# Patient Record
Sex: FEMALE | Race: WHITE | Employment: OTHER | URBAN - NONMETROPOLITAN AREA
[De-identification: names, ages, dates, MRNs, and addresses within clinical notes are randomized per-mention and may not be internally consistent; named-entity substitution may affect disease eponyms.]

---

## 2017-01-11 ENCOUNTER — OFFICE VISIT (OUTPATIENT)
Dept: PRIMARY CARE CLINIC | Age: 62
End: 2017-01-11
Payer: MEDICARE

## 2017-01-11 VITALS
WEIGHT: 165 LBS | TEMPERATURE: 98.8 F | BODY MASS INDEX: 25.9 KG/M2 | RESPIRATION RATE: 16 BRPM | DIASTOLIC BLOOD PRESSURE: 80 MMHG | SYSTOLIC BLOOD PRESSURE: 130 MMHG | OXYGEN SATURATION: 96 % | HEART RATE: 80 BPM | HEIGHT: 67 IN

## 2017-01-11 DIAGNOSIS — E55.9 VITAMIN D DEFICIENCY: ICD-10-CM

## 2017-01-11 DIAGNOSIS — N28.1 RENAL CYST: ICD-10-CM

## 2017-01-11 DIAGNOSIS — J44.1 COPD EXACERBATION (HCC): ICD-10-CM

## 2017-01-11 DIAGNOSIS — J44.9 CHRONIC OBSTRUCTIVE PULMONARY DISEASE, UNSPECIFIED COPD TYPE (HCC): ICD-10-CM

## 2017-01-11 DIAGNOSIS — Z12.31 SCREENING MAMMOGRAM, ENCOUNTER FOR: ICD-10-CM

## 2017-01-11 DIAGNOSIS — F32.A DEPRESSION, UNSPECIFIED DEPRESSION TYPE: ICD-10-CM

## 2017-01-11 DIAGNOSIS — E78.5 HYPERLIPIDEMIA, UNSPECIFIED HYPERLIPIDEMIA TYPE: ICD-10-CM

## 2017-01-11 DIAGNOSIS — K21.9 GASTROESOPHAGEAL REFLUX DISEASE, ESOPHAGITIS PRESENCE NOT SPECIFIED: ICD-10-CM

## 2017-01-11 DIAGNOSIS — G25.81 RESTLESS LEGS SYNDROME: Primary | ICD-10-CM

## 2017-01-11 DIAGNOSIS — E03.9 HYPOTHYROIDISM, UNSPECIFIED TYPE: ICD-10-CM

## 2017-01-11 PROCEDURE — 99214 OFFICE O/P EST MOD 30 MIN: CPT | Performed by: FAMILY MEDICINE

## 2017-01-11 PROCEDURE — G8427 DOCREV CUR MEDS BY ELIG CLIN: HCPCS | Performed by: FAMILY MEDICINE

## 2017-01-11 PROCEDURE — 3023F SPIROM DOC REV: CPT | Performed by: FAMILY MEDICINE

## 2017-01-11 PROCEDURE — G8484 FLU IMMUNIZE NO ADMIN: HCPCS | Performed by: FAMILY MEDICINE

## 2017-01-11 PROCEDURE — G8926 SPIRO NO PERF OR DOC: HCPCS | Performed by: FAMILY MEDICINE

## 2017-01-11 PROCEDURE — G8419 CALC BMI OUT NRM PARAM NOF/U: HCPCS | Performed by: FAMILY MEDICINE

## 2017-01-11 PROCEDURE — 3014F SCREEN MAMMO DOC REV: CPT | Performed by: FAMILY MEDICINE

## 2017-01-11 PROCEDURE — 96372 THER/PROPH/DIAG INJ SC/IM: CPT | Performed by: FAMILY MEDICINE

## 2017-01-11 PROCEDURE — 1036F TOBACCO NON-USER: CPT | Performed by: FAMILY MEDICINE

## 2017-01-11 PROCEDURE — 3017F COLORECTAL CA SCREEN DOC REV: CPT | Performed by: FAMILY MEDICINE

## 2017-01-11 RX ORDER — METHYLPREDNISOLONE SODIUM SUCCINATE 40 MG/ML
40 INJECTION, POWDER, LYOPHILIZED, FOR SOLUTION INTRAMUSCULAR; INTRAVENOUS ONCE
Status: COMPLETED | OUTPATIENT
Start: 2017-01-11 | End: 2017-01-11

## 2017-01-11 RX ORDER — CHOLECALCIFEROL (VITAMIN D3) 1250 MCG
1 CAPSULE ORAL WEEKLY
Qty: 12 CAPSULE | Refills: 3 | Status: SHIPPED | OUTPATIENT
Start: 2017-01-11 | End: 2022-05-03

## 2017-01-11 RX ORDER — TIZANIDINE 4 MG/1
4 TABLET ORAL EVERY 8 HOURS PRN
Status: CANCELLED | OUTPATIENT
Start: 2017-01-11

## 2017-01-11 RX ORDER — CITALOPRAM 40 MG/1
TABLET ORAL
Qty: 30 TABLET | Refills: 6 | Status: SHIPPED | OUTPATIENT
Start: 2017-01-11 | End: 2018-02-20 | Stop reason: SDUPTHER

## 2017-01-11 RX ORDER — PREDNISONE 20 MG/1
40 TABLET ORAL DAILY
Qty: 10 TABLET | Refills: 0 | Status: SHIPPED | OUTPATIENT
Start: 2017-01-11 | End: 2017-01-16

## 2017-01-11 RX ORDER — LEVOTHYROXINE SODIUM 112 UG/1
TABLET ORAL
Qty: 30 TABLET | Refills: 11 | Status: SHIPPED | OUTPATIENT
Start: 2017-01-11 | End: 2018-02-09 | Stop reason: SDUPTHER

## 2017-01-11 RX ORDER — ROPINIROLE 0.25 MG/1
0.25 TABLET, FILM COATED ORAL NIGHTLY
Qty: 30 TABLET | Refills: 5 | Status: SHIPPED | OUTPATIENT
Start: 2017-01-11 | End: 2018-02-09 | Stop reason: SDUPTHER

## 2017-01-11 RX ORDER — DOXYCYCLINE 100 MG/1
100 CAPSULE ORAL 2 TIMES DAILY
Qty: 20 CAPSULE | Refills: 0 | Status: SHIPPED | OUTPATIENT
Start: 2017-01-11 | End: 2017-08-07

## 2017-01-11 RX ORDER — ALENDRONATE SODIUM 35 MG/1
TABLET ORAL
Qty: 4 TABLET | Refills: 6 | Status: ON HOLD | OUTPATIENT
Start: 2017-01-11 | End: 2019-03-13

## 2017-01-11 RX ORDER — GABAPENTIN 600 MG/1
TABLET ORAL
Qty: 90 TABLET | Refills: 11 | Status: SHIPPED | OUTPATIENT
Start: 2017-01-11 | End: 2018-01-29 | Stop reason: SDUPTHER

## 2017-01-11 RX ORDER — MIRTAZAPINE 15 MG/1
TABLET, FILM COATED ORAL
Qty: 30 TABLET | Refills: 5 | Status: SHIPPED | OUTPATIENT
Start: 2017-01-11 | End: 2017-07-31 | Stop reason: SDUPTHER

## 2017-01-11 RX ORDER — ALBUTEROL SULFATE 90 UG/1
2 AEROSOL, METERED RESPIRATORY (INHALATION) PRN
Qty: 1 INHALER | Refills: 6 | Status: SHIPPED | OUTPATIENT
Start: 2017-01-11 | End: 2018-01-25 | Stop reason: SDUPTHER

## 2017-01-11 RX ADMIN — METHYLPREDNISOLONE SODIUM SUCCINATE 40 MG: 40 INJECTION, POWDER, LYOPHILIZED, FOR SOLUTION INTRAMUSCULAR; INTRAVENOUS at 14:57

## 2017-01-17 ENCOUNTER — HOSPITAL ENCOUNTER (OUTPATIENT)
Dept: WOMENS IMAGING | Age: 62
Discharge: HOME | End: 2017-01-17
Payer: MEDICARE

## 2017-01-17 ENCOUNTER — HOSPITAL ENCOUNTER (OUTPATIENT)
Dept: ULTRASOUND IMAGING | Age: 62
Discharge: HOME | End: 2017-01-17
Payer: MEDICARE

## 2017-01-17 DIAGNOSIS — N28.1 RENAL CYST: ICD-10-CM

## 2017-01-17 DIAGNOSIS — E78.5 HYPERLIPIDEMIA, UNSPECIFIED HYPERLIPIDEMIA TYPE: ICD-10-CM

## 2017-01-17 DIAGNOSIS — G25.81 RESTLESS LEGS SYNDROME: ICD-10-CM

## 2017-01-17 DIAGNOSIS — Z12.31 SCREENING MAMMOGRAM, ENCOUNTER FOR: ICD-10-CM

## 2017-01-17 DIAGNOSIS — E03.9 HYPOTHYROIDISM, UNSPECIFIED TYPE: ICD-10-CM

## 2017-01-17 LAB
ALBUMIN SERPL-MCNC: 3.9 G/DL (ref 3.5–5.2)
ALP BLD-CCNC: 137 U/L (ref 35–104)
ALT SERPL-CCNC: 15 U/L (ref 5–33)
ANION GAP SERPL CALCULATED.3IONS-SCNC: 15 MMOL/L (ref 7–19)
AST SERPL-CCNC: 13 U/L (ref 5–32)
BILIRUB SERPL-MCNC: 0.3 MG/DL (ref 0.2–1.2)
BUN BLDV-MCNC: 7 MG/DL (ref 6–20)
CALCIUM SERPL-MCNC: 8.7 MG/DL (ref 8.6–10)
CHLORIDE BLD-SCNC: 100 MMOL/L (ref 98–111)
CHOLESTEROL, TOTAL: 192 MG/DL (ref 160–199)
CO2: 29 MMOL/L (ref 22–29)
CREAT SERPL-MCNC: 0.6 MG/DL (ref 0.5–0.9)
GFR NON-AFRICAN AMERICAN: >60
GLOBULIN: 2.5 G/DL
GLUCOSE BLD-MCNC: 83 MG/DL (ref 74–109)
HDLC SERPL-MCNC: 68 MG/DL (ref 65–121)
LDL CHOLESTEROL CALCULATED: 82 MG/DL
POTASSIUM SERPL-SCNC: 3.9 MMOL/L (ref 3.5–5)
SODIUM BLD-SCNC: 144 MMOL/L (ref 136–145)
T4 FREE: 1.2 NG/ML (ref 0.9–1.7)
TOTAL PROTEIN: 6.4 G/DL (ref 6.6–8.7)
TRIGL SERPL-MCNC: 209 MG/DL (ref 150–199)
TSH SERPL DL<=0.05 MIU/L-ACNC: 2.63 UIU/ML (ref 0.27–4.2)

## 2017-01-17 PROCEDURE — 76770 US EXAM ABDO BACK WALL COMP: CPT

## 2017-01-17 PROCEDURE — 77063 BREAST TOMOSYNTHESIS BI: CPT

## 2017-01-20 ENCOUNTER — TELEPHONE (OUTPATIENT)
Dept: PRIMARY CARE CLINIC | Age: 62
End: 2017-01-20

## 2017-01-25 ASSESSMENT — ENCOUNTER SYMPTOMS
CHEST TIGHTNESS: 1
HEMOPTYSIS: 0
COUGH: 1
WHEEZING: 1
COLOR CHANGE: 0

## 2017-01-25 ASSESSMENT — COPD QUESTIONNAIRES: COPD: 1

## 2017-01-31 ENCOUNTER — TELEPHONE (OUTPATIENT)
Dept: PRIMARY CARE CLINIC | Age: 62
End: 2017-01-31

## 2017-01-31 DIAGNOSIS — N28.1 CYST OF LEFT KIDNEY: Primary | ICD-10-CM

## 2017-04-11 ENCOUNTER — OFFICE VISIT (OUTPATIENT)
Dept: PRIMARY CARE CLINIC | Age: 62
End: 2017-04-11
Payer: MEDICARE

## 2017-04-11 VITALS
DIASTOLIC BLOOD PRESSURE: 84 MMHG | HEIGHT: 67 IN | BODY MASS INDEX: 26.3 KG/M2 | SYSTOLIC BLOOD PRESSURE: 122 MMHG | OXYGEN SATURATION: 97 % | HEART RATE: 90 BPM | WEIGHT: 167.6 LBS

## 2017-04-11 DIAGNOSIS — E03.9 HYPOTHYROIDISM, UNSPECIFIED TYPE: ICD-10-CM

## 2017-04-11 DIAGNOSIS — G47.33 OBSTRUCTIVE SLEEP APNEA: ICD-10-CM

## 2017-04-11 DIAGNOSIS — N30.01 ACUTE CYSTITIS WITH HEMATURIA: ICD-10-CM

## 2017-04-11 DIAGNOSIS — R35.0 INCREASED FREQUENCY OF URINATION: Primary | ICD-10-CM

## 2017-04-11 LAB
BILIRUBIN, POC: NORMAL
BLOOD URINE, POC: NORMAL
CLARITY, POC: CLEAR
COLOR, POC: YELLOW
GLUCOSE URINE, POC: NORMAL
KETONES, POC: NORMAL
LEUKOCYTE EST, POC: NORMAL
NITRITE, POC: NORMAL
PH, POC: 6
PROTEIN, POC: NORMAL
SPECIFIC GRAVITY, POC: 1.02
UROBILINOGEN, POC: 0.2

## 2017-04-11 PROCEDURE — G8427 DOCREV CUR MEDS BY ELIG CLIN: HCPCS | Performed by: FAMILY MEDICINE

## 2017-04-11 PROCEDURE — 3014F SCREEN MAMMO DOC REV: CPT | Performed by: FAMILY MEDICINE

## 2017-04-11 PROCEDURE — 99214 OFFICE O/P EST MOD 30 MIN: CPT | Performed by: FAMILY MEDICINE

## 2017-04-11 PROCEDURE — G8419 CALC BMI OUT NRM PARAM NOF/U: HCPCS | Performed by: FAMILY MEDICINE

## 2017-04-11 PROCEDURE — 3017F COLORECTAL CA SCREEN DOC REV: CPT | Performed by: FAMILY MEDICINE

## 2017-04-11 PROCEDURE — 1036F TOBACCO NON-USER: CPT | Performed by: FAMILY MEDICINE

## 2017-04-11 PROCEDURE — 81002 URINALYSIS NONAUTO W/O SCOPE: CPT | Performed by: FAMILY MEDICINE

## 2017-04-11 RX ORDER — NITROFURANTOIN 25; 75 MG/1; MG/1
100 CAPSULE ORAL 2 TIMES DAILY
Qty: 20 CAPSULE | Refills: 0 | Status: SHIPPED | OUTPATIENT
Start: 2017-04-11 | End: 2017-04-21

## 2017-04-11 RX ORDER — MODAFINIL 200 MG/1
TABLET ORAL
Qty: 60 TABLET | Refills: 3 | Status: SHIPPED | OUTPATIENT
Start: 2017-04-11 | End: 2017-07-31 | Stop reason: SDUPTHER

## 2017-04-29 ASSESSMENT — ENCOUNTER SYMPTOMS
COUGH: 0
SHORTNESS OF BREATH: 0

## 2017-07-31 DIAGNOSIS — F32.A DEPRESSION, UNSPECIFIED DEPRESSION TYPE: ICD-10-CM

## 2017-08-01 RX ORDER — MIRTAZAPINE 15 MG/1
TABLET, FILM COATED ORAL
Qty: 30 TABLET | Refills: 0 | Status: SHIPPED | OUTPATIENT
Start: 2017-08-01 | End: 2017-08-31 | Stop reason: SDUPTHER

## 2017-08-01 RX ORDER — MODAFINIL 200 MG/1
TABLET ORAL
Qty: 60 TABLET | Refills: 3 | Status: SHIPPED | OUTPATIENT
Start: 2017-08-01 | End: 2017-12-08 | Stop reason: SDUPTHER

## 2017-08-07 ENCOUNTER — OFFICE VISIT (OUTPATIENT)
Dept: PRIMARY CARE CLINIC | Age: 62
End: 2017-08-07
Payer: MEDICARE

## 2017-08-07 VITALS
WEIGHT: 162.8 LBS | SYSTOLIC BLOOD PRESSURE: 136 MMHG | TEMPERATURE: 98.9 F | OXYGEN SATURATION: 95 % | HEART RATE: 82 BPM | BODY MASS INDEX: 25.55 KG/M2 | HEIGHT: 67 IN | DIASTOLIC BLOOD PRESSURE: 84 MMHG

## 2017-08-07 DIAGNOSIS — Z99.89 CPAP (CONTINUOUS POSITIVE AIRWAY PRESSURE) DEPENDENCE: ICD-10-CM

## 2017-08-07 DIAGNOSIS — Z23 NEED FOR ZOSTAVAX ADMINISTRATION: ICD-10-CM

## 2017-08-07 DIAGNOSIS — G25.81 RESTLESS LEGS SYNDROME: ICD-10-CM

## 2017-08-07 DIAGNOSIS — E03.9 HYPOTHYROIDISM, UNSPECIFIED TYPE: ICD-10-CM

## 2017-08-07 DIAGNOSIS — G47.33 OBSTRUCTIVE SLEEP APNEA: ICD-10-CM

## 2017-08-07 DIAGNOSIS — E78.5 HYPERLIPIDEMIA, UNSPECIFIED HYPERLIPIDEMIA TYPE: ICD-10-CM

## 2017-08-07 DIAGNOSIS — N28.1 RENAL CYST: Primary | ICD-10-CM

## 2017-08-07 PROCEDURE — G8427 DOCREV CUR MEDS BY ELIG CLIN: HCPCS | Performed by: FAMILY MEDICINE

## 2017-08-07 PROCEDURE — G8419 CALC BMI OUT NRM PARAM NOF/U: HCPCS | Performed by: FAMILY MEDICINE

## 2017-08-07 PROCEDURE — 3017F COLORECTAL CA SCREEN DOC REV: CPT | Performed by: FAMILY MEDICINE

## 2017-08-07 PROCEDURE — 3014F SCREEN MAMMO DOC REV: CPT | Performed by: FAMILY MEDICINE

## 2017-08-07 PROCEDURE — 1036F TOBACCO NON-USER: CPT | Performed by: FAMILY MEDICINE

## 2017-08-07 PROCEDURE — 99214 OFFICE O/P EST MOD 30 MIN: CPT | Performed by: FAMILY MEDICINE

## 2017-08-11 ASSESSMENT — ENCOUNTER SYMPTOMS
HEMOPTYSIS: 0
COUGH: 0
CHEST TIGHTNESS: 1
WHEEZING: 0
SHORTNESS OF BREATH: 0
COLOR CHANGE: 0

## 2017-08-11 ASSESSMENT — COPD QUESTIONNAIRES: COPD: 1

## 2017-08-25 ENCOUNTER — OFFICE VISIT (OUTPATIENT)
Dept: URGENT CARE | Age: 62
End: 2017-08-25
Payer: MEDICARE

## 2017-08-25 VITALS
WEIGHT: 161 LBS | TEMPERATURE: 98.4 F | HEART RATE: 84 BPM | BODY MASS INDEX: 25.27 KG/M2 | DIASTOLIC BLOOD PRESSURE: 74 MMHG | OXYGEN SATURATION: 96 % | SYSTOLIC BLOOD PRESSURE: 131 MMHG | HEIGHT: 67 IN | RESPIRATION RATE: 18 BRPM

## 2017-08-25 DIAGNOSIS — J02.0 STREP PHARYNGITIS: Primary | ICD-10-CM

## 2017-08-25 DIAGNOSIS — J02.9 SORE THROAT: ICD-10-CM

## 2017-08-25 DIAGNOSIS — J06.9 URI WITH COUGH AND CONGESTION: ICD-10-CM

## 2017-08-25 LAB — S PYO AG THROAT QL: POSITIVE

## 2017-08-25 PROCEDURE — 87880 STREP A ASSAY W/OPTIC: CPT | Performed by: NURSE PRACTITIONER

## 2017-08-25 PROCEDURE — 99213 OFFICE O/P EST LOW 20 MIN: CPT | Performed by: NURSE PRACTITIONER

## 2017-08-25 PROCEDURE — G8419 CALC BMI OUT NRM PARAM NOF/U: HCPCS | Performed by: NURSE PRACTITIONER

## 2017-08-25 PROCEDURE — 1036F TOBACCO NON-USER: CPT | Performed by: NURSE PRACTITIONER

## 2017-08-25 PROCEDURE — 3014F SCREEN MAMMO DOC REV: CPT | Performed by: NURSE PRACTITIONER

## 2017-08-25 PROCEDURE — G8427 DOCREV CUR MEDS BY ELIG CLIN: HCPCS | Performed by: NURSE PRACTITIONER

## 2017-08-25 PROCEDURE — 3017F COLORECTAL CA SCREEN DOC REV: CPT | Performed by: NURSE PRACTITIONER

## 2017-08-25 RX ORDER — AMOXICILLIN AND CLAVULANATE POTASSIUM 875; 125 MG/1; MG/1
1 TABLET, FILM COATED ORAL EVERY 12 HOURS
Qty: 20 TABLET | Refills: 0 | Status: SHIPPED | OUTPATIENT
Start: 2017-08-25 | End: 2017-09-04

## 2017-08-25 RX ORDER — BENZONATATE 100 MG/1
100 CAPSULE ORAL 3 TIMES DAILY PRN
Qty: 21 CAPSULE | Refills: 0 | Status: SHIPPED | OUTPATIENT
Start: 2017-08-25 | End: 2017-09-01

## 2017-08-25 RX ORDER — METHYLPREDNISOLONE 4 MG/1
TABLET ORAL
Qty: 1 KIT | Refills: 0 | Status: SHIPPED | OUTPATIENT
Start: 2017-08-25 | End: 2017-08-31

## 2017-08-25 RX ORDER — GUAIFENESIN AND DEXTROMETHORPHAN HYDROBROMIDE 1200; 60 MG/1; MG/1
1 TABLET, EXTENDED RELEASE ORAL EVERY 12 HOURS
Qty: 28 TABLET | Refills: 0 | Status: SHIPPED | OUTPATIENT
Start: 2017-08-25 | End: 2018-02-21

## 2017-08-25 ASSESSMENT — ENCOUNTER SYMPTOMS
SINUS PRESSURE: 1
SHORTNESS OF BREATH: 1
COUGH: 1

## 2017-08-31 DIAGNOSIS — F32.A DEPRESSION, UNSPECIFIED DEPRESSION TYPE: ICD-10-CM

## 2017-08-31 RX ORDER — MIRTAZAPINE 15 MG/1
TABLET, FILM COATED ORAL
Qty: 30 TABLET | Refills: 0 | Status: SHIPPED | OUTPATIENT
Start: 2017-08-31 | End: 2017-10-05 | Stop reason: SDUPTHER

## 2017-09-05 ENCOUNTER — HOSPITAL ENCOUNTER (OUTPATIENT)
Dept: GENERAL RADIOLOGY | Age: 62
Discharge: HOME | End: 2017-09-05
Payer: MEDICARE

## 2017-09-05 ENCOUNTER — OFFICE VISIT (OUTPATIENT)
Dept: URGENT CARE | Age: 62
End: 2017-09-05
Payer: MEDICARE

## 2017-09-05 VITALS
RESPIRATION RATE: 18 BRPM | OXYGEN SATURATION: 98 % | BODY MASS INDEX: 25.27 KG/M2 | WEIGHT: 161 LBS | HEART RATE: 98 BPM | TEMPERATURE: 97.3 F | HEIGHT: 67 IN | SYSTOLIC BLOOD PRESSURE: 139 MMHG | DIASTOLIC BLOOD PRESSURE: 80 MMHG

## 2017-09-05 DIAGNOSIS — R06.2 WHEEZING: ICD-10-CM

## 2017-09-05 DIAGNOSIS — R06.02 SHORTNESS OF BREATH: ICD-10-CM

## 2017-09-05 DIAGNOSIS — J06.9 URI WITH COUGH AND CONGESTION: Primary | ICD-10-CM

## 2017-09-05 PROCEDURE — 96372 THER/PROPH/DIAG INJ SC/IM: CPT | Performed by: NURSE PRACTITIONER

## 2017-09-05 PROCEDURE — G8419 CALC BMI OUT NRM PARAM NOF/U: HCPCS | Performed by: NURSE PRACTITIONER

## 2017-09-05 PROCEDURE — 99213 OFFICE O/P EST LOW 20 MIN: CPT | Performed by: NURSE PRACTITIONER

## 2017-09-05 PROCEDURE — 3014F SCREEN MAMMO DOC REV: CPT | Performed by: NURSE PRACTITIONER

## 2017-09-05 PROCEDURE — 1036F TOBACCO NON-USER: CPT | Performed by: NURSE PRACTITIONER

## 2017-09-05 PROCEDURE — G8427 DOCREV CUR MEDS BY ELIG CLIN: HCPCS | Performed by: NURSE PRACTITIONER

## 2017-09-05 PROCEDURE — 3017F COLORECTAL CA SCREEN DOC REV: CPT | Performed by: NURSE PRACTITIONER

## 2017-09-05 PROCEDURE — 71020 XR CHEST STANDARD TWO VW: CPT

## 2017-09-05 RX ORDER — FLUTICASONE PROPIONATE 50 MCG
1 SPRAY, SUSPENSION (ML) NASAL DAILY
Qty: 1 BOTTLE | Refills: 0 | Status: SHIPPED | OUTPATIENT
Start: 2017-09-05 | End: 2018-02-21

## 2017-09-05 RX ORDER — DEXTROMETHORPHAN HYDROBROMIDE AND PROMETHAZINE HYDROCHLORIDE 15; 6.25 MG/5ML; MG/5ML
5 SYRUP ORAL NIGHTLY PRN
Qty: 118 ML | Refills: 0 | Status: SHIPPED | OUTPATIENT
Start: 2017-09-05 | End: 2017-09-12

## 2017-09-05 RX ORDER — DEXAMETHASONE SODIUM PHOSPHATE 10 MG/ML
10 INJECTION INTRAMUSCULAR; INTRAVENOUS ONCE
Status: COMPLETED | OUTPATIENT
Start: 2017-09-05 | End: 2017-09-05

## 2017-09-05 RX ORDER — CETIRIZINE HYDROCHLORIDE 10 MG/1
10 TABLET ORAL DAILY
Qty: 30 TABLET | Refills: 0 | Status: SHIPPED | OUTPATIENT
Start: 2017-09-05 | End: 2018-02-21

## 2017-09-05 RX ADMIN — DEXAMETHASONE SODIUM PHOSPHATE 10 MG: 10 INJECTION INTRAMUSCULAR; INTRAVENOUS at 12:35

## 2017-09-05 ASSESSMENT — ENCOUNTER SYMPTOMS
COUGH: 1
WHEEZING: 1
SHORTNESS OF BREATH: 1
SORE THROAT: 0

## 2017-10-05 DIAGNOSIS — F32.A DEPRESSION, UNSPECIFIED DEPRESSION TYPE: ICD-10-CM

## 2017-10-06 RX ORDER — MIRTAZAPINE 15 MG/1
TABLET, FILM COATED ORAL
Qty: 30 TABLET | Refills: 0 | Status: SHIPPED | OUTPATIENT
Start: 2017-10-06 | End: 2017-11-10 | Stop reason: SDUPTHER

## 2017-11-10 DIAGNOSIS — F32.A DEPRESSION, UNSPECIFIED DEPRESSION TYPE: ICD-10-CM

## 2017-11-10 RX ORDER — MIRTAZAPINE 15 MG/1
TABLET, FILM COATED ORAL
Qty: 30 TABLET | Refills: 0 | Status: SHIPPED | OUTPATIENT
Start: 2017-11-10 | End: 2017-12-11 | Stop reason: SDUPTHER

## 2017-12-08 RX ORDER — MODAFINIL 200 MG/1
TABLET ORAL
Qty: 60 TABLET | Refills: 3 | Status: SHIPPED | OUTPATIENT
Start: 2017-12-08 | End: 2018-04-23 | Stop reason: SDUPTHER

## 2017-12-08 NOTE — TELEPHONE ENCOUNTER
patient called left message with request for refill on providgal. Last office visit 9-5 with next scheduled appointment 2-7  Dose verified.    Last UDS  unknown    Last fill per chart 8-1 with 3

## 2017-12-11 DIAGNOSIS — F32.A DEPRESSION, UNSPECIFIED DEPRESSION TYPE: ICD-10-CM

## 2017-12-12 RX ORDER — MIRTAZAPINE 15 MG/1
TABLET, FILM COATED ORAL
Qty: 30 TABLET | Refills: 0 | Status: SHIPPED | OUTPATIENT
Start: 2017-12-12 | End: 2017-12-22 | Stop reason: SDUPTHER

## 2017-12-22 DIAGNOSIS — F32.A DEPRESSION, UNSPECIFIED DEPRESSION TYPE: ICD-10-CM

## 2017-12-22 RX ORDER — MIRTAZAPINE 15 MG/1
TABLET, FILM COATED ORAL
Qty: 30 TABLET | Refills: 0 | Status: SHIPPED | OUTPATIENT
Start: 2017-12-22 | End: 2018-02-15 | Stop reason: SDUPTHER

## 2018-01-25 DIAGNOSIS — J44.9 CHRONIC OBSTRUCTIVE PULMONARY DISEASE, UNSPECIFIED COPD TYPE (HCC): ICD-10-CM

## 2018-01-30 RX ORDER — GABAPENTIN 600 MG/1
TABLET ORAL
Qty: 90 TABLET | Refills: 5 | Status: SHIPPED | OUTPATIENT
Start: 2018-01-30 | End: 2018-08-16 | Stop reason: SDUPTHER

## 2018-02-09 DIAGNOSIS — G25.81 RESTLESS LEGS SYNDROME: ICD-10-CM

## 2018-02-09 RX ORDER — ROPINIROLE 0.25 MG/1
TABLET, FILM COATED ORAL
Qty: 30 TABLET | Refills: 0 | Status: SHIPPED | OUTPATIENT
Start: 2018-02-09 | End: 2018-03-16 | Stop reason: SDUPTHER

## 2018-02-09 RX ORDER — LEVOTHYROXINE SODIUM 112 UG/1
TABLET ORAL
Qty: 30 TABLET | Refills: 0 | Status: SHIPPED | OUTPATIENT
Start: 2018-02-09 | End: 2018-03-12 | Stop reason: SDUPTHER

## 2018-02-15 DIAGNOSIS — F32.A DEPRESSION, UNSPECIFIED DEPRESSION TYPE: ICD-10-CM

## 2018-02-17 RX ORDER — MIRTAZAPINE 15 MG/1
TABLET, FILM COATED ORAL
Qty: 30 TABLET | Refills: 0 | Status: SHIPPED | OUTPATIENT
Start: 2018-02-17 | End: 2018-05-21 | Stop reason: SDUPTHER

## 2018-02-21 ENCOUNTER — OFFICE VISIT (OUTPATIENT)
Dept: PRIMARY CARE CLINIC | Age: 63
End: 2018-02-21
Payer: MEDICARE

## 2018-02-21 VITALS
WEIGHT: 158.6 LBS | HEIGHT: 67 IN | HEART RATE: 75 BPM | DIASTOLIC BLOOD PRESSURE: 76 MMHG | BODY MASS INDEX: 24.89 KG/M2 | TEMPERATURE: 98.6 F | OXYGEN SATURATION: 93 % | SYSTOLIC BLOOD PRESSURE: 110 MMHG

## 2018-02-21 DIAGNOSIS — Z99.89 CPAP (CONTINUOUS POSITIVE AIRWAY PRESSURE) DEPENDENCE: ICD-10-CM

## 2018-02-21 DIAGNOSIS — E78.5 HYPERLIPIDEMIA, UNSPECIFIED HYPERLIPIDEMIA TYPE: ICD-10-CM

## 2018-02-21 DIAGNOSIS — E03.9 HYPOTHYROIDISM, UNSPECIFIED TYPE: Primary | ICD-10-CM

## 2018-02-21 DIAGNOSIS — N28.1 RENAL CYST: ICD-10-CM

## 2018-02-21 DIAGNOSIS — F32.A DEPRESSION, UNSPECIFIED DEPRESSION TYPE: ICD-10-CM

## 2018-02-21 DIAGNOSIS — Z23 NEEDS FLU SHOT: ICD-10-CM

## 2018-02-21 PROCEDURE — 90686 IIV4 VACC NO PRSV 0.5 ML IM: CPT | Performed by: FAMILY MEDICINE

## 2018-02-21 PROCEDURE — G0008 ADMIN INFLUENZA VIRUS VAC: HCPCS | Performed by: FAMILY MEDICINE

## 2018-02-21 PROCEDURE — 99214 OFFICE O/P EST MOD 30 MIN: CPT | Performed by: FAMILY MEDICINE

## 2018-02-21 PROCEDURE — 3017F COLORECTAL CA SCREEN DOC REV: CPT | Performed by: FAMILY MEDICINE

## 2018-02-21 PROCEDURE — G8427 DOCREV CUR MEDS BY ELIG CLIN: HCPCS | Performed by: FAMILY MEDICINE

## 2018-02-21 PROCEDURE — G8484 FLU IMMUNIZE NO ADMIN: HCPCS | Performed by: FAMILY MEDICINE

## 2018-02-21 PROCEDURE — 3014F SCREEN MAMMO DOC REV: CPT | Performed by: FAMILY MEDICINE

## 2018-02-21 PROCEDURE — 1036F TOBACCO NON-USER: CPT | Performed by: FAMILY MEDICINE

## 2018-02-21 PROCEDURE — G8420 CALC BMI NORM PARAMETERS: HCPCS | Performed by: FAMILY MEDICINE

## 2018-02-21 ASSESSMENT — PATIENT HEALTH QUESTIONNAIRE - PHQ9
SUM OF ALL RESPONSES TO PHQ9 QUESTIONS 1 & 2: 0
2. FEELING DOWN, DEPRESSED OR HOPELESS: 0
SUM OF ALL RESPONSES TO PHQ QUESTIONS 1-9: 0
1. LITTLE INTEREST OR PLEASURE IN DOING THINGS: 0

## 2018-02-21 ASSESSMENT — ENCOUNTER SYMPTOMS
COLOR CHANGE: 0
COUGH: 0
SHORTNESS OF BREATH: 0

## 2018-03-05 DIAGNOSIS — E55.9 VITAMIN D DEFICIENCY: ICD-10-CM

## 2018-03-05 RX ORDER — ERGOCALCIFEROL 1.25 MG/1
CAPSULE ORAL WEEKLY
Qty: 4 CAPSULE | Refills: 2 | Status: SHIPPED | OUTPATIENT
Start: 2018-03-05 | End: 2018-11-30 | Stop reason: SDUPTHER

## 2018-03-09 ENCOUNTER — HOSPITAL ENCOUNTER (OUTPATIENT)
Dept: ULTRASOUND IMAGING | Age: 63
Discharge: HOME | End: 2018-03-09
Payer: MEDICARE

## 2018-03-09 DIAGNOSIS — E03.9 HYPOTHYROIDISM, UNSPECIFIED TYPE: ICD-10-CM

## 2018-03-09 DIAGNOSIS — E78.5 HYPERLIPIDEMIA, UNSPECIFIED HYPERLIPIDEMIA TYPE: ICD-10-CM

## 2018-03-09 DIAGNOSIS — N28.1 RENAL CYST: ICD-10-CM

## 2018-03-09 LAB
ALBUMIN SERPL-MCNC: 4.2 G/DL (ref 3.5–5.2)
ALP BLD-CCNC: 146 U/L (ref 35–104)
ALT SERPL-CCNC: 12 U/L (ref 5–33)
ANION GAP SERPL CALCULATED.3IONS-SCNC: 11 MMOL/L (ref 7–19)
AST SERPL-CCNC: 14 U/L (ref 5–32)
BILIRUB SERPL-MCNC: 0.3 MG/DL (ref 0.2–1.2)
BUN BLDV-MCNC: 11 MG/DL (ref 8–23)
CALCIUM SERPL-MCNC: 9.1 MG/DL (ref 8.8–10.2)
CHLORIDE BLD-SCNC: 100 MMOL/L (ref 98–111)
CHOLESTEROL, TOTAL: 190 MG/DL (ref 160–199)
CO2: 30 MMOL/L (ref 22–29)
CREAT SERPL-MCNC: 0.6 MG/DL (ref 0.5–0.9)
GFR NON-AFRICAN AMERICAN: >60
GLUCOSE BLD-MCNC: 97 MG/DL (ref 74–109)
HDLC SERPL-MCNC: 71 MG/DL (ref 65–121)
LDL CHOLESTEROL CALCULATED: 101 MG/DL
POTASSIUM SERPL-SCNC: 3.5 MMOL/L (ref 3.5–5)
SODIUM BLD-SCNC: 141 MMOL/L (ref 136–145)
T4 FREE: 1.5 NG/DL (ref 0.9–1.7)
TOTAL PROTEIN: 6.9 G/DL (ref 6.6–8.7)
TRIGL SERPL-MCNC: 91 MG/DL (ref 0–149)
TSH SERPL DL<=0.05 MIU/L-ACNC: 0.37 UIU/ML (ref 0.27–4.2)

## 2018-03-09 PROCEDURE — 76770 US EXAM ABDO BACK WALL COMP: CPT

## 2018-03-13 RX ORDER — LEVOTHYROXINE SODIUM 112 UG/1
TABLET ORAL
Qty: 30 TABLET | Refills: 11 | Status: SHIPPED | OUTPATIENT
Start: 2018-03-13 | End: 2019-04-15 | Stop reason: SDUPTHER

## 2018-03-14 ENCOUNTER — TELEPHONE (OUTPATIENT)
Dept: PRIMARY CARE CLINIC | Age: 63
End: 2018-03-14

## 2018-03-15 ENCOUNTER — TELEPHONE (OUTPATIENT)
Dept: PRIMARY CARE CLINIC | Age: 63
End: 2018-03-15

## 2018-04-23 RX ORDER — MODAFINIL 200 MG/1
TABLET ORAL
Qty: 60 TABLET | Refills: 0 | Status: SHIPPED | OUTPATIENT
Start: 2018-04-23 | End: 2018-05-10 | Stop reason: SDUPTHER

## 2018-05-01 ENCOUNTER — TELEPHONE (OUTPATIENT)
Dept: PRIMARY CARE CLINIC | Age: 63
End: 2018-05-01

## 2018-05-03 ENCOUNTER — TELEPHONE (OUTPATIENT)
Dept: PRIMARY CARE CLINIC | Age: 63
End: 2018-05-03

## 2018-05-09 ENCOUNTER — TELEPHONE (OUTPATIENT)
Dept: PRIMARY CARE CLINIC | Age: 63
End: 2018-05-09

## 2018-05-11 RX ORDER — MODAFINIL 200 MG/1
TABLET ORAL
Qty: 60 TABLET | Refills: 0 | Status: SHIPPED | OUTPATIENT
Start: 2018-05-11 | End: 2018-06-12 | Stop reason: SDUPTHER

## 2018-05-21 DIAGNOSIS — F32.A DEPRESSION, UNSPECIFIED DEPRESSION TYPE: ICD-10-CM

## 2018-05-23 RX ORDER — MIRTAZAPINE 15 MG/1
TABLET, FILM COATED ORAL
Qty: 30 TABLET | Refills: 3 | Status: SHIPPED | OUTPATIENT
Start: 2018-05-23 | End: 2018-11-02 | Stop reason: SDUPTHER

## 2018-06-12 DIAGNOSIS — G47.30 SLEEP APNEA, UNSPECIFIED TYPE: Primary | ICD-10-CM

## 2018-06-15 RX ORDER — MODAFINIL 200 MG/1
TABLET ORAL
Qty: 60 TABLET | Refills: 0 | Status: SHIPPED | OUTPATIENT
Start: 2018-06-15 | End: 2018-07-16 | Stop reason: SDUPTHER

## 2018-07-10 ENCOUNTER — APPOINTMENT (OUTPATIENT)
Dept: GENERAL RADIOLOGY | Age: 63
End: 2018-07-10
Payer: MEDICARE

## 2018-07-10 ENCOUNTER — APPOINTMENT (OUTPATIENT)
Dept: CT IMAGING | Age: 63
End: 2018-07-10
Payer: MEDICARE

## 2018-07-10 ENCOUNTER — HOSPITAL ENCOUNTER (EMERGENCY)
Age: 63
Discharge: HOME | End: 2018-07-10
Attending: EMERGENCY MEDICINE
Payer: MEDICARE

## 2018-07-10 VITALS
OXYGEN SATURATION: 93 % | DIASTOLIC BLOOD PRESSURE: 65 MMHG | HEART RATE: 89 BPM | WEIGHT: 160 LBS | HEIGHT: 67 IN | TEMPERATURE: 98 F | RESPIRATION RATE: 16 BRPM | BODY MASS INDEX: 25.11 KG/M2 | SYSTOLIC BLOOD PRESSURE: 110 MMHG

## 2018-07-10 DIAGNOSIS — M79.10 MYALGIA: ICD-10-CM

## 2018-07-10 DIAGNOSIS — E87.6 HYPOKALEMIA: ICD-10-CM

## 2018-07-10 DIAGNOSIS — R53.1 GENERALIZED WEAKNESS: Primary | ICD-10-CM

## 2018-07-10 DIAGNOSIS — R51.9 ACUTE NONINTRACTABLE HEADACHE, UNSPECIFIED HEADACHE TYPE: ICD-10-CM

## 2018-07-10 DIAGNOSIS — J44.1 COPD EXACERBATION (HCC): ICD-10-CM

## 2018-07-10 LAB
ALBUMIN SERPL-MCNC: 3.8 G/DL (ref 3.5–5.2)
ALP BLD-CCNC: 147 U/L (ref 35–104)
ALT SERPL-CCNC: 25 U/L (ref 5–33)
ANION GAP SERPL CALCULATED.3IONS-SCNC: 12 MMOL/L (ref 7–19)
AST SERPL-CCNC: 17 U/L (ref 5–32)
ATYPICAL LYMPHOCYTE RELATIVE PERCENT: 5 % (ref 0–8)
BASOPHILS ABSOLUTE: 0.1 K/UL (ref 0–0.2)
BASOPHILS MANUAL: 1 %
BASOPHILS RELATIVE PERCENT: 1 % (ref 0–1)
BILIRUB SERPL-MCNC: <0.2 MG/DL (ref 0.2–1.2)
BILIRUBIN URINE: NEGATIVE
BLOOD, URINE: NEGATIVE
BUN BLDV-MCNC: 5 MG/DL (ref 8–23)
CALCIUM SERPL-MCNC: 8.6 MG/DL (ref 8.8–10.2)
CHLORIDE BLD-SCNC: 101 MMOL/L (ref 98–111)
CLARITY: CLEAR
CO2: 31 MMOL/L (ref 22–29)
COLOR: YELLOW
CREAT SERPL-MCNC: 0.6 MG/DL (ref 0.5–0.9)
EOSINOPHILS ABSOLUTE: 0.12 K/UL (ref 0–0.6)
EOSINOPHILS RELATIVE PERCENT: 2 % (ref 0–5)
GFR NON-AFRICAN AMERICAN: >60
GLUCOSE BLD-MCNC: 99 MG/DL (ref 74–109)
GLUCOSE URINE: NEGATIVE MG/DL
HCT VFR BLD CALC: 34.7 % (ref 37–47)
HEMOGLOBIN: 11.4 G/DL (ref 12–16)
HYPOCHROMIA: ABNORMAL
KETONES, URINE: NEGATIVE MG/DL
LEUKOCYTE ESTERASE, URINE: NEGATIVE
LYMPHOCYTES ABSOLUTE: 2.6 K/UL (ref 1.1–4.5)
LYMPHOCYTES RELATIVE PERCENT: 37 % (ref 20–40)
MCH RBC QN AUTO: 29.5 PG (ref 27–31)
MCHC RBC AUTO-ENTMCNC: 32.9 G/DL (ref 33–37)
MCV RBC AUTO: 89.9 FL (ref 81–99)
MONOCYTES ABSOLUTE: 0.1 K/UL (ref 0–0.9)
MONOCYTES RELATIVE PERCENT: 1 % (ref 0–10)
NEUTROPHILS ABSOLUTE: 3.3 K/UL (ref 1.5–7.5)
NEUTROPHILS MANUAL: 54 %
NEUTROPHILS RELATIVE PERCENT: 54 % (ref 50–65)
NITRITE, URINE: NEGATIVE
OVALOCYTES: ABNORMAL
PDW BLD-RTO: 13.2 % (ref 11.5–14.5)
PERFORMED ON: NORMAL
PH UA: 6
PLATELET # BLD: 289 K/UL (ref 130–400)
PLATELET SLIDE REVIEW: ADEQUATE
PMV BLD AUTO: 9.6 FL (ref 9.4–12.3)
POC TROPONIN I: 0 NG/ML (ref 0–0.08)
POLYCHROMASIA: ABNORMAL
POTASSIUM SERPL-SCNC: 3 MMOL/L (ref 3.5–5)
PROTEIN UA: NEGATIVE MG/DL
RBC # BLD: 3.86 M/UL (ref 4.2–5.4)
SODIUM BLD-SCNC: 144 MMOL/L (ref 136–145)
SPECIFIC GRAVITY UA: 1.01
TOTAL CK: 47 U/L (ref 26–192)
TOTAL PROTEIN: 6.5 G/DL (ref 6.6–8.7)
TROPONIN: <0.01 NG/ML (ref 0–0.03)
URINE REFLEX TO CULTURE: NORMAL
UROBILINOGEN, URINE: 0.2 E.U./DL
WBC # BLD: 6.1 K/UL (ref 4.8–10.8)

## 2018-07-10 PROCEDURE — 85025 COMPLETE CBC W/AUTO DIFF WBC: CPT

## 2018-07-10 PROCEDURE — 6370000000 HC RX 637 (ALT 250 FOR IP): Performed by: EMERGENCY MEDICINE

## 2018-07-10 PROCEDURE — 84484 ASSAY OF TROPONIN QUANT: CPT

## 2018-07-10 PROCEDURE — 99285 EMERGENCY DEPT VISIT HI MDM: CPT

## 2018-07-10 PROCEDURE — 81003 URINALYSIS AUTO W/O SCOPE: CPT

## 2018-07-10 PROCEDURE — 96366 THER/PROPH/DIAG IV INF ADDON: CPT

## 2018-07-10 PROCEDURE — 80053 COMPREHEN METABOLIC PANEL: CPT

## 2018-07-10 PROCEDURE — 96375 TX/PRO/DX INJ NEW DRUG ADDON: CPT

## 2018-07-10 PROCEDURE — 6360000002 HC RX W HCPCS: Performed by: EMERGENCY MEDICINE

## 2018-07-10 PROCEDURE — 36415 COLL VENOUS BLD VENIPUNCTURE: CPT

## 2018-07-10 PROCEDURE — 70450 CT HEAD/BRAIN W/O DYE: CPT

## 2018-07-10 PROCEDURE — 94640 AIRWAY INHALATION TREATMENT: CPT

## 2018-07-10 PROCEDURE — 82550 ASSAY OF CK (CPK): CPT

## 2018-07-10 PROCEDURE — 96365 THER/PROPH/DIAG IV INF INIT: CPT

## 2018-07-10 PROCEDURE — 2580000003 HC RX 258: Performed by: EMERGENCY MEDICINE

## 2018-07-10 PROCEDURE — 99284 EMERGENCY DEPT VISIT MOD MDM: CPT | Performed by: EMERGENCY MEDICINE

## 2018-07-10 PROCEDURE — 71045 X-RAY EXAM CHEST 1 VIEW: CPT

## 2018-07-10 PROCEDURE — 93005 ELECTROCARDIOGRAM TRACING: CPT

## 2018-07-10 RX ORDER — ONDANSETRON 2 MG/ML
4 INJECTION INTRAMUSCULAR; INTRAVENOUS ONCE
Status: COMPLETED | OUTPATIENT
Start: 2018-07-10 | End: 2018-07-10

## 2018-07-10 RX ORDER — FENTANYL CITRATE 50 UG/ML
50 INJECTION, SOLUTION INTRAMUSCULAR; INTRAVENOUS ONCE
Status: COMPLETED | OUTPATIENT
Start: 2018-07-10 | End: 2018-07-10

## 2018-07-10 RX ORDER — POTASSIUM CHLORIDE 3 G/15ML
40 SOLUTION ORAL ONCE
Status: COMPLETED | OUTPATIENT
Start: 2018-07-10 | End: 2018-07-10

## 2018-07-10 RX ORDER — IPRATROPIUM BROMIDE AND ALBUTEROL SULFATE 2.5; .5 MG/3ML; MG/3ML
1 SOLUTION RESPIRATORY (INHALATION)
Status: DISCONTINUED | OUTPATIENT
Start: 2018-07-10 | End: 2018-07-10

## 2018-07-10 RX ORDER — MAGNESIUM SULFATE 1 G/100ML
1 INJECTION INTRAVENOUS ONCE
Status: COMPLETED | OUTPATIENT
Start: 2018-07-10 | End: 2018-07-10

## 2018-07-10 RX ORDER — IPRATROPIUM BROMIDE AND ALBUTEROL SULFATE 2.5; .5 MG/3ML; MG/3ML
1 SOLUTION RESPIRATORY (INHALATION) ONCE
Status: COMPLETED | OUTPATIENT
Start: 2018-07-10 | End: 2018-07-10

## 2018-07-10 RX ORDER — AZITHROMYCIN 250 MG/1
TABLET, FILM COATED ORAL
Qty: 1 PACKET | Refills: 0 | Status: SHIPPED | OUTPATIENT
Start: 2018-07-10 | End: 2018-07-14

## 2018-07-10 RX ORDER — 0.9 % SODIUM CHLORIDE 0.9 %
1000 INTRAVENOUS SOLUTION INTRAVENOUS ONCE
Status: COMPLETED | OUTPATIENT
Start: 2018-07-10 | End: 2018-07-10

## 2018-07-10 RX ORDER — METHYLPREDNISOLONE 4 MG/1
TABLET ORAL
Qty: 1 KIT | Refills: 0 | Status: ON HOLD | OUTPATIENT
Start: 2018-07-10 | End: 2019-03-13

## 2018-07-10 RX ORDER — ALBUTEROL SULFATE 90 UG/1
2 AEROSOL, METERED RESPIRATORY (INHALATION) EVERY 6 HOURS PRN
Qty: 1 INHALER | Refills: 3 | Status: SHIPPED | OUTPATIENT
Start: 2018-07-10 | End: 2019-04-15 | Stop reason: SDUPTHER

## 2018-07-10 RX ADMIN — Medication 40 MEQ: at 14:33

## 2018-07-10 RX ADMIN — ONDANSETRON 4 MG: 2 INJECTION INTRAMUSCULAR; INTRAVENOUS at 14:41

## 2018-07-10 RX ADMIN — SODIUM CHLORIDE 1000 ML: 9 INJECTION, SOLUTION INTRAVENOUS at 14:40

## 2018-07-10 RX ADMIN — FENTANYL CITRATE 50 MCG: 50 INJECTION, SOLUTION INTRAMUSCULAR; INTRAVENOUS at 14:43

## 2018-07-10 RX ADMIN — MAGNESIUM SULFATE HEPTAHYDRATE 1 G: 1 INJECTION, SOLUTION INTRAVENOUS at 14:45

## 2018-07-10 RX ADMIN — IPRATROPIUM BROMIDE AND ALBUTEROL SULFATE 1 AMPULE: .5; 3 SOLUTION RESPIRATORY (INHALATION) at 13:42

## 2018-07-10 ASSESSMENT — PAIN DESCRIPTION - PAIN TYPE
TYPE: ACUTE PAIN
TYPE: ACUTE PAIN

## 2018-07-10 ASSESSMENT — PAIN SCALES - GENERAL
PAINLEVEL_OUTOF10: 10
PAINLEVEL_OUTOF10: 10
PAINLEVEL_OUTOF10: 6

## 2018-07-10 ASSESSMENT — ENCOUNTER SYMPTOMS
COUGH: 0
NAUSEA: 0
DIARRHEA: 0
ABDOMINAL PAIN: 0
SORE THROAT: 0
SHORTNESS OF BREATH: 0
BACK PAIN: 0
RHINORRHEA: 0
VOMITING: 0

## 2018-07-10 NOTE — ED PROVIDER NOTES
INHALE ONCE DAILY. TIZANIDINE (ZANAFLEX) 4 MG TABLET    Take 4 mg by mouth every 8 hours as needed     VENTOLIN  (90 BASE) MCG/ACT INHALER    USE TWO PUFFS BY MOUTH AS NEEDED FOR WHEEZING    VITAMIN D (ERGOCALCIFEROL) 03755 UNITS CAPS CAPSULE    TAKE 1 CAPSULE BY MOUTH ONCE A WEEK       ALLERGIES     Codeine    FAMILY HISTORY       Family History   Problem Relation Age of Onset    Heart Disease Mother     Diabetes Father     Heart Disease Father     Diabetes Sister     Heart Disease Sister     Diabetes Brother     Heart Disease Brother     Cancer Maternal Aunt         breast    Cancer Paternal Aunt         lung cancer    Diabetes Sister     Heart Disease Sister     Diabetes Brother     Cancer Son         kidney          SOCIAL HISTORY       Social History     Social History    Marital status:      Spouse name: N/A    Number of children: N/A    Years of education: N/A     Social History Main Topics    Smoking status: Former Smoker     Packs/day: 0.00     Years: 37.00     Quit date: 2/3/2014    Smokeless tobacco: Never Used    Alcohol use No    Drug use: No    Sexual activity: Not Asked     Other Topics Concern    None     Social History Narrative    None       SCREENINGS             PHYSICAL EXAM    (up to 7 for level 4, 8 or more for level 5)     ED Triage Vitals [07/10/18 1308]   BP Temp Temp Source Pulse Resp SpO2 Height Weight   121/75 97.6 °F (36.4 °C) Temporal 95 20 95 % 5' 7\" (1.702 m) 160 lb (72.6 kg)       Physical Exam   Constitutional: She is oriented to person, place, and time. She appears well-developed and well-nourished. No distress. HENT:   Head: Normocephalic and atraumatic. Right Ear: External ear normal.   Left Ear: External ear normal.   Eyes: Conjunctivae and EOM are normal. Pupils are equal, round, and reactive to light. Neck: Normal range of motion. No tracheal deviation present.    Cardiovascular: Normal rate, regular rhythm, normal heart sounds and then take 1 tablet (250 mg) on days 2 through 5. METHYLPREDNISOLONE (MEDROL, AJ,) 4 MG TABLET    Take by mouth.           (Please note that portions of this note were completed with a voice recognition program.  Efforts were made to edit the dictations but occasionally words are mis-transcribed.)    Arti Flynn MD (electronically signed)  Attending Emergency Physician       Layne Rizvi MD  07/10/18 [id number]

## 2018-07-12 LAB
EKG P AXIS: 62 DEGREES
EKG P-R INTERVAL: 136 MS
EKG Q-T INTERVAL: 396 MS
EKG QRS DURATION: 94 MS
EKG QTC CALCULATION (BAZETT): 432 MS
EKG T AXIS: 37 DEGREES

## 2018-07-16 DIAGNOSIS — G47.30 SLEEP APNEA, UNSPECIFIED TYPE: ICD-10-CM

## 2018-07-16 RX ORDER — MODAFINIL 200 MG/1
TABLET ORAL
Qty: 60 TABLET | Refills: 0 | Status: SHIPPED | OUTPATIENT
Start: 2018-07-16 | End: 2018-08-15

## 2018-07-17 RX ORDER — MODAFINIL 200 MG/1
TABLET ORAL
Qty: 60 TABLET | Refills: 0 | Status: SHIPPED | OUTPATIENT
Start: 2018-07-17 | End: 2018-08-16 | Stop reason: SDUPTHER

## 2018-07-17 NOTE — TELEPHONE ENCOUNTER
[place] [facility] is scanned,06/2018    Requested Prescriptions     Pending Prescriptions Disp Refills    modafinil (PROVIGIL) 200 MG tablet [Pharmacy Med Name: MODAFINIL 200 MG TABLET] 60 tablet 0     Sig: TAKE 1 TABLET BY MOUTH AT 6AM AND 1 TABLET AT NOON     No UDS on file    Future Appointments  Date Time Provider Tanja Hernandez   8/23/2018 11:15 AM Sami Groves MD [address]

## 2018-08-16 DIAGNOSIS — G47.30 SLEEP APNEA, UNSPECIFIED TYPE: ICD-10-CM

## 2018-08-16 DIAGNOSIS — G62.9 NEUROPATHY: Primary | ICD-10-CM

## 2018-08-16 RX ORDER — MODAFINIL 200 MG/1
TABLET ORAL
Qty: 60 TABLET | Refills: 0 | Status: SHIPPED | OUTPATIENT
Start: 2018-08-17 | End: 2018-09-16

## 2018-08-16 RX ORDER — GABAPENTIN 600 MG/1
TABLET ORAL
Qty: 90 TABLET | Refills: 0 | Status: SHIPPED | OUTPATIENT
Start: 2018-08-16 | End: 2018-10-08 | Stop reason: SDUPTHER

## 2018-10-08 ENCOUNTER — OFFICE VISIT (OUTPATIENT)
Dept: PRIMARY CARE CLINIC | Age: 63
End: 2018-10-08
Payer: MEDICARE

## 2018-10-08 VITALS
WEIGHT: 156.8 LBS | BODY MASS INDEX: 24.61 KG/M2 | DIASTOLIC BLOOD PRESSURE: 72 MMHG | OXYGEN SATURATION: 97 % | SYSTOLIC BLOOD PRESSURE: 118 MMHG | TEMPERATURE: 97.7 F | HEART RATE: 76 BPM | HEIGHT: 67 IN

## 2018-10-08 DIAGNOSIS — G62.9 NEUROPATHY: Primary | ICD-10-CM

## 2018-10-08 DIAGNOSIS — Z13.0 SCREENING, ANEMIA, DEFICIENCY, IRON: ICD-10-CM

## 2018-10-08 DIAGNOSIS — Z13.220 SCREENING FOR HYPERLIPIDEMIA: ICD-10-CM

## 2018-10-08 DIAGNOSIS — G47.33 OBSTRUCTIVE SLEEP APNEA: ICD-10-CM

## 2018-10-08 DIAGNOSIS — F51.01 PRIMARY INSOMNIA: ICD-10-CM

## 2018-10-08 DIAGNOSIS — G47.19 EXCESSIVE DAYTIME SLEEPINESS: ICD-10-CM

## 2018-10-08 DIAGNOSIS — Z11.1 ENCOUNTER FOR TB TINE TEST: ICD-10-CM

## 2018-10-08 DIAGNOSIS — Z51.81 THERAPEUTIC DRUG MONITORING: ICD-10-CM

## 2018-10-08 DIAGNOSIS — E03.9 HYPOTHYROIDISM, UNSPECIFIED TYPE: ICD-10-CM

## 2018-10-08 DIAGNOSIS — Z13.1 SCREENING FOR DIABETES MELLITUS: ICD-10-CM

## 2018-10-08 LAB

## 2018-10-08 PROCEDURE — G8420 CALC BMI NORM PARAMETERS: HCPCS | Performed by: NURSE PRACTITIONER

## 2018-10-08 PROCEDURE — 80305 DRUG TEST PRSMV DIR OPT OBS: CPT | Performed by: NURSE PRACTITIONER

## 2018-10-08 PROCEDURE — 1036F TOBACCO NON-USER: CPT | Performed by: NURSE PRACTITIONER

## 2018-10-08 PROCEDURE — G8427 DOCREV CUR MEDS BY ELIG CLIN: HCPCS | Performed by: NURSE PRACTITIONER

## 2018-10-08 PROCEDURE — 3017F COLORECTAL CA SCREEN DOC REV: CPT | Performed by: NURSE PRACTITIONER

## 2018-10-08 PROCEDURE — 99214 OFFICE O/P EST MOD 30 MIN: CPT | Performed by: NURSE PRACTITIONER

## 2018-10-08 PROCEDURE — 86580 TB INTRADERMAL TEST: CPT | Performed by: NURSE PRACTITIONER

## 2018-10-08 PROCEDURE — G8484 FLU IMMUNIZE NO ADMIN: HCPCS | Performed by: NURSE PRACTITIONER

## 2018-10-08 RX ORDER — MODAFINIL 200 MG/1
200 TABLET ORAL 2 TIMES DAILY
Qty: 60 TABLET | Refills: 0 | Status: SHIPPED | OUTPATIENT
Start: 2018-10-08 | End: 2018-11-02 | Stop reason: SDUPTHER

## 2018-10-08 RX ORDER — MODAFINIL 200 MG/1
200 TABLET ORAL 2 TIMES DAILY
COMMUNITY
End: 2018-10-08 | Stop reason: SDUPTHER

## 2018-10-08 RX ORDER — GABAPENTIN 600 MG/1
600 TABLET ORAL 3 TIMES DAILY
Qty: 90 TABLET | Refills: 0 | Status: SHIPPED | OUTPATIENT
Start: 2018-10-08 | End: 2018-11-02 | Stop reason: SDUPTHER

## 2018-10-08 ASSESSMENT — ENCOUNTER SYMPTOMS
COUGH: 0
NAUSEA: 0
WHEEZING: 0
VOMITING: 0
ABDOMINAL PAIN: 0
SHORTNESS OF BREATH: 0
DIARRHEA: 0
BACK PAIN: 0

## 2018-10-08 NOTE — PROGRESS NOTES
[address] [place] [address], [facility]  Phone:  [phone]  Fax:  [phone]      Kellee Han is a 66 y.o. female who presents today for her medical conditions/complaints as noted below. Kellee Han is c/o of Medication Refill and Other (tb test)      Chief Complaint   Patient presents with    Medication Refill    Other     tb test       HPI:     HPI    Kellee Han presents today for a follow up, medication refills, and a TB skin test. She needs a refill on gabapentin and modafinil. She has obstructive sleep apnea. She wears a CPAP nightly. She also takes remeron for sleep. She states she sleeps well and has no issues today. She also has anxiety and depression that is stable. She takes her medications regularly. She is taking Celexa and this works well to help control her anxiety and depression. She denies SI/HI. She takes Provigil for daytime sleepiness. She has HARRIET and was not sleeping well. She sleeps better now that she takes Provigil. She also has neuropathy that is stable. She takes gabapentin and this control her pain from her neuropathy. Hypothyroidism: Recent symptoms: none. She denies fatigue, weight gain, weight loss, cold intolerance, heat intolerance, hair loss, dry skin, constipation, diarrhea, edema, anxiety, tremor, palpitations and dysphagia. Patient is  taking her medication consistently on an empty stomach.     No results found for: [facility]  Lab Results   Component Value Date    TSH 0.374 03/09/2018    TSH 2.63 01/17/2017    TSH 0.17 (L) 07/11/2016           Past Medical History:   Diagnosis Date    Anxiety     Asthma     COPD (chronic obstructive pulmonary disease) (HCC)     CPAP (continuous positive airway pressure) dependence     11cm    Depression     Hyperlipidemia     Hypothyroidism     Migraines     Obstructive sleep apnea     AHI:  13.1    Restless legs syndrome     Scoliosis     Unspecified sleep apnea         Past Surgical History: Procedure Laterality Date    APPENDECTOMY  age 27    Patient thinks this was removed with GB    BREAST SURGERY  2006    [facility]  [facility] 27       Social History   Substance Use Topics    Smoking status: Former Smoker     Packs/day: 0.00     Years: 37.00     Quit date: 2/3/2014    Smokeless tobacco: Never Used    Alcohol use No        Current Outpatient Prescriptions   Medication Sig Dispense Refill    modafinil (PROVIGIL) 200 MG tablet Take 1 tablet by mouth 2 times daily for 30 days. . 60 tablet 0    gabapentin (NEURONTIN) 600 MG tablet Take 1 tablet by mouth 3 times daily for 30 days. . 90 tablet 0    albuterol sulfate HFA (VENTOLIN HFA) 108 (90 Base) MCG/ACT inhaler Inhale 2 puffs into the lungs every 6 hours as needed for Wheezing 1 Inhaler 3    mirtazapine (REMERON) 15 MG tablet TAKE 1 TABLET BY MOUTH AT BEDTIME 30 tablet 3    rOPINIRole (REQUIP) 0.25 MG tablet TAKE 1 TABLET BY MOUTH AT BEDTIME 30 tablet 5    levothyroxine (SYNTHROID) 112 MCG tablet TAKE 1 TABLET BY MOUTH ONCE DAILY. 30 tablet 11    citalopram (CELEXA) 40 MG tablet TAKE 1 TABLET BY MOUTH ONCE DAILY 30 tablet 11    fluticasone-salmeterol (ADVAIR DISKUS) 250-50 MCG/DOSE AEPB USE 1 PUFF INTO LUNGS EVERY 12 HOURS TO PREVENT ASTHMA 60 each 3    esomeprazole (NEXIUM) 20 MG delayed release capsule TAKE 1 CAPSULE BEFORE BREAKFAST DAILY 30 capsule 6    Cholecalciferol (VITAMIN D3) 03756 UNITS CAPS Take 1 capsule by mouth once a week 12 capsule 3    alendronate (FOSAMAX) 35 MG tablet TAKE 1 TABLET ONCE A WEEK. TAKE WITH 8OZ WATER 30 MIN BEFORE FOOD OR OTHER MEDS. DON'T LIE DOWN FOR 30 MIN. 4 tablet 6    HYDROcodone-acetaminophen (NORCO)  MG per tablet Take 1 tablet by mouth every 6 hours as needed       tiZANidine (ZANAFLEX) 4 MG tablet Take 4 mg by mouth every 8 hours as needed       methylPREDNISolone (MEDROL, AJ,) 4 MG tablet Take by mouth.  1 kit 0    VENTOLIN neg     THC Screen, Urine neg     Tricyclic Antidepressants, Urine neg        Plan:     UDS and Raffy appropriate. Patient was unable to get to appointment last month so she has not had her medication in 3 weeks. Medications refilled. TB skin testing performed. Please come back in 48-72 hours to check this. Labs ordered. Please get these before next visit while fasting. Return in about 6 months (around 4/8/2019), or if symptoms worsen or fail to improve, for chronic conditions. Orders Placed This Encounter   Procedures    Mantoux testing    CBC     Standing Status:   Future     Standing Expiration Date:   10/8/2019    Lipid Panel     Standing Status:   Future     Standing Expiration Date:   10/8/2019     Order Specific Question:   Is Patient Fasting?/# of Hours     Answer:   8    Comprehensive Metabolic Panel     Standing Status:   Future     Standing Expiration Date:   10/8/2019    TSH without Reflex     Standing Status:   Future     Standing Expiration Date:   10/8/2019    POCT Rapid Drug Screen       Orders Placed This Encounter   Medications    modafinil (PROVIGIL) 200 MG tablet     Sig: Take 1 tablet by mouth 2 times daily for 30 days. .     Dispense:  60 tablet     Refill:  0    gabapentin (NEURONTIN) 600 MG tablet     Sig: Take 1 tablet by mouth 3 times daily for 30 days. .     Dispense:  90 tablet     Refill:  0        Patient offered educational materials - see patient instructions. Discussed use, benefit, and side effects of prescribed medications. All patient questions answered. Pt voiced understanding. Reviewed health maintenance. Instructed to continue current medications, diet and exercise. Patient agreed with treatment plan. Follow up as directed.            Electronically signed by ERIN Subramanian on 10/8/2018 at 12:22 PM

## 2018-11-02 DIAGNOSIS — G62.9 NEUROPATHY: ICD-10-CM

## 2018-11-02 DIAGNOSIS — G47.19 EXCESSIVE DAYTIME SLEEPINESS: ICD-10-CM

## 2018-11-02 DIAGNOSIS — F32.A DEPRESSION, UNSPECIFIED DEPRESSION TYPE: ICD-10-CM

## 2018-11-02 RX ORDER — MODAFINIL 200 MG/1
TABLET ORAL
Qty: 60 TABLET | Refills: 0 | Status: SHIPPED | OUTPATIENT
Start: 2018-11-08 | End: 2018-11-30 | Stop reason: SDUPTHER

## 2018-11-02 RX ORDER — GABAPENTIN 600 MG/1
TABLET ORAL
Qty: 90 TABLET | Refills: 0 | Status: SHIPPED | OUTPATIENT
Start: 2018-11-08 | End: 2018-11-30 | Stop reason: SDUPTHER

## 2018-11-02 RX ORDER — MIRTAZAPINE 15 MG/1
TABLET, FILM COATED ORAL
Qty: 30 TABLET | Refills: 0 | Status: SHIPPED | OUTPATIENT
Start: 2018-11-02 | End: 2018-11-30 | Stop reason: SDUPTHER

## 2018-11-30 DIAGNOSIS — F32.A DEPRESSION, UNSPECIFIED DEPRESSION TYPE: ICD-10-CM

## 2018-11-30 DIAGNOSIS — G62.9 NEUROPATHY: ICD-10-CM

## 2018-11-30 DIAGNOSIS — E55.9 VITAMIN D DEFICIENCY: ICD-10-CM

## 2018-11-30 DIAGNOSIS — G25.81 RESTLESS LEGS SYNDROME: ICD-10-CM

## 2018-11-30 DIAGNOSIS — G47.19 EXCESSIVE DAYTIME SLEEPINESS: ICD-10-CM

## 2018-11-30 RX ORDER — ERGOCALCIFEROL 1.25 MG/1
CAPSULE ORAL WEEKLY
Qty: 4 CAPSULE | Refills: 0 | Status: ON HOLD | OUTPATIENT
Start: 2018-11-30 | End: 2019-07-30 | Stop reason: HOSPADM

## 2018-11-30 RX ORDER — ROPINIROLE 0.25 MG/1
TABLET, FILM COATED ORAL
Qty: 30 TABLET | Refills: 0 | Status: SHIPPED | OUTPATIENT
Start: 2018-11-30 | End: 2019-01-07 | Stop reason: SDUPTHER

## 2018-11-30 RX ORDER — MODAFINIL 200 MG/1
TABLET ORAL
Qty: 60 TABLET | Refills: 0 | Status: SHIPPED | OUTPATIENT
Start: 2018-12-07 | End: 2019-01-07 | Stop reason: SDUPTHER

## 2018-11-30 RX ORDER — GABAPENTIN 600 MG/1
TABLET ORAL
Qty: 90 TABLET | Refills: 0 | Status: ON HOLD | OUTPATIENT
Start: 2018-12-07 | End: 2019-03-13

## 2018-11-30 RX ORDER — MIRTAZAPINE 15 MG/1
TABLET, FILM COATED ORAL
Qty: 30 TABLET | Refills: 0 | Status: SHIPPED | OUTPATIENT
Start: 2018-12-01 | End: 2019-01-07 | Stop reason: SDUPTHER

## 2018-11-30 NOTE — TELEPHONE ENCOUNTER
Keshavahid Amandashu called 11/30/18 with request for refill on Remeron, Gabapentin, Provigil, Requip, and Vitamin D. Last office visit 10/08/18 with next scheduled appointment 04/08/19  Dose verified.    Last UDS  10/08/18    Last fill per Remeron 11/2/18         Gabapentin 11/08/18         Provigil 11/08/18         Requip 08/16/18         Vitamin D 05/04/18  Raffy Report 10/08/18    10/8/2018 11:55 AM - Jeffrey Oven Winders     Component Results     Component Collected Lab   Amphetamine Screen, Urine 10/08/2018 11:49 AM Unknown   neg    Barbiturate Screen, Urine 10/08/2018 11:49 AM Unknown   POS    Benzodiazepine Screen, Urine 10/08/2018 11:49 AM Unknown   neg    Buprenorphine Urine 10/08/2018 11:49 AM Unknown   neg    Cocaine Metabolite Screen, Urine 10/08/2018 11:49 AM Unknown   neg    Gabapentin Screen, Urine 10/08/2018 11:49 AM Unknown   neg    Methamphetamine, Urine 10/08/2018 11:49 AM Unknown   neg    Methadone Screen, Urine 10/08/2018 11:49 AM Unknown   neg    Opiate Scrn, Ur 10/08/2018 11:49 AM Unknown   POS    Oxycodone Screen, Ur 10/08/2018 11:49 AM Unknown   POS    PCP Screen, Urine 10/08/2018 11:49 AM Unknown   neg    Propoxyphene Screen, Urine 10/08/2018 11:49 AM Unknown   neg    THC Screen, Urine 10/08/2018 11:49 AM Unknown   neg    Tricyclic Antidepressants, Urine 10/08/2018 11:49 AM Unknown   neg    Lab and Collection     POCT Rapid Drug Screen on 10/8/2018

## 2019-01-07 DIAGNOSIS — G25.81 RESTLESS LEGS SYNDROME: ICD-10-CM

## 2019-01-07 DIAGNOSIS — G47.19 EXCESSIVE DAYTIME SLEEPINESS: ICD-10-CM

## 2019-01-07 DIAGNOSIS — F32.A DEPRESSION, UNSPECIFIED DEPRESSION TYPE: ICD-10-CM

## 2019-01-07 RX ORDER — ROPINIROLE 0.25 MG/1
TABLET, FILM COATED ORAL
Qty: 30 TABLET | Refills: 0 | Status: SHIPPED | OUTPATIENT
Start: 2019-01-07 | End: 2019-02-11 | Stop reason: SDUPTHER

## 2019-01-07 RX ORDER — MODAFINIL 200 MG/1
TABLET ORAL
Qty: 60 TABLET | Refills: 0 | Status: SHIPPED | OUTPATIENT
Start: 2019-01-07 | End: 2019-02-06

## 2019-01-07 RX ORDER — MIRTAZAPINE 15 MG/1
TABLET, FILM COATED ORAL
Qty: 30 TABLET | Refills: 0 | Status: SHIPPED | OUTPATIENT
Start: 2019-01-07 | End: 2019-02-11 | Stop reason: SDUPTHER

## 2019-02-11 DIAGNOSIS — F32.A DEPRESSION, UNSPECIFIED DEPRESSION TYPE: ICD-10-CM

## 2019-02-11 DIAGNOSIS — G62.9 NEUROPATHY: ICD-10-CM

## 2019-02-11 DIAGNOSIS — G25.81 RESTLESS LEGS SYNDROME: ICD-10-CM

## 2019-02-11 RX ORDER — MIRTAZAPINE 15 MG/1
TABLET, FILM COATED ORAL
Qty: 30 TABLET | Refills: 0 | Status: SHIPPED | OUTPATIENT
Start: 2019-02-11 | End: 2019-03-12 | Stop reason: SDUPTHER

## 2019-02-11 RX ORDER — ROPINIROLE 0.25 MG/1
TABLET, FILM COATED ORAL
Qty: 30 TABLET | Refills: 0 | Status: SHIPPED | OUTPATIENT
Start: 2019-02-11 | End: 2019-03-12 | Stop reason: SDUPTHER

## 2019-02-13 RX ORDER — GABAPENTIN 600 MG/1
TABLET ORAL
Qty: 90 TABLET | Refills: 0 | Status: ON HOLD | OUTPATIENT
Start: 2019-02-13 | End: 2019-03-12 | Stop reason: SDUPTHER

## 2019-02-13 RX ORDER — MODAFINIL 200 MG/1
TABLET ORAL
Qty: 60 TABLET | Refills: 0 | Status: ON HOLD | OUTPATIENT
Start: 2019-02-13 | End: 2019-03-12 | Stop reason: SDUPTHER

## 2019-03-12 DIAGNOSIS — G62.9 NEUROPATHY: ICD-10-CM

## 2019-03-12 DIAGNOSIS — G25.81 RESTLESS LEGS SYNDROME: ICD-10-CM

## 2019-03-12 DIAGNOSIS — F32.A DEPRESSION, UNSPECIFIED DEPRESSION TYPE: ICD-10-CM

## 2019-03-13 ENCOUNTER — APPOINTMENT (OUTPATIENT)
Dept: GENERAL RADIOLOGY | Age: 64
DRG: 871 | End: 2019-03-13
Payer: MEDICARE

## 2019-03-13 ENCOUNTER — HOSPITAL ENCOUNTER (INPATIENT)
Age: 64
LOS: 2 days | Discharge: HOME | DRG: 871 | End: 2019-03-15
Attending: EMERGENCY MEDICINE | Admitting: HOSPITALIST
Payer: MEDICARE

## 2019-03-13 DIAGNOSIS — K21.9 GASTROESOPHAGEAL REFLUX DISEASE, ESOPHAGITIS PRESENCE NOT SPECIFIED: ICD-10-CM

## 2019-03-13 DIAGNOSIS — J96.11 CHRONIC RESPIRATORY FAILURE WITH HYPOXIA (HCC): ICD-10-CM

## 2019-03-13 DIAGNOSIS — J44.9 CHRONIC OBSTRUCTIVE PULMONARY DISEASE, UNSPECIFIED COPD TYPE (HCC): ICD-10-CM

## 2019-03-13 DIAGNOSIS — J18.9 PNEUMONIA DUE TO ORGANISM: Primary | ICD-10-CM

## 2019-03-13 DIAGNOSIS — F32.A DEPRESSION, UNSPECIFIED DEPRESSION TYPE: ICD-10-CM

## 2019-03-13 DIAGNOSIS — A41.9 SEPTICEMIA (HCC): ICD-10-CM

## 2019-03-13 DIAGNOSIS — J44.1 COPD EXACERBATION (HCC): ICD-10-CM

## 2019-03-13 LAB
ALBUMIN SERPL-MCNC: 4.1 G/DL (ref 3.5–5.2)
ALP BLD-CCNC: 147 U/L (ref 35–104)
ALT SERPL-CCNC: 19 U/L (ref 5–33)
ANION GAP SERPL CALCULATED.3IONS-SCNC: 10 MMOL/L (ref 7–19)
AST SERPL-CCNC: 26 U/L (ref 5–32)
BASE EXCESS ARTERIAL: 2.7 MMOL/L (ref -2–2)
BASOPHILS ABSOLUTE: 0 K/UL (ref 0–0.2)
BASOPHILS RELATIVE PERCENT: 0.3 % (ref 0–1)
BILIRUB SERPL-MCNC: 0.3 MG/DL (ref 0.2–1.2)
BILIRUBIN URINE: NEGATIVE
BLOOD, URINE: NEGATIVE
BUN BLDV-MCNC: 14 MG/DL (ref 8–23)
CALCIUM SERPL-MCNC: 8.9 MG/DL (ref 8.8–10.2)
CARBOXYHEMOGLOBIN ARTERIAL: 1.1 % (ref 0–5)
CHLORIDE BLD-SCNC: 102 MMOL/L (ref 98–111)
CLARITY: CLEAR
CO2: 28 MMOL/L (ref 22–29)
COLOR: YELLOW
CREAT SERPL-MCNC: 0.7 MG/DL (ref 0.5–0.9)
EOSINOPHILS ABSOLUTE: 0.1 K/UL (ref 0–0.6)
EOSINOPHILS RELATIVE PERCENT: 0.9 % (ref 0–5)
GFR NON-AFRICAN AMERICAN: >60
GLUCOSE BLD-MCNC: 145 MG/DL (ref 74–109)
GLUCOSE URINE: NEGATIVE MG/DL
HCO3 ARTERIAL: 28.9 MMOL/L (ref 22–26)
HCT VFR BLD CALC: 40.4 % (ref 37–47)
HEMOGLOBIN, ART, EXTENDED: 12.9 G/DL (ref 12–16)
HEMOGLOBIN: 13 G/DL (ref 12–16)
INR BLD: 0.9 (ref 0.88–1.18)
KETONES, URINE: NEGATIVE MG/DL
LACTIC ACID: 1.6 MMOL/L (ref 0.5–1.9)
LEUKOCYTE ESTERASE, URINE: NEGATIVE
LYMPHOCYTES ABSOLUTE: 0.9 K/UL (ref 1.1–4.5)
LYMPHOCYTES RELATIVE PERCENT: 13.3 % (ref 20–40)
MCH RBC QN AUTO: 30 PG (ref 27–31)
MCHC RBC AUTO-ENTMCNC: 32.2 G/DL (ref 33–37)
MCV RBC AUTO: 93.1 FL (ref 81–99)
METHEMOGLOBIN ARTERIAL: 1 %
MONOCYTES ABSOLUTE: 0.3 K/UL (ref 0–0.9)
MONOCYTES RELATIVE PERCENT: 3.9 % (ref 0–10)
NEUTROPHILS ABSOLUTE: 5.4 K/UL (ref 1.5–7.5)
NEUTROPHILS RELATIVE PERCENT: 81.3 % (ref 50–65)
NITRITE, URINE: NEGATIVE
O2 CONTENT ARTERIAL: 17.1 ML/DL
O2 SAT, ARTERIAL: 94 %
O2 THERAPY: ABNORMAL
PCO2 ARTERIAL: 50 MMHG (ref 35–45)
PDW BLD-RTO: 12.9 % (ref 11.5–14.5)
PH ARTERIAL: 7.37 (ref 7.35–7.45)
PH UA: 5.5 (ref 5–8)
PLATELET # BLD: 215 K/UL (ref 130–400)
PMV BLD AUTO: 10 FL (ref 9.4–12.3)
PO2 ARTERIAL: 69 MMHG (ref 80–100)
POTASSIUM SERPL-SCNC: 3.6 MMOL/L (ref 3.5–5)
POTASSIUM, WHOLE BLOOD: 3.5
PRO-BNP: 135 PG/ML (ref 0–900)
PROTEIN UA: NEGATIVE MG/DL
PROTHROMBIN TIME: 11.6 SEC (ref 12–14.6)
RAPID INFLUENZA  B AGN: NEGATIVE
RAPID INFLUENZA A AGN: NEGATIVE
RBC # BLD: 4.34 M/UL (ref 4.2–5.4)
SODIUM BLD-SCNC: 140 MMOL/L (ref 136–145)
SPECIFIC GRAVITY UA: 1.02 (ref 1–1.03)
TOTAL PROTEIN: 6.9 G/DL (ref 6.6–8.7)
TROPONIN: <0.01 NG/ML (ref 0–0.03)
URINE REFLEX TO CULTURE: NORMAL
UROBILINOGEN, URINE: 0.2 E.U./DL
WBC # BLD: 6.6 K/UL (ref 4.8–10.8)

## 2019-03-13 PROCEDURE — 6370000000 HC RX 637 (ALT 250 FOR IP): Performed by: EMERGENCY MEDICINE

## 2019-03-13 PROCEDURE — 2580000003 HC RX 258: Performed by: EMERGENCY MEDICINE

## 2019-03-13 PROCEDURE — 96367 TX/PROPH/DG ADDL SEQ IV INF: CPT

## 2019-03-13 PROCEDURE — 83605 ASSAY OF LACTIC ACID: CPT

## 2019-03-13 PROCEDURE — 87040 BLOOD CULTURE FOR BACTERIA: CPT

## 2019-03-13 PROCEDURE — 99285 EMERGENCY DEPT VISIT HI MDM: CPT | Performed by: EMERGENCY MEDICINE

## 2019-03-13 PROCEDURE — 6370000000 HC RX 637 (ALT 250 FOR IP): Performed by: INTERNAL MEDICINE

## 2019-03-13 PROCEDURE — 71046 X-RAY EXAM CHEST 2 VIEWS: CPT

## 2019-03-13 PROCEDURE — 96375 TX/PRO/DX INJ NEW DRUG ADDON: CPT

## 2019-03-13 PROCEDURE — 94664 DEMO&/EVAL PT USE INHALER: CPT

## 2019-03-13 PROCEDURE — 80074 ACUTE HEPATITIS PANEL: CPT

## 2019-03-13 PROCEDURE — 99223 1ST HOSP IP/OBS HIGH 75: CPT | Performed by: INTERNAL MEDICINE

## 2019-03-13 PROCEDURE — 36600 WITHDRAWAL OF ARTERIAL BLOOD: CPT

## 2019-03-13 PROCEDURE — 6360000002 HC RX W HCPCS: Performed by: EMERGENCY MEDICINE

## 2019-03-13 PROCEDURE — 83880 ASSAY OF NATRIURETIC PEPTIDE: CPT

## 2019-03-13 PROCEDURE — 81003 URINALYSIS AUTO W/O SCOPE: CPT

## 2019-03-13 PROCEDURE — 2700000000 HC OXYGEN THERAPY PER DAY

## 2019-03-13 PROCEDURE — 96365 THER/PROPH/DIAG IV INF INIT: CPT

## 2019-03-13 PROCEDURE — 82803 BLOOD GASES ANY COMBINATION: CPT

## 2019-03-13 PROCEDURE — 94640 AIRWAY INHALATION TREATMENT: CPT

## 2019-03-13 PROCEDURE — 84132 ASSAY OF SERUM POTASSIUM: CPT

## 2019-03-13 PROCEDURE — 94660 CPAP INITIATION&MGMT: CPT

## 2019-03-13 PROCEDURE — 2580000003 HC RX 258: Performed by: INTERNAL MEDICINE

## 2019-03-13 PROCEDURE — 1210000000 HC MED SURG R&B

## 2019-03-13 PROCEDURE — 99285 EMERGENCY DEPT VISIT HI MDM: CPT

## 2019-03-13 PROCEDURE — 87804 INFLUENZA ASSAY W/OPTIC: CPT

## 2019-03-13 PROCEDURE — 85025 COMPLETE CBC W/AUTO DIFF WBC: CPT

## 2019-03-13 PROCEDURE — 84484 ASSAY OF TROPONIN QUANT: CPT

## 2019-03-13 PROCEDURE — 6360000002 HC RX W HCPCS: Performed by: INTERNAL MEDICINE

## 2019-03-13 PROCEDURE — 93005 ELECTROCARDIOGRAM TRACING: CPT

## 2019-03-13 PROCEDURE — 85610 PROTHROMBIN TIME: CPT

## 2019-03-13 PROCEDURE — 80053 COMPREHEN METABOLIC PANEL: CPT

## 2019-03-13 PROCEDURE — 2500000003 HC RX 250 WO HCPCS: Performed by: INTERNAL MEDICINE

## 2019-03-13 PROCEDURE — 36415 COLL VENOUS BLD VENIPUNCTURE: CPT

## 2019-03-13 RX ORDER — ALBUTEROL SULFATE 90 UG/1
2 AEROSOL, METERED RESPIRATORY (INHALATION) EVERY 6 HOURS PRN
Status: DISCONTINUED | OUTPATIENT
Start: 2019-03-13 | End: 2019-03-15 | Stop reason: HOSPADM

## 2019-03-13 RX ORDER — ACETAMINOPHEN 325 MG/1
650 TABLET ORAL ONCE
Status: COMPLETED | OUTPATIENT
Start: 2019-03-13 | End: 2019-03-13

## 2019-03-13 RX ORDER — SODIUM CHLORIDE 0.9 % (FLUSH) 0.9 %
10 SYRINGE (ML) INJECTION PRN
Status: DISCONTINUED | OUTPATIENT
Start: 2019-03-13 | End: 2019-03-15 | Stop reason: HOSPADM

## 2019-03-13 RX ORDER — ROPINIROLE 0.25 MG/1
TABLET, FILM COATED ORAL
Qty: 30 TABLET | Refills: 0 | Status: SHIPPED | OUTPATIENT
Start: 2019-03-13 | End: 2019-04-15 | Stop reason: SDUPTHER

## 2019-03-13 RX ORDER — METHYLPREDNISOLONE SODIUM SUCCINATE 40 MG/ML
40 INJECTION, POWDER, LYOPHILIZED, FOR SOLUTION INTRAMUSCULAR; INTRAVENOUS EVERY 12 HOURS
Status: DISCONTINUED | OUTPATIENT
Start: 2019-03-13 | End: 2019-03-15

## 2019-03-13 RX ORDER — MIRTAZAPINE 15 MG/1
TABLET, FILM COATED ORAL
Qty: 30 TABLET | Refills: 0 | Status: SHIPPED | OUTPATIENT
Start: 2019-03-13 | End: 2019-04-15 | Stop reason: SDUPTHER

## 2019-03-13 RX ORDER — PANTOPRAZOLE SODIUM 40 MG/1
40 TABLET, DELAYED RELEASE ORAL
Status: DISCONTINUED | OUTPATIENT
Start: 2019-03-14 | End: 2019-03-15 | Stop reason: HOSPADM

## 2019-03-13 RX ORDER — ACETAMINOPHEN 325 MG/1
650 TABLET ORAL EVERY 4 HOURS PRN
Status: DISCONTINUED | OUTPATIENT
Start: 2019-03-13 | End: 2019-03-15 | Stop reason: HOSPADM

## 2019-03-13 RX ORDER — IPRATROPIUM BROMIDE AND ALBUTEROL SULFATE 2.5; .5 MG/3ML; MG/3ML
1 SOLUTION RESPIRATORY (INHALATION)
Status: DISCONTINUED | OUTPATIENT
Start: 2019-03-13 | End: 2019-03-15 | Stop reason: HOSPADM

## 2019-03-13 RX ORDER — IPRATROPIUM BROMIDE AND ALBUTEROL SULFATE 2.5; .5 MG/3ML; MG/3ML
1 SOLUTION RESPIRATORY (INHALATION) ONCE
Status: COMPLETED | OUTPATIENT
Start: 2019-03-13 | End: 2019-03-13

## 2019-03-13 RX ORDER — 0.9 % SODIUM CHLORIDE 0.9 %
1000 INTRAVENOUS SOLUTION INTRAVENOUS ONCE
Status: COMPLETED | OUTPATIENT
Start: 2019-03-13 | End: 2019-03-13

## 2019-03-13 RX ORDER — METHYLPREDNISOLONE SODIUM SUCCINATE 125 MG/2ML
60 INJECTION, POWDER, LYOPHILIZED, FOR SOLUTION INTRAMUSCULAR; INTRAVENOUS ONCE
Status: COMPLETED | OUTPATIENT
Start: 2019-03-13 | End: 2019-03-13

## 2019-03-13 RX ORDER — CITALOPRAM 40 MG/1
TABLET ORAL
Qty: 30 TABLET | Refills: 11 | OUTPATIENT
Start: 2019-03-13

## 2019-03-13 RX ORDER — CITALOPRAM 40 MG/1
TABLET ORAL
Qty: 30 TABLET | Refills: 0 | Status: SHIPPED | OUTPATIENT
Start: 2019-03-13 | End: 2019-06-19 | Stop reason: SDUPTHER

## 2019-03-13 RX ORDER — ROPINIROLE 0.25 MG/1
0.25 TABLET, FILM COATED ORAL NIGHTLY
Status: DISCONTINUED | OUTPATIENT
Start: 2019-03-13 | End: 2019-03-15 | Stop reason: HOSPADM

## 2019-03-13 RX ORDER — GABAPENTIN 600 MG/1
TABLET ORAL
Qty: 90 TABLET | Refills: 0 | OUTPATIENT
Start: 2019-03-13 | End: 2019-04-12

## 2019-03-13 RX ORDER — GABAPENTIN 600 MG/1
600 TABLET ORAL 3 TIMES DAILY
Status: DISCONTINUED | OUTPATIENT
Start: 2019-03-13 | End: 2019-03-15 | Stop reason: HOSPADM

## 2019-03-13 RX ORDER — CITALOPRAM 40 MG/1
40 TABLET ORAL DAILY
Status: DISCONTINUED | OUTPATIENT
Start: 2019-03-13 | End: 2019-03-15 | Stop reason: HOSPADM

## 2019-03-13 RX ORDER — MODAFINIL 200 MG/1
200 TABLET ORAL 2 TIMES DAILY
Status: DISCONTINUED | OUTPATIENT
Start: 2019-03-13 | End: 2019-03-15 | Stop reason: HOSPADM

## 2019-03-13 RX ORDER — ROPINIROLE 0.25 MG/1
TABLET, FILM COATED ORAL
Qty: 30 TABLET | Refills: 0 | OUTPATIENT
Start: 2019-03-13 | End: 2019-04-12

## 2019-03-13 RX ORDER — FENTANYL CITRATE 50 UG/ML
25 INJECTION, SOLUTION INTRAMUSCULAR; INTRAVENOUS ONCE
Status: COMPLETED | OUTPATIENT
Start: 2019-03-13 | End: 2019-03-13

## 2019-03-13 RX ORDER — MIRTAZAPINE 15 MG/1
TABLET, FILM COATED ORAL
Qty: 30 TABLET | Refills: 0 | OUTPATIENT
Start: 2019-03-13 | End: 2019-04-12

## 2019-03-13 RX ORDER — TIZANIDINE 4 MG/1
4 TABLET ORAL EVERY 8 HOURS PRN
Status: DISCONTINUED | OUTPATIENT
Start: 2019-03-13 | End: 2019-03-15 | Stop reason: HOSPADM

## 2019-03-13 RX ORDER — LEVOTHYROXINE SODIUM 112 UG/1
112 TABLET ORAL DAILY
Status: DISCONTINUED | OUTPATIENT
Start: 2019-03-13 | End: 2019-03-15 | Stop reason: HOSPADM

## 2019-03-13 RX ORDER — SODIUM CHLORIDE 0.9 % (FLUSH) 0.9 %
10 SYRINGE (ML) INJECTION EVERY 12 HOURS SCHEDULED
Status: DISCONTINUED | OUTPATIENT
Start: 2019-03-13 | End: 2019-03-15 | Stop reason: HOSPADM

## 2019-03-13 RX ORDER — HYDROCODONE BITARTRATE AND ACETAMINOPHEN 10; 325 MG/1; MG/1
1 TABLET ORAL EVERY 6 HOURS PRN
Status: DISCONTINUED | OUTPATIENT
Start: 2019-03-13 | End: 2019-03-15 | Stop reason: HOSPADM

## 2019-03-13 RX ADMIN — ACETAMINOPHEN 650 MG: 325 TABLET ORAL at 11:03

## 2019-03-13 RX ADMIN — METHYLPREDNISOLONE SODIUM SUCCINATE 40 MG: 40 INJECTION, POWDER, FOR SOLUTION INTRAMUSCULAR; INTRAVENOUS at 22:32

## 2019-03-13 RX ADMIN — IPRATROPIUM BROMIDE AND ALBUTEROL SULFATE 1 AMPULE: .5; 3 SOLUTION RESPIRATORY (INHALATION) at 09:35

## 2019-03-13 RX ADMIN — ROPINIROLE HYDROCHLORIDE 0.25 MG: 0.25 TABLET, FILM COATED ORAL at 22:31

## 2019-03-13 RX ADMIN — CEFTRIAXONE 1 G: 1 INJECTION, POWDER, FOR SOLUTION INTRAMUSCULAR; INTRAVENOUS at 11:02

## 2019-03-13 RX ADMIN — Medication 500 MG: at 11:03

## 2019-03-13 RX ADMIN — FAMOTIDINE 20 MG: 10 INJECTION, SOLUTION INTRAVENOUS at 16:44

## 2019-03-13 RX ADMIN — HYDROCODONE BITARTRATE AND ACETAMINOPHEN 1 TABLET: 10; 325 TABLET ORAL at 22:31

## 2019-03-13 RX ADMIN — IPRATROPIUM BROMIDE AND ALBUTEROL SULFATE 1 AMPULE: .5; 3 SOLUTION RESPIRATORY (INHALATION) at 15:32

## 2019-03-13 RX ADMIN — CITALOPRAM HYDROBROMIDE 40 MG: 40 TABLET ORAL at 16:33

## 2019-03-13 RX ADMIN — GABAPENTIN 600 MG: 600 TABLET, FILM COATED ORAL at 22:31

## 2019-03-13 RX ADMIN — FENTANYL CITRATE 25 MCG: 50 INJECTION INTRAMUSCULAR; INTRAVENOUS at 11:31

## 2019-03-13 RX ADMIN — FAMOTIDINE 20 MG: 10 INJECTION, SOLUTION INTRAVENOUS at 22:32

## 2019-03-13 RX ADMIN — ENOXAPARIN SODIUM 40 MG: 40 INJECTION SUBCUTANEOUS at 16:34

## 2019-03-13 RX ADMIN — HYDROCODONE BITARTRATE AND ACETAMINOPHEN 1 TABLET: 10; 325 TABLET ORAL at 16:34

## 2019-03-13 RX ADMIN — Medication 10 ML: at 22:33

## 2019-03-13 RX ADMIN — IPRATROPIUM BROMIDE AND ALBUTEROL SULFATE 1 AMPULE: .5; 3 SOLUTION RESPIRATORY (INHALATION) at 18:00

## 2019-03-13 RX ADMIN — LEVOTHYROXINE SODIUM 112 MCG: 112 TABLET ORAL at 16:34

## 2019-03-13 RX ADMIN — SODIUM CHLORIDE 1000 ML: 9 INJECTION, SOLUTION INTRAVENOUS at 12:15

## 2019-03-13 RX ADMIN — GABAPENTIN 600 MG: 600 TABLET, FILM COATED ORAL at 16:34

## 2019-03-13 RX ADMIN — METHYLPREDNISOLONE SODIUM SUCCINATE 60 MG: 125 INJECTION, POWDER, FOR SOLUTION INTRAMUSCULAR; INTRAVENOUS at 11:02

## 2019-03-13 ASSESSMENT — ENCOUNTER SYMPTOMS
ABDOMINAL PAIN: 0
SHORTNESS OF BREATH: 1

## 2019-03-13 ASSESSMENT — PAIN SCALES - GENERAL
PAINLEVEL_OUTOF10: 5
PAINLEVEL_OUTOF10: 5
PAINLEVEL_OUTOF10: 8
PAINLEVEL_OUTOF10: 6
PAINLEVEL_OUTOF10: 7

## 2019-03-14 PROBLEM — R74.01 TRANSAMINITIS: Status: ACTIVE | Noted: 2019-03-14

## 2019-03-14 PROBLEM — J18.9 COMMUNITY ACQUIRED PNEUMONIA OF LEFT LOWER LOBE OF LUNG: Status: ACTIVE | Noted: 2019-03-14

## 2019-03-14 LAB
ALBUMIN SERPL-MCNC: 3.6 G/DL (ref 3.5–5.2)
ALP BLD-CCNC: 126 U/L (ref 35–104)
ALT SERPL-CCNC: 125 U/L (ref 5–33)
ANION GAP SERPL CALCULATED.3IONS-SCNC: 9 MMOL/L (ref 7–19)
AST SERPL-CCNC: 122 U/L (ref 5–32)
BANDED NEUTROPHILS RELATIVE PERCENT: 6 % (ref 0–5)
BASOPHILS ABSOLUTE: 0 K/UL (ref 0–0.2)
BASOPHILS MANUAL: 0 %
BASOPHILS RELATIVE PERCENT: 0 % (ref 0–1)
BILIRUB SERPL-MCNC: <0.2 MG/DL (ref 0.2–1.2)
BUN BLDV-MCNC: 13 MG/DL (ref 8–23)
CALCIUM SERPL-MCNC: 8.5 MG/DL (ref 8.8–10.2)
CHLORIDE BLD-SCNC: 103 MMOL/L (ref 98–111)
CO2: 28 MMOL/L (ref 22–29)
CREAT SERPL-MCNC: 0.7 MG/DL (ref 0.5–0.9)
EOSINOPHILS ABSOLUTE: 0 K/UL (ref 0–0.6)
EOSINOPHILS RELATIVE PERCENT: 0 % (ref 0–5)
GFR NON-AFRICAN AMERICAN: >60
GLUCOSE BLD-MCNC: 155 MG/DL (ref 74–109)
HAV IGM SER IA-ACNC: NORMAL
HCT VFR BLD CALC: 35.2 % (ref 37–47)
HEMOGLOBIN: 11.1 G/DL (ref 12–16)
HEPATITIS B CORE IGM ANTIBODY: NORMAL
HEPATITIS B SURFACE ANTIGEN INTERPRETATION: NORMAL
HEPATITIS C ANTIBODY INTERPRETATION: NORMAL
HYPOCHROMIA: ABNORMAL
LYMPHOCYTES ABSOLUTE: 0.8 K/UL (ref 1.1–4.5)
LYMPHOCYTES RELATIVE PERCENT: 6 % (ref 20–40)
MAGNESIUM: 2 MG/DL (ref 1.6–2.4)
MCH RBC QN AUTO: 29.8 PG (ref 27–31)
MCHC RBC AUTO-ENTMCNC: 31.5 G/DL (ref 33–37)
MCV RBC AUTO: 94.4 FL (ref 81–99)
MONOCYTES ABSOLUTE: 0 K/UL (ref 0–0.9)
MONOCYTES RELATIVE PERCENT: 0 % (ref 0–10)
NEUTROPHILS ABSOLUTE: 13.1 K/UL (ref 1.5–7.5)
NEUTROPHILS MANUAL: 88 %
NEUTROPHILS RELATIVE PERCENT: 88 % (ref 50–65)
OVALOCYTES: ABNORMAL
PDW BLD-RTO: 13.2 % (ref 11.5–14.5)
PLATELET # BLD: 191 K/UL (ref 130–400)
PLATELET SLIDE REVIEW: ADEQUATE
PMV BLD AUTO: 10.9 FL (ref 9.4–12.3)
POLYCHROMASIA: ABNORMAL
POTASSIUM REFLEX MAGNESIUM: 3.7 MMOL/L (ref 3.5–5)
RBC # BLD: 3.73 M/UL (ref 4.2–5.4)
SODIUM BLD-SCNC: 140 MMOL/L (ref 136–145)
TOTAL PROTEIN: 6 G/DL (ref 6.6–8.7)
WBC # BLD: 13.9 K/UL (ref 4.8–10.8)

## 2019-03-14 PROCEDURE — 6360000002 HC RX W HCPCS: Performed by: INTERNAL MEDICINE

## 2019-03-14 PROCEDURE — 1210000000 HC MED SURG R&B

## 2019-03-14 PROCEDURE — 2700000000 HC OXYGEN THERAPY PER DAY

## 2019-03-14 PROCEDURE — 2500000003 HC RX 250 WO HCPCS: Performed by: INTERNAL MEDICINE

## 2019-03-14 PROCEDURE — 36415 COLL VENOUS BLD VENIPUNCTURE: CPT

## 2019-03-14 PROCEDURE — 6370000000 HC RX 637 (ALT 250 FOR IP): Performed by: INTERNAL MEDICINE

## 2019-03-14 PROCEDURE — 94640 AIRWAY INHALATION TREATMENT: CPT

## 2019-03-14 PROCEDURE — 94660 CPAP INITIATION&MGMT: CPT

## 2019-03-14 PROCEDURE — 2580000003 HC RX 258: Performed by: INTERNAL MEDICINE

## 2019-03-14 PROCEDURE — 99232 SBSQ HOSP IP/OBS MODERATE 35: CPT | Performed by: HOSPITALIST

## 2019-03-14 PROCEDURE — 83735 ASSAY OF MAGNESIUM: CPT

## 2019-03-14 PROCEDURE — 85025 COMPLETE CBC W/AUTO DIFF WBC: CPT

## 2019-03-14 PROCEDURE — 80053 COMPREHEN METABOLIC PANEL: CPT

## 2019-03-14 RX ADMIN — ENOXAPARIN SODIUM 40 MG: 40 INJECTION SUBCUTANEOUS at 12:57

## 2019-03-14 RX ADMIN — CITALOPRAM HYDROBROMIDE 40 MG: 40 TABLET ORAL at 07:52

## 2019-03-14 RX ADMIN — Medication 1 G: at 10:39

## 2019-03-14 RX ADMIN — Medication 10 ML: at 07:52

## 2019-03-14 RX ADMIN — IPRATROPIUM BROMIDE AND ALBUTEROL SULFATE 1 AMPULE: .5; 3 SOLUTION RESPIRATORY (INHALATION) at 18:30

## 2019-03-14 RX ADMIN — LEVOTHYROXINE SODIUM 112 MCG: 112 TABLET ORAL at 07:52

## 2019-03-14 RX ADMIN — GABAPENTIN 600 MG: 600 TABLET, FILM COATED ORAL at 07:52

## 2019-03-14 RX ADMIN — ROPINIROLE HYDROCHLORIDE 0.25 MG: 0.25 TABLET, FILM COATED ORAL at 22:11

## 2019-03-14 RX ADMIN — PANTOPRAZOLE SODIUM 40 MG: 40 TABLET, DELAYED RELEASE ORAL at 07:52

## 2019-03-14 RX ADMIN — METHYLPREDNISOLONE SODIUM SUCCINATE 40 MG: 40 INJECTION, POWDER, FOR SOLUTION INTRAMUSCULAR; INTRAVENOUS at 10:39

## 2019-03-14 RX ADMIN — FAMOTIDINE 20 MG: 10 INJECTION, SOLUTION INTRAVENOUS at 22:13

## 2019-03-14 RX ADMIN — MODAFINIL 200 MG: 200 TABLET ORAL at 04:41

## 2019-03-14 RX ADMIN — METHYLPREDNISOLONE SODIUM SUCCINATE 40 MG: 40 INJECTION, POWDER, FOR SOLUTION INTRAMUSCULAR; INTRAVENOUS at 22:12

## 2019-03-14 RX ADMIN — HYDROCODONE BITARTRATE AND ACETAMINOPHEN 1 TABLET: 10; 325 TABLET ORAL at 04:41

## 2019-03-14 RX ADMIN — IPRATROPIUM BROMIDE AND ALBUTEROL SULFATE 1 AMPULE: .5; 3 SOLUTION RESPIRATORY (INHALATION) at 07:16

## 2019-03-14 RX ADMIN — GABAPENTIN 600 MG: 600 TABLET, FILM COATED ORAL at 12:57

## 2019-03-14 RX ADMIN — HYDROCODONE BITARTRATE AND ACETAMINOPHEN 1 TABLET: 10; 325 TABLET ORAL at 23:34

## 2019-03-14 RX ADMIN — GABAPENTIN 600 MG: 600 TABLET, FILM COATED ORAL at 22:11

## 2019-03-14 RX ADMIN — AZITHROMYCIN MONOHYDRATE 500 MG: 500 INJECTION, POWDER, LYOPHILIZED, FOR SOLUTION INTRAVENOUS at 10:39

## 2019-03-14 RX ADMIN — Medication 10 ML: at 22:12

## 2019-03-14 RX ADMIN — FAMOTIDINE 20 MG: 10 INJECTION, SOLUTION INTRAVENOUS at 07:52

## 2019-03-14 RX ADMIN — HYDROCODONE BITARTRATE AND ACETAMINOPHEN 1 TABLET: 10; 325 TABLET ORAL at 10:38

## 2019-03-14 RX ADMIN — IPRATROPIUM BROMIDE AND ALBUTEROL SULFATE 1 AMPULE: .5; 3 SOLUTION RESPIRATORY (INHALATION) at 11:08

## 2019-03-14 RX ADMIN — HYDROCODONE BITARTRATE AND ACETAMINOPHEN 1 TABLET: 10; 325 TABLET ORAL at 17:06

## 2019-03-14 RX ADMIN — IPRATROPIUM BROMIDE AND ALBUTEROL SULFATE 1 AMPULE: .5; 3 SOLUTION RESPIRATORY (INHALATION) at 15:09

## 2019-03-14 RX ADMIN — MODAFINIL 200 MG: 200 TABLET ORAL at 12:12

## 2019-03-14 ASSESSMENT — PAIN SCALES - GENERAL
PAINLEVEL_OUTOF10: 5
PAINLEVEL_OUTOF10: 8
PAINLEVEL_OUTOF10: 5
PAINLEVEL_OUTOF10: 0

## 2019-03-15 VITALS
OXYGEN SATURATION: 100 % | BODY MASS INDEX: 24.01 KG/M2 | DIASTOLIC BLOOD PRESSURE: 74 MMHG | SYSTOLIC BLOOD PRESSURE: 140 MMHG | WEIGHT: 153 LBS | RESPIRATION RATE: 18 BRPM | HEIGHT: 67 IN | HEART RATE: 60 BPM | TEMPERATURE: 98.1 F

## 2019-03-15 LAB
ALBUMIN SERPL-MCNC: 3.5 G/DL (ref 3.5–5.2)
ALP BLD-CCNC: 122 U/L (ref 35–104)
ALT SERPL-CCNC: 143 U/L (ref 5–33)
ANION GAP SERPL CALCULATED.3IONS-SCNC: 11 MMOL/L (ref 7–19)
AST SERPL-CCNC: 85 U/L (ref 5–32)
BASOPHILS ABSOLUTE: 0 K/UL (ref 0–0.2)
BASOPHILS RELATIVE PERCENT: 0.1 % (ref 0–1)
BILIRUB SERPL-MCNC: <0.2 MG/DL (ref 0.2–1.2)
BUN BLDV-MCNC: 8 MG/DL (ref 8–23)
CALCIUM SERPL-MCNC: 8.5 MG/DL (ref 8.8–10.2)
CHLORIDE BLD-SCNC: 105 MMOL/L (ref 98–111)
CO2: 29 MMOL/L (ref 22–29)
CREAT SERPL-MCNC: 0.6 MG/DL (ref 0.5–0.9)
EOSINOPHILS ABSOLUTE: 0 K/UL (ref 0–0.6)
EOSINOPHILS RELATIVE PERCENT: 0 % (ref 0–5)
GFR NON-AFRICAN AMERICAN: >60
GLUCOSE BLD-MCNC: 131 MG/DL (ref 74–109)
HCT VFR BLD CALC: 35 % (ref 37–47)
HEMOGLOBIN: 10.9 G/DL (ref 12–16)
LYMPHOCYTES ABSOLUTE: 0.8 K/UL (ref 1.1–4.5)
LYMPHOCYTES RELATIVE PERCENT: 6.9 % (ref 20–40)
MAGNESIUM: 2.1 MG/DL (ref 1.6–2.4)
MCH RBC QN AUTO: 29.3 PG (ref 27–31)
MCHC RBC AUTO-ENTMCNC: 31.1 G/DL (ref 33–37)
MCV RBC AUTO: 94.1 FL (ref 81–99)
MONOCYTES ABSOLUTE: 0.4 K/UL (ref 0–0.9)
MONOCYTES RELATIVE PERCENT: 3.5 % (ref 0–10)
NEUTROPHILS ABSOLUTE: 10.6 K/UL (ref 1.5–7.5)
NEUTROPHILS RELATIVE PERCENT: 88.3 % (ref 50–65)
PDW BLD-RTO: 13.4 % (ref 11.5–14.5)
PLATELET # BLD: 198 K/UL (ref 130–400)
PMV BLD AUTO: 10.5 FL (ref 9.4–12.3)
POTASSIUM REFLEX MAGNESIUM: 4 MMOL/L (ref 3.5–5)
RBC # BLD: 3.72 M/UL (ref 4.2–5.4)
SODIUM BLD-SCNC: 145 MMOL/L (ref 136–145)
TOTAL PROTEIN: 6.2 G/DL (ref 6.6–8.7)
WBC # BLD: 12.1 K/UL (ref 4.8–10.8)

## 2019-03-15 PROCEDURE — 36415 COLL VENOUS BLD VENIPUNCTURE: CPT

## 2019-03-15 PROCEDURE — 83735 ASSAY OF MAGNESIUM: CPT

## 2019-03-15 PROCEDURE — 6370000000 HC RX 637 (ALT 250 FOR IP): Performed by: INTERNAL MEDICINE

## 2019-03-15 PROCEDURE — 80053 COMPREHEN METABOLIC PANEL: CPT

## 2019-03-15 PROCEDURE — 6370000000 HC RX 637 (ALT 250 FOR IP): Performed by: HOSPITALIST

## 2019-03-15 PROCEDURE — 85025 COMPLETE CBC W/AUTO DIFF WBC: CPT

## 2019-03-15 PROCEDURE — 99238 HOSP IP/OBS DSCHRG MGMT 30/<: CPT | Performed by: HOSPITALIST

## 2019-03-15 PROCEDURE — 94640 AIRWAY INHALATION TREATMENT: CPT

## 2019-03-15 PROCEDURE — 2580000003 HC RX 258: Performed by: INTERNAL MEDICINE

## 2019-03-15 RX ORDER — MODAFINIL 200 MG/1
TABLET ORAL
Qty: 60 TABLET | Refills: 0 | Status: SHIPPED | OUTPATIENT
Start: 2019-03-15 | End: 2019-04-15 | Stop reason: SDUPTHER

## 2019-03-15 RX ORDER — PREDNISONE 20 MG/1
40 TABLET ORAL DAILY
Qty: 10 TABLET | Refills: 0 | Status: SHIPPED | OUTPATIENT
Start: 2019-03-16 | End: 2019-03-21 | Stop reason: ALTCHOICE

## 2019-03-15 RX ORDER — PREDNISONE 10 MG/1
40 TABLET ORAL DAILY
Status: DISCONTINUED | OUTPATIENT
Start: 2019-03-15 | End: 2019-03-15 | Stop reason: HOSPADM

## 2019-03-15 RX ORDER — ALENDRONATE SODIUM 35 MG/1
TABLET ORAL
Qty: 4 TABLET | Refills: 6 | Status: SHIPPED | OUTPATIENT
Start: 2019-03-15 | End: 2020-02-24 | Stop reason: SDUPTHER

## 2019-03-15 RX ORDER — GABAPENTIN 600 MG/1
TABLET ORAL
Qty: 90 TABLET | Refills: 0 | Status: ON HOLD | OUTPATIENT
Start: 2019-03-15 | End: 2019-07-30 | Stop reason: HOSPADM

## 2019-03-15 RX ORDER — LEVOFLOXACIN 750 MG/1
750 TABLET ORAL DAILY
Qty: 5 TABLET | Refills: 0 | Status: SHIPPED | OUTPATIENT
Start: 2019-03-15 | End: 2019-03-20

## 2019-03-15 RX ADMIN — PREDNISONE 40 MG: 10 TABLET ORAL at 08:07

## 2019-03-15 RX ADMIN — IPRATROPIUM BROMIDE AND ALBUTEROL SULFATE 1 AMPULE: .5; 3 SOLUTION RESPIRATORY (INHALATION) at 07:13

## 2019-03-15 RX ADMIN — PANTOPRAZOLE SODIUM 40 MG: 40 TABLET, DELAYED RELEASE ORAL at 06:35

## 2019-03-15 RX ADMIN — CITALOPRAM HYDROBROMIDE 40 MG: 40 TABLET ORAL at 08:07

## 2019-03-15 RX ADMIN — Medication 10 ML: at 08:07

## 2019-03-15 RX ADMIN — MODAFINIL 200 MG: 200 TABLET ORAL at 06:35

## 2019-03-15 RX ADMIN — HYDROCODONE BITARTRATE AND ACETAMINOPHEN 1 TABLET: 10; 325 TABLET ORAL at 06:41

## 2019-03-15 RX ADMIN — GABAPENTIN 600 MG: 600 TABLET, FILM COATED ORAL at 08:07

## 2019-03-15 RX ADMIN — LEVOTHYROXINE SODIUM 112 MCG: 112 TABLET ORAL at 06:35

## 2019-03-15 ASSESSMENT — PAIN SCALES - GENERAL
PAINLEVEL_OUTOF10: 8
PAINLEVEL_OUTOF10: 8

## 2019-03-18 ENCOUNTER — CARE COORDINATION (OUTPATIENT)
Dept: CASE MANAGEMENT | Age: 64
End: 2019-03-18

## 2019-03-18 LAB
BLOOD CULTURE, ROUTINE: NORMAL
CULTURE, BLOOD 2: NORMAL

## 2019-03-19 ENCOUNTER — CARE COORDINATION (OUTPATIENT)
Dept: CASE MANAGEMENT | Age: 64
End: 2019-03-19

## 2019-03-19 LAB
EKG P AXIS: 67 DEGREES
EKG P-R INTERVAL: 122 MS
EKG Q-T INTERVAL: 330 MS
EKG QRS DURATION: 86 MS
EKG QTC CALCULATION (BAZETT): 414 MS
EKG T AXIS: 66 DEGREES

## 2019-03-20 ENCOUNTER — CARE COORDINATION (OUTPATIENT)
Dept: CASE MANAGEMENT | Age: 64
End: 2019-03-20

## 2019-03-20 DIAGNOSIS — J43.2 CENTRILOBULAR EMPHYSEMA (HCC): Primary | ICD-10-CM

## 2019-03-20 PROCEDURE — 1111F DSCHRG MED/CURRENT MED MERGE: CPT | Performed by: FAMILY MEDICINE

## 2019-03-21 ENCOUNTER — OFFICE VISIT (OUTPATIENT)
Dept: PRIMARY CARE CLINIC | Age: 64
End: 2019-03-21
Payer: MEDICARE

## 2019-03-21 VITALS
BODY MASS INDEX: 23.58 KG/M2 | DIASTOLIC BLOOD PRESSURE: 72 MMHG | HEART RATE: 76 BPM | SYSTOLIC BLOOD PRESSURE: 136 MMHG | OXYGEN SATURATION: 97 % | HEIGHT: 67 IN | WEIGHT: 150.25 LBS | TEMPERATURE: 97.7 F

## 2019-03-21 DIAGNOSIS — J43.8 OTHER EMPHYSEMA (HCC): ICD-10-CM

## 2019-03-21 DIAGNOSIS — J18.9 COMMUNITY ACQUIRED PNEUMONIA OF LEFT LOWER LOBE OF LUNG: ICD-10-CM

## 2019-03-21 DIAGNOSIS — Z09 HOSPITAL DISCHARGE FOLLOW-UP: Primary | ICD-10-CM

## 2019-03-21 DIAGNOSIS — J44.1 CHRONIC OBSTRUCTIVE PULMONARY DISEASE WITH ACUTE EXACERBATION (HCC): ICD-10-CM

## 2019-03-21 PROCEDURE — 1111F DSCHRG MED/CURRENT MED MERGE: CPT | Performed by: NURSE PRACTITIONER

## 2019-03-21 PROCEDURE — 99495 TRANSJ CARE MGMT MOD F2F 14D: CPT | Performed by: NURSE PRACTITIONER

## 2019-03-21 RX ORDER — METHYLPREDNISOLONE 4 MG/1
TABLET ORAL
Qty: 1 KIT | Refills: 0 | Status: SHIPPED | OUTPATIENT
Start: 2019-03-21 | End: 2019-03-27

## 2019-03-21 RX ORDER — AZITHROMYCIN 250 MG/1
250 TABLET, FILM COATED ORAL SEE ADMIN INSTRUCTIONS
Qty: 6 TABLET | Refills: 0 | Status: SHIPPED | OUTPATIENT
Start: 2019-03-21 | End: 2019-03-26

## 2019-03-25 ENCOUNTER — CARE COORDINATION (OUTPATIENT)
Dept: CASE MANAGEMENT | Age: 64
End: 2019-03-25

## 2019-03-25 ASSESSMENT — ENCOUNTER SYMPTOMS
WHEEZING: 1
SINUS PRESSURE: 0
NAUSEA: 0
VOMITING: 0
ABDOMINAL PAIN: 0
DIARRHEA: 0
CONSTIPATION: 0
COUGH: 0
SINUS PAIN: 0
SHORTNESS OF BREATH: 1

## 2019-03-26 ENCOUNTER — CARE COORDINATION (OUTPATIENT)
Dept: CASE MANAGEMENT | Age: 64
End: 2019-03-26

## 2019-03-29 ENCOUNTER — CARE COORDINATION (OUTPATIENT)
Dept: CASE MANAGEMENT | Age: 64
End: 2019-03-29

## 2019-04-01 ENCOUNTER — CARE COORDINATION (OUTPATIENT)
Dept: CASE MANAGEMENT | Age: 64
End: 2019-04-01

## 2019-04-01 NOTE — CARE COORDINATION
[facility] Transitions Follow Up Call    2019    Patient: Andrzej Carpenter  Patient : 1955   MRN: [id number]  Reason for Admission:   Discharge Date: 3/15/19 RARS: Readmission Risk Score: 15         Spoke with: [address] Transitions Subsequent and Final Call    Subsequent and Final Calls  Do you have any ongoing symptoms?:  No  Have your medications changed?:  No  Do you have any questions related to your medications?:  No  Do you currently have any active services?:  No  Do you have any needs or concerns that I can assist you with?:  No  Identified Barriers:  None  Care Transitions Interventions  Other Interventions: Follow Up : Spoke with patient for final follow up call. Patient very hurried to finish call. She did says she was eating well, feeling well. No problems, or SOA. She has had her follow up appointment with her provider. Will discharge from [place] [address] and advised patient if she has any problems or issues to contact [facility].    Future Appointments   Date Time Provider Tanja Hernandez   2019 11:30 AM ERIN Kelley [facility]   2019 11:30 [facility]       Ankita Conte, [place]

## 2019-04-15 DIAGNOSIS — F32.A DEPRESSION, UNSPECIFIED DEPRESSION TYPE: ICD-10-CM

## 2019-04-15 DIAGNOSIS — G25.81 RESTLESS LEGS SYNDROME: ICD-10-CM

## 2019-04-15 RX ORDER — MODAFINIL 200 MG/1
TABLET ORAL
Qty: 60 TABLET | Refills: 0 | Status: SHIPPED | OUTPATIENT
Start: 2019-04-15 | End: 2019-05-15

## 2019-04-15 RX ORDER — LEVOTHYROXINE SODIUM 112 UG/1
TABLET ORAL
Qty: 30 TABLET | Refills: 0 | Status: SHIPPED | OUTPATIENT
Start: 2019-04-15 | End: 2019-05-16 | Stop reason: SDUPTHER

## 2019-04-15 RX ORDER — MIRTAZAPINE 15 MG/1
TABLET, FILM COATED ORAL
Qty: 30 TABLET | Refills: 0 | Status: SHIPPED | OUTPATIENT
Start: 2019-04-15 | End: 2019-05-16 | Stop reason: SDUPTHER

## 2019-04-15 RX ORDER — ROPINIROLE 0.25 MG/1
TABLET, FILM COATED ORAL
Qty: 30 TABLET | Refills: 0 | Status: SHIPPED | OUTPATIENT
Start: 2019-04-15 | End: 2019-05-16 | Stop reason: SDUPTHER

## 2019-05-23 DIAGNOSIS — G25.81 RESTLESS LEGS SYNDROME: ICD-10-CM

## 2019-05-23 DIAGNOSIS — F32.A DEPRESSION, UNSPECIFIED DEPRESSION TYPE: ICD-10-CM

## 2019-05-23 RX ORDER — MODAFINIL 200 MG/1
TABLET ORAL
Qty: 60 TABLET | Refills: 0 | OUTPATIENT
Start: 2019-05-23 | End: 2019-06-19 | Stop reason: SDUPTHER

## 2019-06-03 DIAGNOSIS — G62.9 NEUROPATHY: ICD-10-CM

## 2019-06-03 RX ORDER — GABAPENTIN 600 MG/1
TABLET ORAL
Qty: 90 TABLET | Refills: 0 | OUTPATIENT
Start: 2019-06-03 | End: 2019-10-24 | Stop reason: SDUPTHER

## 2019-06-19 DIAGNOSIS — J44.1 CHRONIC OBSTRUCTIVE PULMONARY DISEASE WITH ACUTE EXACERBATION (HCC): ICD-10-CM

## 2019-06-19 DIAGNOSIS — F32.A DEPRESSION, UNSPECIFIED DEPRESSION TYPE: ICD-10-CM

## 2019-06-19 DIAGNOSIS — G25.81 RESTLESS LEGS SYNDROME: ICD-10-CM

## 2019-06-19 DIAGNOSIS — E03.9 HYPOTHYROIDISM, UNSPECIFIED TYPE: Primary | ICD-10-CM

## 2019-06-19 RX ORDER — LEVOTHYROXINE SODIUM 112 UG/1
TABLET ORAL
Qty: 30 TABLET | Refills: 0 | Status: SHIPPED | OUTPATIENT
Start: 2019-06-19 | End: 2019-07-18 | Stop reason: SDUPTHER

## 2019-06-19 RX ORDER — CITALOPRAM 40 MG/1
TABLET ORAL
Qty: 30 TABLET | Refills: 0 | Status: SHIPPED | OUTPATIENT
Start: 2019-06-19 | End: 2019-08-26 | Stop reason: SDUPTHER

## 2019-06-19 RX ORDER — MIRTAZAPINE 15 MG/1
TABLET, FILM COATED ORAL
Qty: 30 TABLET | Refills: 0 | Status: SHIPPED | OUTPATIENT
Start: 2019-06-19 | End: 2019-07-18 | Stop reason: SDUPTHER

## 2019-06-19 RX ORDER — ROPINIROLE 0.25 MG/1
TABLET, FILM COATED ORAL
Qty: 30 TABLET | Refills: 0 | Status: SHIPPED | OUTPATIENT
Start: 2019-06-19 | End: 2019-07-18 | Stop reason: SDUPTHER

## 2019-06-19 RX ORDER — MODAFINIL 200 MG/1
TABLET ORAL
Qty: 60 TABLET | Refills: 0 | OUTPATIENT
Start: 2019-06-19 | End: 2019-09-26 | Stop reason: SDUPTHER

## 2019-06-19 RX ORDER — GABAPENTIN 600 MG/1
TABLET ORAL
Qty: 90 TABLET | Refills: 0 | Status: ON HOLD | OUTPATIENT
Start: 2019-06-19 | End: 2019-07-30 | Stop reason: HOSPADM

## 2019-06-19 NOTE — TELEPHONE ENCOUNTER
Carlena Bon called to request a refill on her medication. Last office visit : 3/21/2019   Next office visit : 6/25/2019     Requested Prescriptions     Pending Prescriptions Disp Refills    VENTOLIN  (90 Base) MCG/ACT inhaler [Pharmacy Med Name: VENTOLIN HFA  INHALER] 18 g 0     Sig: USE TWO PUFFS BY MOUTH AS NEEDED FOR WHEEZING    levothyroxine (SYNTHROID) 112 MCG tablet [Pharmacy Med Name: LEVOTHYROXINE 112 MCG TABLET] 30 tablet 0     Sig: TAKE 1 TABLET BY MOUTH ONCE DAILY.     mirtazapine (REMERON) 15 MG tablet [Pharmacy Med Name: MIRTAZAPINE 15MG TABLET] 30 tablet 0     Sig: TAKE 1 TABLET BY MOUTH AT BEDTIME    rOPINIRole (REQUIP) 0.25 MG tablet [Pharmacy Med Name: rOPINIRole 0.25MG TAB] 30 tablet 0     Sig: TAKE 1 TABLET BY MOUTH AT BEDTIME    citalopram (CELEXA) 40 MG tablet [Pharmacy Med Name: CITALOPRAM 40 MG *DD] 30 tablet 0     Sig: TAKE 1 TABLET BY MOUTH ONCE DAILY            Daniel Arcos

## 2019-06-19 NOTE — TELEPHONE ENCOUNTER
Deangelo Izaguirre called to request a refill on her medication. Last office visit : 3/21/2019   Next office visit : 6/25/2019     Last UDS:   Amphetamine Screen, Urine   Date Value Ref Range Status   10/08/2018 neg  Final     Barbiturate Screen, Urine   Date Value Ref Range Status   10/08/2018 POS  Final     Benzodiazepine Screen, Urine   Date Value Ref Range Status   10/08/2018 neg  Final     Buprenorphine Urine   Date Value Ref Range Status   10/08/2018 neg  Final     Cocaine Metabolite Screen, Urine   Date Value Ref Range Status   10/08/2018 neg  Final     Gabapentin Screen, Urine   Date Value Ref Range Status   10/08/2018 neg  Final     Methamphetamine, Urine   Date Value Ref Range Status   10/08/2018 neg  Final     Opiate Scrn, Ur   Date Value Ref Range Status   10/08/2018 POS  Final     Oxycodone Screen, Ur   Date Value Ref Range Status   10/08/2018 POS  Final     PCP Screen, Urine   Date Value Ref Range Status   10/08/2018 neg  Final     Propoxyphene Screen, Urine   Date Value Ref Range Status   10/08/2018 neg  Final     THC Screen, Urine   Date Value Ref Range Status   10/08/2018 neg  Final     Tricyclic Antidepressants, Urine   Date Value Ref Range Status   10/08/2018 neg  Final       Last Versie Daub: 10/08/2018  Medication Contract: 1/11/2017   Last Fill: 4/15/2019    Requested Prescriptions     Pending Prescriptions Disp Refills    modafinil (PROVIGIL) 200 MG tablet [Pharmacy Med Name: MODAFINIL 200 MG TABLET] 60 tablet 0     Sig: TAKE 1 TABLET BY MOUTH TWO TIMES A DAY    gabapentin (NEURONTIN) 600 MG tablet [Pharmacy Med Name: GABAPENTIN 600MG TAB DDSD] 90 tablet 0     Sig: TAKE 1 TABLET BY MOUTH THREE TIMES DAILY.      Signed Prescriptions Disp Refills    VENTOLIN  (90 Base) MCG/ACT inhaler 18 g 0     Sig: USE TWO PUFFS BY MOUTH AS NEEDED FOR WHEEZING     Authorizing Provider: Veronica Carr     Ordering User: Carlos De La Fuente    levothyroxine (SYNTHROID) 112 MCG tablet 30 tablet 0 Sig: TAKE 1 TABLET BY MOUTH ONCE DAILY. Authorizing Provider: Steve Cam     Ordering User: Devi Iverson    mirtazapine (REMERON) 15 MG tablet 30 tablet 0     Sig: TAKE 1 TABLET BY MOUTH AT BEDTIME     Authorizing Provider: Steve Cam     Ordering User: TEJAL BENTON    rOPINIRole (REQUIP) 0.25 MG tablet 30 tablet 0     Sig: TAKE 1 TABLET BY MOUTH AT BEDTIME     Authorizing Provider: Steve Cam     Ordering User: TEJAL BENTON    citalopram (CELEXA) 40 MG tablet 30 tablet 0     Sig: TAKE 1 TABLET BY MOUTH ONCE DAILY     Authorizing Provider: Steve Cam     Ordering User: Devi Iverson         Please approve or refuse this medication.    Lazaro Huynh

## 2019-07-01 ENCOUNTER — OFFICE VISIT (OUTPATIENT)
Dept: PRIMARY CARE CLINIC | Age: 64
End: 2019-07-01
Payer: MEDICARE

## 2019-07-01 VITALS
OXYGEN SATURATION: 98 % | TEMPERATURE: 97.7 F | SYSTOLIC BLOOD PRESSURE: 132 MMHG | WEIGHT: 140 LBS | BODY MASS INDEX: 21.97 KG/M2 | HEIGHT: 67 IN | DIASTOLIC BLOOD PRESSURE: 84 MMHG | HEART RATE: 72 BPM

## 2019-07-01 DIAGNOSIS — N28.1 RENAL CYST: ICD-10-CM

## 2019-07-01 DIAGNOSIS — R93.89 ABNORMAL CHEST X-RAY: ICD-10-CM

## 2019-07-01 DIAGNOSIS — J44.9 CHRONIC OBSTRUCTIVE PULMONARY DISEASE, UNSPECIFIED COPD TYPE (HCC): Primary | ICD-10-CM

## 2019-07-01 DIAGNOSIS — Z80.51 FAMILY HISTORY OF RENAL CELL CARCINOMA: ICD-10-CM

## 2019-07-01 PROCEDURE — 1036F TOBACCO NON-USER: CPT | Performed by: NURSE PRACTITIONER

## 2019-07-01 PROCEDURE — 99214 OFFICE O/P EST MOD 30 MIN: CPT | Performed by: NURSE PRACTITIONER

## 2019-07-01 PROCEDURE — G8427 DOCREV CUR MEDS BY ELIG CLIN: HCPCS | Performed by: NURSE PRACTITIONER

## 2019-07-01 PROCEDURE — G8420 CALC BMI NORM PARAMETERS: HCPCS | Performed by: NURSE PRACTITIONER

## 2019-07-01 PROCEDURE — 3023F SPIROM DOC REV: CPT | Performed by: NURSE PRACTITIONER

## 2019-07-01 PROCEDURE — 3017F COLORECTAL CA SCREEN DOC REV: CPT | Performed by: NURSE PRACTITIONER

## 2019-07-01 PROCEDURE — G8926 SPIRO NO PERF OR DOC: HCPCS | Performed by: NURSE PRACTITIONER

## 2019-07-01 NOTE — PROGRESS NOTES
[address] [address]     Phone:  [phone]  Fax:  [phone]      Christy Marmolejo is a 60 y.o. female who presents today for her medical conditions/complaints as noted below. Christy Marmolejo is c/o of COPD (3 month follow up - no concerns)      Chief Complaint   Patient presents with    COPD     3 month follow up - no concerns       HPI:     HPI    Christy Marmolejo presents today for a 3 month COPD follow up. She has no new concerns today. She has to take rescue albuterol Nebs once or twice per week. She is still SOB upon exertion. She takes spiriva daily in the morning. She has a slight cough at times, but denies any other symptoms. She reports being able to do all ADLs just a little slower than usual. She no longer smokes. She also has a history of renal cell carcinoma and her son. She has a renal cyst.  She has followed up on this yearly for the last several years. It has not been noted to change. It is now time for her yearly ultrasound. She denies any abdominal pain or urination changes. Past Medical History:   Diagnosis Date    Anxiety     Asthma     COPD (chronic obstructive pulmonary disease) (HCC)     CPAP (continuous positive airway pressure) dependence     11cm    Depression     Hyperlipidemia     Hypothyroidism     Migraines     Obstructive sleep apnea     AHI:  13.1    Restless legs syndrome     Scoliosis     Unspecified sleep apnea         Past Surgical History:   Procedure Laterality Date    APPENDECTOMY  age 27    Patient thinks this was removed with GB    [facility]       Social History     Tobacco Use    Smoking status: Former Smoker     Packs/day: 0.00     Years: 37.00     Pack years: 0.00     Last attempt to quit: 2/3/2014     Years since quittin.4    Smokeless tobacco: Never Used   Substance Use Topics    Alcohol use:  No lb (63.5 kg)   SpO2 98%   BMI 21.93 kg/m²     Assessment:      Diagnosis Orders   1. Chronic obstructive pulmonary disease, unspecified COPD type (HCC)  XR CHEST STANDARD (2 VW)    Full PFT Study With Bronchodilator   2. Abnormal chest x-ray  XR CHEST STANDARD (2 VW)   3. Renal cyst  US RENAL LIMITED   4. Family history of renal cell carcinoma  US RENAL LIMITED       No results found for this visit on 07/01/19. Plan: We will get repeat chest x-ray in 2 months per radiologist recommendation on last chest x-ray on 3/13. Pulmonary function testing ordered. Renal ultrasound due to history of cyst and family history of renal cell carcinoma. Continue current medications. Return in about 3 months (around 10/1/2019), or if symptoms worsen or fail to improve, for COPD. Orders Placed This Encounter   Procedures    XR CHEST STANDARD (2 VW)     Standing Status:   Future     Standing Expiration Date:   7/1/2020     Order Specific Question:   Reason for exam:     Answer:   ab chest xray March, 2019, COPD    US RENAL LIMITED     Standing Status:   Future     Standing Expiration Date:   7/1/2020     Order Specific Question:   Reason for exam:     Answer:   left renal cyst    Full PFT Study With Bronchodilator     Standing Status:   Future     Standing Expiration Date:   7/1/2020       No orders of the defined types were placed in this encounter. Patient offered educational materials - see patient instructions for any instruction needed. Discussed use, benefit, and side effects of prescribed medications. All patient questions answered. Instructed to continue current medications, diet and exercise. Patient agreed with treatment plan. Follow up as directed. Patient was advised to go to the ED if condition ever becomes emergent. EMR Dragon/transcription disclaimer: Some of this encounter note is an electronic transcription/translation of spoken language to printed text.  The electronic

## 2019-07-01 NOTE — PATIENT INSTRUCTIONS
We are committed to providing you with the best care possible. In order to help us achieve these goals please remember to bring all medications, herbal products, and over the counter supplements with you to each visit. If your provider has ordered testing for you, please be sure to follow up with our office if you have not received results within 7 days after the testing took place. *If you receive a survey after visiting one of our offices, please take time to share your experience concerning your physician office visit. These surveys are confidential and no health information about you is shared. We are eager to improve for you and we are counting on your feedback to help make that happen. \

## 2019-07-07 ASSESSMENT — ENCOUNTER SYMPTOMS
WHEEZING: 0
SHORTNESS OF BREATH: 1
GASTROINTESTINAL NEGATIVE: 1
COUGH: 1
STRIDOR: 0

## 2019-07-24 ENCOUNTER — APPOINTMENT (OUTPATIENT)
Dept: GENERAL RADIOLOGY | Age: 64
DRG: 189 | End: 2019-07-24
Payer: MEDICARE

## 2019-07-24 ENCOUNTER — APPOINTMENT (OUTPATIENT)
Dept: CT IMAGING | Age: 64
DRG: 189 | End: 2019-07-24
Payer: MEDICARE

## 2019-07-24 ENCOUNTER — HOSPITAL ENCOUNTER (INPATIENT)
Age: 64
LOS: 5 days | Discharge: HOME | DRG: 189 | End: 2019-07-30
Attending: EMERGENCY MEDICINE | Admitting: HOSPITALIST
Payer: MEDICARE

## 2019-07-24 DIAGNOSIS — R93.89 ABNORMAL CT SCAN: Primary | ICD-10-CM

## 2019-07-24 DIAGNOSIS — R04.2 HEMOPTYSIS: ICD-10-CM

## 2019-07-24 DIAGNOSIS — J18.9 PNEUMONIA DUE TO ORGANISM: ICD-10-CM

## 2019-07-24 DIAGNOSIS — E87.6 HYPOKALEMIA: ICD-10-CM

## 2019-07-24 DIAGNOSIS — R51.9 NONINTRACTABLE HEADACHE, UNSPECIFIED CHRONICITY PATTERN, UNSPECIFIED HEADACHE TYPE: ICD-10-CM

## 2019-07-24 LAB
ALBUMIN SERPL-MCNC: 3.9 G/DL (ref 3.5–5.2)
ALP BLD-CCNC: 122 U/L (ref 35–104)
ALT SERPL-CCNC: 11 U/L (ref 5–33)
ANION GAP SERPL CALCULATED.3IONS-SCNC: 12 MMOL/L (ref 7–19)
APPEARANCE CSF: CLEAR
APTT: 31.8 SEC (ref 26–36.2)
AST SERPL-CCNC: 17 U/L (ref 5–32)
BACTERIA: NEGATIVE /HPF
BASOPHILS ABSOLUTE: 0 K/UL (ref 0–0.2)
BASOPHILS RELATIVE PERCENT: 0.3 % (ref 0–1)
BILIRUB SERPL-MCNC: 0.4 MG/DL (ref 0.2–1.2)
BILIRUBIN URINE: NEGATIVE
BLOOD, URINE: NEGATIVE
BUN BLDV-MCNC: 12 MG/DL (ref 8–23)
CALCIUM SERPL-MCNC: 9.2 MG/DL (ref 8.8–10.2)
CHLORIDE BLD-SCNC: 96 MMOL/L (ref 98–111)
CLARITY: ABNORMAL
CLOT EVALUATION CSF: NORMAL
CO2: 28 MMOL/L (ref 22–29)
COLOR CSF: COLORLESS
COLOR: YELLOW
CREAT SERPL-MCNC: 0.6 MG/DL (ref 0.5–0.9)
EOSINOPHILS ABSOLUTE: 0.1 K/UL (ref 0–0.6)
EOSINOPHILS RELATIVE PERCENT: 0.5 % (ref 0–5)
EPITHELIAL CELLS, UA: 2 /HPF (ref 0–5)
GFR NON-AFRICAN AMERICAN: >60
GLUCOSE BLD-MCNC: 101 MG/DL (ref 74–109)
GLUCOSE URINE: NEGATIVE MG/DL
GLUCOSE, CSF: 72 MG/DL (ref 40–70)
HCG QUALITATIVE: NEGATIVE
HCT VFR BLD CALC: 40.1 % (ref 37–47)
HEMOGLOBIN: 13 G/DL (ref 12–16)
HYALINE CASTS: 0 /HPF (ref 0–8)
INR BLD: 1.04 (ref 0.88–1.18)
KETONES, URINE: NEGATIVE MG/DL
LACTIC ACID, SEPSIS: 1.2 MG/DL (ref 0.5–1.9)
LEUKOCYTE ESTERASE, URINE: ABNORMAL
LIPASE: 33 U/L (ref 13–60)
LYMPHOCYTES ABSOLUTE: 1.6 K/UL (ref 1.1–4.5)
LYMPHOCYTES RELATIVE PERCENT: 9.8 % (ref 20–40)
MCH RBC QN AUTO: 29.9 PG (ref 27–31)
MCHC RBC AUTO-ENTMCNC: 32.4 G/DL (ref 33–37)
MCV RBC AUTO: 92.2 FL (ref 81–99)
MONOCYTES ABSOLUTE: 1 K/UL (ref 0–0.9)
MONOCYTES RELATIVE PERCENT: 6.2 % (ref 0–10)
NEUTROPHILS ABSOLUTE: 13.2 K/UL (ref 1.5–7.5)
NEUTROPHILS RELATIVE PERCENT: 82.8 % (ref 50–65)
NITRITE, URINE: NEGATIVE
PDW BLD-RTO: 12.9 % (ref 11.5–14.5)
PH UA: 7 (ref 5–8)
PLATELET # BLD: 231 K/UL (ref 130–400)
PMV BLD AUTO: 10.1 FL (ref 9.4–12.3)
POTASSIUM SERPL-SCNC: 2.9 MMOL/L (ref 3.5–5)
PROTEIN CSF: 27 MG/DL (ref 15–45)
PROTEIN UA: NEGATIVE MG/DL
PROTHROMBIN TIME: 13 SEC (ref 12–14.6)
RBC # BLD: 4.35 M/UL (ref 4.2–5.4)
RBC CSF: 0 /CUMM (ref 0–5)
RBC UA: 0 /HPF (ref 0–4)
SODIUM BLD-SCNC: 136 MMOL/L (ref 136–145)
SPECIFIC GRAVITY UA: 1.02 (ref 1–1.03)
TOTAL PROTEIN: 6.6 G/DL (ref 6.6–8.7)
TUBE NUMBER CSF: NORMAL
URINE REFLEX TO CULTURE: YES
UROBILINOGEN, URINE: 0.2 E.U./DL
WBC # BLD: 15.9 K/UL (ref 4.8–10.8)
WBC CSF: 0 /CUMM (ref 0–8)
WBC UA: 2 /HPF (ref 0–5)

## 2019-07-24 PROCEDURE — 71275 CT ANGIOGRAPHY CHEST: CPT

## 2019-07-24 PROCEDURE — 85610 PROTHROMBIN TIME: CPT

## 2019-07-24 PROCEDURE — 96374 THER/PROPH/DIAG INJ IV PUSH: CPT

## 2019-07-24 PROCEDURE — 82945 GLUCOSE OTHER FLUID: CPT

## 2019-07-24 PROCEDURE — 6360000004 HC RX CONTRAST MEDICATION: Performed by: EMERGENCY MEDICINE

## 2019-07-24 PROCEDURE — 87205 SMEAR GRAM STAIN: CPT

## 2019-07-24 PROCEDURE — 83690 ASSAY OF LIPASE: CPT

## 2019-07-24 PROCEDURE — 84157 ASSAY OF PROTEIN OTHER: CPT

## 2019-07-24 PROCEDURE — 009U3ZX DRAINAGE OF SPINAL CANAL, PERCUTANEOUS APPROACH, DIAGNOSTIC: ICD-10-PCS | Performed by: EMERGENCY MEDICINE

## 2019-07-24 PROCEDURE — 84703 CHORIONIC GONADOTROPIN ASSAY: CPT

## 2019-07-24 PROCEDURE — 83605 ASSAY OF LACTIC ACID: CPT

## 2019-07-24 PROCEDURE — 87086 URINE CULTURE/COLONY COUNT: CPT

## 2019-07-24 PROCEDURE — 87040 BLOOD CULTURE FOR BACTERIA: CPT

## 2019-07-24 PROCEDURE — 89050 BODY FLUID CELL COUNT: CPT

## 2019-07-24 PROCEDURE — 85730 THROMBOPLASTIN TIME PARTIAL: CPT

## 2019-07-24 PROCEDURE — 71045 X-RAY EXAM CHEST 1 VIEW: CPT

## 2019-07-24 PROCEDURE — 85025 COMPLETE CBC W/AUTO DIFF WBC: CPT

## 2019-07-24 PROCEDURE — 36415 COLL VENOUS BLD VENIPUNCTURE: CPT

## 2019-07-24 PROCEDURE — 93005 ELECTROCARDIOGRAM TRACING: CPT

## 2019-07-24 PROCEDURE — 80053 COMPREHEN METABOLIC PANEL: CPT

## 2019-07-24 PROCEDURE — 99285 EMERGENCY DEPT VISIT HI MDM: CPT

## 2019-07-24 PROCEDURE — 87070 CULTURE OTHR SPECIMN AEROBIC: CPT

## 2019-07-24 PROCEDURE — 70450 CT HEAD/BRAIN W/O DYE: CPT

## 2019-07-24 PROCEDURE — 6370000000 HC RX 637 (ALT 250 FOR IP): Performed by: EMERGENCY MEDICINE

## 2019-07-24 PROCEDURE — 81001 URINALYSIS AUTO W/SCOPE: CPT

## 2019-07-24 PROCEDURE — 87529 HSV DNA AMP PROBE: CPT

## 2019-07-24 PROCEDURE — 87075 CULTR BACTERIA EXCEPT BLOOD: CPT

## 2019-07-24 PROCEDURE — 6360000002 HC RX W HCPCS: Performed by: EMERGENCY MEDICINE

## 2019-07-24 PROCEDURE — 62270 DX LMBR SPI PNXR: CPT

## 2019-07-24 RX ORDER — LIDOCAINE HYDROCHLORIDE 10 MG/ML
INJECTION, SOLUTION EPIDURAL; INFILTRATION; INTRACAUDAL; PERINEURAL
Status: DISPENSED
Start: 2019-07-24 | End: 2019-07-25

## 2019-07-24 RX ORDER — ACETAMINOPHEN 500 MG
1000 TABLET ORAL ONCE
Status: COMPLETED | OUTPATIENT
Start: 2019-07-24 | End: 2019-07-24

## 2019-07-24 RX ORDER — POTASSIUM CHLORIDE 20 MEQ/1
40 TABLET, EXTENDED RELEASE ORAL ONCE
Status: COMPLETED | OUTPATIENT
Start: 2019-07-24 | End: 2019-07-24

## 2019-07-24 RX ORDER — MORPHINE SULFATE 4 MG/ML
4 INJECTION, SOLUTION INTRAMUSCULAR; INTRAVENOUS ONCE
Status: COMPLETED | OUTPATIENT
Start: 2019-07-24 | End: 2019-07-24

## 2019-07-24 RX ADMIN — MORPHINE SULFATE 4 MG: 4 INJECTION INTRAVENOUS at 22:10

## 2019-07-24 RX ADMIN — POTASSIUM CHLORIDE 40 MEQ: 20 TABLET, EXTENDED RELEASE ORAL at 21:17

## 2019-07-24 RX ADMIN — IOPAMIDOL 95 ML: 755 INJECTION, SOLUTION INTRAVENOUS at 20:42

## 2019-07-24 RX ADMIN — ACETAMINOPHEN 1000 MG: 500 TABLET, FILM COATED ORAL at 21:17

## 2019-07-24 ASSESSMENT — ENCOUNTER SYMPTOMS
SHORTNESS OF BREATH: 1
COUGH: 1
VOMITING: 0
ABDOMINAL PAIN: 0
DIARRHEA: 0
EYE PAIN: 0
NAUSEA: 1

## 2019-07-24 ASSESSMENT — PAIN DESCRIPTION - LOCATION: LOCATION: HEAD

## 2019-07-24 ASSESSMENT — PAIN SCALES - GENERAL
PAINLEVEL_OUTOF10: 10
PAINLEVEL_OUTOF10: 8
PAINLEVEL_OUTOF10: 10
PAINLEVEL_OUTOF10: 10

## 2019-07-25 ENCOUNTER — OUTSIDE FACILITY SERVICE (OUTPATIENT)
Dept: PULMONOLOGY | Facility: CLINIC | Age: 64
End: 2019-07-25

## 2019-07-25 PROBLEM — J96.01 ACUTE HYPOXEMIC RESPIRATORY FAILURE (HCC): Status: ACTIVE | Noted: 2019-07-25

## 2019-07-25 LAB
C-REACTIVE PROTEIN: 11.96 MG/DL (ref 0–0.5)
EKG P AXIS: NORMAL DEGREES
EKG P-R INTERVAL: NORMAL MS
EKG Q-T INTERVAL: 362 MS
EKG QRS DURATION: 146 MS
EKG QTC CALCULATION (BAZETT): 428 MS
EKG T AXIS: -105 DEGREES

## 2019-07-25 PROCEDURE — 86038 ANTINUCLEAR ANTIBODIES: CPT

## 2019-07-25 PROCEDURE — 99285 EMERGENCY DEPT VISIT HI MDM: CPT | Performed by: EMERGENCY MEDICINE

## 2019-07-25 PROCEDURE — 96376 TX/PRO/DX INJ SAME DRUG ADON: CPT

## 2019-07-25 PROCEDURE — 6370000000 HC RX 637 (ALT 250 FOR IP): Performed by: HOSPITALIST

## 2019-07-25 PROCEDURE — 6360000002 HC RX W HCPCS: Performed by: HOSPITALIST

## 2019-07-25 PROCEDURE — 96375 TX/PRO/DX INJ NEW DRUG ADDON: CPT

## 2019-07-25 PROCEDURE — 62270 DX LMBR SPI PNXR: CPT | Performed by: EMERGENCY MEDICINE

## 2019-07-25 PROCEDURE — 2580000003 HC RX 258: Performed by: EMERGENCY MEDICINE

## 2019-07-25 PROCEDURE — 2700000000 HC OXYGEN THERAPY PER DAY

## 2019-07-25 PROCEDURE — 99223 1ST HOSP IP/OBS HIGH 75: CPT | Performed by: INTERNAL MEDICINE

## 2019-07-25 PROCEDURE — 86255 FLUORESCENT ANTIBODY SCREEN: CPT

## 2019-07-25 PROCEDURE — 86140 C-REACTIVE PROTEIN: CPT

## 2019-07-25 PROCEDURE — 6360000002 HC RX W HCPCS: Performed by: EMERGENCY MEDICINE

## 2019-07-25 PROCEDURE — 94640 AIRWAY INHALATION TREATMENT: CPT

## 2019-07-25 PROCEDURE — 36415 COLL VENOUS BLD VENIPUNCTURE: CPT

## 2019-07-25 PROCEDURE — 99223 1ST HOSP IP/OBS HIGH 75: CPT | Performed by: HOSPITALIST

## 2019-07-25 PROCEDURE — 1210000000 HC MED SURG R&B

## 2019-07-25 PROCEDURE — 2580000003 HC RX 258: Performed by: HOSPITALIST

## 2019-07-25 RX ORDER — ACETAMINOPHEN 325 MG/1
650 TABLET ORAL EVERY 4 HOURS PRN
Status: DISCONTINUED | OUTPATIENT
Start: 2019-07-25 | End: 2019-07-30 | Stop reason: HOSPADM

## 2019-07-25 RX ORDER — ONDANSETRON 2 MG/ML
4 INJECTION INTRAMUSCULAR; INTRAVENOUS EVERY 6 HOURS PRN
Status: DISCONTINUED | OUTPATIENT
Start: 2019-07-25 | End: 2019-07-30 | Stop reason: HOSPADM

## 2019-07-25 RX ORDER — POTASSIUM CHLORIDE 7.45 MG/ML
10 INJECTION INTRAVENOUS PRN
Status: DISCONTINUED | OUTPATIENT
Start: 2019-07-25 | End: 2019-07-27

## 2019-07-25 RX ORDER — IPRATROPIUM BROMIDE AND ALBUTEROL SULFATE 2.5; .5 MG/3ML; MG/3ML
1 SOLUTION RESPIRATORY (INHALATION)
Status: DISCONTINUED | OUTPATIENT
Start: 2019-07-25 | End: 2019-07-28

## 2019-07-25 RX ORDER — LEVOTHYROXINE SODIUM 0.1 MG/1
100 TABLET ORAL DAILY
Status: DISCONTINUED | OUTPATIENT
Start: 2019-07-25 | End: 2019-07-30 | Stop reason: HOSPADM

## 2019-07-25 RX ORDER — POTASSIUM CHLORIDE 20 MEQ/1
40 TABLET, EXTENDED RELEASE ORAL PRN
Status: DISCONTINUED | OUTPATIENT
Start: 2019-07-25 | End: 2019-07-30 | Stop reason: HOSPADM

## 2019-07-25 RX ORDER — SODIUM CHLORIDE 0.9 % (FLUSH) 0.9 %
10 SYRINGE (ML) INJECTION EVERY 12 HOURS SCHEDULED
Status: DISCONTINUED | OUTPATIENT
Start: 2019-07-25 | End: 2019-07-30 | Stop reason: HOSPADM

## 2019-07-25 RX ORDER — ALBUTEROL SULFATE 90 UG/1
2 AEROSOL, METERED RESPIRATORY (INHALATION) EVERY 4 HOURS PRN
Status: DISCONTINUED | OUTPATIENT
Start: 2019-07-25 | End: 2019-07-30 | Stop reason: HOSPADM

## 2019-07-25 RX ORDER — SODIUM CHLORIDE 0.9 % (FLUSH) 0.9 %
10 SYRINGE (ML) INJECTION PRN
Status: DISCONTINUED | OUTPATIENT
Start: 2019-07-25 | End: 2019-07-30 | Stop reason: HOSPADM

## 2019-07-25 RX ORDER — IBUPROFEN 200 MG
400 TABLET ORAL EVERY 6 HOURS PRN
Status: DISCONTINUED | OUTPATIENT
Start: 2019-07-25 | End: 2019-07-30 | Stop reason: HOSPADM

## 2019-07-25 RX ORDER — ROPINIROLE 0.25 MG/1
0.25 TABLET, FILM COATED ORAL NIGHTLY
Status: DISCONTINUED | OUTPATIENT
Start: 2019-07-25 | End: 2019-07-30 | Stop reason: HOSPADM

## 2019-07-25 RX ORDER — HYDROCODONE BITARTRATE AND ACETAMINOPHEN 10; 325 MG/1; MG/1
1 TABLET ORAL EVERY 6 HOURS PRN
Status: DISCONTINUED | OUTPATIENT
Start: 2019-07-25 | End: 2019-07-30 | Stop reason: HOSPADM

## 2019-07-25 RX ORDER — MIRTAZAPINE 15 MG/1
15 TABLET, FILM COATED ORAL NIGHTLY
Status: DISCONTINUED | OUTPATIENT
Start: 2019-07-25 | End: 2019-07-30 | Stop reason: HOSPADM

## 2019-07-25 RX ORDER — SODIUM CHLORIDE 9 MG/ML
INJECTION, SOLUTION INTRAVENOUS CONTINUOUS
Status: DISCONTINUED | OUTPATIENT
Start: 2019-07-25 | End: 2019-07-26

## 2019-07-25 RX ORDER — PANTOPRAZOLE SODIUM 40 MG/1
40 TABLET, DELAYED RELEASE ORAL
Status: DISCONTINUED | OUTPATIENT
Start: 2019-07-25 | End: 2019-07-30 | Stop reason: HOSPADM

## 2019-07-25 RX ORDER — GABAPENTIN 600 MG/1
600 TABLET ORAL 2 TIMES DAILY
Status: DISCONTINUED | OUTPATIENT
Start: 2019-07-25 | End: 2019-07-30 | Stop reason: HOSPADM

## 2019-07-25 RX ORDER — CITALOPRAM 40 MG/1
40 TABLET ORAL DAILY
Status: DISCONTINUED | OUTPATIENT
Start: 2019-07-25 | End: 2019-07-30 | Stop reason: HOSPADM

## 2019-07-25 RX ORDER — MAGNESIUM SULFATE 1 G/100ML
1 INJECTION INTRAVENOUS PRN
Status: DISCONTINUED | OUTPATIENT
Start: 2019-07-25 | End: 2019-07-30 | Stop reason: HOSPADM

## 2019-07-25 RX ORDER — MORPHINE SULFATE 2 MG/ML
2 INJECTION, SOLUTION INTRAMUSCULAR; INTRAVENOUS ONCE
Status: COMPLETED | OUTPATIENT
Start: 2019-07-25 | End: 2019-07-25

## 2019-07-25 RX ORDER — METHYLPREDNISOLONE SODIUM SUCCINATE 125 MG/2ML
60 INJECTION, POWDER, LYOPHILIZED, FOR SOLUTION INTRAMUSCULAR; INTRAVENOUS EVERY 6 HOURS
Status: DISCONTINUED | OUTPATIENT
Start: 2019-07-25 | End: 2019-07-27

## 2019-07-25 RX ORDER — TIZANIDINE 4 MG/1
4 TABLET ORAL EVERY 8 HOURS PRN
Status: DISCONTINUED | OUTPATIENT
Start: 2019-07-25 | End: 2019-07-30 | Stop reason: HOSPADM

## 2019-07-25 RX ADMIN — HYDROCODONE BITARTRATE AND ACETAMINOPHEN 1 TABLET: 10; 325 TABLET ORAL at 02:28

## 2019-07-25 RX ADMIN — PANTOPRAZOLE SODIUM 40 MG: 40 TABLET, DELAYED RELEASE ORAL at 05:10

## 2019-07-25 RX ADMIN — METHYLPREDNISOLONE SODIUM SUCCINATE 60 MG: 125 INJECTION, POWDER, FOR SOLUTION INTRAMUSCULAR; INTRAVENOUS at 02:29

## 2019-07-25 RX ADMIN — HYDROCODONE BITARTRATE AND ACETAMINOPHEN 1 TABLET: 10; 325 TABLET ORAL at 09:28

## 2019-07-25 RX ADMIN — IPRATROPIUM BROMIDE AND ALBUTEROL SULFATE 1 AMPULE: .5; 3 SOLUTION RESPIRATORY (INHALATION) at 06:38

## 2019-07-25 RX ADMIN — CITALOPRAM HYDROBROMIDE 40 MG: 40 TABLET ORAL at 09:29

## 2019-07-25 RX ADMIN — IBUPROFEN 400 MG: 200 TABLET, FILM COATED ORAL at 13:44

## 2019-07-25 RX ADMIN — HYDROCODONE BITARTRATE AND ACETAMINOPHEN 1 TABLET: 10; 325 TABLET ORAL at 22:39

## 2019-07-25 RX ADMIN — Medication 500 MG: at 00:41

## 2019-07-25 RX ADMIN — METHYLPREDNISOLONE SODIUM SUCCINATE 60 MG: 125 INJECTION, POWDER, FOR SOLUTION INTRAMUSCULAR; INTRAVENOUS at 23:00

## 2019-07-25 RX ADMIN — TIOTROPIUM BROMIDE INHALATION SPRAY 2 PUFF: 3.12 SPRAY, METERED RESPIRATORY (INHALATION) at 09:29

## 2019-07-25 RX ADMIN — SODIUM CHLORIDE: 9 INJECTION, SOLUTION INTRAVENOUS at 16:14

## 2019-07-25 RX ADMIN — SODIUM CHLORIDE: 9 INJECTION, SOLUTION INTRAVENOUS at 02:29

## 2019-07-25 RX ADMIN — IPRATROPIUM BROMIDE AND ALBUTEROL SULFATE 1 AMPULE: .5; 3 SOLUTION RESPIRATORY (INHALATION) at 10:33

## 2019-07-25 RX ADMIN — IPRATROPIUM BROMIDE AND ALBUTEROL SULFATE 1 AMPULE: .5; 3 SOLUTION RESPIRATORY (INHALATION) at 14:12

## 2019-07-25 RX ADMIN — MORPHINE SULFATE 2 MG: 2 INJECTION, SOLUTION INTRAMUSCULAR; INTRAVENOUS at 00:41

## 2019-07-25 RX ADMIN — HYDROCODONE BITARTRATE AND ACETAMINOPHEN 1 TABLET: 10; 325 TABLET ORAL at 16:12

## 2019-07-25 RX ADMIN — ROPINIROLE HYDROCHLORIDE 0.25 MG: 0.25 TABLET, FILM COATED ORAL at 02:28

## 2019-07-25 RX ADMIN — IPRATROPIUM BROMIDE AND ALBUTEROL SULFATE 1 AMPULE: .5; 3 SOLUTION RESPIRATORY (INHALATION) at 20:14

## 2019-07-25 RX ADMIN — GABAPENTIN 600 MG: 600 TABLET, FILM COATED ORAL at 02:28

## 2019-07-25 RX ADMIN — Medication 10 ML: at 09:30

## 2019-07-25 RX ADMIN — METHYLPREDNISOLONE SODIUM SUCCINATE 60 MG: 125 INJECTION, POWDER, FOR SOLUTION INTRAMUSCULAR; INTRAVENOUS at 09:29

## 2019-07-25 RX ADMIN — Medication 10 ML: at 14:48

## 2019-07-25 RX ADMIN — MIRTAZAPINE 15 MG: 15 TABLET, FILM COATED ORAL at 21:01

## 2019-07-25 RX ADMIN — GABAPENTIN 600 MG: 600 TABLET, FILM COATED ORAL at 21:01

## 2019-07-25 RX ADMIN — CEFTRIAXONE 1 G: 1 INJECTION, POWDER, FOR SOLUTION INTRAMUSCULAR; INTRAVENOUS at 00:31

## 2019-07-25 RX ADMIN — MIRTAZAPINE 15 MG: 15 TABLET, FILM COATED ORAL at 02:28

## 2019-07-25 RX ADMIN — LEVOTHYROXINE SODIUM 100 MCG: 0.1 TABLET ORAL at 05:10

## 2019-07-25 RX ADMIN — METHYLPREDNISOLONE SODIUM SUCCINATE 60 MG: 125 INJECTION, POWDER, FOR SOLUTION INTRAMUSCULAR; INTRAVENOUS at 14:47

## 2019-07-25 RX ADMIN — ROPINIROLE HYDROCHLORIDE 0.25 MG: 0.25 TABLET, FILM COATED ORAL at 21:01

## 2019-07-25 RX ADMIN — GABAPENTIN 600 MG: 600 TABLET, FILM COATED ORAL at 09:29

## 2019-07-25 ASSESSMENT — PAIN SCALES - GENERAL
PAINLEVEL_OUTOF10: 7
PAINLEVEL_OUTOF10: 8
PAINLEVEL_OUTOF10: 9
PAINLEVEL_OUTOF10: 4
PAINLEVEL_OUTOF10: 3
PAINLEVEL_OUTOF10: 5
PAINLEVEL_OUTOF10: 8
PAINLEVEL_OUTOF10: 4
PAINLEVEL_OUTOF10: 9
PAINLEVEL_OUTOF10: 7
PAINLEVEL_OUTOF10: 5

## 2019-07-25 ASSESSMENT — ENCOUNTER SYMPTOMS
SHORTNESS OF BREATH: 1
COLOR CHANGE: 0
CHEST TIGHTNESS: 1
TROUBLE SWALLOWING: 0
WHEEZING: 1
GASTROINTESTINAL NEGATIVE: 1
COUGH: 1
SORE THROAT: 0

## 2019-07-25 ASSESSMENT — PAIN DESCRIPTION - LOCATION: LOCATION: HEAD

## 2019-07-25 ASSESSMENT — PAIN DESCRIPTION - FREQUENCY: FREQUENCY: CONTINUOUS

## 2019-07-25 NOTE — ED PROVIDER NOTES
vomiting. Genitourinary: Negative for difficulty urinating and dysuria. Musculoskeletal: Negative for neck pain and neck stiffness. Skin: Negative for rash. Neurological: Positive for headaches. Negative for weakness and numbness. All other systems reviewed and are negative. A complete review of systems was performed and is negative except as noted above in the HPI. PAST MEDICAL HISTORY     Past Medical History:   Diagnosis Date    Anxiety     Asthma     COPD [facility]chronic obstructive pulmonary disease) ([facility])     CPAP (continuous positive airway pressure) dependence     11cm    Depression     Hyperlipidemia     Hypothyroidism     Migraines     Obstructive sleep apnea     AHI:  13.1    Restless legs syndrome     Scoliosis     Unspecified sleep apnea          SURGICAL HISTORY       Past Surgical History:   Procedure Laterality Date    APPENDECTOMY  age 27    Patient thinks this was removed with GB    BREAST SURGERY  2006    [facility] 27         CURRENT MEDICATIONS       Previous Medications    ALBUTEROL (PROVENTIL) (5 MG/ML) 0.5% NEBULIZER SOLUTION    Take 1 mL by nebulization 4 times daily as needed for Wheezing    ALENDRONATE (FOSAMAX) 35 MG TABLET    TAKE 1 TABLET ONCE A WEEK. TAKE WITH 8OZ WATER 30 MIN BEFORE FOOD OR OTHER MEDS. DON'T LIE DOWN FOR 30 MIN. CHOLECALCIFEROL (VITAMIN D3) 50601 UNITS CAPS    Take 1 capsule by mouth once a week    CITALOPRAM (CELEXA) 40 MG TABLET    TAKE 1 TABLET BY MOUTH ONCE DAILY    ESOMEPRAZOLE (NEXIUM) 20 MG DELAYED RELEASE CAPSULE    TAKE 1 CAPSULE BEFORE BREAKFAST DAILY    GABAPENTIN (NEURONTIN) 600 MG TABLET    TAKE 1 TABLET BY MOUTH THREE TIMES DAILY. GABAPENTIN (NEURONTIN) 600 MG TABLET    TAKE 1 TABLET BY MOUTH THREE TIMES DAILY. GABAPENTIN (NEURONTIN) 600 MG TABLET    TAKE 1 TABLET BY MOUTH THREE TIMES DAILY.     HYDROCODONE-ACETAMINOPHEN (NORCO)  MG PER TABLET Take 1 tablet by mouth every 6 hours as needed     LEVOTHYROXINE (SYNTHROID) 112 MCG TABLET    TAKE 1 TABLET BY MOUTH ONCE DAILY. MIRTAZAPINE (REMERON) 15 MG TABLET    TAKE 1 TABLET BY MOUTH AT BEDTIME    MULTIPLE VITAMIN (MULTI-VITAMIN DAILY PO)    Take by mouth    OXYGEN    Inhale into the lungs as needed     ROPINIROLE (REQUIP) 0.25 MG TABLET    TAKE 1 TABLET BY MOUTH AT BEDTIME    TIOTROPIUM (SPIRIVA HANDIHALER) 18 MCG INHALATION CAPSULE    USE 1 CAPSULE IN HANDIHALER DEVICE AND INHALE ONCE DAILY.     TIZANIDINE (ZANAFLEX) 4 MG TABLET    Take 4 mg by mouth every 8 hours as needed     VENTOLIN  (90 BASE) MCG/ACT INHALER    USE TWO PUFFS BY MOUTH AS NEEDED FOR WHEEZING    VITAMIN D (ERGOCALCIFEROL) 26795 UNITS CAPS CAPSULE    TAKE 1 CAPSULE BY MOUTH ONCE A WEEK       ALLERGIES     Codeine    FAMILY HISTORY       Family History   Problem Relation Age of Onset    Heart Disease Mother     Diabetes Father     Heart Disease Father     Diabetes Sister     Heart Disease Sister     Diabetes Brother     Heart Disease Brother     Cancer Maternal Aunt         breast    Cancer Paternal Aunt         lung cancer    Diabetes Sister     Heart Disease Sister     Diabetes Brother     Cancer Son         kidney          SOCIAL HISTORY       Social History     Socioeconomic History    Marital status:      Spouse name: None    Number of children: None    Years of education: None    Highest education level: None   Occupational History    None   Social Needs    Financial resource strain: None    Food insecurity:     Worry: None     Inability: None    Transportation needs:     Medical: None     Non-medical: None   Tobacco Use    Smoking status: Former Smoker     Packs/day: 0.00     Years: 37.00     Pack years: 0.00     Last attempt to quit: 2/3/2014     Years since quittin.4    Smokeless tobacco: Never Used   Substance and Sexual Activity    Alcohol use: No    Drug use: No    Sexual

## 2019-07-25 NOTE — CONSULTS
Constitutional: Positive for fatigue and unexpected weight change (lost 26 lbs in 6 months ). Negative for appetite change, chills and fever. HENT: Positive for dental problem (wears dentures). Negative for nosebleeds, sore throat and trouble swallowing. Eyes: Visual disturbance: wears glasses but notes distortion of the left eye. Respiratory: Positive for cough (with hemoptysis ), chest tightness (when she is very SOB, feels like she has a knot in her chest at times), shortness of breath and wheezing. Cardiovascular: Negative. Negative for chest pain, palpitations and leg swelling. Gastrointestinal: Negative. Endocrine: Negative. Genitourinary: Negative. Musculoskeletal:        Scoliosis, DDD, arthritis and gets injections every 3 months with pain management    Skin: Negative. Negative for color change, rash and wound. Neurological: Positive for headaches (has migraines). Negative for dizziness, seizures, speech difficulty and weakness. Hematological: Negative. Does not bruise/bleed easily. Psychiatric/Behavioral: Negative for dysphoric mood. The patient is nervous/anxious. Physical Exam:   height is 5' 6\" (1.676 m) and weight is 141 lb 1.6 oz (64 kg). Her temporal temperature is 98.1 °F (36.7 °C). Her blood pressure is 116/68 and her pulse is 103. Her respiration is 20 and oxygen saturation is 93%. Intake/Output Summary (Last 24 hours) at 7/25/2019 1633  Last data filed at 7/25/2019 1613  Gross per 24 hour   Intake 1838 ml   Output 900 ml   Net 938 ml     Physical Exam   Constitutional: She is oriented to person, place, and time. She appears well-developed and well-nourished. No distress. HENT:   Head: Normocephalic and atraumatic. Eyes: Pupils are equal, round, and reactive to light. Neck: Normal range of motion. Neck supple. No JVD present. No thyromegaly present. Cardiovascular: Normal rate, regular rhythm and normal heart sounds.  Exam reveals no gallop and no excluded. . The trachea and bronchial tree are patent. Heart: The heart is normal in size. There is no pericardial effusion. Lymph nodes: No pathologically enlarged mediastinal, hilar, or axillary lymph nodes are present. Bones and soft tissues: The osseous structures of the thorax and surrounding soft tissues demonstrate no acute process. Upper abdomen: The imaged portion of the upper abdomen demonstrates no acute process. 1. There is no evidence of embolic disease. 2. On image 32 in the left lower lobe irregular 13 mm density is present. A neoplasm should be excluded. Follow-up with either bronchoscopy or possibly PET CT scan. . Signed by Dr Chhaya Adam on 2019 9:09 PM    Independent review of ekg: NSR 99 BPM  Problem list generated by Manual Bermudez:  Patient Active Problem List   Diagnosis    Restless legs syndrome    Hypothyroidism    Anxiety    Depression    COPD (chronic obstructive pulmonary disease) ([address]    Hyperlipidemia    Restless legs syndrome    Migraines    Smoking addiction    Vitamin D deficiency    Renal cyst    Obstructive sleep apnea    CPAP (continuous positive airway pressure) dependence    Primary insomnia    Sepsis ([address]    Community acquired pneumonia of left lower lobe of lung ([address]    Transaminitis    Acute hypoxemic respiratory failure (HCC)     Pulmonary Assessment:  New problem (to me), with additional workup planned:   1. Acute hypoxemic respiratory failure  2. LLL Density   3. Community acquired PNA  4. COPD exacerbation  5. Hemoptysis   New problem (to me), no additional workup planned:   1. Sarcoidosis  2. HARRIET-CPAP  Other problems either stable, failing to improve or worsenin. Hypokalemia     Recommend/plan:   Density noted on CT of the superior segment of the LLL is not amenable to bronchoscopy and there is no obvious bronchus sign. Nodes c/w Sarcoidosis. Differential diagnosis would include structural lung disease, tumor, vasculitis, infection. Recommendation at this time for antibiotics, Respiratory culture, CRP, JELLY, ANCA, sputum cytology. Thank you for this consult. We will follow  Electronically signed by Jordan Yepez on 07/25/19 at 4:33 PM    Physician Substantive portion:  Patient with history of COPD and sarcoidosis which was diagnosed in [place] about 20 years ago who presents now with hemoptysis. This is been of acute onset over the last day and did not occur in the past.  She has been diagnosed with COPD. She had an episode of pneumonia earlier in the year and had abnormalities on chest x-ray then. She is followed up with primary care. She has been treated since arrival here with antibiotics oxygen and respiratory therapy. She thinks she feels a little better. She is had no additional episodes of hemoptysis. She cannot really quantify reporting a got all over her clothes on the last episode on presentation. She denies increasing shortness of breath. She uses some inhalers at home. Plan to continue antibiotics and steroids and respiratory therapy. Her CT is reviewed. There is Nodular density in the left lower lobe and some other chronic changes. .  She has a history in her family of tuberculosis in her father although she reports having been PPD negative and did not require any treatment after that episode. We will send assays for vasculitis. She is not currently a candidate for bronchoscopy due to her marginal respiratory status. If she can improve from her COPD exacerbation, and if hemoptysis recurs, then at some point down the road we will plan to take her for bronchoscopy. Imaging will need to be followed up in the outpatient setting to ensure resolution. I have seen and examined patient personally, performing a face-to-face diagnostic evaluation with plan of care reviewed and developed with APRN and nursing staff.  I have addended and/or modified the above history of present illness, physical examination, and

## 2019-07-25 NOTE — ED NOTES
Assisted MD Moeller with Lumbar Puncture. Pt tolerated well. Band-aid applied to site. Pt instructed to lay flat. Pt verbalized understanding.       Zo Angeles RN  07/24/19 9750

## 2019-07-25 NOTE — CARE COORDINATION
[address] Transitions Interview     2019    Patient: Aby Aceves Patient : 1955   MRN: [id number]   RARS: Readmission Risk Score: 13         Spoke with:Fozia Briceno      Readmission Risk  Patient Active Problem List   Diagnosis    Restless legs syndrome    Hypothyroidism    Anxiety    Depression    COPD (chronic obstructive pulmonary disease) (HCC)    Hyperlipidemia    Restless legs syndrome    Migraines    Smoking addiction    Vitamin D deficiency    Renal cyst    Obstructive sleep apnea    CPAP (continuous positive airway pressure) dependence    Primary insomnia    [facility]    Community acquired pneumonia of left lower lobe of lung (HCC)    Transaminitis    Acute hypoxemic respiratory failure [facility])       Inpatient Assessment  Care Transitions Summary    Care Transitions Inpatient Review  Medication Review  Do you have all of your prescriptions and are they filled?:  Yes   Are you able to afford your medications?:  Yes  How often do you have difficulty taking your medications?:  I always take them as prescribed. Housing Review  Who do you live with?:  Grandchild  Are you an active caregiver in your home?:  Yes  For whom are you the caregiver?:  15year old grandchild  Social Support  Do you have a ?:  No  Do you have a [address] [place] [address]?:  No  Durable Medical Equipment  Patient Home Equipment:  Oxygen, CPAP, Nebulizer  Functional Review  Ability to seek help/take action for Emergent/Urgent situations i.e. fire, crime, inclement weather or health crisis. :  Independent  Ability handle personal hygiene needs (bathing/dressing/grooming): Independent  Ability to manage medications: Independent  Ability to prepare food:  Independent  Ability to maintain home (clean home, laundry): Independent  Ability to drive and/or has transportation:  Independent  Ability to do shopping:  Independent  Ability to manage finances:   Independent  Is patient able to

## 2019-07-26 ENCOUNTER — TELEPHONE (OUTPATIENT)
Dept: PULMONOLOGY | Facility: CLINIC | Age: 64
End: 2019-07-26

## 2019-07-26 ENCOUNTER — OUTSIDE FACILITY SERVICE (OUTPATIENT)
Dept: PULMONOLOGY | Facility: CLINIC | Age: 64
End: 2019-07-26

## 2019-07-26 PROBLEM — R04.2 HEMOPTYSIS: Status: ACTIVE | Noted: 2019-07-26

## 2019-07-26 LAB
ANION GAP SERPL CALCULATED.3IONS-SCNC: 14 MMOL/L (ref 7–19)
BUN BLDV-MCNC: 9 MG/DL (ref 8–23)
CALCIUM SERPL-MCNC: 8.9 MG/DL (ref 8.8–10.2)
CHLORIDE BLD-SCNC: 108 MMOL/L (ref 98–111)
CO2: 24 MMOL/L (ref 22–29)
CREAT SERPL-MCNC: 0.7 MG/DL (ref 0.5–0.9)
GFR NON-AFRICAN AMERICAN: >60
GLUCOSE BLD-MCNC: 175 MG/DL (ref 74–109)
HCT VFR BLD CALC: 41.5 % (ref 37–47)
HEMOGLOBIN: 11.9 G/DL (ref 12–16)
MAGNESIUM: 2 MG/DL (ref 1.6–2.4)
MCH RBC QN AUTO: 30.3 PG (ref 27–31)
MCHC RBC AUTO-ENTMCNC: 28.7 G/DL (ref 33–37)
MCV RBC AUTO: 105.6 FL (ref 81–99)
PDW BLD-RTO: 13.2 % (ref 11.5–14.5)
PLATELET # BLD: 200 K/UL (ref 130–400)
PMV BLD AUTO: 10.7 FL (ref 9.4–12.3)
POTASSIUM REFLEX MAGNESIUM: 3.1 MMOL/L (ref 3.5–5)
RBC # BLD: 3.93 M/UL (ref 4.2–5.4)
SODIUM BLD-SCNC: 146 MMOL/L (ref 136–145)
URINE CULTURE, ROUTINE: NORMAL
WBC # BLD: 16.4 K/UL (ref 4.8–10.8)

## 2019-07-26 PROCEDURE — 6370000000 HC RX 637 (ALT 250 FOR IP): Performed by: HOSPITALIST

## 2019-07-26 PROCEDURE — 94640 AIRWAY INHALATION TREATMENT: CPT

## 2019-07-26 PROCEDURE — 99232 SBSQ HOSP IP/OBS MODERATE 35: CPT | Performed by: HOSPITALIST

## 2019-07-26 PROCEDURE — 94660 CPAP INITIATION&MGMT: CPT

## 2019-07-26 PROCEDURE — 36415 COLL VENOUS BLD VENIPUNCTURE: CPT

## 2019-07-26 PROCEDURE — 85027 COMPLETE CBC AUTOMATED: CPT

## 2019-07-26 PROCEDURE — 80048 BASIC METABOLIC PNL TOTAL CA: CPT

## 2019-07-26 PROCEDURE — 99232 SBSQ HOSP IP/OBS MODERATE 35: CPT | Performed by: INTERNAL MEDICINE

## 2019-07-26 PROCEDURE — 83735 ASSAY OF MAGNESIUM: CPT

## 2019-07-26 PROCEDURE — 2580000003 HC RX 258: Performed by: HOSPITALIST

## 2019-07-26 PROCEDURE — 6360000002 HC RX W HCPCS: Performed by: HOSPITALIST

## 2019-07-26 PROCEDURE — 2700000000 HC OXYGEN THERAPY PER DAY

## 2019-07-26 PROCEDURE — 1210000000 HC MED SURG R&B

## 2019-07-26 RX ADMIN — TIOTROPIUM BROMIDE INHALATION SPRAY 2 PUFF: 3.12 SPRAY, METERED RESPIRATORY (INHALATION) at 08:46

## 2019-07-26 RX ADMIN — HYDROCODONE BITARTRATE AND ACETAMINOPHEN 1 TABLET: 10; 325 TABLET ORAL at 04:47

## 2019-07-26 RX ADMIN — MIRTAZAPINE 15 MG: 15 TABLET, FILM COATED ORAL at 19:57

## 2019-07-26 RX ADMIN — GABAPENTIN 600 MG: 600 TABLET, FILM COATED ORAL at 19:57

## 2019-07-26 RX ADMIN — PANTOPRAZOLE SODIUM 40 MG: 40 TABLET, DELAYED RELEASE ORAL at 05:50

## 2019-07-26 RX ADMIN — LEVOTHYROXINE SODIUM 100 MCG: 0.1 TABLET ORAL at 05:50

## 2019-07-26 RX ADMIN — Medication 10 ML: at 08:47

## 2019-07-26 RX ADMIN — IPRATROPIUM BROMIDE AND ALBUTEROL SULFATE 1 AMPULE: .5; 3 SOLUTION RESPIRATORY (INHALATION) at 06:34

## 2019-07-26 RX ADMIN — Medication 10 ML: at 14:27

## 2019-07-26 RX ADMIN — IBUPROFEN 400 MG: 200 TABLET, FILM COATED ORAL at 01:23

## 2019-07-26 RX ADMIN — CITALOPRAM HYDROBROMIDE 40 MG: 40 TABLET ORAL at 08:47

## 2019-07-26 RX ADMIN — POTASSIUM CHLORIDE 40 MEQ: 20 TABLET, EXTENDED RELEASE ORAL at 14:27

## 2019-07-26 RX ADMIN — HYDROCODONE BITARTRATE AND ACETAMINOPHEN 1 TABLET: 10; 325 TABLET ORAL at 23:31

## 2019-07-26 RX ADMIN — METHYLPREDNISOLONE SODIUM SUCCINATE 60 MG: 125 INJECTION, POWDER, FOR SOLUTION INTRAMUSCULAR; INTRAVENOUS at 19:56

## 2019-07-26 RX ADMIN — HYDROCODONE BITARTRATE AND ACETAMINOPHEN 1 TABLET: 10; 325 TABLET ORAL at 10:57

## 2019-07-26 RX ADMIN — METHYLPREDNISOLONE SODIUM SUCCINATE 60 MG: 125 INJECTION, POWDER, FOR SOLUTION INTRAMUSCULAR; INTRAVENOUS at 08:46

## 2019-07-26 RX ADMIN — IPRATROPIUM BROMIDE AND ALBUTEROL SULFATE 1 AMPULE: .5; 3 SOLUTION RESPIRATORY (INHALATION) at 10:16

## 2019-07-26 RX ADMIN — HYDROCODONE BITARTRATE AND ACETAMINOPHEN 1 TABLET: 10; 325 TABLET ORAL at 17:11

## 2019-07-26 RX ADMIN — GABAPENTIN 600 MG: 600 TABLET, FILM COATED ORAL at 08:47

## 2019-07-26 RX ADMIN — IPRATROPIUM BROMIDE AND ALBUTEROL SULFATE 1 AMPULE: .5; 3 SOLUTION RESPIRATORY (INHALATION) at 14:01

## 2019-07-26 RX ADMIN — METHYLPREDNISOLONE SODIUM SUCCINATE 60 MG: 125 INJECTION, POWDER, FOR SOLUTION INTRAMUSCULAR; INTRAVENOUS at 14:27

## 2019-07-26 RX ADMIN — METHYLPREDNISOLONE SODIUM SUCCINATE 60 MG: 125 INJECTION, POWDER, FOR SOLUTION INTRAMUSCULAR; INTRAVENOUS at 02:12

## 2019-07-26 RX ADMIN — AZITHROMYCIN DIHYDRATE 500 MG: 500 INJECTION, POWDER, LYOPHILIZED, FOR SOLUTION INTRAVENOUS at 01:23

## 2019-07-26 RX ADMIN — ROPINIROLE HYDROCHLORIDE 0.25 MG: 0.25 TABLET, FILM COATED ORAL at 19:57

## 2019-07-26 RX ADMIN — Medication 1 G: at 01:23

## 2019-07-26 RX ADMIN — SODIUM CHLORIDE: 9 INJECTION, SOLUTION INTRAVENOUS at 04:47

## 2019-07-26 RX ADMIN — IPRATROPIUM BROMIDE AND ALBUTEROL SULFATE 1 AMPULE: .5; 3 SOLUTION RESPIRATORY (INHALATION) at 18:44

## 2019-07-26 ASSESSMENT — PAIN DESCRIPTION - LOCATION: LOCATION: HEAD

## 2019-07-26 ASSESSMENT — ENCOUNTER SYMPTOMS
TROUBLE SWALLOWING: 0
SHORTNESS OF BREATH: 1
COUGH: 1
SORE THROAT: 0
COLOR CHANGE: 0
CHEST TIGHTNESS: 1
WHEEZING: 1
GASTROINTESTINAL NEGATIVE: 1

## 2019-07-26 ASSESSMENT — PAIN SCALES - GENERAL
PAINLEVEL_OUTOF10: 9
PAINLEVEL_OUTOF10: 8
PAINLEVEL_OUTOF10: 9
PAINLEVEL_OUTOF10: 7
PAINLEVEL_OUTOF10: 4
PAINLEVEL_OUTOF10: 9
PAINLEVEL_OUTOF10: 7

## 2019-07-26 ASSESSMENT — PAIN DESCRIPTION - FREQUENCY: FREQUENCY: CONTINUOUS

## 2019-07-26 NOTE — PROGRESS NOTES
Signed by Dr Luciana Romano on 7/24/2019 9:05 PM      XR CHEST PORTABLE   Final Result   1. No radiographic evidence of acute cardiopulmonary process. Signed by Dr Luciana Romano on 7/24/2019 8:39 PM        Micro:   Blood Culture, Routine   Date Value Ref Range Status   07/24/2019   Preliminary    No Growth to date. Any change in status will be called.   , No components found for: LABURINE  Patient Active Problem List    Diagnosis Date Noted    Acute hypoxemic respiratory failure ([facility] 07/25/2019    Community acquired pneumonia of left lower lobe of lung ([facility] 03/14/2019    Transaminitis 03/14/2019    Sepsis ([facility] 03/13/2019    Primary insomnia 11/07/2016    Obstructive sleep apnea     CPAP (continuous positive airway pressure) dependence     Renal cyst 01/11/2016    Smoking addiction 11/20/2012    Vitamin D deficiency 11/20/2012    Restless legs syndrome     Hypothyroidism     Anxiety     Depression     COPD (chronic obstructive pulmonary disease) (HCC)     Hyperlipidemia     Restless legs syndrome     Migraines        Assessment/plan: Active Problems:    Acute hypoxemic respiratory failure (HCC)   LLL Density    Community acquired PNA   COPD exacerbation   Hemoptysis   HARRIET    Resolved Problems:    * No resolved hospital problems. *      Plan:   Increase activity. O2 Walks. Per Pulm :  Will check PFTs on Monday am with tentative Bronchoscopy for Monday PM.   Will continue antibiotics, Solu Medrol, and duonebs     DVT Prophylaxis: jesica Juarez MD  Hospitalist Service  7/26/2019  3:17 PM

## 2019-07-26 NOTE — TELEPHONE ENCOUNTER
Pt informed regarding her Bronchoscopy with Biopsy on for 07/29/19 2nd case at [facility].  I am assuming the hospital will make sure she preps appropriately before procedure.

## 2019-07-26 NOTE — PROGRESS NOTES
Cancer in her maternal aunt, paternal aunt, son, and another family member; Diabetes in her brother, brother, father, sister, and sister; Heart Disease in her brother, father, mother, sister, and sister. Review of Systems:  Review of Systems   Constitutional: Positive for fatigue (no further ) and unexpected weight change (lost 26 lbs in 6 months ). Negative for appetite change, chills and fever. HENT: Positive for dental problem (wears dentures). Negative for nosebleeds, sore throat and trouble swallowing. Eyes: Visual disturbance: wears glasses but notes distortion of the left eye. Respiratory: Positive for cough (with hemoptysis ), chest tightness (when she is very SOB, feels like she has a knot in her chest at times), shortness of breath and wheezing. Cardiovascular: Negative. Negative for chest pain, palpitations and leg swelling. Gastrointestinal: Negative. Endocrine: Negative. Genitourinary: Negative. Musculoskeletal:        Scoliosis, DDD, arthritis and gets injections every 3 months with pain management    Skin: Negative. Negative for color change, rash and wound. Neurological: Positive for headaches (has migraines). Negative for dizziness, seizures, speech difficulty and weakness. Hematological: Negative. Does not bruise/bleed easily. Psychiatric/Behavioral: Negative for dysphoric mood. The patient is nervous/anxious. Physical Exam:   height is 5' 6\" (1.676 m) and weight is 141 lb 1.6 oz (64 kg). Her temporal temperature is 98.5 °F (36.9 °C). Her blood pressure is 119/69 and her pulse is 88. Her respiration is 20 and oxygen saturation is 94%. Intake/Output Summary (Last 24 hours) at 7/26/2019 1131  Last data filed at 7/26/2019 0853  Gross per 24 hour   Intake 1678 ml   Output --   Net 1678 ml     Physical Exam   Constitutional: She is oriented to person, place, and time. She appears well-developed and well-nourished. No distress.    HENT:   Head: Normocephalic and atraumatic. Eyes: Pupils are equal, round, and reactive to light. Neck: Normal range of motion. Neck supple. No JVD present. No thyromegaly present. Cardiovascular: Normal rate, regular rhythm and normal heart sounds. Exam reveals no gallop and no friction rub. No murmur heard. Pulmonary/Chest: Effort normal. No respiratory distress. She has wheezes (moderate throughout ). Abdominal: Soft. Bowel sounds are normal. She exhibits no distension. There is no tenderness. There is no guarding. Musculoskeletal: Normal range of motion. She exhibits no edema or deformity. Neurological: She is alert and oriented to person, place, and time. Skin: Skin is warm and dry. Psychiatric: She has a normal mood and affect. Her behavior is normal. Judgment and thought content normal.     Recent Labs     07/24/19 2007 07/26/19  0614   WBC 15.9* 16.4*   RBC 4.35 3.93*   HGB 13.0 11.9*   HCT 40.1 41.5    200   MCV 92.2 105.6*   MCH 29.9 30.3   MCHC 32.4* 28.7*   RDW 12.9 13.2      Recent Labs     07/24/19 2007      K 2.9*   CL 96*   CO2 28   BUN 12   CREATININE 0.6   CALCIUM 9.2   GLUCOSE 101      No results for input(s): PHART, NRL2COE, PO2ART, NKN1EFO, S3BNARKN, BEART in the last 72 hours. Recent Labs     07/24/19 2007   AST 17   ALT 11   ALKPHOS 122*   BILITOT 0.4   CALCIUM 9.2   INR 1.04     Recent Labs     07/24/19 2000 07/24/19  2230   BC No Growth to date. Any change in status will be called. --    LABGRAM  --  No WBCs or organisms seen     Radiograph: Ct Head Wo Contrast    Result Date: 7/24/2019  EXAMINATION: CT HEAD WO CONTRAST 7/24/2019 9:03 PM HISTORY: CT BRAIN without contrast dated 7/24/2019 7:30 PM HISTORY: Headache COMPARISON: July 10, 2018 DOSE LENGTH PRODUCT: 652 mGy cm TECHNIQUE: Serial axial tomographic images of the brain were obtained without the use of intravenous contrast. FINDINGS: The midline structures are nondisplaced.  The ventricles and basilar cisterns are normal in size demonstrates no aneurysm, stenosis or dissection. The great vessels are normal in appearance. No coronary artery calcifications are visualized. Neck base: The imaged portion of the base of the neck appears unremarkable. Lungs: Right lung is clear. In the left lower lobe a 13 mm irregular densities present. With a history of hemoptysis a neoplasm should be excluded. . The trachea and bronchial tree are patent. Heart: The heart is normal in size. There is no pericardial effusion. Lymph nodes: No pathologically enlarged mediastinal, hilar, or axillary lymph nodes are present. Bones and soft tissues: The osseous structures of the thorax and surrounding soft tissues demonstrate no acute process. Upper abdomen: The imaged portion of the upper abdomen demonstrates no acute process. 1. There is no evidence of embolic disease. 2. On image 32 in the left lower lobe irregular 13 mm density is present. A neoplasm should be excluded. Follow-up with either bronchoscopy or possibly PET CT scan. . Signed by Dr Francy Quispe on 7/24/2019 9:09 PM    Independent review of ekg: NSR 99 BPM 7/25/2019  Problem list generated by Ema Morataya:  Patient Active Problem List   Diagnosis    Restless legs syndrome    Hypothyroidism    Anxiety    Depression    COPD (chronic obstructive pulmonary disease) ([address]    Hyperlipidemia    Restless legs syndrome    Migraines    Smoking addiction    Vitamin D deficiency    Renal cyst    Obstructive sleep apnea    CPAP (continuous positive airway pressure) dependence    Primary insomnia    Sepsis ([address]    Community acquired pneumonia of left lower lobe of lung ([address]    Transaminitis    Acute hypoxemic respiratory failure (HCC)     Pulmonary Assessment:  New problem (to me), with additional workup planned:   1. Acute hypoxemic respiratory failure  2. LLL Density   3. Community acquired PNA  4. COPD exacerbation  5. Hemoptysis   New problem (to me), no additional workup planned:   1.

## 2019-07-27 ENCOUNTER — OUTSIDE FACILITY SERVICE (OUTPATIENT)
Dept: PULMONOLOGY | Facility: CLINIC | Age: 64
End: 2019-07-27

## 2019-07-27 LAB
ANION GAP SERPL CALCULATED.3IONS-SCNC: 15 MMOL/L (ref 7–19)
BUN BLDV-MCNC: 8 MG/DL (ref 8–23)
CALCIUM SERPL-MCNC: 8.4 MG/DL (ref 8.8–10.2)
CHLORIDE BLD-SCNC: 107 MMOL/L (ref 98–111)
CO2: 23 MMOL/L (ref 22–29)
CREAT SERPL-MCNC: 0.5 MG/DL (ref 0.5–0.9)
GFR NON-AFRICAN AMERICAN: >60
GLUCOSE BLD-MCNC: 255 MG/DL (ref 74–109)
HCT VFR BLD CALC: 33.7 % (ref 37–47)
HEMOGLOBIN: 10.8 G/DL (ref 12–16)
HERPES SIMPLEX VIRUS BY PCR: NOT DETECTED
HSV SOURCE: NORMAL
MCH RBC QN AUTO: 30.2 PG (ref 27–31)
MCHC RBC AUTO-ENTMCNC: 32 G/DL (ref 33–37)
MCV RBC AUTO: 94.1 FL (ref 81–99)
PDW BLD-RTO: 13.5 % (ref 11.5–14.5)
PLATELET # BLD: 227 K/UL (ref 130–400)
PMV BLD AUTO: 10.8 FL (ref 9.4–12.3)
POTASSIUM SERPL-SCNC: 3 MMOL/L (ref 3.5–5)
RBC # BLD: 3.58 M/UL (ref 4.2–5.4)
SODIUM BLD-SCNC: 145 MMOL/L (ref 136–145)
WBC # BLD: 18.8 K/UL (ref 4.8–10.8)

## 2019-07-27 PROCEDURE — 94660 CPAP INITIATION&MGMT: CPT

## 2019-07-27 PROCEDURE — 36415 COLL VENOUS BLD VENIPUNCTURE: CPT

## 2019-07-27 PROCEDURE — 99232 SBSQ HOSP IP/OBS MODERATE 35: CPT | Performed by: HOSPITALIST

## 2019-07-27 PROCEDURE — 2580000003 HC RX 258: Performed by: HOSPITALIST

## 2019-07-27 PROCEDURE — 6370000000 HC RX 637 (ALT 250 FOR IP): Performed by: HOSPITALIST

## 2019-07-27 PROCEDURE — 85027 COMPLETE CBC AUTOMATED: CPT

## 2019-07-27 PROCEDURE — 6360000002 HC RX W HCPCS: Performed by: HOSPITALIST

## 2019-07-27 PROCEDURE — 1210000000 HC MED SURG R&B

## 2019-07-27 PROCEDURE — 80048 BASIC METABOLIC PNL TOTAL CA: CPT

## 2019-07-27 PROCEDURE — 94640 AIRWAY INHALATION TREATMENT: CPT

## 2019-07-27 PROCEDURE — 99232 SBSQ HOSP IP/OBS MODERATE 35: CPT | Performed by: INTERNAL MEDICINE

## 2019-07-27 PROCEDURE — 2700000000 HC OXYGEN THERAPY PER DAY

## 2019-07-27 RX ORDER — METHYLPREDNISOLONE SODIUM SUCCINATE 40 MG/ML
40 INJECTION, POWDER, LYOPHILIZED, FOR SOLUTION INTRAMUSCULAR; INTRAVENOUS EVERY 12 HOURS
Status: DISCONTINUED | OUTPATIENT
Start: 2019-07-28 | End: 2019-07-28

## 2019-07-27 RX ORDER — ALPRAZOLAM 1 MG/1
1 TABLET ORAL 4 TIMES DAILY PRN
Status: DISCONTINUED | OUTPATIENT
Start: 2019-07-27 | End: 2019-07-30 | Stop reason: HOSPADM

## 2019-07-27 RX ORDER — POTASSIUM CHLORIDE 20 MEQ/1
40 TABLET, EXTENDED RELEASE ORAL 2 TIMES DAILY
Status: COMPLETED | OUTPATIENT
Start: 2019-07-27 | End: 2019-07-28

## 2019-07-27 RX ADMIN — POTASSIUM CHLORIDE 40 MEQ: 20 TABLET, EXTENDED RELEASE ORAL at 13:29

## 2019-07-27 RX ADMIN — AZITHROMYCIN DIHYDRATE 500 MG: 500 INJECTION, POWDER, LYOPHILIZED, FOR SOLUTION INTRAVENOUS at 00:10

## 2019-07-27 RX ADMIN — GABAPENTIN 600 MG: 600 TABLET, FILM COATED ORAL at 13:29

## 2019-07-27 RX ADMIN — MIRTAZAPINE 15 MG: 15 TABLET, FILM COATED ORAL at 19:42

## 2019-07-27 RX ADMIN — GABAPENTIN 600 MG: 600 TABLET, FILM COATED ORAL at 19:42

## 2019-07-27 RX ADMIN — TIZANIDINE 4 MG: 4 TABLET ORAL at 18:32

## 2019-07-27 RX ADMIN — HYDROCODONE BITARTRATE AND ACETAMINOPHEN 1 TABLET: 10; 325 TABLET ORAL at 07:31

## 2019-07-27 RX ADMIN — PANTOPRAZOLE SODIUM 40 MG: 40 TABLET, DELAYED RELEASE ORAL at 05:47

## 2019-07-27 RX ADMIN — METHYLPREDNISOLONE SODIUM SUCCINATE 60 MG: 125 INJECTION, POWDER, FOR SOLUTION INTRAMUSCULAR; INTRAVENOUS at 02:16

## 2019-07-27 RX ADMIN — POTASSIUM CHLORIDE 10 MEQ: 7.46 INJECTION, SOLUTION INTRAVENOUS at 07:31

## 2019-07-27 RX ADMIN — IPRATROPIUM BROMIDE AND ALBUTEROL SULFATE 1 AMPULE: .5; 3 SOLUTION RESPIRATORY (INHALATION) at 06:30

## 2019-07-27 RX ADMIN — Medication 10 ML: at 19:44

## 2019-07-27 RX ADMIN — METHYLPREDNISOLONE SODIUM SUCCINATE 60 MG: 125 INJECTION, POWDER, FOR SOLUTION INTRAMUSCULAR; INTRAVENOUS at 13:29

## 2019-07-27 RX ADMIN — IPRATROPIUM BROMIDE AND ALBUTEROL SULFATE 1 AMPULE: .5; 3 SOLUTION RESPIRATORY (INHALATION) at 10:32

## 2019-07-27 RX ADMIN — HYDROCODONE BITARTRATE AND ACETAMINOPHEN 1 TABLET: 10; 325 TABLET ORAL at 13:28

## 2019-07-27 RX ADMIN — LEVOTHYROXINE SODIUM 100 MCG: 0.1 TABLET ORAL at 05:47

## 2019-07-27 RX ADMIN — CITALOPRAM HYDROBROMIDE 40 MG: 40 TABLET ORAL at 13:29

## 2019-07-27 RX ADMIN — POTASSIUM CHLORIDE 40 MEQ: 20 TABLET, EXTENDED RELEASE ORAL at 19:42

## 2019-07-27 RX ADMIN — ALPRAZOLAM 1 MG: 1 TABLET ORAL at 19:42

## 2019-07-27 RX ADMIN — HYDROCODONE BITARTRATE AND ACETAMINOPHEN 1 TABLET: 10; 325 TABLET ORAL at 19:42

## 2019-07-27 RX ADMIN — Medication 1 G: at 00:11

## 2019-07-27 RX ADMIN — ROPINIROLE HYDROCHLORIDE 0.25 MG: 0.25 TABLET, FILM COATED ORAL at 19:42

## 2019-07-27 ASSESSMENT — ENCOUNTER SYMPTOMS
SORE THROAT: 0
CHEST TIGHTNESS: 1
GASTROINTESTINAL NEGATIVE: 1
COUGH: 1
WHEEZING: 1
TROUBLE SWALLOWING: 0
SHORTNESS OF BREATH: 1
COLOR CHANGE: 0

## 2019-07-27 ASSESSMENT — PAIN SCALES - GENERAL
PAINLEVEL_OUTOF10: 9
PAINLEVEL_OUTOF10: 3
PAINLEVEL_OUTOF10: 9

## 2019-07-27 ASSESSMENT — PAIN DESCRIPTION - PAIN TYPE: TYPE: ACUTE PAIN

## 2019-07-27 ASSESSMENT — PAIN DESCRIPTION - FREQUENCY: FREQUENCY: CONTINUOUS

## 2019-07-27 ASSESSMENT — PAIN DESCRIPTION - LOCATION: LOCATION: HEAD

## 2019-07-27 NOTE — PROGRESS NOTES
15 mg Oral Nightly    sodium chloride flush  10 mL Intravenous 2 times per day    cefTRIAXone (ROCEPHIN) IV  1 g Intravenous Q24H    azithromycin  500 mg Intravenous Q24H    methylPREDNISolone  60 mg Intravenous Q6H    ipratropium-albuterol  1 ampule Inhalation Q4H WA    pantoprazole  40 mg Oral QAM AC     albuterol, HYDROcodone-acetaminophen, albuterol sulfate HFA, tiZANidine, sodium chloride flush, magnesium hydroxide, ondansetron, potassium chloride **OR** potassium alternative oral replacement **OR** [DISCONTINUED] potassium chloride, magnesium sulfate, acetaminophen, ibuprofen  DIET GENERAL;         Lab and other Data:     Recent Labs     07/24/19 2007 07/26/19  0614 07/27/19  0015   WBC 15.9* 16.4* 18.8*   HGB 13.0 11.9* 10.8*    200 227     Recent Labs     07/24/19 2007 07/26/19  1206 07/27/19  0015    146* 145   K 2.9* 3.1* 3.0*   CL 96* 108 107   CO2 28 24 23   BUN 12 9 8   CREATININE 0.6 0.7 0.5   GLUCOSE 101 175* 255*     Recent Labs     07/24/19 2007   AST 17   ALT 11   BILITOT 0.4   ALKPHOS 122*     Troponin T: No results for input(s): TROPONINI in the last 72 hours. Pro-BNP: No results for input(s): BNP in the last 72 hours. INR:   Recent Labs     07/24/19 2007   INR 1.04     ABGs:   Lab Results   Component Value Date    PHART 7.370 03/13/2019    PO2ART 69.0 03/13/2019    VDZ4ESQ 50.0 03/13/2019     UA:  Recent Labs     07/24/19  2119   COLORU YELLOW   PHUR 7.0   WBCUA 2   RBCUA 0   BACTERIA NEGATIVE   CLARITYU CLOUDY*   SPECGRAV 1.020   LEUKOCYTESUR TRACE*   UROBILINOGEN 0.2   BILIRUBINUR Negative   BLOODU Negative   GLUCOSEU Negative       Imaging:   CTA PULMONARY W CONTRAST   Final Result   1. There is no evidence of embolic disease. 2. On image 32 in the left lower lobe irregular 13 mm density is   present. A neoplasm should be excluded. Follow-up with either   bronchoscopy or possibly PET CT scan. .   Signed by Dr Steffany Orozco on 7/24/2019 9:09 PM      CT Head WO

## 2019-07-27 NOTE — PROGRESS NOTES
Pulmonary and Critical Care Progress Note  [address]    MR# [id number]    Acct# [de-identified]  7/27/2019   4:33 PM    Referring Queenie Mercer MD  Chief Complaint: Shortness of breath and hemoptysis. Interval Hx: The patient is having no further hemoptysis at this time. Plans are tentatively to perform bronchoscopy sometime early next week. Past Medical History      Past Medical History:   Diagnosis Date    Anxiety     Asthma     [facility]     CPAP (continuous positive airway pressure) dependence     11cm    Depression     Hyperlipidemia     Hypothyroidism     Migraines     Obstructive sleep apnea     AHI:  13.1    Restless legs syndrome     Sarcoidosis     Scoliosis     Unspecified sleep apnea      SurgicalHistory  Past Surgical History:   Procedure Laterality Date    APPENDECTOMY  age 27    Patient thinks this was removed with GB    BREAST SURGERY  2006    bilateral breast tumor removal-[place] [id number]  age 27     Allergies  Allergies   Allergen Reactions    Codeine Shortness Of Breath     Medications    potassium chloride, 40 mEq, Oral, BID    citalopram, 40 mg, Oral, Daily    gabapentin, 600 mg, Oral, BID    levothyroxine, 100 mcg, Oral, Daily    rOPINIRole, 0.25 mg, Oral, Nightly    mirtazapine, 15 mg, Oral, Nightly    sodium chloride flush, 10 mL, Intravenous, 2 times per day    cefTRIAXone (ROCEPHIN) IV, 1 g, Intravenous, Q24H    azithromycin, 500 mg, Intravenous, Q24H    methylPREDNISolone, 60 mg, Intravenous, Q6H    ipratropium-albuterol, 1 ampule, Inhalation, Q4H WA    pantoprazole, 40 mg, Oral, QAM AC    tiotropium, 2 puff, Inhalation, Daily   Social History   reports that she quit smoking about 5 years ago. She smoked 0.00 packs per day for 37.00 years. She has never used smokeless tobacco. She reports that she does not drink alcohol or use drugs.   Family History  family history includes Cancer in her maternal aunt, paternal aunt, son, and another family member; Diabetes in her brother, brother, father, sister, and sister; Heart Disease in her brother, father, mother, sister, and sister. Review of Systems:  Review of Systems   Constitutional: Positive for fatigue (no further ) and unexpected weight change (lost 26 lbs in 6 months ). Negative for appetite change, chills and fever. HENT: Positive for dental problem (wears dentures). Negative for nosebleeds, sore throat and trouble swallowing. Eyes: Visual disturbance: wears glasses but notes distortion of the left eye. Respiratory: Positive for cough (with hemoptysis ), chest tightness (when she is very SOB, feels like she has a knot in her chest at times), shortness of breath and wheezing. Cardiovascular: Negative. Negative for chest pain, palpitations and leg swelling. Gastrointestinal: Negative. Endocrine: Negative. Genitourinary: Negative. Musculoskeletal:        Scoliosis, DDD, arthritis and gets injections every 3 months with pain management    Skin: Negative. Negative for color change, rash and wound. Neurological: Positive for headaches (has migraines). Negative for dizziness, seizures, speech difficulty and weakness. Hematological: Negative. Does not bruise/bleed easily. Psychiatric/Behavioral: Negative for dysphoric mood. The patient is nervous/anxious. Physical Exam:   height is 5' 6\" (1.676 m) and weight is 141 lb 1.6 oz (64 kg). Her temporal temperature is 97.7 °F (36.5 °C). Her blood pressure is 132/74 and her pulse is 90. Her respiration is 14 and oxygen saturation is 93%. Intake/Output Summary (Last 24 hours) at 7/27/2019 1126  Last data filed at 7/27/2019 0343  Gross per 24 hour   Intake 1204 ml   Output --   Net 1204 ml     Physical Exam   Constitutional: She is oriented to person, place, and time. She appears well-developed and well-nourished. No distress.

## 2019-07-28 ENCOUNTER — OUTSIDE FACILITY SERVICE (OUTPATIENT)
Dept: PULMONOLOGY | Facility: CLINIC | Age: 64
End: 2019-07-28

## 2019-07-28 LAB
ANA IGG, ELISA: NORMAL
ANCA IFA: NORMAL
ANION GAP SERPL CALCULATED.3IONS-SCNC: 12 MMOL/L (ref 7–19)
BUN BLDV-MCNC: 14 MG/DL (ref 8–23)
CALCIUM SERPL-MCNC: 8.5 MG/DL (ref 8.8–10.2)
CHLORIDE BLD-SCNC: 103 MMOL/L (ref 98–111)
CO2: 27 MMOL/L (ref 22–29)
CREAT SERPL-MCNC: 0.5 MG/DL (ref 0.5–0.9)
GFR NON-AFRICAN AMERICAN: >60
GLUCOSE BLD-MCNC: 159 MG/DL (ref 74–109)
HCT VFR BLD CALC: 35.9 % (ref 37–47)
HEMOGLOBIN: 11.2 G/DL (ref 12–16)
MCH RBC QN AUTO: 30.1 PG (ref 27–31)
MCHC RBC AUTO-ENTMCNC: 31.2 G/DL (ref 33–37)
MCV RBC AUTO: 96.5 FL (ref 81–99)
PDW BLD-RTO: 13.4 % (ref 11.5–14.5)
PLATELET # BLD: 233 K/UL (ref 130–400)
PMV BLD AUTO: 10.7 FL (ref 9.4–12.3)
POTASSIUM SERPL-SCNC: 3.7 MMOL/L (ref 3.5–5)
RBC # BLD: 3.72 M/UL (ref 4.2–5.4)
SODIUM BLD-SCNC: 142 MMOL/L (ref 136–145)
WBC # BLD: 11.8 K/UL (ref 4.8–10.8)

## 2019-07-28 PROCEDURE — 94640 AIRWAY INHALATION TREATMENT: CPT

## 2019-07-28 PROCEDURE — 6370000000 HC RX 637 (ALT 250 FOR IP): Performed by: HOSPITALIST

## 2019-07-28 PROCEDURE — 2580000003 HC RX 258: Performed by: HOSPITALIST

## 2019-07-28 PROCEDURE — 6360000002 HC RX W HCPCS: Performed by: HOSPITALIST

## 2019-07-28 PROCEDURE — 80048 BASIC METABOLIC PNL TOTAL CA: CPT

## 2019-07-28 PROCEDURE — 36415 COLL VENOUS BLD VENIPUNCTURE: CPT

## 2019-07-28 PROCEDURE — 99232 SBSQ HOSP IP/OBS MODERATE 35: CPT | Performed by: HOSPITALIST

## 2019-07-28 PROCEDURE — 85027 COMPLETE CBC AUTOMATED: CPT

## 2019-07-28 PROCEDURE — 1210000000 HC MED SURG R&B

## 2019-07-28 PROCEDURE — 99232 SBSQ HOSP IP/OBS MODERATE 35: CPT | Performed by: INTERNAL MEDICINE

## 2019-07-28 PROCEDURE — 94660 CPAP INITIATION&MGMT: CPT

## 2019-07-28 RX ORDER — METHYLPREDNISOLONE SODIUM SUCCINATE 40 MG/ML
40 INJECTION, POWDER, LYOPHILIZED, FOR SOLUTION INTRAMUSCULAR; INTRAVENOUS DAILY
Status: DISCONTINUED | OUTPATIENT
Start: 2019-07-29 | End: 2019-07-29

## 2019-07-28 RX ADMIN — MIRTAZAPINE 15 MG: 15 TABLET, FILM COATED ORAL at 21:19

## 2019-07-28 RX ADMIN — HYDROCODONE BITARTRATE AND ACETAMINOPHEN 1 TABLET: 10; 325 TABLET ORAL at 02:21

## 2019-07-28 RX ADMIN — PANTOPRAZOLE SODIUM 40 MG: 40 TABLET, DELAYED RELEASE ORAL at 06:21

## 2019-07-28 RX ADMIN — METHYLPREDNISOLONE SODIUM SUCCINATE 40 MG: 40 INJECTION, POWDER, FOR SOLUTION INTRAMUSCULAR; INTRAVENOUS at 00:20

## 2019-07-28 RX ADMIN — CITALOPRAM HYDROBROMIDE 40 MG: 40 TABLET ORAL at 07:46

## 2019-07-28 RX ADMIN — HYDROCODONE BITARTRATE AND ACETAMINOPHEN 1 TABLET: 10; 325 TABLET ORAL at 21:19

## 2019-07-28 RX ADMIN — ROPINIROLE HYDROCHLORIDE 0.25 MG: 0.25 TABLET, FILM COATED ORAL at 21:19

## 2019-07-28 RX ADMIN — GABAPENTIN 600 MG: 600 TABLET, FILM COATED ORAL at 07:46

## 2019-07-28 RX ADMIN — LEVOTHYROXINE SODIUM 100 MCG: 0.1 TABLET ORAL at 06:21

## 2019-07-28 RX ADMIN — HYDROCODONE BITARTRATE AND ACETAMINOPHEN 1 TABLET: 10; 325 TABLET ORAL at 08:41

## 2019-07-28 RX ADMIN — GABAPENTIN 600 MG: 600 TABLET, FILM COATED ORAL at 21:19

## 2019-07-28 RX ADMIN — Medication 10 ML: at 21:22

## 2019-07-28 RX ADMIN — POTASSIUM CHLORIDE 40 MEQ: 20 TABLET, EXTENDED RELEASE ORAL at 07:46

## 2019-07-28 RX ADMIN — ALPRAZOLAM 1 MG: 1 TABLET ORAL at 21:19

## 2019-07-28 RX ADMIN — ALPRAZOLAM 1 MG: 1 TABLET ORAL at 13:24

## 2019-07-28 RX ADMIN — IPRATROPIUM BROMIDE AND ALBUTEROL SULFATE 1 AMPULE: .5; 3 SOLUTION RESPIRATORY (INHALATION) at 06:39

## 2019-07-28 RX ADMIN — Medication 10 ML: at 07:47

## 2019-07-28 RX ADMIN — POTASSIUM CHLORIDE 40 MEQ: 20 TABLET, EXTENDED RELEASE ORAL at 21:19

## 2019-07-28 RX ADMIN — AZITHROMYCIN DIHYDRATE 500 MG: 500 INJECTION, POWDER, LYOPHILIZED, FOR SOLUTION INTRAVENOUS at 00:19

## 2019-07-28 RX ADMIN — Medication 1 G: at 00:19

## 2019-07-28 RX ADMIN — HYDROCODONE BITARTRATE AND ACETAMINOPHEN 1 TABLET: 10; 325 TABLET ORAL at 15:12

## 2019-07-28 ASSESSMENT — PAIN DESCRIPTION - DESCRIPTORS: DESCRIPTORS: THROBBING;BURNING

## 2019-07-28 ASSESSMENT — PAIN SCALES - GENERAL
PAINLEVEL_OUTOF10: 6
PAINLEVEL_OUTOF10: 8
PAINLEVEL_OUTOF10: 0
PAINLEVEL_OUTOF10: 4
PAINLEVEL_OUTOF10: 7
PAINLEVEL_OUTOF10: 0
PAINLEVEL_OUTOF10: 3
PAINLEVEL_OUTOF10: 3
PAINLEVEL_OUTOF10: 8

## 2019-07-28 ASSESSMENT — PAIN DESCRIPTION - PAIN TYPE: TYPE: CHRONIC PAIN

## 2019-07-28 ASSESSMENT — ENCOUNTER SYMPTOMS
TROUBLE SWALLOWING: 0
CHEST TIGHTNESS: 1
SHORTNESS OF BREATH: 1
COUGH: 1
SORE THROAT: 0
COLOR CHANGE: 0
GASTROINTESTINAL NEGATIVE: 1
WHEEZING: 1

## 2019-07-28 ASSESSMENT — PAIN DESCRIPTION - ONSET: ONSET: ON-GOING

## 2019-07-28 ASSESSMENT — PAIN DESCRIPTION - FREQUENCY: FREQUENCY: CONTINUOUS

## 2019-07-28 ASSESSMENT — PAIN DESCRIPTION - DIRECTION: RADIATING_TOWARDS: NO

## 2019-07-28 ASSESSMENT — PAIN DESCRIPTION - LOCATION: LOCATION: SHOULDER;BACK

## 2019-07-28 ASSESSMENT — PAIN DESCRIPTION - PROGRESSION: CLINICAL_PROGRESSION: NOT CHANGED

## 2019-07-28 ASSESSMENT — PAIN DESCRIPTION - ORIENTATION: ORIENTATION: LOWER

## 2019-07-28 ASSESSMENT — PAIN - FUNCTIONAL ASSESSMENT: PAIN_FUNCTIONAL_ASSESSMENT: PREVENTS OR INTERFERES SOME ACTIVE ACTIVITIES AND ADLS

## 2019-07-28 NOTE — PROGRESS NOTES
[id number] [facility]      Patient:  Raghavendra Trujillo  YOB: 1955  Date of Service: 7/28/2019  MRN: [id number]   Acct: [de-identified]   Primary Care Physician: ERIN Dalton  Advance Directive: Full Code  Admit Date: 7/24/2019       Hospital Day: 3    Chief Complaint:   Chief Complaint   Patient presents with    Shortness of Breath     out of home O2 and febrile, brought in by EMS, duoneb given in route        Subjective:  Pt seen and examined, still sob. Overall doing well. Pt feeling better, less agitated after receiving xanax prn. Objective:   VITALS:  /76   Pulse 79   Temp 97.2 °F (36.2 °C) (Temporal)   Resp 14   Ht 5' 6\" (1.676 m)   Wt 141 lb 1.6 oz (64 kg)   SpO2 97%   BMI 22.77 kg/m²   24HR INTAKE/OUTPUT:      Intake/Output Summary (Last 24 hours) at 7/28/2019 7942  Last data filed at 7/28/2019 0039  Gross per 24 hour   Intake 430 ml   Output --   Net 430 ml     General appearance: looks older than her age   [de-identified]: Normal cephalic, atraumatic without obvious deformity. Pupils equal, round, and reactive to light. Extra ocular muscles intact. Conjunctivae/corneas clear. Normal ears and nose. Neck: Supple, with full range of motion. No jugular venous distention. Trachea midline. Thyroid no masses noted. Respiratory: scattered wheezing and crackles   Cardiovascular: Regular rate and rhythm with normal S1/S2 without murmurs, rubs or gallops. Abdomen: Soft, non-tender, non-distended with normal bowel sounds. Musculoskeletal: No clubbing, cyanosis or edema bilaterally. Full range of motion without deformity in 4 extremities.     Neurologic:  Neurovascularly intact without any focal sensory/motor deficits. Cranial nerves: II-XII intact, grossly non-focal.  Psychiatric: Alert and oriented, thought content appropriate, normal insight.    Medications:      potassium chloride  40 mEq Oral BID    methylPREDNISolone  40 mg Intravenous Q12H    citalopram  40 mg Oral Daily    gabapentin 600 mg Oral BID    levothyroxine  100 mcg Oral Daily    rOPINIRole  0.25 mg Oral Nightly    mirtazapine  15 mg Oral Nightly    sodium chloride flush  10 mL Intravenous 2 times per day    cefTRIAXone (ROCEPHIN) IV  1 g Intravenous Q24H    azithromycin  500 mg Intravenous Q24H    ipratropium-albuterol  1 ampule Inhalation Q4H WA    pantoprazole  40 mg Oral QAM AC     ALPRAZolam, albuterol, HYDROcodone-acetaminophen, albuterol sulfate HFA, tiZANidine, sodium chloride flush, magnesium hydroxide, ondansetron, potassium chloride **OR** potassium alternative oral replacement **OR** [DISCONTINUED] potassium chloride, magnesium sulfate, acetaminophen, ibuprofen  DIET GENERAL;         Lab and other Data:     Recent Labs     07/26/19  0614 07/27/19  0015 07/28/19  0459   WBC 16.4* 18.8* 11.8*   HGB 11.9* 10.8* 11.2*    227 233     Recent Labs     07/26/19  1206 07/27/19  0015 07/28/19  0500   * 145 142   K 3.1* 3.0* 3.7    107 103   CO2 24 23 27   BUN 9 8 14   CREATININE 0.7 0.5 0.5   GLUCOSE 175* 255* 159*     No results for input(s): AST, ALT, ALB, BILITOT, ALKPHOS in the last 72 hours. Troponin T: No results for input(s): TROPONINI in the last 72 hours. Pro-BNP: No results for input(s): BNP in the last 72 hours. INR:   No results for input(s): INR in the last 72 hours. ABGs:   Lab Results   Component Value Date    PHART 7.370 03/13/2019    PO2ART 69.0 03/13/2019    UIC8VHS 50.0 03/13/2019     UA:  No results for input(s): NITRITE, COLORU, PHUR, LABCAST, WBCUA, RBCUA, MUCUS, TRICHOMONAS, YEAST, BACTERIA, CLARITYU, SPECGRAV, LEUKOCYTESUR, UROBILINOGEN, BILIRUBINUR, BLOODU, GLUCOSEU, AMORPHOUS in the last 72 hours. Invalid input(s): KETONESU    Imaging:   CTA PULMONARY W CONTRAST   Final Result   1. There is no evidence of embolic disease. 2. On image 32 in the left lower lobe irregular 13 mm density is   present. A neoplasm should be excluded.  Follow-up with either   bronchoscopy or possibly PET CT scan. .   Signed by Dr Yocasta Moy on 7/24/2019 9:09 PM      CT Head WO Contrast   Final Result   1. No acute intracranial process. Signed by Dr Yocasta Moy on 7/24/2019 9:05 PM      XR CHEST PORTABLE   Final Result   1. No radiographic evidence of acute cardiopulmonary process. Signed by Dr Yocasta Moy on 7/24/2019 8:39 PM        Micro:   Blood Culture, Routine   Date Value Ref Range Status   07/24/2019   Preliminary    No Growth to date. Any change in status will be called.   , No components found for: LABURINE  Patient Active Problem List    Diagnosis Date Noted    Hemoptysis 07/26/2019    Acute hypoxemic respiratory failure ([facility] [address] 07/25/2019    Community acquired pneumonia of left lower lobe of lung ([facility] [address] 03/14/2019    Transaminitis 03/14/2019    Sepsis ([facility] [address] 03/13/2019    Primary insomnia 11/07/2016    Obstructive sleep apnea     CPAP (continuous positive airway pressure) dependence     Renal cyst 01/11/2016    Smoking addiction 11/20/2012    Vitamin D deficiency 11/20/2012    Restless legs syndrome     Hypothyroidism     Anxiety     Depression     COPD with acute exacerbation (HCC)     Hyperlipidemia     Restless legs syndrome     Migraines        Assessment/plan: Active Problems:    Acute hypoxemic respiratory failure (HCC)   LLL Density    Community acquired PNA   COPD exacerbation   Hemoptysis   HARRIET    Resolved Problems:    * No resolved hospital problems. *      Plan:   Increase activity. O2 Walks. Per Pulm : Will check PFTs on Monday am with tentative Bronchoscopy for Monday PM.   Will continue antibiotics,    dec Solu Medrol as she is experiencing steroid induce psychosis and severe agitation.- + xanax prn    C/w  duonebs     Dispo: plans are for pulmonary function studies and possible bronchoscopy later tomrorow. Pulm on board.     DVT Prophylaxis: lovenox      Jimmie Barker MD  Hospitalist Service  7/28/2019  9:21 AM

## 2019-07-28 NOTE — PLAN OF CARE
Problem: Falls - Risk of:  Goal: Will remain free from falls  Description  Will remain free from falls  7/28/2019 1159 by Ashley Valencia RN  Outcome: Ongoing  7/28/2019 0434 by Marla Zabala LPN  Outcome: Ongoing  Goal: Absence of physical injury  Description  Absence of physical injury  7/28/2019 1159 by Ashley Valencia RN  Outcome: Ongoing  7/28/2019 0434 by Marla Zabala LPN  Outcome: Ongoing     Problem: Pain:  Goal: Pain level will decrease  Description  Pain level will decrease  7/28/2019 1159 by Ashley Valencia RN  Outcome: Ongoing  7/28/2019 0434 by Marla Zabala LPN  Outcome: Ongoing  Goal: Control of acute pain  Description  Control of acute pain  7/28/2019 1159 by Ashley Valencia RN  Outcome: Ongoing  7/28/2019 0434 by Marla Zabala LPN  Outcome: Ongoing  Goal: Control of chronic pain  Description  Control of chronic pain  7/28/2019 1159 by Ashley Valencia RN  Outcome: Ongoing  7/28/2019 0434 by Marla Zabala LPN  Outcome: Ongoing     Problem: Breathing Pattern - Ineffective:  Goal: Ability to achieve and maintain a regular respiratory rate will improve  Description  Ability to achieve and maintain a regular respiratory rate will improve  7/28/2019 1159 by Ashley Valencia RN  Outcome: Ongoing  7/28/2019 0434 by Marla Zabala LPN  Outcome: Ongoing

## 2019-07-29 ENCOUNTER — ANESTHESIA (OUTPATIENT)
Dept: ENDOSCOPY | Age: 64
DRG: 189 | End: 2019-07-29
Payer: MEDICARE

## 2019-07-29 ENCOUNTER — OUTSIDE FACILITY SERVICE (OUTPATIENT)
Dept: PULMONOLOGY | Facility: CLINIC | Age: 64
End: 2019-07-29

## 2019-07-29 ENCOUNTER — ANESTHESIA EVENT (OUTPATIENT)
Dept: ENDOSCOPY | Age: 64
DRG: 189 | End: 2019-07-29
Payer: MEDICARE

## 2019-07-29 VITALS
DIASTOLIC BLOOD PRESSURE: 84 MMHG | OXYGEN SATURATION: 95 % | SYSTOLIC BLOOD PRESSURE: 149 MMHG | RESPIRATION RATE: 35 BRPM

## 2019-07-29 PROBLEM — J18.9 COMMUNITY ACQUIRED PNEUMONIA OF LEFT LOWER LOBE OF LUNG: Status: RESOLVED | Noted: 2019-03-14 | Resolved: 2019-07-29

## 2019-07-29 LAB
ANION GAP SERPL CALCULATED.3IONS-SCNC: 10 MMOL/L (ref 7–19)
BLOOD CULTURE, ROUTINE: NORMAL
BUN BLDV-MCNC: 15 MG/DL (ref 8–23)
CALCIUM SERPL-MCNC: 8.2 MG/DL (ref 8.8–10.2)
CHLORIDE BLD-SCNC: 103 MMOL/L (ref 98–111)
CO2: 29 MMOL/L (ref 22–29)
CREAT SERPL-MCNC: 0.6 MG/DL (ref 0.5–0.9)
CULTURE, BLOOD 2: NORMAL
GFR NON-AFRICAN AMERICAN: >60
GLUCOSE BLD-MCNC: 88 MG/DL (ref 74–109)
HCT VFR BLD CALC: 37.6 % (ref 37–47)
HEMOGLOBIN: 12.2 G/DL (ref 12–16)
MCH RBC QN AUTO: 30 PG (ref 27–31)
MCHC RBC AUTO-ENTMCNC: 32.4 G/DL (ref 33–37)
MCV RBC AUTO: 92.6 FL (ref 81–99)
PDW BLD-RTO: 13.3 % (ref 11.5–14.5)
PLATELET # BLD: 215 K/UL (ref 130–400)
PMV BLD AUTO: 11 FL (ref 9.4–12.3)
POTASSIUM SERPL-SCNC: 4.7 MMOL/L (ref 3.5–5)
RBC # BLD: 4.06 M/UL (ref 4.2–5.4)
SODIUM BLD-SCNC: 142 MMOL/L (ref 136–145)
WBC # BLD: 9.9 K/UL (ref 4.8–10.8)

## 2019-07-29 PROCEDURE — 2500000003 HC RX 250 WO HCPCS

## 2019-07-29 PROCEDURE — 2580000003 HC RX 258: Performed by: INTERNAL MEDICINE

## 2019-07-29 PROCEDURE — 3700000001 HC ADD 15 MINUTES (ANESTHESIA): Performed by: INTERNAL MEDICINE

## 2019-07-29 PROCEDURE — 94660 CPAP INITIATION&MGMT: CPT

## 2019-07-29 PROCEDURE — 0BJ08ZZ INSPECTION OF TRACHEOBRONCHIAL TREE, VIA NATURAL OR ARTIFICIAL OPENING ENDOSCOPIC: ICD-10-PCS | Performed by: INTERNAL MEDICINE

## 2019-07-29 PROCEDURE — 2580000003 HC RX 258: Performed by: HOSPITALIST

## 2019-07-29 PROCEDURE — 87102 FUNGUS ISOLATION CULTURE: CPT

## 2019-07-29 PROCEDURE — 6360000002 HC RX W HCPCS: Performed by: HOSPITALIST

## 2019-07-29 PROCEDURE — 87206 SMEAR FLUORESCENT/ACID STAI: CPT

## 2019-07-29 PROCEDURE — 31622 DX BRONCHOSCOPE/WASH: CPT | Performed by: INTERNAL MEDICINE

## 2019-07-29 PROCEDURE — 6370000000 HC RX 637 (ALT 250 FOR IP): Performed by: INTERNAL MEDICINE

## 2019-07-29 PROCEDURE — 94762 N-INVAS EAR/PLS OXIMTRY CONT: CPT

## 2019-07-29 PROCEDURE — 6360000002 HC RX W HCPCS: Performed by: INTERNAL MEDICINE

## 2019-07-29 PROCEDURE — 6370000000 HC RX 637 (ALT 250 FOR IP): Performed by: HOSPITALIST

## 2019-07-29 PROCEDURE — 1210000000 HC MED SURG R&B

## 2019-07-29 PROCEDURE — 99232 SBSQ HOSP IP/OBS MODERATE 35: CPT | Performed by: HOSPITALIST

## 2019-07-29 PROCEDURE — 94727 GAS DIL/WSHOT DETER LNG VOL: CPT

## 2019-07-29 PROCEDURE — 99232 SBSQ HOSP IP/OBS MODERATE 35: CPT | Performed by: INTERNAL MEDICINE

## 2019-07-29 PROCEDURE — 6360000002 HC RX W HCPCS: Performed by: NURSE PRACTITIONER

## 2019-07-29 PROCEDURE — 7100000000 HC PACU RECOVERY - FIRST 15 MIN: Performed by: INTERNAL MEDICINE

## 2019-07-29 PROCEDURE — 94640 AIRWAY INHALATION TREATMENT: CPT

## 2019-07-29 PROCEDURE — 87205 SMEAR GRAM STAIN: CPT

## 2019-07-29 PROCEDURE — 87015 SPECIMEN INFECT AGNT CONCNTJ: CPT

## 2019-07-29 PROCEDURE — 2709999900 HC NON-CHARGEABLE SUPPLY: Performed by: INTERNAL MEDICINE

## 2019-07-29 PROCEDURE — 7100000001 HC PACU RECOVERY - ADDTL 15 MIN: Performed by: INTERNAL MEDICINE

## 2019-07-29 PROCEDURE — 6360000002 HC RX W HCPCS

## 2019-07-29 PROCEDURE — 2580000003 HC RX 258: Performed by: NURSE PRACTITIONER

## 2019-07-29 PROCEDURE — 3700000000 HC ANESTHESIA ATTENDED CARE: Performed by: INTERNAL MEDICINE

## 2019-07-29 PROCEDURE — 3609011300 HC BRONCHOSCOPY BRONCHIAL/ENDOBRNCL BX 1+ SITES: Performed by: INTERNAL MEDICINE

## 2019-07-29 PROCEDURE — 80048 BASIC METABOLIC PNL TOTAL CA: CPT

## 2019-07-29 PROCEDURE — 94060 EVALUATION OF WHEEZING: CPT

## 2019-07-29 PROCEDURE — 94729 DIFFUSING CAPACITY: CPT

## 2019-07-29 PROCEDURE — 36415 COLL VENOUS BLD VENIPUNCTURE: CPT

## 2019-07-29 PROCEDURE — 87116 MYCOBACTERIA CULTURE: CPT

## 2019-07-29 PROCEDURE — 85027 COMPLETE CBC AUTOMATED: CPT

## 2019-07-29 PROCEDURE — 87070 CULTURE OTHR SPECIMN AEROBIC: CPT

## 2019-07-29 RX ORDER — LIDOCAINE HYDROCHLORIDE 10 MG/ML
INJECTION, SOLUTION EPIDURAL; INFILTRATION; INTRACAUDAL; PERINEURAL PRN
Status: DISCONTINUED | OUTPATIENT
Start: 2019-07-29 | End: 2019-07-29 | Stop reason: SDUPTHER

## 2019-07-29 RX ORDER — SODIUM CHLORIDE 0.9 % (FLUSH) 0.9 %
10 SYRINGE (ML) INJECTION PRN
Status: DISCONTINUED | OUTPATIENT
Start: 2019-07-29 | End: 2019-07-29 | Stop reason: HOSPADM

## 2019-07-29 RX ORDER — SODIUM CHLORIDE 0.9 % (FLUSH) 0.9 %
10 SYRINGE (ML) INJECTION PRN
Status: DISCONTINUED | OUTPATIENT
Start: 2019-07-29 | End: 2019-07-30 | Stop reason: HOSPADM

## 2019-07-29 RX ORDER — SODIUM CHLORIDE 0.9 % (FLUSH) 0.9 %
10 SYRINGE (ML) INJECTION EVERY 12 HOURS SCHEDULED
Status: DISCONTINUED | OUTPATIENT
Start: 2019-07-29 | End: 2019-07-30 | Stop reason: HOSPADM

## 2019-07-29 RX ORDER — PROPOFOL 10 MG/ML
INJECTION, EMULSION INTRAVENOUS PRN
Status: DISCONTINUED | OUTPATIENT
Start: 2019-07-29 | End: 2019-07-29 | Stop reason: SDUPTHER

## 2019-07-29 RX ORDER — SODIUM CHLORIDE, SODIUM LACTATE, POTASSIUM CHLORIDE, CALCIUM CHLORIDE 600; 310; 30; 20 MG/100ML; MG/100ML; MG/100ML; MG/100ML
INJECTION, SOLUTION INTRAVENOUS CONTINUOUS
Status: DISCONTINUED | OUTPATIENT
Start: 2019-07-29 | End: 2019-07-29

## 2019-07-29 RX ORDER — PREDNISONE 20 MG/1
20 TABLET ORAL DAILY
Status: DISCONTINUED | OUTPATIENT
Start: 2019-07-30 | End: 2019-07-30 | Stop reason: HOSPADM

## 2019-07-29 RX ORDER — LIDOCAINE HYDROCHLORIDE 40 MG/ML
2.5 INJECTION, SOLUTION RETROBULBAR; TOPICAL ONCE
Status: DISCONTINUED | OUTPATIENT
Start: 2019-07-29 | End: 2019-07-29 | Stop reason: HOSPADM

## 2019-07-29 RX ORDER — SODIUM CHLORIDE 0.9 % (FLUSH) 0.9 %
10 SYRINGE (ML) INJECTION EVERY 12 HOURS SCHEDULED
Status: DISCONTINUED | OUTPATIENT
Start: 2019-07-29 | End: 2019-07-29 | Stop reason: HOSPADM

## 2019-07-29 RX ORDER — LIDOCAINE HYDROCHLORIDE 20 MG/ML
JELLY TOPICAL ONCE
Status: DISCONTINUED | OUTPATIENT
Start: 2019-07-29 | End: 2019-07-29 | Stop reason: HOSPADM

## 2019-07-29 RX ORDER — ALBUTEROL SULFATE 2.5 MG/3ML
2.5 SOLUTION RESPIRATORY (INHALATION) EVERY 6 HOURS PRN
Status: DISCONTINUED | OUTPATIENT
Start: 2019-07-29 | End: 2019-07-29

## 2019-07-29 RX ADMIN — Medication 10 ML: at 09:01

## 2019-07-29 RX ADMIN — GABAPENTIN 600 MG: 600 TABLET, FILM COATED ORAL at 20:35

## 2019-07-29 RX ADMIN — IPRATROPIUM BROMIDE 0.5 MG: 0.5 SOLUTION RESPIRATORY (INHALATION) at 19:00

## 2019-07-29 RX ADMIN — ALPRAZOLAM 1 MG: 1 TABLET ORAL at 20:38

## 2019-07-29 RX ADMIN — ROPINIROLE HYDROCHLORIDE 0.25 MG: 0.25 TABLET, FILM COATED ORAL at 20:34

## 2019-07-29 RX ADMIN — Medication 10 ML: at 20:35

## 2019-07-29 RX ADMIN — IPRATROPIUM BROMIDE 0.5 MG: 0.5 SOLUTION RESPIRATORY (INHALATION) at 10:17

## 2019-07-29 RX ADMIN — PROPOFOL 50 MG: 10 INJECTION, EMULSION INTRAVENOUS at 14:42

## 2019-07-29 RX ADMIN — HYDROCODONE BITARTRATE AND ACETAMINOPHEN 1 TABLET: 10; 325 TABLET ORAL at 18:05

## 2019-07-29 RX ADMIN — PROPOFOL 50 MG: 10 INJECTION, EMULSION INTRAVENOUS at 14:44

## 2019-07-29 RX ADMIN — SODIUM CHLORIDE, SODIUM LACTATE, POTASSIUM CHLORIDE, AND CALCIUM CHLORIDE: 600; 310; 30; 20 INJECTION, SOLUTION INTRAVENOUS at 13:21

## 2019-07-29 RX ADMIN — METHYLPREDNISOLONE SODIUM SUCCINATE 40 MG: 40 INJECTION, POWDER, FOR SOLUTION INTRAMUSCULAR; INTRAVENOUS at 09:00

## 2019-07-29 RX ADMIN — PROPOFOL 50 MG: 10 INJECTION, EMULSION INTRAVENOUS at 14:46

## 2019-07-29 RX ADMIN — ALBUTEROL SULFATE 2.5 MG: 2.5 SOLUTION RESPIRATORY (INHALATION) at 08:29

## 2019-07-29 RX ADMIN — Medication 1 G: at 01:13

## 2019-07-29 RX ADMIN — LEVOTHYROXINE SODIUM 100 MCG: 0.1 TABLET ORAL at 06:01

## 2019-07-29 RX ADMIN — AZITHROMYCIN DIHYDRATE 500 MG: 500 INJECTION, POWDER, LYOPHILIZED, FOR SOLUTION INTRAVENOUS at 01:14

## 2019-07-29 RX ADMIN — MIRTAZAPINE 15 MG: 15 TABLET, FILM COATED ORAL at 20:35

## 2019-07-29 RX ADMIN — LIDOCAINE HYDROCHLORIDE 30 MG: 10 INJECTION, SOLUTION EPIDURAL; INFILTRATION; INTRACAUDAL; PERINEURAL at 14:42

## 2019-07-29 RX ADMIN — PANTOPRAZOLE SODIUM 40 MG: 40 TABLET, DELAYED RELEASE ORAL at 06:01

## 2019-07-29 RX ADMIN — LIDOCAINE HYDROCHLORIDE 30 MG: 10 INJECTION, SOLUTION EPIDURAL; INFILTRATION; INTRACAUDAL; PERINEURAL at 14:40

## 2019-07-29 ASSESSMENT — ENCOUNTER SYMPTOMS
WHEEZING: 1
TROUBLE SWALLOWING: 0
COUGH: 1
GASTROINTESTINAL NEGATIVE: 1
SORE THROAT: 0
SHORTNESS OF BREATH: 1
COLOR CHANGE: 0
CHEST TIGHTNESS: 0

## 2019-07-29 ASSESSMENT — LIFESTYLE VARIABLES: SMOKING_STATUS: 0

## 2019-07-29 ASSESSMENT — PAIN SCALES - GENERAL
PAINLEVEL_OUTOF10: 0
PAINLEVEL_OUTOF10: 9
PAINLEVEL_OUTOF10: 0

## 2019-07-29 NOTE — ANESTHESIA PRE PROCEDURE
(NORCO)  MG per tablet 1 tablet  1 tablet Oral Q6H PRN Katlin Wallace MD   1 tablet at 07/28/19 2119    levothyroxine (SYNTHROID) tablet 100 mcg  100 mcg Oral Daily Katlin Wallace MD   100 mcg at 07/29/19 0601    albuterol sulfate  (90 Base) MCG/ACT inhaler 2 puff  2 puff Inhalation Q4H PRN Katlin Wallace MD        tiZANidine (ZANAFLEX) tablet 4 mg  4 mg Oral Q8H PRN Katlin Wallace MD   4 mg at 07/27/19 1832    rOPINIRole (REQUIP) tablet 0.25 mg  0.25 mg Oral Nightly Katlin Wallace MD   0.25 mg at 07/28/19 2119    mirtazapine (REMERON) tablet 15 mg  15 mg Oral Nightly Katlin Wallace MD   15 mg at 07/28/19 2119    sodium chloride flush 0.9 % injection 10 mL  10 mL Intravenous 2 times per day Katlin Wallace MD   10 mL at 07/28/19 2122    sodium chloride flush 0.9 % injection 10 mL  10 mL Intravenous PRN Katlin Wallace MD   10 mL at 07/26/19 1427    magnesium hydroxide (MILK OF MAGNESIA) 400 MG/5ML suspension 30 mL  30 mL Oral Daily PRN Katlin Wallace MD        [facility] injection 4 mg  4 mg Intravenous Q6H PRN Katlin Wallace MD        potassium chloride (KLOR-CON M) extended release tablet 40 mEq  40 mEq Oral PRN Katlin Wallace MD   40 mEq at 07/26/19 1427    Or    potassium bicarb-citric acid (EFFER-K) effervescent tablet 40 mEq  40 mEq Oral PRN Katlin Wallace MD        magnesium sulfate 1 g in dextrose 5% 100 mL IVPB  1 g Intravenous PRN Katlin Wallace MD        acetaminophen (TYLENOL) tablet 650 mg  650 mg Oral Q4H PRN Katlin Wallace MD        cefTRIAXone (ROCEPHIN) 1 g in sodium chloride (PF) 10 mL IV syringe  1 g Intravenous Q24H Katlin Wallace MD   1 g at 07/29/19 0113    azithromycin (ZITHROMAX) 500 mg in D5W 250ml Vial Mate  500 mg Intravenous Q24H Katlin Wallace MD   Stopped at 07/29/19 0331    pantoprazole (PROTONIX) tablet 40 mg  40 mg Oral QAM AC Katlin Wallace MD   40 mg at 07/29/19 0601    ibuprofen (ADVIL;MOTRIN) tablet 400 mg  400 mg Oral Q6H PRN Anselmo Rivera,

## 2019-07-29 NOTE — OP NOTE
Bronchoscopy Procedure Note    Date of Operation: 07/29/19    Pre-op Diagnosis: Hemoptysis    Post-op Diagnosis: no endobronchial lesions, chronic bronchitis    Surgeon: Saul Lazaro      Anesthesia: Aerosolized 4% lidocaine, lidocaine gel to nares. Topical 2% lidocaine to vocal cords and central airways via bronchoscope. Operation: Flexible fiberoptic bronchoscopy, diagnostic without c-arm    Bronchoscopy, Diagnostic was performed    Findings: diffuse and copious thick white mucus, friable airways. No focal lesions    Specimen: bronch wash    Estimated Blood Loss: None    Complications: none    Indications and History:  The patient is a 60 y.o.  female with Hemoptysis. The risks, benefits, complications, treatment options and expected outcomes were discussed with the patient. The possibilities of reaction to medication, pulmonary aspiration, perforation of a viscus, bleeding, failure to diagnose a condition and creating a complication requiring transfusion or operation were discussed with the patient who freely signed the consent. Time out was taken prior to the procedure. Description of Procedure:    After the induction of topical nasopharyngeal anesthesia, the patient was positioned supine and the bronchoscope was passed through the right naris . The vocal cords were visualized and 2% plain lidocaine was topically placed onto the cords. The cords were normal. The scope was then passed into the trachea. 1% plain lidocaine 5 ml was used topically on the christy.       The trachea, mainstem bronchi, RUL, RML, Bronchus Intermedius, RLL, SADE, SAED upper division and lingula, and LLL, and all primary segmental airways were examined and were normal except as described below:    Endobronchial findings: diffuse chronic bronchitis  Trachea: Edematous mucosa  Christy: Edematous mucosa  Right main bronchus: Edematous mucosa  Right upper lobe bronchus: Edematous mucosa  Right middle lobe bronchus: Edematous mucosa  Right lower lobe bronchus: Edematous mucosa  Left main bronchus: Edematous mucosa  Left upper lobe bronchus: Edematous mucosa  Left lower lobe bronchus: Edematous mucosa    Thick tenacious mucus was encountered through out trachea, and all lobar bronchi    Following examination and collection of specimens the bronchoscope was withdrawn    The Patient was taken to the Endoscopy Recovery area in satisfactory condition.     Brittany Garcia MD  7/29/19, 2:53 PM

## 2019-07-29 NOTE — ANESTHESIA POSTPROCEDURE EVALUATION
Department of Anesthesiology  Postprocedure Note    Patient: Christian Plaza  MRN: [id number]  YOB: 1955  Date of evaluation: 7/29/2019  Time:  2:52 PM     Procedure Summary     Date:  07/29/19 Room / Location:  [facility] ENDO 10 / [facility]    Anesthesia Start:  1430 Anesthesia Stop:  5803    Procedure:  BRONCHOSCOPY BIOPSY BRONCHUS (N/A ) Diagnosis:  (hemoptysis)    Surgeon:  Maddy Rahman MD Responsible Provider:  ERIN Montague CRNA    Anesthesia Type:  general ASA Status:  3          Anesthesia Type: general    Kaleigh Phase I: Kaleigh Score: 10    Kaleigh Phase II:      Last vitals: Reviewed and per EMR flowsheets.        Anesthesia Post Evaluation    Patient location during evaluation: bedside  Patient participation: complete - patient participated  Level of consciousness: awake and alert  Pain score: 0  Airway patency: patent  Nausea & Vomiting: no nausea and no vomiting  Complications: no  Cardiovascular status: hemodynamically stable  Respiratory status: acceptable, nonlabored ventilation, spontaneous ventilation and nasal cannula  Hydration status: euvolemic

## 2019-07-29 NOTE — PROGRESS NOTES
respiratory failure  2. LLL Density   3. Community acquired PNA  4. COPD exacerbation  5. Hemoptysis   New problem (to me), no additional workup planned:   1. Sarcoidosis  2. HARRIET-CPAP  Other problems either stable, failing to improve or worsenin. Hypokalemia     Recommend/plan:   NPO for Bronchoscopy today at 1:30. PFTs pending. Continue antibiotics. Electronically signed by Monica Alva on 19 at 7:56 AM    Physician Substantive portion:  Patient is without new complaints. We saw her this morning during rounds and she indicated no additional hemoptysis. No fevers or chills. She had not had her pulmonary function test done yet at the time of rounds. Exam showed diminished breath sounds, much improved compared to last week. Plan bronchoscopy in the p.m. to evaluate the airways and evaluate for any source of bleeding. Transition to p.o. steroids in the morning. See separate bronchoscopy documentation    I have seen and examined patient personally, performing a face-to-face diagnostic evaluation with plan of care reviewed and developed with APRN and nursing staff. I have addended and/or modified the above history of present illness, physical examination, and assessment and plan to reflect my findings and impressions. Essential elements of the care plan were discussed with APRN above. Agree with findings and assessment/plan as documented above.     Electronically signed by Maddy Rahman on 2019, 6:49 PM

## 2019-07-29 NOTE — PROGRESS NOTES
[address] [facility]      Patient:  Lyla Bettencourt  YOB: 1955  Date of Service: 7/29/2019  MRN: [id number]   Acct: [de-identified]   Primary Care Physician: ERIN Daigle  Advance Directive: Full Code  Admit Date: 7/24/2019       Hospital Day: 4    Chief Complaint:   Chief Complaint   Patient presents with    Shortness of Breath     out of home O2 and febrile, brought in by EMS, duoneb given in route        Subjective:  Pt seen and examined,  Doing ok- just came bck from PFT study- sched for bronch later this afternoon. Objective:   VITALS:  /75   Pulse 79   Temp 96.3 °F (35.7 °C) (Temporal)   Resp 20   Ht 5' 6\" (1.676 m)   Wt 141 lb 1.6 oz (64 kg)   SpO2 95%   BMI 22.77 kg/m²   24HR INTAKE/OUTPUT:      Intake/Output Summary (Last 24 hours) at 7/29/2019 0751  Last data filed at 7/28/2019 2355  Gross per 24 hour   Intake 500 ml   Output --   Net 500 ml     General appearance: looks older than her age   [de-identified]: Normal cephalic, atraumatic without obvious deformity. Pupils equal, round, and reactive to light. Extra ocular muscles intact. Conjunctivae/corneas clear. Normal ears and nose. Neck: Supple, with full range of motion. No jugular venous distention. Trachea midline. Thyroid no masses noted. Respiratory: scattered wheezing and crackles   Cardiovascular: Regular rate and rhythm with normal S1/S2 without murmurs, rubs or gallops. Abdomen: Soft, non-tender, non-distended with normal bowel sounds. Musculoskeletal: No clubbing, cyanosis or edema bilaterally. Full range of motion without deformity in 4 extremities.     Neurologic:  Neurovascularly intact without any focal sensory/motor deficits. Cranial nerves: II-XII intact, grossly non-focal.  Psychiatric: Alert and oriented, thought content appropriate, normal insight.    Medications:      sodium chloride flush  10 mL Intravenous 2 times per day    lidocaine PF  2.5 mL Other Once    lidocaine   Topical Once    ipratropium  0.5

## 2019-07-30 ENCOUNTER — OUTSIDE FACILITY SERVICE (OUTPATIENT)
Dept: PULMONOLOGY | Facility: CLINIC | Age: 64
End: 2019-07-30

## 2019-07-30 VITALS
HEART RATE: 70 BPM | DIASTOLIC BLOOD PRESSURE: 71 MMHG | HEIGHT: 66 IN | SYSTOLIC BLOOD PRESSURE: 112 MMHG | TEMPERATURE: 97.9 F | BODY MASS INDEX: 22.68 KG/M2 | RESPIRATION RATE: 14 BRPM | WEIGHT: 141.1 LBS | OXYGEN SATURATION: 97 %

## 2019-07-30 LAB
ANION GAP SERPL CALCULATED.3IONS-SCNC: 11 MMOL/L (ref 7–19)
BUN BLDV-MCNC: 14 MG/DL (ref 8–23)
CALCIUM SERPL-MCNC: 8 MG/DL (ref 8.8–10.2)
CHLORIDE BLD-SCNC: 99 MMOL/L (ref 98–111)
CO2: 29 MMOL/L (ref 22–29)
CREAT SERPL-MCNC: 0.7 MG/DL (ref 0.5–0.9)
GFR NON-AFRICAN AMERICAN: >60
GLUCOSE BLD-MCNC: 93 MG/DL (ref 74–109)
HCT VFR BLD CALC: 39.2 % (ref 37–47)
HEMOGLOBIN: 12.7 G/DL (ref 12–16)
MCH RBC QN AUTO: 30.2 PG (ref 27–31)
MCHC RBC AUTO-ENTMCNC: 32.4 G/DL (ref 33–37)
MCV RBC AUTO: 93.3 FL (ref 81–99)
PDW BLD-RTO: 12.9 % (ref 11.5–14.5)
PLATELET # BLD: 251 K/UL (ref 130–400)
PMV BLD AUTO: 10.6 FL (ref 9.4–12.3)
POTASSIUM SERPL-SCNC: 3.9 MMOL/L (ref 3.5–5)
RBC # BLD: 4.2 M/UL (ref 4.2–5.4)
SODIUM BLD-SCNC: 139 MMOL/L (ref 136–145)
WBC # BLD: 13.3 K/UL (ref 4.8–10.8)

## 2019-07-30 PROCEDURE — 85027 COMPLETE CBC AUTOMATED: CPT

## 2019-07-30 PROCEDURE — 2580000003 HC RX 258: Performed by: INTERNAL MEDICINE

## 2019-07-30 PROCEDURE — 36415 COLL VENOUS BLD VENIPUNCTURE: CPT

## 2019-07-30 PROCEDURE — 6360000002 HC RX W HCPCS: Performed by: INTERNAL MEDICINE

## 2019-07-30 PROCEDURE — 6370000000 HC RX 637 (ALT 250 FOR IP): Performed by: INTERNAL MEDICINE

## 2019-07-30 PROCEDURE — 94640 AIRWAY INHALATION TREATMENT: CPT

## 2019-07-30 PROCEDURE — 99232 SBSQ HOSP IP/OBS MODERATE 35: CPT | Performed by: INTERNAL MEDICINE

## 2019-07-30 PROCEDURE — 80048 BASIC METABOLIC PNL TOTAL CA: CPT

## 2019-07-30 RX ORDER — METHYLPREDNISOLONE 4 MG/1
TABLET ORAL
Qty: 1 KIT | Refills: 0 | Status: SHIPPED | OUTPATIENT
Start: 2019-07-30 | End: 2019-08-05

## 2019-07-30 RX ORDER — CEFDINIR 300 MG/1
300 CAPSULE ORAL 2 TIMES DAILY
Qty: 10 CAPSULE | Refills: 0 | Status: SHIPPED | OUTPATIENT
Start: 2019-07-30 | End: 2019-08-04

## 2019-07-30 RX ADMIN — ALPRAZOLAM 1 MG: 1 TABLET ORAL at 11:38

## 2019-07-30 RX ADMIN — AZITHROMYCIN DIHYDRATE 500 MG: 500 INJECTION, POWDER, LYOPHILIZED, FOR SOLUTION INTRAVENOUS at 00:29

## 2019-07-30 RX ADMIN — LEVOTHYROXINE SODIUM 100 MCG: 0.1 TABLET ORAL at 05:27

## 2019-07-30 RX ADMIN — CITALOPRAM HYDROBROMIDE 40 MG: 40 TABLET ORAL at 08:47

## 2019-07-30 RX ADMIN — PANTOPRAZOLE SODIUM 40 MG: 40 TABLET, DELAYED RELEASE ORAL at 05:27

## 2019-07-30 RX ADMIN — IPRATROPIUM BROMIDE 0.5 MG: 0.5 SOLUTION RESPIRATORY (INHALATION) at 06:47

## 2019-07-30 RX ADMIN — Medication 1 G: at 00:29

## 2019-07-30 RX ADMIN — PREDNISONE 20 MG: 20 TABLET ORAL at 08:47

## 2019-07-30 RX ADMIN — HYDROCODONE BITARTRATE AND ACETAMINOPHEN 1 TABLET: 10; 325 TABLET ORAL at 12:59

## 2019-07-30 RX ADMIN — GABAPENTIN 600 MG: 600 TABLET, FILM COATED ORAL at 08:47

## 2019-07-30 RX ADMIN — IPRATROPIUM BROMIDE 0.5 MG: 0.5 SOLUTION RESPIRATORY (INHALATION) at 10:29

## 2019-07-30 RX ADMIN — HYDROCODONE BITARTRATE AND ACETAMINOPHEN 1 TABLET: 10; 325 TABLET ORAL at 06:47

## 2019-07-30 RX ADMIN — ALPRAZOLAM 1 MG: 1 TABLET ORAL at 05:30

## 2019-07-30 RX ADMIN — Medication 10 ML: at 11:39

## 2019-07-30 RX ADMIN — HYDROCODONE BITARTRATE AND ACETAMINOPHEN 1 TABLET: 10; 325 TABLET ORAL at 00:40

## 2019-07-30 ASSESSMENT — PAIN SCALES - GENERAL
PAINLEVEL_OUTOF10: 6
PAINLEVEL_OUTOF10: 9
PAINLEVEL_OUTOF10: 4
PAINLEVEL_OUTOF10: 0

## 2019-07-30 ASSESSMENT — ENCOUNTER SYMPTOMS
WHEEZING: 1
COUGH: 0
GASTROINTESTINAL NEGATIVE: 1
CHEST TIGHTNESS: 0
COLOR CHANGE: 0
SHORTNESS OF BREATH: 1
SORE THROAT: 0
TROUBLE SWALLOWING: 0

## 2019-07-30 NOTE — PROGRESS NOTES
problems either stable, failing to improve or worsenin. Hypokalemia     Recommend/plan:   Ok to discharge from pulmonology standpoint. Follow up in one month. Taper prednisone. Electronically signed by Amber Springer on 19 at 11:18 AM    Physician Substantive portion:  She is better. Wants to go home. Says she hasn't felt better in years and she can breathe. Lung show diminished breath sounds, no accessory muscle use. Plan ok to home with outpatient follow up.  preliminary bronch results unrevealing. I have seen and examined patient personally, performing a face-to-face diagnostic evaluation with plan of care reviewed and developed with APRN and nursing staff. I have addended and/or modified the above history of present illness, physical examination, and assessment and plan to reflect my findings and impressions. Essential elements of the care plan were discussed with APRN above. Agree with findings and assessment/plan as documented above.     Electronically signed by Hilda Vasquez on 2019, 9:20 PM

## 2019-07-30 NOTE — DISCHARGE SUMMARY
reconstructions in the coronal and  sagittal planes were provided.     FINDINGS:    Pulmonary arteries: There is adequate enhancement of the pulmonary  arteries to evaluate for central and segmental pulmonary emboli. There  are no filling defects within the main, lobar, segmental or visualized  subsegmental pulmonary arteries. The pulmonary vessels are within  normal limits for size. Aorta and great vessels: The aorta is well opacified and demonstrates  no aneurysm, stenosis or dissection. The great vessels are normal in  appearance. No coronary artery calcifications are visualized. Neck base: The imaged portion of the base of the neck appears  unremarkable. Lungs: Right lung is clear. In the left lower lobe a 13 mm irregular  densities present. With a history of hemoptysis a neoplasm should be  excluded. . The trachea and bronchial tree are patent. Heart: The heart is normal in size. There is no pericardial effusion. Lymph nodes: No pathologically enlarged mediastinal, hilar, or  axillary lymph nodes are present. Bones and soft tissues: The osseous structures of the thorax and  surrounding soft tissues demonstrate no acute process. Upper abdomen: The imaged portion of the upper abdomen demonstrates no  acute process.      Impression:       1. There is no evidence of embolic disease. 2. On image 32 in the left lower lobe irregular 13 mm density is  present. A neoplasm should be excluded. Follow-up with either  bronchoscopy or possibly PET CT scan. .  Signed by Dr Francy Quispe on 7/24/2019 9:09 PM     CT Head WO Contrast [[id number]] Resulted: 07/24/19 2105     Order Status: Completed Updated: 07/24/19 2107     Narrative:       EXAMINATION: CT HEAD WO CONTRAST 7/24/2019 9:03 PM  HISTORY: CT BRAIN without contrast dated 7/24/2019 7:30 PM  HISTORY: Headache  COMPARISON: July 10, 2018   DOSE LENGTH PRODUCT: 652 mGy cm  TECHNIQUE: Serial axial tomographic images of the brain were obtained  without the use of

## 2019-07-31 LAB
ANAEROBIC CULTURE: NORMAL
CSF CULTURE: NORMAL
CULTURE, RESPIRATORY: ABNORMAL
CULTURE, RESPIRATORY: ABNORMAL
GRAM STAIN RESULT: ABNORMAL
GRAM STAIN RESULT: NORMAL
ORGANISM: ABNORMAL

## 2019-08-01 ENCOUNTER — TELEPHONE (OUTPATIENT)
Dept: INTERNAL MEDICINE | Age: 64
End: 2019-08-01

## 2019-08-06 ENCOUNTER — OFFICE VISIT (OUTPATIENT)
Dept: PRIMARY CARE CLINIC | Age: 64
End: 2019-08-06
Payer: MEDICARE

## 2019-08-06 VITALS
SYSTOLIC BLOOD PRESSURE: 116 MMHG | HEART RATE: 82 BPM | DIASTOLIC BLOOD PRESSURE: 70 MMHG | OXYGEN SATURATION: 98 % | TEMPERATURE: 96.1 F | HEIGHT: 67 IN | BODY MASS INDEX: 21.79 KG/M2 | WEIGHT: 138.8 LBS

## 2019-08-06 DIAGNOSIS — J18.9 PNEUMONIA DUE TO INFECTIOUS ORGANISM, UNSPECIFIED LATERALITY, UNSPECIFIED PART OF LUNG: ICD-10-CM

## 2019-08-06 DIAGNOSIS — Z09 HOSPITAL DISCHARGE FOLLOW-UP: Primary | ICD-10-CM

## 2019-08-06 DIAGNOSIS — R04.2 HEMOPTYSIS: ICD-10-CM

## 2019-08-06 PROCEDURE — 99496 TRANSJ CARE MGMT HIGH F2F 7D: CPT | Performed by: NURSE PRACTITIONER

## 2019-08-06 PROCEDURE — 1111F DSCHRG MED/CURRENT MED MERGE: CPT | Performed by: NURSE PRACTITIONER

## 2019-08-06 NOTE — PROGRESS NOTES
Chief Complaint   Patient presents with    Follow-Up from Hospital     no longer coughing up blood        Post-Discharge Transitional Care Management Services or Hospital Follow Up      Velasco Banner   YOB: 1955    Date of Office Visit:  8/6/2019  Date of Hospital Admission: 7/24/19  Date of Hospital Discharge: 7/30/19 at [facility]  Readmission Risk Score(high >=14%. Medium >=10%):Readmission Risk Score: 13      Care management risk score Rising risk (score 2-5) and Complex Care (Scores >=6): 6     Non face to face  following discharge, date last encounter closed (first attempt may have been earlier): 8/1/2019  3:05 PM 8/1/2019  3:05 PM    Call initiated 2 business days of discharge: Yes     Patient Active Problem List   Diagnosis    Restless legs syndrome    Hypothyroidism    Anxiety    Depression    COPD with acute exacerbation ([address]    Hyperlipidemia    Restless legs syndrome    Migraines    Smoking addiction    Vitamin D deficiency    Renal cyst    Obstructive sleep apnea    CPAP (continuous positive airway pressure) dependence    Primary insomnia    Sepsis ([address]    Transaminitis    Acute hypoxemic respiratory failure (HCC)    Hemoptysis       Allergies   Allergen Reactions    Codeine Shortness Of Breath       Medications listed as ordered at the time of discharge from hospital   [facility] Medication Instructions LINO:    Printed on:08/06/19 5407   Medication Information                      albuterol (PROVENTIL) (5 MG/ML) 0.5% nebulizer solution  Take 1 mL by nebulization 4 times daily as needed for Wheezing             alendronate (FOSAMAX) 35 MG tablet  TAKE 1 TABLET ONCE A WEEK. TAKE WITH 8OZ WATER 30 MIN BEFORE FOOD OR OTHER MEDS. DON'T LIE DOWN FOR 30 MIN.              Cholecalciferol (VITAMIN D3) 45021 UNITS CAPS  Take 1 capsule by mouth once a week             citalopram (CELEXA) 40 MG tablet  TAKE 1 TABLET BY MOUTH ONCE DAILY

## 2019-08-11 ASSESSMENT — ENCOUNTER SYMPTOMS
SHORTNESS OF BREATH: 1
WHEEZING: 0
NAUSEA: 0
SINUS PRESSURE: 0
SINUS PAIN: 0
VOMITING: 0
DIARRHEA: 0
COUGH: 0

## 2019-08-26 DIAGNOSIS — F32.A DEPRESSION, UNSPECIFIED DEPRESSION TYPE: ICD-10-CM

## 2019-08-26 RX ORDER — CITALOPRAM 40 MG/1
TABLET ORAL
Qty: 30 TABLET | Refills: 1 | Status: SHIPPED | OUTPATIENT
Start: 2019-08-26 | End: 2019-12-19

## 2019-09-02 LAB
FUNGUS (MYCOLOGY) CULTURE: ABNORMAL
KOH PREP: ABNORMAL
ORGANISM: ABNORMAL

## 2019-09-11 LAB
AFB CULTURE (MYCOBACTERIA): NORMAL
AFB SMEAR: NORMAL

## 2019-09-26 DIAGNOSIS — G25.81 RESTLESS LEGS SYNDROME: ICD-10-CM

## 2019-09-26 DIAGNOSIS — F32.A DEPRESSION, UNSPECIFIED DEPRESSION TYPE: ICD-10-CM

## 2019-09-26 RX ORDER — MODAFINIL 200 MG/1
TABLET ORAL
Qty: 60 TABLET | Refills: 1 | Status: SHIPPED | OUTPATIENT
Start: 2019-09-26 | End: 2019-11-26 | Stop reason: SDUPTHER

## 2019-10-15 ENCOUNTER — OFFICE VISIT (OUTPATIENT)
Dept: URGENT CARE | Age: 64
End: 2019-10-15
Payer: MEDICARE

## 2019-10-15 VITALS
OXYGEN SATURATION: 93 % | HEIGHT: 67 IN | SYSTOLIC BLOOD PRESSURE: 139 MMHG | TEMPERATURE: 98.5 F | RESPIRATION RATE: 16 BRPM | HEART RATE: 84 BPM | WEIGHT: 141 LBS | DIASTOLIC BLOOD PRESSURE: 78 MMHG | BODY MASS INDEX: 22.13 KG/M2

## 2019-10-15 DIAGNOSIS — J20.9 ACUTE BRONCHITIS, UNSPECIFIED ORGANISM: Primary | ICD-10-CM

## 2019-10-15 PROCEDURE — 1036F TOBACCO NON-USER: CPT | Performed by: NURSE PRACTITIONER

## 2019-10-15 PROCEDURE — G8427 DOCREV CUR MEDS BY ELIG CLIN: HCPCS | Performed by: NURSE PRACTITIONER

## 2019-10-15 PROCEDURE — 99213 OFFICE O/P EST LOW 20 MIN: CPT | Performed by: NURSE PRACTITIONER

## 2019-10-15 PROCEDURE — G8484 FLU IMMUNIZE NO ADMIN: HCPCS | Performed by: NURSE PRACTITIONER

## 2019-10-15 PROCEDURE — G8420 CALC BMI NORM PARAMETERS: HCPCS | Performed by: NURSE PRACTITIONER

## 2019-10-15 PROCEDURE — 96372 THER/PROPH/DIAG INJ SC/IM: CPT | Performed by: NURSE PRACTITIONER

## 2019-10-15 PROCEDURE — 3017F COLORECTAL CA SCREEN DOC REV: CPT | Performed by: NURSE PRACTITIONER

## 2019-10-15 RX ORDER — AZITHROMYCIN 250 MG/1
TABLET, FILM COATED ORAL
Qty: 1 PACKET | Refills: 0 | Status: SHIPPED | OUTPATIENT
Start: 2019-10-15 | End: 2019-10-25

## 2019-10-15 RX ORDER — BENZONATATE 100 MG/1
100 CAPSULE ORAL 3 TIMES DAILY PRN
Qty: 21 CAPSULE | Refills: 0 | Status: SHIPPED | OUTPATIENT
Start: 2019-10-15 | End: 2019-10-22

## 2019-10-15 RX ORDER — DEXAMETHASONE SODIUM PHOSPHATE 10 MG/ML
5 INJECTION INTRAMUSCULAR; INTRAVENOUS ONCE
Status: COMPLETED | OUTPATIENT
Start: 2019-10-15 | End: 2019-10-15

## 2019-10-15 RX ADMIN — DEXAMETHASONE SODIUM PHOSPHATE 5 MG: 10 INJECTION INTRAMUSCULAR; INTRAVENOUS at 13:23

## 2019-10-15 ASSESSMENT — ENCOUNTER SYMPTOMS
COUGH: 1
SHORTNESS OF BREATH: 0
SORE THROAT: 0
SINUS PRESSURE: 0

## 2019-10-23 DIAGNOSIS — G62.9 NEUROPATHY: ICD-10-CM

## 2019-10-24 DIAGNOSIS — G62.9 NEUROPATHY: ICD-10-CM

## 2019-10-24 RX ORDER — GABAPENTIN 600 MG/1
TABLET ORAL
Qty: 90 TABLET | Refills: 0 | Status: SHIPPED | OUTPATIENT
Start: 2019-10-24 | End: 2019-10-25

## 2019-10-25 RX ORDER — GABAPENTIN 600 MG/1
TABLET ORAL
Qty: 90 TABLET | Refills: 0 | Status: SHIPPED | OUTPATIENT
Start: 2019-10-25 | End: 2019-12-23

## 2019-11-26 DIAGNOSIS — F32.A DEPRESSION, UNSPECIFIED DEPRESSION TYPE: ICD-10-CM

## 2019-11-26 DIAGNOSIS — G25.81 RESTLESS LEGS SYNDROME: ICD-10-CM

## 2019-11-26 RX ORDER — MODAFINIL 200 MG/1
TABLET ORAL
Qty: 60 TABLET | Refills: 0 | Status: SHIPPED | OUTPATIENT
Start: 2019-11-26 | End: 2019-12-23

## 2019-12-18 DIAGNOSIS — F32.A DEPRESSION, UNSPECIFIED DEPRESSION TYPE: ICD-10-CM

## 2019-12-19 RX ORDER — CITALOPRAM 40 MG/1
TABLET ORAL
Qty: 30 TABLET | Refills: 0 | Status: SHIPPED | OUTPATIENT
Start: 2019-12-19 | End: 2020-01-21

## 2019-12-23 DIAGNOSIS — G62.9 NEUROPATHY: ICD-10-CM

## 2019-12-23 DIAGNOSIS — F32.A DEPRESSION, UNSPECIFIED DEPRESSION TYPE: ICD-10-CM

## 2019-12-23 DIAGNOSIS — G25.81 RESTLESS LEGS SYNDROME: ICD-10-CM

## 2019-12-23 RX ORDER — MODAFINIL 200 MG/1
TABLET ORAL
Qty: 60 TABLET | Refills: 0 | OUTPATIENT
Start: 2019-12-23 | End: 2020-01-21 | Stop reason: SDUPTHER

## 2019-12-23 RX ORDER — GABAPENTIN 600 MG/1
TABLET ORAL
Qty: 90 TABLET | Refills: 0 | OUTPATIENT
Start: 2019-12-23 | End: 2020-01-21 | Stop reason: SDUPTHER

## 2020-01-21 RX ORDER — CITALOPRAM 40 MG/1
TABLET ORAL
Qty: 30 TABLET | Refills: 0 | Status: SHIPPED | OUTPATIENT
Start: 2020-01-21 | End: 2020-02-24 | Stop reason: SDUPTHER

## 2020-01-21 RX ORDER — LEVOTHYROXINE SODIUM 112 UG/1
TABLET ORAL
Qty: 30 TABLET | Refills: 0 | Status: SHIPPED | OUTPATIENT
Start: 2020-01-21 | End: 2020-02-24 | Stop reason: SDUPTHER

## 2020-01-21 RX ORDER — MODAFINIL 200 MG/1
TABLET ORAL
Qty: 60 TABLET | Refills: 0 | OUTPATIENT
Start: 2020-01-21

## 2020-01-21 RX ORDER — GABAPENTIN 600 MG/1
TABLET ORAL
Qty: 90 TABLET | Refills: 0 | Status: SHIPPED | OUTPATIENT
Start: 2020-01-21 | End: 2020-02-24

## 2020-01-21 RX ORDER — MODAFINIL 200 MG/1
TABLET ORAL
Qty: 60 TABLET | Refills: 0 | Status: SHIPPED | OUTPATIENT
Start: 2020-01-21 | End: 2020-02-20

## 2020-01-21 RX ORDER — ROPINIROLE 0.25 MG/1
TABLET, FILM COATED ORAL
Qty: 30 TABLET | Refills: 0 | Status: SHIPPED | OUTPATIENT
Start: 2020-01-21 | End: 2020-02-24 | Stop reason: SDUPTHER

## 2020-01-21 RX ORDER — MIRTAZAPINE 15 MG/1
TABLET, FILM COATED ORAL
Qty: 30 TABLET | Refills: 0 | Status: SHIPPED | OUTPATIENT
Start: 2020-01-21 | End: 2020-02-24 | Stop reason: SDUPTHER

## 2020-01-21 NOTE — TELEPHONE ENCOUNTER
tablet 0     Sig: Take one tablet TID    modafinil (PROVIGIL) 200 MG tablet 60 tablet 0     Sig: TAKE 1 TABLET BY MOUTH TWICE DAILY. Please approve or refuse this medication.    Juju Escalante LPN

## 2020-01-28 ENCOUNTER — OFFICE VISIT (OUTPATIENT)
Dept: PRIMARY CARE CLINIC | Age: 65
End: 2020-01-28
Payer: MEDICARE

## 2020-01-28 VITALS
BODY MASS INDEX: 21.19 KG/M2 | SYSTOLIC BLOOD PRESSURE: 120 MMHG | HEART RATE: 84 BPM | WEIGHT: 135 LBS | HEIGHT: 67 IN | TEMPERATURE: 99 F | RESPIRATION RATE: 20 BRPM | OXYGEN SATURATION: 94 % | DIASTOLIC BLOOD PRESSURE: 72 MMHG

## 2020-01-28 PROCEDURE — 3023F SPIROM DOC REV: CPT | Performed by: NURSE PRACTITIONER

## 2020-01-28 PROCEDURE — 1036F TOBACCO NON-USER: CPT | Performed by: NURSE PRACTITIONER

## 2020-01-28 PROCEDURE — G8482 FLU IMMUNIZE ORDER/ADMIN: HCPCS | Performed by: NURSE PRACTITIONER

## 2020-01-28 PROCEDURE — G8427 DOCREV CUR MEDS BY ELIG CLIN: HCPCS | Performed by: NURSE PRACTITIONER

## 2020-01-28 PROCEDURE — G8420 CALC BMI NORM PARAMETERS: HCPCS | Performed by: NURSE PRACTITIONER

## 2020-01-28 PROCEDURE — G0008 ADMIN INFLUENZA VIRUS VAC: HCPCS | Performed by: NURSE PRACTITIONER

## 2020-01-28 PROCEDURE — 99214 OFFICE O/P EST MOD 30 MIN: CPT | Performed by: NURSE PRACTITIONER

## 2020-01-28 PROCEDURE — 90686 IIV4 VACC NO PRSV 0.5 ML IM: CPT | Performed by: NURSE PRACTITIONER

## 2020-01-28 PROCEDURE — G8926 SPIRO NO PERF OR DOC: HCPCS | Performed by: NURSE PRACTITIONER

## 2020-01-28 PROCEDURE — 3017F COLORECTAL CA SCREEN DOC REV: CPT | Performed by: NURSE PRACTITIONER

## 2020-01-28 NOTE — PROGRESS NOTES
BRONCHOSCOPY BIOPSY BRONCHUS performed by Adrianna Salcido MD at [facility]    CHOLECYSTECTOMY  age 27       Social History     Tobacco Use    Smoking status: Former Smoker     Packs/day: 0.00     Years: 37.00     Pack years: 0.00     Last attempt to quit: 2/3/2014     Years since quittin.9    Smokeless tobacco: Never Used   Substance Use Topics    Alcohol use: No        Current Outpatient Medications   Medication Sig Dispense Refill    mirtazapine (REMERON) 15 MG tablet TAKE 1 TABLET BY MOUTH AT BEDTIME 30 tablet 0    levothyroxine (SYNTHROID) 112 MCG tablet TAKE 1 TABLET BY MOUTH ONCE DAILY. 30 tablet 0    rOPINIRole (REQUIP) 0.25 MG tablet TAKE 1 TABLET BY MOUTH AT BEDTIME 30 tablet 0    gabapentin (NEURONTIN) 600 MG tablet Take one tablet TID 90 tablet 0    modafinil (PROVIGIL) 200 MG tablet TAKE 1 TABLET BY MOUTH TWICE DAILY. 60 tablet 0    VENTOLIN  (90 Base) MCG/ACT inhaler USE TWO PUFFS BY MOUTH AS NEEDED FOR WHEEZING 18 g 5    Multiple Vitamin (MULTI-VITAMIN DAILY PO) Take by mouth      albuterol (PROVENTIL) (5 MG/ML) 0.5% nebulizer solution Take 1 mL by nebulization 4 times daily as needed for Wheezing 120 each 3    alendronate (FOSAMAX) 35 MG tablet TAKE 1 TABLET ONCE A WEEK. TAKE WITH 8OZ WATER 30 MIN BEFORE FOOD OR OTHER MEDS. DON'T LIE DOWN FOR 30 MIN. 4 tablet 6    esomeprazole (NEXIUM) 20 MG delayed release capsule TAKE 1 CAPSULE BEFORE BREAKFAST DAILY 30 capsule 6    Cholecalciferol (VITAMIN D3) 75379 UNITS CAPS Take 1 capsule by mouth once a week 12 capsule 3    HYDROcodone-acetaminophen (NORCO)  MG per tablet Take 1 tablet by mouth every 6 hours as needed       OXYGEN Inhale into the lungs as needed       tiZANidine (ZANAFLEX) 4 MG tablet Take 4 mg by mouth every 8 hours as needed       citalopram (CELEXA) 40 MG tablet TAKE 1 TABLET BY MOUTH ONCE DAILY 30 tablet 0     No current facility-administered medications for this visit.         Allergies   Allergen neck supple. Cardiovascular:      Rate and Rhythm: Normal rate and regular rhythm. Pulses: Normal pulses. Heart sounds: Normal heart sounds. No murmur. Pulmonary:      Effort: Pulmonary effort is normal. No respiratory distress. Breath sounds: Normal breath sounds. No stridor. No wheezing, rhonchi or rales. Abdominal:      General: Bowel sounds are normal. There is no distension. Palpations: Abdomen is soft. Tenderness: There is no abdominal tenderness. Musculoskeletal:      Lumbar back: She exhibits decreased range of motion. Right lower leg: No edema. Left lower leg: No edema. Comments: Slow to rise from sitting due to pain. Lymphadenopathy:      Cervical: No cervical adenopathy. Skin:     General: Skin is warm and dry. Capillary Refill: Capillary refill takes less than 2 seconds. Findings: No rash. Neurological:      General: No focal deficit present. Mental Status: She is alert and oriented to person, place, and time. Mental status is at baseline. Psychiatric:         Mood and Affect: Mood normal.         Behavior: Behavior normal.         /72   Pulse 84   Temp 99 °F (37.2 °C) (Temporal)   Resp 20   Ht 5' 6.5\" (1.689 m)   Wt 135 lb (61.2 kg)   SpO2 94%   BMI 21.46 kg/m²     Assessment:      Diagnosis Orders   1. Hypothyroidism, unspecified type  TSH WITH REFLEX TO FT4    CBC Auto Differential    Comprehensive Metabolic Panel   2. Age-related osteoporosis without current pathological fracture  DEXA BONE DENSITY AXIAL SKELETON   3. Depression, unspecified depression type     4. Chronic obstructive pulmonary disease, unspecified COPD type ([facility] [address]     5. Renal mass  US RENAL COMPLETE   6. Restless legs syndrome     7. Encounter for screening mammogram for malignant neoplasm of breast   KRISTA DIGITAL SCREEN W CAD BILATERAL   8. Screening for breast cancer  KRISTA DIGITAL SCREEN W CAD BILATERAL   9. Neuropathy     10.  Excessive daytime sleepiness     11. Family history of renal cell carcinoma     12. Screening for hyperlipidemia  Lipid, Fasting   13. Chronic pain syndrome         No results found for this visit on 01/28/20. Plan:     Follow-up with specialist as scheduled. Mammogram ordered. Renal ultrasound ordered due to family history and history of renal cyst.    Bone density testing. Patient is getting these test done at [facility]. Check TSH, CBC, CMP, and lipid panel while fasting. Meds refilled. Flu vaccine given in office without reaction. Return in about 3 months (around 4/28/2020), or if symptoms worsen or fail to improve, for Physical- AWV, health  maintenance. Orders Placed This Encounter   Procedures    US RENAL COMPLETE     Standing Status:   Future     Standing Expiration Date:   1/28/2021     Scheduling Instructions:      [facility] done at [facility]     Order Specific Question:   Reason for exam:     Answer:   renal mass    KRISTA DIGITAL SCREEN W CAD BILATERAL     Standing Status:   Future     Standing Expiration Date:   3/28/2021     Order Specific Question:   Reason for exam:     Answer:   screen    DEXA BONE DENSITY AXIAL SKELETON     Standing Status:   Future     Standing Expiration Date:   1/28/2021     Order Specific Question:   Reason for exam:     Answer:   on fosamax    INFLUENZA, QUADV, 3 YRS AND OLDER, IM PF, PREFILL SYR OR SDV, 0.5ML (AFLURIA QUADV, PF)    TSH WITH REFLEX TO FT4     Standing Status:   Future     Standing Expiration Date:   1/28/2021    CBC Auto Differential     Standing Status:   Future     Standing Expiration Date:   1/28/2021    Comprehensive Metabolic Panel     Standing Status:   Future     Standing Expiration Date:   1/28/2021    Lipid, Fasting     Standing Status:   Future     Standing Expiration Date:   1/28/2021       No orders of the defined types were placed in this encounter.        Patient offered educational materials - see patient instructions for any instruction

## 2020-01-30 PROBLEM — Z80.51 FAMILY HISTORY OF RENAL CELL CARCINOMA: Status: ACTIVE | Noted: 2020-01-30

## 2020-01-30 PROBLEM — G62.9 NEUROPATHY: Status: ACTIVE | Noted: 2020-01-30

## 2020-01-30 PROBLEM — G89.4 CHRONIC PAIN SYNDROME: Status: ACTIVE | Noted: 2020-01-30

## 2020-01-30 PROBLEM — G47.19 EXCESSIVE DAYTIME SLEEPINESS: Status: ACTIVE | Noted: 2020-01-30

## 2020-01-30 PROBLEM — M81.0 AGE-RELATED OSTEOPOROSIS WITHOUT CURRENT PATHOLOGICAL FRACTURE: Status: ACTIVE | Noted: 2020-01-30

## 2020-01-30 ASSESSMENT — ENCOUNTER SYMPTOMS
NAUSEA: 0
WHEEZING: 0
SHORTNESS OF BREATH: 0
VOMITING: 0
ABDOMINAL PAIN: 0
COUGH: 0
SINUS PRESSURE: 0
DIARRHEA: 0
EYE PAIN: 0
BACK PAIN: 1
SINUS PAIN: 0

## 2020-02-24 RX ORDER — LEVOTHYROXINE SODIUM 112 UG/1
TABLET ORAL
Qty: 30 TABLET | Refills: 3 | Status: SHIPPED | OUTPATIENT
Start: 2020-02-24 | End: 2020-06-28

## 2020-02-24 RX ORDER — CITALOPRAM 40 MG/1
TABLET ORAL
Qty: 30 TABLET | Refills: 0 | Status: SHIPPED | OUTPATIENT
Start: 2020-02-24 | End: 2020-03-24

## 2020-02-24 RX ORDER — MIRTAZAPINE 15 MG/1
TABLET, FILM COATED ORAL
Qty: 30 TABLET | Refills: 3 | Status: SHIPPED | OUTPATIENT
Start: 2020-02-24 | End: 2020-06-28

## 2020-02-24 RX ORDER — ROPINIROLE 0.25 MG/1
TABLET, FILM COATED ORAL
Qty: 30 TABLET | Refills: 3 | Status: SHIPPED | OUTPATIENT
Start: 2020-02-24 | End: 2020-05-28

## 2020-02-24 RX ORDER — ALBUTEROL SULFATE 90 UG/1
AEROSOL, METERED RESPIRATORY (INHALATION)
Qty: 18 G | Refills: 5 | Status: SHIPPED | OUTPATIENT
Start: 2020-02-24 | End: 2020-12-01

## 2020-02-24 RX ORDER — ALENDRONATE SODIUM 35 MG/1
TABLET ORAL
Qty: 4 TABLET | Refills: 6 | Status: SHIPPED | OUTPATIENT
Start: 2020-02-24 | End: 2022-05-03

## 2020-02-24 NOTE — TELEPHONE ENCOUNTER
Alea Landa called to request a refill on her medication. Last office visit : 1/28/2020   Next office visit : 5/1/2020     Last UDS:   Amphetamine Screen, Urine   Date Value Ref Range Status   10/08/2018 neg  Final     Barbiturate Screen, Urine   Date Value Ref Range Status   10/08/2018 POS  Final     Benzodiazepine Screen, Urine   Date Value Ref Range Status   10/08/2018 neg  Final     Buprenorphine Urine   Date Value Ref Range Status   10/08/2018 neg  Final     Cocaine Metabolite Screen, Urine   Date Value Ref Range Status   10/08/2018 neg  Final     Gabapentin Screen, Urine   Date Value Ref Range Status   10/08/2018 neg  Final     Methamphetamine, Urine   Date Value Ref Range Status   10/08/2018 neg  Final     Opiate Scrn, Ur   Date Value Ref Range Status   10/08/2018 POS  Final     Oxycodone Screen, Ur   Date Value Ref Range Status   10/08/2018 POS  Final     PCP Screen, Urine   Date Value Ref Range Status   10/08/2018 neg  Final     Propoxyphene Screen, Urine   Date Value Ref Range Status   10/08/2018 neg  Final     THC Screen, Urine   Date Value Ref Range Status   10/08/2018 neg  Final     Tricyclic Antidepressants, Urine   Date Value Ref Range Status   10/08/2018 neg  Final       Last Gloria Juarez:   Medication Contract: needs med contract put in next appt note  Last Fill: 01-21-20    Requested Prescriptions     Pending Prescriptions Disp Refills    gabapentin (NEURONTIN) 600 MG tablet [Pharmacy Med Name: GABAPENTIN 600MG TAB] 90 tablet 0     Sig: TAKE 1 TABLET BY MOUTH THREE TIMES DAILY. Please approve or refuse this medication.    Gladys Beasley MA

## 2020-02-25 RX ORDER — GABAPENTIN 600 MG/1
TABLET ORAL
Qty: 90 TABLET | Refills: 0 | OUTPATIENT
Start: 2020-02-25 | End: 2020-03-19

## 2020-02-27 RX ORDER — MODAFINIL 200 MG/1
TABLET ORAL
Qty: 60 TABLET | Refills: 0 | Status: SHIPPED | OUTPATIENT
Start: 2020-02-27 | End: 2020-03-27

## 2020-03-19 RX ORDER — GABAPENTIN 600 MG/1
TABLET ORAL
Qty: 90 TABLET | Refills: 2 | Status: SHIPPED | OUTPATIENT
Start: 2020-03-25 | End: 2020-06-28

## 2020-03-19 NOTE — TELEPHONE ENCOUNTER
Ruddy Stallion called to request a refill on her medication. Last office visit : 1/28/2020   Next office visit : 5/1/2020     Last UDS:   Amphetamine Screen, Urine   Date Value Ref Range Status   10/08/2018 neg  Final     Barbiturate Screen, Urine   Date Value Ref Range Status   10/08/2018 POS  Final     Benzodiazepine Screen, Urine   Date Value Ref Range Status   10/08/2018 neg  Final     Buprenorphine Urine   Date Value Ref Range Status   10/08/2018 neg  Final     Cocaine Metabolite Screen, Urine   Date Value Ref Range Status   10/08/2018 neg  Final     Gabapentin Screen, Urine   Date Value Ref Range Status   10/08/2018 neg  Final     Methamphetamine, Urine   Date Value Ref Range Status   10/08/2018 neg  Final     Opiate Scrn, Ur   Date Value Ref Range Status   10/08/2018 POS  Final     Oxycodone Screen, Ur   Date Value Ref Range Status   10/08/2018 POS  Final     PCP Screen, Urine   Date Value Ref Range Status   10/08/2018 neg  Final     Propoxyphene Screen, Urine   Date Value Ref Range Status   10/08/2018 neg  Final     THC Screen, Urine   Date Value Ref Range Status   10/08/2018 neg  Final     Tricyclic Antidepressants, Urine   Date Value Ref Range Status   10/08/2018 neg  Final       Last [place]: 2-25  Medication Contract: 2017   Last Fill: 2-25    Requested Prescriptions     Pending Prescriptions Disp Refills    gabapentin (NEURONTIN) 600 MG tablet [Pharmacy Med Name: GABAPENTIN 600MG TAB] 90 tablet 0     Sig: TAKE 1 TABLET BY MOUTH THREE TIMES DAILY. Please approve or refuse this medication.    Tyrone Inman LPN

## 2020-03-25 PROBLEM — G25.81 RESTLESS LEGS SYNDROME: Status: RESOLVED | Noted: 2020-03-25 | Resolved: 2020-03-24

## 2020-03-27 RX ORDER — MODAFINIL 200 MG/1
TABLET ORAL
Qty: 60 TABLET | Refills: 0 | Status: SHIPPED | OUTPATIENT
Start: 2020-03-27 | End: 2020-03-27 | Stop reason: SDUPTHER

## 2020-03-29 RX ORDER — MODAFINIL 200 MG/1
TABLET ORAL
Qty: 60 TABLET | Refills: 0 | OUTPATIENT
Start: 2020-03-29 | End: 2020-04-26

## 2020-03-29 NOTE — TELEPHONE ENCOUNTER
Alea Landa called to request a refill on her medication. Last office visit : 1/28/2020   Next office visit : 5/1/2020     Last UDS:   Amphetamine Screen, Urine   Date Value Ref Range Status   10/08/2018 neg  Final     Barbiturate Screen, Urine   Date Value Ref Range Status   10/08/2018 POS  Final     Benzodiazepine Screen, Urine   Date Value Ref Range Status   10/08/2018 neg  Final     Buprenorphine Urine   Date Value Ref Range Status   10/08/2018 neg  Final     Cocaine Metabolite Screen, Urine   Date Value Ref Range Status   10/08/2018 neg  Final     Gabapentin Screen, Urine   Date Value Ref Range Status   10/08/2018 neg  Final     Methamphetamine, Urine   Date Value Ref Range Status   10/08/2018 neg  Final     Opiate Scrn, Ur   Date Value Ref Range Status   10/08/2018 POS  Final     Oxycodone Screen, Ur   Date Value Ref Range Status   10/08/2018 POS  Final     PCP Screen, Urine   Date Value Ref Range Status   10/08/2018 neg  Final     Propoxyphene Screen, Urine   Date Value Ref Range Status   10/08/2018 neg  Final     THC Screen, Urine   Date Value Ref Range Status   10/08/2018 neg  Final     Tricyclic Antidepressants, Urine   Date Value Ref Range Status   10/08/2018 neg  Final           Requested Prescriptions     Pending Prescriptions Disp Refills    modafinil (PROVIGIL) 200 MG tablet 60 tablet 0     Sig: Take 1 tablet by mouth twice a daily.          Rx called to pharmacy  Familia Alexander LPN

## 2020-05-06 RX ORDER — MODAFINIL 200 MG/1
TABLET ORAL
Qty: 60 TABLET | Refills: 0 | OUTPATIENT
Start: 2020-05-06

## 2020-05-06 NOTE — TELEPHONE ENCOUNTER
Rashaad Triplett called to request a refill on her medication. Last office visit : 1/28/2020   Next office visit : Visit date not found     Last UDS:   Amphetamine Screen, Urine   Date Value Ref Range Status   10/08/2018 neg  Final     Barbiturate Screen, Urine   Date Value Ref Range Status   10/08/2018 POS  Final     Benzodiazepine Screen, Urine   Date Value Ref Range Status   10/08/2018 neg  Final     Buprenorphine Urine   Date Value Ref Range Status   10/08/2018 neg  Final     Cocaine Metabolite Screen, Urine   Date Value Ref Range Status   10/08/2018 neg  Final     Gabapentin Screen, Urine   Date Value Ref Range Status   10/08/2018 neg  Final     Methamphetamine, Urine   Date Value Ref Range Status   10/08/2018 neg  Final     Opiate Scrn, Ur   Date Value Ref Range Status   10/08/2018 POS  Final     Oxycodone Screen, Ur   Date Value Ref Range Status   10/08/2018 POS  Final     PCP Screen, Urine   Date Value Ref Range Status   10/08/2018 neg  Final     Propoxyphene Screen, Urine   Date Value Ref Range Status   10/08/2018 neg  Final     THC Screen, Urine   Date Value Ref Range Status   10/08/2018 neg  Final     Tricyclic Antidepressants, Urine   Date Value Ref Range Status   10/08/2018 neg  Final           Requested Prescriptions     Pending Prescriptions Disp Refills    modafinil (PROVIGIL) 200 MG tablet [Pharmacy Med Name: MODAFINIL 200 MG TABLET] 60 tablet 0     Sig: TAKE 1 TABLET BY MOUTH TWICE DAILY.        Refused,no showed appt  Dilshad Lanier LPN

## 2020-05-07 ENCOUNTER — VIRTUAL VISIT (OUTPATIENT)
Dept: PRIMARY CARE CLINIC | Age: 65
End: 2020-05-07
Payer: MEDICARE

## 2020-05-07 PROCEDURE — G8427 DOCREV CUR MEDS BY ELIG CLIN: HCPCS | Performed by: NURSE PRACTITIONER

## 2020-05-07 PROCEDURE — 99214 OFFICE O/P EST MOD 30 MIN: CPT | Performed by: NURSE PRACTITIONER

## 2020-05-07 PROCEDURE — 3017F COLORECTAL CA SCREEN DOC REV: CPT | Performed by: NURSE PRACTITIONER

## 2020-05-07 RX ORDER — NAPROXEN 500 MG/1
500 TABLET ORAL 2 TIMES DAILY PRN
Qty: 20 TABLET | Refills: 0 | Status: SHIPPED | OUTPATIENT
Start: 2020-05-07 | End: 2022-05-03

## 2020-05-07 ASSESSMENT — ENCOUNTER SYMPTOMS
BACK PAIN: 1
EYES NEGATIVE: 1
GASTROINTESTINAL NEGATIVE: 1
RESPIRATORY NEGATIVE: 1

## 2020-05-07 NOTE — PROGRESS NOTES
2020    TELEHEALTH EVALUATION -- Audio/Visual (During XKYCW-14 public health emergency)    HPI:    Gallito Rubin (:  1955) has requested an audio/video evaluation for the following concern(s):    Fall  Patient reports she fell about 1 month ago and hit her right side chest/ ribs. Patient reports she fell down five steps and hit the stairs. Patient reports her dog pulled her down the stairs. Patient reports she is taking norco 10/325 as prescribed from Dr. Maria Guadalupe Perez at pain management. Patient reports she hasn't taken anything else for pain. Patient would like to have any imaging sent to [facility] because she has an appointment there on  for other imaging. No chest pain or shortness of breath. Review of Systems   Constitutional: Negative. HENT: Negative. Eyes: Negative. Respiratory: Negative. Cardiovascular: Negative. Gastrointestinal: Negative. Genitourinary: Negative. Musculoskeletal: Positive for arthralgias, back pain and myalgias. Skin: Negative. Allergic/Immunologic: Positive for environmental allergies. Neurological: Negative. Hematological: Negative. Psychiatric/Behavioral: Negative. Prior to Visit Medications    Medication Sig Taking? Authorizing Provider   naproxen (NAPROSYN) 500 MG tablet Take 1 tablet by mouth 2 times daily as needed for Pain Yes ERIN Rodas - NP   citalopram (CELEXA) 40 MG tablet TAKE 1 TABLET BY MOUTH ONCE DAILY Yes ERIN Escobar   gabapentin (NEURONTIN) 600 MG tablet TAKE 1 TABLET BY MOUTH THREE TIMES DAILY.  Yes ERIN Escobar   esomeprazole (NEXIUM) 20 MG delayed release capsule TAKE 1 CAPSULE BEFORE BREAKFAST DAILY Yes ERIN Escobar   albuterol sulfate HFA (VENTOLIN HFA) 108 (90 Base) MCG/ACT inhaler USE TWO PUFFS BY MOUTH AS NEEDED FOR WHEEZING Yes ERIN Escobar   rOPINIRole (REQUIP) 0.25 MG tablet TAKE 1 TABLET BY MOUTH AT BEDTIME Yes ERIN Escobar   levothyroxine (SYNTHROID) 112

## 2020-05-08 RX ORDER — MODAFINIL 200 MG/1
TABLET ORAL
Qty: 60 TABLET | Refills: 0 | Status: CANCELLED | OUTPATIENT
Start: 2020-05-08 | End: 2020-06-08

## 2020-05-08 RX ORDER — MODAFINIL 200 MG/1
TABLET ORAL
Qty: 60 TABLET | Refills: 0 | OUTPATIENT
Start: 2020-05-08 | End: 2020-05-08 | Stop reason: CLARIF

## 2020-05-12 RX ORDER — MODAFINIL 200 MG/1
200 TABLET ORAL 2 TIMES DAILY
Qty: 60 TABLET | Refills: 0 | OUTPATIENT
Start: 2020-05-12 | End: 2020-06-09

## 2020-05-28 ENCOUNTER — TELEPHONE (OUTPATIENT)
Dept: PRIMARY CARE CLINIC | Age: 65
End: 2020-05-28

## 2020-05-28 RX ORDER — ROPINIROLE 0.25 MG/1
TABLET, FILM COATED ORAL
Qty: 30 TABLET | Refills: 0 | Status: SHIPPED | OUTPATIENT
Start: 2020-05-28 | End: 2020-07-02

## 2020-05-28 NOTE — TELEPHONE ENCOUNTER
Patient called for xray,bone density results  Informed have bone density but will need to call for other results  Severo Abarca results

## 2020-05-29 RX ORDER — NAPROXEN 500 MG/1
500 TABLET ORAL 2 TIMES DAILY WITH MEALS
Qty: 60 TABLET | Refills: 0 | Status: SHIPPED | OUTPATIENT
Start: 2020-05-29 | End: 2020-07-02

## 2020-05-29 RX ORDER — ALBUTEROL SULFATE 2.5 MG/3ML
SOLUTION RESPIRATORY (INHALATION)
Qty: 75 ML | Refills: 0 | Status: SHIPPED | OUTPATIENT
Start: 2020-05-29 | End: 2021-03-30

## 2020-06-01 ENCOUNTER — TELEPHONE (OUTPATIENT)
Dept: PRIMARY CARE CLINIC | Age: 65
End: 2020-06-01

## 2020-06-01 NOTE — TELEPHONE ENCOUNTER
----- Message from Nacho Zaragoza, [address] - NP sent at 5/28/2020  2:14 PM CDT -----  Patient has three nondisplaced fractures of the right ribs. 5th, 6th, and 7th. Has the patient's pain improved?

## 2020-06-09 RX ORDER — MODAFINIL 200 MG/1
TABLET ORAL
Qty: 60 TABLET | Refills: 0 | OUTPATIENT
Start: 2020-06-11 | End: 2020-07-02

## 2020-06-10 ENCOUNTER — TRANSCRIBE ORDERS (OUTPATIENT)
Dept: ADMINISTRATIVE | Facility: HOSPITAL | Age: 65
End: 2020-06-10

## 2020-06-10 DIAGNOSIS — Z01.818 PREOP TESTING: Primary | ICD-10-CM

## 2020-06-28 RX ORDER — LEVOTHYROXINE SODIUM 112 UG/1
TABLET ORAL
Qty: 30 TABLET | Refills: 11 | Status: SHIPPED | OUTPATIENT
Start: 2020-06-28 | End: 2021-07-01

## 2020-06-28 RX ORDER — MIRTAZAPINE 15 MG/1
TABLET, FILM COATED ORAL
Qty: 30 TABLET | Refills: 11 | Status: SHIPPED | OUTPATIENT
Start: 2020-06-28 | End: 2021-07-01

## 2020-06-29 RX ORDER — GABAPENTIN 600 MG/1
TABLET ORAL
Qty: 90 TABLET | Refills: 5 | Status: SHIPPED | OUTPATIENT
Start: 2020-06-29 | End: 2021-01-03

## 2020-07-02 RX ORDER — NAPROXEN 500 MG/1
TABLET ORAL
Qty: 60 TABLET | Refills: 0 | Status: SHIPPED | OUTPATIENT
Start: 2020-07-02 | End: 2020-07-20 | Stop reason: SDUPTHER

## 2020-07-02 RX ORDER — ROPINIROLE 0.25 MG/1
TABLET, FILM COATED ORAL
Qty: 30 TABLET | Refills: 0 | Status: SHIPPED | OUTPATIENT
Start: 2020-07-02 | End: 2020-07-20 | Stop reason: SDUPTHER

## 2020-07-02 RX ORDER — MODAFINIL 200 MG/1
TABLET ORAL
Qty: 60 TABLET | Refills: 0 | Status: SHIPPED | OUTPATIENT
Start: 2020-07-10 | End: 2020-08-17

## 2020-07-02 NOTE — TELEPHONE ENCOUNTER
Cesar Valencia called to request a refill on her medication. Last office visit : 1/28/2020   Next office visit : 7/20/2020     Last UDS:   Amphetamine Screen, Urine   Date Value Ref Range Status   10/08/2018 neg  Final     Barbiturate Screen, Urine   Date Value Ref Range Status   10/08/2018 POS  Final     Benzodiazepine Screen, Urine   Date Value Ref Range Status   10/08/2018 neg  Final     Buprenorphine Urine   Date Value Ref Range Status   10/08/2018 neg  Final     Cocaine Metabolite Screen, Urine   Date Value Ref Range Status   10/08/2018 neg  Final     Gabapentin Screen, Urine   Date Value Ref Range Status   10/08/2018 neg  Final     Methamphetamine, Urine   Date Value Ref Range Status   10/08/2018 neg  Final     Opiate Scrn, Ur   Date Value Ref Range Status   10/08/2018 POS  Final     Oxycodone Screen, Ur   Date Value Ref Range Status   10/08/2018 POS  Final     PCP Screen, Urine   Date Value Ref Range Status   10/08/2018 neg  Final     Propoxyphene Screen, Urine   Date Value Ref Range Status   10/08/2018 neg  Final     THC Screen, Urine   Date Value Ref Range Status   10/08/2018 neg  Final     Tricyclic Antidepressants, Urine   Date Value Ref Range Status   10/08/2018 neg  Final       Last [place]: 2-25  Medication Contract: 2017   Last Fill: 6-11    Requested Prescriptions     Pending Prescriptions Disp Refills    rOPINIRole (REQUIP) 0.25 MG tablet [Pharmacy Med Name: rOPINIRole 0.25MG TAB] 30 tablet 0     Sig: TAKE 1 TABLET BY MOUTH AT BEDTIME    modafinil (PROVIGIL) 200 MG tablet [Pharmacy Med Name: MODAFINIL 200 MG TABLET] 60 tablet 0     Sig: TAKE 1 TABLET BY MOUTH TWICE DAILY.  naproxen (NAPROSYN) 500 MG tablet [Pharmacy Med Name: NAPROXEN 500MG TABLET] 60 tablet 0     Sig: TAKE 1 TABLET BY MOUTH TWICE DAILY WITH MEALS                   Please approve or refuse this medication.    Anjali Duarte LPN

## 2020-07-06 ENCOUNTER — LAB (OUTPATIENT)
Dept: LAB | Facility: HOSPITAL | Age: 65
End: 2020-07-06

## 2020-07-06 PROCEDURE — C9803 HOPD COVID-19 SPEC COLLECT: HCPCS | Performed by: PAIN MEDICINE

## 2020-07-06 PROCEDURE — U0003 INFECTIOUS AGENT DETECTION BY NUCLEIC ACID (DNA OR RNA); SEVERE ACUTE RESPIRATORY SYNDROME CORONAVIRUS 2 (SARS-COV-2) (CORONAVIRUS DISEASE [COVID-19]), AMPLIFIED PROBE TECHNIQUE, MAKING USE OF HIGH THROUGHPUT TECHNOLOGIES AS DESCRIBED BY CMS-2020-01-R: HCPCS | Performed by: PAIN MEDICINE

## 2020-07-07 LAB
COVID LABCORP PRIORITY: NORMAL
SARS-COV-2 RNA RESP QL NAA+PROBE: NOT DETECTED

## 2020-07-20 ENCOUNTER — VIRTUAL VISIT (OUTPATIENT)
Dept: PRIMARY CARE CLINIC | Age: 65
End: 2020-07-20
Payer: MEDICARE

## 2020-07-20 PROCEDURE — G8428 CUR MEDS NOT DOCUMENT: HCPCS | Performed by: NURSE PRACTITIONER

## 2020-07-20 PROCEDURE — 99214 OFFICE O/P EST MOD 30 MIN: CPT | Performed by: NURSE PRACTITIONER

## 2020-07-20 PROCEDURE — 3017F COLORECTAL CA SCREEN DOC REV: CPT | Performed by: NURSE PRACTITIONER

## 2020-07-20 RX ORDER — CITALOPRAM 40 MG/1
40 TABLET ORAL DAILY
Qty: 30 TABLET | Refills: 5 | Status: SHIPPED | OUTPATIENT
Start: 2020-07-20 | End: 2021-03-30

## 2020-07-20 RX ORDER — ROPINIROLE 0.25 MG/1
0.25 TABLET, FILM COATED ORAL NIGHTLY
Qty: 30 TABLET | Refills: 5 | Status: SHIPPED | OUTPATIENT
Start: 2020-07-20 | End: 2021-03-01

## 2020-07-20 RX ORDER — NAPROXEN 500 MG/1
500 TABLET ORAL 2 TIMES DAILY WITH MEALS
Qty: 60 TABLET | Refills: 5 | Status: SHIPPED | OUTPATIENT
Start: 2020-07-20 | End: 2021-05-02

## 2020-07-20 NOTE — PROGRESS NOTES
[address] [address]     Phone:  [phone]  Fax:  [phone]      2020    TELEHEALTH EVALUATION -- Audio/Visual (During DYHGB-86 public health emergency)    HPI:    Chief Complaint   Patient presents with    Hypothyroidism    Insomnia         George Wallis (:  1955) has requested an audio/video evaluation for the following concern(s): A follow-up on chronic conditions. She has hypothyroidism. She takes levothyroxine each morning. She has not yet been able to get labs that were previously ordered in January. She plans on getting these soon. She takes mirtazapine for sleep. She states this works well. She takes Requip for restless leg and leg cramps at night. She also takes gabapentin and naproxen for arthralgias. She states this works well. She is on Celexa for her moods. She states her moods have been stable. She is on Provigil due to daytime somnolence. She has no complaints with this. She states overall she feels well. Review of Systems   Constitutional: Negative for appetite change, fatigue and fever. HENT: Negative for congestion, sinus pressure and sinus pain. Eyes: Negative for pain and visual disturbance. Respiratory: Negative for cough, shortness of breath and wheezing. Cardiovascular: Negative for chest pain and leg swelling. Gastrointestinal: Negative for abdominal pain, diarrhea, nausea and vomiting. Endocrine: Negative for cold intolerance and heat intolerance. Genitourinary: Negative for dysuria, frequency and urgency. Musculoskeletal: Positive for arthralgias and back pain. Skin: Negative for rash and wound. Neurological: Negative for dizziness, weakness and headaches. Hematological: Negative for adenopathy. Psychiatric/Behavioral: Negative for dysphoric mood and sleep disturbance. The patient is not nervous/anxious. Prior to Visit Medications    Medication Sig Taking?  Authorizing Provider rOPINIRole (REQUIP) 0.25 MG tablet Take 1 tablet by mouth nightly Yes ERIN Escobar   naproxen (NAPROSYN) 500 MG tablet Take 1 tablet by mouth 2 times daily (with meals) Yes Abigail Bajwaanna marie ERIN   citalopram (CELEXA) 40 MG tablet Take 1 tablet by mouth daily Yes Abigail Bajwaanna marie ERIN   modafinil (PROVIGIL) 200 MG tablet TAKE 1 TABLET BY MOUTH TWICE DAILY. ERIN Escobar   gabapentin (NEURONTIN) 600 MG tablet TAKE 1 TABLET BY MOUTH THREE TIMES DAILY. ERIN Escobar   mirtazapine (REMERON) 15 MG tablet TAKE 1 TABLET BY MOUTH AT BEDTIME  ERIN Escobar   levothyroxine (SYNTHROID) 112 MCG tablet TAKE 1 TABLET BY MOUTH ONCE DAILY. Abigail Bajwaanna marieERIN   albuterol (PROVENTIL) (5 MG/ML) 0.5% nebulizer solution Take 1 mL by nebulization 4 times daily as needed for Wheezing  Abigailalbert AngeloERIN   albuterol (PROVENTIL) (2.5 MG/3ML) 0.083% nebulizer solution USE 1 VIAL IN THE NEBULIZATION 4 TIMES DAILY AS NEEDED FOR WHEEZING  Abigail Garettanna marieERIN   naproxen (NAPROSYN) 500 MG tablet Take 1 tablet by mouth 2 times daily as needed for Pain  ERIN Guerrero NP   esomeprazole (NEXIUM) 20 MG delayed release capsule TAKE 1 CAPSULE BEFORE BREAKFAST DAILY  ERIN Escobar   albuterol sulfate HFA (VENTOLIN HFA) 108 (90 Base) MCG/ACT inhaler USE TWO PUFFS BY MOUTH AS NEEDED FOR WHEEZING  REIN Escobar   alendronate (FOSAMAX) 35 MG tablet TAKE 1 TABLET ONCE A WEEK. TAKE WITH 8OZ WATER 30 MIN BEFORE FOOD OR OTHER MEDS. DON'T LIE DOWN FOR 30 MIN.   ERIN Cuevas   Cholecalciferol (VITAMIN D3) 09668 UNITS CAPS Take 1 capsule by mouth once a week  Radha Ramirez MD   HYDROcodone-acetaminophen (NORCO)  MG per tablet Take 1 tablet by mouth every 6 hours as needed   Historical Provider, MD   OXYGEN Inhale into the lungs as needed   Historical Provider, MD   tiZANidine (ZANAFLEX) 4 MG tablet Take 4 mg by mouth every 8 hours as needed   Historical Provider, MD       Social History     Tobacco Use    Smoking status: Former Smoker     Packs/day: 0.00     Years: 37.00     Pack years: 0.00     Last attempt to quit: 2/3/2014     Years since quittin.4    Smokeless tobacco: Never Used   Substance Use Topics    Alcohol use: No    Drug use: No        Allergies   Allergen Reactions    Codeine Shortness Of Breath   ,   Past Medical History:   Diagnosis Date    Anxiety     Asthma     COPD (chronic obstructive pulmonary disease) (HCC)     CPAP (continuous positive airway pressure) dependence     11cm    Depression     Hyperlipidemia     Hypothyroidism     Migraines     Obstructive sleep apnea     AHI:  13.1    Restless legs syndrome     Sarcoidosis     Scoliosis     Unspecified sleep apnea    ,   Past Surgical History:   Procedure Laterality Date    APPENDECTOMY  age 27    Patient thinks this was removed with GB    BREAST SURGERY      bilateral breast tumor removal-[facility] in [address] N/A 2019    BRONCHOSCOPY BIOPSY BRONCHUS performed by Holly Hernández MD at [facility]    CHOLECYSTECTOMY  age 27   ,   Family History   Problem Relation Age of Onset    Heart Disease Mother     Diabetes Father     Heart Disease Father     Diabetes Sister     Heart Disease Sister     Diabetes Brother     Heart Disease Brother     Cancer Maternal Aunt         breast    Cancer Paternal Aunt         lung cancer    Diabetes Sister     Heart Disease Sister     Diabetes Brother     Cancer Son         kidney    Cancer Other         Nephew with Renal cell carcinoma        PHYSICAL EXAMINATION:  [ INSTRUCTIONS:  \"[x]\" Indicates a positive item  \"[]\" Indicates a negative item  -- DELETE ALL ITEMS NOT EXAMINED]  Vital Signs: (As obtained by patient/caregiver at home)        Constitutional: [x] Appears well-developed and well-nourished [x] No apparent distress      [] Abnormal   Mental status  [x] Alert and awake  [x] Oriented to person/place/time under the [facility] waiver authority and the [employer] Act, this Virtual  Visit was conducted, with patient's consent, to reduce the patient's risk of exposure to COVID-19 and provide continuity of care for an established patient. Services were provided through a video synchronous discussion virtually to substitute for in-person clinic visit.

## 2020-07-21 ASSESSMENT — ENCOUNTER SYMPTOMS
SINUS PRESSURE: 0
DIARRHEA: 0
SHORTNESS OF BREATH: 0
VOMITING: 0
NAUSEA: 0
WHEEZING: 0
COUGH: 0
BACK PAIN: 1
EYE PAIN: 0
ABDOMINAL PAIN: 0
SINUS PAIN: 0

## 2020-08-16 NOTE — TELEPHONE ENCOUNTER
Shawn Barrios called to request a refill on her medication. Last office visit : 7/20/2020   Next office visit : 10/23/2020     Last UDS:   Amphetamine Screen, Urine   Date Value Ref Range Status   10/08/2018 neg  Final     Barbiturate Screen, Urine   Date Value Ref Range Status   10/08/2018 POS  Final     Benzodiazepine Screen, Urine   Date Value Ref Range Status   10/08/2018 neg  Final     Buprenorphine Urine   Date Value Ref Range Status   10/08/2018 neg  Final     Cocaine Metabolite Screen, Urine   Date Value Ref Range Status   10/08/2018 neg  Final     Gabapentin Screen, Urine   Date Value Ref Range Status   10/08/2018 neg  Final     Methamphetamine, Urine   Date Value Ref Range Status   10/08/2018 neg  Final     Opiate Scrn, Ur   Date Value Ref Range Status   10/08/2018 POS  Final     Oxycodone Screen, Ur   Date Value Ref Range Status   10/08/2018 POS  Final     PCP Screen, Urine   Date Value Ref Range Status   10/08/2018 neg  Final     Propoxyphene Screen, Urine   Date Value Ref Range Status   10/08/2018 neg  Final     THC Screen, Urine   Date Value Ref Range Status   10/08/2018 neg  Final     Tricyclic Antidepressants, Urine   Date Value Ref Range Status   10/08/2018 neg  Final       Last Pennie Rich: 2-25  Medication Contract: 2017   Last Fill: 7-10    Requested Prescriptions     Pending Prescriptions Disp Refills    modafinil (PROVIGIL) 200 MG tablet [Pharmacy Med Name: MODAFINIL 200 MG TABLET] 60 tablet 0     Sig: TAKE 1 TABLET BY MOUTH TWICE DAILY. Please approve or refuse this medication.    Arelis Son LPN

## 2020-08-17 RX ORDER — MODAFINIL 200 MG/1
TABLET ORAL
Qty: 60 TABLET | Refills: 0 | Status: SHIPPED | OUTPATIENT
Start: 2020-08-17 | End: 2020-09-23 | Stop reason: SDUPTHER

## 2020-08-19 ENCOUNTER — VIRTUAL VISIT (OUTPATIENT)
Dept: PRIMARY CARE CLINIC | Age: 65
End: 2020-08-19
Payer: MEDICARE

## 2020-08-19 PROCEDURE — 1036F TOBACCO NON-USER: CPT | Performed by: NURSE PRACTITIONER

## 2020-08-19 PROCEDURE — 3023F SPIROM DOC REV: CPT | Performed by: NURSE PRACTITIONER

## 2020-08-19 PROCEDURE — 3017F COLORECTAL CA SCREEN DOC REV: CPT | Performed by: NURSE PRACTITIONER

## 2020-08-19 PROCEDURE — G8420 CALC BMI NORM PARAMETERS: HCPCS | Performed by: NURSE PRACTITIONER

## 2020-08-19 PROCEDURE — G8926 SPIRO NO PERF OR DOC: HCPCS | Performed by: NURSE PRACTITIONER

## 2020-08-19 PROCEDURE — G8427 DOCREV CUR MEDS BY ELIG CLIN: HCPCS | Performed by: NURSE PRACTITIONER

## 2020-08-19 PROCEDURE — 99213 OFFICE O/P EST LOW 20 MIN: CPT | Performed by: NURSE PRACTITIONER

## 2020-08-19 RX ORDER — PREDNISONE 10 MG/1
TABLET ORAL
Qty: 39 TABLET | Refills: 0 | Status: SHIPPED | OUTPATIENT
Start: 2020-08-19 | End: 2021-09-22

## 2020-08-19 RX ORDER — CEFDINIR 300 MG/1
300 CAPSULE ORAL 2 TIMES DAILY
Qty: 20 CAPSULE | Refills: 0 | Status: SHIPPED | OUTPATIENT
Start: 2020-08-19 | End: 2020-08-29

## 2020-08-19 ASSESSMENT — ENCOUNTER SYMPTOMS
BACK PAIN: 0
SHORTNESS OF BREATH: 1
VOICE CHANGE: 0
NAUSEA: 0
COUGH: 1
VOMITING: 0
COLOR CHANGE: 0
RHINORRHEA: 0
PHOTOPHOBIA: 0

## 2020-08-19 NOTE — PROGRESS NOTES
[address] [address]             Phone:  [phone]  Fax:  [phone]       2020    TELEHEALTH EVALUATION -- Audio/Visual (During XQFIX-89 public health emergency)    HPI:  Chief Complaint   Patient presents with    Cough    [place] (:  1955) has requested an audio/video evaluation for the following concern(s):    Patient presents today for evaluation of congestion and cough. She states she is having thick green mucous production. She denies fever. She reports she has been tested for covid last week with these symptoms. She was negative. She has not taken any OTC medications for her symptoms. She wears O2 at night and is taking 4 breathing treatments per day. She has history of COPD. Review of Systems   Constitutional: Negative for chills and fever. HENT: Positive for congestion. Negative for ear pain, hearing loss, rhinorrhea and voice change. Eyes: Negative for photophobia and visual disturbance. Respiratory: Positive for cough and shortness of breath. Cardiovascular: Negative for chest pain and palpitations. Gastrointestinal: Negative for nausea and vomiting. Endocrine: Negative. Negative for cold intolerance and heat intolerance. Genitourinary: Negative for difficulty urinating and flank pain. Musculoskeletal: Negative for back pain and neck pain. Skin: Negative for color change and rash. Allergic/Immunologic: Negative for environmental allergies and food allergies. Neurological: Negative for dizziness, speech difficulty and headaches. Hematological: Does not bruise/bleed easily. Psychiatric/Behavioral: Negative for sleep disturbance and suicidal ideas. Prior to Visit Medications    Medication Sig Taking?  Authorizing Provider   cefdinir (OMNICEF) 300 MG capsule Take 1 capsule by mouth 2 times daily for 10 days Yes ERIN Brantley   predniSONE (DELTASONE) 10 MG tablet 60 mg x 3 days; 40 mg x 3 days; 20 mg x 3 days and then 10 mg x 3 days. Yes ERIN Min   modafinil (PROVIGIL) 200 MG tablet TAKE 1 TABLET BY MOUTH TWICE DAILY. ERIN Escobar   rOPINIRole (REQUIP) 0.25 MG tablet Take 1 tablet by mouth nightly  ERIN Escobar   naproxen (NAPROSYN) 500 MG tablet Take 1 tablet by mouth 2 times daily (with meals)  ERIN Escobar   citalopram (CELEXA) 40 MG tablet Take 1 tablet by mouth daily  ERIN Escobar   gabapentin (NEURONTIN) 600 MG tablet TAKE 1 TABLET BY MOUTH THREE TIMES DAILY. ERIN Escobar   mirtazapine (REMERON) 15 MG tablet TAKE 1 TABLET BY MOUTH AT BEDTIME  ERIN Escobar   levothyroxine (SYNTHROID) 112 MCG tablet TAKE 1 TABLET BY MOUTH ONCE DAILY. ERIN Escobar   albuterol (PROVENTIL) (5 MG/ML) 0.5% nebulizer solution Take 1 mL by nebulization 4 times daily as needed for Wheezing  ERIN Escobar   albuterol (PROVENTIL) (2.5 MG/3ML) 0.083% nebulizer solution USE 1 VIAL IN THE NEBULIZATION 4 TIMES DAILY AS NEEDED FOR WHEEZING  ERIN Escobar   naproxen (NAPROSYN) 500 MG tablet Take 1 tablet by mouth 2 times daily as needed for Pain  ERIN Wilson - NP   esomeprazole (NEXIUM) 20 MG delayed release capsule TAKE 1 CAPSULE BEFORE BREAKFAST DAILY  ERIN Escobar   albuterol sulfate HFA (VENTOLIN HFA) 108 (90 Base) MCG/ACT inhaler USE TWO PUFFS BY MOUTH AS NEEDED FOR WHEEZING  ERIN Escobar   alendronate (FOSAMAX) 35 MG tablet TAKE 1 TABLET ONCE A WEEK. TAKE WITH 8OZ WATER 30 MIN BEFORE FOOD OR OTHER MEDS. DON'T LIE DOWN FOR 30 MIN.   ERIN Luciano   Cholecalciferol (VITAMIN D3) 37540 UNITS CAPS Take 1 capsule by mouth once a week  Bernice Rios MD   HYDROcodone-acetaminophen (NORCO)  MG per tablet Take 1 tablet by mouth every 6 hours as needed   Historical Provider, MD   OXYGEN Inhale into the lungs as needed   Historical Provider, MD   tiZANidine (ZANAFLEX) 4 MG tablet Take 4 mg by mouth every 8 hours as needed   Historical Provider, MD       Social History     Tobacco Use    Smoking status: Former Smoker     Packs/day: 0.00     Years: 37.00     Pack years: 0.00     Last attempt to quit: 2/3/2014     Years since quittin.5    Smokeless tobacco: Never Used   Substance Use Topics    Alcohol use: No    Drug use: No        Allergies   Allergen Reactions    Codeine Shortness Of Breath   ,   Past Medical History:   Diagnosis Date    Anxiety     Asthma     COPD (chronic obstructive pulmonary disease) (HCC)     CPAP (continuous positive airway pressure) dependence     11cm    Depression     Hyperlipidemia     Hypothyroidism     Migraines     Obstructive sleep apnea     AHI:  13.1    Restless legs syndrome     Sarcoidosis     Scoliosis     Unspecified sleep apnea    ,   Past Surgical History:   Procedure Laterality Date    APPENDECTOMY  age 27    Patient thinks this was removed with GB    BREAST SURGERY      bilateral breast tumor removal-[facility] [address] N/A 2019    BRONCHOSCOPY BIOPSY BRONCHUS performed by Marva Mcgraw MD at [facility]    CHOLECYSTECTOMY  age 27   ,   Social History     Tobacco Use    Smoking status: Former Smoker     Packs/day: 0.00     Years: 37.00     Pack years: 0.00     Last attempt to quit: 2/3/2014     Years since quittin.5    Smokeless tobacco: Never Used   Substance Use Topics    Alcohol use: No    Drug use: No   ,   Family History   Problem Relation Age of Onset    Heart Disease Mother     Diabetes Father     Heart Disease Father     Diabetes Sister     Heart Disease Sister     Diabetes Brother     Heart Disease Brother     Cancer Maternal Aunt         breast    Cancer Paternal Aunt         lung cancer    Diabetes Sister     Heart Disease Sister     Diabetes Brother     Cancer Son         kidney    Cancer Other         Nephew with Renal cell carcinoma    ,   Immunization History Administered Date(s) Administered    Influenza Vaccine, unspecified formulation 10/22/2018    Influenza Virus Vaccine 12/01/2014, 01/11/2016    Influenza, Epimenio Oliverio, IM, PF (6 mo and older Fluzone, Flulaval, Fluarix, and 3 yrs and older Afluria) 02/21/2018, 01/28/2020    PPD Test 10/08/2018    Pneumococcal Polysaccharide (Ewhadktot11) 12/01/2014   ,   Health Maintenance   Topic Date Due    DTaP/Tdap/Td vaccine (1 - Tdap) 06/27/1976    Shingles Vaccine (1 of 2) 06/27/2007    Cervical cancer screen  09/25/2016    Colon cancer screen colonoscopy  10/17/2016    Breast cancer screen  01/17/2019    TSH testing  03/09/2019    Annual Wellness Visit (AWV)  05/29/2019    Flu vaccine (1) 09/01/2020    Lipid screen  03/09/2023    Pneumococcal 0-64 years Vaccine  Completed    Hepatitis C screen  Completed    HIV screen  Completed    Hepatitis A vaccine  Aged Out    Hepatitis B vaccine  Aged Out    Hib vaccine  Aged Out    Meningococcal (ACWY) vaccine  Aged Out       PHYSICAL EXAMINATION:  [ INSTRUCTIONS:  \"[x]\" Indicates a positive item  \"[]\" Indicates a negative item  -- DELETE ALL ITEMS NOT EXAMINED]  [x] Alert  [x] Oriented to person/place/time    [x] No apparent distress  [] Toxic appearing    [] Face flushed appearing [x] Sclera clear  [] Lips are cyanotic      [x] Breathing appears normal  [] Appears tachypneic      [] Rash on visible skin    [x] Cranial Nerves II-XII grossly intact    [x] Motor grossly intact in visible upper extremities    [x] Motor grossly intact in visible lower extremities    [x] Normal Mood  [] Anxious appearing    [] Depressed appearing  [] Confused appearing      [] Poor short term memory  [] Poor long term memory    [] OTHER:      Due to this being a TeleHealth encounter, evaluation of the following organ systems is limited: Vitals/Constitutional/EENT/Resp/CV/GI//MS/Neuro/Skin/Heme-Lymph-Imm. ASSESSMENT/PLAN:  1.  Upper respiratory tract infection, unspecified type  - Advised patient to continue breathing treatments every 4-6 hours at home; add mucinex OTC to help thin secretions. If her symptoms persist, worsen, or she develops fever she will need to follow-up for further evaluation and possible CXR. She has had 2 negative covid tests so low suspicion for covid19 at this time but she will still need to take quarantine precautions as discussed. - cefdinir (OMNICEF) 300 MG capsule; Take 1 capsule by mouth 2 times daily for 10 days  Dispense: 20 capsule; Refill: 0  - predniSONE (DELTASONE) 10 MG tablet; 60 mg x 3 days; 40 mg x 3 days; 20 mg x 3 days and then 10 mg x 3 days. Dispense: 39 tablet; Refill: 0    2. Chronic obstructive pulmonary disease with acute exacerbation (HCC)    - cefdinir (OMNICEF) 300 MG capsule; Take 1 capsule by mouth 2 times daily for 10 days  Dispense: 20 capsule; Refill: 0  - predniSONE (DELTASONE) 10 MG tablet; 60 mg x 3 days; 40 mg x 3 days; 20 mg x 3 days and then 10 mg x 3 days. Dispense: 39 tablet; Refill: 0      No follow-ups on file. An  electronic signature was used to authenticate this note. --ERIN Enciso on 8/19/2020 at 3:22 PM        Pursuant to the emergency declaration under the [facility], [facility] waiver authority and the [employer] Act, this Virtual  Visit was conducted, with patient's consent, to reduce the patient's risk of exposure to COVID-19 and provide continuity of care for an established patient. Services were provided through a video synchronous discussion virtually to substitute for in-person clinic visit.

## 2020-09-22 DIAGNOSIS — E03.9 HYPOTHYROIDISM, UNSPECIFIED TYPE: ICD-10-CM

## 2020-09-22 DIAGNOSIS — Z13.220 SCREENING FOR HYPERLIPIDEMIA: ICD-10-CM

## 2020-09-22 LAB
ALBUMIN SERPL-MCNC: 4.3 G/DL (ref 3.5–5.2)
ALP BLD-CCNC: 123 U/L (ref 35–104)
ALT SERPL-CCNC: 12 U/L (ref 5–33)
ANION GAP SERPL CALCULATED.3IONS-SCNC: 13 MMOL/L (ref 7–19)
AST SERPL-CCNC: 16 U/L (ref 5–32)
BASOPHILS ABSOLUTE: 0.1 K/UL (ref 0–0.2)
BASOPHILS RELATIVE PERCENT: 1 % (ref 0–1)
BILIRUB SERPL-MCNC: <0.2 MG/DL (ref 0.2–1.2)
BUN BLDV-MCNC: 13 MG/DL (ref 8–23)
CALCIUM SERPL-MCNC: 9.1 MG/DL (ref 8.8–10.2)
CHLORIDE BLD-SCNC: 100 MMOL/L (ref 98–111)
CHOLESTEROL, FASTING: 205 MG/DL (ref 160–199)
CO2: 30 MMOL/L (ref 22–29)
CREAT SERPL-MCNC: 0.6 MG/DL (ref 0.5–0.9)
EOSINOPHILS ABSOLUTE: 0.6 K/UL (ref 0–0.6)
EOSINOPHILS RELATIVE PERCENT: 7.4 % (ref 0–5)
GFR AFRICAN AMERICAN: >59
GFR NON-AFRICAN AMERICAN: >60
GLUCOSE BLD-MCNC: 53 MG/DL (ref 74–109)
HCT VFR BLD CALC: 42.4 % (ref 37–47)
HDLC SERPL-MCNC: 87 MG/DL (ref 65–121)
HEMOGLOBIN: 13.2 G/DL (ref 12–16)
IMMATURE GRANULOCYTES #: 0 K/UL
LDL CHOLESTEROL CALCULATED: 94 MG/DL
LYMPHOCYTES ABSOLUTE: 2.1 K/UL (ref 1.1–4.5)
LYMPHOCYTES RELATIVE PERCENT: 25.9 % (ref 20–40)
MCH RBC QN AUTO: 29.3 PG (ref 27–31)
MCHC RBC AUTO-ENTMCNC: 31.1 G/DL (ref 33–37)
MCV RBC AUTO: 94.2 FL (ref 81–99)
MONOCYTES ABSOLUTE: 0.6 K/UL (ref 0–0.9)
MONOCYTES RELATIVE PERCENT: 7.8 % (ref 0–10)
NEUTROPHILS ABSOLUTE: 4.6 K/UL (ref 1.5–7.5)
NEUTROPHILS RELATIVE PERCENT: 57.6 % (ref 50–65)
PDW BLD-RTO: 13.3 % (ref 11.5–14.5)
PLATELET # BLD: 232 K/UL (ref 130–400)
PMV BLD AUTO: 10.5 FL (ref 9.4–12.3)
POTASSIUM SERPL-SCNC: 3.8 MMOL/L (ref 3.5–5)
RBC # BLD: 4.5 M/UL (ref 4.2–5.4)
SODIUM BLD-SCNC: 143 MMOL/L (ref 136–145)
TOTAL PROTEIN: 6.8 G/DL (ref 6.6–8.7)
TRIGLYCERIDE, FASTING: 122 MG/DL (ref 0–149)
TSH REFLEX FT4: 0.91 UIU/ML (ref 0.35–5.5)
WBC # BLD: 7.9 K/UL (ref 4.8–10.8)

## 2020-09-23 RX ORDER — MODAFINIL 200 MG/1
200 TABLET ORAL DAILY
Qty: 60 TABLET | Refills: 0 | Status: SHIPPED | OUTPATIENT
Start: 2020-09-23 | End: 2020-10-23

## 2020-09-23 NOTE — TELEPHONE ENCOUNTER
Mary Nafisa called to request a refill on her medication. Last office visit : 8/19/2020   Next office visit : 10/23/2020     Last UDS:   Amphetamine Screen, Urine   Date Value Ref Range Status   10/08/2018 neg  Final     Barbiturate Screen, Urine   Date Value Ref Range Status   10/08/2018 POS  Final     Benzodiazepine Screen, Urine   Date Value Ref Range Status   10/08/2018 neg  Final     Buprenorphine Urine   Date Value Ref Range Status   10/08/2018 neg  Final     Cocaine Metabolite Screen, Urine   Date Value Ref Range Status   10/08/2018 neg  Final     Gabapentin Screen, Urine   Date Value Ref Range Status   10/08/2018 neg  Final     Methamphetamine, Urine   Date Value Ref Range Status   10/08/2018 neg  Final     Opiate Scrn, Ur   Date Value Ref Range Status   10/08/2018 POS  Final     Oxycodone Screen, Ur   Date Value Ref Range Status   10/08/2018 POS  Final     PCP Screen, Urine   Date Value Ref Range Status   10/08/2018 neg  Final     Propoxyphene Screen, Urine   Date Value Ref Range Status   10/08/2018 neg  Final     THC Screen, Urine   Date Value Ref Range Status   10/08/2018 neg  Final     Tricyclic Antidepressants, Urine   Date Value Ref Range Status   10/08/2018 neg  Final       Last Raffy:09/23/2020  Medication Contract:01/11/2017   Last Fill: 08/17/2020    Requested Prescriptions     Pending Prescriptions Disp Refills    modafinil (PROVIGIL) 200 MG tablet 60 tablet 0     Sig: Take 1 tablet by mouth daily for 30 days. Please approve or refuse this medication.    Cortes Adams

## 2020-10-01 ENCOUNTER — TRANSCRIBE ORDERS (OUTPATIENT)
Dept: ADMINISTRATIVE | Facility: HOSPITAL | Age: 65
End: 2020-10-01

## 2020-10-01 DIAGNOSIS — Z01.818 PREOP TESTING: Primary | ICD-10-CM

## 2020-10-08 ENCOUNTER — LAB (OUTPATIENT)
Dept: LAB | Facility: HOSPITAL | Age: 65
End: 2020-10-08

## 2020-10-08 PROCEDURE — U0003 INFECTIOUS AGENT DETECTION BY NUCLEIC ACID (DNA OR RNA); SEVERE ACUTE RESPIRATORY SYNDROME CORONAVIRUS 2 (SARS-COV-2) (CORONAVIRUS DISEASE [COVID-19]), AMPLIFIED PROBE TECHNIQUE, MAKING USE OF HIGH THROUGHPUT TECHNOLOGIES AS DESCRIBED BY CMS-2020-01-R: HCPCS | Performed by: PAIN MEDICINE

## 2020-10-08 PROCEDURE — C9803 HOPD COVID-19 SPEC COLLECT: HCPCS | Performed by: PAIN MEDICINE

## 2020-10-09 LAB
COVID LABCORP PRIORITY: NORMAL
SARS-COV-2 RNA RESP QL NAA+PROBE: NOT DETECTED

## 2020-10-13 ENCOUNTER — TRANSCRIBE ORDERS (OUTPATIENT)
Dept: ADMINISTRATIVE | Facility: HOSPITAL | Age: 65
End: 2020-10-13

## 2020-10-13 DIAGNOSIS — Z01.818 PREOP TESTING: Primary | ICD-10-CM

## 2020-10-15 ENCOUNTER — LAB (OUTPATIENT)
Dept: LAB | Facility: HOSPITAL | Age: 65
End: 2020-10-15

## 2020-10-15 PROCEDURE — C9803 HOPD COVID-19 SPEC COLLECT: HCPCS | Performed by: PAIN MEDICINE

## 2020-10-15 PROCEDURE — U0003 INFECTIOUS AGENT DETECTION BY NUCLEIC ACID (DNA OR RNA); SEVERE ACUTE RESPIRATORY SYNDROME CORONAVIRUS 2 (SARS-COV-2) (CORONAVIRUS DISEASE [COVID-19]), AMPLIFIED PROBE TECHNIQUE, MAKING USE OF HIGH THROUGHPUT TECHNOLOGIES AS DESCRIBED BY CMS-2020-01-R: HCPCS | Performed by: PAIN MEDICINE

## 2020-10-16 LAB
COVID LABCORP PRIORITY: NORMAL
SARS-COV-2 RNA RESP QL NAA+PROBE: NOT DETECTED

## 2020-10-22 ENCOUNTER — TELEPHONE (OUTPATIENT)
Dept: ADMINISTRATIVE | Age: 65
End: 2020-10-22

## 2020-10-30 RX ORDER — MODAFINIL 200 MG/1
TABLET ORAL
Qty: 60 TABLET | Refills: 0 | Status: SHIPPED | OUTPATIENT
Start: 2020-10-30 | End: 2020-12-02

## 2020-10-30 NOTE — TELEPHONE ENCOUNTER
Vandana Molly called to request a refill on her medication. Last office visit : 8/19/2020   Next office visit : Visit date not found     Last UDS:   Amphetamine Screen, Urine   Date Value Ref Range Status   10/08/2018 neg  Final     Barbiturate Screen, Urine   Date Value Ref Range Status   10/08/2018 POS  Final     Benzodiazepine Screen, Urine   Date Value Ref Range Status   10/08/2018 neg  Final     Buprenorphine Urine   Date Value Ref Range Status   10/08/2018 neg  Final     Cocaine Metabolite Screen, Urine   Date Value Ref Range Status   10/08/2018 neg  Final     Gabapentin Screen, Urine   Date Value Ref Range Status   10/08/2018 neg  Final     Methamphetamine, Urine   Date Value Ref Range Status   10/08/2018 neg  Final     Opiate Scrn, Ur   Date Value Ref Range Status   10/08/2018 POS  Final     Oxycodone Screen, Ur   Date Value Ref Range Status   10/08/2018 POS  Final     PCP Screen, Urine   Date Value Ref Range Status   10/08/2018 neg  Final     Propoxyphene Screen, Urine   Date Value Ref Range Status   10/08/2018 neg  Final     THC Screen, Urine   Date Value Ref Range Status   10/08/2018 neg  Final     Tricyclic Antidepressants, Urine   Date Value Ref Range Status   10/08/2018 neg  Final       Last Raffy:10/07/2020  Medication Contract:01/11/2017  Last Fill: **09/23/2020    Requested Prescriptions     Pending Prescriptions Disp Refills    modafinil (PROVIGIL) 200 MG tablet [Pharmacy Med Name: MODAFINIL 200 MG TABLET] 60 tablet 0     Sig: TAKE 1 TABLET BY MOUTH TWICE DAILY. Please approve or refuse this medication.    Edward Emanuel

## 2020-11-09 ENCOUNTER — TELEPHONE (OUTPATIENT)
Dept: ADMINISTRATIVE | Age: 65
End: 2020-11-09

## 2020-11-24 ENCOUNTER — OFFICE VISIT (OUTPATIENT)
Dept: PRIMARY CARE CLINIC | Age: 65
End: 2020-11-24
Payer: MEDICARE

## 2020-11-24 VITALS
DIASTOLIC BLOOD PRESSURE: 70 MMHG | WEIGHT: 150.2 LBS | OXYGEN SATURATION: 94 % | BODY MASS INDEX: 23.57 KG/M2 | HEIGHT: 67 IN | SYSTOLIC BLOOD PRESSURE: 110 MMHG | TEMPERATURE: 97.1 F | HEART RATE: 85 BPM

## 2020-11-24 PROCEDURE — G0008 ADMIN INFLUENZA VIRUS VAC: HCPCS | Performed by: NURSE PRACTITIONER

## 2020-11-24 PROCEDURE — 90686 IIV4 VACC NO PRSV 0.5 ML IM: CPT | Performed by: NURSE PRACTITIONER

## 2020-11-24 PROCEDURE — G0438 PPPS, INITIAL VISIT: HCPCS | Performed by: NURSE PRACTITIONER

## 2020-11-24 PROCEDURE — G8482 FLU IMMUNIZE ORDER/ADMIN: HCPCS | Performed by: NURSE PRACTITIONER

## 2020-11-24 PROCEDURE — 3017F COLORECTAL CA SCREEN DOC REV: CPT | Performed by: NURSE PRACTITIONER

## 2020-11-24 RX ORDER — ZOSTER VACCINE RECOMBINANT, ADJUVANTED 50 MCG/0.5
0.5 KIT INTRAMUSCULAR SEE ADMIN INSTRUCTIONS
Qty: 0.5 ML | Refills: 0 | Status: SHIPPED | OUTPATIENT
Start: 2020-11-24 | End: 2021-05-23

## 2020-11-24 ASSESSMENT — PATIENT HEALTH QUESTIONNAIRE - PHQ9
1. LITTLE INTEREST OR PLEASURE IN DOING THINGS: 0
SUM OF ALL RESPONSES TO PHQ9 QUESTIONS 1 & 2: 0
2. FEELING DOWN, DEPRESSED OR HOPELESS: 0
SUM OF ALL RESPONSES TO PHQ QUESTIONS 1-9: 0

## 2020-11-24 ASSESSMENT — LIFESTYLE VARIABLES: HOW OFTEN DO YOU HAVE A DRINK CONTAINING ALCOHOL: 0

## 2020-11-24 NOTE — PATIENT INSTRUCTIONS
Personalized Preventive Plan for Jack Romano - 11/24/2020  Medicare offers a range of preventive health benefits. Some of the tests and screenings are paid in full while other may be subject to a deductible, co-insurance, and/or copay. Some of these benefits include a comprehensive review of your medical history including lifestyle, illnesses that may run in your family, and various assessments and screenings as appropriate. After reviewing your medical record and screening and assessments performed today your provider may have ordered immunizations, labs, imaging, and/or referrals for you. A list of these orders (if applicable) as well as your Preventive Care list are included within your After Visit Summary for your review. Other Preventive Recommendations:    · A preventive eye exam performed by an eye specialist is recommended every 1-2 years to screen for glaucoma; cataracts, macular degeneration, and other eye disorders. · A preventive dental visit is recommended every 6 months. · Try to get at least 150 minutes of exercise per week or 10,000 steps per day on a pedometer . · Order or download the FREE \"Exercise & Physical Activity: Your Everyday Guide\" from [employer]. Call 0-225.519.4656 or search [employer] online. · You need 1401-3371 mg of calcium and 9867-7026 IU of vitamin D per day. It is possible to meet your calcium requirement with diet alone, but a vitamin D supplement is usually necessary to meet this goal.  · When exposed to the sun, use a sunscreen that protects against both UVA and UVB radiation with an SPF of 30 or greater. Reapply every 2 to 3 hours or after sweating, drying off with a towel, or swimming. · Always wear a seat belt when traveling in a car. Always wear a helmet when riding a bicycle or motorcycle.

## 2020-11-24 NOTE — PROGRESS NOTES
Chief Complaint   Patient presents with   [facility] AWV       Medicare Annual Wellness Visit  Name: Shakira Penn Date: 2020   MRN: [id number] Sex: Female   Age: 63 y.o. Ethnicity: Non-/Non    : 1955 Race: Steph Alaniz is here for Medicare AWV    Screenings for behavioral, psychosocial and functional/safety risks, and cognitive dysfunction are all negative except as indicated below. These results, as well as other patient data from the [address] form, are documented in Flowsheets linked to this Encounter. Allergies   Allergen Reactions    Codeine Shortness Of Breath         Prior to Visit Medications    Medication Sig Taking? Authorizing Provider   diclofenac sodium (VOLTAREN) 1 % GEL Apply 2 g topically 2 times daily Yes ERIN Ross   zoster recombinant adjuvanted vaccine (SHINGRIX) 50 MCG/0.5ML SUSR injection Inject 0.5 mLs into the muscle See Admin Instructions 1 dose now and repeat in 2-6 months Yes ERIN Escobar   modafinil (PROVIGIL) 200 MG tablet TAKE 1 TABLET BY MOUTH TWICE DAILY. Yes Nancy Machuca MD   predniSONE (DELTASONE) 10 MG tablet 60 mg x 3 days; 40 mg x 3 days; 20 mg x 3 days and then 10 mg x 3 days. Yes ERIN Arevalo   rOPINIRole (REQUIP) 0.25 MG tablet Take 1 tablet by mouth nightly Yes ERIN Escobar   naproxen (NAPROSYN) 500 MG tablet Take 1 tablet by mouth 2 times daily (with meals) Yes ERIN Escobar   citalopram (CELEXA) 40 MG tablet Take 1 tablet by mouth daily Yes ERIN Escobar   mirtazapine (REMERON) 15 MG tablet TAKE 1 TABLET BY MOUTH AT BEDTIME Yes ERIN Escobar   levothyroxine (SYNTHROID) 112 MCG tablet TAKE 1 TABLET BY MOUTH ONCE DAILY.  Yes ERIN Escobar   albuterol (PROVENTIL) (5 MG/ML) 0.5% nebulizer solution Take 1 mL by nebulization 4 times daily as needed for Wheezing Yes ERIN Escobar   albuterol (PROVENTIL) (2.5 MG/3ML) 0.083% nebulizer solution USE 1 VIAL IN THE NEBULIZATION 4 TIMES DAILY AS NEEDED FOR WHEEZING Yes ERIN Escobar   esomeprazole (NEXIUM) 20 MG delayed release capsule TAKE 1 CAPSULE BEFORE BREAKFAST DAILY Yes ERIN Escobar   albuterol sulfate HFA (VENTOLIN HFA) 108 (90 Base) MCG/ACT inhaler USE TWO PUFFS BY MOUTH AS NEEDED FOR WHEEZING Yes ERIN Escobar   alendronate (FOSAMAX) 35 MG tablet TAKE 1 TABLET ONCE A WEEK. TAKE WITH 8OZ WATER 30 MIN BEFORE FOOD OR OTHER MEDS. DON'T LIE DOWN FOR 30 MIN. Yes ERIN Cabrera   Cholecalciferol (VITAMIN D3) 34559 UNITS CAPS Take 1 capsule by mouth once a week Yes Curtis Jade MD   HYDROcodone-acetaminophen (NORCO)  MG per tablet Take 1 tablet by mouth every 6 hours as needed  Yes Historical Provider, MD   OXYGEN Inhale into the lungs as needed  Yes Historical Provider, MD   tiZANidine (ZANAFLEX) 4 MG tablet Take 4 mg by mouth every 8 hours as needed  Yes Historical Provider, MD   gabapentin (NEURONTIN) 600 MG tablet TAKE 1 TABLET BY MOUTH THREE TIMES DAILY.   ERIN Cabrera   naproxen (NAPROSYN) 500 MG tablet Take 1 tablet by mouth 2 times daily as needed for Pain  ERIN Pillai - NP         Past Medical History:   Diagnosis Date    Anxiety     Asthma     COPD [facility]ch[facility] obstructive pulmonary disease) ([facility] [address]     CPAP (continuous positive airway pressure) dependence     11cm    Depression     Hyperlipidemia     Hypothyroidism     Migraines     Obstructive sleep apnea     AHI:  13.1    Restless legs syndrome     Sarcoidosis     Scoliosis     Unspecified sleep apnea        Past Surgical History:   Procedure Laterality Date    APPENDECTOMY  age 27    Patient thinks this was removed with GB    BREAST SURGERY  2006    bilateral breast tumor removal-[facility] in [address] [place] N/A 7/29/2019    BRONCHOSCOPY BIOPSY BRONCHUS performed by Beba Griffin MD at [facility]    CHOLECYSTECTOMY  age 27         Family History   Problem Relation Age of Onset    Heart Disease Mother     Diabetes Father     Heart Disease Father     Diabetes Sister     Heart Disease Sister     Diabetes Brother     Heart Disease Brother     Cancer Maternal Aunt         breast    Cancer Paternal Aunt         lung cancer    Diabetes Sister     Heart Disease Sister     Diabetes Brother     Cancer Son         kidney    Cancer Other         Nephew with Renal cell carcinoma        CareTeam (Including outside providers/suppliers regularly involved in providing care):   Patient Care Team:  ERIN Foreman as PCP - General (Nurse Practitioner)  ERIN Fore[facility] [place] [place] [facility]    Wt Readings from Last 3 Encounters:   11/24/20 150 lb 3.2 oz (68.1 kg)   01/28/20 135 lb (61.2 kg)   10/15/19 141 lb (64 kg)     Vitals:    11/24/20 1506   BP: 110/70   Pulse: 85   Temp: 97.1 °F (36.2 °C)   TempSrc: Temporal   SpO2: 94%   Weight: 150 lb 3.2 oz (68.1 kg)   Height: 5' 6.5\" (1.689 m)     Body mass index is 23.88 kg/m². Based upon direct observation of the patient, evaluation of cognition reveals recent and remote memory intact.     General Appearance: alert and oriented to person, place and time, well developed and well- nourished, in no acute distress  Skin: warm and dry, no rash or erythema  Head: normocephalic and atraumatic  Eyes: pupils equal, round, and reactive to light, extraocular eye movements intact, conjunctivae normal  ENT: tympanic membrane, external ear and ear canal normal bilaterally, nose without deformity, nasal mucosa and turbinates normal without polyps  Neck: supple and non-tender without mass, no thyromegaly or thyroid nodules, no cervical lymphadenopathy  Pulmonary/Chest: clear to auscultation bilaterally- no wheezes, rales or rhonchi, normal air movement, no respiratory distress  Cardiovascular: normal rate, regular rhythm, normal S1 and S2, no murmurs, rubs, clicks, or gallops, distal pulses intact, no carotid bruits  Abdomen: soft, non-tender, non-distended, normal bowel sounds, no masses or organomegaly  Extremities: no cyanosis, clubbing or edema  Musculoskeletal: abnormal range of motion, no joint swelling, deformity or tenderness- low back pain-right lower rib pain  Neurologic: reflexes normal and symmetric, no cranial nerve deficit, gait, coordination and speech normal    Patient's complete Health Risk Assessment and screening values have been reviewed and are found in Flowsheets. The following problems were reviewed today and where indicated follow up appointments were made and/or referrals ordered. Positive Risk Factor Screenings with Interventions:       General Health and ACP:  General  In general, how would you say your health is?: Good  In the past 7 days, have you experienced any of the following?  New or Increased Pain, New or Increased Fatigue, Loneliness, Social Isolation, Stress or Anger?: None of These  Do you get the social and emotional support that you need?: Yes  Do you have a Living Will?: (!) No  Advance Directives     Power of [address] [place] [address] Will ACP-Advance Directive ACP-Power of     Not on File Not on File Filed [address] [place] [address] Risk Interventions:  · No Living Will: Packet given to patient    Health Habits/Nutrition:  Health Habits/Nutrition  Do you exercise for at least 20 minutes 2-3 times per week?: Yes  Have you lost any weight without trying in the past 3 months?: No  Do you eat fewer than 2 meals per day?: No  Have you seen a dentist within the past year?: (!) No  Body mass index: 23.88  Health Habits/Nutrition Interventions:  · Dental exam overdue:  patient declines dental evaluation, has dentures    Hearing/Vision:  No exam data present  Hearing/Vision  Do you or your family notice any trouble with your hearing?: No  Do you have difficulty driving, watching TV, or doing any of your daily activities because of your eyesight?: No  Have you had an eye exam within the past year?: (!) No  Hearing/Vision Interventions:  · Vision concerns:  patient encouraged to make appointment with his/her eye specialist, has plans to make an appt    Personalized Preventive Plan   Current Health Maintenance Status  Immunization History   Administered Date(s) Administered    Influenza Vaccine, unspecified formulation 10/22/2018    Influenza Virus Vaccine 12/01/2014, 01/11/2016    Influenza, Quadv, IM, PF (6 mo and older Fluzone, Flulaval, Fluarix, and 3 yrs and older Afluria) 02/21/2018, 01/28/2020, 11/24/2020    PPD Test 10/08/2018    Pneumococcal Polysaccharide ([id number]) 12/01/2014    Tdap (Boostrix, Adacel) 11/24/2017        Health Maintenance   Topic Date Due    Shingles Vaccine (1 of 2) 06/27/2007    Cervical cancer screen  09/25/2016    Colon cancer screen colonoscopy  10/17/2016    Breast cancer screen  01/17/2019    Annual Wellness Visit (AWV)  05/29/2019    TSH testing  09/22/2021    Lipid screen  09/22/2025    DTaP/Tdap/Td vaccine (2 - Td) 11/24/2027    Flu vaccine  Completed    Hepatitis C screen  Completed    HIV screen  Completed    Hepatitis A vaccine  Aged Out    Hepatitis B vaccine  Aged Out    Hib vaccine  Aged Out    Meningococcal (ACWY) vaccine  Aged Out    Pneumococcal 0-64 years Vaccine  Aged Out     Recommendations for [employer] Due: see orders and patient instructions/AVS.  . Recommended screening schedule for the next 5-10 years is provided to the patient in written form: see Patient Miguelina Granados was seen today for medicare awv. Diagnoses and all orders for this visit:    Routine general medical examination at a health care facility    Need for influenza vaccination  -     INFLUENZA, QUADV, 3 YRS AND OLDER, IM PF, PREFILL SYR OR SDV, 0.5ML (AFLURIA QUADV, PF)    Rib pain on right side  -     diclofenac sodium (VOLTAREN) 1 % GEL;  Apply 2 g topically 2 times daily    Need for shingles vaccine  -     zoster recombinant adjuvanted vaccine [facility]) 50 MCG/0.5ML SUSR injection; Inject 0.5 mLs into the muscle See Admin Instructions 1 dose now and repeat in 2-6 months          Due for colon screening- she is aware and will schedule with Dr. Shaan Chang    She [facility] that was normal, per patient. She is due next year for pap with HPV testing. Influenza vaccine given in office without reaction. Shingles vaccine rx given    Continues to have rib pain on right side from old fractures-can use diclofenac. Return in 6 months (on 5/24/2021), or if symptoms worsen or fail to improve, for Medicare Annual Wellness Visit in 1 year, chronic conditions.

## 2020-12-01 RX ORDER — ALBUTEROL SULFATE 90 UG/1
AEROSOL, METERED RESPIRATORY (INHALATION)
Qty: 18 G | Refills: 0 | Status: SHIPPED | OUTPATIENT
Start: 2020-12-01 | End: 2020-12-02

## 2020-12-02 RX ORDER — MODAFINIL 200 MG/1
TABLET ORAL
Qty: 60 TABLET | Refills: 0 | Status: SHIPPED | OUTPATIENT
Start: 2020-12-02 | End: 2020-12-04 | Stop reason: SDUPTHER

## 2020-12-02 RX ORDER — ALBUTEROL SULFATE 90 UG/1
AEROSOL, METERED RESPIRATORY (INHALATION)
Qty: 18 G | Refills: 0 | Status: SHIPPED | OUTPATIENT
Start: 2020-12-02 | End: 2021-01-29 | Stop reason: SDUPTHER

## 2020-12-04 RX ORDER — MODAFINIL 200 MG/1
200 TABLET ORAL DAILY
Qty: 60 TABLET | Refills: 0 | Status: SHIPPED | OUTPATIENT
Start: 2020-12-04 | End: 2020-12-06

## 2020-12-04 NOTE — TELEPHONE ENCOUNTER
Devyn Overall called to request a refill on her medication. Last office visit : 11/24/2020   Next office visit : Visit date not found     Requested Prescriptions     Pending Prescriptions Disp Refills    modafinil (PROVIGIL) 200 MG tablet 60 tablet 0     Sig: Take 1 tablet by mouth daily for 30 days.             John Da Silva

## 2020-12-31 DIAGNOSIS — G62.9 NEUROPATHY: ICD-10-CM

## 2021-01-03 NOTE — TELEPHONE ENCOUNTER
Enedelia Wood called to request a refill on her medication. Last office visit : 11/24/2020   Next office visit : Visit date not found     Last UDS:   Amphetamine Screen, Urine   Date Value Ref Range Status   10/08/2018 neg  Final     Barbiturate Screen, Urine   Date Value Ref Range Status   10/08/2018 POS  Final     Benzodiazepine Screen, Urine   Date Value Ref Range Status   10/08/2018 neg  Final     Buprenorphine Urine   Date Value Ref Range Status   10/08/2018 neg  Final     Cocaine Metabolite Screen, Urine   Date Value Ref Range Status   10/08/2018 neg  Final     Gabapentin Screen, Urine   Date Value Ref Range Status   10/08/2018 neg  Final     Methamphetamine, Urine   Date Value Ref Range Status   10/08/2018 neg  Final     Opiate Scrn, Ur   Date Value Ref Range Status   10/08/2018 POS  Final     Oxycodone Screen, Ur   Date Value Ref Range Status   10/08/2018 POS  Final     PCP Screen, Urine   Date Value Ref Range Status   10/08/2018 neg  Final     Propoxyphene Screen, Urine   Date Value Ref Range Status   10/08/2018 neg  Final     THC Screen, Urine   Date Value Ref Range Status   10/08/2018 neg  Final     Tricyclic Antidepressants, Urine   Date Value Ref Range Status   10/08/2018 neg  Final       Last Chetna Katz: 10-7  Medication Contract: 2017   Last Fill: 6-29 with 5      Requested Prescriptions     Pending Prescriptions Disp Refills    gabapentin (NEURONTIN) 600 MG tablet [Pharmacy Med Name: GABAPENTIN 600 MG TAB *DD] 90 tablet 0     Sig: TAKE 1 TABLET BY MOUTH THREE TIMES DAILY. Please approve or refuse this medication.    Jacque Inman LPN

## 2021-01-04 RX ORDER — GABAPENTIN 600 MG/1
TABLET ORAL
Qty: 90 TABLET | Refills: 5 | Status: SHIPPED
Start: 2021-01-04 | End: 2021-05-05 | Stop reason: SDDI

## 2021-01-04 NOTE — TELEPHONE ENCOUNTER
LAMONT was reviewed today per office protocol. Report shows No discrepancies. Fill pattern is consistent from single provider(s) at single pharmacy(s).     Presciption Escribed

## 2021-01-06 DIAGNOSIS — F51.01 PRIMARY INSOMNIA: ICD-10-CM

## 2021-01-08 RX ORDER — MODAFINIL 200 MG/1
TABLET ORAL
Qty: 60 TABLET | Refills: 0 | Status: SHIPPED | OUTPATIENT
Start: 2021-01-08 | End: 2021-02-19 | Stop reason: SDUPTHER

## 2021-01-08 NOTE — TELEPHONE ENCOUNTER
Chandni Guy called to request a refill on her medication. Last office visit : 11/24/2020   Next office visit : Visit date not found     Last UDS:   Amphetamine Screen, Urine   Date Value Ref Range Status   10/08/2018 neg  Final     Barbiturate Screen, Urine   Date Value Ref Range Status   10/08/2018 POS  Final     Benzodiazepine Screen, Urine   Date Value Ref Range Status   10/08/2018 neg  Final     Buprenorphine Urine   Date Value Ref Range Status   10/08/2018 neg  Final     Cocaine Metabolite Screen, Urine   Date Value Ref Range Status   10/08/2018 neg  Final     Gabapentin Screen, Urine   Date Value Ref Range Status   10/08/2018 neg  Final     Methamphetamine, Urine   Date Value Ref Range Status   10/08/2018 neg  Final     Opiate Scrn, Ur   Date Value Ref Range Status   10/08/2018 POS  Final     Oxycodone Screen, Ur   Date Value Ref Range Status   10/08/2018 POS  Final     PCP Screen, Urine   Date Value Ref Range Status   10/08/2018 neg  Final     Propoxyphene Screen, Urine   Date Value Ref Range Status   10/08/2018 neg  Final     THC Screen, Urine   Date Value Ref Range Status   10/08/2018 neg  Final     Tricyclic Antidepressants, Urine   Date Value Ref Range Status   10/08/2018 neg  Final       Last Ramiro Chew: today  Medication Contract: needs update at next visit   Last Fill: 12/10/20    Requested Prescriptions     Pending Prescriptions Disp Refills    modafinil (PROVIGIL) 200 MG tablet [Pharmacy Med Name: MODAFINIL 200 MG TABLET] 60 tablet 0     Sig: TAKE 1 TABLET BY MOUTH TWICE DAILY         Please approve or refuse this medication.    Deny Diggs

## 2021-01-14 ENCOUNTER — TRANSCRIBE ORDERS (OUTPATIENT)
Dept: ADMINISTRATIVE | Facility: HOSPITAL | Age: 66
End: 2021-01-14

## 2021-01-14 DIAGNOSIS — Z01.818 PREOP TESTING: Primary | ICD-10-CM

## 2021-01-16 ENCOUNTER — LAB (OUTPATIENT)
Dept: LAB | Facility: HOSPITAL | Age: 66
End: 2021-01-16

## 2021-01-16 LAB — SARS-COV-2 ORF1AB RESP QL NAA+PROBE: NOT DETECTED

## 2021-01-16 PROCEDURE — C9803 HOPD COVID-19 SPEC COLLECT: HCPCS | Performed by: PAIN MEDICINE

## 2021-01-16 PROCEDURE — U0004 COV-19 TEST NON-CDC HGH THRU: HCPCS | Performed by: PAIN MEDICINE

## 2021-01-22 ENCOUNTER — TELEPHONE (OUTPATIENT)
Dept: PRIMARY CARE CLINIC | Age: 66
End: 2021-01-22

## 2021-01-29 DIAGNOSIS — J44.1 CHRONIC OBSTRUCTIVE PULMONARY DISEASE WITH ACUTE EXACERBATION (HCC): ICD-10-CM

## 2021-01-29 RX ORDER — ALBUTEROL SULFATE 90 UG/1
AEROSOL, METERED RESPIRATORY (INHALATION)
Qty: 18 G | Refills: 0 | OUTPATIENT
Start: 2021-01-29

## 2021-01-29 RX ORDER — ALBUTEROL SULFATE 90 UG/1
AEROSOL, METERED RESPIRATORY (INHALATION)
Qty: 18 G | Refills: 0 | Status: SHIPPED | OUTPATIENT
Start: 2021-01-29 | End: 2021-03-01

## 2021-01-29 NOTE — TELEPHONE ENCOUNTER
Enedelia Helenashu called to request a refill on her medication.       Last office visit : 11/24/2020   Next office visit : 2/1/2021     Requested Prescriptions     Pending Prescriptions Disp Refills    albuterol sulfate HFA (VENTOLIN HFA) 108 (90 Base) MCG/ACT inhaler 18 g 0     Sig: USE TWO PUFFS BY MOUTH DAILY AS NEEDED FOR WHEEZING     Refused Prescriptions Disp Refills    albuterol sulfate HFA (VENTOLIN HFA) 108 (90 Base) MCG/ACT inhaler 18 g 0     Sig: USE TWO PUFFS BY MOUTH DAILY AS NEEDED FOR WHEEZING     Refused By: Carloz Oswald     Reason for Refusal: Patient needs an appointment            Lalita Hernandez

## 2021-02-01 ENCOUNTER — VIRTUAL VISIT (OUTPATIENT)
Dept: PRIMARY CARE CLINIC | Age: 66
End: 2021-02-01
Payer: MEDICARE

## 2021-02-01 DIAGNOSIS — F33.0 MILD EPISODE OF RECURRENT MAJOR DEPRESSIVE DISORDER (HCC): ICD-10-CM

## 2021-02-01 DIAGNOSIS — E03.9 ACQUIRED HYPOTHYROIDISM: ICD-10-CM

## 2021-02-01 DIAGNOSIS — J44.1 CHRONIC OBSTRUCTIVE PULMONARY DISEASE WITH ACUTE EXACERBATION (HCC): ICD-10-CM

## 2021-02-01 DIAGNOSIS — G89.4 CHRONIC PAIN SYNDROME: Primary | ICD-10-CM

## 2021-02-01 DIAGNOSIS — E78.5 HYPERLIPIDEMIA, UNSPECIFIED HYPERLIPIDEMIA TYPE: ICD-10-CM

## 2021-02-01 PROCEDURE — 99213 OFFICE O/P EST LOW 20 MIN: CPT | Performed by: NURSE PRACTITIONER

## 2021-02-01 PROCEDURE — 3023F SPIROM DOC REV: CPT | Performed by: NURSE PRACTITIONER

## 2021-02-01 PROCEDURE — G8482 FLU IMMUNIZE ORDER/ADMIN: HCPCS | Performed by: NURSE PRACTITIONER

## 2021-02-01 PROCEDURE — 3017F COLORECTAL CA SCREEN DOC REV: CPT | Performed by: NURSE PRACTITIONER

## 2021-02-01 PROCEDURE — G8427 DOCREV CUR MEDS BY ELIG CLIN: HCPCS | Performed by: NURSE PRACTITIONER

## 2021-02-01 PROCEDURE — G8420 CALC BMI NORM PARAMETERS: HCPCS | Performed by: NURSE PRACTITIONER

## 2021-02-01 PROCEDURE — 1036F TOBACCO NON-USER: CPT | Performed by: NURSE PRACTITIONER

## 2021-02-01 PROCEDURE — G8926 SPIRO NO PERF OR DOC: HCPCS | Performed by: NURSE PRACTITIONER

## 2021-02-01 ASSESSMENT — ENCOUNTER SYMPTOMS
PHOTOPHOBIA: 0
RHINORRHEA: 0
BACK PAIN: 0
VOICE CHANGE: 0
COLOR CHANGE: 0
SHORTNESS OF BREATH: 0
COUGH: 0
VOMITING: 0
NAUSEA: 0

## 2021-02-01 NOTE — PROGRESS NOTES
[address] [address]             Phone:  [phone]  Fax:  [phone]       2021    TELEHEALTH EVALUATION -- Audio/Visual (During WRBXV-03 public health emergency)    HPI:  Chief Complaint   Patient presents with    Depression    Hypertension    COPD         Theron Jefferson (:  1955) has requested an audio/video evaluation for the following concern(s):    Patient presents today for follow-up hyperlipidemia, hypothyroidism, depression, COPD. She reports medications accurate and she is compliant with taking appropriately. Depression well-controlled with Celexa. Denies SI. No recent COPD exacerbation. Blood pressure has been stable. She sees pain management for chronic back pain; receives injections. She would like to hold off on mammogram and colonoscopy until risk of covid decreases. Review of Systems   Constitutional: Negative for chills and fever. HENT: Negative for ear pain, hearing loss, rhinorrhea and voice change. Eyes: Negative for photophobia and visual disturbance. Respiratory: Negative for cough and shortness of breath. Cardiovascular: Negative for chest pain and palpitations. Gastrointestinal: Negative for nausea and vomiting. Endocrine: Negative. Negative for cold intolerance and heat intolerance. Genitourinary: Negative for difficulty urinating and flank pain. Musculoskeletal: Negative for back pain and neck pain. Skin: Negative for color change and rash. Allergic/Immunologic: Negative for environmental allergies and food allergies. Neurological: Negative for dizziness, speech difficulty and headaches. Hematological: Does not bruise/bleed easily. Psychiatric/Behavioral: Negative for sleep disturbance and suicidal ideas. Prior to Visit Medications    Medication Sig Taking?  Authorizing Provider albuterol sulfate HFA (VENTOLIN HFA) 108 (90 Base) MCG/ACT inhaler USE TWO PUFFS BY MOUTH DAILY AS NEEDED FOR WHEEZING  ERIN Escobar   modafinil (PROVIGIL) 200 MG tablet TAKE 1 TABLET BY MOUTH TWICE DAILY  Mitchell Leyva MD   gabapentin (NEURONTIN) 600 MG tablet TAKE 1 TABLET BY MOUTH THREE TIMES DAILY. Raffi Steele MD   diclofenac sodium (VOLTAREN) 1 % GEL Apply 2 g topically 2 times daily  ERIN Rader   zoster recombinant adjuvanted vaccine [facility]) 50 MCG/0.5ML SUSR injection Inject 0.5 mLs into the muscle See Admin Instructions 1 dose now and repeat in 2-6 months  ERIN Escobar   predniSONE (DELTASONE) 10 MG tablet 60 mg x 3 days; 40 mg x 3 days; 20 mg x 3 days and then 10 mg x 3 days. ERIN White   rOPINIRole (REQUIP) 0.25 MG tablet Take 1 tablet by mouth nightly  ERIN Escobar   naproxen (NAPROSYN) 500 MG tablet Take 1 tablet by mouth 2 times daily (with meals)  ERIN Escobar   citalopram (CELEXA) 40 MG tablet Take 1 tablet by mouth daily  ERIN Escobar   mirtazapine (REMERON) 15 MG tablet TAKE 1 TABLET BY MOUTH AT BEDTIME  ERIN Escobar   levothyroxine (SYNTHROID) 112 MCG tablet TAKE 1 TABLET BY MOUTH ONCE DAILY. ERIN Escobar   albuterol (PROVENTIL) (5 MG/ML) 0.5% nebulizer solution Take 1 mL by nebulization 4 times daily as needed for Wheezing  ERIN Escobar   albuterol (PROVENTIL) (2.5 MG/3ML) 0.083% nebulizer solution USE 1 VIAL IN THE NEBULIZATION 4 TIMES DAILY AS NEEDED FOR WHEEZING  ERIN Escobar   naproxen (NAPROSYN) 500 MG tablet Take 1 tablet by mouth 2 times daily as needed for Pain  ERIN Guzman - NP   esomeprazole (NEXIUM) 20 MG delayed release capsule TAKE 1 CAPSULE BEFORE BREAKFAST DAILY  ERIN Escobar   alendronate (FOSAMAX) 35 MG tablet TAKE 1 TABLET ONCE A WEEK. TAKE WITH 8OZ WATER 30 MIN BEFORE FOOD OR OTHER MEDS. DON'T LIE DOWN FOR 30 MIN.   ERIN Rader Cholecalciferol (VITAMIN D3) 20872 UNITS CAPS Take 1 capsule by mouth once a week  Diego Gibbs MD   HYDROcodone-acetaminophen (NORCO)  MG per tablet Take 1 tablet by mouth every 6 hours as needed   Historical Provider, MD   OXYGEN Inhale into the lungs as needed   Historical Provider, MD   tiZANidine (ZANAFLEX) 4 MG tablet Take 4 mg by mouth every 8 hours as needed   Historical Provider, MD       Social History     Tobacco Use    Smoking status: Former Smoker     Packs/day: 0.00     Years: 37.00     Pack years: 0.00     Quit date: 2/3/2014     Years since quittin.0    Smokeless tobacco: Never Used   Substance Use Topics    Alcohol use: No    Drug use: No        Allergies   Allergen Reactions    Codeine Shortness Of Breath   ,   Past Medical History:   Diagnosis Date    Anxiety     Asthma     [facility]     CPAP (continuous positive airway pressure) dependence     11cm    Depression     Hyperlipidemia     Hypothyroidism     Migraines     Obstructive sleep apnea     AHI:  13.1    Restless legs syndrome     Sarcoidosis     Scoliosis     Unspecified sleep apnea    ,   Past Surgical History:   Procedure Laterality Date    APPENDECTOMY  age 27    Patient thinks this was removed with GB    BREAST SURGERY      bilateral breast tumor removal-[facility] in [address] N/A 2019    BRONCHOSCOPY BIOPSY BRONCHUS performed by Avery Moody MD at [facility]    CHOLECYSTECTOMY  age 27   ,   Social History     Tobacco Use    Smoking status: Former Smoker     Packs/day: 0.00     Years: 37.00     Pack years: 0.00     Quit date: 2/3/2014     Years since quittin.0    Smokeless tobacco: Never Used   Substance Use Topics    Alcohol use: No    Drug use: No   ,   Family History   Problem Relation Age of Onset    Heart Disease Mother     Diabetes Father     Heart Disease Father     Diabetes Sister [x] Normal Mood  [] Anxious appearing    [] Depressed appearing  [] Confused appearing      [] Poor short term memory  [] Poor long term memory    [] OTHER:      Due to this being a TeleHealth encounter, evaluation of the following organ systems is limited: Vitals/Constitutional/EENT/Resp/CV/GI//MS/Neuro/Skin/Heme-Lymph-Imm. ASSESSMENT/PLAN:  1. Chronic obstructive pulmonary disease with acute exacerbation (HCC)    - Comprehensive Metabolic Panel; Future  - Hemoglobin A1C; Future  - Lipid Panel; Future  - CBC Auto Differential; Future  - TSH without Reflex; Future    2. Mild episode of recurrent major depressive disorder (HCC)  - Continue celexa. - Comprehensive Metabolic Panel; Future  - Hemoglobin A1C; Future  - Lipid Panel; Future  - CBC Auto Differential; Future  - TSH without Reflex; Future    3. Chronic pain syndrome  - Pain management. 4. Hyperlipidemia, unspecified hyperlipidemia type    - Comprehensive Metabolic Panel; Future  - Hemoglobin A1C; Future  - Lipid Panel; Future  - CBC Auto Differential; Future  - TSH without Reflex; Future    5. Acquired hypothyroidism    - Comprehensive Metabolic Panel; Future  - Hemoglobin A1C; Future  - Lipid Panel; Future  - CBC Auto Differential; Future  - TSH without Reflex; Future      Return in about 3 months (around 5/1/2021) for follow-up with labs, in office. An  electronic signature was used to authenticate this note. --ERIN Gutierrez on 2/1/2021 at 8:29 AM        Pursuant to the emergency declaration under the [address] [place] [address], [facility] waiver authority and the [employer] and [employer] Act, this Virtual  Visit was conducted, with patient's consent, to reduce the patient's risk of exposure to COVID-19 and provide continuity of care for an established patient. Services were provided through a video synchronous discussion virtually to substitute for in-person clinic visit.

## 2021-02-19 DIAGNOSIS — F51.01 PRIMARY INSOMNIA: ICD-10-CM

## 2021-02-19 NOTE — TELEPHONE ENCOUNTER
Kandy Jimenez called to request a refill on her medication. Last office visit : 2/1/2021   Next office visit : 5/5/2021     Last UDS:   Amphetamine Screen, Urine   Date Value Ref Range Status   10/08/2018 neg  Final     Barbiturate Screen, Urine   Date Value Ref Range Status   10/08/2018 POS  Final     Benzodiazepine Screen, Urine   Date Value Ref Range Status   10/08/2018 neg  Final     Buprenorphine Urine   Date Value Ref Range Status   10/08/2018 neg  Final     Cocaine Metabolite Screen, Urine   Date Value Ref Range Status   10/08/2018 neg  Final     Gabapentin Screen, Urine   Date Value Ref Range Status   10/08/2018 neg  Final     Methamphetamine, Urine   Date Value Ref Range Status   10/08/2018 neg  Final     Opiate Scrn, Ur   Date Value Ref Range Status   10/08/2018 POS  Final     Oxycodone Screen, Ur   Date Value Ref Range Status   10/08/2018 POS  Final     PCP Screen, Urine   Date Value Ref Range Status   10/08/2018 neg  Final     Propoxyphene Screen, Urine   Date Value Ref Range Status   10/08/2018 neg  Final     THC Screen, Urine   Date Value Ref Range Status   10/08/2018 neg  Final     Tricyclic Antidepressants, Urine   Date Value Ref Range Status   10/08/2018 neg  Final       Last Dontae Serene: 1/6/2021  Medication Contract:    Last Fill: 1/8/2021    Requested Prescriptions     Pending Prescriptions Disp Refills    modafinil (PROVIGIL) 200 MG tablet 60 tablet 0     Sig: TAKE 1 TABLET BY MOUTH TWICE DAILY                 Please approve or refuse this medication.    Gregory, MA

## 2021-02-24 RX ORDER — MODAFINIL 200 MG/1
TABLET ORAL
Qty: 60 TABLET | Refills: 0 | Status: SHIPPED | OUTPATIENT
Start: 2021-02-24 | End: 2021-03-30

## 2021-02-24 NOTE — TELEPHONE ENCOUNTER
LAMONT was reviewed today per office protocol. Report shows No discrepancies. Fill pattern is consistent from single provider(s) at single pharmacy(s).

## 2021-03-01 DIAGNOSIS — G25.81 RESTLESS LEGS SYNDROME: ICD-10-CM

## 2021-03-01 DIAGNOSIS — J44.1 CHRONIC OBSTRUCTIVE PULMONARY DISEASE WITH ACUTE EXACERBATION (HCC): ICD-10-CM

## 2021-03-01 RX ORDER — ROPINIROLE 0.25 MG/1
TABLET, FILM COATED ORAL
Qty: 30 TABLET | Refills: 0 | Status: SHIPPED | OUTPATIENT
Start: 2021-03-01 | End: 2021-03-30

## 2021-03-01 RX ORDER — ALBUTEROL SULFATE 90 UG/1
AEROSOL, METERED RESPIRATORY (INHALATION)
Qty: 18 G | Refills: 0 | Status: SHIPPED | OUTPATIENT
Start: 2021-03-01 | End: 2021-03-30

## 2021-03-30 DIAGNOSIS — F33.0 MILD EPISODE OF RECURRENT MAJOR DEPRESSIVE DISORDER (HCC): ICD-10-CM

## 2021-03-30 DIAGNOSIS — G25.81 RESTLESS LEGS SYNDROME: ICD-10-CM

## 2021-03-30 DIAGNOSIS — J44.1 CHRONIC OBSTRUCTIVE PULMONARY DISEASE WITH ACUTE EXACERBATION (HCC): ICD-10-CM

## 2021-03-30 RX ORDER — ALBUTEROL SULFATE 2.5 MG/3ML
SOLUTION RESPIRATORY (INHALATION)
Qty: 75 ML | Refills: 0 | Status: SHIPPED | OUTPATIENT
Start: 2021-03-30 | End: 2022-05-03

## 2021-03-30 RX ORDER — ALBUTEROL SULFATE 90 UG/1
AEROSOL, METERED RESPIRATORY (INHALATION)
Qty: 18 G | Refills: 0 | Status: SHIPPED | OUTPATIENT
Start: 2021-03-30 | End: 2021-05-02

## 2021-03-30 RX ORDER — ROPINIROLE 0.25 MG/1
TABLET, FILM COATED ORAL
Qty: 30 TABLET | Refills: 0 | Status: SHIPPED | OUTPATIENT
Start: 2021-03-30 | End: 2021-05-02

## 2021-03-30 RX ORDER — CITALOPRAM 40 MG/1
TABLET ORAL
Qty: 30 TABLET | Refills: 0 | Status: SHIPPED | OUTPATIENT
Start: 2021-03-30 | End: 2021-05-02

## 2021-04-15 ENCOUNTER — TRANSCRIBE ORDERS (OUTPATIENT)
Dept: LAB | Facility: HOSPITAL | Age: 66
End: 2021-04-15

## 2021-04-15 DIAGNOSIS — Z01.818 PREOP TESTING: Primary | ICD-10-CM

## 2021-04-19 ENCOUNTER — LAB (OUTPATIENT)
Dept: LAB | Facility: HOSPITAL | Age: 66
End: 2021-04-19

## 2021-04-19 LAB — SARS-COV-2 ORF1AB RESP QL NAA+PROBE: NOT DETECTED

## 2021-04-19 PROCEDURE — U0005 INFEC AGEN DETEC AMPLI PROBE: HCPCS | Performed by: PAIN MEDICINE

## 2021-04-19 PROCEDURE — U0004 COV-19 TEST NON-CDC HGH THRU: HCPCS | Performed by: PAIN MEDICINE

## 2021-04-19 PROCEDURE — C9803 HOPD COVID-19 SPEC COLLECT: HCPCS | Performed by: PAIN MEDICINE

## 2021-05-01 DIAGNOSIS — F33.0 MILD EPISODE OF RECURRENT MAJOR DEPRESSIVE DISORDER (HCC): ICD-10-CM

## 2021-05-01 DIAGNOSIS — G25.81 RESTLESS LEGS SYNDROME: ICD-10-CM

## 2021-05-01 DIAGNOSIS — J44.1 CHRONIC OBSTRUCTIVE PULMONARY DISEASE WITH ACUTE EXACERBATION (HCC): ICD-10-CM

## 2021-05-02 RX ORDER — ROPINIROLE 0.25 MG/1
TABLET, FILM COATED ORAL
Qty: 30 TABLET | Refills: 0 | Status: SHIPPED | OUTPATIENT
Start: 2021-05-02 | End: 2021-06-02

## 2021-05-02 RX ORDER — ALBUTEROL SULFATE 90 UG/1
AEROSOL, METERED RESPIRATORY (INHALATION)
Qty: 18 G | Refills: 0 | Status: SHIPPED | OUTPATIENT
Start: 2021-05-02 | End: 2021-06-02

## 2021-05-02 RX ORDER — CITALOPRAM 40 MG/1
TABLET ORAL
Qty: 30 TABLET | Refills: 0 | Status: SHIPPED | OUTPATIENT
Start: 2021-05-02 | End: 2021-06-02

## 2021-05-05 ENCOUNTER — OFFICE VISIT (OUTPATIENT)
Dept: PRIMARY CARE CLINIC | Age: 66
End: 2021-05-05
Payer: MEDICARE

## 2021-05-05 ENCOUNTER — HOSPITAL ENCOUNTER (OUTPATIENT)
Dept: GENERAL RADIOLOGY | Age: 66
Discharge: HOME | End: 2021-05-05
Payer: MEDICARE

## 2021-05-05 ENCOUNTER — TELEPHONE (OUTPATIENT)
Dept: PRIMARY CARE CLINIC | Age: 66
End: 2021-05-05

## 2021-05-05 VITALS
DIASTOLIC BLOOD PRESSURE: 84 MMHG | OXYGEN SATURATION: 96 % | HEART RATE: 83 BPM | HEIGHT: 66 IN | TEMPERATURE: 97.4 F | SYSTOLIC BLOOD PRESSURE: 120 MMHG | RESPIRATION RATE: 14 BRPM | BODY MASS INDEX: 25.07 KG/M2 | WEIGHT: 156 LBS

## 2021-05-05 DIAGNOSIS — E78.5 HYPERLIPIDEMIA, UNSPECIFIED HYPERLIPIDEMIA TYPE: ICD-10-CM

## 2021-05-05 DIAGNOSIS — R06.02 SHORTNESS OF BREATH: ICD-10-CM

## 2021-05-05 DIAGNOSIS — R07.81 RIB PAIN ON RIGHT SIDE: Primary | ICD-10-CM

## 2021-05-05 DIAGNOSIS — E03.9 ACQUIRED HYPOTHYROIDISM: ICD-10-CM

## 2021-05-05 DIAGNOSIS — R07.81 RIB PAIN ON RIGHT SIDE: ICD-10-CM

## 2021-05-05 DIAGNOSIS — Z51.81 ENCOUNTER FOR MEDICATION MONITORING: ICD-10-CM

## 2021-05-05 DIAGNOSIS — Z12.11 COLON CANCER SCREENING: ICD-10-CM

## 2021-05-05 DIAGNOSIS — F51.01 PRIMARY INSOMNIA: ICD-10-CM

## 2021-05-05 DIAGNOSIS — F33.0 MILD EPISODE OF RECURRENT MAJOR DEPRESSIVE DISORDER (HCC): ICD-10-CM

## 2021-05-05 PROCEDURE — G8419 CALC BMI OUT NRM PARAM NOF/U: HCPCS | Performed by: NURSE PRACTITIONER

## 2021-05-05 PROCEDURE — 71046 X-RAY EXAM CHEST 2 VIEWS: CPT

## 2021-05-05 PROCEDURE — 3017F COLORECTAL CA SCREEN DOC REV: CPT | Performed by: NURSE PRACTITIONER

## 2021-05-05 PROCEDURE — 80305 DRUG TEST PRSMV DIR OPT OBS: CPT | Performed by: NURSE PRACTITIONER

## 2021-05-05 PROCEDURE — 99214 OFFICE O/P EST MOD 30 MIN: CPT | Performed by: NURSE PRACTITIONER

## 2021-05-05 PROCEDURE — 1036F TOBACCO NON-USER: CPT | Performed by: NURSE PRACTITIONER

## 2021-05-05 PROCEDURE — G8427 DOCREV CUR MEDS BY ELIG CLIN: HCPCS | Performed by: NURSE PRACTITIONER

## 2021-05-05 RX ORDER — MODAFINIL 200 MG/1
TABLET ORAL
COMMUNITY
Start: 2021-02-23 | End: 2021-05-05 | Stop reason: SDDI

## 2021-05-05 RX ORDER — NAPROXEN 500 MG/1
TABLET ORAL
Qty: 60 TABLET | Refills: 0 | Status: SHIPPED | OUTPATIENT
Start: 2021-05-05 | End: 2021-09-22

## 2021-05-05 ASSESSMENT — ENCOUNTER SYMPTOMS
VOICE CHANGE: 0
NAUSEA: 0
SHORTNESS OF BREATH: 0
COLOR CHANGE: 0
BACK PAIN: 0
COUGH: 0
PHOTOPHOBIA: 0
RHINORRHEA: 0
VOMITING: 0

## 2021-05-05 ASSESSMENT — PATIENT HEALTH QUESTIONNAIRE - PHQ9
2. FEELING DOWN, DEPRESSED OR HOPELESS: 1
SUM OF ALL RESPONSES TO PHQ9 QUESTIONS 1 & 2: 1
SUM OF ALL RESPONSES TO PHQ QUESTIONS 1-9: 1

## 2021-05-05 NOTE — TELEPHONE ENCOUNTER
----- Message from ERIN Sutton sent at 5/5/2021 11:55 AM CDT -----  Discontinue provigil and gabapentin-- no further refills of this medication for this patient. She was positive for methamphatamine. Okay to send out to Sh[facility] for confirmation. Please also send UDS results to Dr Catina Dias with pain management.

## 2021-05-05 NOTE — PROGRESS NOTES
[address] [facility]  [address] [address]  [address] [address]  Dept: [phone]  Dept Fax: [phone]  Loc: [phone]    Kuldeep Giang is a 63 y.o. female who presents today for her medical conditions/complaints as noted below. Kuldeep Giang is c/o of Hyperlipidemia, Thyroid Problem, Depression (doing well; tired of being \"cooped up\" from [facility]), and Chest Pain (broke 3 ribs a while ago and she is still having pain when she wears a bra or moves a certain way)        HPI:     HPI   Chief Complaint   Patient presents with    Hyperlipidemia    Thyroid Problem    Depression     doing well; tired of being \"cooped up\" from [address] Chest Pain     broke 3 ribs a while ago and she is still having pain when she wears a bra or moves a certain way     Patient presents today for follow-up hyperlipidemia, hypothyroidism, depression. Depression well-controlled with celexa. She continues provigil for sleep disorder. Patient is due for a UDS today. She states she had broken 3 ribs several months ago and still complains of occasional pain. She  reports that she quit smoking about 7 years ago. She smoked 0.00 packs per day for 37.00 years. She has never used smokeless tobacco.    She would like to hold off on mammogram until after covid; she is agreeable to cologuard.      Past Medical History:   Diagnosis Date    Anxiety     Asthma     COPD [facility]ch[facility] obstructive [facility] disease[facility]     CPAP (continuous positive airway pressure) dependence     11cm    Depression     Hyperlipidemia     Hypothyroidism     Migraines     Obstructive sleep apnea     AHI:  13.1    Restless legs syndrome     Sarcoidosis     Scoliosis     Unspecified sleep apnea       Past Surgical History:   Procedure Laterality Date    APPENDECTOMY  age 27    Patient thinks this was removed with GB    BREAST SURGERY  2006    bilateral breast tumor removal-[facility] BRONCHOSCOPY N/A 2019    BRONCHOSCOPY BIOPSY BRONCHUS performed by Oswald Gatica MD at [facility]    CHOLECYSTECTOMY  age 27       Vitals 2021 2020 2020 10/15/2019 2019    SYSTOLIC 648 606 623 262 295 -   DIASTOLIC 84 70 72 78 70 -   Pulse 83 85 84 84 82 -   Temp 97.4 97.1 99 98.5 96.1 -   Resp 14 - 20 16 - -   SpO2 96 94 94 93 98 -   Weight 156 lb 150 lb 3.2 oz 135 lb 141 lb 138 lb 12.8 oz -   Height 5' 6\" 5' 6.5\" 5' 6.5\" 5' 7\" 5' 7\" -   Body mass index 25.18 kg/m2 23.88 kg/m2 21.46 kg/m2 22.08 kg/m2 21.74 kg/m2 -   Pain Level - - - - - 9   Some recent data might be hidden       Family History   Problem Relation Age of Onset    Heart Disease Mother     Diabetes Father     Heart Disease Father     Diabetes Sister     Heart Disease Sister     Diabetes Brother     Heart Disease Brother     Cancer Maternal Aunt         breast    Cancer Paternal Aunt         lung cancer    Diabetes Sister     Heart Disease Sister     Diabetes Brother     Cancer Son         kidney    Cancer Other         Nephew with Renal cell carcinoma        Social History     Tobacco Use    Smoking status: Former Smoker     Packs/day: 0.00     Years: 37.00     Pack years: 0.00     Quit date: 2/3/2014     Years since quittin.2    Smokeless tobacco: Never Used   Substance Use Topics    Alcohol use: No      Current Outpatient Medications on File Prior to Visit   Medication Sig Dispense Refill    citalopram (CELEXA) 40 MG tablet TAKE 1 TABLET BY MOUTH ONCE DAILY 30 tablet 0    rOPINIRole (REQUIP) 0.25 MG tablet TAKE 1 TABLET BY MOUTH AT BEDTIME 30 tablet 0    albuterol sulfate  (90 Base) MCG/ACT inhaler USE TWO PUFFS BY MOUTH DAILY AS NEEDED FOR WHEEZING 18 g 0    albuterol (PROVENTIL) (2.5 MG/3ML) 0.083% nebulizer solution USE 1 VIAL IN THE NEBULIZATION 4 TIMES DAILY AS NEEDED FOR WHEEZING 75 mL 0    diclofenac sodium (VOLTAREN) 1 % GEL Apply 2 g topically 2 times daily 150 g 1    mirtazapine (REMERON) 15 MG tablet TAKE 1 TABLET BY MOUTH AT BEDTIME 30 tablet 11    levothyroxine (SYNTHROID) 112 MCG tablet TAKE 1 TABLET BY MOUTH ONCE DAILY. 30 tablet 11    albuterol (PROVENTIL) (5 MG/ML) 0.5% nebulizer solution Take 1 mL by nebulization 4 times daily as needed for Wheezing 120 each 3    esomeprazole (NEXIUM) 20 MG delayed release capsule TAKE 1 CAPSULE BEFORE BREAKFAST DAILY 30 capsule 6    alendronate (FOSAMAX) 35 MG tablet TAKE 1 TABLET ONCE A WEEK. TAKE WITH 8OZ WATER 30 MIN BEFORE FOOD OR OTHER MEDS. DON'T LIE DOWN FOR 30 MIN. 4 tablet 6    Cholecalciferol (VITAMIN D3) 25796 UNITS CAPS Take 1 capsule by mouth once a week 12 capsule 3    HYDROcodone-acetaminophen (NORCO)  MG per tablet Take 1 tablet by mouth every 6 hours as needed       OXYGEN Inhale into the lungs as needed       tiZANidine (ZANAFLEX) 4 MG tablet Take 4 mg by mouth every 8 hours as needed       zoster recombinant adjuvanted vaccine [facility]) 50 MCG/0.5ML SUSR injection Inject 0.5 mLs into the muscle See Admin Instructions 1 dose now and repeat in 2-6 months (Patient not taking: Reported on 5/5/2021) 0.5 mL 0    predniSONE (DELTASONE) 10 MG tablet 60 mg x 3 days; 40 mg x 3 days; 20 mg x 3 days and then 10 mg x 3 days. (Patient not taking: Reported on 5/5/2021) 39 tablet 0    naproxen (NAPROSYN) 500 MG tablet Take 1 tablet by mouth 2 times daily as needed for Pain 20 tablet 0     No current facility-administered medications on file prior to visit.       Allergies   Allergen Reactions    Codeine Shortness Of Breath       Health Maintenance   Topic Date Due    COVID-19 Vaccine (1) Never done    Shingles Vaccine (1 of 2) Never done    Cervical cancer screen  09/25/2016    Colon cancer screen colonoscopy  10/17/2016    Breast cancer screen  01/17/2019    TSH testing  09/22/2021    Annual Wellness Visit (AWV)  11/25/2021    Lipid screen  09/22/2025    DTaP/Tdap/Td vaccine (2 - Td) 11/24/2027    Flu vaccine  Completed    Pneumococcal 0-64 years Vaccine  Completed    Hepatitis C screen  Completed    HIV screen  Completed    Hepatitis A vaccine  Aged Out    Hepatitis B vaccine  Aged Out    Hib vaccine  Aged Out    Meningococcal (ACWY) vaccine  Aged Out       Subjective:      Review of Systems   Constitutional: Negative for chills and fever. HENT: Negative for ear pain, hearing loss, rhinorrhea and voice change. Eyes: Negative for photophobia and visual disturbance. Respiratory: Negative for cough and shortness of breath. Cardiovascular: Negative for chest pain and palpitations. Gastrointestinal: Negative for nausea and vomiting. Endocrine: Negative. Negative for cold intolerance and heat intolerance. Genitourinary: Negative for difficulty urinating and flank pain. Musculoskeletal: Negative for back pain and neck pain. Skin: Negative for color change and rash. Allergic/Immunologic: Negative for environmental allergies and food allergies. Neurological: Negative for dizziness, speech difficulty and headaches. Hematological: Does not bruise/bleed easily. Psychiatric/Behavioral: Negative for sleep disturbance and suicidal ideas. Objective:     Physical Exam  Vitals signs and nursing note reviewed. Constitutional:       Appearance: She is well-developed. HENT:      Head: Atraumatic. Right Ear: External ear normal.      Left Ear: External ear normal.      Nose: Nose normal.   Eyes:      Conjunctiva/sclera: Conjunctivae normal.      Pupils: Pupils are equal, round, and reactive to light. Neck:      Musculoskeletal: Normal range of motion and neck supple. Cardiovascular:      Rate and Rhythm: Normal rate and regular rhythm. Heart sounds: Normal heart sounds, S1 normal and S2 normal.   Pulmonary:      Effort: Pulmonary effort is normal.      Breath sounds: Wheezing present. Abdominal:      General: Bowel sounds are normal.      Palpations: Abdomen is soft. Musculoskeletal: Normal range of motion. Skin:     General: Skin is warm and dry. Neurological:      Mental Status: She is alert and oriented to person, place, and time. Psychiatric:         Behavior: Behavior normal.       /84   Pulse 83   Temp 97.4 °F (36.3 °C) (Temporal)   Resp 14   Ht 5' 6\" (1.676 m)   Wt 156 lb (70.8 kg)   SpO2 96%   BMI 25.18 kg/m²     Assessment:       Diagnosis Orders   1. Rib pain on right side  naproxen (NAPROSYN) 500 MG tablet    XR CHEST STANDARD (2 VW)   2. Acquired hypothyroidism     3. Primary insomnia     4. Mild episode of recurrent major depressive disorder ([facility] [address]     5. Hyperlipidemia, unspecified hyperlipidemia type     6. Shortness of breath  XR CHEST STANDARD (2 VW)   7. Colon cancer screening  Cologuard (For External Results Only)   8. Encounter for medication monitoring  POCT Rapid Drug Screen         Plan:   More than 50% of the time was spent counseling and coordinating care for a total time of 30 min face to face. Advised naproxen as needed for rib pain. UDS today. Will obtain a chest x-ray. Follow-up in 3 months or sooner if needed. Patient given educational materials -see patient instructions. Discussed use, benefit, and side effects of prescribed medications. All patient questions answered. Pt voiced understanding. Reviewed health maintenance. Instructed to continue currentmedications, diet and exercise. Patient agreed with treatment plan. Follow up as directed. MEDICATIONS:  Orders Placed This Encounter   Medications    naproxen (NAPROSYN) 500 MG tablet     Sig: TAKE 1 TABLET TWICE DAILY WITH MEALS     Dispense:  60 tablet     Refill:  0         ORDERS:  Orders Placed This Encounter   Procedures    Cologuard (For External Results Only)    XR CHEST STANDARD (2 VW)    POCT Rapid Drug Screen       Follow-up:  Return in about 6 months (around 11/5/2021) for medicare AWV.     PATIENT INSTRUCTIONS:  Patient Instructions   We are committed to providing you with the best care possible. In order to help us achieve these goals please remember to bring all medications, herbal products, and over the counter supplements with you to each visit. If your provider has ordered testing for you, please be sure to follow up with our office if you have not received results within 7 days after the testing took place. *If you receive a survey after visiting one of our offices, please take time to share your experience concerning your physician office visit. These surveys are confidential and no health information about you is shared. We are eager to improve for you and we are counting on your feedback to help make that happen. Electronically signed by ERIN Sutton on 5/5/2021 at 4:56 PM    EMR Dragon/transcription disclaimer:  Much of thisencounter note is electronic transcription/translation of spoken language to printed texts. The electronic translation of spoken language may be erroneous, or at times, nonsensical words or phrases may be inadvertentlytranscribed.   Although I have reviewed the note for such errors, some may still exist.

## 2021-05-13 ENCOUNTER — TRANSCRIBE ORDERS (OUTPATIENT)
Dept: LAB | Facility: HOSPITAL | Age: 66
End: 2021-05-13

## 2021-05-13 DIAGNOSIS — Z01.818 PRE-OP TESTING: Primary | ICD-10-CM

## 2021-05-15 ENCOUNTER — LAB (OUTPATIENT)
Dept: LAB | Facility: HOSPITAL | Age: 66
End: 2021-05-15

## 2021-05-15 PROCEDURE — U0004 COV-19 TEST NON-CDC HGH THRU: HCPCS | Performed by: PAIN MEDICINE

## 2021-05-15 PROCEDURE — C9803 HOPD COVID-19 SPEC COLLECT: HCPCS | Performed by: PAIN MEDICINE

## 2021-05-15 PROCEDURE — U0005 INFEC AGEN DETEC AMPLI PROBE: HCPCS | Performed by: PAIN MEDICINE

## 2021-06-02 ENCOUNTER — VIRTUAL VISIT (OUTPATIENT)
Dept: PRIMARY CARE CLINIC | Age: 66
End: 2021-06-02
Payer: MEDICARE

## 2021-06-02 DIAGNOSIS — G43.809 OTHER MIGRAINE WITHOUT STATUS MIGRAINOSUS, NOT INTRACTABLE: Primary | ICD-10-CM

## 2021-06-02 DIAGNOSIS — G25.81 RESTLESS LEGS SYNDROME: ICD-10-CM

## 2021-06-02 DIAGNOSIS — F33.0 MILD EPISODE OF RECURRENT MAJOR DEPRESSIVE DISORDER (HCC): ICD-10-CM

## 2021-06-02 DIAGNOSIS — J44.1 CHRONIC OBSTRUCTIVE PULMONARY DISEASE WITH ACUTE EXACERBATION (HCC): ICD-10-CM

## 2021-06-02 PROCEDURE — 3017F COLORECTAL CA SCREEN DOC REV: CPT | Performed by: NURSE PRACTITIONER

## 2021-06-02 PROCEDURE — 1036F TOBACCO NON-USER: CPT | Performed by: NURSE PRACTITIONER

## 2021-06-02 PROCEDURE — G8427 DOCREV CUR MEDS BY ELIG CLIN: HCPCS | Performed by: NURSE PRACTITIONER

## 2021-06-02 PROCEDURE — 99213 OFFICE O/P EST LOW 20 MIN: CPT | Performed by: NURSE PRACTITIONER

## 2021-06-02 PROCEDURE — G8419 CALC BMI OUT NRM PARAM NOF/U: HCPCS | Performed by: NURSE PRACTITIONER

## 2021-06-02 RX ORDER — RIZATRIPTAN BENZOATE 10 MG/1
10 TABLET ORAL
Qty: 9 TABLET | Refills: 0 | Status: SHIPPED | OUTPATIENT
Start: 2021-06-02 | End: 2021-07-01

## 2021-06-02 RX ORDER — ROPINIROLE 0.25 MG/1
TABLET, FILM COATED ORAL
Qty: 30 TABLET | Refills: 0 | Status: SHIPPED | OUTPATIENT
Start: 2021-06-02 | End: 2021-07-01

## 2021-06-02 RX ORDER — ALBUTEROL SULFATE 90 UG/1
AEROSOL, METERED RESPIRATORY (INHALATION)
Qty: 18 G | Refills: 0 | Status: SHIPPED | OUTPATIENT
Start: 2021-06-02 | End: 2021-07-01

## 2021-06-02 RX ORDER — CITALOPRAM 40 MG/1
TABLET ORAL
Qty: 30 TABLET | Refills: 0 | Status: SHIPPED | OUTPATIENT
Start: 2021-06-02 | End: 2021-07-01

## 2021-06-02 ASSESSMENT — ENCOUNTER SYMPTOMS
BACK PAIN: 0
COUGH: 0
RHINORRHEA: 0
SHORTNESS OF BREATH: 0
NAUSEA: 0
VOICE CHANGE: 0
VOMITING: 0
PHOTOPHOBIA: 0
COLOR CHANGE: 0

## 2021-06-02 NOTE — PROGRESS NOTES
[address] [address]             Phone:  [phone]  Fax:  [phone]       2021    TELEHEALTH EVALUATION -- Audio/Visual (During [id number] public health emergency)    HPI:  Chief Complaint   Patient presents with    Migraine     has had a migraine for the last 3 days; has taken maxalt in the past and had success with that; would like to see about getting a new script for this         Yoly Valdez (:  1955) has requested an audio/video evaluation for the following concern(s):    Patient presents today for evaluation of migraine headache x 3 days. She states she has taken tylenol and ibuprofen without relief. Blood pressure in normal range. She denies visual changes, dizziness, chest pain. She states she has been on maxalt in the past but has not had a migraine in awhile so she has not needed it. She is requesting this today. Review of Systems   Constitutional: Negative for chills and fever. HENT: Negative for ear pain, hearing loss, rhinorrhea and voice change. Eyes: Negative for photophobia and visual disturbance. Respiratory: Negative for cough and shortness of breath. Cardiovascular: Negative for chest pain and palpitations. Gastrointestinal: Negative for nausea and vomiting. Endocrine: Negative. Negative for cold intolerance and heat intolerance. Genitourinary: Negative for difficulty urinating and flank pain. Musculoskeletal: Negative for back pain and neck pain. Skin: Negative for color change and rash. Allergic/Immunologic: Negative for environmental allergies and food allergies. Neurological: Positive for headaches. Negative for dizziness and speech difficulty. Hematological: Does not bruise/bleed easily. Psychiatric/Behavioral: Negative for sleep disturbance and suicidal ideas. Prior to Visit Medications    Medication Sig Taking?  Authorizing Provider   albuterol sulfate  (90 Base) MCG/ACT inhaler USE TWO PUFFS BY MOUTH DAILY AS NEEDED FOR WHEEZING  Yes ERIN Thornton   rOPINIRole (REQUIP) 0.25 MG tablet TAKE 1 TABLET BY MOUTH AT BEDTIME  Yes Steve Gao MD   citalopram (CELEXA) 40 MG tablet TAKE 1 TABLET BY MOUTH ONCE DAILY  Yes ERIN Thornton   rizatriptan (MAXALT) 10 MG tablet Take 1 tablet by mouth once as needed for Migraine May repeat in 2 hours if needed Yes ERIN Thornton   naproxen (NAPROSYN) 500 MG tablet TAKE 1 TABLET TWICE DAILY WITH MEALS Yes ERIN Thornton   albuterol (PROVENTIL) (2.5 MG/3ML) 0.083% nebulizer solution USE 1 VIAL IN THE NEBULIZATION 4 TIMES DAILY AS NEEDED FOR WHEEZING Yes Steve Gao MD   diclofenac sodium (VOLTAREN) 1 % GEL Apply 2 g topically 2 times daily Yes ERIN Escobar   predniSONE (DELTASONE) 10 MG tablet 60 mg x 3 days; 40 mg x 3 days; 20 mg x 3 days and then 10 mg x 3 days. Yes ERIN Thornton   mirtazapine (REMERON) 15 MG tablet TAKE 1 TABLET BY MOUTH AT BEDTIME Yes ERIN Escobar   levothyroxine (SYNTHROID) 112 MCG tablet TAKE 1 TABLET BY MOUTH ONCE DAILY. Yes ERIN Escobar   albuterol (PROVENTIL) (5 MG/ML) 0.5% nebulizer solution Take 1 mL by nebulization 4 times daily as needed for Wheezing Yes ERIN Escobar   esomeprazole (NEXIUM) 20 MG delayed release capsule TAKE 1 CAPSULE BEFORE BREAKFAST DAILY Yes ERIN Escobar   alendronate (FOSAMAX) 35 MG tablet TAKE 1 TABLET ONCE A WEEK. TAKE WITH 8OZ WATER 30 MIN BEFORE FOOD OR OTHER MEDS. DON'T LIE DOWN FOR 30 MIN.  Yes ERIN Gomez   Cholecalciferol (VITAMIN D3) 38582 UNITS CAPS Take 1 capsule by mouth once a week Yes Simon Huff MD   HYDROcodone-acetaminophen (NORCO)  MG per tablet Take 1 tablet by mouth every 6 hours as needed  Yes Historical Provider, MD   OXYGEN Inhale into the lungs as needed  Yes Historical Provider, MD   tiZANidine (ZANAFLEX) 4 MG tablet Take 4 mg by mouth every 8 hours as needed  Yes Historical Provider, MD   naproxen (NAPROSYN) 500 MG tablet Take 1 tablet by mouth 2 times daily as needed for Pain  ERIN Vee - NP       Social History     Tobacco Use    Smoking status: Former Smoker     Packs/day: 0.00     Years: 37.00     Pack years: 0.00     Quit date: 2/3/2014     Years since quittin.3    Smokeless tobacco: Never Used   Vaping Use    Vaping Use: Never used   Substance Use Topics    Alcohol use: No    Drug use: No        Allergies   Allergen Reactions    Codeine Shortness Of Breath   ,   Past Medical History:   Diagnosis Date    Anxiety     Asthma     COPD (chronic obstructive pulmonary disease) (HCC)     CPAP (continuous positive airway pressure) dependence     11cm    Depression     Hyperlipidemia     Hypothyroidism     Migraines     Obstructive sleep apnea     AHI:  13.1    Restless legs syndrome     Sarcoidosis     Scoliosis     Unspecified sleep apnea    ,   Past Surgical History:   Procedure Laterality Date    APPENDECTOMY  age 27    Patient thinks this was removed with GB    BREAST SURGERY      bilateral breast tumor removal-[facility] in [address] N/A 2019    BRONCHOSCOPY BIOPSY BRONCHUS performed by Yasemin Fuentes MD at [facility]    CHOLECYSTECTOMY  age 27   ,   Social History     Tobacco Use    Smoking status: Former Smoker     Packs/day: 0.00     Years: 37.00     Pack years: 0.00     Quit date: 2/3/2014     Years since quittin.3    Smokeless tobacco: Never Used   Vaping Use    Vaping Use: Never used   Substance Use Topics    Alcohol use: No    Drug use: No   ,   Family History   Problem Relation Age of Onset    Heart Disease Mother     Diabetes Father     Heart Disease Father     Diabetes Sister     Heart Disease Sister     Diabetes Brother     Heart Disease Brother     Cancer Maternal Aunt         breast    Cancer Paternal Aunt         lung cancer    Diabetes Sister     Heart Disease Sister     Diabetes

## 2021-07-01 DIAGNOSIS — G25.81 RESTLESS LEGS SYNDROME: ICD-10-CM

## 2021-07-01 DIAGNOSIS — J44.1 CHRONIC OBSTRUCTIVE PULMONARY DISEASE WITH ACUTE EXACERBATION (HCC): ICD-10-CM

## 2021-07-01 DIAGNOSIS — G43.809 OTHER MIGRAINE WITHOUT STATUS MIGRAINOSUS, NOT INTRACTABLE: ICD-10-CM

## 2021-07-01 DIAGNOSIS — F33.0 MILD EPISODE OF RECURRENT MAJOR DEPRESSIVE DISORDER (HCC): ICD-10-CM

## 2021-07-01 RX ORDER — CITALOPRAM 40 MG/1
TABLET ORAL
Qty: 30 TABLET | Refills: 0 | Status: SHIPPED | OUTPATIENT
Start: 2021-07-01 | End: 2021-08-03

## 2021-07-01 RX ORDER — RIZATRIPTAN BENZOATE 10 MG/1
10 TABLET ORAL
Qty: 9 TABLET | Refills: 0 | Status: SHIPPED | OUTPATIENT
Start: 2021-07-01 | End: 2021-09-22

## 2021-07-01 RX ORDER — ROPINIROLE 0.25 MG/1
TABLET, FILM COATED ORAL
Qty: 30 TABLET | Refills: 0 | Status: SHIPPED | OUTPATIENT
Start: 2021-07-01 | End: 2021-08-02

## 2021-07-01 RX ORDER — ALBUTEROL SULFATE 90 UG/1
AEROSOL, METERED RESPIRATORY (INHALATION)
Qty: 18 G | Refills: 0 | Status: SHIPPED | OUTPATIENT
Start: 2021-07-01 | End: 2021-07-20

## 2021-07-15 ENCOUNTER — OFFICE VISIT (OUTPATIENT)
Dept: FAMILY MEDICINE CLINIC | Facility: CLINIC | Age: 66
End: 2021-07-15

## 2021-07-15 VITALS
OXYGEN SATURATION: 96 % | HEIGHT: 67 IN | TEMPERATURE: 98.3 F | HEART RATE: 76 BPM | RESPIRATION RATE: 18 BRPM | BODY MASS INDEX: 26.53 KG/M2 | WEIGHT: 169 LBS

## 2021-07-15 DIAGNOSIS — R05.9 COUGH: Primary | ICD-10-CM

## 2021-07-15 DIAGNOSIS — Z20.822 EXPOSURE TO COVID-19 VIRUS: ICD-10-CM

## 2021-07-15 DIAGNOSIS — J44.1 COPD WITH EXACERBATION (HCC): ICD-10-CM

## 2021-07-15 LAB — SARS-COV-2 RNA PNL SPEC NAA+PROBE: NOT DETECTED

## 2021-07-15 PROCEDURE — 99203 OFFICE O/P NEW LOW 30 MIN: CPT | Performed by: NURSE PRACTITIONER

## 2021-07-15 PROCEDURE — 87635 SARS-COV-2 COVID-19 AMP PRB: CPT | Performed by: NURSE PRACTITIONER

## 2021-07-15 PROCEDURE — 96372 THER/PROPH/DIAG INJ SC/IM: CPT | Performed by: NURSE PRACTITIONER

## 2021-07-15 RX ORDER — RIZATRIPTAN BENZOATE 10 MG/1
TABLET ORAL
COMMUNITY
Start: 2021-07-01

## 2021-07-15 RX ORDER — BENZONATATE 100 MG/1
100 CAPSULE ORAL 3 TIMES DAILY PRN
Qty: 21 CAPSULE | Refills: 0 | Status: SHIPPED | OUTPATIENT
Start: 2021-07-15 | End: 2022-03-25

## 2021-07-15 RX ORDER — HYDROCODONE BITARTRATE AND ACETAMINOPHEN 10; 325 MG/1; MG/1
TABLET ORAL
COMMUNITY
Start: 2021-07-10

## 2021-07-15 RX ORDER — ALBUTEROL SULFATE 90 UG/1
AEROSOL, METERED RESPIRATORY (INHALATION)
COMMUNITY
Start: 2021-07-01 | End: 2022-03-25 | Stop reason: SDUPTHER

## 2021-07-15 RX ORDER — CITALOPRAM 40 MG/1
TABLET ORAL
COMMUNITY
Start: 2021-07-01

## 2021-07-15 RX ORDER — LEVOTHYROXINE SODIUM 112 UG/1
112 TABLET ORAL DAILY
COMMUNITY
Start: 2021-07-01

## 2021-07-15 RX ORDER — TIZANIDINE 4 MG/1
4 TABLET ORAL 2 TIMES DAILY
COMMUNITY
Start: 2021-07-10

## 2021-07-15 RX ORDER — AZITHROMYCIN 250 MG/1
TABLET, FILM COATED ORAL
Qty: 6 TABLET | Refills: 0 | Status: SHIPPED | OUTPATIENT
Start: 2021-07-15 | End: 2022-03-25

## 2021-07-15 RX ORDER — ROPINIROLE 0.25 MG/1
0.25 TABLET, FILM COATED ORAL
COMMUNITY
Start: 2021-07-01

## 2021-07-15 RX ORDER — MIRTAZAPINE 15 MG/1
15 TABLET, FILM COATED ORAL
COMMUNITY
Start: 2021-07-01

## 2021-07-15 RX ORDER — NAPROXEN 500 MG/1
500 TABLET ORAL 2 TIMES DAILY WITH MEALS
COMMUNITY
Start: 2021-06-02 | End: 2022-11-22

## 2021-07-15 RX ORDER — ALBUTEROL SULFATE 2.5 MG/3ML
SOLUTION RESPIRATORY (INHALATION)
COMMUNITY
Start: 2021-03-30 | End: 2022-10-03 | Stop reason: SDUPTHER

## 2021-07-15 RX ORDER — DEXAMETHASONE SODIUM PHOSPHATE 10 MG/ML
10 INJECTION INTRAMUSCULAR; INTRAVENOUS ONCE
Status: COMPLETED | OUTPATIENT
Start: 2021-07-15 | End: 2021-07-15

## 2021-07-15 RX ADMIN — DEXAMETHASONE SODIUM PHOSPHATE 10 MG: 10 INJECTION INTRAMUSCULAR; INTRAVENOUS at 10:18

## 2021-07-15 NOTE — PROGRESS NOTES
"Chief Complaint  Cough    Subjective          Ashley Gibbs presents to [facility] for cough.  History of Present Illness  New. Began 3-4 days ago.  Getting worse.  Patient reports headache, cough (productive thick yellow), sore throat, shortness of breath, wheezing, chills, and nauseated.  Denies fever, v/d, changes in taste or smell.     Has been using inhaler and nebulizer which helps temporarily. She is former smoker, has history of copd.   Reports covid exposure- would like to be tested today      Objective   Vital Signs:   Pulse 76   Temp 98.3 °F (36.8 °C) (Infrared)   Resp 18   Ht 170.2 cm (67\") Comment: per patient  Wt 76.7 kg (169 lb) Comment: per patient  SpO2 96%   BMI 26.47 kg/m²     Physical Exam  Vitals and nursing note reviewed.   Constitutional:       General: She is not in acute distress.     Appearance: She is well-developed.   HENT:      Head: Normocephalic and atraumatic.      Right Ear: Tympanic membrane, ear canal and external ear normal.      Left Ear: Tympanic membrane, ear canal and external ear normal.      Nose: Nose normal.      Right Sinus: No maxillary sinus tenderness or frontal sinus tenderness.      Left Sinus: No maxillary sinus tenderness or frontal sinus tenderness.      Mouth/Throat:      Pharynx: Uvula midline. Posterior oropharyngeal erythema (mild) present. No uvula swelling.   Eyes:      Conjunctiva/sclera: Conjunctivae normal.   Neck:      Thyroid: No thyromegaly.      Trachea: No tracheal deviation.   Cardiovascular:      Rate and Rhythm: Normal rate and regular rhythm.      Heart sounds: Normal heart sounds.   Pulmonary:      Effort: Pulmonary effort is normal.      Breath sounds: Wheezing (scattered upper lobes) present.   Abdominal:      General: Bowel sounds are normal. There is no abdominal bruit.      Palpations: Abdomen is soft. There is no mass.      Tenderness: There is no abdominal tenderness. There is no right CVA tenderness " or left CVA tenderness.   Musculoskeletal:      Cervical back: Neck supple.   Lymphadenopathy:      Cervical: Cervical adenopathy present.      Right cervical: Superficial cervical adenopathy present.      Left cervical: Superficial cervical adenopathy present.   Skin:     General: Skin is warm and dry.   Neurological:      Mental Status: She is alert.   Psychiatric:         Behavior: Behavior normal.        Result Review :                 Assessment and Plan    Diagnoses and all orders for this visit:    1. Cough (Primary)  -     COVID-19,[facility] [employer] IN-HOUSE,Nasal Swab No Transport Media 3-4 HR TAT - Swab, Nasal Cavity    2. COPD with exacerbation (CMS/HCC)  -     dexamethasone (DECADRON) injection 10 mg    3. Exposure to COVID-19 virus    Other orders  -     azithromycin (ZITHROMAX) 250 MG tablet; Take 2 tablets the first day, then 1 tablet daily for 4 days.  Dispense: 6 tablet; Refill: 0  -     benzonatate (Tessalon Perles) 100 MG capsule; Take 1 capsule by mouth 3 (Three) Times a Day As Needed for Cough.  Dispense: 21 capsule; Refill: 0      Plan:  covid test today  Recommend quarantine  Decadron im in office today   Start azithromycin    rtc or ed if worse shortness of breath occur for additional evaluation and management         Follow Up   Return in about 1 week (around 7/22/2021), or if symptoms worsen or fail to improve.  Patient was given instructions and counseling regarding her condition or for health maintenance advice. Please see specific information pulled into the AVS if appropriate.

## 2021-07-16 ENCOUNTER — TELEPHONE (OUTPATIENT)
Dept: FAMILY MEDICINE CLINIC | Facility: CLINIC | Age: 66
End: 2021-07-16

## 2021-07-16 NOTE — TELEPHONE ENCOUNTER
Caller: Ashley Gibbs    Relationship: Self    Best call back number: [phone]      What test was performed: COVID    When was the test performed: 7/15/21    Additional notes: Pt would like a call w/ results

## 2021-07-16 NOTE — TELEPHONE ENCOUNTER
Unable to leave message for patient.  Voicemail box is full.      Please see results note encounter.

## 2021-07-20 DIAGNOSIS — J44.1 CHRONIC OBSTRUCTIVE PULMONARY DISEASE WITH ACUTE EXACERBATION (HCC): ICD-10-CM

## 2021-07-20 RX ORDER — ALBUTEROL SULFATE 90 UG/1
AEROSOL, METERED RESPIRATORY (INHALATION)
Qty: 18 G | Refills: 0 | Status: SHIPPED | OUTPATIENT
Start: 2021-07-20 | End: 2021-09-01

## 2021-07-23 ENCOUNTER — OFFICE VISIT (OUTPATIENT)
Dept: FAMILY MEDICINE CLINIC | Facility: CLINIC | Age: 66
End: 2021-07-23

## 2021-07-23 VITALS
BODY MASS INDEX: 25.27 KG/M2 | SYSTOLIC BLOOD PRESSURE: 111 MMHG | TEMPERATURE: 98 F | HEIGHT: 67 IN | HEART RATE: 97 BPM | RESPIRATION RATE: 18 BRPM | WEIGHT: 161 LBS | DIASTOLIC BLOOD PRESSURE: 71 MMHG
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
## 2021-07-23 DIAGNOSIS — M25.559 HIP PAIN: Primary | ICD-10-CM

## 2021-07-23 PROBLEM — F32.A DEPRESSION: Status: ACTIVE | Noted: 2021-07-23

## 2021-07-23 PROBLEM — G89.4 CHRONIC PAIN DISORDER: Status: ACTIVE | Noted: 2020-01-30

## 2021-07-23 PROBLEM — E78.5 HYPERLIPIDEMIA: Status: ACTIVE | Noted: 2021-07-23

## 2021-07-23 PROBLEM — J96.01 ACUTE HYPOXEMIC RESPIRATORY FAILURE: Status: ACTIVE | Noted: 2019-07-25

## 2021-07-23 PROBLEM — F41.9 ANXIETY: Status: ACTIVE | Noted: 2021-07-23

## 2021-07-23 PROBLEM — Z99.89 CPAP (CONTINUOUS POSITIVE AIRWAY PRESSURE) DEPENDENCE: Status: ACTIVE | Noted: 2021-07-23

## 2021-07-23 PROBLEM — G47.19 EXCESSIVE DAYTIME SLEEPINESS: Status: ACTIVE | Noted: 2020-01-30

## 2021-07-23 PROBLEM — R04.2 HEMOPTYSIS: Status: ACTIVE | Noted: 2019-07-26

## 2021-07-23 PROBLEM — Z80.51 FAMILY HISTORY OF RENAL CELL CARCINOMA: Status: ACTIVE | Noted: 2020-01-30

## 2021-07-23 PROBLEM — M81.0 AGE-RELATED OSTEOPOROSIS WITHOUT CURRENT PATHOLOGICAL FRACTURE: Status: ACTIVE | Noted: 2020-01-30

## 2021-07-23 PROBLEM — J44.9 CHRONIC OBSTRUCTIVE PULMONARY DISEASE: Status: ACTIVE | Noted: 2021-07-23

## 2021-07-23 PROCEDURE — 99213 OFFICE O/P EST LOW 20 MIN: CPT | Performed by: NURSE PRACTITIONER
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
## 2021-08-02 DIAGNOSIS — G25.81 RESTLESS LEGS SYNDROME: ICD-10-CM

## 2021-08-02 DIAGNOSIS — F33.0 MILD EPISODE OF RECURRENT MAJOR DEPRESSIVE DISORDER (HCC): ICD-10-CM

## 2021-08-02 RX ORDER — ROPINIROLE 0.25 MG/1
TABLET, FILM COATED ORAL
Qty: 30 TABLET | Refills: 3 | Status: SHIPPED | OUTPATIENT
Start: 2021-08-02 | End: 2021-12-02

## 2021-08-02 NOTE — TELEPHONE ENCOUNTER
Rich Viveros called to request a refill on her medication.       Last office visit : 6/2/2021   Next office visit : 8/2/2021     Requested Prescriptions     Pending Prescriptions Disp Refills    rOPINIRole (REQUIP) 0.25 MG tablet [Pharmacy Med Name: ROPINIROLE HYDROCHLORIDE 0.25MG TABLET] 30 tablet 0     Sig: TAKE 1 TABLET BY MOUTH AT BEDTIME            Bridger Sun MA

## 2021-08-03 RX ORDER — CITALOPRAM 40 MG/1
TABLET ORAL
Qty: 30 TABLET | Refills: 0 | Status: SHIPPED | OUTPATIENT
Start: 2021-08-03 | End: 2021-09-01

## 2021-08-19 ENCOUNTER — TRANSCRIBE ORDERS (OUTPATIENT)
Dept: LAB | Facility: HOSPITAL | Age: 66
End: 2021-08-19

## 2021-08-19 DIAGNOSIS — Z01.818 PREOP TESTING: Primary | ICD-10-CM

## 2021-08-21 ENCOUNTER — LAB (OUTPATIENT)
Dept: LAB | Facility: HOSPITAL | Age: 66
End: 2021-08-21

## 2021-08-21 LAB — SARS-COV-2 ORF1AB RESP QL NAA+PROBE: NOT DETECTED

## 2021-08-21 PROCEDURE — U0004 COV-19 TEST NON-CDC HGH THRU: HCPCS | Performed by: PAIN MEDICINE

## 2021-08-21 PROCEDURE — C9803 HOPD COVID-19 SPEC COLLECT: HCPCS | Performed by: PAIN MEDICINE

## 2021-08-21 PROCEDURE — U0005 INFEC AGEN DETEC AMPLI PROBE: HCPCS | Performed by: PAIN MEDICINE

## 2021-09-01 DIAGNOSIS — J44.1 CHRONIC OBSTRUCTIVE PULMONARY DISEASE WITH ACUTE EXACERBATION (HCC): ICD-10-CM

## 2021-09-01 DIAGNOSIS — F33.0 MILD EPISODE OF RECURRENT MAJOR DEPRESSIVE DISORDER (HCC): ICD-10-CM

## 2021-09-01 RX ORDER — ALBUTEROL SULFATE 90 UG/1
AEROSOL, METERED RESPIRATORY (INHALATION)
Qty: 18 G | Refills: 0 | Status: SHIPPED | OUTPATIENT
Start: 2021-09-01 | End: 2021-10-01

## 2021-09-01 RX ORDER — CITALOPRAM 40 MG/1
TABLET ORAL
Qty: 30 TABLET | Refills: 0 | Status: SHIPPED | OUTPATIENT
Start: 2021-09-01 | End: 2021-10-01

## 2021-09-13 ENCOUNTER — TRANSCRIBE ORDERS (OUTPATIENT)
Dept: LAB | Facility: HOSPITAL | Age: 66
End: 2021-09-13

## 2021-09-13 DIAGNOSIS — Z01.818 PREOPERATIVE TESTING: Primary | ICD-10-CM

## 2021-09-17 ENCOUNTER — APPOINTMENT (OUTPATIENT)
Dept: LAB | Facility: HOSPITAL | Age: 66
End: 2021-09-17

## 2021-09-20 ENCOUNTER — TELEPHONE (OUTPATIENT)
Dept: PRIMARY CARE CLINIC | Age: 66
End: 2021-09-20

## 2021-09-20 NOTE — TELEPHONE ENCOUNTER
----- Message from Rosmery Quintana sent at 9/17/2021  6:53 PM CDT -----  Subject: Message to Provider    QUESTIONS  Information for Provider? Patient is attempting to collect unemployment   \"back pay\" from April of 2021 due to her high risk status because of COPD   and emphysema diagnoses. She said she needs a statement from her PCP   indicating why she should not be working during the pandemic. She's on   disability but had worked a little to help make ends meet, but hasn't been   working since April of 2020. Please call the patient on Monday when this   letter is ready for .  ---------------------------------------------------------------------------  --------------  [address]  What is the best way for the office to contact you? OK to leave message on   voicemail  Preferred Call Back Phone Number? [phone]  ---------------------------------------------------------------------------  --------------  SCRIPT ANSWERS  Relationship to Patient?  Self

## 2021-09-22 ENCOUNTER — OFFICE VISIT (OUTPATIENT)
Dept: PRIMARY CARE CLINIC | Age: 66
End: 2021-09-22
Payer: MEDICARE

## 2021-09-22 ENCOUNTER — TELEPHONE (OUTPATIENT)
Dept: PRIMARY CARE CLINIC | Age: 66
End: 2021-09-22

## 2021-09-22 VITALS
HEIGHT: 67 IN | BODY MASS INDEX: 26.06 KG/M2 | WEIGHT: 166 LBS | HEART RATE: 90 BPM | SYSTOLIC BLOOD PRESSURE: 120 MMHG | DIASTOLIC BLOOD PRESSURE: 82 MMHG | OXYGEN SATURATION: 95 % | TEMPERATURE: 98.4 F

## 2021-09-22 DIAGNOSIS — J44.1 CHRONIC OBSTRUCTIVE PULMONARY DISEASE WITH ACUTE EXACERBATION (HCC): Primary | ICD-10-CM

## 2021-09-22 DIAGNOSIS — E78.5 HYPERLIPIDEMIA, UNSPECIFIED HYPERLIPIDEMIA TYPE: ICD-10-CM

## 2021-09-22 PROCEDURE — 99214 OFFICE O/P EST MOD 30 MIN: CPT | Performed by: NURSE PRACTITIONER

## 2021-09-22 PROCEDURE — G8427 DOCREV CUR MEDS BY ELIG CLIN: HCPCS | Performed by: NURSE PRACTITIONER

## 2021-09-22 PROCEDURE — 3017F COLORECTAL CA SCREEN DOC REV: CPT | Performed by: NURSE PRACTITIONER

## 2021-09-22 PROCEDURE — 0001A COVID-19, PFIZER VACCINE 30MCG/0.3ML DOSE: CPT | Performed by: NURSE PRACTITIONER

## 2021-09-22 PROCEDURE — 91300 COVID-19, PFIZER VACCINE 30MCG/0.3ML DOSE: CPT | Performed by: NURSE PRACTITIONER

## 2021-09-22 PROCEDURE — G8419 CALC BMI OUT NRM PARAM NOF/U: HCPCS | Performed by: NURSE PRACTITIONER

## 2021-09-22 PROCEDURE — 3023F SPIROM DOC REV: CPT | Performed by: NURSE PRACTITIONER

## 2021-09-22 PROCEDURE — G8926 SPIRO NO PERF OR DOC: HCPCS | Performed by: NURSE PRACTITIONER

## 2021-09-22 PROCEDURE — 1036F TOBACCO NON-USER: CPT | Performed by: NURSE PRACTITIONER

## 2021-09-22 RX ORDER — METHYLPREDNISOLONE 4 MG/1
TABLET ORAL
Qty: 1 KIT | Refills: 0 | Status: SHIPPED | OUTPATIENT
Start: 2021-09-22 | End: 2022-05-03

## 2021-09-22 ASSESSMENT — ENCOUNTER SYMPTOMS
BACK PAIN: 0
VOICE CHANGE: 0
PHOTOPHOBIA: 0
RHINORRHEA: 0
NAUSEA: 0
SHORTNESS OF BREATH: 0
VOMITING: 0
COLOR CHANGE: 0
COUGH: 0

## 2021-09-22 NOTE — PROGRESS NOTES
[address] [facility]  [address] [address]  [address] [address]  Dept: [phone]  Dept Fax: [phone]  Loc: [phone]    Rose Rondon is a 61 y.o. female who presents today for her medical conditions/complaints as noted below. Rose Rondon is c/o of Follow-up (Pt here with no complaints. She states she has a little bit of wheezing)        HPI:     HPI   Chief Complaint   Patient presents with    Follow-up     Pt here with no complaints. She states she has a little bit of wheezing     Patient presents today for follow-up COPD, hyperlipidemia. She complains of wheezing that began this morning. She has history of COPD. She reports her  passed away in August due to covid. She states several family members have had it unfortunately. She is currently on a voluntary leave of absence due to being high risk for covid19. She works for [employer]; she is needing a statement to remain on leave. Patient is agreeable to covid19 vaccine in clinic.      Past Medical History:   Diagnosis Date    Anxiety     Asthma     COPD (chronic obstructive pulmonary disease) (HCC)     CPAP (continuous positive airway pressure) dependence     11cm    Depression     Hyperlipidemia     Hypothyroidism     Migraines     Obstructive sleep apnea     AHI:  13.1    Restless legs syndrome     Sarcoidosis     Scoliosis     Unspecified sleep apnea       Past Surgical History:   Procedure Laterality Date    APPENDECTOMY  age 27    Patient thinks this was removed with GB    BREAST SURGERY  2006    bilateral breast tumor removal-[facility] in [address] N/A 7/29/2019    BRONCHOSCOPY BIOPSY BRONCHUS performed by Yesi Razo MD at [facility]    CHOLECYSTECTOMY  age 22       Vitals 9/22/2021 5/5/2021 11/24/2020 1/28/2020 10/15/2019 7/4/6124   SYSTOLIC 875 [phone]   DIASTOLIC 82 84 70 72 78 70   Pulse 90 83 85 84 84 82   Temp 98.4 97.4 97.1 99 98.5 96.1   Resp - 14  16 -   SpO2 95 96 94 94 93 98   Weight 166 lb 156 lb 150 lb 3.2 oz 135 lb 141 lb 138 lb 12.8 oz   Height 5' 7\" 5' 6\" 5' 6.5\" 5' 6.5\" 5' 7\" 5' 7\"   Body mass index 26 kg/m2 25.18 kg/m2 23.88 kg/m2 21.46 kg/m2 22.08 kg/m2 21.74 kg/m2   Pain Level - - - - - -   Some recent data might be hidden       Family History   Problem Relation Age of Onset    Heart Disease Mother     Diabetes Father     Heart Disease Father     Diabetes Sister     Heart Disease Sister     Diabetes Brother     Heart Disease Brother     Cancer Maternal Aunt         breast    Cancer Paternal Aunt         lung cancer    Diabetes Sister     Heart Disease Sister     Diabetes Brother     Cancer Son         kidney    Cancer Other         Nephew with Renal cell carcinoma        Social History     Tobacco Use    Smoking status: Former Smoker     Packs/day: 0.00     Years: 37.00     Pack years: 0.00     Quit date: 2/3/2014     Years since quittin.6    Smokeless tobacco: Never Used   Substance Use Topics    Alcohol use: No      Current Outpatient Medications on File Prior to Visit   Medication Sig Dispense Refill    citalopram (CELEXA) 40 MG tablet TAKE 1 TABLET BY MOUTH ONCE DAILY  30 tablet 0    albuterol sulfate  (90 Base) MCG/ACT inhaler USE TWO PUFFS BY MOUTH DAILY AS NEEDED FOR WHEEZING  18 g 0    rOPINIRole (REQUIP) 0.25 MG tablet TAKE 1 TABLET BY MOUTH AT BEDTIME  30 tablet 3    levothyroxine (SYNTHROID) 112 MCG tablet TAKE 1 TABLET BY MOUTH ONCE DAILY.   30 tablet 11    mirtazapine (REMERON) 15 MG tablet TAKE 1 TABLET BY MOUTH AT BEDTIME  30 tablet 10    rizatriptan (MAXALT) 10 MG tablet TAKE 1 TABLET BY MOUTH ONCE AS NEEDED FOR MIGRAINE MAY REPEAT IN 2 HOURS IF NEEDED  9 tablet 0    albuterol (PROVENTIL) (2.5 MG/3ML) 0.083% nebulizer solution USE 1 VIAL IN THE NEBULIZATION 4 TIMES DAILY AS NEEDED FOR WHEEZING 75 mL 0    diclofenac sodium (VOLTAREN) 1 % GEL Apply 2 g topically 2 times daily 150 g 1    albuterol (PROVENTIL) (5 MG/ML) 0.5% nebulizer solution Take 1 mL by nebulization 4 times daily as needed for Wheezing 120 each 3    naproxen (NAPROSYN) 500 MG tablet Take 1 tablet by mouth 2 times daily as needed for Pain 20 tablet 0    alendronate (FOSAMAX) 35 MG tablet TAKE 1 TABLET ONCE A WEEK. TAKE WITH 8OZ WATER 30 MIN BEFORE FOOD OR OTHER MEDS. DON'T LIE DOWN FOR 30 MIN. 4 tablet 6    Cholecalciferol (VITAMIN D3) 27234 UNITS CAPS Take 1 capsule by mouth once a week 12 capsule 3    HYDROcodone-acetaminophen (NORCO)  MG per tablet Take 1 tablet by mouth every 6 hours as needed       OXYGEN Inhale into the lungs as needed       tiZANidine (ZANAFLEX) 4 MG tablet Take 4 mg by mouth every 8 hours as needed        No current facility-administered medications on file prior to visit. Allergies   Allergen Reactions    Codeine Shortness Of Breath       Health Maintenance   Topic Date Due    COVID-19 Vaccine (1) Never done    Shingles Vaccine (1 of 2) Never done    Cervical cancer screen  09/26/2013    Colon cancer screen colonoscopy  10/17/2016    Breast cancer screen  01/17/2019    Flu vaccine (1) 09/01/2021    TSH testing  09/22/2021    Annual Wellness Visit (AWV)  11/25/2021    Pneumococcal 0-64 years Vaccine (2 of 2 - PPSV23) 06/27/2022    Lipid screen  09/22/2025    DTaP/Tdap/Td vaccine (2 - Td or Tdap) 11/24/2027    Hepatitis C screen  Completed    HIV screen  Completed    Hepatitis A vaccine  Aged Out    Hepatitis B vaccine  Aged Out    Hib vaccine  Aged Out    Meningococcal (ACWY) vaccine  Aged Out       Subjective:      Review of Systems   Constitutional: Negative for chills and fever. HENT: Negative for ear pain, hearing loss, rhinorrhea and voice change. Eyes: Negative for photophobia and visual disturbance. Respiratory: Negative for cough and shortness of breath. Cardiovascular: Negative for chest pain and palpitations.    Gastrointestinal: Negative for nausea and vomiting. Endocrine: Negative. Negative for cold intolerance and heat intolerance. Genitourinary: Negative for difficulty urinating and flank pain. Musculoskeletal: Negative for back pain and neck pain. Skin: Negative for color change and rash. Allergic/Immunologic: Negative for environmental allergies and food allergies. Neurological: Negative for dizziness, speech difficulty and headaches. Hematological: Does not bruise/bleed easily. Psychiatric/Behavioral: Negative for sleep disturbance and suicidal ideas. Objective:     Physical Exam  Vitals and nursing note reviewed. Constitutional:       Appearance: She is well-developed. HENT:      Head: Atraumatic. Right Ear: External ear normal.      Left Ear: External ear normal.      Nose: Nose normal.   Eyes:      Conjunctiva/sclera: Conjunctivae normal.      Pupils: Pupils are equal, round, and reactive to light. Cardiovascular:      Rate and Rhythm: Normal rate and regular rhythm. Heart sounds: Normal heart sounds, S1 normal and S2 normal.   Pulmonary:      Effort: Pulmonary effort is normal.      Breath sounds: Wheezing present. Abdominal:      General: Bowel sounds are normal.      Palpations: Abdomen is soft. Musculoskeletal:         General: Normal range of motion. Cervical back: Normal range of motion and neck supple. Skin:     General: Skin is warm and dry. Neurological:      Mental Status: She is alert and oriented to person, place, and time. Psychiatric:         Behavior: Behavior normal.       /82   Pulse 90   Temp 98.4 °F (36.9 °C)   Ht 5' 7\" (1.702 m)   Wt 166 lb (75.3 kg)   SpO2 95%   BMI 26.00 kg/m²     Assessment:       Diagnosis Orders   1. Chronic obstructive pulmonary disease [facility]     2.  Hyperlipidemia, unspecified hyperlipidemia type           Plan:     More than 50% of the time was spent counseling and coordinating care for a total time of 30 min face to face. First dose of covid vaccine given in clinic today. Return in 3 weeks for second dose. She will go to lab today for fasting labs. Medrol dose pack given for COPD exacerbation if symptoms fail to improve. Patient received approximately 5 minutes of counseling on COVID-19 pandemic in regards to hygiene, symptoms, following public guidelines, and precautions to take. Patient received additional 5 minutes of counseling on covid vaccination data and need to vaccinate when available. Patient given educational materials -see patient instructions. Discussed use, benefit, and side effects of prescribed medications. All patient questions answered. Pt voiced understanding. Reviewed health maintenance. Instructed to continue currentmedications, diet and exercise. Patient agreed with treatment plan. Follow up as directed. MEDICATIONS:  Orders Placed This Encounter   Medications    methylPREDNISolone (MEDROL DOSEPACK) 4 MG tablet     Sig: Take by mouth. Dispense:  1 kit     Refill:  0         ORDERS:  Orders Placed This Encounter   Procedures    COVID-19, Pfizer Vaccine 30MCG/0.3mL Dose       Follow-up:  Return in about 3 weeks (around 10/13/2021) for covid vaccine. PATIENT INSTRUCTIONS:  There are no Patient Instructions on file for this visit. Electronically signed by ERIN Horton on 9/22/2021 at 11:52 AM    EMR Dragon/transcription disclaimer:  Much of thisencounter note is electronic transcription/translation of spoken language to printed texts. The electronic translation of spoken language may be erroneous, or at times, nonsensical words or phrases may be inadvertentlytranscribed.   Although I have reviewed the note for such errors, some may still exist.

## 2021-09-22 NOTE — TELEPHONE ENCOUNTER
----- Message from ERIN Adan sent at 9/22/2021  3:34 PM CDT -----  Please inform patient of stable labs. Lipids are slightly elevated. Advise weight loss and exercise. Avoid fatty foods. Recheck 6 months.

## 2021-09-23 ENCOUNTER — TELEPHONE (OUTPATIENT)
Dept: PRIMARY CARE CLINIC | Age: 66
End: 2021-09-23

## 2021-09-23 NOTE — TELEPHONE ENCOUNTER
----- Message from ERIN Ruiz sent at 9/22/2021  3:34 PM CDT -----  Please inform patient of stable labs. Lipids are slightly elevated. Advise weight loss and exercise. Avoid fatty foods. Recheck 6 months.

## 2021-09-27 ENCOUNTER — LAB (OUTPATIENT)
Dept: LAB | Facility: HOSPITAL | Age: 66
End: 2021-09-27

## 2021-09-27 LAB — SARS-COV-2 ORF1AB RESP QL NAA+PROBE: NOT DETECTED

## 2021-09-27 PROCEDURE — U0004 COV-19 TEST NON-CDC HGH THRU: HCPCS | Performed by: PAIN MEDICINE

## 2021-09-27 PROCEDURE — U0005 INFEC AGEN DETEC AMPLI PROBE: HCPCS | Performed by: PAIN MEDICINE

## 2021-09-27 PROCEDURE — C9803 HOPD COVID-19 SPEC COLLECT: HCPCS | Performed by: PAIN MEDICINE

## 2021-10-01 DIAGNOSIS — J44.1 CHRONIC OBSTRUCTIVE PULMONARY DISEASE WITH ACUTE EXACERBATION (HCC): ICD-10-CM

## 2021-10-01 DIAGNOSIS — F33.0 MILD EPISODE OF RECURRENT MAJOR DEPRESSIVE DISORDER (HCC): ICD-10-CM

## 2021-10-01 RX ORDER — ALBUTEROL SULFATE 90 UG/1
AEROSOL, METERED RESPIRATORY (INHALATION)
Qty: 18 G | Refills: 0 | Status: SHIPPED | OUTPATIENT
Start: 2021-10-01 | End: 2021-11-01

## 2021-10-01 RX ORDER — CITALOPRAM 40 MG/1
TABLET ORAL
Qty: 30 TABLET | Refills: 0 | Status: SHIPPED | OUTPATIENT
Start: 2021-10-01 | End: 2021-11-01

## 2021-10-13 ENCOUNTER — OFFICE VISIT (OUTPATIENT)
Dept: PRIMARY CARE CLINIC | Age: 66
End: 2021-10-13
Payer: MEDICARE

## 2021-10-13 DIAGNOSIS — Z23 NEED FOR COVID-19 VACCINE: Primary | ICD-10-CM

## 2021-10-13 PROCEDURE — 99999 PR OFFICE/OUTPT VISIT,PROCEDURE ONLY: CPT | Performed by: NURSE PRACTITIONER

## 2021-10-13 PROCEDURE — 0002A COVID-19, PFIZER VACCINE 30MCG/0.3ML DOSE: CPT | Performed by: NURSE PRACTITIONER

## 2021-10-13 PROCEDURE — 91300 COVID-19, PFIZER VACCINE 30MCG/0.3ML DOSE: CPT | Performed by: NURSE PRACTITIONER

## 2021-10-13 NOTE — PROGRESS NOTES
After obtaining consent, and per orders of Dr. Adline Felty cooper, injection of covid 19 given in Right deltoid by Deysi Hunt. Patient instructed to remain in clinic for 20 minutes afterwards, and to report any adverse reaction to me immediately.

## 2021-11-01 DIAGNOSIS — F33.0 MILD EPISODE OF RECURRENT MAJOR DEPRESSIVE DISORDER (HCC): ICD-10-CM

## 2021-11-01 DIAGNOSIS — J44.1 CHRONIC OBSTRUCTIVE PULMONARY DISEASE WITH ACUTE EXACERBATION (HCC): ICD-10-CM

## 2021-11-01 RX ORDER — ALBUTEROL SULFATE 90 UG/1
AEROSOL, METERED RESPIRATORY (INHALATION)
Qty: 18 G | Refills: 0 | Status: SHIPPED | OUTPATIENT
Start: 2021-11-01 | End: 2021-12-02

## 2021-11-01 RX ORDER — CITALOPRAM 40 MG/1
TABLET ORAL
Qty: 30 TABLET | Refills: 0 | Status: SHIPPED | OUTPATIENT
Start: 2021-11-01 | End: 2021-12-02

## 2021-11-01 NOTE — TELEPHONE ENCOUNTER
Niya Wesley called to request a refill on her medication.       Last office visit : 10/13/2021   Next office visit : 3/22/2022     Requested Prescriptions     Pending Prescriptions Disp Refills    albuterol sulfate  (90 Base) MCG/ACT inhaler [Pharmacy Med Name: ALBUTEROL SULFATE HFA 108MCG/ACT AEROSOL SOLUTION] 18 g 0     Sig: INHALE 2 PUFFS DAILY AS NEEDED FOR WHEEZING    citalopram (CELEXA) 40 MG tablet [Pharmacy Med Name: CITALOPRAM HYDROBROMIDE 40MG TABLET] 30 tablet 0     Sig: TAKE 1 TABLET BY MOUTH ONCE DAILY            Jodee Blunt

## 2021-12-02 DIAGNOSIS — F33.0 MILD EPISODE OF RECURRENT MAJOR DEPRESSIVE DISORDER (HCC): ICD-10-CM

## 2021-12-02 DIAGNOSIS — J44.1 CHRONIC OBSTRUCTIVE PULMONARY DISEASE WITH ACUTE EXACERBATION (HCC): ICD-10-CM

## 2021-12-02 DIAGNOSIS — G25.81 RESTLESS LEGS SYNDROME: ICD-10-CM

## 2021-12-02 RX ORDER — CITALOPRAM 40 MG/1
TABLET ORAL
Qty: 30 TABLET | Refills: 0 | Status: SHIPPED | OUTPATIENT
Start: 2021-12-02 | End: 2022-01-02

## 2021-12-02 RX ORDER — ALBUTEROL SULFATE 90 UG/1
AEROSOL, METERED RESPIRATORY (INHALATION)
Qty: 18 G | Refills: 0 | Status: SHIPPED | OUTPATIENT
Start: 2021-12-02 | End: 2022-01-07

## 2021-12-02 RX ORDER — ROPINIROLE 0.25 MG/1
TABLET, FILM COATED ORAL
Qty: 30 TABLET | Refills: 3 | Status: SHIPPED | OUTPATIENT
Start: 2021-12-02 | End: 2022-04-04

## 2021-12-06 ENCOUNTER — OFFICE VISIT (OUTPATIENT)
Dept: FAMILY MEDICINE CLINIC | Facility: CLINIC | Age: 66
End: 2021-12-06

## 2021-12-06 VITALS
OXYGEN SATURATION: 95 % | HEART RATE: 91 BPM | TEMPERATURE: 96.9 F | DIASTOLIC BLOOD PRESSURE: 80 MMHG | HEIGHT: 67 IN | SYSTOLIC BLOOD PRESSURE: 128 MMHG | WEIGHT: 165.12 LBS | BODY MASS INDEX: 25.92 KG/M2

## 2021-12-06 DIAGNOSIS — R05.9 COUGH: ICD-10-CM

## 2021-12-06 DIAGNOSIS — J06.9 UPPER RESPIRATORY TRACT INFECTION, UNSPECIFIED TYPE: ICD-10-CM

## 2021-12-06 DIAGNOSIS — J44.1 COPD WITH EXACERBATION (HCC): Primary | ICD-10-CM

## 2021-12-06 LAB — SARS-COV-2 ORF1AB RESP QL NAA+PROBE: NOT DETECTED

## 2021-12-06 PROCEDURE — 99213 OFFICE O/P EST LOW 20 MIN: CPT | Performed by: NURSE PRACTITIONER

## 2021-12-06 PROCEDURE — U0004 COV-19 TEST NON-CDC HGH THRU: HCPCS | Performed by: NURSE PRACTITIONER

## 2021-12-06 PROCEDURE — U0005 INFEC AGEN DETEC AMPLI PROBE: HCPCS | Performed by: NURSE PRACTITIONER

## 2021-12-06 RX ORDER — AMOXICILLIN AND CLAVULANATE POTASSIUM 875; 125 MG/1; MG/1
1 TABLET, FILM COATED ORAL 2 TIMES DAILY
Qty: 20 TABLET | Refills: 0 | Status: SHIPPED | OUTPATIENT
Start: 2021-12-06 | End: 2021-12-16

## 2021-12-06 RX ORDER — METHYLPREDNISOLONE 4 MG/1
TABLET ORAL
Qty: 21 TABLET | Refills: 0 | Status: SHIPPED | OUTPATIENT
Start: 2021-12-06 | End: 2022-01-31

## 2021-12-06 NOTE — PROGRESS NOTES
"Chief Complaint  URI (chest congestion started 3 days ago )    Subjective          Ashley Gibbs presents to [facility]  History of Present Illness  Uri  New. Started started 3 days ago. Reports cough (yellow producitve), chest congestion, nasal congestion, pnd, headache, sweating. No fever or shrotness obfreaht, wheezing, n/v/d. mendez has been exposed to similar illness.  Has tried ibuprofen, has not helped.       Objective   Vital Signs:   /80 (BP Location: Left arm, Patient Position: Sitting, Cuff Size: Adult)   Pulse 91   Temp 96.9 °F (36.1 °C) (Temporal)   Ht 170.2 cm (67\") Comment: CT  Wt 74.9 kg (165 lb 2 oz)   SpO2 95%   BMI 25.86 kg/m²     Physical Exam  Vitals and nursing note reviewed.   Constitutional:       General: She is not in acute distress.     Appearance: She is well-developed.   HENT:      Right Ear: Tympanic membrane and ear canal normal.      Left Ear: Tympanic membrane and ear canal normal.      Nose: Congestion present.      Right Sinus: No maxillary sinus tenderness or frontal sinus tenderness.      Left Sinus: No maxillary sinus tenderness or frontal sinus tenderness.      Mouth/Throat:      Mouth: Mucous membranes are moist.      Pharynx: Oropharynx is clear. Uvula midline. No uvula swelling.   Eyes:      Conjunctiva/sclera: Conjunctivae normal.   Neck:      Thyroid: No thyromegaly.      Trachea: No tracheal deviation.   Cardiovascular:      Rate and Rhythm: Normal rate and regular rhythm.      Heart sounds: Normal heart sounds.   Pulmonary:      Effort: Pulmonary effort is normal.      Breath sounds: Wheezing and rhonchi present.   Musculoskeletal:      Cervical back: Neck supple.   Lymphadenopathy:      Cervical: No cervical adenopathy.   Skin:     General: Skin is warm and dry.   Neurological:      Mental Status: She is alert.   Psychiatric:         Behavior: Behavior normal.        Result Review :                 Assessment and Plan  "   Diagnoses and all orders for this visit:    1. COPD with exacerbation (HCC) (Primary)    2. Cough  -     COVID-19,APTIMA PANTHER,PAD IN-HOUSE,NP/OP/NASAL SWAB IN UTM/VTM/SALINE/LIQUID AMIES TRANSPORT MEDIA/NP WASH OR ASPIRATE, 24 HR TAT - Swab, Nasal Cavity    3. Upper respiratory tract infection, unspecified type    Other orders  -     amoxicillin-clavulanate (AUGMENTIN) 875-125 MG per tablet; Take 1 tablet by mouth 2 (Two) Times a Day for 10 days.  Dispense: 20 tablet; Refill: 0  -     methylPREDNISolone (MEDROL) 4 MG dose pack; Take as directed on package instructions.  Dispense: 21 tablet; Refill: 0      Use albuterol  F/u for cxr if worse      Follow Up {Instructions Charge Capture  Follow-up Communications :23}  Return in about 1 week (around 12/13/2021), or if symptoms worsen or fail to improve.  Patient was given instructions and counseling regarding her condition or for health maintenance advice. Please see specific information pulled into the AVS if appropriate.

## 2022-01-07 ENCOUNTER — TELEPHONE (OUTPATIENT)
Dept: PRIMARY CARE CLINIC | Age: 67
End: 2022-01-07

## 2022-01-07 DIAGNOSIS — J44.1 CHRONIC OBSTRUCTIVE PULMONARY DISEASE WITH ACUTE EXACERBATION (HCC): ICD-10-CM

## 2022-01-07 RX ORDER — ALBUTEROL SULFATE 90 UG/1
AEROSOL, METERED RESPIRATORY (INHALATION)
Qty: 18 G | Refills: 0 | Status: SHIPPED | OUTPATIENT
Start: 2022-01-07 | End: 2022-02-02

## 2022-01-07 NOTE — TELEPHONE ENCOUNTER
----- Message from Peachwang Bermeo sent at 1/7/2022  8:46 AM CST -----  Subject: Refill Request    QUESTIONS  Name of Medication? albuterol sulfate  (90 Base) MCG/ACT inhaler  Patient-reported dosage and instructions? as needed   How many days do you have left? 0  Preferred Pharmacy? [address] phone number (if available)? [phone]  ---------------------------------------------------------------------------  --------------  CALL BACK INFO  What is the best way for the office to contact you? OK to leave message on   voicemail  Preferred Call Back Phone Number?  [phone]

## 2022-01-07 NOTE — TELEPHONE ENCOUNTER
Lopez Alfaro called to request a refill on her medication.       Last office visit : 10/13/2021   Next office visit : 3/22/2022     Requested Prescriptions     Pending Prescriptions Disp Refills    albuterol sulfate  (90 Base) MCG/ACT inhaler [Pharmacy Med Name: ALBUTEROL SULFATE HFA 108MCG/ACT AEROSOL SOLUTION] 18 g 0     Sig: INHALE 2 PUFFS DAILY AS NEEDED FOR WHEEZING            Linda Hathaway

## 2022-01-31 ENCOUNTER — OFFICE VISIT (OUTPATIENT)
Dept: FAMILY MEDICINE CLINIC | Facility: CLINIC | Age: 67
End: 2022-01-31

## 2022-01-31 VITALS
HEART RATE: 78 BPM | TEMPERATURE: 99.2 F | WEIGHT: 160 LBS | BODY MASS INDEX: 25.11 KG/M2 | DIASTOLIC BLOOD PRESSURE: 82 MMHG | OXYGEN SATURATION: 97 % | HEIGHT: 67 IN | SYSTOLIC BLOOD PRESSURE: 142 MMHG | RESPIRATION RATE: 16 BRPM

## 2022-01-31 DIAGNOSIS — R06.02 SHORTNESS OF BREATH: ICD-10-CM

## 2022-01-31 DIAGNOSIS — R05.9 COUGH: ICD-10-CM

## 2022-01-31 DIAGNOSIS — J44.1 COPD WITH EXACERBATION: ICD-10-CM

## 2022-01-31 DIAGNOSIS — R50.9 FEVER, UNSPECIFIED FEVER CAUSE: Primary | ICD-10-CM

## 2022-01-31 PROBLEM — G25.81 RESTLESS LEGS SYNDROME: Status: ACTIVE | Noted: 2022-01-31

## 2022-01-31 PROBLEM — G43.909 MIGRAINES: Status: ACTIVE | Noted: 2022-01-31

## 2022-01-31 PROBLEM — E03.9 HYPOTHYROIDISM: Status: ACTIVE | Noted: 2022-01-31

## 2022-01-31 PROBLEM — F33.0 MILD EPISODE OF RECURRENT MAJOR DEPRESSIVE DISORDER: Status: ACTIVE | Noted: 2021-02-01

## 2022-01-31 PROBLEM — G62.9 NEUROPATHY: Status: ACTIVE | Noted: 2020-01-30

## 2022-01-31 PROBLEM — G47.33 OBSTRUCTIVE SLEEP APNEA: Status: ACTIVE | Noted: 2022-01-31

## 2022-01-31 LAB — SARS-COV-2 ORF1AB RESP QL NAA+PROBE: NOT DETECTED

## 2022-01-31 PROCEDURE — 96372 THER/PROPH/DIAG INJ SC/IM: CPT | Performed by: NURSE PRACTITIONER

## 2022-01-31 PROCEDURE — U0005 INFEC AGEN DETEC AMPLI PROBE: HCPCS | Performed by: NURSE PRACTITIONER

## 2022-01-31 PROCEDURE — U0004 COV-19 TEST NON-CDC HGH THRU: HCPCS | Performed by: NURSE PRACTITIONER

## 2022-01-31 PROCEDURE — 99214 OFFICE O/P EST MOD 30 MIN: CPT | Performed by: NURSE PRACTITIONER

## 2022-01-31 RX ORDER — DEXAMETHASONE SODIUM PHOSPHATE 10 MG/ML
10 INJECTION INTRAMUSCULAR; INTRAVENOUS ONCE
Status: DISCONTINUED | OUTPATIENT
Start: 2022-01-31 | End: 2022-01-31

## 2022-01-31 RX ORDER — IPRATROPIUM BROMIDE AND ALBUTEROL SULFATE 2.5; .5 MG/3ML; MG/3ML
3 SOLUTION RESPIRATORY (INHALATION) EVERY 4 HOURS PRN
Qty: 90 ML | Refills: 0 | Status: SHIPPED | OUTPATIENT
Start: 2022-01-31 | End: 2023-01-25 | Stop reason: SDUPTHER

## 2022-01-31 RX ORDER — METHYLPREDNISOLONE ACETATE 40 MG/ML
40 INJECTION, SUSPENSION INTRA-ARTICULAR; INTRALESIONAL; INTRAMUSCULAR; SOFT TISSUE ONCE
Status: COMPLETED | OUTPATIENT
Start: 2022-01-31 | End: 2022-01-31

## 2022-01-31 RX ORDER — AMOXICILLIN AND CLAVULANATE POTASSIUM 875; 125 MG/1; MG/1
1 TABLET, FILM COATED ORAL 2 TIMES DAILY
Qty: 20 TABLET | Refills: 0 | Status: SHIPPED | OUTPATIENT
Start: 2022-01-31 | End: 2022-02-02

## 2022-01-31 RX ORDER — METHYLPREDNISOLONE 4 MG/1
TABLET ORAL
Qty: 21 TABLET | Refills: 0 | Status: SHIPPED | OUTPATIENT
Start: 2022-01-31 | End: 2022-03-25

## 2022-01-31 RX ADMIN — METHYLPREDNISOLONE ACETATE 40 MG: 40 INJECTION, SUSPENSION INTRA-ARTICULAR; INTRALESIONAL; INTRAMUSCULAR; SOFT TISSUE at 12:55

## 2022-02-01 ENCOUNTER — TELEPHONE (OUTPATIENT)
Dept: FAMILY MEDICINE CLINIC | Facility: CLINIC | Age: 67
End: 2022-02-01

## 2022-02-01 DIAGNOSIS — J44.1 CHRONIC OBSTRUCTIVE PULMONARY DISEASE WITH ACUTE EXACERBATION (HCC): ICD-10-CM

## 2022-02-01 NOTE — TELEPHONE ENCOUNTER
PATIENT CALLED BACK STATING SHE MISSED A CALL FROM SOMEONE IN OFFICE. SHE IS UNSURE WHAT THE CALLBACK WAS FOR. [phone]

## 2022-02-02 RX ORDER — CEFDINIR 300 MG/1
300 CAPSULE ORAL 2 TIMES DAILY
Qty: 14 CAPSULE | Refills: 0 | Status: SHIPPED | OUTPATIENT
Start: 2022-02-02 | End: 2022-03-25

## 2022-02-02 RX ORDER — ALBUTEROL SULFATE 90 UG/1
AEROSOL, METERED RESPIRATORY (INHALATION)
Qty: 18 G | Refills: 0 | Status: SHIPPED | OUTPATIENT
Start: 2022-02-02 | End: 2022-03-03

## 2022-02-02 NOTE — TELEPHONE ENCOUNTER
Called pt to notify- pt verbalized understanding. Will  new abx and stop current, requesting extended work excuse, granddaughter will  this afternoon.

## 2022-02-02 NOTE — TELEPHONE ENCOUNTER
Stop augmentin, start cefdinir (sent to pharmacy). If not improving still after trying this recommend ER.

## 2022-03-03 DIAGNOSIS — J44.1 CHRONIC OBSTRUCTIVE PULMONARY DISEASE WITH ACUTE EXACERBATION (HCC): ICD-10-CM

## 2022-03-03 RX ORDER — ALBUTEROL SULFATE 90 UG/1
AEROSOL, METERED RESPIRATORY (INHALATION)
Qty: 18 G | Refills: 10 | Status: SHIPPED | OUTPATIENT
Start: 2022-03-03 | End: 2022-05-03 | Stop reason: SDUPTHER

## 2022-03-25 ENCOUNTER — OFFICE VISIT (OUTPATIENT)
Dept: FAMILY MEDICINE CLINIC | Facility: CLINIC | Age: 67
End: 2022-03-25

## 2022-03-25 VITALS
TEMPERATURE: 98 F | SYSTOLIC BLOOD PRESSURE: 152 MMHG | HEIGHT: 67 IN | BODY MASS INDEX: 26.06 KG/M2 | RESPIRATION RATE: 24 BRPM | HEART RATE: 82 BPM | WEIGHT: 166 LBS | DIASTOLIC BLOOD PRESSURE: 76 MMHG | OXYGEN SATURATION: 95 %

## 2022-03-25 DIAGNOSIS — J44.1 COPD WITH EXACERBATION: Primary | ICD-10-CM

## 2022-03-25 PROCEDURE — 96372 THER/PROPH/DIAG INJ SC/IM: CPT | Performed by: FAMILY MEDICINE

## 2022-03-25 PROCEDURE — 99214 OFFICE O/P EST MOD 30 MIN: CPT | Performed by: FAMILY MEDICINE

## 2022-03-25 RX ORDER — ALBUTEROL SULFATE 90 UG/1
2 AEROSOL, METERED RESPIRATORY (INHALATION) EVERY 4 HOURS PRN
Qty: 6.7 G | Refills: 0 | Status: SHIPPED | OUTPATIENT
Start: 2022-03-25 | End: 2022-11-22 | Stop reason: SDUPTHER

## 2022-03-25 RX ORDER — DEXAMETHASONE SODIUM PHOSPHATE 10 MG/ML
10 INJECTION INTRAMUSCULAR; INTRAVENOUS ONCE
Status: COMPLETED | OUTPATIENT
Start: 2022-03-25 | End: 2022-03-25

## 2022-03-25 RX ORDER — AZITHROMYCIN 250 MG/1
TABLET, FILM COATED ORAL
Qty: 6 TABLET | Refills: 0 | Status: SHIPPED | OUTPATIENT
Start: 2022-03-25 | End: 2022-06-02

## 2022-03-25 RX ADMIN — DEXAMETHASONE SODIUM PHOSPHATE 10 MG: 10 INJECTION INTRAMUSCULAR; INTRAVENOUS at 12:15

## 2022-04-04 DIAGNOSIS — G25.81 RESTLESS LEGS SYNDROME: ICD-10-CM

## 2022-04-04 RX ORDER — ROPINIROLE 0.25 MG/1
TABLET, FILM COATED ORAL
Qty: 30 TABLET | Refills: 0 | Status: SHIPPED | OUTPATIENT
Start: 2022-04-04 | End: 2022-05-03 | Stop reason: SDUPTHER

## 2022-04-04 NOTE — TELEPHONE ENCOUNTER
Emma Postal called to request a refill on her medication.       Last office visit : 10/13/2021   Next office visit : Visit date not found     Requested Prescriptions     Signed Prescriptions Disp Refills    rOPINIRole (REQUIP) 0.25 MG tablet 30 tablet 0     Sig: TAKE 1 TABLET BY MOUTH AT BEDTIME     Authorizing Provider: Dalton Beverly     Ordering User: Emili Gutierrez MA

## 2022-05-03 ENCOUNTER — OFFICE VISIT (OUTPATIENT)
Dept: PRIMARY CARE CLINIC | Age: 67
End: 2022-05-03
Payer: MEDICARE

## 2022-05-03 VITALS
TEMPERATURE: 99.6 F | HEART RATE: 77 BPM | HEIGHT: 67 IN | BODY MASS INDEX: 25.27 KG/M2 | DIASTOLIC BLOOD PRESSURE: 68 MMHG | SYSTOLIC BLOOD PRESSURE: 110 MMHG | WEIGHT: 161 LBS | OXYGEN SATURATION: 97 %

## 2022-05-03 DIAGNOSIS — G25.81 RESTLESS LEGS SYNDROME: ICD-10-CM

## 2022-05-03 DIAGNOSIS — R07.81 RIB PAIN ON RIGHT SIDE: ICD-10-CM

## 2022-05-03 DIAGNOSIS — J44.1 CHRONIC OBSTRUCTIVE PULMONARY DISEASE WITH ACUTE EXACERBATION (HCC): ICD-10-CM

## 2022-05-03 DIAGNOSIS — F33.0 MILD EPISODE OF RECURRENT MAJOR DEPRESSIVE DISORDER (HCC): ICD-10-CM

## 2022-05-03 DIAGNOSIS — F32.A DEPRESSION, UNSPECIFIED DEPRESSION TYPE: ICD-10-CM

## 2022-05-03 DIAGNOSIS — E03.9 HYPOTHYROIDISM, UNSPECIFIED TYPE: ICD-10-CM

## 2022-05-03 PROCEDURE — 1036F TOBACCO NON-USER: CPT | Performed by: NURSE PRACTITIONER

## 2022-05-03 PROCEDURE — 3017F COLORECTAL CA SCREEN DOC REV: CPT | Performed by: NURSE PRACTITIONER

## 2022-05-03 PROCEDURE — 99214 OFFICE O/P EST MOD 30 MIN: CPT | Performed by: NURSE PRACTITIONER

## 2022-05-03 PROCEDURE — 3023F SPIROM DOC REV: CPT | Performed by: NURSE PRACTITIONER

## 2022-05-03 PROCEDURE — G8427 DOCREV CUR MEDS BY ELIG CLIN: HCPCS | Performed by: NURSE PRACTITIONER

## 2022-05-03 PROCEDURE — G8419 CALC BMI OUT NRM PARAM NOF/U: HCPCS | Performed by: NURSE PRACTITIONER

## 2022-05-03 RX ORDER — ALBUTEROL SULFATE 90 UG/1
AEROSOL, METERED RESPIRATORY (INHALATION)
Qty: 18 G | Refills: 5 | Status: SHIPPED | OUTPATIENT
Start: 2022-05-03

## 2022-05-03 RX ORDER — MIRTAZAPINE 15 MG/1
TABLET, FILM COATED ORAL
Qty: 30 TABLET | Refills: 5 | Status: SHIPPED | OUTPATIENT
Start: 2022-05-03 | End: 2022-11-02

## 2022-05-03 RX ORDER — TIZANIDINE 4 MG/1
4 TABLET ORAL 2 TIMES DAILY
COMMUNITY
Start: 2021-07-10

## 2022-05-03 RX ORDER — ROPINIROLE 0.25 MG/1
0.25 TABLET, FILM COATED ORAL
COMMUNITY
Start: 2021-07-01

## 2022-05-03 RX ORDER — LEVOTHYROXINE SODIUM 112 UG/1
TABLET ORAL
Qty: 30 TABLET | Refills: 5 | Status: SHIPPED | OUTPATIENT
Start: 2022-05-03 | End: 2022-11-02

## 2022-05-03 RX ORDER — ROPINIROLE 0.25 MG/1
TABLET, FILM COATED ORAL
Qty: 30 TABLET | Refills: 5 | Status: SHIPPED | OUTPATIENT
Start: 2022-05-03 | End: 2022-11-02

## 2022-05-03 RX ORDER — CITALOPRAM 40 MG/1
TABLET ORAL
Qty: 30 TABLET | Refills: 5 | Status: SHIPPED | OUTPATIENT
Start: 2022-05-03 | End: 2022-11-02

## 2022-05-03 RX ORDER — IPRATROPIUM BROMIDE AND ALBUTEROL SULFATE 2.5; .5 MG/3ML; MG/3ML
3 SOLUTION RESPIRATORY (INHALATION) EVERY 4 HOURS PRN
COMMUNITY
Start: 2022-01-31

## 2022-05-03 RX ORDER — AZITHROMYCIN 250 MG/1
TABLET, FILM COATED ORAL
COMMUNITY
Start: 2022-03-25 | End: 2022-05-03

## 2022-05-03 RX ORDER — CEFDINIR 300 MG/1
CAPSULE ORAL
COMMUNITY
Start: 2022-02-02 | End: 2022-05-03

## 2022-05-03 SDOH — ECONOMIC STABILITY: FOOD INSECURITY: WITHIN THE PAST 12 MONTHS, YOU WORRIED THAT YOUR FOOD WOULD RUN OUT BEFORE YOU GOT MONEY TO BUY MORE.: OFTEN TRUE

## 2022-05-03 SDOH — ECONOMIC STABILITY: FOOD INSECURITY: WITHIN THE PAST 12 MONTHS, THE FOOD YOU BOUGHT JUST DIDN'T LAST AND YOU DIDN'T HAVE MONEY TO GET MORE.: OFTEN TRUE

## 2022-05-03 ASSESSMENT — ENCOUNTER SYMPTOMS
VOMITING: 0
COLOR CHANGE: 0
COUGH: 0
VOICE CHANGE: 0
BACK PAIN: 0
PHOTOPHOBIA: 0
SHORTNESS OF BREATH: 0
NAUSEA: 0
RHINORRHEA: 0

## 2022-05-03 ASSESSMENT — SOCIAL DETERMINANTS OF HEALTH (SDOH): HOW HARD IS IT FOR YOU TO PAY FOR THE VERY BASICS LIKE FOOD, HOUSING, MEDICAL CARE, AND HEATING?: NOT HARD AT ALL

## 2022-05-03 NOTE — PROGRESS NOTES
[address] [facility]  [address] [address]  [address] [address]  Dept: [phone]  Dept Fax: [phone]  Loc: [phone]    Rose Rondon is a 61 y.o. female who presents today for her medical conditions/complaints as noted below. Rose Rondon is c/o of Medication Refill (PROVIGIL GABAPENTIN) and Follow-up        HPI:     HPI   Chief Complaint   Patient presents with    Medication Refill     PROVIGIL GABAPENTIN    Follow-up     Patient presents today for follow-up COPD, hyperlipidemia. Patient is due for fasting labs. She is requesting to restart gabapentin for lower back pain with sciatica. She has known arthritis. She has radiating pain to both legs; right > left. She states she has been on gabapentin in the past for this; she has been off it for 6 months. She failed a UDS in May of last year; she had a confirmation via [employer] that showed positive for methamphetamine. We had discussed no longer prescribing controlled medications from this practice. Patient is very argumentative today that there is no way she could have been positive for meth at that time. Discussed with patient that she can return to pain management to discuss receiving gabapentin. Patient also requesting provigil from our office today.      Past Medical History:   Diagnosis Date    Anxiety     Asthma     COPD (chronic obstructive pulmonary disease) (HCC)     CPAP (continuous positive airway pressure) dependence     11cm    Depression     Hyperlipidemia     Hypothyroidism     Migraines     Obstructive sleep apnea     AHI:  13.1    Restless legs syndrome     Sarcoidosis     Scoliosis     Unspecified sleep apnea       Past Surgical History:   Procedure Laterality Date    APPENDECTOMY  age 27    Patient thinks this was removed with GB    BREAST SURGERY  2006    bilateral breast tumor removal-[facility] in [address] N/A 7/29/2019    BRONCHOSCOPY BIOPSY BRONCHUS performed by Beba Griffin MD at [facility]    CHOLECYSTECTOMY  age 27       Vitals 5/3/2022 2021 2021 2020 2020    SYSTOLIC 070 [phone] [id number] 573   DIASTOLIC 68 82 84 70 72 78   Site Left Upper Arm - - - - -   Position Sitting - - - - -   Pulse 77 90 83 85 84 84   Temp 99.6 98.4 97.4 97.1 99 98.5   Resp - - 14 - 20 16   SpO2 97 95 96 94 94 93   Weight 161 lb 166 lb 156 lb 150 lb 3.2 oz 135 lb 141 lb   Height 5' 7\" 5' 7\" 5' 6\" 5' 6.5\" 5' 6.5\" 5' 7\"   Body mass index 25.21 kg/m2 26 kg/m2 25.18 kg/m2 23.88 kg/m2 21.46 kg/m2 22.08 kg/m2   Pain Level - - - - - -   Some recent data might be hidden       Family History   Problem Relation Age of Onset    Heart Disease Mother     Diabetes Father     Heart Disease Father     Diabetes Sister     Heart Disease Sister     Diabetes Brother     Heart Disease Brother     Cancer Maternal Aunt         breast    Cancer Paternal Aunt         lung cancer    Diabetes Sister     Heart Disease Sister     Diabetes Brother     Cancer Son         kidney    Cancer Other         Nephew with Renal cell carcinoma        Social History     Tobacco Use    Smoking status: Former Smoker     Packs/day: 0.00     Years: 37.00     Pack years: 0.00     Quit date: 2/3/2014     Years since quittin.2    Smokeless tobacco: Never Used   Substance Use Topics    Alcohol use: No      Current Outpatient Medications on File Prior to Visit   Medication Sig Dispense Refill    OXYGEN Inhale into the lungs daily as needed      ipratropium-albuterol (DUONEB) 0.5-2.5 (3) MG/3ML SOLN nebulizer solution Inhale 3 mLs into the lungs every 4 hours as needed      rOPINIRole (REQUIP) 0.25 MG tablet Take 0.25 mg by mouth      tiZANidine (ZANAFLEX) 4 MG tablet Take 4 mg by mouth 2 times daily      rOPINIRole (REQUIP) 0.25 MG tablet TAKE 1 TABLET BY MOUTH AT BEDTIME 30 tablet 0    albuterol sulfate  (90 Base) MCG/ACT inhaler INHALE 2 PUFFS DAILY AS NEEDED FOR WHEEZING 18 g 10    citalopram (CELEXA) 40 MG tablet TAKE 1 TABLET BY MOUTH ONCE DAILY 30 tablet 3    levothyroxine (SYNTHROID) 112 MCG tablet TAKE 1 TABLET BY MOUTH ONCE DAILY. 30 tablet 11    mirtazapine (REMERON) 15 MG tablet TAKE 1 TABLET BY MOUTH AT BEDTIME  30 tablet 10    diclofenac sodium (VOLTAREN) 1 % GEL Apply 2 g topically 2 times daily 150 g 1    albuterol (PROVENTIL) (5 MG/ML) 0.5% nebulizer solution Take 1 mL by nebulization 4 times daily as needed for Wheezing 120 each 3    naproxen (NAPROSYN) 500 MG tablet Take 1 tablet by mouth 2 times daily as needed for Pain 20 tablet 0    HYDROcodone-acetaminophen (NORCO)  MG per tablet Take 1 tablet by mouth every 6 hours as needed       azithromycin (ZITHROMAX) 250 MG tablet  (Patient not taking: Reported on 5/3/2022)      cefdinir (OMNICEF) 300 MG capsule  (Patient not taking: Reported on 5/3/2022)      methylPREDNISolone (MEDROL DOSEPACK) 4 MG tablet Take by mouth. (Patient not taking: Reported on 5/3/2022) 1 kit 0    rizatriptan (MAXALT) 10 MG tablet TAKE 1 TABLET BY MOUTH ONCE AS NEEDED FOR MIGRAINE MAY REPEAT IN 2 HOURS IF NEEDED  9 tablet 0    albuterol (PROVENTIL) (2.5 MG/3ML) 0.083% nebulizer solution USE 1 VIAL IN THE NEBULIZATION 4 TIMES DAILY AS NEEDED FOR WHEEZING (Patient not taking: Reported on 5/3/2022) 75 mL 0    alendronate (FOSAMAX) 35 MG tablet TAKE 1 TABLET ONCE A WEEK. TAKE WITH 8OZ WATER 30 MIN BEFORE FOOD OR OTHER MEDS. DON'T LIE DOWN FOR 30 MIN. (Patient not taking: Reported on 5/3/2022) 4 tablet 6    Cholecalciferol (VITAMIN D3) 94227 UNITS CAPS Take 1 capsule by mouth once a week (Patient not taking: Reported on 5/3/2022) 12 capsule 3    OXYGEN Inhale into the lungs as needed       tiZANidine (ZANAFLEX) 4 MG tablet Take 4 mg by mouth every 8 hours as needed        No current facility-administered medications on file prior to visit.      Allergies   Allergen Reactions    Codeine Shortness Of Breath Health Maintenance   Topic Date Due    Shingles vaccine (1 of 2) Never done    Pneumococcal 0-64 years Vaccine (2 - PCV) 12/01/2015    Cervical cancer screen  09/25/2016    Colorectal Cancer Screen  10/17/2016    Breast cancer screen  01/17/2019    Annual Wellness Visit (AWV)  11/25/2021    COVID-19 Vaccine (3 - Booster for Pfizer series) 03/13/2022    Depression Monitoring  05/05/2022    Flu vaccine (Season Ended) 09/01/2022    TSH  09/22/2022    Lipids  09/22/2026    DTaP/Tdap/Td vaccine (2 - Td or Tdap) 11/24/2027    Hepatitis C screen  Completed    HIV screen  Completed    Hepatitis A vaccine  Aged Out    Hepatitis B vaccine  Aged Out    Hib vaccine  Aged Out    Meningococcal (ACWY) vaccine  Aged Out       Subjective:      Review of Systems   Constitutional: Negative for chills and fever. HENT: Negative for ear pain, hearing loss, rhinorrhea and voice change. Eyes: Negative for photophobia and visual disturbance. Respiratory: Negative for cough and shortness of breath. Cardiovascular: Negative for chest pain and palpitations. Gastrointestinal: Negative for nausea and vomiting. Endocrine: Negative. Negative for cold intolerance and heat intolerance. Genitourinary: Negative for difficulty urinating and flank pain. Musculoskeletal: Negative for back pain and neck pain. Skin: Negative for color change and rash. Allergic/Immunologic: Negative for environmental allergies and food allergies. Neurological: Negative for dizziness, speech difficulty and headaches. Hematological: Does not bruise/bleed easily. Psychiatric/Behavioral: Negative for sleep disturbance and suicidal ideas. Objective:     Physical Exam  Vitals and nursing note reviewed. Constitutional:       Appearance: She is well-developed. HENT:      Head: Atraumatic.       Right Ear: External ear normal.      Left Ear: External ear normal.      Nose: Nose normal.   Eyes:      Conjunctiva/sclera: Conjunctivae normal.      Pupils: Pupils are equal, round, and reactive to light. Cardiovascular:      Rate and Rhythm: Normal rate and regular rhythm. Heart sounds: Normal heart sounds, S1 normal and S2 normal.   Pulmonary:      Effort: Pulmonary effort is normal.      Breath sounds: Normal breath sounds. Abdominal:      General: Bowel sounds are normal.      Palpations: Abdomen is soft. Musculoskeletal:         General: Normal range of motion. Cervical back: Normal range of motion and neck supple. Skin:     General: Skin is warm and dry. Neurological:      Mental Status: She is alert and oriented to person, place, and time. Psychiatric:         Behavior: Behavior normal.       /68 (Site: Left Upper Arm, Position: Sitting)   Pulse 77   Temp 99.6 °F (37.6 °C) (Temporal)   Ht 5' 7\" (1.702 m)   Wt 161 lb (73 kg)   SpO2 97%   BMI 25.22 kg/m²     Assessment:       Diagnosis Orders   1. Chronic obstructive pulmonary disease with acute exacerbation ([facility] [address]     2. Restless legs syndrome     3. Hypothyroidism, unspecified type     4. Mild episode of recurrent major depressive disorder ([facility] [address]     5. Depression, unspecified depression type     6. Rib pain on right side           Plan:   More than 50% of the time was spent counseling and coordinating care for a total time of 30 min face to face. No controlled medications from this office; patient was not satisfied with this decision, however, I discussed with her that her UDS was inappropriate on 2 accounts therefore our policy does not allow for controlled meds. She may discuss with pain management. PDMP Monitoring:    Last PDMP Oscar Ashby as Reviewed [facility]):  Review User Review Instant Review Result          Last Controlled Substance Monitoring Documentation      Refill from 1/6/2021 in [address] [place] [address] The Prescription Monitoring Report for this patient was reviewed today.  filed at 01/08/2021 6266   Periodic Controlled Substance Monitoring No signs of potential drug abuse or diversion identified. filed at 01/08/2021 1708        Urine Drug Screenings (1 yr)     POCT Rapid Drug Screen  Collected: 5/5/2021 (Edited Result - FINAL)    Complete Results          POCT Rapid Drug Screen  Collected: 10/8/2018 11:49 AM (Final result)    Complete Results          Urine Drug Screen  Resulted: 5/9/2021 (Final result)    Complete Results              Medication Contract and Consent for Opioid Use Documents Filed     Patient Documents     Type of Document Status Date Received Received By Description    Medication Contract Signed 10/23/2013 10:01 AM KATIE Leung scanned into media    Medication Contract Signed 12/1/2014  9:38 AM Rodrigo Pressley     Medication Contract Signed 1/11/2017  1:46 PM Sondra Luo     Medication Contract Received 5/5/2021 10:23 AM Carly Owusu Medication Contract                 Patient given educational materials -see patient instructions. Discussed use, benefit, and side effects of prescribed medications. All patient questions answered. Pt voiced understanding. Reviewed health maintenance. Instructed to continue currentmedications, diet and exercise. Patient agreed with treatment plan. Follow up as directed. MEDICATIONS:  No orders of the defined types were placed in this encounter. ORDERS:  No orders of the defined types were placed in this encounter. Follow-up:  No follow-ups on file. PATIENT INSTRUCTIONS:  There are no Patient Instructions on file for this visit. Electronically signed by ERIN Haider on 5/3/2022 at 11:15 AM    EMR Dragon/transcription disclaimer:  Much of thisencounter note is electronic transcription/translation of spoken language to printed texts. The electronic translation of spoken language may be erroneous, or at times, nonsensical words or phrases may be inadvertentlytranscribed.   Although I have reviewed the note for such errors, some may still exist.

## 2022-06-02 ENCOUNTER — OFFICE VISIT (OUTPATIENT)
Dept: FAMILY MEDICINE CLINIC | Facility: CLINIC | Age: 67
End: 2022-06-02

## 2022-06-02 VITALS
HEIGHT: 67 IN | HEART RATE: 68 BPM | WEIGHT: 160 LBS | TEMPERATURE: 96.4 F | OXYGEN SATURATION: 98 % | SYSTOLIC BLOOD PRESSURE: 112 MMHG | BODY MASS INDEX: 25.11 KG/M2 | RESPIRATION RATE: 18 BRPM | DIASTOLIC BLOOD PRESSURE: 68 MMHG

## 2022-06-02 DIAGNOSIS — R11.2 NAUSEA AND VOMITING, UNSPECIFIED VOMITING TYPE: ICD-10-CM

## 2022-06-02 DIAGNOSIS — R52 BODY ACHES: Primary | ICD-10-CM

## 2022-06-02 DIAGNOSIS — R19.8 GUM SYMPTOMS: ICD-10-CM

## 2022-06-02 DIAGNOSIS — J01.00 ACUTE NON-RECURRENT MAXILLARY SINUSITIS: ICD-10-CM

## 2022-06-02 LAB
EXPIRATION DATE: NORMAL
FLUAV AG NPH QL: NEGATIVE
FLUBV AG NPH QL: NEGATIVE
INTERNAL CONTROL: NORMAL
Lab: NORMAL

## 2022-06-02 PROCEDURE — U0004 COV-19 TEST NON-CDC HGH THRU: HCPCS | Performed by: NURSE PRACTITIONER

## 2022-06-02 PROCEDURE — 87804 INFLUENZA ASSAY W/OPTIC: CPT | Performed by: NURSE PRACTITIONER

## 2022-06-02 PROCEDURE — 99214 OFFICE O/P EST MOD 30 MIN: CPT | Performed by: NURSE PRACTITIONER

## 2022-06-02 PROCEDURE — U0005 INFEC AGEN DETEC AMPLI PROBE: HCPCS | Performed by: NURSE PRACTITIONER

## 2022-06-02 RX ORDER — AZITHROMYCIN 250 MG/1
TABLET, FILM COATED ORAL
Qty: 6 TABLET | Refills: 0 | Status: SHIPPED | OUTPATIENT
Start: 2022-06-02 | End: 2022-11-22

## 2022-06-02 RX ORDER — PROMETHAZINE HYDROCHLORIDE 25 MG/1
25 TABLET ORAL EVERY 6 HOURS PRN
Qty: 20 TABLET | Refills: 0 | Status: SHIPPED | OUTPATIENT
Start: 2022-06-02 | End: 2022-11-22

## 2022-06-02 NOTE — PROGRESS NOTES
Chief Complaint  Vomiting (Vomiting for 2 days.)    Subjective        Ashley Gibbs presents to [facility]  Lip swelling, body aches, sinus tenderness and nausea vomiting that started 2 days ago.  Has vomited 4 x/24 hrs.  Denies fever, chills, diarrhea or abdominal pain.  Has associated nausea, vomiting, headache, feeling terrible, and decreased appetite.  Has been able to keep liquids down.  Denies any new foods, perfumes , lotions.  Denies loss of taste or smell. Does not have anything at home to stop the vomiting. She has tried ice, heat, salt water gargles and nothing is working.  She has been in bed for the last 2 days     Vomiting   This is a new problem. The current episode started in the past 7 days. The problem occurs 2 to 4 times per day. The problem has been gradually worsening. The emesis has an appearance of stomach contents. There has been no fever. Associated symptoms include headaches. Pertinent negatives include no arthralgias, chest pain, chills, diarrhea, dizziness, fever, myalgias, sweats or URI. She has tried nothing for the symptoms. The treatment provided no relief.   Facial Pain  This is a new problem. The current episode started in the past 7 days. The problem occurs constantly. The problem has been gradually worsening. Associated symptoms include headaches and vomiting. Pertinent negatives include no arthralgias, chest pain, chills, fever, myalgias, sore throat or swollen glands. The symptoms are aggravated by drinking, eating and swallowing. She has tried acetaminophen, rest, sleep, lying down, heat and ice for the symptoms. The treatment provided mild relief.     Lip Swelling  Upper gums/lip swollen and tender.  Normally, wears dentures but the pain is so bad she couldn't wear them.  She says it hurts to touch.  Rates the pain 10/10.        Objective   Vital Signs:  /68 (BP Location: Right arm, Patient Position: Sitting, Cuff Size: Adult)   Pulse 68   " Temp 96.4 °F (35.8 °C) (Infrared)   Resp 18   Ht 170.2 cm (67\") Comment: per patient  Wt 72.6 kg (160 lb) Comment: per patient  SpO2 98%   BMI 25.06 kg/m²     BMI is >= 25 and < 30. (Overweight) The following options were offered after discussion: exercise counseling/recommendations and nutrition counseling/recommendations      Physical Exam  Vitals and nursing note reviewed.   Constitutional:       General: She is awake.      Appearance: Normal appearance. She is well-developed and well-groomed.   HENT:      Head: Normocephalic and atraumatic.        Right Ear: Hearing, tympanic membrane, ear canal and external ear normal.      Left Ear: Hearing, tympanic membrane, ear canal and external ear normal.      Nose: Congestion present.      Right Sinus: Maxillary sinus tenderness present.      Left Sinus: Maxillary sinus tenderness present.      Mouth/Throat:      Lips: Pink.      Dentition: Gingival swelling present.      Pharynx: Oropharynx is clear.     Eyes:      General: Lids are normal.      Conjunctiva/sclera: Conjunctivae normal.   Cardiovascular:      Rate and Rhythm: Normal rate and regular rhythm.      Heart sounds: Normal heart sounds.   Pulmonary:      Effort: Pulmonary effort is normal.      Breath sounds: Normal breath sounds and air entry.   Musculoskeletal:      Cervical back: Full passive range of motion without pain.      Right lower leg: No edema.      Left lower leg: No edema.   Lymphadenopathy:      Head:      Right side of head: No submental, submandibular or tonsillar adenopathy.      Left side of head: No submental, submandibular or tonsillar adenopathy.   Skin:     General: Skin is warm and dry.   Neurological:      Mental Status: She is alert and oriented to person, place, and time.      Sensory: Sensation is intact.      Motor: Motor function is intact.      Coordination: Coordination is intact.      Gait: Gait is intact.   Psychiatric:         Attention and Perception: Attention and " perception normal.         Mood and Affect: Mood and affect normal.         Speech: Speech normal.         Behavior: Behavior normal. Behavior is cooperative.         Thought Content: Thought content normal.         Cognition and Memory: Cognition and memory normal.         Judgment: Judgment normal.        Result Review :                Assessment and Plan   Diagnoses and all orders for this visit:    1. Body aches (Primary); new worsening with work up   -     COVID-19,APTIMA PANTHER,PAD IN-HOUSE,NP/OP/NASAL SWAB IN UTM/VTM/SALINE/LIQUID AMIES TRANSPORT MEDIA/NP WASH OR ASPIRATE, 24 HR TAT - Swab, Nasal Cavity  -     POCT Influenza A/B    2. Nausea and vomiting, unspecified vomiting type; new, worsening with med management   -     promethazine (PHENERGAN) 25 MG tablet; Take 1 tablet by mouth Every 6 (Six) Hours As Needed for Nausea or Vomiting.  Dispense: 20 tablet; Refill: 0    3. Acute non-recurrent maxillary sinusitis; new, worsening, with med management   -     azithromycin (Zithromax) 250 MG tablet; Take 2 tablets the first day, then 1 tablet daily for 4 days.  Dispense: 6 tablet; Refill: 0    4. Gum symptoms; new, worsening with med management   -     nystatin (MYCOSTATIN) 100,000 unit/mL suspension; Swish and swallow 5 mL 4 (Four) Times a Day.  Dispense: 200 mL; Refill: 0    may continue salt water gargles       I spent 14 minutes caring for Ashley on this date of service. This time includes time spent by me in the following activities:preparing for the visit, performing a medically appropriate examination and/or evaluation , counseling and educating the patient/family/caregiver, ordering medications, tests, or procedures, documenting information in the medical record and care coordination  Follow Up   No follow-ups on file.  Patient was given instructions and counseling regarding her condition or for health maintenance advice. Please see specific information pulled into the AVS if appropriate.      Electronically signed by Kristine Dacosta, CHERY, APRN, 06/02/22, 2:39 PM CDT.

## 2022-06-03 LAB — SARS-COV-2 ORF1AB RESP QL NAA+PROBE: NOT DETECTED

## 2022-06-05 ENCOUNTER — APPOINTMENT (OUTPATIENT)
Dept: CT IMAGING | Age: 67
End: 2022-06-05
Payer: MEDICARE

## 2022-06-05 ENCOUNTER — HOSPITAL ENCOUNTER (EMERGENCY)
Age: 67
Discharge: HOME | End: 2022-06-05
Payer: MEDICARE

## 2022-06-05 VITALS
TEMPERATURE: 97.9 F | DIASTOLIC BLOOD PRESSURE: 87 MMHG | WEIGHT: 160 LBS | SYSTOLIC BLOOD PRESSURE: 142 MMHG | HEART RATE: 85 BPM | OXYGEN SATURATION: 91 % | BODY MASS INDEX: 25.06 KG/M2 | RESPIRATION RATE: 16 BRPM

## 2022-06-05 DIAGNOSIS — K04.7 DENTAL INFECTION: Primary | ICD-10-CM

## 2022-06-05 LAB
ALBUMIN SERPL-MCNC: 4.3 G/DL (ref 3.5–5.2)
ALP BLD-CCNC: 155 U/L (ref 35–104)
ALT SERPL-CCNC: 10 U/L (ref 5–33)
ANION GAP SERPL CALCULATED.3IONS-SCNC: 9 MMOL/L (ref 7–19)
AST SERPL-CCNC: 14 U/L (ref 5–32)
BASOPHILS ABSOLUTE: 0.1 K/UL (ref 0–0.2)
BASOPHILS RELATIVE PERCENT: 0.6 % (ref 0–1)
BILIRUB SERPL-MCNC: 0.4 MG/DL (ref 0.2–1.2)
BUN BLDV-MCNC: 9 MG/DL (ref 8–23)
CALCIUM SERPL-MCNC: 9.4 MG/DL (ref 8.8–10.2)
CHLORIDE BLD-SCNC: 98 MMOL/L (ref 98–111)
CO2: 31 MMOL/L (ref 22–29)
CREAT SERPL-MCNC: 0.7 MG/DL (ref 0.5–0.9)
EOSINOPHILS ABSOLUTE: 0.6 K/UL (ref 0–0.6)
EOSINOPHILS RELATIVE PERCENT: 6.7 % (ref 0–5)
GFR AFRICAN AMERICAN: >59
GFR NON-AFRICAN AMERICAN: >60
GLUCOSE BLD-MCNC: 100 MG/DL (ref 74–109)
HCT VFR BLD CALC: 46 % (ref 37–47)
HEMOGLOBIN: 14.5 G/DL (ref 12–16)
IMMATURE GRANULOCYTES #: 0 K/UL
LYMPHOCYTES ABSOLUTE: 2.5 K/UL (ref 1.1–4.5)
LYMPHOCYTES RELATIVE PERCENT: 28.3 % (ref 20–40)
MCH RBC QN AUTO: 28.9 PG (ref 27–31)
MCHC RBC AUTO-ENTMCNC: 31.5 G/DL (ref 33–37)
MCV RBC AUTO: 91.8 FL (ref 81–99)
MONOCYTES ABSOLUTE: 0.8 K/UL (ref 0–0.9)
MONOCYTES RELATIVE PERCENT: 9.4 % (ref 0–10)
NEUTROPHILS ABSOLUTE: 4.9 K/UL (ref 1.5–7.5)
NEUTROPHILS RELATIVE PERCENT: 54.8 % (ref 50–65)
PDW BLD-RTO: 12.6 % (ref 11.5–14.5)
PLATELET # BLD: 275 K/UL (ref 130–400)
PMV BLD AUTO: 10.3 FL (ref 9.4–12.3)
POTASSIUM REFLEX MAGNESIUM: 3.7 MMOL/L (ref 3.5–5)
RBC # BLD: 5.01 M/UL (ref 4.2–5.4)
SODIUM BLD-SCNC: 138 MMOL/L (ref 136–145)
TOTAL PROTEIN: 6.5 G/DL (ref 6.6–8.7)
WBC # BLD: 8.9 K/UL (ref 4.8–10.8)

## 2022-06-05 PROCEDURE — 6360000002 HC RX W HCPCS: Performed by: PHYSICIAN ASSISTANT

## 2022-06-05 PROCEDURE — 70486 CT MAXILLOFACIAL W/O DYE: CPT

## 2022-06-05 PROCEDURE — 2500000003 HC RX 250 WO HCPCS: Performed by: PHYSICIAN ASSISTANT

## 2022-06-05 PROCEDURE — 2580000003 HC RX 258: Performed by: PHYSICIAN ASSISTANT

## 2022-06-05 PROCEDURE — 36415 COLL VENOUS BLD VENIPUNCTURE: CPT

## 2022-06-05 PROCEDURE — 85025 COMPLETE CBC W/AUTO DIFF WBC: CPT

## 2022-06-05 PROCEDURE — 96375 TX/PRO/DX INJ NEW DRUG ADDON: CPT

## 2022-06-05 PROCEDURE — 96365 THER/PROPH/DIAG IV INF INIT: CPT

## 2022-06-05 PROCEDURE — 6370000000 HC RX 637 (ALT 250 FOR IP): Performed by: PHYSICIAN ASSISTANT

## 2022-06-05 PROCEDURE — 99284 EMERGENCY DEPT VISIT MOD MDM: CPT

## 2022-06-05 PROCEDURE — 80053 COMPREHEN METABOLIC PANEL: CPT

## 2022-06-05 PROCEDURE — 70486 CT MAXILLOFACIAL W/O DYE: CPT | Performed by: RADIOLOGY

## 2022-06-05 PROCEDURE — 96366 THER/PROPH/DIAG IV INF ADDON: CPT

## 2022-06-05 RX ORDER — CLINDAMYCIN PHOSPHATE 900 MG/50ML
900 INJECTION INTRAVENOUS ONCE
Status: COMPLETED | OUTPATIENT
Start: 2022-06-05 | End: 2022-06-05

## 2022-06-05 RX ORDER — LIDOCAINE HYDROCHLORIDE 20 MG/ML
15 SOLUTION OROPHARYNGEAL ONCE
Status: COMPLETED | OUTPATIENT
Start: 2022-06-05 | End: 2022-06-05

## 2022-06-05 RX ORDER — 0.9 % SODIUM CHLORIDE 0.9 %
500 INTRAVENOUS SOLUTION INTRAVENOUS ONCE
Status: COMPLETED | OUTPATIENT
Start: 2022-06-05 | End: 2022-06-05

## 2022-06-05 RX ORDER — CLINDAMYCIN HYDROCHLORIDE 300 MG/1
300 CAPSULE ORAL 3 TIMES DAILY
Qty: 21 CAPSULE | Refills: 0 | Status: SHIPPED | OUTPATIENT
Start: 2022-06-05 | End: 2022-06-12

## 2022-06-05 RX ORDER — MORPHINE SULFATE 4 MG/ML
4 INJECTION, SOLUTION INTRAMUSCULAR; INTRAVENOUS ONCE
Status: COMPLETED | OUTPATIENT
Start: 2022-06-05 | End: 2022-06-05

## 2022-06-05 RX ORDER — ONDANSETRON 2 MG/ML
4 INJECTION INTRAMUSCULAR; INTRAVENOUS ONCE
Status: COMPLETED | OUTPATIENT
Start: 2022-06-05 | End: 2022-06-05

## 2022-06-05 RX ADMIN — CLINDAMYCIN IN 5 PERCENT DEXTROSE 900 MG: 18 INJECTION, SOLUTION INTRAVENOUS at 15:57

## 2022-06-05 RX ADMIN — MORPHINE SULFATE 4 MG: 4 INJECTION, SOLUTION INTRAMUSCULAR; INTRAVENOUS at 15:52

## 2022-06-05 RX ADMIN — SODIUM CHLORIDE 500 ML: 9 INJECTION, SOLUTION INTRAVENOUS at 15:52

## 2022-06-05 RX ADMIN — LIDOCAINE HYDROCHLORIDE 15 ML: 20 SOLUTION ORAL; TOPICAL at 17:06

## 2022-06-05 RX ADMIN — ONDANSETRON 4 MG: 2 INJECTION INTRAMUSCULAR; INTRAVENOUS at 15:52

## 2022-06-05 RX ADMIN — TOPICAL ANESTHETIC: 200 SPRAY DENTAL; PERIODONTAL at 17:06

## 2022-06-05 ASSESSMENT — PAIN DESCRIPTION - LOCATION: LOCATION: MOUTH

## 2022-06-05 ASSESSMENT — PAIN - FUNCTIONAL ASSESSMENT: PAIN_FUNCTIONAL_ASSESSMENT: 0-10

## 2022-06-05 ASSESSMENT — ENCOUNTER SYMPTOMS
NAUSEA: 1
DIARRHEA: 0
SHORTNESS OF BREATH: 0
SINUS PAIN: 1
VOMITING: 1
SORE THROAT: 0
ABDOMINAL PAIN: 0

## 2022-06-05 ASSESSMENT — PAIN SCALES - GENERAL: PAINLEVEL_OUTOF10: 10

## 2022-06-05 NOTE — Clinical Note
Rudy Gonsalo was seen and treated in our emergency department on 6/5/2022. She may return to work on 06/10/2022. If you have any questions or concerns, please don't hesitate to call.       Jarrett Puckett [place]

## 2022-06-05 NOTE — ED PROVIDER NOTES
[facility] [place] [facility] EMERGENCY DEPT  eMERGENCY dEPARTMENT eNCOUnter      Pt Name: Az Sahu  MRN: [id number]  [place] 1955  Date of evaluation: 6/5/2022  Provider: Seth Kaba Dr       Chief Complaint   Patient presents with    Dental Pain     pt seen at walk in clinic recently and started on abx          HISTORY OF PRESENT ILLNESS   (Location/Symptom, Timing/Onset,Context/Setting, Quality, Duration, Modifying Factors, Severity)  Note limiting factors. Az Sahu is a 61 y.o. female with history including hypothyroidism, anxiety, depression, COPD, restless leg syndrome, migraines, obstructive sleep apnea, and chronic pain syndrome who presents to the emergency department complaint of oral pain. The patient was seen at a walk-in clinic and started on azithromycin for abscessed sinuses. The patient denies any fever or chills. She states she has pain in the gums of the upper jawline. She states the pain radiates into her nose and sinuses and she is concerned about an abscess. HPI    NursingNotes were reviewed. REVIEW OF SYSTEMS    (2-9 systems for level 4, 10 or more for level 5)     Review of Systems   Constitutional: Negative for chills and fever. HENT: Positive for dental problem and sinus pain. Negative for congestion and sore throat. Respiratory: Negative for shortness of breath. Cardiovascular: Negative for chest pain. Gastrointestinal: Positive for nausea and vomiting. Negative for abdominal pain and diarrhea. Musculoskeletal: Negative for neck pain and neck stiffness. Neurological: Positive for headaches. Negative for dizziness. All other systems reviewed and are negative.            PAST MEDICALHISTORY     Past Medical History:   Diagnosis Date    Anxiety     Asthma     COPD (chronic obstructive pulmonary disease) (HCC)     CPAP (continuous positive airway pressure) dependence     11cm    Depression     Hyperlipidemia     Hypothyroidism     Migraines     Obstructive sleep apnea     AHI:  13.1    Restless legs syndrome     Sarcoidosis     Scoliosis     Unspecified sleep apnea          SURGICAL HISTORY       Past Surgical History:   Procedure Laterality Date    APPENDECTOMY  age 27    Patient thinks this was removed with GB    BREAST SURGERY  2006    bilateral breast tumor removal-[facility] in [address] N/A 7/29/2019    BRONCHOSCOPY BIOPSY BRONCHUS performed by Isaiah Garcia MD at [facility]    CHOLECYSTECTOMY  age 27         CURRENT MEDICATIONS     Previous Medications    ALBUTEROL (PROVENTIL) (5 MG/ML) 0.5% NEBULIZER SOLUTION    Take 1 mL by nebulization 4 times daily as needed for Wheezing    ALBUTEROL SULFATE  (90 BASE) MCG/ACT INHALER    INHALE 2 PUFFS DAILY AS NEEDED FOR WHEEZING    CITALOPRAM (CELEXA) 40 MG TABLET    TAKE 1 TABLET BY MOUTH ONCE DAILY    DICLOFENAC SODIUM (VOLTAREN) 1 % GEL    Apply 2 g topically 2 times daily    HYDROCODONE-ACETAMINOPHEN (NORCO)  MG PER TABLET    Take 1 tablet by mouth every 6 hours as needed     IPRATROPIUM-ALBUTEROL (DUONEB) 0.5-2.5 (3) MG/3ML SOLN NEBULIZER SOLUTION    Inhale 3 mLs into the lungs every 4 hours as needed    LEVOTHYROXINE (SYNTHROID) 112 MCG TABLET    TAKE 1 TABLET BY MOUTH ONCE DAILY.     MIRTAZAPINE (REMERON) 15 MG TABLET    TAKE 1 TABLET BY MOUTH AT BEDTIME    NAPROXEN (NAPROSYN) 500 MG TABLET    Take 1 tablet by mouth 2 times daily as needed for Pain    OXYGEN    Inhale into the lungs as needed     OXYGEN    Inhale into the lungs daily as needed    RIZATRIPTAN (MAXALT) 10 MG TABLET    TAKE 1 TABLET BY MOUTH ONCE AS NEEDED FOR MIGRAINE MAY REPEAT IN 2 HOURS IF NEEDED     ROPINIROLE (REQUIP) 0.25 MG TABLET    Take 0.25 mg by mouth    ROPINIROLE (REQUIP) 0.25 MG TABLET    TAKE 1 TABLET BY MOUTH AT BEDTIME    TIZANIDINE (ZANAFLEX) 4 MG TABLET    Take 4 mg by mouth every 8 hours as needed     TIZANIDINE (ZANAFLEX) 4 MG TABLET    Take 4 mg by mouth 2 times daily ALLERGIES     Codeine    FAMILY HISTORY       Family History   Problem Relation Age of Onset    Heart Disease Mother     Diabetes Father     Heart Disease Father     Diabetes Sister     Heart Disease Sister     Diabetes Brother     Heart Disease Brother     Cancer Maternal Aunt         breast    Cancer Paternal Aunt         lung cancer    Diabetes Sister     Heart Disease Sister     Diabetes Brother     Cancer Son         kidney    Cancer Other         Nephew with Renal cell carcinoma           SOCIAL HISTORY       Social History     Socioeconomic History    Marital status:      Spouse name: None    Number of children: None    Years of education: None    Highest education level: None   Occupational History    None   Tobacco Use    Smoking status: Former Smoker     Packs/day: 0.00     Years: 37.00     Pack years: 0.00     Quit date: 2/3/2014     Years since quittin.3    Smokeless tobacco: Never Used   Vaping Use    Vaping Use: Never used   Substance and Sexual Activity    Alcohol use: No    Drug use: No    Sexual activity: Yes     Partners: Male   Other Topics Concern    None   Social History Narrative    None     Social Determinants of Health     Financial Resource Strain: Low Risk     Difficulty of Paying Living Expenses: Not hard at all   Food Insecurity: Food Insecurity Present    Worried About [facility] in the Last Year: Often true    Francicso Javier of Food in the Last Year: Often true   Transportation Needs:     Lack of Transportation (Medical): Not on file    Lack of Transportation (Non-Medical):  Not on file   Physical Activity:     Days of Exercise per Week: Not on file    Minutes of Exercise per Session: Not on file   Stress:     Feeling of Stress : Not on file   Social Connections:     Frequency of Communication with Friends and Family: Not on file    Frequency of Social Gatherings with Friends and Family: Not on file    Attends Jain Services: Not on file    Active Member of Clubs or Organizations: Not on file    Attends Club or Organization Meetings: Not on file    Marital Status: Not on file   Intimate Partner Violence:     Fear of Current or Ex-Partner: Not on file    Emotionally Abused: Not on file    Physically Abused: Not on file    Sexually Abused: Not on file   Housing Stability:     Unable to Pay for Housing in the Last Year: Not on file    Number of [place] in the Last Year: Not on file    Unstable Housing in the Last Year: Not on file       SCREENINGS    Arnie Coma Scale  Eye Opening: Spontaneous  Best Verbal Response: Oriented  Best Motor Response: Obeys commands  Eitzen Coma Scale Score: 15        PHYSICAL EXAM    (up to 7 for level 4, 8 or more for level 5)     ED Triage Vitals [06/05/22 1400]   BP Temp Temp src Heart Rate Resp SpO2 Height Weight   (!) 142/87 97.9 °F (36.6 °C) -- 85 16 91 % -- 160 lb (72.6 kg)       Physical Exam  Vitals and nursing note reviewed. Constitutional:       General: She is not in acute distress. Appearance: Normal appearance. She is normal weight. She is not ill-appearing, toxic-appearing or diaphoretic. HENT:      Head: Normocephalic and atraumatic. Comments: Patient does not have any teeth, all removed. She does have an area of swelling and tenderness in the anterior gums of the upper jawline. There is no associated fluctuance or obvious drainable abscess     Mouth/Throat:      Mouth: Mucous membranes are moist.      Pharynx: Oropharynx is clear. No oropharyngeal exudate or posterior oropharyngeal erythema. Eyes:      Extraocular Movements: Extraocular movements intact. Conjunctiva/sclera: Conjunctivae normal.      Pupils: Pupils are equal, round, and reactive to light. Cardiovascular:      Rate and Rhythm: Normal rate and regular rhythm. Pulses: Normal pulses. Pulmonary:      Effort: Pulmonary effort is normal. No respiratory distress.    Chest:      Chest wall: No tenderness. Abdominal:      General: There is no distension. Palpations: Abdomen is soft. Tenderness: There is no abdominal tenderness. Musculoskeletal:      Cervical back: Normal range of motion and neck supple. No rigidity or tenderness. Lymphadenopathy:      Cervical: No cervical adenopathy. Skin:     General: Skin is warm and dry. Neurological:      General: No focal deficit present. Mental Status: She is alert and oriented to person, place, and time. DIAGNOSTIC RESULTS     RADIOLOGY:  Non-plain film images such as CT, Ultrasound and MRI are read by the radiologist. Plain radiographic images are visualized and preliminarily interpreted bythe emergency physician with the below findings:    [address]   Final Result       1. No facial abscess/fluid collection is seen. Recommendation:   Follow up as clinically indicated. All CT scans at this facility utilize dose modulation, iterative reconstruction, and/or weight based dosing when appropriate to reduce radiation dose to as low as reasonably achievable. Electronically Signed by Dwayne Mendiola MD at 05-Jun-2022 04:58:57 PM                       LABS:  Labs Reviewed   CBC WITH AUTO DIFFERENTIAL - Abnormal; Notable for the following components:       Result Value    MCHC 31.5 (*)     Eosinophils % 6.7 (*)     All other components within normal limits   COMPREHENSIVE METABOLIC PANEL W/ REFLEX TO MG FOR LOW K - Abnormal; Notable for the following components:    CO2 31 (*)     Total Protein 6.5 (*)     Alkaline Phosphatase 155 (*)     All other components within normal limits       All other labs were within normal range or not returned as of this dictation.     EMERGENCY DEPARTMENT COURSE and DIFFERENTIAL DIAGNOSIS/MDM:   Vitals:    Vitals:    06/05/22 1400   BP: (!) 142/87   Pulse: 85   Resp: 16   Temp: 97.9 °F (36.6 °C)   SpO2: 91%   Weight: 160 lb (72.6 kg)       MDM  Patient is a 71-year-old female presents the ER with complaint of oral pain. On physical exam she does have an area of swelling without any fluctuance or obvious abscess. Due to her tenderness of her maxillary sinuses and nose associated with this lesion, CT of the face was obtained. This is negative for any abscess or fluid collection. The patient has continued to worsen with azithromycin so I will change to clindamycin. She also did receive a dose of clindamycin in the ER. She has no other associated leukocytosis on her CBC that is concerning. She has no electrolyte disturbance, TORSTEN, or elevation of her LFTs it is noted on her CMP. She has no abdominal tenderness warranting imaging related to her nausea and vomiting. She was given morphine as well in the ER for pain control. I will send her home with dental balls for pain control. She is agreeable to the treatment plan and I have gone over return precautions with the patient and her family member who verbalized understanding. All questions were answered. FINAL IMPRESSION      1.  Dental infection          DISPOSITION/PLAN   DISPOSITION Decision To Discharge 06/05/2022 04:53:03 PM      PATIENT REFERRED TO:  ERIN Gómez  [facility] [address]  [phone]            DISCHARGE MEDICATIONS:  New Prescriptions    CLINDAMYCIN (CLEOCIN) 300 MG CAPSULE    Take 1 capsule by mouth 3 times daily for 7 days          (Please note that portions of this note were completed with a voice recognition program.  Efforts were made to edit thedictations but occasionally words are mis-transcribed.)    ATI Bonilla (electronically signed)     Carolina Salas, [address]  06/05/22 [id number]

## 2022-08-24 ENCOUNTER — TELEPHONE (OUTPATIENT)
Dept: PRIMARY CARE CLINIC | Age: 67
End: 2022-08-24

## 2022-08-24 NOTE — TELEPHONE ENCOUNTER
Wants to know if you can send her some medication in to her pharmacy because she has recently alyson tested positive for covid

## 2022-09-13 ENCOUNTER — TELEPHONE (OUTPATIENT)
Dept: FAMILY MEDICINE CLINIC | Facility: CLINIC | Age: 67
End: 2022-09-13

## 2022-09-13 ENCOUNTER — OFFICE VISIT (OUTPATIENT)
Dept: FAMILY MEDICINE CLINIC | Facility: CLINIC | Age: 67
End: 2022-09-13

## 2022-09-13 VITALS
TEMPERATURE: 98.6 F | RESPIRATION RATE: 18 BRPM | WEIGHT: 149 LBS | BODY MASS INDEX: 23.39 KG/M2 | SYSTOLIC BLOOD PRESSURE: 137 MMHG | HEIGHT: 67 IN | DIASTOLIC BLOOD PRESSURE: 76 MMHG | HEART RATE: 105 BPM | OXYGEN SATURATION: 99 %

## 2022-09-13 DIAGNOSIS — K08.89 PAIN, DENTAL: ICD-10-CM

## 2022-09-13 DIAGNOSIS — K12.2 MOUTH ABSCESS: Primary | ICD-10-CM

## 2022-09-13 PROCEDURE — 99213 OFFICE O/P EST LOW 20 MIN: CPT | Performed by: NURSE PRACTITIONER

## 2022-09-13 RX ORDER — CLINDAMYCIN HYDROCHLORIDE 300 MG/1
300 CAPSULE ORAL 3 TIMES DAILY
Qty: 21 CAPSULE | Refills: 0 | Status: SHIPPED | OUTPATIENT
Start: 2022-09-13 | End: 2022-09-20

## 2022-09-13 NOTE — PROGRESS NOTES
"Chief Complaint  Mouth Lesions    Subjective        Ashley Gibbs presents to [facility]  Presents with complaints of mouth pain and abscess that started yesterday.  She was here for same thing  In June and was seen by me and  treated with azithromycin. Family member in room states, \"She was treated at this urgent care with this last time and the girl who seen her gave her the wrong antibiotic and she had to go to er and get iv antibiotics.  I explained to the family member that I was the one who seen her and I prescribed an appropriate antibiotic.  Also educated the family member that we are not an urgent care we are a primary care office just like pt's PCP.  Pt states the abscess has come back and it started a couple days ago and her gums are very tender and throbbing. Pt states, \"I have not been eating but have been drinking.\"  Denies fever, chills, nausea, vomiting or diarrhea            Dental Pain   This is a recurrent problem. The current episode started yesterday. The problem occurs constantly. The problem has been gradually worsening. The pain is at a severity of 10/10. The pain is severe. Associated symptoms include facial pain. Pertinent negatives include no fever, oral bleeding, sinus pressure or thermal sensitivity. She has tried rest and acetaminophen for the symptoms. The treatment provided mild relief.       Objective   Vital Signs:  /76 (BP Location: Left arm, Patient Position: Sitting, Cuff Size: Adult)   Pulse 105   Temp 98.6 °F (37 °C) (Infrared)   Resp 18   Ht 170.2 cm (67\") Comment: per patient  Wt 67.6 kg (149 lb)   SpO2 99%   BMI 23.34 kg/m²   Estimated body mass index is 23.34 kg/m² as calculated from the following:    Height as of this encounter: 170.2 cm (67\").    Weight as of this encounter: 67.6 kg (149 lb).    BMI is within normal parameters. No other follow-up for BMI required.      Physical Exam  Vitals and nursing note reviewed. "   Constitutional:       General: She is awake.      Appearance: She is well-developed and well-groomed. She is ill-appearing.   HENT:      Head: Normocephalic and atraumatic.      Right Ear: Hearing, tympanic membrane, ear canal and external ear normal.      Left Ear: Hearing, tympanic membrane, ear canal and external ear normal.      Nose: Nose normal.      Mouth/Throat:      Lips: Pink.      Pharynx: Oropharynx is clear.     Eyes:      General: Lids are normal.      Conjunctiva/sclera: Conjunctivae normal.   Cardiovascular:      Rate and Rhythm: Normal rate and regular rhythm.      Heart sounds: Normal heart sounds.   Pulmonary:      Effort: Pulmonary effort is normal.      Breath sounds: Normal breath sounds and air entry.   Musculoskeletal:      Cervical back: Full passive range of motion without pain.      Right lower leg: No edema.      Left lower leg: No edema.   Lymphadenopathy:      Head:      Right side of head: No submental, submandibular or tonsillar adenopathy.      Left side of head: No submental, submandibular or tonsillar adenopathy.   Skin:     General: Skin is warm and dry.   Neurological:      Mental Status: She is alert and oriented to person, place, and time.      Sensory: Sensation is intact.      Motor: Motor function is intact.      Coordination: Coordination is intact.      Gait: Gait is intact.   Psychiatric:         Attention and Perception: Attention and perception normal.         Mood and Affect: Mood and affect normal.         Speech: Speech normal.         Behavior: Behavior normal. Behavior is cooperative.         Thought Content: Thought content normal.         Cognition and Memory: Cognition and memory normal.         Judgment: Judgment normal.        Result Review :                Assessment and Plan   Diagnoses and all orders for this visit:    1. Mouth abscess (Primary)  2. Pain, dental      -     clindamycin (CLEOCIN) 300 MG capsule; Take 1 capsule by mouth 3 (Three) Times a Day  for 7 days.  Dispense: 21 capsule; Refill: 0       -    Pt is prescribed norco by another provider she can take that for pain or use tylenol as directed         Follow Up   Return in about 1 week (around 9/20/2022), or if symptoms worsen or fail to improve.  Patient was given instructions and counseling regarding her condition or for health maintenance advice. Please see specific information pulled into the AVS if appropriate.     Electronically signed by Kristine Dacosta DNP, APRN, 09/13/22, 12:46 PM CDT.

## 2022-10-03 ENCOUNTER — TELEMEDICINE (OUTPATIENT)
Dept: FAMILY MEDICINE CLINIC | Facility: CLINIC | Age: 67
End: 2022-10-03

## 2022-10-03 DIAGNOSIS — J06.9 UPPER RESPIRATORY TRACT INFECTION, UNSPECIFIED TYPE: Primary | ICD-10-CM

## 2022-10-03 PROCEDURE — 99214 OFFICE O/P EST MOD 30 MIN: CPT | Performed by: NURSE PRACTITIONER

## 2022-10-03 RX ORDER — ALBUTEROL SULFATE 2.5 MG/3ML
2.5 SOLUTION RESPIRATORY (INHALATION) EVERY 4 HOURS PRN
Qty: 90 ML | Refills: 0 | Status: SHIPPED | OUTPATIENT
Start: 2022-10-03

## 2022-10-03 RX ORDER — METHYLPREDNISOLONE 4 MG/1
TABLET ORAL
Qty: 21 TABLET | Refills: 0 | Status: SHIPPED | OUTPATIENT
Start: 2022-10-03 | End: 2022-11-22

## 2022-10-03 RX ORDER — AMOXICILLIN AND CLAVULANATE POTASSIUM 875; 125 MG/1; MG/1
1 TABLET, FILM COATED ORAL 2 TIMES DAILY
Qty: 20 TABLET | Refills: 0 | Status: SHIPPED | OUTPATIENT
Start: 2022-10-03 | End: 2022-10-13

## 2022-10-03 NOTE — PROGRESS NOTES
"Chief Complaint  Cough    Subjective        Ashley Gibbs presents via telehealth zoom video visit [facility]  History of Present Illness   Patient location: [place], [place]  Provider location: office in [place]     Consent verbalized by patient for audio/video visit today.     Here for sick visit.  Reports  Symptoms started 4 days ago. Worsening.   Reports nasal congestion, chest congestion, pnd, headache, sinus pressure, cough.  Thick purulent mucous   Feels ill, fatigued  Gets out of breath with coughing  No fevers, sore throat, ear pain  Getting out of breath  Has copd   Needs refill of albuterol nebulizer.     Objective   Vital Signs:  There were no vitals taken for this visit.  Estimated body mass index is 23.34 kg/m² as calculated from the following:    Height as of 9/13/22: 170.2 cm (67\").    Weight as of 9/13/22: 67.6 kg (149 lb).    BMI is within normal parameters. No other follow-up for BMI required.      Physical Exam   No vital signs or bmi obtained for this encounter.      Physical exam-  · General appearance- Alert, appears ill but comfortable. Dressed appropriately. No distress.   · HEENT- external examination of eyes, ears and nose all normal. Head is atraumatic. Hearing normal.   · Neck is symmetric, trachea midline.   · Normal respiratory effort. Coughing occasionally during visit.  · Digits and nails appear healthy. No clubbing, rashes or discolorations.   · Normal range of motion of all extremities.  · Mood appropriate. Communicates easily. Normal judgement and insight. Oriented to time, place and person. Memory appears intact.       Result Review :                Assessment and Plan   Diagnoses and all orders for this visit:    1. Upper respiratory tract infection, unspecified type (Primary)    Other orders  -     albuterol (PROVENTIL) (2.5 MG/3ML) 0.083% nebulizer solution; Take 2.5 mg by nebulization Every 4 (Four) Hours As Needed for Wheezing.  " Dispense: 90 mL; Refill: 0  -     amoxicillin-clavulanate (AUGMENTIN) 875-125 MG per tablet; Take 1 tablet by mouth 2 (Two) Times a Day for 10 days.  Dispense: 20 tablet; Refill: 0  -     methylPREDNISolone (MEDROL) 4 MG dose pack; Take as directed on package instructions.  Dispense: 21 tablet; Refill: 0      Encouraged to follow up in a clinic if symptoms do not improve or worsen to be evaluated and get an xray if needed.          Follow Up   Return in about 1 week (around 10/10/2022), or if symptoms worsen or fail to improve.  Patient was given instructions and counseling regarding her condition or for health maintenance advice. Please see specific information pulled into the AVS if appropriate.

## 2022-10-27 ENCOUNTER — TELEPHONE (OUTPATIENT)
Dept: PRIMARY CARE CLINIC | Age: 67
End: 2022-10-27

## 2022-10-31 DIAGNOSIS — F32.A DEPRESSION, UNSPECIFIED DEPRESSION TYPE: ICD-10-CM

## 2022-10-31 DIAGNOSIS — G25.81 RESTLESS LEGS SYNDROME: ICD-10-CM

## 2022-10-31 DIAGNOSIS — E03.9 HYPOTHYROIDISM, UNSPECIFIED TYPE: ICD-10-CM

## 2022-10-31 DIAGNOSIS — F33.0 MILD EPISODE OF RECURRENT MAJOR DEPRESSIVE DISORDER (HCC): ICD-10-CM

## 2022-11-02 RX ORDER — ROPINIROLE 0.25 MG/1
TABLET, FILM COATED ORAL
Qty: 30 TABLET | Refills: 5 | Status: SHIPPED | OUTPATIENT
Start: 2022-11-02

## 2022-11-02 RX ORDER — MIRTAZAPINE 15 MG/1
TABLET, FILM COATED ORAL
Qty: 30 TABLET | Refills: 5 | Status: SHIPPED | OUTPATIENT
Start: 2022-11-02

## 2022-11-02 RX ORDER — LEVOTHYROXINE SODIUM 112 UG/1
TABLET ORAL
Qty: 30 TABLET | Refills: 5 | Status: SHIPPED | OUTPATIENT
Start: 2022-11-02

## 2022-11-02 RX ORDER — CITALOPRAM 40 MG/1
TABLET ORAL
Qty: 30 TABLET | Refills: 5 | Status: SHIPPED | OUTPATIENT
Start: 2022-11-02

## 2022-11-05 DIAGNOSIS — J44.1 CHRONIC OBSTRUCTIVE PULMONARY DISEASE WITH ACUTE EXACERBATION (HCC): ICD-10-CM

## 2022-11-05 RX ORDER — ALBUTEROL SULFATE 90 UG/1
AEROSOL, METERED RESPIRATORY (INHALATION)
Qty: 18 G | Refills: 0 | Status: SHIPPED | OUTPATIENT
Start: 2022-11-05

## 2022-11-22 ENCOUNTER — TELEMEDICINE (OUTPATIENT)
Dept: FAMILY MEDICINE CLINIC | Facility: CLINIC | Age: 67
End: 2022-11-22

## 2022-11-22 DIAGNOSIS — J44.1 COPD WITH EXACERBATION: ICD-10-CM

## 2022-11-22 PROCEDURE — 99213 OFFICE O/P EST LOW 20 MIN: CPT | Performed by: NURSE PRACTITIONER

## 2022-11-22 RX ORDER — ALBUTEROL SULFATE 90 UG/1
2 AEROSOL, METERED RESPIRATORY (INHALATION) EVERY 4 HOURS PRN
Qty: 6.7 G | Refills: 0 | Status: SHIPPED | OUTPATIENT
Start: 2022-11-22 | End: 2023-01-25

## 2022-11-22 RX ORDER — PREDNISONE 20 MG/1
20 TABLET ORAL DAILY
Qty: 5 TABLET | Refills: 0 | Status: SHIPPED | OUTPATIENT
Start: 2022-11-22 | End: 2022-11-27

## 2022-11-22 RX ORDER — AZITHROMYCIN 250 MG/1
TABLET, FILM COATED ORAL
Qty: 6 TABLET | Refills: 0 | Status: SHIPPED | OUTPATIENT
Start: 2022-11-22 | End: 2023-02-13

## 2022-12-06 NOTE — PROGRESS NOTES
"Chief Complaint  Cough    Subjective        Ashley Gibbs presents via telehealth video visit with [facility]  History of Present Illness  Unable to complete visit using a video connection to the patient. A phone visit was used to complete this visits. Total time of discussion was 10 minutes.    Here for acute visit   Reports symptoms started 2 days ago  Reports sinus congestion, drainage into chest, wheezing and thick mucous.   No fevers  Having shortness of breath      Objective   Vital Signs:  There were no vitals taken for this visit.  Estimated body mass index is 23.34 kg/m² as calculated from the following:    Height as of 9/13/22: 170.2 cm (67\").    Weight as of 9/13/22: 67.6 kg (149 lb).    BMI is within normal parameters. No other follow-up for BMI required.      Physical Exam   No vital signs, physical examination or bmi performed for this encounter.     Result Review :                Assessment and Plan   Diagnoses and all orders for this visit:    1. COPD with exacerbation (HCC)  -     albuterol sulfate  (90 Base) MCG/ACT inhaler; Inhale 2 puffs Every 4 (Four) Hours As Needed for Wheezing or Shortness of Air.  Dispense: 6.7 g; Refill: 0    Other orders  -     predniSONE (DELTASONE) 20 MG tablet; Take 1 tablet by mouth Daily for 5 days.  Dispense: 5 tablet; Refill: 0  -     azithromycin (Zithromax Z-Jonas) 250 MG tablet; Take 2 tablets by mouth on day 1, then 1 tablet daily on days 2-5  Dispense: 6 tablet; Refill: 0      Follow up in clinic if respiratory symptoms worsen.            Follow Up   Return in about 1 week (around 11/29/2022), or if symptoms worsen or fail to improve.  Patient was given instructions and counseling regarding her condition or for health maintenance advice. Please see specific information pulled into the AVS if appropriate.       "

## 2023-01-25 ENCOUNTER — OFFICE VISIT (OUTPATIENT)
Dept: FAMILY MEDICINE CLINIC | Facility: CLINIC | Age: 68
End: 2023-01-25
Payer: MEDICARE

## 2023-01-25 VITALS
OXYGEN SATURATION: 94 % | HEART RATE: 76 BPM | HEIGHT: 67 IN | DIASTOLIC BLOOD PRESSURE: 73 MMHG | RESPIRATION RATE: 16 BRPM | WEIGHT: 149 LBS | SYSTOLIC BLOOD PRESSURE: 116 MMHG | BODY MASS INDEX: 23.39 KG/M2 | TEMPERATURE: 97.5 F

## 2023-01-25 DIAGNOSIS — J44.1 COPD WITH EXACERBATION: ICD-10-CM

## 2023-01-25 PROCEDURE — 99214 OFFICE O/P EST MOD 30 MIN: CPT | Performed by: NURSE PRACTITIONER

## 2023-01-25 PROCEDURE — 96372 THER/PROPH/DIAG INJ SC/IM: CPT | Performed by: NURSE PRACTITIONER

## 2023-01-25 RX ORDER — CEFTRIAXONE 1 G/1
1 INJECTION, POWDER, FOR SOLUTION INTRAMUSCULAR; INTRAVENOUS ONCE
Status: COMPLETED | OUTPATIENT
Start: 2023-01-25 | End: 2023-01-25

## 2023-01-25 RX ORDER — METHYLPREDNISOLONE 4 MG/1
TABLET ORAL
Qty: 21 TABLET | Refills: 0 | Status: SHIPPED | OUTPATIENT
Start: 2023-01-25 | End: 2023-02-13

## 2023-01-25 RX ORDER — ALBUTEROL SULFATE 90 UG/1
2 AEROSOL, METERED RESPIRATORY (INHALATION) EVERY 4 HOURS PRN
Qty: 18 G | Refills: 2 | Status: SHIPPED | OUTPATIENT
Start: 2023-01-25

## 2023-01-25 RX ORDER — AZITHROMYCIN 250 MG/1
TABLET, FILM COATED ORAL
Qty: 6 TABLET | Refills: 0 | Status: SHIPPED | OUTPATIENT
Start: 2023-01-25 | End: 2023-02-13

## 2023-01-25 RX ORDER — DEXAMETHASONE SODIUM PHOSPHATE 10 MG/ML
10 INJECTION INTRAMUSCULAR; INTRAVENOUS ONCE
Status: COMPLETED | OUTPATIENT
Start: 2023-01-25 | End: 2023-01-25

## 2023-01-25 RX ORDER — IPRATROPIUM BROMIDE AND ALBUTEROL SULFATE 2.5; .5 MG/3ML; MG/3ML
3 SOLUTION RESPIRATORY (INHALATION) EVERY 4 HOURS PRN
Qty: 90 ML | Refills: 0 | Status: SHIPPED | OUTPATIENT
Start: 2023-01-25

## 2023-01-25 RX ORDER — FLUTICASONE PROPIONATE AND SALMETEROL 250; 50 UG/1; UG/1
1 POWDER RESPIRATORY (INHALATION)
Qty: 60 EACH | Refills: 1 | Status: SHIPPED | OUTPATIENT
Start: 2023-01-25 | End: 2023-03-14

## 2023-01-25 RX ADMIN — DEXAMETHASONE SODIUM PHOSPHATE 10 MG: 10 INJECTION INTRAMUSCULAR; INTRAVENOUS at 11:47

## 2023-01-25 RX ADMIN — CEFTRIAXONE 1 G: 1 INJECTION, POWDER, FOR SOLUTION INTRAMUSCULAR; INTRAVENOUS at 11:47

## 2023-01-25 NOTE — PROGRESS NOTES
"Chief Complaint  Shortness of Breath    Subjective        Ashley Gibbs presents to [facility]  History of Present Illness    The patient is a 67-year-old female who presents to the clinic today for cough and shortness of breath. She is accompanied by an adult female.    The patient has been coughing and experiencing shortness of breath for approximately 3 days. Her oxygen has been saturating at 79 to 83 percent. She has oxygen at home and has been using it when her oxygen saturation is low. She uses her albuterol nebulizer but has ran out. She has rescue inhalers as well, however she is out and, she needs refills. She denies having a long-term inhaler. She reports she has used Advair in the past and she tolerated it well. She has been producing significant amounts of thick mucus. She is using Mucus Relief 400 mg 3 times daily. She denies any ear congestion, otalgia, or otorrhea. She denies any allergies. The patient has not been prescribed antibiotics or steroids for at least 3 months.    Objective   Vital Signs:  /73 (BP Location: Left arm, Patient Position: Sitting, Cuff Size: Adult)   Pulse 76   Temp 97.5 °F (36.4 °C) (Infrared)   Resp 16   Ht 170.2 cm (67\")   Wt 67.6 kg (149 lb)   SpO2 94%   BMI 23.34 kg/m²   Estimated body mass index is 23.34 kg/m² as calculated from the following:    Height as of this encounter: 170.2 cm (67\").    Weight as of this encounter: 67.6 kg (149 lb).       BMI is within normal parameters. No other follow-up for BMI required.      Physical Exam  Vitals and nursing note reviewed.   Constitutional:       General: She is not in acute distress.     Appearance: She is well-developed.   HENT:      Head: Normocephalic and atraumatic.      Right Ear: Tympanic membrane and ear canal normal.      Left Ear: Tympanic membrane and ear canal normal.      Nose: Nose normal.      Right Sinus: No maxillary sinus tenderness or frontal sinus tenderness.      " Left Sinus: No maxillary sinus tenderness or frontal sinus tenderness.      Mouth/Throat:      Mouth: Mucous membranes are moist.      Pharynx: Oropharynx is clear. Uvula midline. No uvula swelling.   Eyes:      Conjunctiva/sclera: Conjunctivae normal.   Neck:      Thyroid: No thyromegaly.      Trachea: No tracheal deviation.   Cardiovascular:      Rate and Rhythm: Normal rate and regular rhythm.      Heart sounds: Normal heart sounds.   Pulmonary:      Effort: Pulmonary effort is normal.      Breath sounds: Wheezing and rhonchi present.   Musculoskeletal:      Cervical back: Neck supple.   Lymphadenopathy:      Cervical: No cervical adenopathy.   Skin:     General: Skin is warm and dry.   Neurological:      Mental Status: She is alert.   Psychiatric:         Behavior: Behavior normal.        Result Review :                   Assessment and Plan   Diagnoses and all orders for this visit:    1. COPD with exacerbation (HCC)  -     dexamethasone (DECADRON) injection 10 mg  -     cefTRIAXone (ROCEPHIN) injection 1 g  -     albuterol sulfate  (90 Base) MCG/ACT inhaler; Inhale 2 puffs Every 4 (Four) Hours As Needed for Wheezing or Shortness of Air.  Dispense: 18 g; Refill: 2    Other orders  -     ipratropium-albuterol (DUO-NEB) 0.5-2.5 mg/3 ml nebulizer; Take 3 mL by nebulization Every 4 (Four) Hours As Needed for Wheezing or Shortness of Air.  Dispense: 90 mL; Refill: 0  -     Fluticasone-Salmeterol (Advair Diskus) 250-50 MCG/ACT DISKUS; Inhale 1 puff 2 (Two) Times a Day.  Dispense: 60 each; Refill: 1  -     methylPREDNISolone (MEDROL) 4 MG dose pack; Take as directed on package instructions.  Dispense: 21 tablet; Refill: 0  -     azithromycin (Zithromax Z-Jonas) 250 MG tablet; Take 2 tablets by mouth on day 1, then 1 tablet daily on days 2-5  Dispense: 6 tablet; Refill: 0      1. Chronic obstructive pulmonary disease exacerbation.  - The patient will receive rocephin 1 gm im  And decadron 10 mg im injections in  office.  - We will send a prescription for azithromycin and a Medrol Dosepak.  - We will send a refill of ipratropium albuterol as well as albuterol sulfate.  - We will send a new prescription of Advair for maintenance treatment, instructed on use.  - The patient was advised to return for a chest x-ray if her breathing symptoms worsen.       Follow Up   Return in about 1 week (around 2/1/2023), or if symptoms worsen or fail to improve.  Patient was given instructions and counseling regarding her condition or for health maintenance advice. Please see specific information pulled into the AVS if appropriate.     Transcribed from ambient dictation for CARRIE Medina by Mar Aguiar, Quality .      Patient verbalized consent to the visit recording.  I have personally performed the services described in this document as transcribed by the above individual, and it is both accurate and complete.  CARRIE Medina  1/31/2023  11:17 CDT

## 2023-02-13 ENCOUNTER — OFFICE VISIT (OUTPATIENT)
Dept: FAMILY MEDICINE CLINIC | Facility: CLINIC | Age: 68
End: 2023-02-13
Payer: MEDICARE

## 2023-02-13 VITALS
SYSTOLIC BLOOD PRESSURE: 103 MMHG | RESPIRATION RATE: 20 BRPM | TEMPERATURE: 96.9 F | WEIGHT: 149 LBS | OXYGEN SATURATION: 95 % | HEART RATE: 91 BPM | DIASTOLIC BLOOD PRESSURE: 70 MMHG | BODY MASS INDEX: 23.39 KG/M2 | HEIGHT: 67 IN

## 2023-02-13 DIAGNOSIS — R05.9 COUGH, UNSPECIFIED TYPE: ICD-10-CM

## 2023-02-13 DIAGNOSIS — J02.9 ACUTE PHARYNGITIS, UNSPECIFIED ETIOLOGY: ICD-10-CM

## 2023-02-13 DIAGNOSIS — R50.9 FEVER, UNSPECIFIED FEVER CAUSE: Primary | ICD-10-CM

## 2023-02-13 DIAGNOSIS — J44.1 COPD EXACERBATION: ICD-10-CM

## 2023-02-13 DIAGNOSIS — J06.9 UPPER RESPIRATORY TRACT INFECTION, UNSPECIFIED TYPE: ICD-10-CM

## 2023-02-13 DIAGNOSIS — H66.003 ACUTE SUPPURATIVE OTITIS MEDIA OF BOTH EARS WITHOUT SPONTANEOUS RUPTURE OF TYMPANIC MEMBRANES, RECURRENCE NOT SPECIFIED: ICD-10-CM

## 2023-02-13 LAB
EXPIRATION DATE: NORMAL
FLUAV AG UPPER RESP QL IA.RAPID: NOT DETECTED
FLUBV AG UPPER RESP QL IA.RAPID: NOT DETECTED
INTERNAL CONTROL: NORMAL
Lab: NORMAL
SARS-COV-2 AG UPPER RESP QL IA.RAPID: NOT DETECTED

## 2023-02-13 PROCEDURE — 87428 SARSCOV & INF VIR A&B AG IA: CPT | Performed by: NURSE PRACTITIONER

## 2023-02-13 PROCEDURE — 99214 OFFICE O/P EST MOD 30 MIN: CPT | Performed by: NURSE PRACTITIONER

## 2023-02-13 PROCEDURE — 96372 THER/PROPH/DIAG INJ SC/IM: CPT | Performed by: NURSE PRACTITIONER

## 2023-02-13 RX ORDER — CEFTRIAXONE 1 G/1
1 INJECTION, POWDER, FOR SOLUTION INTRAMUSCULAR; INTRAVENOUS ONCE
Status: COMPLETED | OUTPATIENT
Start: 2023-02-13 | End: 2023-02-13

## 2023-02-13 RX ORDER — AMOXICILLIN AND CLAVULANATE POTASSIUM 875; 125 MG/1; MG/1
1 TABLET, FILM COATED ORAL 2 TIMES DAILY
Qty: 20 TABLET | Refills: 0 | Status: SHIPPED | OUTPATIENT
Start: 2023-02-13 | End: 2023-02-23

## 2023-02-13 RX ADMIN — CEFTRIAXONE 1 G: 1 INJECTION, POWDER, FOR SOLUTION INTRAMUSCULAR; INTRAVENOUS at 14:57

## 2023-02-13 NOTE — PROGRESS NOTES
"Chief Complaint  Fever (Pt states she has been sick for a few day s) and Headache    Subjective          Ashley Gibbs presents to [facility]  History of Present Illness  The patient is a 67-year-old female who presents today for a sick visit. She is accompanied by an adult female.    The patient was last seen a couple of weeks ago for a COPD exacerbation. She was treated with an antibiotic injection and cortisone injection. She was prescribed an antibiotic and a steroid. She feels that she has never recovered from the COPD exacerbation. Symptoms that still persist include wheezing, coughing, and mucus that is thick and yellow in color.     The adult female states that the patient was freezing last night, 02/12/2023. This morning, 02/13/2023, the adult female checked in on her and the patient had a temperature of 103 degrees Fahrenheit. The adult female took her temperature 3 times for confirmation of a fever. The patient has been using her nebulizer. She is also complaining of a mild earache. The adult female adds that the patient's throat and ear were red.     The patient's COVID-19 and influenza tests were negative in the office today, 02/13/2023.     Objective   Vital Signs:   /70 (BP Location: Left arm, Patient Position: Sitting, Cuff Size: Adult)   Pulse 91   Temp 96.9 °F (36.1 °C) (Infrared)   Resp 20   Ht 170.2 cm (67\")   Wt 67.6 kg (149 lb)   SpO2 95%   BMI 23.34 kg/m²     BMI is within normal parameters. No other follow-up for BMI required.      Physical Exam  Vitals and nursing note reviewed.   Constitutional:       General: She is not in acute distress.     Appearance: She is well-developed. She is ill-appearing.   HENT:      Right Ear: Ear canal normal. Tympanic membrane is injected.      Left Ear: Ear canal normal. Tympanic membrane is injected.      Nose: Nose normal.      Right Sinus: No maxillary sinus tenderness or frontal sinus tenderness.      Left " Sinus: No maxillary sinus tenderness or frontal sinus tenderness.      Mouth/Throat:      Mouth: Mucous membranes are moist.      Pharynx: Uvula midline. Pharyngeal swelling and posterior oropharyngeal erythema present. No uvula swelling.   Eyes:      Conjunctiva/sclera: Conjunctivae normal.   Neck:      Thyroid: No thyromegaly.      Trachea: No tracheal deviation.   Cardiovascular:      Rate and Rhythm: Normal rate and regular rhythm.      Heart sounds: Normal heart sounds.   Pulmonary:      Effort: Pulmonary effort is normal.      Breath sounds: Wheezing and rhonchi present.   Musculoskeletal:      Cervical back: Neck supple.   Lymphadenopathy:      Cervical: Cervical adenopathy present.      Right cervical: Superficial cervical adenopathy present.      Left cervical: Superficial cervical adenopathy present.   Skin:     General: Skin is warm and dry.   Neurological:      Mental Status: She is alert.   Psychiatric:         Behavior: Behavior normal.          Result Review :          Lab Results (last 24 hours)     Procedure Component Value Units Date/Time    POCT SARS-CoV-2 Antigen NOLVIA + Flu [[id number]]  (Normal) Collected: 02/13/23 1430    Specimen: Swab Updated: 02/13/23 1431     SARS Antigen Not Detected     Influenza A Antigen NOLVIA Not Detected     Influenza B Antigen NOLVIA Not Detected     Internal Control Passed     Lot Number [id number]     Expiration Date 03/07/2024                   Assessment and Plan    Diagnoses and all orders for this visit:    1. Fever, unspecified fever cause (Primary)  -     POCT SARS-CoV-2 Antigen NOLVIA + Flu  -     XR Chest 2 View    2. COPD exacerbation (HCC)  -     cefTRIAXone (ROCEPHIN) injection 1 g    3. Upper respiratory tract infection, unspecified type    4. Cough, unspecified type  - We will obtain a chest radiograph to rule out pneumonia.  - The patient will have an antibiotic injection performed today.  -     XR Chest 2 View    Other orders  -     amoxicillin-clavulanate  (AUGMENTIN) 875-125 MG per tablet; Take 1 tablet by mouth 2 (Two) Times a Day for 10 days.  Dispense: 20 tablet; Refill: 0  Take antibiotic with food      Follow Up   Return if symptoms worsen or fail to improve.  Patient was given instructions and counseling regarding her condition or for health maintenance advice. Please see specific information pulled into the AVS if appropriate.     CARRIE Medina      Transcribed from ambient dictation for CARRIE Medina by Zeenat Whaley.  02/13/23   16:13 CST    Patient or patient representative verbalized consent to the visit recording.  I have personally performed the services described in this document as transcribed by the above individual, and it is both accurate and complete.

## 2023-02-22 RX ORDER — ALBUTEROL SULFATE 90 UG/1
2 AEROSOL, METERED RESPIRATORY (INHALATION) EVERY 4 HOURS PRN
Qty: 18 G | Refills: 2 | Status: SHIPPED | OUTPATIENT
Start: 2023-02-22

## 2023-03-13 ENCOUNTER — TELEPHONE (OUTPATIENT)
Dept: FAMILY MEDICINE CLINIC | Facility: CLINIC | Age: 68
End: 2023-03-13
Payer: MEDICARE

## 2023-03-13 NOTE — TELEPHONE ENCOUNTER
Patient's granddaughter Hope Davidson called and said that Dirkleslee told her to call if patient needs an emergency inhaler.  She said her grandmother does need this.  She wants this sent to [facility].

## 2023-03-13 NOTE — TELEPHONE ENCOUNTER
Granddaughter called again to check on the status of inhaler. Antonia is listed on [facility] but no boxes were selected. Ashley gave verbal permission to speak to Antonia. Advised her that pt has 2 inhalers on her medication list and both have refills. Antonia states that when she spoek with the pharmacy that they didn't have any inhalers on file for the pt. Explained that I would contact the pharmacy to follow up. Verbalized understanding.     Called pharmacy, states that it is too soon to refill albuterol/ventolin inhaler. There is a refill ready for the advair diskus and they will get filled and ready for pt .     Called pt back to notify- verbalized understanding.

## 2023-03-14 RX ORDER — FLUTICASONE PROPIONATE AND SALMETEROL 50; 250 UG/1; UG/1
POWDER RESPIRATORY (INHALATION)
Qty: 60 EACH | Refills: 1 | Status: SHIPPED | OUTPATIENT
Start: 2023-03-14

## 2023-03-14 NOTE — TELEPHONE ENCOUNTER
Called pt back to inquire. Pt reports that she picked up her advair diskus inhaler and is doing fire. No further questions or concerns at this time. Refill also sent to day for 2 more refills.

## 2023-03-14 NOTE — TELEPHONE ENCOUNTER
Rx Refill Note  Requested Prescriptions     Pending Prescriptions Disp Refills   • Advair Diskus 250-50 MCG/ACT DISKUS [Pharmacy Med Name: ADVAIR 250-50 DISKUS 250-50 Aerosol] 60 each 1     Sig: INHALE ONE PUFF INTO THE LUNGS TWICE A DAY      Last office visit with prescribing clinician: 2/13/2023   Next office visit with prescribing clinician: Visit date not found                         Would you like a call back once the refill request has been completed: [] Yes [] No    If the office needs to give you a call back, can they leave a voicemail: [] Yes [] No    Olivia Newsome MA  03/14/23, 08:39 CDT

## 2023-03-30 ENCOUNTER — TELEPHONE (OUTPATIENT)
Dept: FAMILY MEDICINE CLINIC | Facility: CLINIC | Age: 68
End: 2023-03-30
Payer: MEDICARE

## 2023-03-30 NOTE — TELEPHONE ENCOUNTER
Pt granddaughter, carrington melton (on verbal release) called and stated patient seen recently and just over time having more trouble breathing. She is using albuterol inhaler more than she should. I will send in atrovent to add to inhalers. Recommend she schedule a visit to establish care and discuss her breathing.

## 2023-03-31 NOTE — TELEPHONE ENCOUNTER
Called pt's grand daughter back to inquire- reports that pt is out of town and will return next week. Reports that pt goes through her rescue inhaler about every 2 weeks. Stated she has previously discussed with Padmaja. Reports that pt is some better but still gets short of breath and relies on inhaler more than she thinks she should.

## 2023-04-04 DIAGNOSIS — J44.1 CHRONIC OBSTRUCTIVE PULMONARY DISEASE WITH ACUTE EXACERBATION (HCC): ICD-10-CM

## 2023-04-04 RX ORDER — ALBUTEROL SULFATE 90 UG/1
AEROSOL, METERED RESPIRATORY (INHALATION)
Qty: 18 G | Refills: 0 | OUTPATIENT
Start: 2023-04-04

## 2023-04-12 ENCOUNTER — OFFICE VISIT (OUTPATIENT)
Dept: FAMILY MEDICINE CLINIC | Facility: CLINIC | Age: 68
End: 2023-04-12
Payer: MEDICARE

## 2023-04-12 VITALS
HEART RATE: 78 BPM | WEIGHT: 148 LBS | HEIGHT: 67 IN | OXYGEN SATURATION: 93 % | SYSTOLIC BLOOD PRESSURE: 118 MMHG | BODY MASS INDEX: 23.23 KG/M2 | RESPIRATION RATE: 20 BRPM | TEMPERATURE: 98.4 F | DIASTOLIC BLOOD PRESSURE: 77 MMHG

## 2023-04-12 DIAGNOSIS — R06.00 DYSPNEA, UNSPECIFIED TYPE: ICD-10-CM

## 2023-04-12 DIAGNOSIS — R09.81 NASAL CONGESTION: Primary | ICD-10-CM

## 2023-04-12 DIAGNOSIS — J06.9 UPPER RESPIRATORY TRACT INFECTION, UNSPECIFIED TYPE: ICD-10-CM

## 2023-04-12 DIAGNOSIS — J44.9 CHRONIC OBSTRUCTIVE PULMONARY DISEASE, UNSPECIFIED COPD TYPE: ICD-10-CM

## 2023-04-12 LAB
B PARAPERT DNA SPEC QL NAA+PROBE: NOT DETECTED
B PERT DNA SPEC QL NAA+PROBE: NOT DETECTED
C PNEUM DNA NPH QL NAA+NON-PROBE: NOT DETECTED
FLUAV SUBTYP SPEC NAA+PROBE: NOT DETECTED
FLUBV RNA ISLT QL NAA+PROBE: NOT DETECTED
HADV DNA SPEC NAA+PROBE: NOT DETECTED
HCOV 229E RNA SPEC QL NAA+PROBE: NOT DETECTED
HCOV HKU1 RNA SPEC QL NAA+PROBE: NOT DETECTED
HCOV NL63 RNA SPEC QL NAA+PROBE: NOT DETECTED
HCOV OC43 RNA SPEC QL NAA+PROBE: NOT DETECTED
HMPV RNA NPH QL NAA+NON-PROBE: NOT DETECTED
HPIV1 RNA ISLT QL NAA+PROBE: NOT DETECTED
HPIV2 RNA SPEC QL NAA+PROBE: NOT DETECTED
HPIV3 RNA NPH QL NAA+PROBE: DETECTED
HPIV4 P GENE NPH QL NAA+PROBE: NOT DETECTED
M PNEUMO IGG SER IA-ACNC: NOT DETECTED
RHINOVIRUS RNA SPEC NAA+PROBE: NOT DETECTED
RSV RNA NPH QL NAA+NON-PROBE: NOT DETECTED
SARS-COV-2 RNA NPH QL NAA+NON-PROBE: NOT DETECTED

## 2023-04-12 PROCEDURE — 0202U NFCT DS 22 TRGT SARS-COV-2: CPT | Performed by: NURSE PRACTITIONER

## 2023-04-12 RX ORDER — AZITHROMYCIN 250 MG/1
TABLET, FILM COATED ORAL
Qty: 6 TABLET | Refills: 0 | Status: SHIPPED | OUTPATIENT
Start: 2023-04-12 | End: 2023-04-19

## 2023-04-12 RX ORDER — DEXAMETHASONE SODIUM PHOSPHATE 10 MG/ML
10 INJECTION INTRAMUSCULAR; INTRAVENOUS ONCE
Status: COMPLETED | OUTPATIENT
Start: 2023-04-12 | End: 2023-04-12

## 2023-04-12 RX ORDER — GABAPENTIN 600 MG/1
1 TABLET ORAL EVERY 12 HOURS SCHEDULED
COMMUNITY
Start: 2023-04-04

## 2023-04-12 RX ORDER — GUAIFENESIN/DEXTROMETHORPHAN 100-10MG/5
5 SYRUP ORAL 3 TIMES DAILY PRN
Qty: 237 ML | Refills: 0 | Status: SHIPPED | OUTPATIENT
Start: 2023-04-12 | End: 2023-04-19

## 2023-04-12 RX ADMIN — DEXAMETHASONE SODIUM PHOSPHATE 10 MG: 10 INJECTION INTRAMUSCULAR; INTRAVENOUS at 09:57

## 2023-04-12 NOTE — PROGRESS NOTES
"Chief Complaint  Cough (Pt states she has been sick since Sunday ), Hoarse, and Headache    Subjective        Ashley Gibbs presents to [facility]  History of Present Illness  Here for acute visit  Reports cough, congestion, shortness of breath for about 4 days now  Exposed to parainfluenza virus  Has copd        Objective   Vital Signs:  /77 (BP Location: Right arm, Patient Position: Sitting, Cuff Size: Adult)   Pulse 78   Temp 98.4 °F (36.9 °C) (Infrared)   Resp 20   Ht 170.2 cm (67\")   Wt 67.1 kg (148 lb)   SpO2 93%   BMI 23.18 kg/m²   Estimated body mass index is 23.18 kg/m² as calculated from the following:    Height as of this encounter: 170.2 cm (67\").    Weight as of this encounter: 67.1 kg (148 lb).       BMI is within normal parameters. No other follow-up for BMI required.      Physical Exam  Vitals and nursing note reviewed.   Constitutional:       General: She is not in acute distress.     Appearance: She is well-developed. She is ill-appearing.   HENT:      Head: Normocephalic and atraumatic.      Right Ear: Tympanic membrane and ear canal normal.      Left Ear: Tympanic membrane and ear canal normal.      Nose: Nose normal.      Right Sinus: No maxillary sinus tenderness or frontal sinus tenderness.      Left Sinus: No maxillary sinus tenderness or frontal sinus tenderness.      Mouth/Throat:      Mouth: Mucous membranes are moist.      Pharynx: Oropharynx is clear. Uvula midline. No uvula swelling.   Eyes:      Conjunctiva/sclera: Conjunctivae normal.   Neck:      Thyroid: No thyromegaly.      Trachea: No tracheal deviation.   Cardiovascular:      Rate and Rhythm: Normal rate and regular rhythm.      Heart sounds: Normal heart sounds.   Pulmonary:      Effort: Pulmonary effort is normal.      Breath sounds: Wheezing and rhonchi present.   Musculoskeletal:      Cervical back: Neck supple.   Lymphadenopathy:      Cervical: No cervical adenopathy.   Skin:     " General: Skin is warm and dry.   Neurological:      Mental Status: She is alert.   Psychiatric:         Behavior: Behavior normal.        Result Review :                   Assessment and Plan   Diagnoses and all orders for this visit:    1. Nasal congestion (Primary)    2. Chronic obstructive pulmonary disease, unspecified COPD type  -     Respiratory Panel PCR w/COVID-19(SARS-CoV-2) NABIL/COREY/LULY/PAD/COR/MAD/TITO In-House, NP Swab in UTM/VTM, 3-4 HR TAT - Swab, Nasopharynx  -     dexamethasone (DECADRON) injection 10 mg    3. Upper respiratory tract infection, unspecified type  -     Respiratory Panel PCR w/COVID-19(SARS-CoV-2) NABIL/COREY/LULY/PAD/COR/MAD/TITO In-House, NP Swab in UTM/VTM, 3-4 HR TAT - Swab, Nasopharynx  -     dexamethasone (DECADRON) injection 10 mg    4. Dyspnea, unspecified type  -     Respiratory Panel PCR w/COVID-19(SARS-CoV-2) NABIL/COREY/LULY/PAD/COR/MAD/TITO In-House, NP Swab in UTM/VTM, 3-4 HR TAT - Swab, Nasopharynx  -     dexamethasone (DECADRON) injection 10 mg  -     XR Chest 2 View    Other orders  -    azithromycin (ZITHROMAX) 250 MG tablet; Take 2 tablets the first day, then 1 tablet daily for 4 days. (Patient not taking: Reported on 4/19/2023)  Dispense: 6 tablet; Refill: 0  -    guaiFENesin-dextromethorphan (ROBITUSSIN DM) 100-10 MG/5ML syrup; Take 5 mL by mouth 3 (Three) Times a Day As Needed for Cough.  Dispense: 237 mL; Refill: 0      Plan:  Respiratory panel today  Likely parainfluenza virus  cxr today   Decadron 10 mg im in office today   Azithromycin to cover for copd exacerbation vs secondary infection  May use robitussin dm if needed for cough           Follow Up   Return in about 1 week (around 4/19/2023), or if symptoms worsen or fail to improve.  Patient was given instructions and counseling regarding her condition or for health maintenance advice. Please see specific information pulled into the AVS if appropriate.

## 2023-04-19 ENCOUNTER — OFFICE VISIT (OUTPATIENT)
Dept: FAMILY MEDICINE CLINIC | Facility: CLINIC | Age: 68
End: 2023-04-19
Payer: MEDICARE

## 2023-04-19 ENCOUNTER — TELEPHONE (OUTPATIENT)
Dept: FAMILY MEDICINE CLINIC | Facility: CLINIC | Age: 68
End: 2023-04-19

## 2023-04-19 VITALS
HEIGHT: 67 IN | HEART RATE: 79 BPM | SYSTOLIC BLOOD PRESSURE: 104 MMHG | DIASTOLIC BLOOD PRESSURE: 66 MMHG | OXYGEN SATURATION: 92 % | WEIGHT: 145.8 LBS | TEMPERATURE: 97.3 F | RESPIRATION RATE: 20 BRPM | BODY MASS INDEX: 22.88 KG/M2

## 2023-04-19 VITALS
SYSTOLIC BLOOD PRESSURE: 104 MMHG | TEMPERATURE: 97.3 F | OXYGEN SATURATION: 92 % | HEART RATE: 79 BPM | HEIGHT: 67 IN | DIASTOLIC BLOOD PRESSURE: 66 MMHG | RESPIRATION RATE: 20 BRPM | WEIGHT: 145.8 LBS | BODY MASS INDEX: 22.88 KG/M2

## 2023-04-19 DIAGNOSIS — G47.33 OSA (OBSTRUCTIVE SLEEP APNEA): Primary | ICD-10-CM

## 2023-04-19 DIAGNOSIS — E03.9 HYPOTHYROIDISM, UNSPECIFIED TYPE: ICD-10-CM

## 2023-04-19 DIAGNOSIS — J44.9 CHRONIC OBSTRUCTIVE PULMONARY DISEASE, UNSPECIFIED COPD TYPE: ICD-10-CM

## 2023-04-19 DIAGNOSIS — G47.19 EXCESSIVE DAYTIME SLEEPINESS: ICD-10-CM

## 2023-04-19 DIAGNOSIS — J44.9 CHRONIC OBSTRUCTIVE PULMONARY DISEASE, UNSPECIFIED COPD TYPE: Primary | ICD-10-CM

## 2023-04-19 DIAGNOSIS — M81.0 AGE-RELATED OSTEOPOROSIS WITHOUT CURRENT PATHOLOGICAL FRACTURE: ICD-10-CM

## 2023-04-19 DIAGNOSIS — Z13.220 SCREENING FOR HYPERLIPIDEMIA: ICD-10-CM

## 2023-04-19 DIAGNOSIS — R91.1 LUNG NODULE: ICD-10-CM

## 2023-04-19 DIAGNOSIS — R06.00 DYSPNEA, UNSPECIFIED TYPE: ICD-10-CM

## 2023-04-19 DIAGNOSIS — Z00.00 WELCOME TO MEDICARE PREVENTIVE VISIT: Primary | ICD-10-CM

## 2023-04-19 DIAGNOSIS — N28.89 RENAL MASS: ICD-10-CM

## 2023-04-19 DIAGNOSIS — Z82.49 FAMILY HISTORY OF CHF (CONGESTIVE HEART FAILURE): ICD-10-CM

## 2023-04-19 DIAGNOSIS — Z12.31 BREAST CANCER SCREENING BY MAMMOGRAM: ICD-10-CM

## 2023-04-19 DIAGNOSIS — Z51.81 THERAPEUTIC DRUG MONITORING: ICD-10-CM

## 2023-04-19 PROBLEM — Z83.3 FAMILY HISTORY OF DIABETES MELLITUS: Status: ACTIVE | Noted: 2023-04-19

## 2023-04-19 PROBLEM — Z99.89 CPAP (CONTINUOUS POSITIVE AIRWAY PRESSURE) DEPENDENCE: Status: ACTIVE | Noted: 2019-03-14

## 2023-04-19 PROBLEM — Z99.81 ON SUPPLEMENTAL OXYGEN THERAPY: Status: ACTIVE | Noted: 2023-04-19

## 2023-04-19 PROCEDURE — G0402 INITIAL PREVENTIVE EXAM: HCPCS | Performed by: NURSE PRACTITIONER

## 2023-04-19 RX ORDER — MODAFINIL 100 MG/1
100 TABLET ORAL DAILY
Qty: 90 TABLET | Refills: 0 | Status: SHIPPED | OUTPATIENT
Start: 2023-04-19

## 2023-04-19 RX ORDER — BUDESONIDE, GLYCOPYRROLATE, AND FORMOTEROL FUMARATE 160; 9; 4.8 UG/1; UG/1; UG/1
2 AEROSOL, METERED RESPIRATORY (INHALATION) 2 TIMES DAILY
Qty: 10.7 G | Refills: 11 | Status: SHIPPED | OUTPATIENT
Start: 2023-04-19

## 2023-04-19 RX ORDER — CITALOPRAM 40 MG/1
40 TABLET ORAL DAILY
Qty: 90 TABLET | Refills: 1 | Status: SHIPPED | OUTPATIENT
Start: 2023-04-19

## 2023-04-19 RX ORDER — MODAFINIL 100 MG/1
100 TABLET ORAL DAILY
COMMUNITY
End: 2023-04-19 | Stop reason: SDUPTHER

## 2023-04-19 RX ORDER — ROPINIROLE 0.25 MG/1
0.25 TABLET, FILM COATED ORAL
Qty: 90 TABLET | Refills: 1 | Status: SHIPPED | OUTPATIENT
Start: 2023-04-19

## 2023-04-19 RX ORDER — LEVOTHYROXINE SODIUM 112 UG/1
112 TABLET ORAL DAILY
Qty: 90 TABLET | Refills: 1 | Status: SHIPPED | OUTPATIENT
Start: 2023-04-19

## 2023-04-19 RX ORDER — MIRTAZAPINE 15 MG/1
15 TABLET, FILM COATED ORAL
Qty: 90 TABLET | Refills: 1 | Status: SHIPPED | OUTPATIENT
Start: 2023-04-19

## 2023-04-19 NOTE — TELEPHONE ENCOUNTER
Caller: Emelia Gibbs    Relationship: Self    Best call back number:     [phone]      Who are you requesting to speak with     CLINICAL STAFF    Do you know the name of the person who called:     EMELIA    What was the call regarding:     NEEDING A PRIOR AUTHORIZATION FOR PROVIGIL    Do you require a callback:     PLEASE ADVISE    [facility] - [place], [place] - [address] - [phone] [address] [phone] FX

## 2023-04-19 NOTE — PROGRESS NOTES
Chief Complaint  Establish Care    Subjective        Ashley Gibbs presents to [facility]  History of Present Illness  The patient is here to establish care and needs refills on chronic medications. We are going to discuss her past medical history. She has a past medical history of cholesterol, hypothyroidism, family history of renal cell carcinoma, renal mass, anxiety, depression, osteoporosis, chronic pain, migraines, neuropathy, restless leg syndrome, chronic respiratory failure, chronic obstructive pulmonary disease, coughing up blood, excessive daytime sleepiness, sleep apnea, insomnia.     COPD  The patient has been using her CPAP machine, and it works well for her. She has an albuterol inhaler and Advair for her chronic obstructive pulmonary disease as well as supplemental oxygen as needed. She confirms that she continues to experience constant shortness of breath and wheezing.     Anxiety and depression  The patient is taking Celexa for her anxiety and depression.    Chronic pain  She is taking gabapentin for her chronic pain and hydrocodone, which is managed with pain management. The patient is also taking Zanaflex that is managed by Dr. Zambrano.     Hypothyroidism  The patient is currently taking levothyroxine for her thyroid.    Restless leg syndrome  She takes Remeron 1 tablet at night for her insomnia.    Migraines  The patient has Maxalt for her migraines and notes that she has not had a migraine in a while. She does not need a refill on Maxalt at this time.     Sleep apnea  The patient has been using her CPAP machine, and it works well for her. She was taking propranolol 200 mg for her sleep apnea, but has not had the medication refilled in over a year.     Family history  She confirms her father had heart failure and mother has tachycardia. She has not seen a cardiologist recently. The patient has a family history of diabetes.   Objective   Vital Signs:  /66 (BP  "Location: Right arm, Patient Position: Sitting, Cuff Size: Adult)   Pulse 79   Temp 97.3 °F (36.3 °C) (Infrared)   Resp 20   Ht 170.2 cm (67\")   Wt 66.1 kg (145 lb 12.8 oz)   SpO2 92%   BMI 22.84 kg/m²   Estimated body mass index is 22.84 kg/m² as calculated from the following:    Height as of this encounter: 170.2 cm (67\").    Weight as of this encounter: 66.1 kg (145 lb 12.8 oz).       BMI is within normal parameters. No other follow-up for BMI required.      Physical Exam  Vitals and nursing note reviewed.   Constitutional:       General: She is not in acute distress.     Appearance: She is well-developed.   HENT:      Right Ear: Tympanic membrane and ear canal normal.      Left Ear: Tympanic membrane and ear canal normal.      Nose: Nose normal.      Right Sinus: No maxillary sinus tenderness or frontal sinus tenderness.      Left Sinus: No maxillary sinus tenderness or frontal sinus tenderness.      Mouth/Throat:      Mouth: Mucous membranes are moist.      Pharynx: Oropharynx is clear. Uvula midline. No uvula swelling.   Eyes:      Conjunctiva/sclera: Conjunctivae normal.   Neck:      Thyroid: No thyromegaly.      Trachea: No tracheal deviation.   Cardiovascular:      Rate and Rhythm: Normal rate and regular rhythm.      Heart sounds: Normal heart sounds.   Pulmonary:      Effort: Pulmonary effort is normal.      Breath sounds: Examination of the right-upper field reveals wheezing. Examination of the left-upper field reveals wheezing. Decreased breath sounds and wheezing present.   Abdominal:      General: Bowel sounds are normal.      Palpations: Abdomen is soft. There is no mass.      Tenderness: There is no abdominal tenderness.   Musculoskeletal:      Cervical back: Neck supple.   Lymphadenopathy:      Cervical: No cervical adenopathy.   Skin:     General: Skin is warm and dry.   Neurological:      Mental Status: She is alert.   Psychiatric:         Mood and Affect: Mood normal.         Behavior: " Behavior normal.        Result Review :                   Assessment and Plan   Diagnoses and all orders for this visit:    1. Chronic obstructive pulmonary disease, unspecified COPD type (Primary)    2. Excessive daytime sleepiness  -     modafinil (Provigil) 100 MG tablet; Take 1 tablet by mouth Daily.  Dispense: 90 tablet; Refill: 0    3. Family history of CHF (congestive heart failure)  -     Adult Transthoracic Echo Complete W/ Cont if Necessary Per Protocol; Future    4. Dyspnea, unspecified type  -     Adult Transthoracic Echo Complete W/ Cont if Necessary Per Protocol; Future    5. Therapeutic drug monitoring  -     ToxASSURE Select 13 (MW) - Urine, Clean Catch    Other orders  -     Budeson-Glycopyrrol-Formoterol (Breztri Aerosphere) 160-9-4.8 MCG/ACT aerosol inhaler; Inhale 2 puffs 2 (Two) Times a Day.  Dispense: 10.7 g; Refill: 11  -     citalopram (CeleXA) 40 MG tablet; Take 1 tablet by mouth Daily.  Dispense: 90 tablet; Refill: 1  -     levothyroxine (SYNTHROID, LEVOTHROID) 112 MCG tablet; Take 1 tablet by mouth Daily.  Dispense: 90 tablet; Refill: 1  -     mirtazapine (REMERON) 15 MG tablet; Take 1 tablet by mouth every night at bedtime.  Dispense: 90 tablet; Refill: 1  -     rOPINIRole (REQUIP) 0.25 MG tablet; Take 1 tablet by mouth every night at bedtime.  Dispense: 90 tablet; Refill: 1      Plan:    1. COPD/worsening dyspnea  - Discontinue advair  - Start breztri  - Ordered a CT scan of the chest.  - Ordered echocardiogram.     2. Anxiety and depression  - Continue Celexa as prescribed.  - resume previous remeron as prescribed    3. Sleep apnea  - Continue using CPAP machine.     4. Restless leg syndrome  - Continue requip as prescribed.    5. Migraines  - Continue Maxalt as prescribed.  - Restart propranolol 100 mg, prescription sent.     6. Excessive daytime sleepiness  - resume previous provigil   - ekasper reviewed. uds and contract today. Advised patient of risks associated with controlled  substances including physical and mental dependence, abuse, and mental impairment. Advised patient to use least amount of possible and never overuse or share medications with other people. Patient states understanding of risks and instructions on proper use.     7. Health maintenance  - Ordered laboratory studies for today  - Ordered a mammogram.         Follow Up   Return in about 3 months (around 7/19/2023) for Recheck.  Patient was given instructions and counseling regarding her condition or for health maintenance advice. Please see specific information pulled into the AVS if appropriate.   Transcribed from ambient dictation for CARRIE Medina by Evangelina Gloria.  04/19/23   14:14 CDT    Patient or patient representative verbalized consent to the visit recording.  I have personally performed the services described in this document as transcribed by the above individual, and it is both accurate and complete.

## 2023-04-19 NOTE — PROGRESS NOTES
The ABCs of the Annual Wellness Visit  [place] to Medicare Visit    Subjective     Ashley Gibbs is a 67 y.o. female who presents for a  Welcome to Medicare Visit.      The following portions of the patient's history were reviewed and   updated as appropriate: allergies, current medications, past family history, past medical history, past social history, past surgical history and problem list.     Compared to one year ago, the patient feels her physical   health is the same.    Compared to one year ago, the patient feels her mental   health is the same.    Recent Hospitalizations:  She was not admitted to the hospital during the last year.       Current Medical Providers:  Patient Care Team:  Padmaja Bermudez APRN as PCP - General (Family Medicine)    Outpatient Medications Prior to Visit   Medication Sig Dispense Refill   • albuterol (PROVENTIL) (2.5 MG/3ML) 0.083% nebulizer solution Take 2.5 mg by nebulization Every 4 (Four) Hours As Needed for Wheezing. 90 mL 0   • gabapentin (NEURONTIN) 600 MG tablet Take 1 tablet by mouth Every 12 (Twelve) Hours.     • HYDROcodone-acetaminophen (NORCO)  MG per tablet TAKE (1) TABLET THREE TIMES DAILY AS NEEDED FOR PAIN     • ipratropium-albuterol (DUO-NEB) 0.5-2.5 mg/3 ml nebulizer Take 3 mL by nebulization Every 4 (Four) Hours As Needed for Wheezing or Shortness of Air. 90 mL 0   • O2 (OXYGEN) Inhale 3 L/min Daily As Needed.     • rizatriptan (MAXALT) 10 MG tablet TAKE 1 TABLET BY MOUTH ONCE AS NEEDED FOR MIGRAINE MAY REPEAT IN 2 HOURS IF NEEDED     • tiZANidine (ZANAFLEX) 4 MG tablet Take 1 tablet by mouth 2 (Two) Times a Day.     • Advair Diskus 250-50 MCG/ACT DISKUS INHALE ONE PUFF INTO THE LUNGS TWICE A DAY 60 each 1   • albuterol sulfate  (90 Base) MCG/ACT inhaler Inhale 2 puffs Every 4 (Four) Hours As Needed for Wheezing or Shortness of Air. 18 g 2   • albuterol sulfate  (90 Base) MCG/ACT inhaler Inhale 2 puffs Every 4 (Four) Hours As Needed for  Wheezing. 18 g 2   • citalopram (CeleXA) 40 MG tablet TAKE 1 TABLET BY MOUTH ONCE DAILY     • guaiFENesin-dextromethorphan (ROBITUSSIN DM) 100-10 MG/5ML syrup Take 5 mL by mouth 3 (Three) Times a Day As Needed for Cough. 237 mL 0   • ipratropium (ATROVENT HFA) 17 MCG/ACT inhaler Inhale 2 puffs 4 (Four) Times a Day. 12.9 g 2   • levothyroxine (SYNTHROID, LEVOTHROID) 112 MCG tablet Take 1 tablet by mouth Daily.     • mirtazapine (REMERON) 15 MG tablet Take 1 tablet by mouth every night at bedtime.     • rOPINIRole (REQUIP) 0.25 MG tablet Take 1 tablet by mouth every night at bedtime.     • azithromycin (ZITHROMAX) 250 MG tablet Take 2 tablets the first day, then 1 tablet daily for 4 days. (Patient not taking: Reported on 4/19/2023) 6 tablet 0     No facility-administered medications prior to visit.       Opioid medication/s are on active medication list.  and I have evaluated her active treatment plan and pain score trends (see table).  Vitals:    04/19/23 1105   PainSc: 0-No pain     I have reviewed the chart for potential of high risk medication and harmful drug interactions in the elderly.            Aspirin is not on active medication list.  Aspirin use is not indicated based on review of current medical condition/s. Risk of harm outweighs potential benefits.  .    Patient Active Problem List   Diagnosis   • Acute hypoxemic respiratory failure   • Age-related osteoporosis without current pathological fracture   • Anxiety   • Chronic obstructive pulmonary disease   • Chronic pain disorder   • CPAP (continuous positive airway pressure) dependence   • Depression   • Excessive daytime sleepiness   • Hemoptysis   • Family history of renal cell carcinoma   • Hyperlipidemia   • Hypothyroidism   • Migraines   • Mild episode of recurrent major depressive disorder   • Neuropathy   • Obstructive sleep apnea   • Primary insomnia   • Renal mass   • Restless legs syndrome   • On supplemental oxygen therapy   • Family history of  "CHF (congestive heart failure)   • Family history of diabetes mellitus     Advance Care Planning   Advance Care Planning     Advance Directive is not on file.  ACP discussion was declined by the patient. Patient does not have an advance directive, declines further assistance.       Objective   Vitals:    23 1105   BP: 104/66   BP Location: Right arm   Patient Position: Sitting   Cuff Size: Adult   Pulse: 79   Resp: 20   Temp: 97.3 °F (36.3 °C)   TempSrc: Infrared   SpO2: 92%   Weight: 66.1 kg (145 lb 12.8 oz)   Height: 170.2 cm (67\")   PainSc: 0-No pain     Estimated body mass index is 22.84 kg/m² as calculated from the following:    Height as of this encounter: 170.2 cm (67\").    Weight as of this encounter: 66.1 kg (145 lb 12.8 oz).    BMI is within normal parameters. No other follow-up for BMI required.      Does the patient have evidence of cognitive impairment?   No    Lab Results   Component Value Date    CHLPL 228 (H) 2023    TRIG 98 2023    HDL 83 (H) 2023     (H) 2023    VLDL 17 2023       Procedures       HEALTH RISK ASSESSMENT    Smoking Status:  Social History     Tobacco Use   Smoking Status Former   • Packs/day: 1.00   • Years: 40.00   • Pack years: 40.00   • Types: Cigarettes   • Quit date: 2008   • Years since quitting: 15.0   Smokeless Tobacco Never     Alcohol Consumption:  Social History     Substance and Sexual Activity   Alcohol Use Defer       Fall Risk Screen:    STEADI Fall Risk Assessment was completed, and patient is at LOW risk for falls.Assessment completed on:2023    Depression Screen:       2023    10:59 AM   PHQ-2/PHQ-9 Depression Screening   Little Interest or Pleasure in Doing Things 0-->not at all   Feeling Down, Depressed or Hopeless 0-->not at all   PHQ-9: Brief Depression Severity Measure Score 0       Health Habits and Functional and Cognitive Screenin/19/2023    11:00 AM   Functional & Cognitive Status   Do you " have difficulty preparing food and eating? No   Do you have difficulty bathing yourself, getting dressed or grooming yourself? No   Do you have difficulty using the toilet? No   Do you have difficulty moving around from place to place? No   Do you have trouble with steps or getting out of a bed or a chair? No   Current Diet Well Balanced Diet   Dental Exam Not up to date   Eye Exam Not up to date   Exercise (times per week) 0 times per week   Current Exercises Include No Regular Exercise   Do you need help using the phone?  No   Are you deaf or do you have serious difficulty hearing?  No   Do you need help with transportation? No   Do you need help shopping? No   Do you need help preparing meals?  No   Do you need help with housework?  No   Do you need help with laundry? No   Do you need help taking your medications? No   Do you need help managing money? No   Do you ever drive or ride in a car without wearing a seat belt? No   Have you felt unusual stress, anger or loneliness in the last month? No   Who do you live with? Alone   If you need help, do you have trouble finding someone available to you? No   Have you been bothered in the last four weeks by sexual problems? No   Do you have difficulty concentrating, remembering or making decisions? No       Visual Acuity:    Vision Screening    Right eye Left eye Both eyes   Without correction 20/30 20/30 20/30   With correction          Age-appropriate Screening Schedule:  Refer to the list below for future screening recommendations based on patient's age, sex and/or medical conditions. Orders for these recommended tests are listed in the plan section. The patient has been provided with a written plan.    Health Maintenance   Topic Date Due   • COLORECTAL CANCER SCREENING  Never done   • ZOSTER VACCINE (1 of 2) Never done   • MAMMOGRAM  01/17/2019   • PAP SMEAR  Never done   • COVID-19 Vaccine (3 - Booster for Pfizer series) 12/08/2021   • DXA SCAN  05/13/2022   •  Pneumococcal Vaccine 65+ (2 - PCV) 10/02/2023 (Originally 12/1/2015)   • INFLUENZA VACCINE  08/01/2023   • ANNUAL WELLNESS VISIT  04/19/2024   • LIPID PANEL  04/19/2024   • TDAP/TD VACCINES (2 - Td or Tdap) 11/24/2027   • HEPATITIS C SCREENING  Completed        CMS Preventative Services Quick Reference  Risk Factors Identified During Encounter    Chronic Pain: Natural history and expected course discussed. Questions answered.  sees pain mgmt  Fall Risk-High or Moderate: Discussed Fall Prevention in the home  Immunizations Discussed/Encouraged: Prevnar 20 (Pneumococcal 20-valent conjugate) and COVID19  Polypharmacy: Medication List reviewed  The above risks/problems have been discussed with the patient.  Pertinent information has been shared with the patient in the After Visit Summary.  Follow up plans and orders are seen below in the Assessment/Plan Section.    Diagnoses and all orders for this visit:    1. Welcome to Medicare preventive visit (Primary)    2. Breast cancer screening by mammogram  -     Mammo Screening Bilateral With CAD; Future    3. Age-related osteoporosis without current pathological fracture  -     DEXA Bone Density Axial; Future    4. Lung nodule  -     CT Chest Without Contrast; Future    5. Renal mass  -     US Renal Bilateral; Future    6. Chronic obstructive pulmonary disease, unspecified COPD type  -     CT Chest Without Contrast; Future  -     Comprehensive metabolic panel  -     CBC & Differential    7. Screening for hyperlipidemia  -     Lipid panel    8. Hypothyroidism, unspecified type  -     TSH      Follow Up:   Initial Medicare Visit in one year    An After Visit Summary and PPPS were made available to the patient.

## 2023-04-20 LAB
ALBUMIN SERPL-MCNC: 4.3 G/DL (ref 3.5–5.2)
ALBUMIN/GLOB SERPL: 2.2 G/DL
ALP SERPL-CCNC: 110 U/L (ref 39–117)
ALT SERPL-CCNC: 18 U/L (ref 1–33)
AST SERPL-CCNC: 17 U/L (ref 1–32)
BASOPHILS # BLD AUTO: 0.07 10*3/MM3 (ref 0–0.2)
BASOPHILS NFR BLD AUTO: 0.9 % (ref 0–1.5)
BILIRUB SERPL-MCNC: 0.3 MG/DL (ref 0–1.2)
BUN SERPL-MCNC: 7 MG/DL (ref 8–23)
BUN/CREAT SERPL: 9.7 (ref 7–25)
CALCIUM SERPL-MCNC: 9.6 MG/DL (ref 8.6–10.5)
CHLORIDE SERPL-SCNC: 100 MMOL/L (ref 98–107)
CHOLEST SERPL-MCNC: 228 MG/DL (ref 0–200)
CO2 SERPL-SCNC: 32.8 MMOL/L (ref 22–29)
CREAT SERPL-MCNC: 0.72 MG/DL (ref 0.57–1)
EGFRCR SERPLBLD CKD-EPI 2021: 92.9 ML/MIN/1.73
EOSINOPHIL # BLD AUTO: 0.47 10*3/MM3 (ref 0–0.4)
EOSINOPHIL NFR BLD AUTO: 6.3 % (ref 0.3–6.2)
ERYTHROCYTE [DISTWIDTH] IN BLOOD BY AUTOMATED COUNT: 13.1 % (ref 12.3–15.4)
GLOBULIN SER CALC-MCNC: 2 GM/DL
GLUCOSE SERPL-MCNC: 78 MG/DL (ref 65–99)
HCT VFR BLD AUTO: 43.6 % (ref 34–46.6)
HDLC SERPL-MCNC: 83 MG/DL (ref 40–60)
HGB BLD-MCNC: 13.7 G/DL (ref 12–15.9)
IMM GRANULOCYTES # BLD AUTO: 0.02 10*3/MM3 (ref 0–0.05)
IMM GRANULOCYTES NFR BLD AUTO: 0.3 % (ref 0–0.5)
LDLC SERPL CALC-MCNC: 128 MG/DL (ref 0–100)
LYMPHOCYTES # BLD AUTO: 1.98 10*3/MM3 (ref 0.7–3.1)
LYMPHOCYTES NFR BLD AUTO: 26.4 % (ref 19.6–45.3)
MCH RBC QN AUTO: 28.1 PG (ref 26.6–33)
MCHC RBC AUTO-ENTMCNC: 31.4 G/DL (ref 31.5–35.7)
MCV RBC AUTO: 89.5 FL (ref 79–97)
MONOCYTES # BLD AUTO: 0.81 10*3/MM3 (ref 0.1–0.9)
MONOCYTES NFR BLD AUTO: 10.8 % (ref 5–12)
NEUTROPHILS # BLD AUTO: 4.14 10*3/MM3 (ref 1.7–7)
NEUTROPHILS NFR BLD AUTO: 55.3 % (ref 42.7–76)
NRBC BLD AUTO-RTO: 0 /100 WBC (ref 0–0.2)
PLATELET # BLD AUTO: 388 10*3/MM3 (ref 140–450)
POTASSIUM SERPL-SCNC: 4.1 MMOL/L (ref 3.5–5.2)
PROT SERPL-MCNC: 6.3 G/DL (ref 6–8.5)
RBC # BLD AUTO: 4.87 10*6/MM3 (ref 3.77–5.28)
SODIUM SERPL-SCNC: 144 MMOL/L (ref 136–145)
TRIGL SERPL-MCNC: 98 MG/DL (ref 0–150)
TSH SERPL DL<=0.005 MIU/L-ACNC: 1.22 UIU/ML (ref 0.27–4.2)
VLDLC SERPL CALC-MCNC: 17 MG/DL (ref 5–40)
WBC # BLD AUTO: 7.49 10*3/MM3 (ref 3.4–10.8)

## 2023-04-24 DIAGNOSIS — J44.1 COPD WITH EXACERBATION: ICD-10-CM

## 2023-04-25 RX ORDER — ALBUTEROL SULFATE 90 UG/1
AEROSOL, METERED RESPIRATORY (INHALATION)
Qty: 18 G | Refills: 2 | Status: SHIPPED | OUTPATIENT
Start: 2023-04-25

## 2023-04-25 NOTE — TELEPHONE ENCOUNTER
Rx Refill Note  Requested Prescriptions     Pending Prescriptions Disp Refills   • Ventolin  (90 Base) MCG/ACT inhaler [Pharmacy Med Name: VENTOLIN HFA 90 MCG INHALER 108 (90 BAS Aerosol] 18 g 2     Sig: Inhale 2 puffs Every 4 (Four) Hours As Needed for Wheezing.      Last office visit with prescribing clinician: 4/19/2023   Next office visit with prescribing clinician: 7/19/2023     Nori Ramsey MA  04/25/23, 10:16 CDT     no

## 2023-04-27 LAB — DRUGS UR: NORMAL

## 2023-04-28 ENCOUNTER — APPOINTMENT (OUTPATIENT)
Dept: CT IMAGING | Facility: HOSPITAL | Age: 68
End: 2023-04-28
Payer: MEDICARE

## 2023-04-28 ENCOUNTER — APPOINTMENT (OUTPATIENT)
Dept: GENERAL RADIOLOGY | Facility: HOSPITAL | Age: 68
End: 2023-04-28
Payer: MEDICARE

## 2023-04-28 ENCOUNTER — HOSPITAL ENCOUNTER (EMERGENCY)
Facility: HOSPITAL | Age: 68
Discharge: HOME | End: 2023-04-28
Attending: FAMILY MEDICINE
Payer: MEDICARE

## 2023-04-28 VITALS
DIASTOLIC BLOOD PRESSURE: 88 MMHG | SYSTOLIC BLOOD PRESSURE: 132 MMHG | OXYGEN SATURATION: 97 % | BODY MASS INDEX: 21.98 KG/M2 | TEMPERATURE: 99 F | HEART RATE: 98 BPM | WEIGHT: 145 LBS | HEIGHT: 68 IN | RESPIRATION RATE: 20 BRPM

## 2023-04-28 DIAGNOSIS — N39.0 URINARY TRACT INFECTION WITHOUT HEMATURIA, SITE UNSPECIFIED: Primary | ICD-10-CM

## 2023-04-28 LAB
ALBUMIN SERPL-MCNC: 3.8 G/DL (ref 3.5–5.2)
ALBUMIN/GLOB SERPL: 1.3 G/DL
ALP SERPL-CCNC: 159 U/L (ref 39–117)
ALT SERPL W P-5'-P-CCNC: 35 U/L (ref 1–33)
ANION GAP SERPL CALCULATED.3IONS-SCNC: 12 MMOL/L (ref 5–15)
AST SERPL-CCNC: 32 U/L (ref 1–32)
BACTERIA UR QL AUTO: ABNORMAL /HPF
BASOPHILS # BLD AUTO: 0.06 10*3/MM3 (ref 0–0.2)
BASOPHILS NFR BLD AUTO: 0.4 % (ref 0–1.5)
BILIRUB SERPL-MCNC: 0.6 MG/DL (ref 0–1.2)
BILIRUB UR QL STRIP: NEGATIVE
BUN SERPL-MCNC: 10 MG/DL (ref 8–23)
BUN/CREAT SERPL: 11.5 (ref 7–25)
CALCIUM SPEC-SCNC: 8.3 MG/DL (ref 8.6–10.5)
CHLORIDE SERPL-SCNC: 99 MMOL/L (ref 98–107)
CK SERPL-CCNC: 69 U/L (ref 20–180)
CLARITY UR: ABNORMAL
CO2 SERPL-SCNC: 27 MMOL/L (ref 22–29)
COLOR UR: ABNORMAL
CREAT SERPL-MCNC: 0.87 MG/DL (ref 0.57–1)
D-LACTATE SERPL-SCNC: 0.7 MMOL/L (ref 0.5–2)
DEPRECATED RDW RBC AUTO: 46 FL (ref 37–54)
EGFRCR SERPLBLD CKD-EPI 2021: 74 ML/MIN/1.73
EOSINOPHIL # BLD AUTO: 0.01 10*3/MM3 (ref 0–0.4)
EOSINOPHIL NFR BLD AUTO: 0.1 % (ref 0.3–6.2)
ERYTHROCYTE [DISTWIDTH] IN BLOOD BY AUTOMATED COUNT: 13.6 % (ref 12.3–15.4)
GLOBULIN UR ELPH-MCNC: 3 GM/DL
GLUCOSE SERPL-MCNC: 105 MG/DL (ref 65–99)
GLUCOSE UR STRIP-MCNC: NEGATIVE MG/DL
HCT VFR BLD AUTO: 39.8 % (ref 34–46.6)
HGB BLD-MCNC: 12.3 G/DL (ref 12–15.9)
HGB UR QL STRIP.AUTO: ABNORMAL
HOLD SPECIMEN: NORMAL
HOLD SPECIMEN: NORMAL
IMM GRANULOCYTES # BLD AUTO: 0.05 10*3/MM3 (ref 0–0.05)
IMM GRANULOCYTES NFR BLD AUTO: 0.3 % (ref 0–0.5)
KETONES UR QL STRIP: NEGATIVE
LEUKOCYTE ESTERASE UR QL STRIP.AUTO: ABNORMAL
LIPASE SERPL-CCNC: 14 U/L (ref 13–60)
LYMPHOCYTES # BLD AUTO: 1.1 10*3/MM3 (ref 0.7–3.1)
LYMPHOCYTES NFR BLD AUTO: 7.1 % (ref 19.6–45.3)
MAGNESIUM SERPL-MCNC: 1.9 MG/DL (ref 1.6–2.4)
MCH RBC QN AUTO: 28 PG (ref 26.6–33)
MCHC RBC AUTO-ENTMCNC: 30.9 G/DL (ref 31.5–35.7)
MCV RBC AUTO: 90.7 FL (ref 79–97)
MONOCYTES # BLD AUTO: 1.92 10*3/MM3 (ref 0.1–0.9)
MONOCYTES NFR BLD AUTO: 12.3 % (ref 5–12)
NEUTROPHILS NFR BLD AUTO: 12.42 10*3/MM3 (ref 1.7–7)
NEUTROPHILS NFR BLD AUTO: 79.8 % (ref 42.7–76)
NITRITE UR QL STRIP: POSITIVE
NRBC BLD AUTO-RTO: 0 /100 WBC (ref 0–0.2)
PH UR STRIP.AUTO: 6 [PH] (ref 5–8)
PLATELET # BLD AUTO: 222 10*3/MM3 (ref 140–450)
PMV BLD AUTO: 10.2 FL (ref 6–12)
POTASSIUM SERPL-SCNC: 3.7 MMOL/L (ref 3.5–5.2)
PROT SERPL-MCNC: 6.8 G/DL (ref 6–8.5)
PROT UR QL STRIP: ABNORMAL
RBC # BLD AUTO: 4.39 10*6/MM3 (ref 3.77–5.28)
RBC # UR STRIP: ABNORMAL /HPF
REF LAB TEST METHOD: ABNORMAL
SODIUM SERPL-SCNC: 138 MMOL/L (ref 136–145)
SP GR UR STRIP: 1.02 (ref 1–1.03)
SQUAMOUS #/AREA URNS HPF: ABNORMAL /HPF
UROBILINOGEN UR QL STRIP: ABNORMAL
WBC # UR STRIP: ABNORMAL /HPF
WBC NRBC COR # BLD: 15.56 10*3/MM3 (ref 3.4–10.8)
WHOLE BLOOD HOLD COAG: NORMAL
WHOLE BLOOD HOLD SPECIMEN: NORMAL

## 2023-04-28 PROCEDURE — 96365 THER/PROPH/DIAG IV INF INIT: CPT

## 2023-04-28 PROCEDURE — 83605 ASSAY OF LACTIC ACID: CPT | Performed by: FAMILY MEDICINE

## 2023-04-28 PROCEDURE — 83735 ASSAY OF MAGNESIUM: CPT | Performed by: FAMILY MEDICINE

## 2023-04-28 PROCEDURE — 25510000001 IOPAMIDOL 61 % SOLUTION: Performed by: FAMILY MEDICINE

## 2023-04-28 PROCEDURE — 74177 CT ABD & PELVIS W/CONTRAST: CPT

## 2023-04-28 PROCEDURE — 96375 TX/PRO/DX INJ NEW DRUG ADDON: CPT

## 2023-04-28 PROCEDURE — 99284 EMERGENCY DEPT VISIT MOD MDM: CPT

## 2023-04-28 PROCEDURE — 87186 SC STD MICRODIL/AGAR DIL: CPT | Performed by: FAMILY MEDICINE

## 2023-04-28 PROCEDURE — 81001 URINALYSIS AUTO W/SCOPE: CPT | Performed by: FAMILY MEDICINE

## 2023-04-28 PROCEDURE — 25010000002 ONDANSETRON PER 1 MG: Performed by: FAMILY MEDICINE

## 2023-04-28 PROCEDURE — P9612 CATHETERIZE FOR URINE SPEC: HCPCS

## 2023-04-28 PROCEDURE — 83690 ASSAY OF LIPASE: CPT | Performed by: FAMILY MEDICINE

## 2023-04-28 PROCEDURE — 80053 COMPREHEN METABOLIC PANEL: CPT | Performed by: FAMILY MEDICINE

## 2023-04-28 PROCEDURE — 87086 URINE CULTURE/COLONY COUNT: CPT | Performed by: FAMILY MEDICINE

## 2023-04-28 PROCEDURE — 71045 X-RAY EXAM CHEST 1 VIEW: CPT

## 2023-04-28 PROCEDURE — 85025 COMPLETE CBC W/AUTO DIFF WBC: CPT | Performed by: FAMILY MEDICINE

## 2023-04-28 PROCEDURE — 96376 TX/PRO/DX INJ SAME DRUG ADON: CPT

## 2023-04-28 PROCEDURE — 87040 BLOOD CULTURE FOR BACTERIA: CPT | Performed by: FAMILY MEDICINE

## 2023-04-28 PROCEDURE — 82550 ASSAY OF CK (CPK): CPT | Performed by: FAMILY MEDICINE

## 2023-04-28 PROCEDURE — 87088 URINE BACTERIA CULTURE: CPT | Performed by: FAMILY MEDICINE

## 2023-04-28 PROCEDURE — 25010000002 CEFTRIAXONE PER 250 MG: Performed by: PHYSICIAN ASSISTANT

## 2023-04-28 RX ORDER — ONDANSETRON 2 MG/ML
4 INJECTION INTRAMUSCULAR; INTRAVENOUS ONCE
Status: COMPLETED | OUTPATIENT
Start: 2023-04-28 | End: 2023-04-28

## 2023-04-28 RX ORDER — HYDROCODONE BITARTRATE AND ACETAMINOPHEN 10; 325 MG/1; MG/1
1 TABLET ORAL ONCE
Status: COMPLETED | OUTPATIENT
Start: 2023-04-28 | End: 2023-04-28

## 2023-04-28 RX ORDER — SODIUM CHLORIDE 0.9 % (FLUSH) 0.9 %
10 SYRINGE (ML) INJECTION AS NEEDED
Status: DISCONTINUED | OUTPATIENT
Start: 2023-04-28 | End: 2023-04-29 | Stop reason: HOSPADM

## 2023-04-28 RX ADMIN — CEFTRIAXONE 2 G: 2 INJECTION, POWDER, FOR SOLUTION INTRAMUSCULAR; INTRAVENOUS at 22:40

## 2023-04-28 RX ADMIN — ONDANSETRON 4 MG: 2 INJECTION INTRAMUSCULAR; INTRAVENOUS at 23:05

## 2023-04-28 RX ADMIN — ONDANSETRON 4 MG: 2 INJECTION INTRAMUSCULAR; INTRAVENOUS at 20:59

## 2023-04-28 RX ADMIN — Medication 10 ML: at 23:06

## 2023-04-28 RX ADMIN — IOPAMIDOL 100 ML: 612 INJECTION, SOLUTION INTRAVENOUS at 21:26

## 2023-04-28 RX ADMIN — SODIUM CHLORIDE 1000 ML: 9 INJECTION, SOLUTION INTRAVENOUS at 21:01

## 2023-04-28 RX ADMIN — HYDROCODONE BITARTRATE AND ACETAMINOPHEN 1 TABLET: 10; 325 TABLET ORAL at 23:06

## 2023-04-29 LAB — HOLD SPECIMEN: NORMAL

## 2023-04-29 RX ORDER — ONDANSETRON 4 MG/1
4 TABLET, ORALLY DISINTEGRATING ORAL EVERY 6 HOURS PRN
Qty: 12 TABLET | Refills: 0 | Status: SHIPPED | OUTPATIENT
Start: 2023-04-29

## 2023-04-29 RX ORDER — CIPROFLOXACIN 500 MG/1
500 TABLET, FILM COATED ORAL 2 TIMES DAILY
Qty: 14 TABLET | Refills: 0 | Status: SHIPPED | OUTPATIENT
Start: 2023-04-29 | End: 2023-05-07 | Stop reason: HOSPADM

## 2023-04-29 NOTE — ED PROVIDER NOTES
Subjective   History of Present Illness    Review of Systems    Past Medical History:   Diagnosis Date   • Back pain    • COPD (chronic obstructive pulmonary disease)    • Dependence on supplemental oxygen    • Disease of thyroid gland    • History of degenerative disc disease    • Restless leg    • Scoliosis        Allergies   Allergen Reactions   • Codeine Shortness Of Breath       Past Surgical History:   Procedure Laterality Date   • CHOLECYSTECTOMY     • TUBAL ABDOMINAL LIGATION         History reviewed. No pertinent family history.    Social History     Socioeconomic History   • Marital status:    Tobacco Use   • Smoking status: Former     Packs/day: 1.00     Years: 40.00     Pack years: 40.00     Types: Cigarettes     Quit date: 5/1/2008     Years since quitting: 15.0   • Smokeless tobacco: Never   Vaping Use   • Vaping Use: Every day   • Substances: Nicotine   • Devices: Pre-filled pod   Substance and Sexual Activity   • Alcohol use: Defer   • Drug use: Defer   • Sexual activity: Defer           Objective   Physical Exam    Procedures           ED Course  ED Course as of 04/29/23 0214 Fri Apr 28, 2023 2121 Assumed care of patient at this time from Dr. Marquise Cortez.  Pending results of urinalysis as well as CT imaging will reevaluate and disposition accordingly.  Please see Dr. Cortez's note above for HPI, ROS, and physical examination findings.  [JS]   2144 CXR showed: Stable mild chronic lung changes, no acute disease. [JS]   2209 Urinalysis revealed moderate leukocytes, positive nitrites, 13-20 WBC as well as 2+ bacteria for which Rocephin will be ordered and a culture will be sent. [JS]   2210 CT imaging of the abdomen and pelvis performed with IV contrast showed: Mild enhancement of mucosal lining of ureters and urinary bladder to coordinate with urinalysis, no mass or abscess. [JS]   2225 Upon reevaluation at this time patient resting comfortably in bed reporting that she is feeling better.   Reviewed with patient as well as family members lab and imaging findings.  Discussed need for continued outpatient compliance with antibiotics, emphasis on hydration avoiding sugary beverages.  Discussed need for PCP follow-up within the next 48 hours for close reevaluation.  Advised strict return precautions and need for immediate return to ED should she develop any new or worsening symptoms.  Patient is tolerating p.o. fluids, has had improvement in her tachycardia, is using her baseline oxygen.  Patient and family members with no further questions, concerns, needs at this time and patient is stable for discharge. [JS]      ED Course User Index  [JS] Tree Moreno PA-C                                           Medical Decision Making  Urinary tract infection without hematuria, site unspecified: complicated acute illness or injury  Amount and/or Complexity of Data Reviewed  External Data Reviewed: labs, radiology and notes.  Labs: ordered. Decision-making details documented in ED Course.  Radiology: ordered. Decision-making details documented in ED Course.  ECG/medicine tests: ordered. Decision-making details documented in ED Course.  Discussion of management or test interpretation with external provider(s): Dr. Marquise Cortez (attending)    Risk  Prescription drug management.          Final diagnoses:   Urinary tract infection without hematuria, site unspecified       ED Disposition  ED Disposition     ED Disposition   Discharge    Condition   Stable    Comment   --             Padmaja Bermudez, CARRIE  [address] [address] [phone]    Schedule an appointment as soon as possible for a visit in 2 days      [facility] Emergency Department  [address] [place] [id number] [phone]    As needed         Medication List      New Prescriptions    ciprofloxacin 500 MG tablet  Commonly known as: CIPRO  Take 1 tablet by mouth 2 (Two) Times a Day for 7 days.      ondansetron ODT 4 MG disintegrating tablet  Commonly known as: ZOFRAN-ODT  Place 1 tablet on the tongue Every 6 (Six) Hours As Needed for Nausea.           Where to Get Your Medications      These medications were sent to [facility] - [place], [place] - [address] - [phone]  - [phone]   [address] [place] [address], [facility] [address]    Phone: [phone]   · ciprofloxacin 500 MG tablet  · ondansetron ODT 4 MG disintegrating tablet          Tree Moreno PA-C  04/29/23 0214

## 2023-04-29 NOTE — DISCHARGE INSTRUCTIONS
Today you have evidence of a urinary tract infection for which will need to complete your antibiotics in their entirety.  Please stay well-hydrated by increasing water.  Please follow-up with your primary care provider within the next 48 hours for close reevaluation.  Should you develop any new or worsening symptoms please return to the ER for further evaluation.

## 2023-04-29 NOTE — ED PROVIDER NOTES
Subjective   History of Present Illness  67-year-old female comes emergency room with complaints of left lower quadrant abdominal pain.  Patient states that she has also been having some episodes of vomiting.  Patient states that she is unable to keep anything down anytime she eats or drinks she had to have having episodes of vomiting.  Patient states that she has been feeling diaphoretic as well.  Patient states that her face has been flushed red.  Patient states that she feels like it is a fever but she has not checked her temperature at home.  Patient denies any diarrhea.  Patient denies any bloody stools.  Patient denies any black tarry stools.  Patient denies any dysuria hematuria.  Patient denies any other symptoms at this time.        Review of Systems   Constitutional: Positive for diaphoresis and fever.   Gastrointestinal: Positive for abdominal pain, nausea and vomiting.   All other systems reviewed and are negative.      Past Medical History:   Diagnosis Date   • Back pain    • COPD (chronic obstructive pulmonary disease)    • Dependence on supplemental oxygen    • Disease of thyroid gland    • History of degenerative disc disease    • Restless leg    • Scoliosis        Allergies   Allergen Reactions   • Codeine Shortness Of Breath       Past Surgical History:   Procedure Laterality Date   • CHOLECYSTECTOMY     • TUBAL ABDOMINAL LIGATION         History reviewed. No pertinent family history.    Social History     Socioeconomic History   • Marital status:    Tobacco Use   • Smoking status: Former     Packs/day: 1.00     Years: 40.00     Pack years: 40.00     Types: Cigarettes     Quit date: 5/1/2008     Years since quitting: 15.0   • Smokeless tobacco: Never   Vaping Use   • Vaping Use: Every day   • Substances: Nicotine   • Devices: Pre-filled pod   Substance and Sexual Activity   • Alcohol use: Defer   • Drug use: Defer   • Sexual activity: Defer           Objective   Physical Exam  Vitals and  nursing note reviewed.   Constitutional:       Appearance: Normal appearance.   HENT:      Head: Normocephalic and atraumatic.      Mouth/Throat:      Mouth: Mucous membranes are moist.   Eyes:      Extraocular Movements: Extraocular movements intact.      Pupils: Pupils are equal, round, and reactive to light.   Cardiovascular:      Rate and Rhythm: Normal rate and regular rhythm.      Heart sounds: Normal heart sounds.   Pulmonary:      Effort: Pulmonary effort is normal.      Breath sounds: Normal breath sounds.   Abdominal:      General: Bowel sounds are normal.      Palpations: Abdomen is soft.      Tenderness: There is abdominal tenderness in the left upper quadrant and left lower quadrant. There is no guarding or rebound.   Musculoskeletal:      Cervical back: Normal range of motion and neck supple.   Skin:     General: Skin is warm and dry.   Neurological:      General: No focal deficit present.      Mental Status: She is alert and oriented to person, place, and time.   Psychiatric:         Mood and Affect: Mood normal.         Behavior: Behavior normal.         Procedures           ED Course  ED Course as of 04/29/23 1513   Fri Apr 28, 2023 2121 Assumed care of patient at this time from Dr. Marquise Cortez.  Pending results of urinalysis as well as CT imaging will reevaluate and disposition accordingly.  Please see Dr. Cortez's note above for HPI, ROS, and physical examination findings.  [JS]   2144 CXR showed: Stable mild chronic lung changes, no acute disease. [JS]   2209 Urinalysis revealed moderate leukocytes, positive nitrites, 13-20 WBC as well as 2+ bacteria for which Rocephin will be ordered and a culture will be sent. [JS]   2210 CT imaging of the abdomen and pelvis performed with IV contrast showed: Mild enhancement of mucosal lining of ureters and urinary bladder to coordinate with urinalysis, no mass or abscess. [JS]   2225 Upon reevaluation at this time patient resting comfortably in bed reporting  that she is feeling better.  Reviewed with patient as well as family members lab and imaging findings.  Discussed need for continued outpatient compliance with antibiotics, emphasis on hydration avoiding sugary beverages.  Discussed need for PCP follow-up within the next 48 hours for close reevaluation.  Advised strict return precautions and need for immediate return to ED should she develop any new or worsening symptoms.  Patient is tolerating p.o. fluids, has had improvement in her tachycardia, is using her baseline oxygen.  Patient and family members with no further questions, concerns, needs at this time and patient is stable for discharge. [JS]      ED Course User Index  [JS] Tree Moreno PA-C                                         Lab Results (last 24 hours)     Procedure Component Value Units Date/Time    [place] Blood Culture Bottle Set [[id number]] Collected: 04/28/23 2035    Specimen: Blood from Arm, Right Updated: 04/28/23 2146     Extra Tube Hold for add-ons.     Comment: Auto resulted.       CBC & Differential [[id number]]  (Abnormal) Collected: 04/28/23 2038    Specimen: Blood Updated: 04/28/23 2050    Narrative:      The following orders were created for panel order CBC & Differential.  Procedure                               Abnormality         Status                     ---------                               -----------         ------                     CBC Auto Differential[[id number]]        Abnormal            Final result                 Please view results for these tests on the individual orders.    Comprehensive Metabolic Panel [[id number]]  (Abnormal) Collected: 04/28/23 2038    Specimen: Blood Updated: 04/28/23 2109     Glucose 105 mg/dL      BUN 10 mg/dL      Creatinine 0.87 mg/dL      Sodium 138 mmol/L      Potassium 3.7 mmol/L      Chloride 99 mmol/L      CO2 27.0 mmol/L      Calcium 8.3 mg/dL      Total Protein 6.8 g/dL      Albumin 3.8 g/dL      ALT (SGPT) 35 U/L      AST (SGOT) 32  U/L      Alkaline Phosphatase 159 U/L      Total Bilirubin 0.6 mg/dL      Globulin 3.0 gm/dL      A/G Ratio 1.3 g/dL      BUN/Creatinine Ratio 11.5     Anion Gap 12.0 mmol/L      eGFR 74.0 mL/min/1.73     Narrative:      GFR Normal >60  Chronic Kidney Disease <60  Kidney Failure <15      Lipase [[id number]]  (Normal) Collected: 04/28/23 2038    Specimen: Blood Updated: 04/28/23 2104     Lipase 14 U/L     Lactic Acid, Plasma [[id number]]  (Normal) Collected: 04/28/23 2038    Specimen: Blood Updated: 04/28/23 2104     Lactate 0.7 mmol/L     CK [[id number]]  (Normal) Collected: 04/28/23 2038    Specimen: Blood Updated: 04/28/23 2109     Creatine Kinase 69 U/L     Magnesium [[id number]]  (Normal) Collected: 04/28/23 2038    Specimen: Blood Updated: 04/28/23 2104     Magnesium 1.9 mg/dL     Blood Culture - Blood, Hand, Right [[id number]] Collected: 04/28/23 2038    Specimen: Blood from Hand, Right Updated: 04/28/23 2055    CBC Auto Differential [[id number]]  (Abnormal) Collected: 04/28/23 2038    Specimen: Blood Updated: 04/28/23 2050     WBC 15.56 10*3/mm3      RBC 4.39 10*6/mm3      Hemoglobin 12.3 g/dL      Hematocrit 39.8 %      MCV 90.7 fL      MCH 28.0 pg      MCHC 30.9 g/dL      RDW 13.6 %      RDW-SD 46.0 fl      MPV 10.2 fL      Platelets 222 10*3/mm3      Neutrophil % 79.8 %      Lymphocyte % 7.1 %      Monocyte % 12.3 %      Eosinophil % 0.1 %      Basophil % 0.4 %      Immature Grans % 0.3 %      Neutrophils, Absolute 12.42 10*3/mm3      Lymphocytes, Absolute 1.10 10*3/mm3      Monocytes, Absolute 1.92 10*3/mm3      Eosinophils, Absolute 0.01 10*3/mm3      Basophils, Absolute 0.06 10*3/mm3      Immature Grans, Absolute 0.05 10*3/mm3      nRBC 0.0 /100 WBC     Urinalysis With Culture If Indicated - Straight Cath [[id number]]  (Abnormal) Collected: 04/28/23 2104    Specimen: Urine from Straight Cath Updated: 04/28/23 2150     Color, UA Dark Yellow     Appearance, UA Turbid     pH, UA 6.0     Specific Gravity, UA 1.016      Glucose, UA Negative     Ketones, UA Negative     Bilirubin, UA Negative     Blood, UA Large (3+)     Protein, UA >=300 mg/dL (3+)     Leuk Esterase, UA Moderate (2+)     Nitrite, UA Positive     Urobilinogen, UA 0.2 E.U./dL    Narrative:      In absence of clinical symptoms, the presence of pyuria, bacteria, and/or nitrites on the urinalysis result does not correlate with infection.    Urinalysis, Microscopic Only - Straight Cath [[id number]]  (Abnormal) Collected: 04/28/23 2104    Specimen: Urine from Straight Cath Updated: 04/28/23 2150     RBC, UA 3-5 /HPF      WBC, UA 13-20 /HPF      Bacteria, UA 2+ /HPF      Squamous Epithelial Cells, UA 0-2 /HPF      Methodology Manual Light Microscopy    Urine Culture - Urine, Straight Cath [[id number]] Collected: 04/28/23 2104    Specimen: Urine from Straight Cath Updated: 04/28/23 2150          CT Abdomen Pelvis With Contrast   Final Result      XR Chest 1 View   Final Result   1. Stable, mild chronic lung changes.   2. No acute disease.           This report was finalized on 04/28/2023 21:24 by Dr. Huber Story MD.           Medications   sodium chloride 0.9 % bolus 1,000 mL (0 mL Intravenous Stopped 4/28/23 2201)   ondansetron (ZOFRAN) injection 4 mg (4 mg Intravenous Given 4/28/23 2059)   iopamidol (ISOVUE-300) 61 % injection 100 mL (100 mL Intravenous Given 4/28/23 2126)   cefTRIAXone (ROCEPHIN) 2 g in sodium chloride 0.9 % 100 mL IVPB (0 g Intravenous Stopped 4/28/23 2306)   ondansetron (ZOFRAN) injection 4 mg (4 mg Intravenous Given 4/28/23 2305)   HYDROcodone-acetaminophen (NORCO)  MG per tablet 1 tablet (1 tablet Oral Given 4/28/23 2306)         MDM    Final diagnoses:   Urinary tract infection without hematuria, site unspecified       ED Disposition  ED Disposition     ED Disposition   Discharge    Condition   Stable    Comment   --             Padmaja Bermudez, CARRIE  [address]  [facility] [address] [phone]    Schedule an appointment as soon  as possible for a visit in 2 days      [facility] Emergency Department  [facility] [facility] [address] [phone]    As needed         Medication List      New Prescriptions    ciprofloxacin 500 MG tablet  Commonly known as: CIPRO  Take 1 tablet by mouth 2 (Two) Times a Day for 7 days.     ondansetron ODT 4 MG disintegrating tablet  Commonly known as: ZOFRAN-ODT  Place 1 tablet on the tongue Every 6 (Six) Hours As Needed for Nausea.           Where to Get Your Medications      These medications were sent to [facility] - [place] [address] [address] - [phone] [facility] [phone] [facility] [address], [facility] [address]    Phone: [phone]   · ciprofloxacin 500 MG tablet  · ondansetron ODT 4 MG disintegrating tablet          Marquise Cortez MD  04/29/23 8139

## 2023-04-30 LAB — BACTERIA SPEC AEROBE CULT: ABNORMAL

## 2023-05-03 ENCOUNTER — TELEPHONE (OUTPATIENT)
Dept: FAMILY MEDICINE CLINIC | Facility: CLINIC | Age: 68
End: 2023-05-03
Payer: MEDICARE

## 2023-05-03 ENCOUNTER — HOSPITAL ENCOUNTER (INPATIENT)
Facility: HOSPITAL | Age: 68
LOS: 4 days | Discharge: HOME | DRG: 872 | End: 2023-05-07
Attending: FAMILY MEDICINE | Admitting: FAMILY MEDICINE
Payer: MEDICARE

## 2023-05-03 ENCOUNTER — APPOINTMENT (OUTPATIENT)
Dept: CT IMAGING | Facility: HOSPITAL | Age: 68
DRG: 872 | End: 2023-05-03
Payer: MEDICARE

## 2023-05-03 DIAGNOSIS — N12 PYELONEPHRITIS: ICD-10-CM

## 2023-05-03 DIAGNOSIS — A41.9 SEPSIS, DUE TO UNSPECIFIED ORGANISM, UNSPECIFIED WHETHER ACUTE ORGAN DYSFUNCTION PRESENT: Primary | ICD-10-CM

## 2023-05-03 LAB
ALBUMIN SERPL-MCNC: 3.6 G/DL (ref 3.5–5.2)
ALBUMIN/GLOB SERPL: 1.2 G/DL
ALP SERPL-CCNC: 138 U/L (ref 39–117)
ALT SERPL W P-5'-P-CCNC: 20 U/L (ref 1–33)
ANION GAP SERPL CALCULATED.3IONS-SCNC: 8 MMOL/L (ref 5–15)
AST SERPL-CCNC: 23 U/L (ref 1–32)
BACTERIA SPEC AEROBE CULT: NORMAL
BACTERIA UR QL AUTO: ABNORMAL /HPF
BASOPHILS # BLD AUTO: 0.09 10*3/MM3 (ref 0–0.2)
BASOPHILS NFR BLD AUTO: 0.4 % (ref 0–1.5)
BILIRUB SERPL-MCNC: 0.3 MG/DL (ref 0–1.2)
BILIRUB UR QL STRIP: NEGATIVE
BUN SERPL-MCNC: 6 MG/DL (ref 8–23)
BUN/CREAT SERPL: 7.4 (ref 7–25)
CALCIUM SPEC-SCNC: 8.7 MG/DL (ref 8.6–10.5)
CHLORIDE SERPL-SCNC: 97 MMOL/L (ref 98–107)
CLARITY UR: CLEAR
CO2 SERPL-SCNC: 33 MMOL/L (ref 22–29)
COLOR UR: YELLOW
CREAT SERPL-MCNC: 0.81 MG/DL (ref 0.57–1)
D-LACTATE SERPL-SCNC: 1 MMOL/L (ref 0.5–2)
DEPRECATED RDW RBC AUTO: 45.2 FL (ref 37–54)
EGFRCR SERPLBLD CKD-EPI 2021: 80.7 ML/MIN/1.73
EOSINOPHIL # BLD AUTO: 0.1 10*3/MM3 (ref 0–0.4)
EOSINOPHIL NFR BLD AUTO: 0.5 % (ref 0.3–6.2)
ERYTHROCYTE [DISTWIDTH] IN BLOOD BY AUTOMATED COUNT: 14 % (ref 12.3–15.4)
GLOBULIN UR ELPH-MCNC: 2.9 GM/DL
GLUCOSE SERPL-MCNC: 95 MG/DL (ref 65–99)
GLUCOSE UR STRIP-MCNC: NEGATIVE MG/DL
HCT VFR BLD AUTO: 38.1 % (ref 34–46.6)
HGB BLD-MCNC: 12.1 G/DL (ref 12–15.9)
HGB UR QL STRIP.AUTO: ABNORMAL
HYALINE CASTS UR QL AUTO: ABNORMAL /LPF
IMM GRANULOCYTES # BLD AUTO: 0.16 10*3/MM3 (ref 0–0.05)
IMM GRANULOCYTES NFR BLD AUTO: 0.8 % (ref 0–0.5)
KETONES UR QL STRIP: NEGATIVE
LEUKOCYTE ESTERASE UR QL STRIP.AUTO: ABNORMAL
LIPASE SERPL-CCNC: 23 U/L (ref 13–60)
LYMPHOCYTES # BLD AUTO: 1.2 10*3/MM3 (ref 0.7–3.1)
LYMPHOCYTES NFR BLD AUTO: 5.9 % (ref 19.6–45.3)
MAGNESIUM SERPL-MCNC: 1.8 MG/DL (ref 1.6–2.4)
MCH RBC QN AUTO: 28.3 PG (ref 26.6–33)
MCHC RBC AUTO-ENTMCNC: 31.8 G/DL (ref 31.5–35.7)
MCV RBC AUTO: 89 FL (ref 79–97)
MONOCYTES # BLD AUTO: 1.19 10*3/MM3 (ref 0.1–0.9)
MONOCYTES NFR BLD AUTO: 5.8 % (ref 5–12)
NEUTROPHILS NFR BLD AUTO: 17.72 10*3/MM3 (ref 1.7–7)
NEUTROPHILS NFR BLD AUTO: 86.6 % (ref 42.7–76)
NITRITE UR QL STRIP: NEGATIVE
NRBC BLD AUTO-RTO: 0 /100 WBC (ref 0–0.2)
PH UR STRIP.AUTO: 5.5 [PH] (ref 5–8)
PLATELET # BLD AUTO: 361 10*3/MM3 (ref 140–450)
PMV BLD AUTO: 9.9 FL (ref 6–12)
POTASSIUM SERPL-SCNC: 3.2 MMOL/L (ref 3.5–5.2)
PROCALCITONIN SERPL-MCNC: 1.13 NG/ML (ref 0–0.25)
PROT SERPL-MCNC: 6.5 G/DL (ref 6–8.5)
PROT UR QL STRIP: ABNORMAL
RBC # BLD AUTO: 4.28 10*6/MM3 (ref 3.77–5.28)
RBC # UR STRIP: ABNORMAL /HPF
REF LAB TEST METHOD: ABNORMAL
SODIUM SERPL-SCNC: 138 MMOL/L (ref 136–145)
SP GR UR STRIP: 1.01 (ref 1–1.03)
SQUAMOUS #/AREA URNS HPF: ABNORMAL /HPF
UROBILINOGEN UR QL STRIP: ABNORMAL
WBC # UR STRIP: ABNORMAL /HPF
WBC NRBC COR # BLD: 20.46 10*3/MM3 (ref 3.4–10.8)

## 2023-05-03 PROCEDURE — 25010000002 DIPHENHYDRAMINE PER 50 MG: Performed by: INTERNAL MEDICINE

## 2023-05-03 PROCEDURE — 87086 URINE CULTURE/COLONY COUNT: CPT | Performed by: FAMILY MEDICINE

## 2023-05-03 PROCEDURE — 25010000002 MORPHINE PER 10 MG: Performed by: FAMILY MEDICINE

## 2023-05-03 PROCEDURE — 83735 ASSAY OF MAGNESIUM: CPT | Performed by: FAMILY MEDICINE

## 2023-05-03 PROCEDURE — 80053 COMPREHEN METABOLIC PANEL: CPT | Performed by: FAMILY MEDICINE

## 2023-05-03 PROCEDURE — 83690 ASSAY OF LIPASE: CPT | Performed by: FAMILY MEDICINE

## 2023-05-03 PROCEDURE — 99285 EMERGENCY DEPT VISIT HI MDM: CPT

## 2023-05-03 PROCEDURE — 87040 BLOOD CULTURE FOR BACTERIA: CPT | Performed by: FAMILY MEDICINE

## 2023-05-03 PROCEDURE — 74177 CT ABD & PELVIS W/CONTRAST: CPT

## 2023-05-03 PROCEDURE — 0 CEFEPIME PER 500 MG: Performed by: FAMILY MEDICINE

## 2023-05-03 PROCEDURE — 0 HYDROMORPHONE 1 MG/ML SOLUTION: Performed by: INTERNAL MEDICINE

## 2023-05-03 PROCEDURE — 81001 URINALYSIS AUTO W/SCOPE: CPT | Performed by: FAMILY MEDICINE

## 2023-05-03 PROCEDURE — 84145 PROCALCITONIN (PCT): CPT | Performed by: FAMILY MEDICINE

## 2023-05-03 PROCEDURE — 25510000001 IOPAMIDOL 61 % SOLUTION: Performed by: FAMILY MEDICINE

## 2023-05-03 PROCEDURE — 83605 ASSAY OF LACTIC ACID: CPT | Performed by: FAMILY MEDICINE

## 2023-05-03 PROCEDURE — 85025 COMPLETE CBC W/AUTO DIFF WBC: CPT | Performed by: FAMILY MEDICINE

## 2023-05-03 RX ORDER — LEVOTHYROXINE SODIUM 112 UG/1
112 TABLET ORAL
Status: DISCONTINUED | OUTPATIENT
Start: 2023-05-04 | End: 2023-05-07 | Stop reason: HOSPADM

## 2023-05-03 RX ORDER — ROPINIROLE 0.25 MG/1
0.25 TABLET, FILM COATED ORAL NIGHTLY
Status: DISCONTINUED | OUTPATIENT
Start: 2023-05-04 | End: 2023-05-07 | Stop reason: HOSPADM

## 2023-05-03 RX ORDER — MODAFINIL 100 MG/1
100 TABLET ORAL DAILY
Status: DISCONTINUED | OUTPATIENT
Start: 2023-05-04 | End: 2023-05-06

## 2023-05-03 RX ORDER — MIRTAZAPINE 15 MG/1
15 TABLET, FILM COATED ORAL NIGHTLY
Status: DISCONTINUED | OUTPATIENT
Start: 2023-05-04 | End: 2023-05-07 | Stop reason: HOSPADM

## 2023-05-03 RX ORDER — SODIUM CHLORIDE 0.9 % (FLUSH) 0.9 %
10 SYRINGE (ML) INJECTION AS NEEDED
Status: DISCONTINUED | OUTPATIENT
Start: 2023-05-03 | End: 2023-05-07 | Stop reason: HOSPADM

## 2023-05-03 RX ORDER — DIPHENHYDRAMINE HYDROCHLORIDE 50 MG/ML
25 INJECTION INTRAMUSCULAR; INTRAVENOUS ONCE
Status: COMPLETED | OUTPATIENT
Start: 2023-05-03 | End: 2023-05-03

## 2023-05-03 RX ORDER — GABAPENTIN 300 MG/1
600 CAPSULE ORAL EVERY 12 HOURS SCHEDULED
Status: DISCONTINUED | OUTPATIENT
Start: 2023-05-04 | End: 2023-05-06

## 2023-05-03 RX ORDER — HYDROCODONE BITARTRATE AND ACETAMINOPHEN 10; 325 MG/1; MG/1
1 TABLET ORAL EVERY 6 HOURS PRN
Status: DISCONTINUED | OUTPATIENT
Start: 2023-05-03 | End: 2023-05-07 | Stop reason: HOSPADM

## 2023-05-03 RX ORDER — DICYCLOMINE HCL 20 MG
20 TABLET ORAL 4 TIMES DAILY
Status: DISCONTINUED | OUTPATIENT
Start: 2023-05-04 | End: 2023-05-07 | Stop reason: HOSPADM

## 2023-05-03 RX ORDER — IPRATROPIUM BROMIDE AND ALBUTEROL SULFATE 2.5; .5 MG/3ML; MG/3ML
3 SOLUTION RESPIRATORY (INHALATION) EVERY 4 HOURS PRN
Status: DISCONTINUED | OUTPATIENT
Start: 2023-05-03 | End: 2023-05-07 | Stop reason: HOSPADM

## 2023-05-03 RX ORDER — ONDANSETRON 2 MG/ML
4 INJECTION INTRAMUSCULAR; INTRAVENOUS EVERY 6 HOURS PRN
Status: DISCONTINUED | OUTPATIENT
Start: 2023-05-03 | End: 2023-05-07 | Stop reason: HOSPADM

## 2023-05-03 RX ORDER — TIZANIDINE 4 MG/1
4 TABLET ORAL 2 TIMES DAILY
Status: DISCONTINUED | OUTPATIENT
Start: 2023-05-04 | End: 2023-05-07 | Stop reason: HOSPADM

## 2023-05-03 RX ORDER — SODIUM CHLORIDE 9 MG/ML
100 INJECTION, SOLUTION INTRAVENOUS CONTINUOUS
Status: DISCONTINUED | OUTPATIENT
Start: 2023-05-04 | End: 2023-05-04

## 2023-05-03 RX ORDER — ALBUTEROL SULFATE 2.5 MG/3ML
2.5 SOLUTION RESPIRATORY (INHALATION) EVERY 4 HOURS PRN
Status: DISCONTINUED | OUTPATIENT
Start: 2023-05-03 | End: 2023-05-03

## 2023-05-03 RX ADMIN — MORPHINE SULFATE 4 MG: 4 INJECTION, SOLUTION INTRAMUSCULAR; INTRAVENOUS at 20:02

## 2023-05-03 RX ADMIN — CEFEPIME 2 G: 2 INJECTION, POWDER, FOR SOLUTION INTRAVENOUS at 21:32

## 2023-05-03 RX ADMIN — SODIUM CHLORIDE 1000 ML: 9 INJECTION, SOLUTION INTRAVENOUS at 19:28

## 2023-05-03 RX ADMIN — SODIUM CHLORIDE 100 ML/HR: 9 INJECTION, SOLUTION INTRAVENOUS at 23:54

## 2023-05-03 RX ADMIN — HYDROMORPHONE HYDROCHLORIDE 1 MG: 1 INJECTION, SOLUTION INTRAMUSCULAR; INTRAVENOUS; SUBCUTANEOUS at 22:26

## 2023-05-03 RX ADMIN — SODIUM CHLORIDE 1000 ML: 9 INJECTION, SOLUTION INTRAVENOUS at 20:16

## 2023-05-03 RX ADMIN — DIPHENHYDRAMINE HYDROCHLORIDE 25 MG: 50 INJECTION, SOLUTION INTRAMUSCULAR; INTRAVENOUS at 22:25

## 2023-05-03 RX ADMIN — IOPAMIDOL 100 ML: 612 INJECTION, SOLUTION INTRAVENOUS at 20:27

## 2023-05-03 NOTE — TELEPHONE ENCOUNTER
Pt's daughter called to notify that pt was seen in ED on 4/28 for UTI and fever, sent home with abx. Reports that pt has been taking meds as prescribed and now has fever 103 again today. Asked pt's daughter if she had give nthe pt any tylenol or ibuprofen for fever. Reported that she has not. Advised pt to give tylenol adult dose and recheck in 1 one hour. No other symptoms reported at this time. Pt doesn't want to go back to ED.  Please advise

## 2023-05-03 NOTE — ED PROVIDER NOTES
Subjective   History of Present Illness  67-year-old female comes into the emergency room with pain in her left flank and abdomen.  Patient states that the pain has been worsening.  Patient was diagnosed with urinary tract infection.  Patient states that she has had a fever of 103 °F.  Patient has been taking Tylenol for the fevers.  Patient states that the Zofran has been helping her nausea.  Patient states that she has had a decreased appetite due to this pain.  Patient denies any other symptoms at this time.        Review of Systems   Constitutional: Positive for fever.   Gastrointestinal: Positive for abdominal pain and nausea.   All other systems reviewed and are negative.      Past Medical History:   Diagnosis Date   • Back pain    • COPD (chronic obstructive pulmonary disease)    • Dependence on supplemental oxygen    • Disease of thyroid gland    • History of degenerative disc disease    • Restless leg    • Scoliosis        Allergies   Allergen Reactions   • Codeine Shortness Of Breath       Past Surgical History:   Procedure Laterality Date   • CHOLECYSTECTOMY     • TUBAL ABDOMINAL LIGATION         History reviewed. No pertinent family history.    Social History     Socioeconomic History   • Marital status:    Tobacco Use   • Smoking status: Former     Packs/day: 1.00     Years: 40.00     Pack years: 40.00     Types: Cigarettes     Quit date: 5/1/2008     Years since quitting: 15.0   • Smokeless tobacco: Never   Vaping Use   • Vaping Use: Every day   • Substances: Nicotine   • Devices: Pre-filled pod   Substance and Sexual Activity   • Alcohol use: Defer   • Drug use: Defer   • Sexual activity: Defer           Objective   Physical Exam  Vitals and nursing note reviewed.   Constitutional:       Appearance: Normal appearance.   HENT:      Head: Normocephalic and atraumatic.      Mouth/Throat:      Mouth: Mucous membranes are moist.   Eyes:      General: No scleral icterus.  Cardiovascular:      Rate  and Rhythm: Normal rate and regular rhythm.      Heart sounds: Normal heart sounds.   Pulmonary:      Effort: Pulmonary effort is normal.      Breath sounds: Normal breath sounds.   Abdominal:      General: Bowel sounds are normal.      Palpations: Abdomen is soft.      Tenderness: There is abdominal tenderness in the left upper quadrant and left lower quadrant. There is guarding.   Skin:     General: Skin is warm and dry.   Neurological:      General: No focal deficit present.      Mental Status: She is alert and oriented to person, place, and time.   Psychiatric:         Mood and Affect: Mood normal.         Behavior: Behavior normal.         Procedures           ED Course                                         Lab Results (last 24 hours)     Procedure Component Value Units Date/Time    Urinalysis With Culture If Indicated - Urine, Clean Catch [[id number]]  (Abnormal) Collected: 05/03/23 1924    Specimen: Urine, Clean Catch Updated: 05/03/23 1941     Color, UA Yellow     Appearance, UA Clear     pH, UA 5.5     Specific Gravity, UA 1.013     Glucose, UA Negative     Ketones, UA Negative     Bilirubin, UA Negative     Blood, UA Trace     Protein, UA 30 mg/dL (1+)     Leuk Esterase, UA Trace     Nitrite, UA Negative     Urobilinogen, UA 0.2 E.U./dL    Narrative:      In absence of clinical symptoms, the presence of pyuria, bacteria, and/or nitrites on the urinalysis result does not correlate with infection.    Urinalysis, Microscopic Only - Urine, Clean Catch [[id number]]  (Abnormal) Collected: 05/03/23 1924    Specimen: Urine, Clean Catch Updated: 05/03/23 1941     RBC, UA 3-5 /HPF      WBC, UA 13-20 /HPF      Bacteria, UA None Seen /HPF      Squamous Epithelial Cells, UA 0-2 /HPF      Hyaline Casts, UA 3-6 /LPF      Methodology Automated Microscopy    Urine Culture - Urine, Urine, Clean Catch [[id number]] Collected: 05/03/23 1924    Specimen: Urine, Clean Catch Updated: 05/03/23 1941    CBC & Differential [[id number]]   (Abnormal) Collected: 05/03/23 1927    Specimen: Blood Updated: 05/03/23 1938    Narrative:      The following orders were created for panel order CBC & Differential.  Procedure                               Abnormality         Status                     ---------                               -----------         ------                     CBC Auto Differential[[id number]]        Abnormal            Final result                 Please view results for these tests on the individual orders.    Comprehensive Metabolic Panel [[id number]]  (Abnormal) Collected: 05/03/23 1927    Specimen: Blood Updated: 05/03/23 1956     Glucose 95 mg/dL      BUN 6 mg/dL      Creatinine 0.81 mg/dL      Sodium 138 mmol/L      Potassium 3.2 mmol/L      Chloride 97 mmol/L      CO2 33.0 mmol/L      Calcium 8.7 mg/dL      Total Protein 6.5 g/dL      Albumin 3.6 g/dL      ALT (SGPT) 20 U/L      AST (SGOT) 23 U/L      Alkaline Phosphatase 138 U/L      Total Bilirubin 0.3 mg/dL      Globulin 2.9 gm/dL      A/G Ratio 1.2 g/dL      BUN/Creatinine Ratio 7.4     Anion Gap 8.0 mmol/L      eGFR 80.7 mL/min/1.73     Narrative:      GFR Normal >60  Chronic Kidney Disease <60  Kidney Failure <15      Lipase [[id number]]  (Normal) Collected: 05/03/23 1927    Specimen: Blood Updated: 05/03/23 1952     Lipase 23 U/L     Lactic Acid, Plasma [[id number]]  (Normal) Collected: 05/03/23 1927    Specimen: Blood Updated: 05/03/23 1953     Lactate 1.0 mmol/L     Procalcitonin [[id number]]  (Abnormal) Collected: 05/03/23 1927    Specimen: Blood Updated: 05/03/23 2003     Procalcitonin 1.13 ng/mL     Narrative:      As a Marker for Sepsis (Non-Neonates):    1. <0.5 ng/mL represents a low risk of severe sepsis and/or septic shock.  2. >2 ng/mL represents a high risk of severe sepsis and/or septic shock.    As a Marker for Lower Respiratory Tract Infections that require antibiotic therapy:    PCT on Admission    Antibiotic Therapy       6-12 Hrs later    >0.5                 "Strongly Recommended  >0.25 - <0.5        Recommended   0.1 - 0.25          Discouraged              Remeasure/reassess PCT  <0.1                Strongly Discouraged     Remeasure/reassess PCT    As 28 day mortality risk marker: \"Change in Procalcitonin Result\" (>80% or <=80%) if Day 0 (or Day 1) and Day 4 values are available. Refer to http://www.[facility]-pct-calculator.com    Change in PCT <=80%  A decrease of PCT levels below or equal to 80% defines a positive change in PCT test result representing a higher risk for 28-day all-cause mortality of patients diagnosed with severe sepsis for septic shock.    Change in PCT >80%  A decrease of PCT levels of more than 80% defines a negative change in PCT result representing a lower risk for 28-day all-cause mortality of patients diagnosed with severe sepsis or septic shock.       Magnesium [[id number]]  (Normal) Collected: 05/03/23 1927    Specimen: Blood Updated: 05/03/23 1953     Magnesium 1.8 mg/dL     CBC Auto Differential [[id number]]  (Abnormal) Collected: 05/03/23 1927    Specimen: Blood Updated: 05/03/23 1938     WBC 20.46 10*3/mm3      RBC 4.28 10*6/mm3      Hemoglobin 12.1 g/dL      Hematocrit 38.1 %      MCV 89.0 fL      MCH 28.3 pg      MCHC 31.8 g/dL      RDW 14.0 %      RDW-SD 45.2 fl      MPV 9.9 fL      Platelets 361 10*3/mm3      Neutrophil % 86.6 %      Lymphocyte % 5.9 %      Monocyte % 5.8 %      Eosinophil % 0.5 %      Basophil % 0.4 %      Immature Grans % 0.8 %      Neutrophils, Absolute 17.72 10*3/mm3      Lymphocytes, Absolute 1.20 10*3/mm3      Monocytes, Absolute 1.19 10*3/mm3      Eosinophils, Absolute 0.10 10*3/mm3      Basophils, Absolute 0.09 10*3/mm3      Immature Grans, Absolute 0.16 10*3/mm3      nRBC 0.0 /100 WBC           CT Abdomen Pelvis With Contrast   Final Result       1.  A few areas of corticomedullary differentiation blurring in both   kidneys are present, concerning for bilateral pyelonephritis. Correlate   with urinalysis and " physical exam.       2.  Short interval increase in size a 6 cm exophytic LEFT upper pole   renal lesion with new internal dense components/debris. Suspect this   represents acute hemorrhage into a renal cyst. Also in the differential,   superimposed infection within a LEFT renal cyst.       3.  Chronic dilatation of the extrahepatic biliary tree, slightly   increased in caliber from prior exam. This may represent reservoir   phenomenon given prior cholecystectomy but if there is concern for   biliary obstruction, MRCP would be recommended.   This report was finalized on 05/03/2023 20:40 by Dr. Eddie Alicia MD.          Medical Decision Making  67-year-old female was found to be having worsening pain in her flank.  Patient was being treated outpatient for urinary tract infection.  Patient was tachycardic.  Patient has had a fever.  Patient was found to be septic on the work-up.  Patient was given IV cefepime here in the emergency room.  Patient was given IV fluids.  Patient will be admitted to the hospitalist service.  I spoke with Dr. Clinton who has accepted the patient under his services.    Pyelonephritis: acute illness or injury  Sepsis, due to unspecified organism, unspecified whether acute organ dysfunction present: acute illness or injury  Amount and/or Complexity of Data Reviewed  Labs: ordered. Decision-making details documented in ED Course.  Radiology: ordered. Decision-making details documented in ED Course.      Risk  Prescription drug management.  Decision regarding hospitalization.          Final diagnoses:   Sepsis, due to unspecified organism, unspecified whether acute organ dysfunction present   Pyelonephritis       ED Disposition  ED Disposition     ED Disposition   Decision to Admit    Condition   --    Comment   Level of Care: Remote Telemetry [26]   Diagnosis: Sepsis, due to unspecified organism, unspecified whether acute organ dysfunction present [[id number]]   Admitting Physician: ODALIS  JEAN PIERRE FRANZ [[id number]]   Attending Physician: JEAN PIERRE JACOBO [[id number]]   Certification: I Certify That Inpatient Hospital Services Are Medically Necessary For Greater Than 2 Midnights               No follow-up provider specified.       Medication List      No changes were made to your prescriptions during this visit.          Marquise Cortez MD  05/03/23 8506

## 2023-05-03 NOTE — TELEPHONE ENCOUNTER
If fever with uti needs to go to er. Patient can become septic and very dangerously ill if fever is occurring with uti. She will need more evaluation than what we can offer in clinic and treatment needs to take place asap to prevent complication.

## 2023-05-04 PROBLEM — B96.20 E. COLI UTI (URINARY TRACT INFECTION): Status: ACTIVE | Noted: 2023-05-04

## 2023-05-04 PROBLEM — N39.0 E. COLI UTI (URINARY TRACT INFECTION): Status: ACTIVE | Noted: 2023-05-04

## 2023-05-04 PROBLEM — N10 ACUTE PYELONEPHRITIS: Status: ACTIVE | Noted: 2023-05-04

## 2023-05-04 PROBLEM — E87.6 HYPOKALEMIA: Status: ACTIVE | Noted: 2023-05-04

## 2023-05-04 PROBLEM — N28.9 RENAL LESION: Status: ACTIVE | Noted: 2023-05-04

## 2023-05-04 LAB
ANION GAP SERPL CALCULATED.3IONS-SCNC: 7 MMOL/L (ref 5–15)
BASOPHILS # BLD AUTO: 0.08 10*3/MM3 (ref 0–0.2)
BASOPHILS NFR BLD AUTO: 0.4 % (ref 0–1.5)
BUN SERPL-MCNC: 7 MG/DL (ref 8–23)
BUN/CREAT SERPL: 7.6 (ref 7–25)
CALCIUM SPEC-SCNC: 7.7 MG/DL (ref 8.6–10.5)
CHLORIDE SERPL-SCNC: 101 MMOL/L (ref 98–107)
CO2 SERPL-SCNC: 29 MMOL/L (ref 22–29)
CREAT SERPL-MCNC: 0.92 MG/DL (ref 0.57–1)
DEPRECATED RDW RBC AUTO: 47.9 FL (ref 37–54)
EGFRCR SERPLBLD CKD-EPI 2021: 69.2 ML/MIN/1.73
EOSINOPHIL # BLD AUTO: 0.13 10*3/MM3 (ref 0–0.4)
EOSINOPHIL NFR BLD AUTO: 0.6 % (ref 0.3–6.2)
ERYTHROCYTE [DISTWIDTH] IN BLOOD BY AUTOMATED COUNT: 14 % (ref 12.3–15.4)
GLUCOSE BLDC GLUCOMTR-MCNC: 110 MG/DL (ref 70–130)
GLUCOSE SERPL-MCNC: 107 MG/DL (ref 65–99)
HCT VFR BLD AUTO: 33.9 % (ref 34–46.6)
HGB BLD-MCNC: 10.4 G/DL (ref 12–15.9)
IMM GRANULOCYTES # BLD AUTO: 0.23 10*3/MM3 (ref 0–0.05)
IMM GRANULOCYTES NFR BLD AUTO: 1 % (ref 0–0.5)
LYMPHOCYTES # BLD AUTO: 1.87 10*3/MM3 (ref 0.7–3.1)
LYMPHOCYTES NFR BLD AUTO: 8.5 % (ref 19.6–45.3)
MCH RBC QN AUTO: 28.3 PG (ref 26.6–33)
MCHC RBC AUTO-ENTMCNC: 30.7 G/DL (ref 31.5–35.7)
MCV RBC AUTO: 92.4 FL (ref 79–97)
MONOCYTES # BLD AUTO: 1.64 10*3/MM3 (ref 0.1–0.9)
MONOCYTES NFR BLD AUTO: 7.5 % (ref 5–12)
NEUTROPHILS NFR BLD AUTO: 18.04 10*3/MM3 (ref 1.7–7)
NEUTROPHILS NFR BLD AUTO: 82 % (ref 42.7–76)
NRBC BLD AUTO-RTO: 0 /100 WBC (ref 0–0.2)
PLATELET # BLD AUTO: 310 10*3/MM3 (ref 140–450)
PMV BLD AUTO: 10.3 FL (ref 6–12)
POTASSIUM SERPL-SCNC: 3 MMOL/L (ref 3.5–5.2)
RBC # BLD AUTO: 3.67 10*6/MM3 (ref 3.77–5.28)
SODIUM SERPL-SCNC: 137 MMOL/L (ref 136–145)
WBC NRBC COR # BLD: 21.99 10*3/MM3 (ref 3.4–10.8)

## 2023-05-04 PROCEDURE — 85025 COMPLETE CBC W/AUTO DIFF WBC: CPT | Performed by: INTERNAL MEDICINE

## 2023-05-04 PROCEDURE — 25010000002 SODIUM CHLORIDE 0.9 % WITH KCL 20 MEQ 20-0.9 MEQ/L-% SOLUTION: Performed by: NURSE PRACTITIONER

## 2023-05-04 PROCEDURE — 82948 REAGENT STRIP/BLOOD GLUCOSE: CPT

## 2023-05-04 PROCEDURE — 93005 ELECTROCARDIOGRAM TRACING: CPT | Performed by: INTERNAL MEDICINE

## 2023-05-04 PROCEDURE — 0 HYDROMORPHONE 1 MG/ML SOLUTION: Performed by: INTERNAL MEDICINE

## 2023-05-04 PROCEDURE — 94640 AIRWAY INHALATION TREATMENT: CPT

## 2023-05-04 PROCEDURE — 94799 UNLISTED PULMONARY SVC/PX: CPT

## 2023-05-04 PROCEDURE — 93010 ELECTROCARDIOGRAM REPORT: CPT | Performed by: INTERNAL MEDICINE

## 2023-05-04 PROCEDURE — 25010000002 CEFTRIAXONE PER 250 MG: Performed by: NURSE PRACTITIONER

## 2023-05-04 PROCEDURE — 80048 BASIC METABOLIC PNL TOTAL CA: CPT | Performed by: INTERNAL MEDICINE

## 2023-05-04 PROCEDURE — 25010000002 KETOROLAC TROMETHAMINE PER 15 MG: Performed by: INTERNAL MEDICINE

## 2023-05-04 PROCEDURE — 99222 1ST HOSP IP/OBS MODERATE 55: CPT | Performed by: UROLOGY

## 2023-05-04 PROCEDURE — 25010000002 ENOXAPARIN PER 10 MG: Performed by: INTERNAL MEDICINE

## 2023-05-04 RX ORDER — POTASSIUM CHLORIDE 750 MG/1
40 CAPSULE, EXTENDED RELEASE ORAL ONCE
Status: COMPLETED | OUTPATIENT
Start: 2023-05-04 | End: 2023-05-04

## 2023-05-04 RX ORDER — ALBUTEROL SULFATE 90 UG/1
2 AEROSOL, METERED RESPIRATORY (INHALATION) EVERY 4 HOURS PRN
COMMUNITY

## 2023-05-04 RX ORDER — KETOROLAC TROMETHAMINE 30 MG/ML
30 INJECTION, SOLUTION INTRAMUSCULAR; INTRAVENOUS ONCE
Status: COMPLETED | OUTPATIENT
Start: 2023-05-04 | End: 2023-05-04

## 2023-05-04 RX ORDER — SODIUM CHLORIDE AND POTASSIUM CHLORIDE 150; 900 MG/100ML; MG/100ML
75 INJECTION, SOLUTION INTRAVENOUS CONTINUOUS
Status: DISCONTINUED | OUTPATIENT
Start: 2023-05-04 | End: 2023-05-06

## 2023-05-04 RX ORDER — ENOXAPARIN SODIUM 100 MG/ML
40 INJECTION SUBCUTANEOUS
Status: DISCONTINUED | OUTPATIENT
Start: 2023-05-04 | End: 2023-05-04

## 2023-05-04 RX ORDER — ACETAMINOPHEN 325 MG/1
650 TABLET ORAL EVERY 6 HOURS PRN
Status: DISCONTINUED | OUTPATIENT
Start: 2023-05-04 | End: 2023-05-07 | Stop reason: HOSPADM

## 2023-05-04 RX ORDER — METOPROLOL TARTRATE 5 MG/5ML
5 INJECTION INTRAVENOUS EVERY 8 HOURS
Status: DISCONTINUED | OUTPATIENT
Start: 2023-05-04 | End: 2023-05-06

## 2023-05-04 RX ORDER — ASCORBIC ACID 500 MG
500 TABLET ORAL DAILY
COMMUNITY

## 2023-05-04 RX ORDER — MELATONIN
1000 DAILY
COMMUNITY

## 2023-05-04 RX ADMIN — POTASSIUM CHLORIDE 40 MEQ: 10 CAPSULE, COATED, EXTENDED RELEASE ORAL at 06:06

## 2023-05-04 RX ADMIN — DICYCLOMINE HYDROCHLORIDE 20 MG: 20 TABLET ORAL at 00:12

## 2023-05-04 RX ADMIN — GABAPENTIN 600 MG: 300 CAPSULE ORAL at 20:52

## 2023-05-04 RX ADMIN — POTASSIUM CHLORIDE AND SODIUM CHLORIDE 75 ML/HR: 900; 150 INJECTION, SOLUTION INTRAVENOUS at 17:56

## 2023-05-04 RX ADMIN — GABAPENTIN 600 MG: 300 CAPSULE ORAL at 00:03

## 2023-05-04 RX ADMIN — HYDROMORPHONE HYDROCHLORIDE 1 MG: 1 INJECTION, SOLUTION INTRAMUSCULAR; INTRAVENOUS; SUBCUTANEOUS at 03:57

## 2023-05-04 RX ADMIN — HYDROCODONE BITARTRATE AND ACETAMINOPHEN 1 TABLET: 10; 325 TABLET ORAL at 05:43

## 2023-05-04 RX ADMIN — IPRATROPIUM BROMIDE AND ALBUTEROL SULFATE 3 ML: .5; 3 SOLUTION RESPIRATORY (INHALATION) at 14:40

## 2023-05-04 RX ADMIN — MIRTAZAPINE 15 MG: 15 TABLET, FILM COATED ORAL at 20:53

## 2023-05-04 RX ADMIN — HYDROCODONE BITARTRATE AND ACETAMINOPHEN 1 TABLET: 10; 325 TABLET ORAL at 00:03

## 2023-05-04 RX ADMIN — METOPROLOL TARTRATE 5 MG: 5 INJECTION INTRAVENOUS at 17:51

## 2023-05-04 RX ADMIN — CEFTRIAXONE SODIUM 2 G: 2 INJECTION, POWDER, FOR SOLUTION INTRAMUSCULAR; INTRAVENOUS at 09:20

## 2023-05-04 RX ADMIN — IPRATROPIUM BROMIDE AND ALBUTEROL SULFATE 3 ML: .5; 3 SOLUTION RESPIRATORY (INHALATION) at 10:46

## 2023-05-04 RX ADMIN — DICYCLOMINE HYDROCHLORIDE 20 MG: 20 TABLET ORAL at 20:53

## 2023-05-04 RX ADMIN — LEVOTHYROXINE SODIUM 112 MCG: 112 TABLET ORAL at 05:29

## 2023-05-04 RX ADMIN — MODAFINIL 100 MG: 100 TABLET ORAL at 09:20

## 2023-05-04 RX ADMIN — TIZANIDINE 4 MG: 4 TABLET ORAL at 09:19

## 2023-05-04 RX ADMIN — HYDROCODONE BITARTRATE AND ACETAMINOPHEN 1 TABLET: 10; 325 TABLET ORAL at 11:33

## 2023-05-04 RX ADMIN — GABAPENTIN 600 MG: 300 CAPSULE ORAL at 09:20

## 2023-05-04 RX ADMIN — MIRTAZAPINE 15 MG: 15 TABLET, FILM COATED ORAL at 00:12

## 2023-05-04 RX ADMIN — HYDROMORPHONE HYDROCHLORIDE 1 MG: 1 INJECTION, SOLUTION INTRAMUSCULAR; INTRAVENOUS; SUBCUTANEOUS at 20:52

## 2023-05-04 RX ADMIN — DICYCLOMINE HYDROCHLORIDE 20 MG: 20 TABLET ORAL at 09:19

## 2023-05-04 RX ADMIN — HYDROMORPHONE HYDROCHLORIDE 1 MG: 1 INJECTION, SOLUTION INTRAMUSCULAR; INTRAVENOUS; SUBCUTANEOUS at 12:58

## 2023-05-04 RX ADMIN — POTASSIUM CHLORIDE 40 MEQ: 10 CAPSULE, COATED, EXTENDED RELEASE ORAL at 09:19

## 2023-05-04 RX ADMIN — TIZANIDINE 4 MG: 4 TABLET ORAL at 00:03

## 2023-05-04 RX ADMIN — HYDROMORPHONE HYDROCHLORIDE 1 MG: 1 INJECTION, SOLUTION INTRAMUSCULAR; INTRAVENOUS; SUBCUTANEOUS at 16:58

## 2023-05-04 RX ADMIN — DICYCLOMINE HYDROCHLORIDE 20 MG: 20 TABLET ORAL at 12:15

## 2023-05-04 RX ADMIN — KETOROLAC TROMETHAMINE 30 MG: 30 INJECTION, SOLUTION INTRAMUSCULAR; INTRAVENOUS at 12:15

## 2023-05-04 RX ADMIN — ROPINIROLE HYDROCHLORIDE 0.25 MG: 0.25 TABLET, FILM COATED ORAL at 20:52

## 2023-05-04 RX ADMIN — TIZANIDINE 4 MG: 4 TABLET ORAL at 20:53

## 2023-05-04 RX ADMIN — HYDROMORPHONE HYDROCHLORIDE 1 MG: 1 INJECTION, SOLUTION INTRAMUSCULAR; INTRAVENOUS; SUBCUTANEOUS at 08:01

## 2023-05-04 RX ADMIN — ROPINIROLE HYDROCHLORIDE 0.25 MG: 0.25 TABLET, FILM COATED ORAL at 00:12

## 2023-05-04 RX ADMIN — ENOXAPARIN SODIUM 40 MG: 100 INJECTION SUBCUTANEOUS at 09:20

## 2023-05-04 RX ADMIN — DICYCLOMINE HYDROCHLORIDE 20 MG: 20 TABLET ORAL at 17:00

## 2023-05-04 RX ADMIN — POTASSIUM CHLORIDE AND SODIUM CHLORIDE 75 ML/HR: 900; 150 INJECTION, SOLUTION INTRAVENOUS at 06:06

## 2023-05-04 NOTE — NURSING NOTE
Notified Dr. Medina of patient heart rate, 166. BP 99/54, O2 97% on 2L, and patient being diaphoretic. Patient A&Ox4, NAD noted. Patient states she does become diaphoretic at home at times. No new orders at this time.

## 2023-05-04 NOTE — H&P
[facility] Medicine Services  HISTORY AND PHYSICAL    Date of Admission: 5/3/2023  Primary Care Physician: Padmaja Bermudez APRN      Subjective   Primary Historian: Patient    Chief Complaint: Bilateral flank pain and fever      History of Present Illness  Patient is a 67-year-old female with medical history of COPD, chronic pain, hypothyroidism, presents to the emergency room with complaints of worsening bilateral flank pain and subjective fevers and chills. Patient reports that she was seen in the ED last week for fever and dysuria. She was diagnosed with UTI and prescribed p.o. antibiotics to take at home.  Over the past few days patient has been feeling worse with subjective fever of 101.3, worsening bilateral flank pain and nausea but no vomiting. Daughter at bedside reported patient have been diagnosed with renal cyst/mass and was supposed to be followed up closely with imaging studies. Patient has not had any repeat imaging studies in a number of months. Due to her worsening flank pain and fever she was brought to the ED for evaluation. Patient had a fever 100.1 °F on ED presentation, had grossly abnormal UA with CT imaging concerning for bilateral pyelonephritis. There is also finding of a 6 cm exophytic lesion in left upper pole renal lesion. Patient is admitted for further medical eval and management.        Review of Systems   Otherwise complete ROS reviewed and negative except as mentioned in the HPI.        Past Medical History:   Past Medical History:   Diagnosis Date   • Back pain    • COPD (chronic obstructive pulmonary disease)    • Dependence on supplemental oxygen    • Disease of thyroid gland    • History of degenerative disc disease    • Restless leg    • Scoliosis      Past Surgical History:  Past Surgical History:   Procedure Laterality Date   • CHOLECYSTECTOMY     • TUBAL ABDOMINAL LIGATION       Social History:  reports that she quit smoking about 15  years ago. Her smoking use included cigarettes. She has a 40.00 pack-year smoking history. She has never used smokeless tobacco. Alcohol use questions deferred to the physician. Drug use questions deferred to the physician.    Family History: Noncontributory    Allergies:  Allergies   Allergen Reactions   • Codeine Shortness Of Breath       Medications:  Prior to Admission medications    Medication Sig Start Date End Date Taking? Authorizing Provider   albuterol (PROVENTIL) (2.5 MG/3ML) 0.083% nebulizer solution Take 2.5 mg by nebulization Every 4 (Four) Hours As Needed for Wheezing. 10/3/22   Padmaja Bermudez APRN   Budeson-Glycopyrrol-Formoterol (Breztri Aerosphere) 160-9-4.8 MCG/ACT aerosol inhaler Inhale 2 puffs 2 (Two) Times a Day. 4/19/23   Padmaja Bermudez APRN   ciprofloxacin (CIPRO) 500 MG tablet Take 1 tablet by mouth 2 (Two) Times a Day for 7 days. 4/29/23 5/6/23  Tree Moreno PA-C   citalopram (CeleXA) 40 MG tablet Take 1 tablet by mouth Daily. 4/19/23   Padmaja Bermudez APRN   gabapentin (NEURONTIN) 600 MG tablet Take 1 tablet by mouth Every 12 (Twelve) Hours. 4/4/23   Emmanuel Basurto MD   HYDROcodone-acetaminophen (NORCO)  MG per tablet TAKE (1) TABLET THREE TIMES DAILY AS NEEDED FOR PAIN 7/10/21   Emmanuel Basurto MD   ipratropium-albuterol (DUO-NEB) 0.5-2.5 mg/3 ml nebulizer Take 3 mL by nebulization Every 4 (Four) Hours As Needed for Wheezing or Shortness of Air. 1/25/23   LaraPadmaja horton APRÁNGELA   levothyroxine (SYNTHROID, LEVOTHROID) 112 MCG tablet Take 1 tablet by mouth Daily. 4/19/23   Padmaja Bermudez APRÁNGELA   mirtazapine (REMERON) 15 MG tablet Take 1 tablet by mouth every night at bedtime. 4/19/23   Padmaja Bermudez APRN   modafinil (Provigil) 100 MG tablet Take 1 tablet by mouth Daily. 4/19/23   Padmaja Bermudez APRN   O2 (OXYGEN) Inhale 3 L/min Daily As Needed.    Provider, MD Emmanuel   ondansetron ODT (ZOFRAN-ODT) 4 MG disintegrating tablet Place 1  "tablet on the tongue Every 6 (Six) Hours As Needed for Nausea. 4/29/23   Tree Moreno PA-C   rizatriptan (MAXALT) 10 MG tablet TAKE 1 TABLET BY MOUTH ONCE AS NEEDED FOR MIGRAINE MAY REPEAT IN 2 HOURS IF NEEDED 7/1/21   ProviderEmmanuel MD   rOPINIRole (REQUIP) 0.25 MG tablet Take 1 tablet by mouth every night at bedtime. 4/19/23   Padmaja Bermudez APRN   tiZANidine (ZANAFLEX) 4 MG tablet Take 1 tablet by mouth 2 (Two) Times a Day. 7/10/21   ProviderEmmanuel MD   Ventolin  (90 Base) MCG/ACT inhaler Inhale 2 puffs Every 4 (Four) Hours As Needed for Wheezing. 4/25/23   Padmaja Bermudez APRN     I have utilized all available immediate resources to obtain, update, or review the patient's current medications (including all prescriptions, over-the-counter products, herbals, cannabis/cannabidiol products, and vitamin/mineral/dietary (nutritional) supplements).    Objective     Vital Signs: /72 (BP Location: Left arm, Patient Position: Lying)   Pulse 101   Temp 99.7 °F (37.6 °C) (Oral)   Resp 22   Ht 170.2 cm (67.01\")   Wt 63.5 kg (140 lb)   SpO2 90%   BMI 21.92 kg/m²   Physical Exam:   Temp:  [99.7 °F (37.6 °C)-100.1 °F (37.8 °C)] 99.8 °F (37.7 °C)  Heart Rate:  [] 93  Resp:  [18-24] 18  BP: (109-127)/(58-80) 124/71  Physical Exam  General: NC/AT, appears stated age, alert and oriented x3  HEENT: PERRLA, EOMI, no scleral icterus, no conjunctival injection,   NECK:  Neck is supple, no JVD, no supraclavicular lymphadenopathy  CV: S1 S2 RRR, no murmurs, no gallops, no heaves, pulses palpable.  LUNG: CTA, no wheezing crackles or rhonchi  ABD: Nondistended, nontender, bowel sounds present, no guarding or rebound, organomegaly not appreciated.  Bilateral flank pain  EXT: FROM, strength is intact, no pitting edema, no joint effusion, no calf tenderness.  Pulses palpable  Neuro: CN 2-12, grossly intact,no  focal weakness appreciated  Skin: Warm and dry, no rash or lesions.      Results " "Reviewed:  Lab Results (last 24 hours)     Procedure Component Value Units Date/Time    Blood Culture - Blood, Arm, Right [[id number]] Collected: 05/03/23 2125    Specimen: Blood from Arm, Right Updated: 05/03/23 2144    Blood Culture - Blood, Arm, Left [[id number]] Collected: 05/03/23 2119    Specimen: Blood from Arm, Left Updated: 05/03/23 2143    Procalcitonin [[id number]]  (Abnormal) Collected: 05/03/23 1927    Specimen: Blood Updated: 05/03/23 2003     Procalcitonin 1.13 ng/mL     Narrative:      As a Marker for Sepsis (Non-Neonates):    1. <0.5 ng/mL represents a low risk of severe sepsis and/or septic shock.  2. >2 ng/mL represents a high risk of severe sepsis and/or septic shock.    As a Marker for Lower Respiratory Tract Infections that require antibiotic therapy:    PCT on Admission    Antibiotic Therapy       6-12 Hrs later    >0.5                Strongly Recommended  >0.25 - <0.5        Recommended   0.1 - 0.25          Discouraged              Remeasure/reassess PCT  <0.1                Strongly Discouraged     Remeasure/reassess PCT    As 28 day mortality risk marker: \"Change in Procalcitonin Result\" (>80% or <=80%) if Day 0 (or Day 1) and Day 4 values are available. Refer to http://[email or web address]-pct-calculator.com    Change in PCT <=80%  A decrease of PCT levels below or equal to 80% defines a positive change in PCT test result representing a higher risk for 28-day all-cause mortality of patients diagnosed with severe sepsis for septic shock.    Change in PCT >80%  A decrease of PCT levels of more than 80% defines a negative change in PCT result representing a lower risk for 28-day all-cause mortality of patients diagnosed with severe sepsis or septic shock.       Comprehensive Metabolic Panel [[id number]]  (Abnormal) Collected: 05/03/23 1927    Specimen: Blood Updated: 05/03/23 1956     Glucose 95 mg/dL      BUN 6 mg/dL      Creatinine 0.81 mg/dL      Sodium 138 mmol/L      Potassium 3.2 mmol/L      " Chloride 97 mmol/L      CO2 33.0 mmol/L      Calcium 8.7 mg/dL      Total Protein 6.5 g/dL      Albumin 3.6 g/dL      ALT (SGPT) 20 U/L      AST (SGOT) 23 U/L      Alkaline Phosphatase 138 U/L      Total Bilirubin 0.3 mg/dL      Globulin 2.9 gm/dL      A/G Ratio 1.2 g/dL      BUN/Creatinine Ratio 7.4     Anion Gap 8.0 mmol/L      eGFR 80.7 mL/min/1.73     Narrative:      GFR Normal >60  Chronic Kidney Disease <60  Kidney Failure <15      Magnesium [[id number]]  (Normal) Collected: 05/03/23 1927    Specimen: Blood Updated: 05/03/23 1953     Magnesium 1.8 mg/dL     Lactic Acid, Plasma [[id number]]  (Normal) Collected: 05/03/23 1927    Specimen: Blood Updated: 05/03/23 1953     Lactate 1.0 mmol/L     Lipase [[id number]]  (Normal) Collected: 05/03/23 1927    Specimen: Blood Updated: 05/03/23 1952     Lipase 23 U/L     Urinalysis, Microscopic Only - Urine, Clean Catch [[id number]]  (Abnormal) Collected: 05/03/23 1924    Specimen: Urine, Clean Catch Updated: 05/03/23 1941     RBC, UA 3-5 /HPF      WBC, UA 13-20 /HPF      Bacteria, UA None Seen /HPF      Squamous Epithelial Cells, UA 0-2 /HPF      Hyaline Casts, UA 3-6 /LPF      Methodology Automated Microscopy    Urinalysis With Culture If Indicated - Urine, Clean Catch [[id number]]  (Abnormal) Collected: 05/03/23 1924    Specimen: Urine, Clean Catch Updated: 05/03/23 1941     Color, UA Yellow     Appearance, UA Clear     pH, UA 5.5     Specific Gravity, UA 1.013     Glucose, UA Negative     Ketones, UA Negative     Bilirubin, UA Negative     Blood, UA Trace     Protein, UA 30 mg/dL (1+)     Leuk Esterase, UA Trace     Nitrite, UA Negative     Urobilinogen, UA 0.2 E.U./dL    Narrative:      In absence of clinical symptoms, the presence of pyuria, bacteria, and/or nitrites on the urinalysis result does not correlate with infection.    Urine Culture - Urine, Urine, Clean Catch [[id number]] Collected: 05/03/23 1924    Specimen: Urine, Clean Catch Updated: 05/03/23 1941    CBC &  Differential [[id number]]  (Abnormal) Collected: 05/03/23 1927    Specimen: Blood Updated: 05/03/23 1938    Narrative:      The following orders were created for panel order CBC & Differential.  Procedure                               Abnormality         Status                     ---------                               -----------         ------                     CBC Auto Differential[[id number]]        Abnormal            Final result                 Please view results for these tests on the individual orders.    CBC Auto Differential [[id number]]  (Abnormal) Collected: 05/03/23 1927    Specimen: Blood Updated: 05/03/23 1938     WBC 20.46 10*3/mm3      RBC 4.28 10*6/mm3      Hemoglobin 12.1 g/dL      Hematocrit 38.1 %      MCV 89.0 fL      MCH 28.3 pg      MCHC 31.8 g/dL      RDW 14.0 %      RDW-SD 45.2 fl      MPV 9.9 fL      Platelets 361 10*3/mm3      Neutrophil % 86.6 %      Lymphocyte % 5.9 %      Monocyte % 5.8 %      Eosinophil % 0.5 %      Basophil % 0.4 %      Immature Grans % 0.8 %      Neutrophils, Absolute 17.72 10*3/mm3      Lymphocytes, Absolute 1.20 10*3/mm3      Monocytes, Absolute 1.19 10*3/mm3      Eosinophils, Absolute 0.10 10*3/mm3      Basophils, Absolute 0.09 10*3/mm3      Immature Grans, Absolute 0.16 10*3/mm3      nRBC 0.0 /100 WBC         Imaging Results (Last 24 Hours)     Procedure Component Value Units Date/Time    CT Abdomen Pelvis With Contrast [[id number]] Collected: 05/03/23 2030     Updated: 05/03/23 2043    Narrative:      EXAM/TECHNIQUE: CT abdomen pelvis with IV contrast     INDICATION: Left-sided abdominal pain     COMPARISON: 04/28/2023     DLP: 235 mGy cm. Automated exposure control was also utilized to  decrease patient radiation dose.     FINDINGS:     Chronic linear opacities in the RIGHT lung base which may be related to  atelectasis or parenchymal scarring.     No suspicious liver lesion. Prior cholecystectomy. Chronic dilatation of  the common hepatic duct and common  bile duct, likely related to  reservoir phenomenon. Pancreas, spleen, and adrenal glands are  unremarkable.     Areas of renal corticomedullary differentiation blurring are present  bilaterally. There is bilateral urothelial thickening in the renal  pelves and ureters. Multiple small bilateral renal cysts. A 6 cm  exophytic LEFT upper pole renal lesion has increased in size (previously  5 cm) and now contains internal heterogeneous content/debris. No focal  urinary bladder wall thickening.     No colonic wall thickening or pericolonic fat stranding. Normal  appendix. No abnormal small bowel distention or evidence of active small  bowel inflammation.     No ascites or free pelvic fluid. No pelvic mass or pelvic collection.  Uterus and adnexa appear unremarkable.     Nonaneurysmal atherosclerotic abdominal aorta. No enlarged  retroperitoneal, mesenteric, pelvic, or inguinal lymph nodes.     No acute abdominal wall soft tissue abnormality. Multilevel lumbar spine  degenerative change. No acute osseous finding.       Impression:         1.  A few areas of corticomedullary differentiation blurring in both  kidneys are present, concerning for bilateral pyelonephritis. Correlate  with urinalysis and physical exam.     2.  Short interval increase in size a 6 cm exophytic LEFT upper pole  renal lesion with new internal dense components/debris. Suspect this  represents acute hemorrhage into a renal cyst. Also in the differential,  superimposed infection within a LEFT renal cyst.     3.  Chronic dilatation of the extrahepatic biliary tree, slightly  increased in caliber from prior exam. This may represent reservoir  phenomenon given prior cholecystectomy but if there is concern for  biliary obstruction, MRCP would be recommended.  This report was finalized on 05/03/2023 20:40 by Dr. Eddie Alicia MD.        I have personally reviewed and interpreted the radiology studies and ECG obtained at time of admission.     Assessment /  Plan   Assessment and plan:   67-year-old female with history of COPD, chronic back pain, hypothyroidism, presenting with worsening bilateral flank pain, subjective fevers and chills, and found to have bilateral pyelonephritis with poor and failed outpatient management of UTI.  She also has finding of renal lesion needing further evaluation.  Patient admitted for further medical care.        Problem list:  Active Hospital Problems    Diagnosis    • **Sepsis, due to unspecified organism, unspecified whether acute organ dysfunction present    Bilateral pyelonephritis  Exophytic renal lesion        Treatment Plan  The patient will be admitted to my service here at [facility].  -Admit to inpatient  - IV hydration  - Blood cultures obtained  - Patient received cefepime in the ED, continue antibiotic coverage with Rocephin.  - Obtain MRI of the abdomen for further renal lesion evaluation and malignancy rule out.  - Pain control  - Review other home medications, continued those appropriate for management of other comorbid conditions.      Patient is a full code  Time spent on admission greater than 30 minutes.  Anticipated to stay greater than 2 nights.      Medical Decision Making  As discussed above      Conditions and Status  Patient is clinically stable     MDM Data  External documents reviewed: As discussed above  Cardiac tracing (EKG, telemetry) interpretation: As discussed above  Radiology interpretation: As discussed above  Labs reviewed: As discussed above         Care Planning  Patient is a full code      Disposition  Patient can discharge home after resolution of acute condition and returned to baseline.  No  needed identified at this time.            Electronically signed by Pat Clinton MD, 05/03/23, 23:38 CDT.

## 2023-05-04 NOTE — PLAN OF CARE
Goal Outcome Evaluation:   Patient A&OX4. PRN pain meds given as ordered. VSS. Safety maintained. NAD noted. MD notified of increased heart rate this shift, no new orders.

## 2023-05-04 NOTE — PROGRESS NOTES
[facility] Medicine Services  INPATIENT PROGRESS NOTE    Patient Name: Ashley Gibbs  Date of Admission: 5/3/2023  Today's Date: 05/04/23  Length of Stay: 1  Primary Care Physician: Padmaja Bermudez APRN    Subjective   Chief Complaint: Fever, bilateral flank pain  HPI   Ms. Gibbs presented to ER 5/3/23 with worsening bilateral flank pain with subjective fever and chills.  Patient evaluated in ED on 4/28 for fever and dysuria.  She was diagnosed with urinary tract infection and discharged on oral Cipro.  Patient reported subjective fever 101.3, bilateral flank pain worse on the left associated with nausea but no vomiting, fever and chills..  She reported dysuria and frequency.  Family member reported patient had been diagnosed with renal cyst mass and was advised to have follow-up imaging studies but no repeat imaging studies in the last several months.  Procalcitonin 1.13, potassium 3.2, urinalysis 13-20 WBC, WBC 20.46.  CT abdomen pelvis noted a few areas of corticomedullary differential blurring in both kidneys concerning for bilateral pyelonephritis.  Short interval increase in size of 6 cm exophytic left upper pole renal lesion with new interval dense components [place].  Suspect acute hemorrhage into renal cyst.  Temperature 100.1, heart rate greater than 100.  WBC 20.46.  Normal saline fluid bolus, cefepime given in ER.    Sitting up on side of bed.  She reports fever and chills associate with nausea but no vomiting.  She reports burning and stinging with urination.  She reports left flank pain today.  She has a history of COPD but wears oxygen as needed.  Urine culture from 4/28 returned E. coli.  Increase Rocephin to 2 g IV every 24 hours.  Follow-up blood cultures.  Replace potassium.    Review of Systems   Constitutional: Positive for appetite change, chills, fatigue and fever.   HENT: Negative for congestion and trouble swallowing.    Eyes: Negative for photophobia  and visual disturbance.   Respiratory: Negative for cough, shortness of breath and wheezing.    Cardiovascular: Negative for chest pain, palpitations and leg swelling.   Gastrointestinal: Positive for nausea. Negative for diarrhea and vomiting.   Endocrine: Negative for cold intolerance, heat intolerance and polyuria.   Genitourinary: Positive for dysuria, flank pain (Left) and frequency.   Musculoskeletal: Negative for gait problem.   Skin: Negative for color change, pallor, rash and wound.   Allergic/Immunologic: Negative for immunocompromised state.   Neurological: Positive for weakness. Negative for light-headedness.   Hematological: Negative for adenopathy. Does not bruise/bleed easily.   Psychiatric/Behavioral: Negative for agitation, behavioral problems and confusion.      All pertinent negatives and positives are as above. All other systems have been reviewed and are negative unless otherwise stated.     Objective    Temp:  [98.5 °F (36.9 °C)-100.1 °F (37.8 °C)] 98.5 °F (36.9 °C)  Heart Rate:  [] 89  Resp:  [18-24] 18  BP: (102-127)/(54-80) 102/54  Physical Exam  Vitals and nursing note reviewed.   Constitutional:       Comments: Sitting up on side of bed.  No oxygen use.  No visitors in room.   HENT:      Head: Normocephalic.      Nose: No congestion.      Mouth/Throat:      Pharynx: Oropharynx is clear. No oropharyngeal exudate or posterior oropharyngeal erythema.   Eyes:      Extraocular Movements: Extraocular movements intact.      Pupils: Pupils are equal, round, and reactive to light.   Cardiovascular:      Rate and Rhythm: Normal rate and regular rhythm.      Heart sounds: No murmur heard.     Comments: Normal sinus rhythm 80 on telemetry.  Pulmonary:      Breath sounds: No rhonchi or rales.      Comments: No oxygen in use.  Abdominal:      Palpations: Abdomen is soft.      Tenderness: There is no abdominal tenderness.   Genitourinary:     Comments: Voiding.  Musculoskeletal:         General: No  swelling or tenderness.      Cervical back: Normal range of motion and neck supple.   Skin:     General: Skin is warm and dry.   Neurological:      General: No focal deficit present.      Mental Status: She is alert and oriented to person, place, and time.   Psychiatric:         Mood and Affect: Mood normal.         Behavior: Behavior normal.         Thought Content: Thought content normal.         Judgment: Judgment normal.       Results Review:  I have reviewed the labs, radiology results, and diagnostic studies.    Laboratory Data:   Results from last 7 days   Lab Units 05/04/23 0353 05/03/23 1927 04/28/23 2038   WBC 10*3/mm3 21.99* 20.46* 15.56*   HEMOGLOBIN g/dL 10.4* 12.1 12.3   HEMATOCRIT % 33.9* 38.1 39.8   PLATELETS 10*3/mm3 310 361 222     Results from last 7 days   Lab Units 05/04/23 0353 05/03/23 1927 04/28/23 2038   SODIUM mmol/L 137 138 138   POTASSIUM mmol/L 3.0* 3.2* 3.7   CHLORIDE mmol/L 101 97* 99   CO2 mmol/L 29.0 33.0* 27.0   BUN mg/dL 7* 6* 10   CREATININE mg/dL 0.92 0.81 0.87   CALCIUM mg/dL 7.7* 8.7 8.3*   BILIRUBIN mg/dL  --  0.3 0.6   ALK PHOS U/L  --  138* 159*   ALT (SGPT) U/L  --  20 35*   AST (SGOT) U/L  --  23 32   GLUCOSE mg/dL 107* 95 105*     Culture Data:   Blood Culture   Date Value Ref Range Status   04/28/2023 No growth at 5 days  Final     Urine Culture   Date Value Ref Range Status   04/28/2023 >100,000 CFU/mL Escherichia coli (A)  Final     Escherichia coli       [facility]     Ampicillin >=32  Resistant     Ampicillin + Sulbactam 4  Susceptible     Cefazolin <=4  Susceptible     Cefepime <=1  Susceptible     Ceftazidime <=1  Susceptible     Ceftriaxone <=1  Susceptible     Gentamicin <=1  Susceptible     Levofloxacin 1  Intermediate     Nitrofurantoin <=16  Susceptible     Piperacillin + Tazobactam <=4  Susceptible     Trimethoprim + Sulfamethoxazole >=320  Resistant                   Radiology Data:   Imaging Results (Last 24 Hours)     Procedure Component Value Units  Date/Time    CT Abdomen Pelvis With Contrast [[id number]] Collected: 05/03/23 2030     Updated: 05/03/23 2043    Narrative:      EXAM/TECHNIQUE: CT abdomen pelvis with IV contrast     INDICATION: Left-sided abdominal pain     COMPARISON: 04/28/2023     DLP: 235 mGy cm. Automated exposure control was also utilized to  decrease patient radiation dose.     FINDINGS:     Chronic linear opacities in the RIGHT lung base which may be related to  atelectasis or parenchymal scarring.     No suspicious liver lesion. Prior cholecystectomy. Chronic dilatation of  the common hepatic duct and common bile duct, likely related to  reservoir phenomenon. Pancreas, spleen, and adrenal glands are  unremarkable.     Areas of renal corticomedullary differentiation blurring are present  bilaterally. There is bilateral urothelial thickening in the renal  pelves and ureters. Multiple small bilateral renal cysts. A 6 cm  exophytic LEFT upper pole renal lesion has increased in size (previously  5 cm) and now contains internal heterogeneous content/debris. No focal  urinary bladder wall thickening.     No colonic wall thickening or pericolonic fat stranding. Normal  appendix. No abnormal small bowel distention or evidence of active small  bowel inflammation.     No ascites or free pelvic fluid. No pelvic mass or pelvic collection.  Uterus and adnexa appear unremarkable.     Nonaneurysmal atherosclerotic abdominal aorta. No enlarged  retroperitoneal, mesenteric, pelvic, or inguinal lymph nodes.     No acute abdominal wall soft tissue abnormality. Multilevel lumbar spine  degenerative change. No acute osseous finding.       Impression:         1.  A few areas of corticomedullary differentiation blurring in both  kidneys are present, concerning for bilateral pyelonephritis. Correlate  with urinalysis and physical exam.     2.  Short interval increase in size a 6 cm exophytic LEFT upper pole  renal lesion with new internal dense components/debris.  Suspect this  represents acute hemorrhage into a renal cyst. Also in the differential,  superimposed infection within a LEFT renal cyst.     3.  Chronic dilatation of the extrahepatic biliary tree, slightly  increased in caliber from prior exam. This may represent reservoir  phenomenon given prior cholecystectomy but if there is concern for  biliary obstruction, MRCP would be recommended.  This report was finalized on 05/03/2023 20:40 by Dr. Eddie Alicia MD.        Schedule medications:  cefTRIAXone, 2 g, Intravenous, Q24H  dicyclomine, 20 mg, Oral, 4x Daily  enoxaparin, 40 mg, Subcutaneous, Q24H  gabapentin, 600 mg, Oral, Q12H  levothyroxine, 112 mcg, Oral, Q AM  mirtazapine, 15 mg, Oral, Nightly  modafinil, 100 mg, Oral, Daily  potassium chloride, 40 mEq, Oral, Once  rOPINIRole, 0.25 mg, Oral, Nightly  tiZANidine, 4 mg, Oral, BID      I have reviewed the patient's current medications.     Assessment/Plan   Assessment  Active Hospital Problems    Diagnosis    • **Sepsis, due to unspecified organism, unspecified whether acute organ dysfunction present    • Acute bilateral pyelonephritis    • 6 cm exophytic left upper pole renal lesion    • Hypokalemia    • E. coli UTI, present on admission (urinary tract infection)    • Chronic obstructive pulmonary disease    • Hypothyroidism      Treatment Plan  Sepsis due to E. coli UTI, bilateral pyelonephritis present on admission.  Patient presented with fever, chills, dysuria, frequency.  Patient evaluated on 4/28 for UTI treated with Cipro.  Urine culture now positive for E. coli.  Temperature 100.1, heart rate greater than 90, WBC 20.46.  Normal saline fluid bolus given in ER.  Patient received cefepime in ER.  Rocephin ordered on admission.  Increase Rocephin to 2 g IV every 24 hours.  Follow-up blood cultures.  Repeat CBC in AM.    6 cm exophytic left upper pole renal lesion.  CT abdomen showed slight increase in size.  Consult urology.    Hypokalemia.  Potassium 3.2 on  admission.  Potassium 3 today.  Replace potassium 40 mEq x 2 doses.  BMP in AM.  Change IV fluids to normal saline with 20 mEq of potassium at 75 mL/h.    COPD without exacerbation.  O2 as needed.  Duo nebs every 4 hours as needed for wheezing.    Hypothyroidism.  Continue Synthroid.    Chronic pain syndrome.  Continue Requip and gabapentin.    Lovenox for deep vein thrombosis prophylaxis.    Medical Decision Making  Number and Complexity of problems: 6  Sepsis due to E. coli UTI, bilateral pyelonephritis.  Acute, high complexity  Bilateral pyelonephritis due to E. Coli: Acute, high complexity  Left upper pole renal lesion: Acute on chronic, high complexity  Hypokalemia: Acute, high complexity  Hypothyroidism: Chronic, low complexity  Chronic pain syndrome: Chronic, low complexity    Differential Diagnosis: None    Conditions and Status        Condition is unchanged.     [facility] Data  External documents reviewed: ER records  4/28/23  Cardiac tracing (EKG, telemetry) interpretation: Normal sinus rhythm 80 on telemetry  Radiology interpretation: CT abdomen radiology interpretation reviewed  Labs reviewed:   CBC 5/4/2023.  Repeat CBC in a.m.  BMP 5/4/2023.  Repeat BMP in AM.  Procalcitonin, magnesium, lactate.  Blood cultures, urine culture      Any tests that were considered but not ordered: MRI abdomen     Decision rules/scores evaluated (example [id number], [place], etc): None     Discussed with: Dr. Medina and patient.     Care Planning  Shared decision making: Dr. Medina and patient.  Patient agrees to IV antibiotics, IV fluids, potassium replacement repeating lab work in AM.  Patient agrees to urology consult.  Code status and discussions: Full code  The patient surrogate decision maker is her granddaughter, Antonia Lopez.    Disposition  Social Determinants of Health that impact treatment or disposition: None  I expect the patient to be discharged to home in 2-3 days.     Electronically signed by Nicole WHYTE  CARRIE Galloway, 05/04/23, 08:43 CDT.    This visit was performed by both a physician and an APC. I personally evaluated and examined the patient. I performed all aspects of the MDM as documented.    Recently seen in the emergency department for fever and dysuria.  Diagnosed with urinary tract infection and discharged home with oral ciprofloxacin.  Comes back with complaints of worsening fever and the development of bilateral flank pain.    CT of the abdomen pelvis was done on 4/28 and showed mild enhancement of the mucosal lining of the ureters and urinary bladder.  Correlate with urinalysis as this suggest urinary tract infection.  No mass or abscess.  There was mention of an exophytic cyst at the upper pole of the left kidney measuring 51 x 38 mm.    CT of the abdomen and pelvis was repeated last night and shows signs concerning for bilateral pyelonephritis.  Slight increase of the exophytic left upper pole renal lesion with new internal dense components/debris.  Suspicion of acute hemorrhage into a renal cyst.  Differential also includes infection of the left renal cyst.    Urology being consulted to comment on the above.    Her recent urine culture showed E. coli that was resistant to ampicillin and Bactrim; otherwise susceptible.  She was given a dose of cefepime last night.  She is currently getting 2 g of IV ceftriaxone.  She will need to complete at least 2 weeks of culture specific antibiotics, but she may very well need a more prolonged course given the findings on her CT.    She states that her pain is not adequately controlled at present.  I am going to give her a dose of IV Toradol, but may not want her to take that on a continuous basis.  She has Norco and Dilaudid available as well.    Change Lovenox to SCDs.    Electronically signed by Won Medina DO, 5/4/2023, 09:40 CDT.

## 2023-05-04 NOTE — CONSULTS
"Referring Provider: Nilesh  Reason for Consultation: left renal lesion    Chief complaint: \"I'm hurting\"    Subjective     History of present illness:    Ms. Gibbs is a 67-year-old female who presents with febrile illness and flank pain, L > R. Seen in our ER last week and treated for complicated UTI. She reports she has been taking her PO antibiotics (Cipro) at home. She presented back to the ER yesterday for worsening pain and fever. She is admitted to hospitalist team. Repeat CT reveals enlarging left renal lesion concerning for hemorraghic cyst or infected cyst. I have reviewed the images.    Review of Systems  Pertinent items are noted in HPI, all other systems reviewed and negative     History  Past Medical History:   Diagnosis Date   • Back pain    • COPD (chronic obstructive pulmonary disease)    • Dependence on supplemental oxygen    • Disease of thyroid gland    • History of degenerative disc disease    • Restless leg    • Scoliosis        Past Surgical History:   Procedure Laterality Date   • CHOLECYSTECTOMY     • TUBAL ABDOMINAL LIGATION         History reviewed. No pertinent family history.    Social History     Socioeconomic History   • Marital status:    Tobacco Use   • Smoking status: Former     Packs/day: 1.00     Years: 40.00     Pack years: 40.00     Types: Cigarettes     Quit date: 5/1/2008     Years since quitting: 15.0   • Smokeless tobacco: Never   Vaping Use   • Vaping Use: Every day   • Substances: Nicotine   • Devices: Pre-filled pod   Substance and Sexual Activity   • Alcohol use: Defer   • Drug use: Defer   • Sexual activity: Defer       Current Facility-Administered Medications   Medication Dose Route Frequency Provider Last Rate Last Admin   • acetaminophen (TYLENOL) tablet 650 mg  650 mg Oral Q6H PRN Nicole Galloway APRN       • cefTRIAXone (ROCEPHIN) 2 g in sodium chloride 0.9 % 100 mL IVPB  2 g Intravenous Q24H Nicole Galloway, CARRIE 200 mL/hr at 05/04/23 0920 2 g at " 05/04/23 0920   • dicyclomine (BENTYL) tablet 20 mg  20 mg Oral 4x Daily Pat Clinton MD   20 mg at 05/04/23 1215   • gabapentin (NEURONTIN) capsule 600 mg  600 mg Oral Q12H Pat Clinton MD   600 mg at 05/04/23 0920   • HYDROcodone-acetaminophen (NORCO)  MG per tablet 1 tablet  1 tablet Oral Q6H PRN Pat Clinton MD   1 tablet at 05/04/23 1133   • HYDROmorphone (DILAUDID) injection 1 mg  1 mg Intravenous Q4H PRN Pat Clinton MD   1 mg at 05/04/23 0801   • ipratropium-albuterol (DUO-NEB) nebulizer solution 3 mL  3 mL Nebulization Q4H PRN Pat Clinton MD   3 mL at 05/04/23 1046   • levothyroxine (SYNTHROID, LEVOTHROID) tablet 112 mcg  112 mcg Oral Q AM Pat Clinton MD   112 mcg at 05/04/23 0529   • mirtazapine (REMERON) tablet 15 mg  15 mg Oral Nightly Pat Clinton MD   15 mg at 05/04/23 0012   • modafinil (PROVIGIL) tablet 100 mg  100 mg Oral Daily Pat Clinton MD   100 mg at 05/04/23 0920   • ondansetron (ZOFRAN) injection 4 mg  4 mg Intravenous Q6H PRN Pat Clinton MD       • rOPINIRole (REQUIP) tablet 0.25 mg  0.25 mg Oral Nightly Pta Clinton MD   0.25 mg at 05/04/23 0012   • sodium chloride 0.9 % flush 10 mL  10 mL Intravenous PRN Marquise Cortez MD       • sodium chloride 0.9 % with KCl 20 mEq/L infusion  75 mL/hr Intravenous Continuous Nicole Galloway APRN 75 mL/hr at 05/04/23 0606 75 mL/hr at 05/04/23 0606   • tiZANidine (ZANAFLEX) tablet 4 mg  4 mg Oral BID Pat Clinton MD   4 mg at 05/04/23 0919        Allergies   Allergen Reactions   • Codeine Shortness Of Breath       Objective     Vital Signs   Temp:  [98.2 °F (36.8 °C)-100.1 °F (37.8 °C)] 98.2 °F (36.8 °C)  Heart Rate:  [] 73  Resp:  [18-24] 20  BP: (102-127)/(54-80) 106/54  No intake or output data in the 24 hours ending 05/04/23 1239    Physical Exam:    General: Alert, cooperative, nontoxic, comfortable  Head:  Normocephalic, without obvious abnormality,  "atraumatic  Eyes:   Lids and lashes normal, no icterus, no pallor  Ears:  Ears appear intact with no abnormalities noted  Neck:  Supple  Back:  Left costovertebral angle tenderness present  Lungs: Unlabored respirations  Heart:  Regular rhythm and normal rate  Abdomen: Soft, nontender, nondistended  :  Deferred  Extremities: Moves all extremities well  Skin:  Warm and dry  Neurologic: Cranial nerves 2 - 12 grossly intact    Data Review:    Basic Metabolic Panel (05/04/2023 03:53)   CBC & Differential (05/04/2023 03:53)   Progress Notes by Won Medina DO (05/04/2023 08:24)   CT Abdomen Pelvis With Contrast (04/28/2023 21:25)   CT Abdomen Pelvis With Contrast (05/03/2023 20:26)   Blood Culture - Blood, Hand, Right (04/28/2023 20:38)   Urine Culture - Urine, Straight Cath (04/28/2023 21:04)       Assessment and Plan:    Renal lesion - Patient reports she has been diagnosed with a \"left upper pole cyst\" years ago. This seems to be consistent with a hemorrhagic cyst with likely superimposed infection in the setting of pyelonephritis. Continue broad spectrum antibiotics pending repeat urine cultures. May consider changing antibiotics (2/3 of renal abscesses are from E coli which ceftriaxone would effectively treat, but could be polymicrobial and relatively isolated from the urine) if no clinical improvement with possible ID consult. Also consider percutaneous drainage if no clinical improvement. Consider repeat imaging pending patient's hospital course. Continue current analgesia orders for now.    UROLOGICAL DISPOSITION: I will continue to see this patient and make further recommendations.    I discussed the patient's findings and my recommendations with patient    (Please note that portions of this note were completed with a voice recognition program.)    Jarrett Hart MD  05/04/23  12:39 CDT    Time: Time spent: 40 minutes spent performing evaluation and management, chart review, and discussion with " patient, > 50% of time spent in face-to-face encounter

## 2023-05-04 NOTE — PLAN OF CARE
Goal Outcome Evaluation:              Outcome Evaluation: New admit this shift. A&OX4. O2 2L BNC (home dose). c/o pain since arrival to unit - PRN medications given with relief. IV fluids infusing as ordered. Up X1 assist. Tele in place. Safety maintained per policy.

## 2023-05-04 NOTE — ED NOTES
Nursing report ED to floor  Ashley Gibbs  67 y.o.  female    HPI:   Chief Complaint   Patient presents with    Flank Pain    Fever       Admitting doctor:   Pat Clinton MD    Consulting provider(s):  Consults       No orders found from 4/4/2023 to 5/4/2023.             Admitting diagnosis:   The primary encounter diagnosis was Sepsis, due to unspecified organism, unspecified whether acute organ dysfunction present. A diagnosis of Pyelonephritis was also pertinent to this visit.    Code status:   Current Code Status       Date Active Code Status Order ID Comments User Context       Not on file            Allergies:   Codeine    Intake and Output  No intake or output data in the 24 hours ending 05/03/23 2133    Weight:       05/03/23  1842   Weight: 66.2 kg (146 lb)       Most recent vitals:   Vitals:    05/03/23 2046 05/03/23 2047 05/03/23 2115 05/03/23 2116   BP: 109/69   127/80   BP Location:       Patient Position:       Pulse:  97 98    Resp:       Temp:       TempSrc:       SpO2:   94%    Weight:       Height:         Oxygen Therapy: .    Active LDAs/IV Access:   Lines, Drains & Airways       Active LDAs       Name Placement date Placement time Site Days    Peripheral IV 05/03/23 1920 Anterior;Left Forearm 05/03/23 1920  Forearm  less than 1                    Labs (abnormal labs have a star):   Labs Reviewed   COMPREHENSIVE METABOLIC PANEL - Abnormal; Notable for the following components:       Result Value    BUN 6 (*)     Potassium 3.2 (*)     Chloride 97 (*)     CO2 33.0 (*)     Alkaline Phosphatase 138 (*)     All other components within normal limits    Narrative:     GFR Normal >60  Chronic Kidney Disease <60  Kidney Failure <15     URINALYSIS W/ CULTURE IF INDICATED - Abnormal; Notable for the following components:    Blood, UA Trace (*)     Protein, UA 30 mg/dL (1+) (*)     Leuk Esterase, UA Trace (*)     All other components within normal limits    Narrative:     In absence of clinical symptoms,  "the presence of pyuria, bacteria, and/or nitrites on the urinalysis result does not correlate with infection.   PROCALCITONIN - Abnormal; Notable for the following components:    Procalcitonin 1.13 (*)     All other components within normal limits    Narrative:     As a Marker for Sepsis (Non-Neonates):    1. <0.5 ng/mL represents a low risk of severe sepsis and/or septic shock.  2. >2 ng/mL represents a high risk of severe sepsis and/or septic shock.    As a Marker for Lower Respiratory Tract Infections that require antibiotic therapy:    PCT on Admission    Antibiotic Therapy       6-12 Hrs later    >0.5                Strongly Recommended  >0.25 - <0.5        Recommended   0.1 - 0.25          Discouraged              Remeasure/reassess PCT  <0.1                Strongly Discouraged     Remeasure/reassess PCT    As 28 day mortality risk marker: \"Change in Procalcitonin Result\" (>80% or <=80%) if Day 0 (or Day 1) and Day 4 values are available. Refer to http://www.[facility]-pct-calculator.com    Change in PCT <=80%  A decrease of PCT levels below or equal to 80% defines a positive change in PCT test result representing a higher risk for 28-day all-cause mortality of patients diagnosed with severe sepsis for septic shock.    Change in PCT >80%  A decrease of PCT levels of more than 80% defines a negative change in PCT result representing a lower risk for 28-day all-cause mortality of patients diagnosed with severe sepsis or septic shock.      CBC WITH AUTO DIFFERENTIAL - Abnormal; Notable for the following components:    WBC 20.46 (*)     Neutrophil % 86.6 (*)     Lymphocyte % 5.9 (*)     Immature Grans % 0.8 (*)     Neutrophils, Absolute 17.72 (*)     Monocytes, Absolute 1.19 (*)     Immature Grans, Absolute 0.16 (*)     All other components within normal limits   URINALYSIS, MICROSCOPIC ONLY - Abnormal; Notable for the following components:    RBC, UA 3-5 (*)     WBC, UA 13-20 (*)     All other components within " normal limits   LIPASE - Normal   LACTIC ACID, PLASMA - Normal   MAGNESIUM - Normal   URINE CULTURE   BLOOD CULTURE   BLOOD CULTURE   CBC AND DIFFERENTIAL    Narrative:     The following orders were created for panel order CBC & Differential.  Procedure                               Abnormality         Status                     ---------                               -----------         ------                     CBC Auto Differential[[id number]]        Abnormal            Final result                 Please view results for these tests on the individual orders.       Meds given in ED:   Medications   sodium chloride 0.9 % flush 10 mL (has no administration in time range)   cefepime 2 gm IVPB in 100 ml NS (MBP) (2 g Intravenous New Bag 5/3/23 2132)   sodium chloride 0.9 % bolus 1,000 mL (1,000 mL Intravenous New Bag 5/3/23 1928)   morphine injection 4 mg (4 mg Intravenous Given 5/3/23 2002)   iopamidol (ISOVUE-300) 61 % injection 100 mL (100 mL Intravenous Given 5/3/23 2027)   sodium chloride 0.9 % bolus 1,000 mL (1,000 mL Intravenous New Bag 5/3/23 2016)           NIH Stroke Scale:       Isolation/Infection(s):  No active isolations   No active infections     COVID Testing  Collected .  Resulted .    Nursing report ED to floor:  Mental status: .  Ambulatory status: .  Precautions: .    ED nurse phone extentsion- ..

## 2023-05-05 ENCOUNTER — APPOINTMENT (OUTPATIENT)
Dept: CARDIOLOGY | Facility: HOSPITAL | Age: 68
DRG: 872 | End: 2023-05-05
Payer: MEDICARE

## 2023-05-05 ENCOUNTER — TELEPHONE (OUTPATIENT)
Dept: FAMILY MEDICINE CLINIC | Facility: CLINIC | Age: 68
End: 2023-05-05
Payer: MEDICARE

## 2023-05-05 LAB
ANION GAP SERPL CALCULATED.3IONS-SCNC: 5 MMOL/L (ref 5–15)
BACTERIA SPEC AEROBE CULT: NO GROWTH
BASOPHILS # BLD AUTO: 0.06 10*3/MM3 (ref 0–0.2)
BASOPHILS NFR BLD AUTO: 0.3 % (ref 0–1.5)
BH CV ECHO LEFT VENTRICLE GLOBAL LONGITUDINAL STRAIN: -13.5 %
BH CV ECHO MEAS - AO MAX PG: 9.5 MMHG
BH CV ECHO MEAS - AO MEAN PG: 5 MMHG
BH CV ECHO MEAS - AO ROOT DIAM: 3.4 CM
BH CV ECHO MEAS - AO V2 MAX: 154 CM/SEC
BH CV ECHO MEAS - AO V2 VTI: 25.8 CM
BH CV ECHO MEAS - AVA(I,D): 2.5 CM2
BH CV ECHO MEAS - EDV(CUBED): 64 ML
BH CV ECHO MEAS - ESV(CUBED): 19.7 ML
BH CV ECHO MEAS - FS: 32.5 %
BH CV ECHO MEAS - IVS/LVPW: 1 CM
BH CV ECHO MEAS - IVSD: 1 CM
BH CV ECHO MEAS - LA DIMENSION: 4.4 CM
BH CV ECHO MEAS - LAT PEAK E' VEL: 11.3 CM/SEC
BH CV ECHO MEAS - LV MASS(C)D: 127.1 GRAMS
BH CV ECHO MEAS - LV MAX PG: 5.2 MMHG
BH CV ECHO MEAS - LV MEAN PG: 2 MMHG
BH CV ECHO MEAS - LV V1 MAX: 114 CM/SEC
BH CV ECHO MEAS - LV V1 VTI: 20.6 CM
BH CV ECHO MEAS - LVIDD: 4 CM
BH CV ECHO MEAS - LVIDS: 2.7 CM
BH CV ECHO MEAS - LVOT AREA: 3.1 CM2
BH CV ECHO MEAS - LVOT DIAM: 2 CM
BH CV ECHO MEAS - LVPWD: 1 CM
BH CV ECHO MEAS - MED PEAK E' VEL: 9.8 CM/SEC
BH CV ECHO MEAS - MV A MAX VEL: 56.8 CM/SEC
BH CV ECHO MEAS - MV DEC SLOPE: 625 CM/SEC2
BH CV ECHO MEAS - MV DEC TIME: 0.15 MSEC
BH CV ECHO MEAS - MV E MAX VEL: 89.2 CM/SEC
BH CV ECHO MEAS - MV E/A: 1.57
BH CV ECHO MEAS - MV P1/2T: 44.7 MSEC
BH CV ECHO MEAS - MVA(P1/2T): 4.9 CM2
BH CV ECHO MEAS - PA V2 MAX: 104.2 CM/SEC
BH CV ECHO MEAS - RAP SYSTOLE: 5 MMHG
BH CV ECHO MEAS - RVDD: 4.3 CM
BH CV ECHO MEAS - RVSP: 42.2 MMHG
BH CV ECHO MEAS - SV(LVOT): 64.7 ML
BH CV ECHO MEAS - TR MAX PG: 37.2 MMHG
BH CV ECHO MEAS - TR MAX VEL: 305 CM/SEC
BH CV ECHO MEASUREMENTS AVERAGE E/E' RATIO: 8.45
BH CV XLRA - RV BASE: 4.9 CM
BH CV XLRA - RV LENGTH: 7.2 CM
BH CV XLRA - RV MID: 3.7 CM
BH CV XLRA - TDI S': 16.2 CM/SEC
BUN SERPL-MCNC: 9 MG/DL (ref 8–23)
BUN/CREAT SERPL: 12.2 (ref 7–25)
CALCIUM SPEC-SCNC: 7.9 MG/DL (ref 8.6–10.5)
CHLORIDE SERPL-SCNC: 104 MMOL/L (ref 98–107)
CO2 SERPL-SCNC: 28 MMOL/L (ref 22–29)
CREAT SERPL-MCNC: 0.74 MG/DL (ref 0.57–1)
DEPRECATED RDW RBC AUTO: 48.5 FL (ref 37–54)
EGFRCR SERPLBLD CKD-EPI 2021: 89.9 ML/MIN/1.73
EOSINOPHIL # BLD AUTO: 0.3 10*3/MM3 (ref 0–0.4)
EOSINOPHIL NFR BLD AUTO: 1.4 % (ref 0.3–6.2)
ERYTHROCYTE [DISTWIDTH] IN BLOOD BY AUTOMATED COUNT: 14.3 % (ref 12.3–15.4)
GLUCOSE SERPL-MCNC: 104 MG/DL (ref 65–99)
HCT VFR BLD AUTO: 31 % (ref 34–46.6)
HGB BLD-MCNC: 9.3 G/DL (ref 12–15.9)
IMM GRANULOCYTES # BLD AUTO: 0.89 10*3/MM3 (ref 0–0.05)
IMM GRANULOCYTES NFR BLD AUTO: 4.2 % (ref 0–0.5)
LEFT ATRIUM VOLUME INDEX: 33.3 ML/M2
LYMPHOCYTES # BLD AUTO: 1.11 10*3/MM3 (ref 0.7–3.1)
LYMPHOCYTES NFR BLD AUTO: 5.3 % (ref 19.6–45.3)
MAXIMAL PREDICTED HEART RATE: 155 BPM
MCH RBC QN AUTO: 27.7 PG (ref 26.6–33)
MCHC RBC AUTO-ENTMCNC: 30 G/DL (ref 31.5–35.7)
MCV RBC AUTO: 92.3 FL (ref 79–97)
MONOCYTES # BLD AUTO: 1.38 10*3/MM3 (ref 0.1–0.9)
MONOCYTES NFR BLD AUTO: 6.6 % (ref 5–12)
NEUTROPHILS NFR BLD AUTO: 17.23 10*3/MM3 (ref 1.7–7)
NEUTROPHILS NFR BLD AUTO: 82.2 % (ref 42.7–76)
NRBC BLD AUTO-RTO: 0 /100 WBC (ref 0–0.2)
PLATELET # BLD AUTO: 307 10*3/MM3 (ref 140–450)
PMV BLD AUTO: 10.1 FL (ref 6–12)
POTASSIUM SERPL-SCNC: 4.7 MMOL/L (ref 3.5–5.2)
RBC # BLD AUTO: 3.36 10*6/MM3 (ref 3.77–5.28)
SODIUM SERPL-SCNC: 137 MMOL/L (ref 136–145)
STRESS TARGET HR: 132 BPM
TSH SERPL DL<=0.05 MIU/L-ACNC: 1.33 UIU/ML (ref 0.27–4.2)
WBC NRBC COR # BLD: 20.97 10*3/MM3 (ref 3.4–10.8)

## 2023-05-05 PROCEDURE — 93356 MYOCRD STRAIN IMG SPCKL TRCK: CPT

## 2023-05-05 PROCEDURE — 25010000002 CEFTRIAXONE PER 250 MG: Performed by: NURSE PRACTITIONER

## 2023-05-05 PROCEDURE — 84443 ASSAY THYROID STIM HORMONE: CPT | Performed by: INTERNAL MEDICINE

## 2023-05-05 PROCEDURE — 99232 SBSQ HOSP IP/OBS MODERATE 35: CPT | Performed by: UROLOGY

## 2023-05-05 PROCEDURE — 85025 COMPLETE CBC W/AUTO DIFF WBC: CPT | Performed by: INTERNAL MEDICINE

## 2023-05-05 PROCEDURE — 94799 UNLISTED PULMONARY SVC/PX: CPT

## 2023-05-05 PROCEDURE — 93005 ELECTROCARDIOGRAM TRACING: CPT | Performed by: NURSE PRACTITIONER

## 2023-05-05 PROCEDURE — 93306 TTE W/DOPPLER COMPLETE: CPT

## 2023-05-05 PROCEDURE — 93306 TTE W/DOPPLER COMPLETE: CPT | Performed by: INTERNAL MEDICINE

## 2023-05-05 PROCEDURE — 93010 ELECTROCARDIOGRAM REPORT: CPT | Performed by: INTERNAL MEDICINE

## 2023-05-05 PROCEDURE — 25010000002 SODIUM CHLORIDE 0.9 % WITH KCL 20 MEQ 20-0.9 MEQ/L-% SOLUTION: Performed by: NURSE PRACTITIONER

## 2023-05-05 PROCEDURE — 93356 MYOCRD STRAIN IMG SPCKL TRCK: CPT | Performed by: INTERNAL MEDICINE

## 2023-05-05 PROCEDURE — 80048 BASIC METABOLIC PNL TOTAL CA: CPT | Performed by: INTERNAL MEDICINE

## 2023-05-05 PROCEDURE — 0 HYDROMORPHONE 1 MG/ML SOLUTION: Performed by: INTERNAL MEDICINE

## 2023-05-05 RX ORDER — HYDROXYZINE HYDROCHLORIDE 25 MG/1
25 TABLET, FILM COATED ORAL 3 TIMES DAILY PRN
Status: DISCONTINUED | OUTPATIENT
Start: 2023-05-05 | End: 2023-05-07 | Stop reason: HOSPADM

## 2023-05-05 RX ADMIN — DICYCLOMINE HYDROCHLORIDE 20 MG: 20 TABLET ORAL at 17:03

## 2023-05-05 RX ADMIN — IPRATROPIUM BROMIDE AND ALBUTEROL SULFATE 3 ML: .5; 3 SOLUTION RESPIRATORY (INHALATION) at 01:42

## 2023-05-05 RX ADMIN — HYDROXYZINE HYDROCHLORIDE 25 MG: 25 TABLET ORAL at 09:40

## 2023-05-05 RX ADMIN — HYDROCODONE BITARTRATE AND ACETAMINOPHEN 1 TABLET: 10; 325 TABLET ORAL at 02:01

## 2023-05-05 RX ADMIN — HYDROMORPHONE HYDROCHLORIDE 1 MG: 1 INJECTION, SOLUTION INTRAMUSCULAR; INTRAVENOUS; SUBCUTANEOUS at 09:42

## 2023-05-05 RX ADMIN — TIZANIDINE 4 MG: 4 TABLET ORAL at 08:12

## 2023-05-05 RX ADMIN — POTASSIUM CHLORIDE AND SODIUM CHLORIDE 75 ML/HR: 900; 150 INJECTION, SOLUTION INTRAVENOUS at 21:59

## 2023-05-05 RX ADMIN — ROPINIROLE HYDROCHLORIDE 0.25 MG: 0.25 TABLET, FILM COATED ORAL at 20:24

## 2023-05-05 RX ADMIN — DICYCLOMINE HYDROCHLORIDE 20 MG: 20 TABLET ORAL at 08:12

## 2023-05-05 RX ADMIN — MIRTAZAPINE 15 MG: 15 TABLET, FILM COATED ORAL at 20:24

## 2023-05-05 RX ADMIN — DICYCLOMINE HYDROCHLORIDE 20 MG: 20 TABLET ORAL at 20:24

## 2023-05-05 RX ADMIN — GABAPENTIN 600 MG: 300 CAPSULE ORAL at 20:24

## 2023-05-05 RX ADMIN — HYDROCODONE BITARTRATE AND ACETAMINOPHEN 1 TABLET: 10; 325 TABLET ORAL at 08:12

## 2023-05-05 RX ADMIN — METOPROLOL TARTRATE 5 MG: 5 INJECTION INTRAVENOUS at 21:59

## 2023-05-05 RX ADMIN — TIZANIDINE 4 MG: 4 TABLET ORAL at 20:24

## 2023-05-05 RX ADMIN — METOPROLOL TARTRATE 5 MG: 5 INJECTION INTRAVENOUS at 09:40

## 2023-05-05 RX ADMIN — HYDROMORPHONE HYDROCHLORIDE 1 MG: 1 INJECTION, SOLUTION INTRAMUSCULAR; INTRAVENOUS; SUBCUTANEOUS at 21:59

## 2023-05-05 RX ADMIN — LEVOTHYROXINE SODIUM 112 MCG: 112 TABLET ORAL at 06:02

## 2023-05-05 RX ADMIN — POTASSIUM CHLORIDE AND SODIUM CHLORIDE 75 ML/HR: 900; 150 INJECTION, SOLUTION INTRAVENOUS at 08:12

## 2023-05-05 RX ADMIN — MODAFINIL 100 MG: 100 TABLET ORAL at 08:12

## 2023-05-05 RX ADMIN — METOPROLOL TARTRATE 5 MG: 5 INJECTION INTRAVENOUS at 01:48

## 2023-05-05 RX ADMIN — GABAPENTIN 600 MG: 300 CAPSULE ORAL at 08:12

## 2023-05-05 RX ADMIN — HYDROMORPHONE HYDROCHLORIDE 1 MG: 1 INJECTION, SOLUTION INTRAMUSCULAR; INTRAVENOUS; SUBCUTANEOUS at 14:01

## 2023-05-05 RX ADMIN — CEFTRIAXONE SODIUM 2 G: 2 INJECTION, POWDER, FOR SOLUTION INTRAMUSCULAR; INTRAVENOUS at 08:12

## 2023-05-05 RX ADMIN — HYDROMORPHONE HYDROCHLORIDE 1 MG: 1 INJECTION, SOLUTION INTRAMUSCULAR; INTRAVENOUS; SUBCUTANEOUS at 03:46

## 2023-05-05 RX ADMIN — HYDROCODONE BITARTRATE AND ACETAMINOPHEN 1 TABLET: 10; 325 TABLET ORAL at 17:03

## 2023-05-05 RX ADMIN — HYDROMORPHONE HYDROCHLORIDE 1 MG: 1 INJECTION, SOLUTION INTRAMUSCULAR; INTRAVENOUS; SUBCUTANEOUS at 18:15

## 2023-05-05 NOTE — PLAN OF CARE
Goal Outcome Evaluation:  Plan of Care Reviewed With: patient        Progress: no change  Outcome Evaluation: Patient A&OX4. Up x1 to BR. VSS. Call light in reach. Safety maintained. Will cont to monitor.

## 2023-05-05 NOTE — TELEPHONE ENCOUNTER
Called Hope back to inquire-verbalized understanding. Will make sure hospital follow up is scheduled.

## 2023-05-05 NOTE — PLAN OF CARE
Goal Outcome Evaluation:              Outcome Evaluation: VSS pt did have several non-sustained runs of V.Tach up to 195. A&OX4. Pt c/o pain throughout this shift - PRN given as ordered with relief. IV fluids infusing as ordered. Safety maintained per policy.

## 2023-05-05 NOTE — PROGRESS NOTES
LOS: 2 days   Patient Care Team:  Padmaja Bermudez APRN as PCP - General (Family Medicine)    Chief Complaint:  Renal cyst, pyelonephritis    Subjective     Interval History:     Patient Reports: Still have intermittent left flank pain and sweats  Patient Denies:  Fever  History taken from: patient chart    Review of Systems  Pertinent items are noted in HPI, all other systems reviewed and negative     Objective     Vital Signs  Temp:  [97.9 °F (36.6 °C)-99.4 °F (37.4 °C)] 99.4 °F (37.4 °C)  Heart Rate:  [] 105  Resp:  [18-26] 20  BP: ()/(54-85) 115/68    Physical Exam:  Stable clinical appearance. Still uncomfortable. Nontoxic. Has left CVA tenderness. No flank bruising.      Data Review:       I have reviewed the following data:    Blood Culture - Blood, Arm, Left (05/03/2023 21:19)   Blood Culture - Blood, Arm, Right (05/03/2023 21:25)   Basic Metabolic Panel (05/05/2023 04:20)   CBC & Differential (05/05/2023 04:20)       Medication Review:     Current Facility-Administered Medications:   •  acetaminophen (TYLENOL) tablet 650 mg, 650 mg, Oral, Q6H PRN, Nicole Galloway APRN  •  cefTRIAXone (ROCEPHIN) 2 g in sodium chloride 0.9 % 100 mL IVPB, 2 g, Intravenous, Q24H, Nicole Galloway APRN, Last Rate: 200 mL/hr at 05/05/23 0812, 2 g at 05/05/23 0812  •  dicyclomine (BENTYL) tablet 20 mg, 20 mg, Oral, 4x Daily, Pat Clinton MD, 20 mg at 05/05/23 0812  •  gabapentin (NEURONTIN) capsule 600 mg, 600 mg, Oral, Q12H, Pat Clinton MD, 600 mg at 05/05/23 0812  •  HYDROcodone-acetaminophen (NORCO)  MG per tablet 1 tablet, 1 tablet, Oral, Q6H PRN, Pat Clinton MD, 1 tablet at 05/05/23 0812  •  HYDROmorphone (DILAUDID) injection 1 mg, 1 mg, Intravenous, Q4H PRN, Pat Clinton MD, 1 mg at 05/05/23 0346  •  hydrOXYzine (ATARAX) tablet 25 mg, 25 mg, Oral, TID PRN, Nicole Galloway APRN, 25 mg at 05/05/23 0926  •  ipratropium-albuterol (DUO-NEB) nebulizer solution 3 mL, 3 mL,  Nebulization, Q4H PRN, Pat Clinton MD, 3 mL at 05/05/23 0142  •  levothyroxine (SYNTHROID, LEVOTHROID) tablet 112 mcg, 112 mcg, Oral, Q AM, Pat Clinton MD, 112 mcg at 05/05/23 0602  •  metoprolol tartrate (LOPRESSOR) injection 5 mg, 5 mg, Intravenous, Q8H, Won Medina DO, 5 mg at 05/05/23 0940  •  mirtazapine (REMERON) tablet 15 mg, 15 mg, Oral, Nightly, Pat Clinton MD, 15 mg at 05/04/23 2053  •  modafinil (PROVIGIL) tablet 100 mg, 100 mg, Oral, Daily, Pat Clinton MD, 100 mg at 05/05/23 0812  •  ondansetron (ZOFRAN) injection 4 mg, 4 mg, Intravenous, Q6H PRN, Pat Clinton MD  •  rOPINIRole (REQUIP) tablet 0.25 mg, 0.25 mg, Oral, Nightly, Pat Clinton MD, 0.25 mg at 05/04/23 2052  •  [COMPLETED] Insert Peripheral IV, , , Once **AND** sodium chloride 0.9 % flush 10 mL, 10 mL, Intravenous, PRN, Marquise Cortez MD  •  sodium chloride 0.9 % with KCl 20 mEq/L infusion, 75 mL/hr, Intravenous, Continuous, Nicole Galloway, APRN, Last Rate: 75 mL/hr at 05/05/23 0812, 75 mL/hr at 05/05/23 0812  •  tiZANidine (ZANAFLEX) tablet 4 mg, 4 mg, Oral, BID, Pat Clinton MD, 4 mg at 05/05/23 0812    Assessment and Plan:    Renal cyst, pyelonephritis: Continue to monitor. Consider broadening spectrum on antibiotic coverage. Continue analgesia. Urine culture still pending. May repeat imaging tomorrow if symptoms remain to reassess cyst.    URO DISPO: I will continue to follow this patient.    I discussed the patient's findings and my recommendations with patient, family and primary care team    (Please note that portions of this note were completed with a voice recognition program.)    Jarrett Hart MD  05/05/23  09:42 CDT    Time: Time spent: 25 minutes spent performing evaluation and management, chart review, and discussion with patient, > 50% of time spent in face-to-face encounter

## 2023-05-05 NOTE — TELEPHONE ENCOUNTER
Patient's grandchild Hope Davidson called to say her grandmother is in the hospital.  She has an infection in her kidneys.  They also did a CT.  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  She wants the nurse to call her to discuss.  [phone].

## 2023-05-05 NOTE — PROGRESS NOTES
[facility] Medicine Services  INPATIENT PROGRESS NOTE    Patient Name: Ashley Gibbs  Date of Admission: 5/3/2023  Today's Date: 05/05/23  Length of Stay: 2  Primary Care Physician: Padmaja Bermudez APRN    Subjective   Chief Complaint: Fever, bilateral flank pain  HPI   Ms. Gibbs presented to ER 5/3/23 with worsening bilateral flank pain with subjective fever and chills.  Patient evaluated in ED on 4/28 for fever and dysuria.  She was diagnosed with urinary tract infection and discharged on oral Cipro.  Patient reported subjective fever 101.3, bilateral flank pain worse on the left associated with nausea but no vomiting, fever and chills..  She reported dysuria and frequency.  Family member reported patient had been diagnosed with renal cyst mass and was advised to have follow-up imaging studies but no repeat imaging studies in the last several months.  Procalcitonin 1.13, potassium 3.2, urinalysis 13-20 WBC, WBC 20.46.  CT abdomen pelvis noted a few areas of corticomedullary differential blurring in both kidneys concerning for bilateral pyelonephritis.  Short interval increase in size of 6 cm exophytic left upper pole renal lesion with new interval dense components [place].  Suspect acute hemorrhage into renal cyst.  Temperature 100.1, heart rate greater than 100.  WBC 20.46.  Normal saline fluid bolus, cefepime given in ER.    Patient up in bathroom.  She still reports left flank pain greater than right.  He has been having brief episodes of SVT up into the 160s.  Started on IV Lopressor.  I have ordered an EKG but she already converted back to normal sinus rhythm.  No complaints of chest pain or palpitations.  She denies nausea or vomiting.  Urology following and discussed with Dr. Hart today and recommends continuing Rocephin and could also be renal abscess and may consider percutaneous drainage.  WBC remains elevated 20.  Blood cultures no growth at 24 hours.  Repeat  urine culture pending.  Previous urine culture 4/28 E. coli sensitive to Rocephin.    Review of Systems   Constitutional: Positive for appetite change, chills, fatigue and fever.   HENT: Negative for congestion and trouble swallowing.    Eyes: Negative for photophobia and visual disturbance.   Respiratory: Negative for cough, shortness of breath and wheezing.    Cardiovascular: Negative for chest pain, palpitations and leg swelling.   Gastrointestinal: Positive for nausea. Negative for diarrhea and vomiting.   Endocrine: Negative for cold intolerance, heat intolerance and polyuria.   Genitourinary: Positive for dysuria, flank pain and frequency.   Musculoskeletal: Negative for gait problem.   Skin: Negative for color change, pallor, rash and wound.   Allergic/Immunologic: Negative for immunocompromised state.   Neurological: Positive for weakness. Negative for light-headedness.   Hematological: Negative for adenopathy. Does not bruise/bleed easily.   Psychiatric/Behavioral: Negative for agitation, behavioral problems and confusion.      All pertinent negatives and positives are as above. All other systems have been reviewed and are negative unless otherwise stated.     Objective    Temp:  [97.9 °F (36.6 °C)-99.4 °F (37.4 °C)] 99.4 °F (37.4 °C)  Heart Rate:  [] 105  Resp:  [18-26] 20  BP: ()/(54-85) 115/68  Physical Exam  Vitals and nursing note reviewed.   Constitutional:       Comments: Up in bathroom.  No visitors in room.   HENT:      Head: Normocephalic.      Nose: No congestion.      Mouth/Throat:      Pharynx: Oropharynx is clear. No oropharyngeal exudate or posterior oropharyngeal erythema.   Eyes:      Extraocular Movements: Extraocular movements intact.      Pupils: Pupils are equal, round, and reactive to light.   Cardiovascular:      Rate and Rhythm: Normal rate and regular rhythm.      Heart sounds: No murmur heard.     Comments: Normal sinus rhythm 80 on telemetry.  Patient has had brief  episodes of SVT up to 160.  EKG ordered rhythm change.  Pulmonary:      Breath sounds: No rhonchi or rales.      Comments: Oxygen at 3 L.  Saturation 91-93%.  Abdominal:      Palpations: Abdomen is soft.      Tenderness: There is no abdominal tenderness.   Genitourinary:     Comments: Voiding.  Musculoskeletal:         General: No swelling or tenderness.      Cervical back: Normal range of motion and neck supple.   Skin:     General: Skin is warm and dry.   Neurological:      General: No focal deficit present.      Mental Status: She is alert and oriented to person, place, and time.   Psychiatric:         Mood and Affect: Mood normal.         Behavior: Behavior normal.         Thought Content: Thought content normal.         Judgment: Judgment normal.       Results Review:  I have reviewed the labs, radiology results, and diagnostic studies.    Laboratory Data:   Results from last 7 days   Lab Units 05/05/23 0420 05/04/23 0353 05/03/23 1927   WBC 10*3/mm3 20.97* 21.99* 20.46*   HEMOGLOBIN g/dL 9.3* 10.4* 12.1   HEMATOCRIT % 31.0* 33.9* 38.1   PLATELETS 10*3/mm3 307 310 361     Results from last 7 days   Lab Units 05/05/23  0420 05/04/23  0353 05/03/23 1927 04/28/23 2038   SODIUM mmol/L 137 137 138 138   POTASSIUM mmol/L 4.7 3.0* 3.2* 3.7   CHLORIDE mmol/L 104 101 97* 99   CO2 mmol/L 28.0 29.0 33.0* 27.0   BUN mg/dL 9 7* 6* 10   CREATININE mg/dL 0.74 0.92 0.81 0.87   CALCIUM mg/dL 7.9* 7.7* 8.7 8.3*   BILIRUBIN mg/dL  --   --  0.3 0.6   ALK PHOS U/L  --   --  138* 159*   ALT (SGPT) U/L  --   --  20 35*   AST (SGOT) U/L  --   --  23 32   GLUCOSE mg/dL 104* 107* 95 105*     Culture Data:     05/03/2023 2125 05/04/2023 2146 Blood Culture - Blood, Arm, Right [[id number]]   Blood from Arm, Right    Preliminary result Component Value   Blood Culture No growth at 24 hours P             05/03/2023/03/2023 2119 05/04/2023 2146 Blood Culture - Blood, Arm, Left [[id number]]   Blood from Arm, Left    Preliminary result Component  Value   Blood Culture No growth at 24 hours           Blood Culture   Date Value Ref Range Status   04/28/2023 No growth at 5 days  Final     Urine Culture   Date Value Ref Range Status   04/28/2023 >100,000 CFU/mL Escherichia coli (A)  Final     Escherichia coli       [facility]     Ampicillin >=32  Resistant     Ampicillin + Sulbactam 4  Susceptible     Cefazolin <=4  Susceptible     Cefepime <=1  Susceptible     Ceftazidime <=1  Susceptible     Ceftriaxone <=1  Susceptible     Gentamicin <=1  Susceptible     Levofloxacin 1  Intermediate     Nitrofurantoin <=16  Susceptible     Piperacillin + Tazobactam <=4  Susceptible     Trimethoprim + Sulfamethoxazole >=320  Resistant                   Imaging Results (All)     Procedure Component Value Units Date/Time    CT Abdomen Pelvis With Contrast [[id number]] Collected: 05/03/23 2030     Updated: 05/03/23 2043    Narrative:      EXAM/TECHNIQUE: CT abdomen pelvis with IV contrast     INDICATION: Left-sided abdominal pain     COMPARISON: 04/28/2023     DLP: 235 mGy cm. Automated exposure control was also utilized to  decrease patient radiation dose.     FINDINGS:     Chronic linear opacities in the RIGHT lung base which may be related to  atelectasis or parenchymal scarring.     No suspicious liver lesion. Prior cholecystectomy. Chronic dilatation of  the common hepatic duct and common bile duct, likely related to  reservoir phenomenon. Pancreas, spleen, and adrenal glands are  unremarkable.     Areas of renal corticomedullary differentiation blurring are present  bilaterally. There is bilateral urothelial thickening in the renal  pelves and ureters. Multiple small bilateral renal cysts. A 6 cm  exophytic LEFT upper pole renal lesion has increased in size (previously  5 cm) and now contains internal heterogeneous content/debris. No focal  urinary bladder wall thickening.     No colonic wall thickening or pericolonic fat stranding. Normal  appendix. No abnormal small bowel  distention or evidence of active small  bowel inflammation.     No ascites or free pelvic fluid. No pelvic mass or pelvic collection.  Uterus and adnexa appear unremarkable.     Nonaneurysmal atherosclerotic abdominal aorta. No enlarged  retroperitoneal, mesenteric, pelvic, or inguinal lymph nodes.     No acute abdominal wall soft tissue abnormality. Multilevel lumbar spine  degenerative change. No acute osseous finding.       Impression:         1.  A few areas of corticomedullary differentiation blurring in both  kidneys are present, concerning for bilateral pyelonephritis. Correlate  with urinalysis and physical exam.     2.  Short interval increase in size a 6 cm exophytic LEFT upper pole  renal lesion with new internal dense components/debris. Suspect this  represents acute hemorrhage into a renal cyst. Also in the differential,  superimposed infection within a LEFT renal cyst.     3.  Chronic dilatation of the extrahepatic biliary tree, slightly  increased in caliber from prior exam. This may represent reservoir  phenomenon given prior cholecystectomy but if there is concern for  biliary obstruction, MRCP would be recommended.  This report was finalized on 05/03/2023 20:40 by Dr. Eddie Alicia MD.            Schedule medications:  cefTRIAXone, 2 g, Intravenous, Q24H  dicyclomine, 20 mg, Oral, 4x Daily  gabapentin, 600 mg, Oral, Q12H  levothyroxine, 112 mcg, Oral, Q AM  metoprolol tartrate, 5 mg, Intravenous, Q8H  mirtazapine, 15 mg, Oral, Nightly  modafinil, 100 mg, Oral, Daily  rOPINIRole, 0.25 mg, Oral, Nightly  tiZANidine, 4 mg, Oral, BID      I have reviewed the patient's current medications.     Assessment/Plan   Assessment  Active Hospital Problems    Diagnosis    • **Sepsis, due to unspecified organism, unspecified whether acute organ dysfunction present    • Acute bilateral pyelonephritis    • 6 cm exophytic left upper pole renal lesion    • Hypokalemia    • E. coli UTI, present on admission (urinary  tract infection)    • Chronic obstructive pulmonary disease    • Hypothyroidism      Treatment Plan  Sepsis due to E. coli UTI, bilateral pyelonephritis present on admission.  Patient presented with fever, chills, dysuria, frequency.  Patient evaluated on 4/28 for UTI and treated with Cipro.  Urine culture 4/28 now positive for E. coli.  Temperature 100.1, heart rate greater than 90, WBC 20.46.  Normal saline fluid bolus given in ER.  Patient received cefepime in ER.  Rocephin ordered on admission.  Rocephin 2 g IV every 24 hours.  Follow-up blood cultures no growth at 24 hours.  Repeat urine culture 5/3 pending.  WBC 20.  Repeat CBC in AM.  We will continue Rocephin at this time and consider additional antibiotics if no improvement.    6 cm exophytic left upper pole renal lesion.  CT abdomen showed slight increase in size.  Urology consulted and discussed with Dr. Hart today who reviewed imaging studies and notes if no clinical improvement possible ID consult and consider percutaneous drainage.    Hypokalemia.  Potassium 3.2 on admission and 3.0 on 5/4.  Potassium replaced.  Potassium 4.7 today.  DC IV fluids.  BMP in AM.    COPD without exacerbation.  O2 as needed.  Duo nebs every 4 hours as needed for wheezing.    Having episodes of increased heart rate 160 appears to be SVT per monitor tech.  Have ordered EKG but patient converted back to normal sinus rhythm.  Continue Lopressor 5 mg IV every 8 hours.    Hypothyroidism.  Continue Synthroid.    Chronic pain syndrome.  Continue Requip and gabapentin.    Lovenox for deep vein thrombosis prophylaxis.    Medical Decision Making  Number and Complexity of problems: 7  Sepsis due to E. coli UTI, bilateral pyelonephritis.  Acute, high complexity  Bilateral pyelonephritis due to E. Coli: Acute, high complexity  Left upper pole renal lesion: Acute on chronic, high complexity  Hypokalemia: Acute, high complexity  Tachycardia: Acute, moderate  complexity  Hypothyroidism: Chronic, low complexity  Chronic pain syndrome: Chronic, low complexity    Differential Diagnosis: None    Conditions and Status        Condition is unchanged.     MDM Data  External documents reviewed: ER records  4/28/23  Cardiac tracing (EKG, telemetry) interpretation: Normal sinus rhythm 80 on telemetry  Radiology interpretation: CT abdomen radiology interpretation reviewed  Labs reviewed:   CBC 5/5/2023.  Repeat CBC in a.m.  BMP 5/5/2023.  Repeat BMP in AM.  Procalcitonin, magnesium, lactate.  Blood cultures, urine culture    Any tests that were considered but not ordered: MRI abdomen     Decision rules/scores evaluated (example [id number], Wells, etc): None     Discussed with: Dr. Medina, Dr. Hart, and patient.     Care Planning  Shared decision making: Dr. Medina, Dr. Hart and patient.  Patient agrees to IV antibiotics, drainage tube if recommended by urology.   Code status and discussions: Full code  The patient surrogate decision maker is her granddaughter, Antonia Lopez.    Disposition  Social Determinants of Health that impact treatment or disposition: None  I expect the patient to be discharged to home in 2-3 days.     Electronically signed by CARRIE Carson, 05/05/23, 10:08 CDT.    This visit was performed by both a physician and an APC. I personally evaluated and examined the patient. I performed all aspects of the MDM as documented.    Discussed with her nurse, Deb.    She had a run of SVT last night and also again this morning.  I did put her on IV Lopressor last night.  We will try to obtain a twelve-lead EKG at the time of an event if it happens again.  Nicole discussed this with the nursing staff.    Discussed with Dr. Hart yesterday.  He wants to continue to observe on IV antibiotic therapy.  Doubts that this will have to be drained, but may need to be.  Likely to require a prolonged treatment course beyond the 2 weeks that we normally  do for pyelonephritis.  She already has culture data from a few days ago and we will continue with ceftriaxone at 2 g for now.    White blood cell count stable.  Not running fever.  Hemodynamically stable other than her brief episodes of SVT that may be more of a chronic problem for her.    Check TSH.  Obtain 2D echocardiogram.    Electronically signed by Won Medina DO, 5/5/2023, 10:24 CDT.

## 2023-05-06 ENCOUNTER — APPOINTMENT (OUTPATIENT)
Dept: GENERAL RADIOLOGY | Facility: HOSPITAL | Age: 68
DRG: 872 | End: 2023-05-06
Payer: MEDICARE

## 2023-05-06 PROBLEM — A41.51 SEPSIS DUE TO ESCHERICHIA COLI: Status: ACTIVE | Noted: 2023-05-06

## 2023-05-06 PROBLEM — I47.10 PAROXYSMAL SUPRAVENTRICULAR TACHYCARDIA: Status: ACTIVE | Noted: 2023-05-06

## 2023-05-06 PROBLEM — I47.1 PAROXYSMAL SUPRAVENTRICULAR TACHYCARDIA: Status: ACTIVE | Noted: 2023-05-06

## 2023-05-06 LAB
ANION GAP SERPL CALCULATED.3IONS-SCNC: 9 MMOL/L (ref 5–15)
ARTERIAL PATENCY WRIST A: POSITIVE
ATMOSPHERIC PRESS: 751 MMHG
BASE EXCESS BLDA CALC-SCNC: 3.3 MMOL/L (ref 0–2)
BASOPHILS # BLD AUTO: 0.04 10*3/MM3 (ref 0–0.2)
BASOPHILS NFR BLD AUTO: 0.2 % (ref 0–1.5)
BDY SITE: ABNORMAL
BODY TEMPERATURE: 37 C
BUN SERPL-MCNC: 6 MG/DL (ref 8–23)
BUN/CREAT SERPL: 9.2 (ref 7–25)
CALCIUM SPEC-SCNC: 8.2 MG/DL (ref 8.6–10.5)
CHLORIDE SERPL-SCNC: 103 MMOL/L (ref 98–107)
CO2 SERPL-SCNC: 28 MMOL/L (ref 22–29)
CREAT SERPL-MCNC: 0.65 MG/DL (ref 0.57–1)
DEPRECATED RDW RBC AUTO: 50.2 FL (ref 37–54)
EGFRCR SERPLBLD CKD-EPI 2021: 97.8 ML/MIN/1.73
EOSINOPHIL # BLD AUTO: 0.43 10*3/MM3 (ref 0–0.4)
EOSINOPHIL NFR BLD AUTO: 2.5 % (ref 0.3–6.2)
ERYTHROCYTE [DISTWIDTH] IN BLOOD BY AUTOMATED COUNT: 14.4 % (ref 12.3–15.4)
GAS FLOW AIRWAY: 3.5 LPM
GLUCOSE SERPL-MCNC: 99 MG/DL (ref 65–99)
HCO3 BLDA-SCNC: 30.3 MMOL/L (ref 20–26)
HCT VFR BLD AUTO: 33.4 % (ref 34–46.6)
HGB BLD-MCNC: 9.7 G/DL (ref 12–15.9)
IMM GRANULOCYTES # BLD AUTO: 0.34 10*3/MM3 (ref 0–0.05)
IMM GRANULOCYTES NFR BLD AUTO: 2 % (ref 0–0.5)
LYMPHOCYTES # BLD AUTO: 0.97 10*3/MM3 (ref 0.7–3.1)
LYMPHOCYTES NFR BLD AUTO: 5.6 % (ref 19.6–45.3)
Lab: ABNORMAL
MCH RBC QN AUTO: 27.7 PG (ref 26.6–33)
MCHC RBC AUTO-ENTMCNC: 29 G/DL (ref 31.5–35.7)
MCV RBC AUTO: 95.4 FL (ref 79–97)
MODALITY: ABNORMAL
MONOCYTES # BLD AUTO: 0.87 10*3/MM3 (ref 0.1–0.9)
MONOCYTES NFR BLD AUTO: 5.1 % (ref 5–12)
NEUTROPHILS NFR BLD AUTO: 14.53 10*3/MM3 (ref 1.7–7)
NEUTROPHILS NFR BLD AUTO: 84.6 % (ref 42.7–76)
NRBC BLD AUTO-RTO: 0 /100 WBC (ref 0–0.2)
PCO2 BLDA: 58.1 MM HG (ref 35–45)
PCO2 TEMP ADJ BLD: 58.1 MM HG (ref 35–45)
PH BLDA: 7.33 PH UNITS (ref 7.35–7.45)
PH, TEMP CORRECTED: 7.33 PH UNITS (ref 7.35–7.45)
PLATELET # BLD AUTO: 371 10*3/MM3 (ref 140–450)
PMV BLD AUTO: 10.5 FL (ref 6–12)
PO2 BLDA: 56 MM HG (ref 83–108)
PO2 TEMP ADJ BLD: 56 MM HG (ref 83–108)
POTASSIUM SERPL-SCNC: 5.1 MMOL/L (ref 3.5–5.2)
QT INTERVAL: 336 MS
QTC INTERVAL: 482 MS
RBC # BLD AUTO: 3.5 10*6/MM3 (ref 3.77–5.28)
SAO2 % BLDCOA: 89.7 % (ref 94–99)
SODIUM SERPL-SCNC: 140 MMOL/L (ref 136–145)
VENTILATOR MODE: ABNORMAL
WBC NRBC COR # BLD: 17.18 10*3/MM3 (ref 3.4–10.8)

## 2023-05-06 PROCEDURE — 25010000002 CEFTRIAXONE PER 250 MG: Performed by: NURSE PRACTITIONER

## 2023-05-06 PROCEDURE — 36600 WITHDRAWAL OF ARTERIAL BLOOD: CPT

## 2023-05-06 PROCEDURE — 0 HYDROMORPHONE 1 MG/ML SOLUTION: Performed by: INTERNAL MEDICINE

## 2023-05-06 PROCEDURE — 85025 COMPLETE CBC W/AUTO DIFF WBC: CPT | Performed by: INTERNAL MEDICINE

## 2023-05-06 PROCEDURE — 71046 X-RAY EXAM CHEST 2 VIEWS: CPT

## 2023-05-06 PROCEDURE — 94799 UNLISTED PULMONARY SVC/PX: CPT

## 2023-05-06 PROCEDURE — 99222 1ST HOSP IP/OBS MODERATE 55: CPT | Performed by: INTERNAL MEDICINE

## 2023-05-06 PROCEDURE — 99232 SBSQ HOSP IP/OBS MODERATE 35: CPT | Performed by: UROLOGY

## 2023-05-06 PROCEDURE — 80048 BASIC METABOLIC PNL TOTAL CA: CPT | Performed by: INTERNAL MEDICINE

## 2023-05-06 PROCEDURE — 82803 BLOOD GASES ANY COMBINATION: CPT

## 2023-05-06 PROCEDURE — 25010000002 FUROSEMIDE PER 20 MG: Performed by: NURSE PRACTITIONER

## 2023-05-06 RX ORDER — FUROSEMIDE 10 MG/ML
40 INJECTION INTRAMUSCULAR; INTRAVENOUS ONCE
Status: COMPLETED | OUTPATIENT
Start: 2023-05-06 | End: 2023-05-06

## 2023-05-06 RX ORDER — NICOTINE 21 MG/24HR
1 PATCH, TRANSDERMAL 24 HOURS TRANSDERMAL
Status: DISCONTINUED | OUTPATIENT
Start: 2023-05-06 | End: 2023-05-07 | Stop reason: HOSPADM

## 2023-05-06 RX ORDER — GABAPENTIN 300 MG/1
300 CAPSULE ORAL EVERY 12 HOURS SCHEDULED
Status: DISCONTINUED | OUTPATIENT
Start: 2023-05-06 | End: 2023-05-07 | Stop reason: HOSPADM

## 2023-05-06 RX ORDER — METOPROLOL TARTRATE 5 MG/5ML
5 INJECTION INTRAVENOUS EVERY 8 HOURS
Status: DISCONTINUED | OUTPATIENT
Start: 2023-05-06 | End: 2023-05-06

## 2023-05-06 RX ADMIN — GABAPENTIN 300 MG: 300 CAPSULE ORAL at 09:24

## 2023-05-06 RX ADMIN — METOPROLOL TARTRATE 5 MG: 5 INJECTION INTRAVENOUS at 05:01

## 2023-05-06 RX ADMIN — HYDROXYZINE HYDROCHLORIDE 25 MG: 25 TABLET ORAL at 17:19

## 2023-05-06 RX ADMIN — METOPROLOL TARTRATE 25 MG: 25 TABLET, FILM COATED ORAL at 09:23

## 2023-05-06 RX ADMIN — LEVOTHYROXINE SODIUM 112 MCG: 112 TABLET ORAL at 05:01

## 2023-05-06 RX ADMIN — GABAPENTIN 300 MG: 300 CAPSULE ORAL at 22:25

## 2023-05-06 RX ADMIN — DICYCLOMINE HYDROCHLORIDE 20 MG: 20 TABLET ORAL at 09:24

## 2023-05-06 RX ADMIN — HYDROMORPHONE HYDROCHLORIDE 1 MG: 1 INJECTION, SOLUTION INTRAMUSCULAR; INTRAVENOUS; SUBCUTANEOUS at 20:43

## 2023-05-06 RX ADMIN — IPRATROPIUM BROMIDE AND ALBUTEROL SULFATE 3 ML: .5; 3 SOLUTION RESPIRATORY (INHALATION) at 04:44

## 2023-05-06 RX ADMIN — TIZANIDINE 4 MG: 4 TABLET ORAL at 22:25

## 2023-05-06 RX ADMIN — HYDROCODONE BITARTRATE AND ACETAMINOPHEN 1 TABLET: 10; 325 TABLET ORAL at 23:39

## 2023-05-06 RX ADMIN — HYDROMORPHONE HYDROCHLORIDE 1 MG: 1 INJECTION, SOLUTION INTRAMUSCULAR; INTRAVENOUS; SUBCUTANEOUS at 14:02

## 2023-05-06 RX ADMIN — HYDROCODONE BITARTRATE AND ACETAMINOPHEN 1 TABLET: 10; 325 TABLET ORAL at 16:02

## 2023-05-06 RX ADMIN — CEFTRIAXONE SODIUM 2 G: 2 INJECTION, POWDER, FOR SOLUTION INTRAMUSCULAR; INTRAVENOUS at 09:24

## 2023-05-06 RX ADMIN — HYDROCODONE BITARTRATE AND ACETAMINOPHEN 1 TABLET: 10; 325 TABLET ORAL at 05:05

## 2023-05-06 RX ADMIN — METOPROLOL TARTRATE 25 MG: 25 TABLET, FILM COATED ORAL at 20:35

## 2023-05-06 RX ADMIN — ROPINIROLE HYDROCHLORIDE 0.25 MG: 0.25 TABLET, FILM COATED ORAL at 20:35

## 2023-05-06 RX ADMIN — DICYCLOMINE HYDROCHLORIDE 20 MG: 20 TABLET ORAL at 20:35

## 2023-05-06 RX ADMIN — MIRTAZAPINE 15 MG: 15 TABLET, FILM COATED ORAL at 20:35

## 2023-05-06 RX ADMIN — HYDROXYZINE HYDROCHLORIDE 25 MG: 25 TABLET ORAL at 05:05

## 2023-05-06 RX ADMIN — FUROSEMIDE 40 MG: 10 INJECTION, SOLUTION INTRAVENOUS at 15:56

## 2023-05-06 RX ADMIN — TIZANIDINE 4 MG: 4 TABLET ORAL at 09:23

## 2023-05-06 NOTE — PLAN OF CARE
Goal Outcome Evaluation:  Plan of Care Reviewed With: patient, family        Progress: declining  Outcome Evaluation: a/ox4, high anxiety, 2L NC, RT treatments, EKG, cardio consult, PRNs given for pain, IV ABX, IV lasix,

## 2023-05-06 NOTE — PROGRESS NOTES
[facility] Medicine Services  INPATIENT PROGRESS NOTE    Patient Name: Ashley Gibbs  Date of Admission: 5/3/2023  Today's Date: 05/06/23  Length of Stay: 3  Primary Care Physician: Padmaja Bermudez APRN    Subjective   Chief Complaint: Fever, bilateral flank pain  HPI   Ms. Gibbs presented to ER 5/3/23 with worsening bilateral flank pain with subjective fever and chills.  Patient evaluated in ED on 4/28 for fever and dysuria.  She was diagnosed with urinary tract infection and discharged on oral Cipro.  Patient reported subjective fever 101.3, bilateral flank pain worse on the left associated with nausea but no vomiting, fever and chills..  She reported dysuria and frequency.  Family member reported patient had been diagnosed with renal cyst mass and was advised to have follow-up imaging studies but no repeat imaging studies in the last several months.  Procalcitonin 1.13, potassium 3.2, urinalysis 13-20 WBC, WBC 20.46.  CT abdomen pelvis noted a few areas of corticomedullary differential blurring in both kidneys concerning for bilateral pyelonephritis.  Short interval increase in size of 6 cm exophytic left upper pole renal lesion with new interval dense components [place].  Suspect acute hemorrhage into renal cyst.  Temperature 100.1, heart rate greater than 100.  WBC 20.46.  Normal saline fluid bolus, cefepime given in ER.    Patient seen earlier this morning as she was having frequent episodes of SVT with heart rates up to 160 and even greater than 200.  She is asymptomatic.  These episodes are short lived and paroxysmal.  She was started on IV Lopressor today.  An echocardiogram yesterday showed EF 66-70%.  Cardiology was consulted who agreed with PSVT.  IV Lopressor transitioned to oral Lopressor twice daily.  Patient indicated to cardiology she has had fluttering in the past only short-lived.  She complains of no chest pain.  Discussed with Dr. Hart today  recommends continuing Rocephin.  WBC trending downward.  He did discuss possible percutaneous drainage and even surgical intervention but at this time patient not interested in either procedure.  Low-grade temperature 100.  Add nicotine patch.  Blood cultures remain no growth at 2 days.  Urine culture 5/3 no growth but urine culture from 4/28 E. Coli.    Review of Systems   Constitutional: Positive for appetite change and fatigue. Negative for chills and fever.   HENT: Negative for congestion and trouble swallowing.    Eyes: Negative for photophobia and visual disturbance.   Respiratory: Negative for cough, shortness of breath and wheezing.    Cardiovascular: Negative for chest pain, palpitations and leg swelling.   Gastrointestinal: Negative for diarrhea, nausea and vomiting.   Endocrine: Negative for cold intolerance, heat intolerance and polyuria.   Genitourinary: Positive for dysuria, flank pain and frequency.   Musculoskeletal: Negative for gait problem.   Skin: Negative for color change, pallor, rash and wound.   Allergic/Immunologic: Negative for immunocompromised state.   Neurological: Positive for weakness. Negative for light-headedness.   Hematological: Negative for adenopathy. Does not bruise/bleed easily.   Psychiatric/Behavioral: Negative for agitation, behavioral problems and confusion.      All pertinent negatives and positives are as above. All other systems have been reviewed and are negative unless otherwise stated.     Objective    Temp:  [97.5 °F (36.4 °C)-100.8 °F (38.2 °C)] 98.4 °F (36.9 °C)  Heart Rate:  [] 91  Resp:  [18-32] 18  BP: ()/(52-98) 96/52  Physical Exam  Vitals and nursing note reviewed.   Constitutional:       Comments: Up in bed.  Oxygen down to 2 L.  No visitors in room.   HENT:      Head: Normocephalic.      Nose: No congestion.      Mouth/Throat:      Pharynx: Oropharynx is clear. No oropharyngeal exudate or posterior oropharyngeal erythema.   Eyes:       Extraocular Movements: Extraocular movements intact.      Pupils: Pupils are equal, round, and reactive to light.   Cardiovascular:      Rate and Rhythm: Normal rate and regular rhythm.      Heart sounds: No murmur heard.     Comments: Normal sinus rhythm 80 on telemetry.  Patient having frequent episodes of PSVT 160-200.      Pulmonary:      Breath sounds: No rhonchi or rales.      Comments: Oxygen at 2 L.  Saturation 96%.  Abdominal:      Palpations: Abdomen is soft.      Tenderness: There is no abdominal tenderness.   Genitourinary:     Comments: Voiding.  Musculoskeletal:         General: No swelling or tenderness.      Cervical back: Normal range of motion and neck supple.   Skin:     General: Skin is warm and dry.   Neurological:      General: No focal deficit present.      Mental Status: She is alert and oriented to person, place, and time.   Psychiatric:         Mood and Affect: Mood normal.         Behavior: Behavior normal.         Thought Content: Thought content normal.         Judgment: Judgment normal.       Results Review:  I have reviewed the labs, radiology results, and diagnostic studies.    Laboratory Data:   Results from last 7 days   Lab Units 05/06/23  0355 05/05/23  0420 05/04/23  0353   WBC 10*3/mm3 17.18* 20.97* 21.99*   HEMOGLOBIN g/dL 9.7* 9.3* 10.4*   HEMATOCRIT % 33.4* 31.0* 33.9*   PLATELETS 10*3/mm3 371 307 310     Results from last 7 days   Lab Units 05/06/23  0355 05/05/23  0420 05/04/23  0353 05/03/23  1927   SODIUM mmol/L 140 137 137 138   POTASSIUM mmol/L 5.1 4.7 3.0* 3.2*   CHLORIDE mmol/L 103 104 101 97*   CO2 mmol/L 28.0 28.0 29.0 33.0*   BUN mg/dL 6* 9 7* 6*   CREATININE mg/dL 0.65 0.74 0.92 0.81   CALCIUM mg/dL 8.2* 7.9* 7.7* 8.7   BILIRUBIN mg/dL  --   --   --  0.3   ALK PHOS U/L  --   --   --  138*   ALT (SGPT) U/L  --   --   --  20   AST (SGOT) U/L  --   --   --  23   GLUCOSE mg/dL 99 104* 107* 95     Culture Data:     05/03/2023 2125 05/04/2023 2146 Blood Culture - Blood,  Arm, Right [[id number]]   Blood from Arm, Right    Preliminary result Component Value   Blood Culture No growth at 24 hours P             05/03/2023 2119 05/04/2023 2146 Blood Culture - Blood, Arm, Left [[id number]]   Blood from Arm, Left    Preliminary result Component Value   Blood Culture No growth at 24 hours           Blood Culture   Date Value Ref Range Status   04/28/2023 No growth at 5 days  Final     Urine Culture   Date Value Ref Range Status   04/28/2023 >100,000 CFU/mL Escherichia coli (A)  Final     Escherichia coli       [facility]     Ampicillin >=32  Resistant     Ampicillin + Sulbactam 4  Susceptible     Cefazolin <=4  Susceptible     Cefepime <=1  Susceptible     Ceftazidime <=1  Susceptible     Ceftriaxone <=1  Susceptible     Gentamicin <=1  Susceptible     Levofloxacin 1  Intermediate     Nitrofurantoin <=16  Susceptible     Piperacillin + Tazobactam <=4  Susceptible     Trimethoprim + Sulfamethoxazole >=320  Resistant                   Imaging Results (All)     Procedure Component Value Units Date/Time    XR Chest PA & Lateral [[id number]] Collected: 05/06/23 1039     Updated: 05/06/23 1043    Narrative:      EXAM/TECHNIQUE: XR CHEST PA AND LATERAL-     INDICATION: Shortness of breath, sepsis     COMPARISON: 04/20/2023     FINDINGS:     Small bilateral pleural effusions with overlying atelectasis. No  pneumothorax. There is diffuse interstitial opacity. Cardiac silhouette  is normal size. No acute osseous finding.       Impression:         1.  Diffuse interstitial opacity, favored to represent mild interstitial  pulmonary edema.  2.  Small bilateral pleural effusions with overlying atelectasis.  This report was finalized on 05/06/2023 10:40 by Dr. Eddie Alicia MD.    CT Abdomen Pelvis With Contrast [[id number]] Collected: 05/03/23 2030     Updated: 05/03/23 2043    Narrative:      EXAM/TECHNIQUE: CT abdomen pelvis with IV contrast     INDICATION: Left-sided abdominal pain     COMPARISON: 04/28/2023      DLP: 235 mGy cm. Automated exposure control was also utilized to  decrease patient radiation dose.     FINDINGS:     Chronic linear opacities in the RIGHT lung base which may be related to  atelectasis or parenchymal scarring.     No suspicious liver lesion. Prior cholecystectomy. Chronic dilatation of  the common hepatic duct and common bile duct, likely related to  reservoir phenomenon. Pancreas, spleen, and adrenal glands are  unremarkable.     Areas of renal corticomedullary differentiation blurring are present  bilaterally. There is bilateral urothelial thickening in the renal  pelves and ureters. Multiple small bilateral renal cysts. A 6 cm  exophytic LEFT upper pole renal lesion has increased in size (previously  5 cm) and now contains internal heterogeneous content/debris. No focal  urinary bladder wall thickening.     No colonic wall thickening or pericolonic fat stranding. Normal  appendix. No abnormal small bowel distention or evidence of active small  bowel inflammation.     No ascites or free pelvic fluid. No pelvic mass or pelvic collection.  Uterus and adnexa appear unremarkable.     Nonaneurysmal atherosclerotic abdominal aorta. No enlarged  retroperitoneal, mesenteric, pelvic, or inguinal lymph nodes.     No acute abdominal wall soft tissue abnormality. Multilevel lumbar spine  degenerative change. No acute osseous finding.       Impression:         1.  A few areas of corticomedullary differentiation blurring in both  kidneys are present, concerning for bilateral pyelonephritis. Correlate  with urinalysis and physical exam.     2.  Short interval increase in size a 6 cm exophytic LEFT upper pole  renal lesion with new internal dense components/debris. Suspect this  represents acute hemorrhage into a renal cyst. Also in the differential,  superimposed infection within a LEFT renal cyst.     3.  Chronic dilatation of the extrahepatic biliary tree, slightly  increased in caliber from prior exam. This  may represent reservoir  phenomenon given prior cholecystectomy but if there is concern for  biliary obstruction, MRCP would be recommended.  This report was finalized on 05/03/2023 20:40 by Dr. Eddie Alicia MD.        Results for orders placed during the hospital encounter of 05/03/23    Adult Transthoracic Echo Complete W/ Cont if Necessary Per Protocol    Interpretation Summary  •  Left ventricular systolic function is normal. Left ventricular ejection fraction appears to be 66 - 70%.  •  Estimated right ventricular systolic pressure from tricuspid regurgitation is mildly elevated (35-45 mmHg).  •  The right ventricular cavity is mild to moderately dilated, with normal systolic function..  •  The right atrial cavity is dilated.  •  No significant valvular pathology.  •  No prior studies available for comparison.      Schedule medications:  cefTRIAXone, 2 g, Intravenous, Q24H  dicyclomine, 20 mg, Oral, 4x Daily  furosemide, 40 mg, Intravenous, Once  gabapentin, 300 mg, Oral, Q12H  levothyroxine, 112 mcg, Oral, Q AM  metoprolol tartrate, 25 mg, Oral, Q12H  mirtazapine, 15 mg, Oral, Nightly  nicotine, 1 patch, Transdermal, Q24H  rOPINIRole, 0.25 mg, Oral, Nightly  tiZANidine, 4 mg, Oral, BID      I have reviewed the patient's current medications.     Assessment/Plan   Assessment  Active Hospital Problems    Diagnosis    • **Sepsis due to Escherichia coli UTI, bilateral pyelonephritis, present on admission    • Acute bilateral pyelonephritis    • Paroxysmal supraventricular tachycardia    • 6 cm exophytic left upper pole renal lesion    • Hypokalemia    • E. coli UTI, present on admission (urinary tract infection)    • Chronic obstructive pulmonary disease    • Hypothyroidism      Treatment Plan  Sepsis due to E. coli UTI, bilateral pyelonephritis present on admission.  Patient presented with fever, chills, dysuria, frequency.  Patient evaluated on 4/28 for UTI and treated with Cipro.  Urine culture 4/28 positive  "for E. coli.  Temperature 100.1, heart rate greater than 90, WBC 20.46.  Normal saline fluid bolus and cefepime given in ER. Rocephin ordered on admission.  Rocephin 2 g IV every 24 hours.  Follow-up blood cultures no growth at 2 days.  Repeat urine culture 5/3 no growth.  WBC 17.18 today.  Temperature 100.8 at 4 AM.  Afebrile since.  Continue minimal 2 weeks culture sensitive antibiotics.  CBC in AM.    6 cm exophytic left upper pole renal lesion.  CT abdomen showed slight increase in size.  Urology consulted and discussed with Dr. Hart today notes WBC starting to trend downward.  Still with flank pain but some improvement.  Will need 14 days of antibiotic coverage.  Discussed percutaneous drainage and less likely surgical intervention but patient does not want to proceed with either at this point.  Consider repeat CT abdomen if temperature elevation or leukocytosis not improved.    Paroxysmal supraventricular tachycardia.  Frequent episodes of PSVT.  Resting heart rate 80 but increases to 160-200 briefly.  No complaints of chest pain but patient reports intermittent \"fluttering\".  IV Lopressor ordered yesterday.  Echo 5/6/23 EF 66-70%.  Cardiology consulted and suspects chronic issue exacerbated by acute illness.  IV Lopressor switched to Lopressor 25 mg twice daily per Dr. Jackson.  TSH normal.    Hypokalemia.  Potassium 3.2 on admission and 3.0 on 5/4.  Potassium replaced.  Potassium 5.1 today.  BMP in AM.    COPD without exacerbation.  O2 as needed.  Duo nebs every 4 hours as needed for wheezing.  Nurses reported patient awoke during the night restless sat 87 and 93%.  She was placed on oxygen as high as 8 L.  When I saw patient today she had no wheezing.  She was not complaining of shortness of breath.  Oxygen decreased to 2 L.  Suspect mostly anxiety.  Chest x-ray noted interstitial opacity mild interstitial pulmonary edema.  Will give Lasix 40 mg IV x1 dose.  IV fluids discontinued.    Hypothyroidism.  " TSH 1.33.  Continue Synthroid.    Chronic pain syndrome.  Continue Requip and gabapentin.    Tobacco use.  Vapes.  Add nicotine patch.    Lovenox for deep vein thrombosis prophylaxis.    Medical Decision Making  Number and Complexity of problems: 7  Sepsis due to E. coli UTI, bilateral pyelonephritis.  Acute, high complexity  Bilateral pyelonephritis due to E. Coli: Acute, high complexity  Left upper pole renal lesion: Acute on chronic, high complexity  PSVT: Acute, high complexity  Hypokalemia: Acute, high complexity  Hypothyroidism: Chronic, low complexity  Chronic pain syndrome: Chronic, low complexity    Differential Diagnosis: None    Conditions and Status        Condition is unchanged.     [facility] Data  External documents reviewed: ER records  4/28/23  Cardiac tracing (EKG, telemetry) interpretation: Normal sinus rhythm 80 on telemetry  Radiology interpretation: CT abdomen radiology interpretation reviewed  Labs reviewed:   CBC 5/6/2023.  Repeat CBC in a.m.  BMP 5/6/2023.  Repeat BMP in AM.  Procalcitonin, magnesium, lactate.  Blood cultures, urine culture    Any tests that were considered but not ordered: MRI abdomen     Decision rules/scores evaluated (example [id number], Wells, etc): None     Discussed with: Dr. Moreno, Dr. Hart, and patient.     Care Planning  Shared decision making: Dr. Moreno, Dr. Hart and patient.  Patient agrees to IV antibiotics.  Patient declines percutaneous drainage tube.  Agrees to EKG.  Code status and discussions: Full code  The patient surrogate decision maker is her granddaughter, Antonia Lopez.    Disposition  Social Determinants of Health that impact treatment or disposition: None  I expect the patient to be discharged to home in 2-3 days.     Electronically signed by CARRIE Carson, 05/06/23, 14:40 CDT.

## 2023-05-06 NOTE — PLAN OF CARE
Goal Outcome Evaluation:  Plan of Care Reviewed With: patient        Progress: declining  Outcome Evaluation: At approx 0200 pt woke up and attempted to use BR by herself.  Urine and feces were covering the bed, floor, and sides of toilet.  When pt got back to bed she seemed to have difficulty catching her breath.  She also seemed confused and unable to answer questions.  Pt had previously been very drowsy after receiving pain medication and her nighttime meds. Once back to bed and cleaned up, pt's breathing calmed and she went back to sleep.  At approx 0430 lab notified this nurse that pt was requesting something for pain.  Entered the room to find pt breathing very labored and restless.  She stated she was unable to swallow a pill due to her SOA.   Her O2 sat ranged from 87-93% on 3.5LNC.  Call placed to nighttime hospitalist letting him know about her SOA, and temp of 100.8.  Order for ABG obtained.  Call placed to RT.  While waiting for RT pt became increasingly anxious and SOA, though O2 sat was largely unchanged.  RT gave pt a breathing treatment.  After receiving treatment pt was able to take a pain pill and atarax for anxiety.  RT placed pt on 8L [place].  Pt's HR has ranged 91-113bpm, though still having instances of SVT up to 180-190s.  Metoprolol given as ordered.  Bed alarm on and education provided to pt that she cannot get up by herself.

## 2023-05-06 NOTE — PROGRESS NOTES
LOS: 3 days   Patient Care Team:  Padmaja Bermudez APRN as PCP - General (Family Medicine)    Chief Complaint:  Left renal cyst, pyelonephritis    Subjective     Interval History:     Patient Reports: Still having left flank pain but it has improved slightly. Mild nausea.  Patient Denies:  Emesis.   History taken from: patient chart RN    Review of Systems  Pertinent items are noted in HPI, all other systems reviewed and negative     Objective     Vital Signs  Temp:  [97.5 °F (36.4 °C)-100.8 °F (38.2 °C)] 98.5 °F (36.9 °C)  Heart Rate:  [] 99  Resp:  [18-32] 18  BP: ()/(56-98) 124/93    Physical Exam:  Improved since yesterday. Patient looks much better than she has in the last 2 days. Still with left CVA tenderness but less on exam.     Data Review:       I have reviewed the following data:    Plan of Care by Jovana Avendano RN (05/06/2023 06:01)   Blood Culture - Blood, Arm, Right (05/03/2023 21:25)   Blood Culture - Blood, Arm, Left (05/03/2023 21:19)   Urine Culture - Urine, Urine, Clean Catch (05/03/2023 19:24)   CT Abdomen Pelvis With Contrast (05/03/2023 20:26)   CT Abdomen Pelvis With Contrast (04/28/2023 21:25)   US RENAL COMPLETE (03/09/2018 10:55 EST)       Medication Review:     Current Facility-Administered Medications:   •  acetaminophen (TYLENOL) tablet 650 mg, 650 mg, Oral, Q6H PRN, Nicole Galloway APRN  •  cefTRIAXone (ROCEPHIN) 2 g in sodium chloride 0.9 % 100 mL IVPB, 2 g, Intravenous, Q24H, Nicole Galloway APRN, Last Rate: 200 mL/hr at 05/05/23 0812, 2 g at 05/05/23 0812  •  dicyclomine (BENTYL) tablet 20 mg, 20 mg, Oral, 4x Daily, Pat Clinton MD, 20 mg at 05/05/23 2024  •  gabapentin (NEURONTIN) capsule 300 mg, 300 mg, Oral, Q12H, Akil Moreno MD  •  HYDROcodone-acetaminophen (NORCO)  MG per tablet 1 tablet, 1 tablet, Oral, Q6H PRN, Pat Clinton MD, 1 tablet at 05/06/23 0505  •  HYDROmorphone (DILAUDID) injection 1 mg, 1 mg, Intravenous, Q4H PRN,  Pat Clinton MD, 1 mg at 05/05/23 2159  •  hydrOXYzine (ATARAX) tablet 25 mg, 25 mg, Oral, TID PRN, Nicole Galloway APRN, 25 mg at 05/06/23 0505  •  ipratropium-albuterol (DUO-NEB) nebulizer solution 3 mL, 3 mL, Nebulization, Q4H PRN, Pat Clinton MD, 3 mL at 05/06/23 0444  •  levothyroxine (SYNTHROID, LEVOTHROID) tablet 112 mcg, 112 mcg, Oral, Q AM, Pat Clinton MD, 112 mcg at 05/06/23 0501  •  metoprolol tartrate (LOPRESSOR) tablet 25 mg, 25 mg, Oral, Q12H, Luke Jackson MD  •  mirtazapine (REMERON) tablet 15 mg, 15 mg, Oral, Nightly, Pat Clinton MD, 15 mg at 05/05/23 2024  •  ondansetron (ZOFRAN) injection 4 mg, 4 mg, Intravenous, Q6H PRN, Pat Clinton MD  •  rOPINIRole (REQUIP) tablet 0.25 mg, 0.25 mg, Oral, Nightly, Pat Clinton MD, 0.25 mg at 05/05/23 2024  •  [COMPLETED] Insert Peripheral IV, , , Once **AND** sodium chloride 0.9 % flush 10 mL, 10 mL, Intravenous, PRN, Marquise Cortez MD  •  tiZANidine (ZANAFLEX) tablet 4 mg, 4 mg, Oral, BID, Pat Clinton MD, 4 mg at 05/05/23 2024    Assessment and Plan:    Renal cyst, pyelonephritis: Likely hemorrhagic cyst with superimposed infection, or infection causing bleeding into the cyst. Improving pain and exam. Continue broad spectrum antibiotics. Will need at least 14 days of antibiotic coverage. Discussed percutaneous drainage again and (less likely) surgical intervention. Patient does not want to proceed with either at this point. She reports that she is feeling better than she has. Leukocytosis improving.    SCDs not on patient. Nursing and patient made aware to keep SCDs on in bed.    Patient can ambulate to commode but otherwise in bed or up in chair. Discussed with patient.     URO DISPO: Urology will continue to follow this patient.    I discussed the patient's findings and my recommendations with patient and nursing staff    (Please note that portions of this note were completed with a voice recognition  program.)    Jarrett Hart MD  05/06/23  09:10 CDT    Time: Time spent: 30 minutes spent performing evaluation and management, chart review, and discussion with patient, > 50% of time spent in face-to-face encounter

## 2023-05-06 NOTE — CONSULTS
Patient Care Team:  Padmaja Bermudez APRN as PCP - General (Family Medicine)  Pat Clinton MD  REASON FOR REFERRAL: SVT  Chief complaint : flank pain     Subjective     Patient is a 67 y.o. female presents with bilateral flank pain, fever, chills.  She had been in the ED on 4/28 for fever and dysuria and was diagnosed with a UTI and discharged with oral antibiotics.  However, she continued to experience flank pain and fever and return to the hospital.  Work-up revealed evidence of leukocytosis, fever, pyelonephritis, and renal cyst with possible associated hemorrhage.  Urology and hospitalist are treating this accordingly.    Cardiology has been consulted for SVT.  Per review of telemetry, she is in normal sinus rhythm with resting heart rates mostly in the 80s, with frequent short bursts of SVT with heart rates reaching anywhere from 120s to 204.  She also has frequent PACs.     At the time of my exam, the patient is tearful, anxious and continues to complain of flank pain.  She tells me she has been experiencing intermittent fluttering in her chest, typically daily, for several years.  She denies any prior cardiac history and reports that she has never seen a cardiologist.  She states this fluttering typically does not bother her, but if it sustains for period of time she may have to rest and put her head between her legs for this to resolve.  She denies any chest pain, edema, rapid weight gain.  She does have chronic shortness of breath and wears oxygen intermittently at home.  She reports that she has COPD, quit smoking several years ago but continues to vape.  She admits that she has been more short of breath this admission and she associates this with her flank pain.      Echocardiogram has completed this admission-see results below.    She does have hypothyroidism.  TSH is normal.    Most recent potassium 5.1, magnesium 1.8.    Chest x-ray revealed mild pleural effusions and mild pulmonary edema  per report.    Until today, she has been receiving intermittent IV Lopressor 5 mg and has received approximately 5 doses.  After reviewing telemetry, Dr. Jackson did replace this with Lopressor 25 mg twice daily and she received her first dose this morning.  So far, I do not appreciate a change in her frequent bursts of atrial tachycardia.    She has been receiving DuoNebs as well    Review of Systems   Review of Systems   Constitutional: Positive for fever. Negative for diaphoresis, fatigue and unexpected weight change.   HENT: Negative for nosebleeds.    Respiratory: Positive for shortness of breath. Negative for apnea, cough, chest tightness and wheezing.    Cardiovascular: Positive for palpitations. Negative for chest pain and leg swelling.   Gastrointestinal: Negative for abdominal distention, nausea and vomiting.   Genitourinary: Positive for flank pain. Negative for hematuria.   Musculoskeletal: Negative for gait problem.   Skin: Negative for color change.   Neurological: Negative for dizziness, syncope, weakness and light-headedness.       History  Past Medical History:   Diagnosis Date   • Back pain    • COPD (chronic obstructive pulmonary disease)    • Dependence on supplemental oxygen    • Disease of thyroid gland    • History of degenerative disc disease    • Restless leg    • Scoliosis      Past Surgical History:   Procedure Laterality Date   • CHOLECYSTECTOMY     • TUBAL ABDOMINAL LIGATION       History reviewed. No pertinent family history.  Social History     Tobacco Use   • Smoking status: Former     Packs/day: 1.00     Years: 40.00     Pack years: 40.00     Types: Cigarettes     Quit date: 5/1/2008     Years since quitting: 15.0   • Smokeless tobacco: Never   Vaping Use   • Vaping Use: Every day   • Substances: Nicotine   • Devices: Pre-filled pod   Substance Use Topics   • Alcohol use: Defer   • Drug use: Defer     Medications Prior to Admission   Medication Sig Dispense Refill Last Dose   •  Budeson-Glycopyrrol-Formoterol (Breztri Aerosphere) 160-9-4.8 MCG/ACT aerosol inhaler Inhale 2 puffs 2 (Two) Times a Day. 10.7 g 11 5/3/2023   • ciprofloxacin (CIPRO) 500 MG tablet Take 1 tablet by mouth 2 (Two) Times a Day for 7 days. 14 tablet 0 5/3/2023   • citalopram (CeleXA) 40 MG tablet Take 1 tablet by mouth Daily. 90 tablet 1 5/3/2023   • gabapentin (NEURONTIN) 600 MG tablet Take 1 tablet by mouth Every 12 (Twelve) Hours.   5/3/2023   • HYDROcodone-acetaminophen (NORCO)  MG per tablet Take 1 tablet by mouth 3 (Three) Times a Day.   5/3/2023   • levothyroxine (SYNTHROID, LEVOTHROID) 112 MCG tablet Take 1 tablet by mouth Daily. 90 tablet 1 5/3/2023   • mirtazapine (REMERON) 15 MG tablet Take 1 tablet by mouth every night at bedtime. 90 tablet 1 5/2/2023   • modafinil (Provigil) 100 MG tablet Take 1 tablet by mouth Daily. 90 tablet 0 5/3/2023   • ondansetron ODT (ZOFRAN-ODT) 4 MG disintegrating tablet Place 1 tablet on the tongue Every 6 (Six) Hours As Needed for Nausea. 12 tablet 0 Past Week   • rOPINIRole (REQUIP) 0.25 MG tablet Take 1 tablet by mouth every night at bedtime. 90 tablet 1 5/2/2023   • tiZANidine (ZANAFLEX) 2 MG tablet Take 1 tablet by mouth 2 (Two) Times a Day As Needed for Muscle Spasms.   5/3/2023   • albuterol sulfate HFA (Ventolin HFA) 108 (90 Base) MCG/ACT inhaler Inhale 2 puffs Every 4 (Four) Hours As Needed for Wheezing.      • ascorbic acid (VITAMIN C) 500 MG tablet Take 1 tablet by mouth Daily.      • Cholecalciferol 25 MCG (1000 UT) tablet Take 1 tablet by mouth Daily.      • rizatriptan (MAXALT) 10 MG tablet Take 1 tablet by mouth 1 (One) Time As Needed for Migraine.   More than a month       Current Facility-Administered Medications:   •  acetaminophen (TYLENOL) tablet 650 mg, 650 mg, Oral, Q6H PRN, Nicole Galloway, APRN  •  cefTRIAXone (ROCEPHIN) 2 g in sodium chloride 0.9 % 100 mL IVPB, 2 g, Intravenous, Q24H, Nicole Galloway, APRN, Last Rate: 200 mL/hr at 05/06/23 0924,  2 g at 05/06/23 0924  •  dicyclomine (BENTYL) tablet 20 mg, 20 mg, Oral, 4x Daily, Pat Clinton MD, 20 mg at 05/06/23 0924  •  gabapentin (NEURONTIN) capsule 300 mg, 300 mg, Oral, Q12H, Akil Moreno MD, 300 mg at 05/06/23 0924  •  HYDROcodone-acetaminophen (NORCO)  MG per tablet 1 tablet, 1 tablet, Oral, Q6H PRN, Pat Clinton MD, 1 tablet at 05/06/23 0505  •  HYDROmorphone (DILAUDID) injection 1 mg, 1 mg, Intravenous, Q4H PRN, Pat Clinton MD, 1 mg at 05/05/23 2159  •  hydrOXYzine (ATARAX) tablet 25 mg, 25 mg, Oral, TID PRN, Nicole Galloway, APRN, 25 mg at 05/06/23 0505  •  ipratropium-albuterol (DUO-NEB) nebulizer solution 3 mL, 3 mL, Nebulization, Q4H PRN, Pat Clinton MD, 3 mL at 05/06/23 0444  •  levothyroxine (SYNTHROID, LEVOTHROID) tablet 112 mcg, 112 mcg, Oral, Q AM, Pat Clinton MD, 112 mcg at 05/06/23 0501  •  metoprolol tartrate (LOPRESSOR) tablet 25 mg, 25 mg, Oral, Q12H, Luke Jackson MD, 25 mg at 05/06/23 0923  •  mirtazapine (REMERON) tablet 15 mg, 15 mg, Oral, Nightly, Pat Clinton MD, 15 mg at 05/05/23 2024  •  ondansetron (ZOFRAN) injection 4 mg, 4 mg, Intravenous, Q6H PRN, Pat Clinton MD  •  rOPINIRole (REQUIP) tablet 0.25 mg, 0.25 mg, Oral, Nightly, Pat Clinton MD, 0.25 mg at 05/05/23 2024  •  [COMPLETED] Insert Peripheral IV, , , Once **AND** sodium chloride 0.9 % flush 10 mL, 10 mL, Intravenous, PRN, Marquise Cortez MD  •  tiZANidine (ZANAFLEX) tablet 4 mg, 4 mg, Oral, BID, Pat Clinton MD, 4 mg at 05/06/23 0923  Allergies:  Codeine    Objective     Vital Signs  Temp:  [97.5 °F (36.4 °C)-100.8 °F (38.2 °C)] 98.4 °F (36.9 °C)  Heart Rate:  [] 91  Resp:  [18-32] 18  BP: ()/(52-98) 96/52    Physical Exam:   Vitals and nursing note reviewed.   Constitutional:       General: Not in acute distress.     Appearance: Well-developed and not in distress. Frail. Chronically ill-appearing and acutely ill-appearing. Not  diaphoretic.   Neck:      Vascular: No JVD.   Pulmonary:      Effort: Pulmonary effort is normal. No respiratory distress.      Breath sounds: Decreased breath sounds present.      Comments: 3L NC in place   Cardiovascular:      Tachycardia present. Regular rhythm.      Murmurs: There is no murmur.   Edema:     Peripheral edema absent.   Abdominal:      Tenderness: There is no abdominal tenderness.   Skin:     General: Skin is warm and dry.   Neurological:      Mental Status: Alert and oriented to person, place, and time.   Psychiatric:         Mood and Affect: Mood is anxious. Affect is tearful.       Results Review:     Lab Results (last 72 hours)     Procedure Component Value Units Date/Time    Blood Gas, Arterial - [[id number]]  (Abnormal) Collected: 05/06/23 0504    Specimen: Arterial Blood Updated: 05/06/23 0503     Site Left Radial     Fabrice's Test Positive     pH, Arterial 7.326 pH units      Comment: 84 Value below reference range        pCO2, Arterial 58.1 mm Hg      Comment: 83 Value above reference range        pO2, Arterial 56.0 mm Hg      Comment: 84 Value below reference range        HCO3, Arterial 30.3 mmol/L      Comment: 83 Value above reference range        Base Excess, Arterial 3.3 mmol/L      Comment: 83 Value above reference range        O2 Saturation, Arterial 89.7 %      Comment: 84 Value below reference range        Temperature 37.0 C      Barometric Pressure for Blood Gas 751 mmHg      Modality Nasal Cannula     Flow Rate 3.5 lpm      Ventilator Mode NA     Collected by [id number]     Comment: Meter: [id number]     :  DCHESEBR        pCO2, Temperature Corrected 58.1 mm Hg      pH, Temp Corrected 7.326 pH Units      pO2, Temperature Corrected 56.0 mm Hg     Basic Metabolic Panel [[id number]]  (Abnormal) Collected: 05/06/23 0355    Specimen: Blood Updated: 05/06/23 0435     Glucose 99 mg/dL      BUN 6 mg/dL      Creatinine 0.65 mg/dL      Sodium 140 mmol/L      Potassium 5.1 mmol/L       Chloride 103 mmol/L      CO2 28.0 mmol/L      Calcium 8.2 mg/dL      BUN/Creatinine Ratio 9.2     Anion Gap 9.0 mmol/L      eGFR 97.8 mL/min/1.73     Narrative:      GFR Normal >60  Chronic Kidney Disease <60  Kidney Failure <15      CBC & Differential [[id number]]  (Abnormal) Collected: 05/06/23 0355    Specimen: Blood Updated: 05/06/23 0416    Narrative:      The following orders were created for panel order CBC & Differential.  Procedure                               Abnormality         Status                     ---------                               -----------         ------                     CBC Auto Differential[[id number]]        Abnormal            Final result                 Please view results for these tests on the individual orders.    CBC Auto Differential [[id number]]  (Abnormal) Collected: 05/06/23 0355    Specimen: Blood Updated: 05/06/23 0416     WBC 17.18 10*3/mm3      RBC 3.50 10*6/mm3      Hemoglobin 9.7 g/dL      Hematocrit 33.4 %      MCV 95.4 fL      MCH 27.7 pg      MCHC 29.0 g/dL      RDW 14.4 %      RDW-SD 50.2 fl      MPV 10.5 fL      Platelets 371 10*3/mm3      Neutrophil % 84.6 %      Lymphocyte % 5.6 %      Monocyte % 5.1 %      Eosinophil % 2.5 %      Basophil % 0.2 %      Immature Grans % 2.0 %      Neutrophils, Absolute 14.53 10*3/mm3      Lymphocytes, Absolute 0.97 10*3/mm3      Monocytes, Absolute 0.87 10*3/mm3      Eosinophils, Absolute 0.43 10*3/mm3      Basophils, Absolute 0.04 10*3/mm3      Immature Grans, Absolute 0.34 10*3/mm3      nRBC 0.0 /100 WBC     Blood Culture - Blood, Arm, Left [[id number]]  (Normal) Collected: 05/03/23 2119    Specimen: Blood from Arm, Left Updated: 05/05/23 2145     Blood Culture No growth at 2 days    Blood Culture - Blood, Arm, Right [[id number]]  (Normal) Collected: 05/03/23 2125    Specimen: Blood from Arm, Right Updated: 05/05/23 2145     Blood Culture No growth at 2 days    Urine Culture - Urine, Urine, Clean Catch [[id number]]  (Normal)  Collected: 05/03/23 1924    Specimen: Urine, Clean Catch Updated: 05/05/23 1611     Urine Culture No growth    TSH [[id number]]  (Normal) Collected: 05/05/23 0420    Specimen: Blood Updated: 05/05/23 1140     TSH 1.330 uIU/mL     Basic Metabolic Panel [[id number]]  (Abnormal) Collected: 05/05/23 0420    Specimen: Blood Updated: 05/05/23 0510     Glucose 104 mg/dL      BUN 9 mg/dL      Creatinine 0.74 mg/dL      Sodium 137 mmol/L      Potassium 4.7 mmol/L      Chloride 104 mmol/L      CO2 28.0 mmol/L      Calcium 7.9 mg/dL      BUN/Creatinine Ratio 12.2     Anion Gap 5.0 mmol/L      eGFR 89.9 mL/min/1.73     Narrative:      GFR Normal >60  Chronic Kidney Disease <60  Kidney Failure <15      CBC & Differential [[id number]]  (Abnormal) Collected: 05/05/23 0420    Specimen: Blood Updated: 05/05/23 0446    Narrative:      The following orders were created for panel order CBC & Differential.  Procedure                               Abnormality         Status                     ---------                               -----------         ------                     CBC Auto Differential[[id number]]        Abnormal            Final result                 Please view results for these tests on the individual orders.    CBC Auto Differential [[id number]]  (Abnormal) Collected: 05/05/23 0420    Specimen: Blood Updated: 05/05/23 0446     WBC 20.97 10*3/mm3      RBC 3.36 10*6/mm3      Hemoglobin 9.3 g/dL      Hematocrit 31.0 %      MCV 92.3 fL      MCH 27.7 pg      MCHC 30.0 g/dL      RDW 14.3 %      RDW-SD 48.5 fl      MPV 10.1 fL      Platelets 307 10*3/mm3      Neutrophil % 82.2 %      Lymphocyte % 5.3 %      Monocyte % 6.6 %      Eosinophil % 1.4 %      Basophil % 0.3 %      Immature Grans % 4.2 %      Neutrophils, Absolute 17.23 10*3/mm3      Lymphocytes, Absolute 1.11 10*3/mm3      Monocytes, Absolute 1.38 10*3/mm3      Eosinophils, Absolute 0.30 10*3/mm3      Basophils, Absolute 0.06 10*3/mm3      Immature Grans, Absolute 0.89  10*3/mm3      nRBC 0.0 /100 WBC     POC Glucose Once [[id number]]  (Normal) Collected: 05/04/23 1436    Specimen: Blood Updated: 05/04/23 1447     Glucose 110 mg/dL      Comment: : Constance Gonzalezer ID: [id number]       Basic Metabolic Panel [[id number]]  (Abnormal) Collected: 05/04/23 0353    Specimen: Blood Updated: 05/04/23 0453     Glucose 107 mg/dL      BUN 7 mg/dL      Creatinine 0.92 mg/dL      Sodium 137 mmol/L      Potassium 3.0 mmol/L      Chloride 101 mmol/L      CO2 29.0 mmol/L      Calcium 7.7 mg/dL      BUN/Creatinine Ratio 7.6     Anion Gap 7.0 mmol/L      eGFR 69.2 mL/min/1.73     Narrative:      GFR Normal >60  Chronic Kidney Disease <60  Kidney Failure <15      CBC & Differential [[id number]]  (Abnormal) Collected: 05/04/23 0353    Specimen: Blood Updated: 05/04/23 0426    Narrative:      The following orders were created for panel order CBC & Differential.  Procedure                               Abnormality         Status                     ---------                               -----------         ------                     CBC Auto Differential[[id number]]        Abnormal            Final result                 Please view results for these tests on the individual orders.    CBC Auto Differential [[id number]]  (Abnormal) Collected: 05/04/23 0353    Specimen: Blood Updated: 05/04/23 0426     WBC 21.99 10*3/mm3      RBC 3.67 10*6/mm3      Hemoglobin 10.4 g/dL      Hematocrit 33.9 %      MCV 92.4 fL      MCH 28.3 pg      MCHC 30.7 g/dL      RDW 14.0 %      RDW-SD 47.9 fl      MPV 10.3 fL      Platelets 310 10*3/mm3      Neutrophil % 82.0 %      Lymphocyte % 8.5 %      Monocyte % 7.5 %      Eosinophil % 0.6 %      Basophil % 0.4 %      Immature Grans % 1.0 %      Neutrophils, Absolute 18.04 10*3/mm3      Lymphocytes, Absolute 1.87 10*3/mm3      Monocytes, Absolute 1.64 10*3/mm3      Eosinophils, Absolute 0.13 10*3/mm3      Basophils, Absolute 0.08 10*3/mm3      Immature Grans,  "Absolute 0.23 10*3/mm3      nRBC 0.0 /100 WBC     Procalcitonin [[id number]]  (Abnormal) Collected: 05/03/23 1927    Specimen: Blood Updated: 05/03/23 2003     Procalcitonin 1.13 ng/mL     Narrative:      As a Marker for Sepsis (Non-Neonates):    1. <0.5 ng/mL represents a low risk of severe sepsis and/or septic shock.  2. >2 ng/mL represents a high risk of severe sepsis and/or septic shock.    As a Marker for Lower Respiratory Tract Infections that require antibiotic therapy:    PCT on Admission    Antibiotic Therapy       6-12 Hrs later    >0.5                Strongly Recommended  >0.25 - <0.5        Recommended   0.1 - 0.25          Discouraged              Remeasure/reassess PCT  <0.1                Strongly Discouraged     Remeasure/reassess PCT    As 28 day mortality risk marker: \"Change in Procalcitonin Result\" (>80% or <=80%) if Day 0 (or Day 1) and Day 4 values are available. Refer to http://[email or web address]-pct-calculator.com    Change in PCT <=80%  A decrease of PCT levels below or equal to 80% defines a positive change in PCT test result representing a higher risk for 28-day all-cause mortality of patients diagnosed with severe sepsis for septic shock.    Change in PCT >80%  A decrease of PCT levels of more than 80% defines a negative change in PCT result representing a lower risk for 28-day all-cause mortality of patients diagnosed with severe sepsis or septic shock.       Comprehensive Metabolic Panel [[id number]]  (Abnormal) Collected: 05/03/23 1927    Specimen: Blood Updated: 05/03/23 1956     Glucose 95 mg/dL      BUN 6 mg/dL      Creatinine 0.81 mg/dL      Sodium 138 mmol/L      Potassium 3.2 mmol/L      Chloride 97 mmol/L      CO2 33.0 mmol/L      Calcium 8.7 mg/dL      Total Protein 6.5 g/dL      Albumin 3.6 g/dL      ALT (SGPT) 20 U/L      AST (SGOT) 23 U/L      Alkaline Phosphatase 138 U/L      Total Bilirubin 0.3 mg/dL      Globulin 2.9 gm/dL      A/G Ratio 1.2 g/dL      BUN/Creatinine Ratio 7.4     " Anion Gap 8.0 mmol/L      eGFR 80.7 mL/min/1.73     Narrative:      GFR Normal >60  Chronic Kidney Disease <60  Kidney Failure <15      Magnesium [[id number]]  (Normal) Collected: 05/03/23 1927    Specimen: Blood Updated: 05/03/23 1953     Magnesium 1.8 mg/dL     Lactic Acid, Plasma [[id number]]  (Normal) Collected: 05/03/23 1927    Specimen: Blood Updated: 05/03/23 1953     Lactate 1.0 mmol/L     Lipase [[id number]]  (Normal) Collected: 05/03/23 1927    Specimen: Blood Updated: 05/03/23 1952     Lipase 23 U/L     Urinalysis, Microscopic Only - Urine, Clean Catch [[id number]]  (Abnormal) Collected: 05/03/23 1924    Specimen: Urine, Clean Catch Updated: 05/03/23 1941     RBC, UA 3-5 /HPF      WBC, UA 13-20 /HPF      Bacteria, UA None Seen /HPF      Squamous Epithelial Cells, UA 0-2 /HPF      Hyaline Casts, UA 3-6 /LPF      Methodology Automated Microscopy    Urinalysis With Culture If Indicated - Urine, Clean Catch [[id number]]  (Abnormal) Collected: 05/03/23 1924    Specimen: Urine, Clean Catch Updated: 05/03/23 1941     Color, UA Yellow     Appearance, UA Clear     pH, UA 5.5     Specific Gravity, UA 1.013     Glucose, UA Negative     Ketones, UA Negative     Bilirubin, UA Negative     Blood, UA Trace     Protein, UA 30 mg/dL (1+)     Leuk Esterase, UA Trace     Nitrite, UA Negative     Urobilinogen, UA 0.2 E.U./dL    Narrative:      In absence of clinical symptoms, the presence of pyuria, bacteria, and/or nitrites on the urinalysis result does not correlate with infection.    CBC & Differential [[id number]]  (Abnormal) Collected: 05/03/23 1927    Specimen: Blood Updated: 05/03/23 1938    Narrative:      The following orders were created for panel order CBC & Differential.  Procedure                               Abnormality         Status                     ---------                               -----------         ------                     CBC Auto Differential[[id number]]        Abnormal            Final result       "           Please view results for these tests on the individual orders.    CBC Auto Differential [[id number]]  (Abnormal) Collected: 05/03/23 1927    Specimen: Blood Updated: 05/03/23 1938     WBC 20.46 10*3/mm3      RBC 4.28 10*6/mm3      Hemoglobin 12.1 g/dL      Hematocrit 38.1 %      MCV 89.0 fL      MCH 28.3 pg      MCHC 31.8 g/dL      RDW 14.0 %      RDW-SD 45.2 fl      MPV 9.9 fL      Platelets 361 10*3/mm3      Neutrophil % 86.6 %      Lymphocyte % 5.9 %      Monocyte % 5.8 %      Eosinophil % 0.5 %      Basophil % 0.4 %      Immature Grans % 0.8 %      Neutrophils, Absolute 17.72 10*3/mm3      Lymphocytes, Absolute 1.20 10*3/mm3      Monocytes, Absolute 1.19 10*3/mm3      Eosinophils, Absolute 0.10 10*3/mm3      Basophils, Absolute 0.09 10*3/mm3      Immature Grans, Absolute 0.16 10*3/mm3      nRBC 0.0 /100 WBC         Echo this admission:     Left ventricular systolic function is normal. Left ventricular ejection fraction appears to be 66 - 70%.  •  Estimated right ventricular systolic pressure from tricuspid regurgitation is mildly elevated (35-45 mmHg).  •  The right ventricular cavity is mild to moderately dilated, with normal systolic function..  •  The right atrial cavity is dilated.  •  No significant valvular pathology.  •  No prior studies available for comparison.         Assessment & Plan      1.  Pyelonephritis, renal cyst noted by nephrology to likely be hemorrhagic: Continue antibiotics per urology and hospitalist    2.  PSVT: The patient has very frequent, brief episodes of SVT.  Resting heart rate appears to be in the 80s, but heart rates often reach 120s, 160s-170s, and very briefly around 200.  The patient reports that she has experienced intermittent \"fluttering\" for several years.  She states this typically does not bother her but she does report occasionally she will have to sit and rest and put her head between her legs and this will resolve her symptoms.    -It appears this is likely " a chronic issue incidentally found and exacerbated by her acute illness.  Suspect this will improve with treatment of infection and pain.  -Transition from IV Lopressor to oral Lopressor 25 mg twice daily as ordered by Dr. Jackson earlier today-she received first dose around 09 30  -Continue to monitor telemetry  -Vagal maneuvers as needed  -Adenosine per ACLS protocol for sustained SVT (thus far these have been very brief frequent episodes, nonsustained)  -TSH is normal  -Monitor and replace electrolytes  -She does also have COPD on home oxygen as needed; underlying lung disease may also contribute to some of her issues with atrial tachycardia  -No structural abnormalities on echo aside from right atrial and right ventricular dilation (?RV dilation d/t chronic lung disease)  -Minimize beta agonists if possible     Further recommendations per Dr. Jackson      I discussed the patient's findings and my recommendations with patient and family.     Electronically signed by CARRIE Vitale, 05/06/23, 1:04 PM CDT.

## 2023-05-06 NOTE — NURSING NOTE
1437-Care transferred of patient from JADIEL Bellamy to this RN     1500- pt has requested that nursing staff leave her alone for an hour.

## 2023-05-07 ENCOUNTER — READMISSION MANAGEMENT (OUTPATIENT)
Dept: CALL CENTER | Facility: HOSPITAL | Age: 68
End: 2023-05-07
Payer: MEDICARE

## 2023-05-07 VITALS
DIASTOLIC BLOOD PRESSURE: 62 MMHG | RESPIRATION RATE: 18 BRPM | OXYGEN SATURATION: 90 % | HEART RATE: 70 BPM | SYSTOLIC BLOOD PRESSURE: 105 MMHG | TEMPERATURE: 98 F | BODY MASS INDEX: 21.97 KG/M2 | HEIGHT: 67 IN | WEIGHT: 140 LBS

## 2023-05-07 LAB
ANION GAP SERPL CALCULATED.3IONS-SCNC: 6 MMOL/L (ref 5–15)
BASOPHILS # BLD AUTO: 0.04 10*3/MM3 (ref 0–0.2)
BASOPHILS NFR BLD AUTO: 0.4 % (ref 0–1.5)
BUN SERPL-MCNC: 5 MG/DL (ref 8–23)
BUN/CREAT SERPL: 7.7 (ref 7–25)
CALCIUM SPEC-SCNC: 8.5 MG/DL (ref 8.6–10.5)
CHLORIDE SERPL-SCNC: 101 MMOL/L (ref 98–107)
CO2 SERPL-SCNC: 36 MMOL/L (ref 22–29)
CREAT SERPL-MCNC: 0.65 MG/DL (ref 0.57–1)
DEPRECATED RDW RBC AUTO: 48.9 FL (ref 37–54)
EGFRCR SERPLBLD CKD-EPI 2021: 97.8 ML/MIN/1.73
EOSINOPHIL # BLD AUTO: 0.57 10*3/MM3 (ref 0–0.4)
EOSINOPHIL NFR BLD AUTO: 5.8 % (ref 0.3–6.2)
ERYTHROCYTE [DISTWIDTH] IN BLOOD BY AUTOMATED COUNT: 14.4 % (ref 12.3–15.4)
GLUCOSE SERPL-MCNC: 101 MG/DL (ref 65–99)
HCT VFR BLD AUTO: 32.6 % (ref 34–46.6)
HGB BLD-MCNC: 9.8 G/DL (ref 12–15.9)
IMM GRANULOCYTES # BLD AUTO: 0.12 10*3/MM3 (ref 0–0.05)
IMM GRANULOCYTES NFR BLD AUTO: 1.2 % (ref 0–0.5)
LYMPHOCYTES # BLD AUTO: 1.59 10*3/MM3 (ref 0.7–3.1)
LYMPHOCYTES NFR BLD AUTO: 16 % (ref 19.6–45.3)
MCH RBC QN AUTO: 27.8 PG (ref 26.6–33)
MCHC RBC AUTO-ENTMCNC: 30.1 G/DL (ref 31.5–35.7)
MCV RBC AUTO: 92.4 FL (ref 79–97)
MONOCYTES # BLD AUTO: 0.75 10*3/MM3 (ref 0.1–0.9)
MONOCYTES NFR BLD AUTO: 7.6 % (ref 5–12)
NEUTROPHILS NFR BLD AUTO: 6.84 10*3/MM3 (ref 1.7–7)
NEUTROPHILS NFR BLD AUTO: 69 % (ref 42.7–76)
NRBC BLD AUTO-RTO: 0 /100 WBC (ref 0–0.2)
PLATELET # BLD AUTO: 445 10*3/MM3 (ref 140–450)
PMV BLD AUTO: 10.3 FL (ref 6–12)
POTASSIUM SERPL-SCNC: 3.6 MMOL/L (ref 3.5–5.2)
RBC # BLD AUTO: 3.53 10*6/MM3 (ref 3.77–5.28)
SODIUM SERPL-SCNC: 143 MMOL/L (ref 136–145)
WBC NRBC COR # BLD: 9.91 10*3/MM3 (ref 3.4–10.8)

## 2023-05-07 PROCEDURE — 80048 BASIC METABOLIC PNL TOTAL CA: CPT | Performed by: INTERNAL MEDICINE

## 2023-05-07 PROCEDURE — 25010000002 CEFTRIAXONE PER 250 MG: Performed by: NURSE PRACTITIONER

## 2023-05-07 PROCEDURE — 99232 SBSQ HOSP IP/OBS MODERATE 35: CPT | Performed by: UROLOGY

## 2023-05-07 PROCEDURE — 85025 COMPLETE CBC W/AUTO DIFF WBC: CPT | Performed by: INTERNAL MEDICINE

## 2023-05-07 PROCEDURE — 0 HYDROMORPHONE 1 MG/ML SOLUTION: Performed by: INTERNAL MEDICINE

## 2023-05-07 RX ORDER — CEFDINIR 300 MG/1
300 CAPSULE ORAL 2 TIMES DAILY
Qty: 28 CAPSULE | Refills: 0 | Status: SHIPPED | OUTPATIENT
Start: 2023-05-08 | End: 2023-05-22

## 2023-05-07 RX ADMIN — HYDROMORPHONE HYDROCHLORIDE 1 MG: 1 INJECTION, SOLUTION INTRAMUSCULAR; INTRAVENOUS; SUBCUTANEOUS at 04:47

## 2023-05-07 RX ADMIN — HYDROCODONE BITARTRATE AND ACETAMINOPHEN 1 TABLET: 10; 325 TABLET ORAL at 07:31

## 2023-05-07 RX ADMIN — METOPROLOL TARTRATE 25 MG: 25 TABLET, FILM COATED ORAL at 05:42

## 2023-05-07 RX ADMIN — CEFTRIAXONE SODIUM 2 G: 2 INJECTION, POWDER, FOR SOLUTION INTRAMUSCULAR; INTRAVENOUS at 08:54

## 2023-05-07 RX ADMIN — LEVOTHYROXINE SODIUM 112 MCG: 112 TABLET ORAL at 05:42

## 2023-05-07 NOTE — OUTREACH NOTE
Prep Survey    Flowsheet Row Responses   [facility] facility patient discharged from? [facility]   Is LACE score < 7 ? No   Eligibility [facility]   Date of Admission 05/03/23   Date of Discharge 05/07/23   Discharge Disposition Home or Self Care   Discharge diagnosis Sepsis due to Escherichia coli UTI, bilateral pyelonephritis   Does the patient have one of the following disease processes/diagnoses(primary or secondary)? Sepsis   Does the patient have Home health ordered? No   Is there a DME ordered? No   Prep survey completed? Yes          Emma ASCENCIO - Registered Nurse

## 2023-05-07 NOTE — NURSING NOTE
"Patient asked this RN for a \"pain shot\" and this RN informed the patient that she could not have IV Narcotics after being discharged. The patient then became verbally abusive towards this RN and using profanities. This RN offered the patient her AM medications which included PO pain control and patient refused the medications. This RN informed the patient that she had received her PO [place] at 0731 this AM.     Discharge instructions given to patient and patient did verbalize understanding.   This RN informed the patient that 2 prescriptions had been sent electronically to [facility] for her to . Patient verbalized understanding. This RN also  Informed the patient to make a follow up  Appointments to her PCP for 1 week from today, Her pain management provider within 1-2 weeks from today, and Nephrology in 2 weeks and to have a CT scan done as well before this appointment. Pt verbalized understanding yet became verbally abusive while using profanities directed at this RN.     Upon removing IV from patient, the patient again became verbally abusive and again starting using profanities directed at this RN. Patient began erratically moving her arm in the opposite direction of this RN attempting to remove tape and IV.  IV was removed without complications and tip was intact, gauze dressing applied.     1035- patient was taken out of her room for discharge by [facility] via wheelchair. Patient and her two granddaughters became verbally abusive while using profanities again directed at PCT.   "

## 2023-05-07 NOTE — PROGRESS NOTES
LOS: 4 days   Patient Care Team:  Padmaja Bermudez APRN as PCP - General (Family Medicine)    Chief Complaint:  Renal cyst, pyelnephritis    Subjective     Interval History:     Patient Reports: Continues to feel better each day.  Patient Denies:  Fevers  History taken from: patient chart    Review of Systems  Pertinent items are noted in HPI, all other systems reviewed and negative     Objective     Vital Signs  Temp:  [97.9 °F (36.6 °C)-98.4 °F (36.9 °C)] 98 °F (36.7 °C)  Heart Rate:  [70-95] 70  Resp:  [18] 18  BP: ()/(51-83) 105/62    Physical Exam:  Stable clinical appearance. Resolving left CVA tenderness. Nontoxic. Abdomen soft and nontender.     Data Review:       I have reviewed the following data:    CBC & Differential (05/07/2023 04:07)   Basic Metabolic Panel (05/07/2023 04:07)   Blood Culture - Blood, Arm, Right (05/03/2023 21:25)   Blood Culture - Blood, Arm, Left (05/03/2023 21:19)   Urine Culture - Urine, Urine, Clean Catch (05/03/2023 19:24)   Plan of Care by Chiara Anderson LPN (05/07/2023 07:02)   Progress Notes by Nicole Galloway APRN (05/06/2023 14:13)       Medication Review:     Current Facility-Administered Medications:   •  acetaminophen (TYLENOL) tablet 650 mg, 650 mg, Oral, Q6H PRN, Nicole Galloway APRN  •  cefTRIAXone (ROCEPHIN) 2 g in sodium chloride 0.9 % 100 mL IVPB, 2 g, Intravenous, Q24H, Nicole Galloway APRN, Last Rate: 200 mL/hr at 05/06/23 0924, 2 g at 05/06/23 0924  •  dicyclomine (BENTYL) tablet 20 mg, 20 mg, Oral, 4x Daily, Pat Clinton MD, 20 mg at 05/06/23 2035  •  gabapentin (NEURONTIN) capsule 300 mg, 300 mg, Oral, Q12H, Akil Moreno MD, 300 mg at 05/06/23 2225  •  HYDROcodone-acetaminophen (NORCO)  MG per tablet 1 tablet, 1 tablet, Oral, Q6H PRN, Pat Clinton MD, 1 tablet at 05/07/23 0731  •  HYDROmorphone (DILAUDID) injection 1 mg, 1 mg, Intravenous, Q4H PRN, Pat Clinton MD, 1 mg at 05/07/23 0447  •  hydrOXYzine (ATARAX)  tablet 25 mg, 25 mg, Oral, TID PRN, Nicole Galloway APRN, 25 mg at 05/06/23 1719  •  ipratropium-albuterol (DUO-NEB) nebulizer solution 3 mL, 3 mL, Nebulization, Q4H PRN, Pat Clinton MD, 3 mL at 05/06/23 0444  •  levothyroxine (SYNTHROID, LEVOTHROID) tablet 112 mcg, 112 mcg, Oral, Q AM, Pat Clinton MD, 112 mcg at 05/07/23 0542  •  metoprolol tartrate (LOPRESSOR) tablet 25 mg, 25 mg, Oral, Q8H, Luke Jackson MD, 25 mg at 05/07/23 0542  •  mirtazapine (REMERON) tablet 15 mg, 15 mg, Oral, Nightly, Pat Clinton MD, 15 mg at 05/06/23 2035  •  nicotine (NICODERM CQ) 14 MG/24HR patch 1 patch, 1 patch, Transdermal, Q24H, Nicole Galloway APRN  •  ondansetron (ZOFRAN) injection 4 mg, 4 mg, Intravenous, Q6H PRN, Pat Clinton MD  •  rOPINIRole (REQUIP) tablet 0.25 mg, 0.25 mg, Oral, Nightly, Pat Clinton MD, 0.25 mg at 05/06/23 2035  •  [COMPLETED] Insert Peripheral IV, , , Once **AND** sodium chloride 0.9 % flush 10 mL, 10 mL, Intravenous, PRN, Marquise Cortez MD  •  tiZANidine (ZANAFLEX) tablet 4 mg, 4 mg, Oral, BID, Pat Clinton MD, 4 mg at 05/06/23 2225    Assessment and Plan:    Overall, improved clinical exam and reports she's feeling much better.    Renal cyst likely hemorrhagic: Follow up in 2 weeks with CT.    Pyelonephritis: Stable. WBC normal today. Afebrile x 24 hours. Antibiotics for 14 more days. Cefdinir would be appropriate.    URO DISPO: Follow up in urology outpatient clinic in 2 weeks with CT. Message sent to scheduling.     I discussed the patient's findings and my recommendations with patient and primary care team    (Please note that portions of this note were completed with a voice recognition program.)    Jarrett Hart MD  05/07/23  08:04 CDT    Time: Time spent: 20 minutes spent performing evaluation and management, chart review, and discussion with patient, > 50% of time spent in face-to-face encounter

## 2023-05-07 NOTE — DISCHARGE SUMMARY
[facility]  DISCHARGE SUMMARY     Date of Admission: 5/3/2023  Date of Discharge:  5/7/2023  Primary Care Physician: Padmaja Bermudez APRN    Presenting Problem/History of Present Illness:  Fever, bilateral flank pain    Final Discharge Diagnoses:  Active Hospital Problems    Diagnosis    • **Sepsis due to Escherichia coli UTI, bilateral pyelonephritis, present on admission    • Acute bilateral pyelonephritis    • Paroxysmal supraventricular tachycardia    • 6 cm exophytic left upper pole renal cyst, likely hemorrhagic    • Hypokalemia    • E. coli UTI, present on admission (urinary tract infection)    • Chronic obstructive pulmonary disease    • Hypothyroidism      Consults:   Dr. Hart, urology  Dr. Jackson, cardiology    Procedures Performed: None    Pertinent Test Results:   Results for orders placed during the hospital encounter of 05/03/23    Adult Transthoracic Echo Complete W/ Cont if Necessary Per Protocol    Interpretation Summary  •  Left ventricular systolic function is normal. Left ventricular ejection fraction appears to be 66 - 70%.  •  Estimated right ventricular systolic pressure from tricuspid regurgitation is mildly elevated (35-45 mmHg).  •  The right ventricular cavity is mild to moderately dilated, with normal systolic function..  •  The right atrial cavity is dilated.  •  No significant valvular pathology.  •  No prior studies available for comparison.      Imaging Results (All)     Procedure Component Value Units Date/Time    XR Chest PA & Lateral [[id number]] Collected: 05/06/23 1039     Updated: 05/06/23 1043    Narrative:      EXAM/TECHNIQUE: XR CHEST PA AND LATERAL-     INDICATION: Shortness of breath, sepsis     COMPARISON: 04/20/2023     FINDINGS:     Small bilateral pleural effusions with overlying atelectasis. No  pneumothorax. There is diffuse interstitial opacity. Cardiac silhouette  is normal size. No acute osseous  finding.       Impression:         1.  Diffuse interstitial opacity, favored to represent mild interstitial  pulmonary edema.  2.  Small bilateral pleural effusions with overlying atelectasis.  This report was finalized on 05/06/2023 10:40 by Dr. Eddie Alicia MD.    CT Abdomen Pelvis With Contrast [[id number]] Collected: 05/03/23 2030     Updated: 05/03/23 2043    Narrative:      EXAM/TECHNIQUE: CT abdomen pelvis with IV contrast     INDICATION: Left-sided abdominal pain     COMPARISON: 04/28/2023     DLP: 235 mGy cm. Automated exposure control was also utilized to  decrease patient radiation dose.     FINDINGS:     Chronic linear opacities in the RIGHT lung base which may be related to  atelectasis or parenchymal scarring.     No suspicious liver lesion. Prior cholecystectomy. Chronic dilatation of  the common hepatic duct and common bile duct, likely related to  reservoir phenomenon. Pancreas, spleen, and adrenal glands are  unremarkable.     Areas of renal corticomedullary differentiation blurring are present  bilaterally. There is bilateral urothelial thickening in the renal  pelves and ureters. Multiple small bilateral renal cysts. A 6 cm  exophytic LEFT upper pole renal lesion has increased in size (previously  5 cm) and now contains internal heterogeneous content/debris. No focal  urinary bladder wall thickening.     No colonic wall thickening or pericolonic fat stranding. Normal  appendix. No abnormal small bowel distention or evidence of active small  bowel inflammation.     No ascites or free pelvic fluid. No pelvic mass or pelvic collection.  Uterus and adnexa appear unremarkable.     Nonaneurysmal atherosclerotic abdominal aorta. No enlarged  retroperitoneal, mesenteric, pelvic, or inguinal lymph nodes.     No acute abdominal wall soft tissue abnormality. Multilevel lumbar spine  degenerative change. No acute osseous finding.       Impression:         1.  A few areas of corticomedullary differentiation  blurring in both  kidneys are present, concerning for bilateral pyelonephritis. Correlate  with urinalysis and physical exam.     2.  Short interval increase in size a 6 cm exophytic LEFT upper pole  renal lesion with new internal dense components/debris. Suspect this  represents acute hemorrhage into a renal cyst. Also in the differential,  superimposed infection within a LEFT renal cyst.     3.  Chronic dilatation of the extrahepatic biliary tree, slightly  increased in caliber from prior exam. This may represent reservoir  phenomenon given prior cholecystectomy but if there is concern for  biliary obstruction, MRCP would be recommended.  This report was finalized on 05/03/2023 20:40 by Dr. Eddie Alicia MD.        LAB RESULTS:      Lab 05/07/23  0407 05/06/23  0355 05/05/23  0420 05/04/23  0353 05/03/23  1927   WBC 9.91 17.18* 20.97* 21.99* 20.46*   HEMOGLOBIN 9.8* 9.7* 9.3* 10.4* 12.1   HEMATOCRIT 32.6* 33.4* 31.0* 33.9* 38.1   PLATELETS 445 371 307 310 361   NEUTROS ABS 6.84 14.53* 17.23* 18.04* 17.72*   IMMATURE GRANS (ABS) 0.12* 0.34* 0.89* 0.23* 0.16*   LYMPHS ABS 1.59 0.97 1.11 1.87 1.20   MONOS ABS 0.75 0.87 1.38* 1.64* 1.19*   EOS ABS 0.57* 0.43* 0.30 0.13 0.10   MCV 92.4 95.4 92.3 92.4 89.0   PROCALCITONIN  --   --   --   --  1.13*   LACTATE  --   --   --   --  1.0         Lab 05/07/23  0407 05/06/23  0355 05/05/23  0420 05/04/23  0353 05/03/23  1927   SODIUM 143 140 137 137 138   POTASSIUM 3.6 5.1 4.7 3.0* 3.2*   CHLORIDE 101 103 104 101 97*   CO2 36.0* 28.0 28.0 29.0 33.0*   ANION GAP 6.0 9.0 5.0 7.0 8.0   BUN 5* 6* 9 7* 6*   CREATININE 0.65 0.65 0.74 0.92 0.81   EGFR 97.8 97.8 89.9 69.2 80.7   GLUCOSE 101* 99 104* 107* 95   CALCIUM 8.5* 8.2* 7.9* 7.7* 8.7   MAGNESIUM  --   --   --   --  1.8   TSH  --   --  1.330  --   --          Lab 05/03/23 1927   TOTAL PROTEIN 6.5   ALBUMIN 3.6   GLOBULIN 2.9   ALT (SGPT) 20   AST (SGOT) 23   BILIRUBIN 0.3   ALK PHOS 138*   LIPASE 23                     Lab  05/06/23  0504   PH, ARTERIAL 7.326*   PCO2, ARTERIAL 58.1*   PO2 ART 56.0*   O2 SATURATION ART 89.7*   HCO3 ART 30.3*   BASE EXCESS ART 3.3*     Brief Urine Lab Results  (Last result in the past 365 days)      Color   Clarity   Blood   Leuk Est   Nitrite   Protein   CREAT   Urine HCG        05/03/23 1924 Yellow   Clear   Trace   Trace   Negative   30 mg/dL (1+)               Microbiology Results (last 10 days)     Procedure Component Value - Date/Time    Blood Culture - Blood, Arm, Right [[id number]]  (Normal) Collected: 05/03/23 2125    Lab Status: Preliminary result Specimen: Blood from Arm, Right Updated: 05/06/23 2146     Blood Culture No growth at 3 days    Blood Culture - Blood, Arm, Left [[id number]]  (Normal) Collected: 05/03/23 2119    Lab Status: Preliminary result Specimen: Blood from Arm, Left Updated: 05/06/23 2146     Blood Culture No growth at 3 days    Urine Culture - Urine, Urine, Clean Catch [[id number]]  (Normal) Collected: 05/03/23 1924    Lab Status: Final result Specimen: Urine, Clean Catch Updated: 05/05/23 1611     Urine Culture No growth    Urine Culture - Urine, Straight Cath [[id number]]  (Abnormal)  (Susceptibility) Collected: 04/28/23 2104    Lab Status: Final result Specimen: Urine from Straight Cath Updated: 04/30/23 0949     Urine Culture >100,000 CFU/mL Escherichia coli    Narrative:      Colonization of the urinary tract without infection is common. Treatment is discouraged unless the patient is symptomatic, pregnant, or undergoing an invasive urologic procedure.    Susceptibility      Escherichia coli      [facility]      Ampicillin Resistant     Ampicillin + Sulbactam Susceptible      Cefazolin Susceptible      Cefepime Susceptible      Ceftazidime Susceptible      Ceftriaxone Susceptible      Gentamicin Susceptible      Levofloxacin Intermediate      Nitrofurantoin Susceptible      Piperacillin + Tazobactam Susceptible      Trimethoprim + Sulfamethoxazole Resistant                           Blood Culture - Blood, Hand, Right [[id number]]  (Normal) Collected: 04/28/23 2038    Lab Status: Final result Specimen: Blood from Hand, Right Updated: 05/03/23 2100     Blood Culture No growth at 5 days        Hospital Course: Ms. Gibbs presented to [facility] emergency room 5/3/23 with worsening bilateral flank pain with subjective fever and chills.  Patient evaluated in ED on 4/28 for fever and dysuria.  She was diagnosed with urinary tract infection and discharged on oral Cipro.  Patient reported subjective fever 101.3, bilateral flank pain worse on the left associated with nausea but no vomiting, fever and chills.  She reported dysuria and frequency.  Family member reported patient had been diagnosed with renal cyst mass and was advised to have follow-up imaging studies but no repeat imaging studies in the last several months.  Procalcitonin 1.13, potassium 3.2, urinalysis 13-20 WBC, WBC 20.46.  CT abdomen pelvis noted a few areas of corticomedullary differential blurring in both kidneys concerning for bilateral pyelonephritis.  Short interval increase in size of 6 cm exophytic left upper pole renal lesion with new interval dense components [place].  Suspect acute hemorrhage into renal cyst.  Temperature 100.1, heart rate greater than 100.  WBC 20.46.  Normal saline fluid bolus, cefepime given in ER.    She was admitted with sepsis due to E. coli UTI, bilateral pyelonephritis present on admission.  Patient presented with fever, chills, dysuria, frequency.  Patient evaluated on 4/28 for UTI and treated with Cipro.  Urine culture 4/28 positive for E. coli.  Temperature 100.1, heart rate greater than 90, WBC 20.46. Rocephin ordered on admission and increased to 2 g IV every 24 hours.  Follow-up blood cultures no growth at 3 days.  Repeat urine culture 5/3 no growth.  WBC 21.99 on 5/4 and trended down to 9.9 on date of discharge.  Patient has remained afebrile for greater than 24 hours.  Discussed with  "urology who recommends additional 2 weeks cefdinir at discharge.    6 cm exophytic left upper pole renal cyst, suspect hemorrhagic.  CT abdomen showed slight increase in size.  Urology consulted and discussed with Dr. Hart who noted flank pain improved, WBC trended down and on date of discharge WBC normal 9.9.  Recommendations for 14 days cefdinir and follow-up with urology in 2 weeks with CT scan.  During admission Dr. Hart did discuss with patient possible percutaneous drainage or less likely surgical intervention but patient did not desire to proceed with either.  I discussed the importance of completing antibiotic treatment as prescribed with patient and she expressed understanding prior to discharge.    Paroxysmal supraventricular tachycardia.  Frequent episodes of PSVT.  Resting heart rate 80 but increases to 160-200 briefly.  Patient had no complaints of chest pain but patient reports intermittent \"fluttering\".  IV Lopressor ordered.  Echo 5/6/23 EF 66-70%.  Cardiology consulted and suspects chronic issue exacerbated by acute illness.  IV Lopressor switched to oral Lopressor per Dr. Jackson.  TSH normal.  On date of discharge, heart rate improved 71-98 with less frequent episodes of PSVT.     Hypokalemia.  Potassium 3.2 on admission and 3.0 on 5/4.  Potassium replaced and 3.6 on date of discharge.     COPD without exacerbation.  O2 as needed.  Duo nebs every 4 hours as needed for wheezing.  Nurses reported patient awoke during the night restless sat 87 and 93%.  She was placed on oxygen but had no wheezing.  She was not complaining of shortness of breath.  Oxygen decreased to 2 L.  Suspect mostly anxiety.  Chest x-ray noted interstitial opacity mild interstitial pulmonary edema.  Lasix given x1 dose and IV fluids discontinued.  Patient was weaned to room air with saturation 90% or greater.  She has oxygen at home that she uses on an as-needed basis.    Synthroid continued for hypothyroidism.  TSH " "1.33.      Chronic pain syndrome followed by pain management.  Requip, gabapentin, Norco, home medications continued.     Discussed smoking cessation and nicotine patch added.  Patient declined nicotine patch at discharge.  She reports she does \"vape\"     Lovenox ordered for deep vein thrombosis prophylaxis.    On 5/7/2023, she has been evaluated by Dr. Hart, urology and cleared for discharge as WBC normal and she has remained afebrile for greater than 24 hours.  Recommendations for cefdinir for an additional 14 days.  Patient will follow-up with urology in 2 weeks with repeat CT scan to follow-up on renal cyst likely hemorrhagic.  Toprol all added for PSVT.  Follow-up with CARRIE Medina in 1 week.  Follow-up with cardiology as needed     Physical Exam on Discharge:  /62 (BP Location: Right arm, Patient Position: Lying)   Pulse 70   Temp 98 °F (36.7 °C) (Oral)   Resp 18   Ht 170.2 cm (67.01\")   Wt 63.5 kg (140 lb)   SpO2 90%   BMI 21.92 kg/m²   Physical Exam  Vitals and nursing note reviewed.   Constitutional:       Comments: Sitting up in bed.  Oxygen off.  Saturation 90% on room air.  No visitors in room.   HENT:      Head: Normocephalic and atraumatic.      Nose: No congestion.      Mouth/Throat:      Pharynx: Oropharynx is clear. No oropharyngeal exudate or posterior oropharyngeal erythema.   Eyes:      Extraocular Movements: Extraocular movements intact.      Pupils: Pupils are equal, round, and reactive to light.   Cardiovascular:      Rate and Rhythm: Normal rate and regular rhythm.      Heart sounds: No murmur heard.     Comments: Normal sinus rhythm 71 on telemetry.  13-second PSVT.  Pulmonary:      Breath sounds: No wheezing, rhonchi or rales.      Comments: Oxygen off.  Saturation 90%.  No sputum production.  Abdominal:      Palpations: Abdomen is soft.      Tenderness: There is no abdominal tenderness.   Genitourinary:     Comments: Voiding.  Musculoskeletal:         General: " No swelling or tenderness.      Cervical back: Normal range of motion and neck supple.   Skin:     General: Skin is warm and dry.   Neurological:      General: No focal deficit present.      Mental Status: She is alert and oriented to person, place, and time.   Psychiatric:         Mood and Affect: Mood normal.         Behavior: Behavior normal.         Thought Content: Thought content normal.         Judgment: Judgment normal.         Condition on Discharge: Stable.    Discharge Disposition: Home with family.  Minimal activity.  No heavy lifting.    Discharge Medications:     Discharge Medications      New Medications      Instructions Start Date   cefdinir 300 MG capsule  Commonly known as: OMNICEF   300 mg, Oral, 2 Times Daily, Begin 5/8/2023   Start Date: May 8, 2023     metoprolol tartrate 25 MG tablet  Commonly known as: LOPRESSOR   25 mg, Oral, Every 8 Hours         Continue These Medications      Instructions Start Date   ascorbic acid 500 MG tablet  Commonly known as: VITAMIN C   500 mg, Oral, Daily      Breztri Aerosphere 160-9-4.8 MCG/ACT aerosol inhaler  Generic drug: Budeson-Glycopyrrol-Formoterol   2 puffs, Inhalation, 2 Times Daily      cholecalciferol 25 MCG (1000 UT) tablet  Commonly known as: VITAMIN D3   1,000 Units, Oral, Daily      citalopram 40 MG tablet  Commonly known as: CeleXA   40 mg, Oral, Daily      gabapentin 600 MG tablet  Commonly known as: NEURONTIN   1 tablet, Oral, Every 12 Hours Scheduled      HYDROcodone-acetaminophen  MG per tablet  Commonly known as: NORCO   Take 1 tablet by mouth 3 (Three) Times a Day.      levothyroxine 112 MCG tablet  Commonly known as: SYNTHROID, LEVOTHROID   112 mcg, Oral, Daily      mirtazapine 15 MG tablet  Commonly known as: REMERON   15 mg, Oral, Every Night at Bedtime      modafinil 100 MG tablet  Commonly known as: Provigil   100 mg, Oral, Daily      ondansetron ODT 4 MG disintegrating tablet  Commonly known as: ZOFRAN-ODT   4 mg, Translingual,  Every 6 Hours PRN      rizatriptan 10 MG tablet  Commonly known as: MAXALT   Take 1 tablet by mouth 1 (One) Time As Needed for Migraine.      rOPINIRole 0.25 MG tablet  Commonly known as: REQUIP   0.25 mg, Oral, Every Night at Bedtime      tiZANidine 2 MG tablet  Commonly known as: ZANAFLEX   2 mg, Oral, 2 Times Daily PRN      Ventolin  (90 Base) MCG/ACT inhaler  Generic drug: albuterol sulfate HFA   2 puffs, Inhalation, Every 4 Hours PRN         Stop These Medications    ciprofloxacin 500 MG tablet  Commonly known as: CIPRO          Discharge Diet:   Diet Instructions     Diet: Regular/House Diet; Regular Texture (IDDSI 7); Thin (IDDSI 0)      Discharge Diet: Regular/House Diet    Texture: Regular Texture (IDDSI 7)    Fluid Consistency: Thin (IDDSI 0)        Activity at Discharge:   Activity Instructions     Other Activity Instructions      Activity Instructions: Limited activity until follow-up with urology         Discharge instructions:  1.  For worsening flank pain, fever, chills seek medical attention.  2.  Omnicef 300 mg twice daily for 14 days per urology.  3.  Follow-up with urology 2 weeks with repeat CT to assess renal cyst likely hemorrhagic.  4.  Continue oxygen at home as needed  5.  Metoprolol tartrate 25 mg orally 3 times daily.  6.  Follow-up with CARRIE Chavis in 1 week  7.  Limited activity, no heavy lifting till follow-up appointment with urology    Follow-up Appointments:   Future Appointments   Date Time Provider Department Center   7/19/2023 11:30 AM Padmaja Bermudez APRN [facility]     Test Results Pending at Discharge: None    Electronically signed by CARRIE Carson, 05/07/23, 10:36 CDT.    Time: 35 minutes.  Discussed with Dr. Moreno, Dr. Hart, and patient.    The above documentation resulted from a face-to-face encounter by me Nicole BUTLER, [facility].

## 2023-05-07 NOTE — PLAN OF CARE
Goal Outcome Evaluation:  Plan of Care Reviewed With: patient        Progress: no change  Outcome Evaluation: VSS. No change in neuro status this shift. Continue to have flank/back pain, prn meds given. Up to bsc w/assist. SCD in place. Safety maintained.

## 2023-05-08 ENCOUNTER — TRANSITIONAL CARE MANAGEMENT TELEPHONE ENCOUNTER (OUTPATIENT)
Dept: CALL CENTER | Facility: HOSPITAL | Age: 68
End: 2023-05-08
Payer: MEDICARE

## 2023-05-08 ENCOUNTER — TELEPHONE (OUTPATIENT)
Dept: FAMILY MEDICINE CLINIC | Facility: CLINIC | Age: 68
End: 2023-05-08

## 2023-05-08 LAB
BACTERIA SPEC AEROBE CULT: NORMAL
BACTERIA SPEC AEROBE CULT: NORMAL
QT INTERVAL: 418 MS
QTC INTERVAL: 463 MS

## 2023-05-08 RX ORDER — HYDROCODONE BITARTRATE AND ACETAMINOPHEN 10; 325 MG/1; MG/1
1 TABLET ORAL 3 TIMES DAILY
Status: CANCELLED | OUTPATIENT
Start: 2023-05-08

## 2023-05-08 NOTE — OUTREACH NOTE
Call Center TCM Note    Flowsheet Row Responses   [facility] patient discharged from? [place]   Does the patient have one of the following disease processes/diagnoses(primary or secondary)? Sepsis   TCM attempt successful? Yes   Call start time 1436   Call end time 1439   Discharge diagnosis Sepsis due to Escherichia coli UTI, bilateral pyelonephritis   Meds reviewed with patient/caregiver? Yes   Is the patient having any side effects they believe may be caused by any medication additions or changes? No   Does the patient have all medications related to this admission filled (includes all antibiotics, inhalers, nebulizers,steroids,etc.) Yes   Is the patient taking all medications as directed (includes completed medication regime)? Yes   Does the patient have an appointment with their PCP within 7 days of discharge? No   Nursing Interventions Patient declined scheduling/rescheduling appointment at this time   Psychosocial issues? No   Did the patient receive a copy of their discharge instructions? Yes   Nursing interventions Reviewed instructions with patient   What is the patient's perception of their health status since discharge? Same   Is the patient/caregiver able to teach back TIME? T emperature - higher or lower than normal, I nfection - may have signs and symptoms of an infection, M ental Decline - confused, sleepy, difficult to arouse, E xtremely Ill - severe pain, discomfort, shortness of breath   Nursing interventions Nurse provided reassurance to patient   Is patient/caregiver able to teach back steps to recovery at home? Rest and regain strength   Is the patient/caregiver able to teach back signs and symptoms of worsening condition: Fever   If the patient is a current smoker, are they able to teach back resources for cessation? Not a smoker   Is the patient/caregiver able to teach back the hierarchy of who to call/visit for symptoms/problems? PCP, Specialist, Home health nurse, Urgent Care, ED, 911  Yes   Additional teach back comments States she is not doing well.  Has weakness and sick to her stomach.  States she just took some medication for the nausea/vomitting and will see if this helps.  Advised if no improvement she should go to ED to be evaluated.    TCM call completed? Yes   Wrap up additional comments Pt was advised if no improvement or worsening symptoms to go to ED.    Call end time [id number]          Debi Morrissey LPN    5/8/2023, 14:41 CDT

## 2023-05-08 NOTE — TELEPHONE ENCOUNTER
Caller: SELAM SANCHEZ    Relationship: Emergency Contact    Best call back number: [phone]    What is the best time to reach you: ANY    Who are you requesting to speak with (clinical staff, provider,  specific staff member): CLINICAL         What was the call regarding: SHE GOING THRU WITHDRAWALS DUE TO NOT HAVING HER PAIN MEDICINE THAT SHE GETS FROM [facility].  SHE IS VOMITING.      Do you require a callback: YES

## 2023-05-12 ENCOUNTER — TELEPHONE (OUTPATIENT)
Dept: UROLOGY | Facility: CLINIC | Age: 68
End: 2023-05-12
Payer: MEDICARE

## 2023-05-12 NOTE — TELEPHONE ENCOUNTER
Please see previous message from Dr. Hart. I left voicemail for patient to call back and schedule. Once scheduled, please send message to clinical to put in order for ct scan.

## 2023-05-12 NOTE — TELEPHONE ENCOUNTER
----- Message from Jarrett Hart MD sent at 5/7/2023  8:03 AM CDT -----  Regarding: Hospital follow up  Follow up in clinic in 2 weeks with CT abdomen and pelvis without and with IV contrast (Dx: renal cyst).

## 2023-05-17 ENCOUNTER — READMISSION MANAGEMENT (OUTPATIENT)
Dept: CALL CENTER | Facility: HOSPITAL | Age: 68
End: 2023-05-17
Payer: MEDICARE

## 2023-05-17 NOTE — OUTREACH NOTE
Sepsis Week 2 Survey    Flowsheet Row Responses   [facility] [place] facility patient discharged from? [place]   Does the patient have one of the following disease processes/diagnoses(primary or secondary)? Sepsis   Week 2 attempt successful? No   Unsuccessful attempts Attempt 1  [attempted pt , grandchild and sister]          JEANINE MORENO - Registered Nurse

## 2023-05-19 DIAGNOSIS — G47.19 EXCESSIVE DAYTIME SLEEPINESS: ICD-10-CM

## 2023-05-19 DIAGNOSIS — R40.0 DAYTIME SOMNOLENCE: Primary | ICD-10-CM

## 2023-05-19 DIAGNOSIS — Z99.89 OSA ON CPAP: ICD-10-CM

## 2023-05-19 DIAGNOSIS — G47.33 OSA (OBSTRUCTIVE SLEEP APNEA): Primary | ICD-10-CM

## 2023-05-19 DIAGNOSIS — G47.33 OSA ON CPAP: ICD-10-CM

## 2023-05-19 RX ORDER — MODAFINIL 100 MG/1
100 TABLET ORAL DAILY
Qty: 90 TABLET | Refills: 0 | Status: CANCELLED | OUTPATIENT
Start: 2023-05-19

## 2023-05-19 RX ORDER — MODAFINIL 100 MG/1
100 TABLET ORAL DAILY
Qty: 90 TABLET | Refills: 0 | Status: SHIPPED | OUTPATIENT
Start: 2023-05-19

## 2023-05-19 NOTE — TELEPHONE ENCOUNTER
Pt's granddaughter called to check up on this since insurance denied coverage and pt had to pay out of pocket. How do you want to proceed?

## 2023-05-19 NOTE — TELEPHONE ENCOUNTER
I thought you had said they paid cash. Please call pharmacy and see if it is due. If so I will try to send a new diagnosis code. Thanks.

## 2023-05-19 NOTE — TELEPHONE ENCOUNTER
Pt does use CPAP, will need dx of FAUSTO added for next fill.   Called pharmacy to double check that pt did  medication in April for qty 90.   Spoke with [facility]- [facility] picked up qty 30, states that next 30 has been filled and ready for .

## 2023-05-22 ENCOUNTER — TELEPHONE (OUTPATIENT)
Dept: FAMILY MEDICINE CLINIC | Facility: CLINIC | Age: 68
End: 2023-05-22
Payer: MEDICARE

## 2023-05-24 ENCOUNTER — READMISSION MANAGEMENT (OUTPATIENT)
Dept: CALL CENTER | Facility: HOSPITAL | Age: 68
End: 2023-05-24
Payer: MEDICARE

## 2023-05-24 NOTE — OUTREACH NOTE
Sepsis Week 2 Survey      Flowsheet Row Responses   [facility] patient discharged from? [place]   Does the patient have one of the following disease processes/diagnoses(primary or secondary)? Sepsis   Week 2 attempt successful? No   Unsuccessful attempts Attempt 2            Carmen HERNANDEZ - Registered Nurse

## 2023-05-25 RX ORDER — MODAFINIL 100 MG/1
100 TABLET ORAL DAILY
Qty: 90 TABLET | Refills: 0 | Status: SHIPPED | OUTPATIENT
Start: 2023-05-25

## 2023-05-25 NOTE — TELEPHONE ENCOUNTER
Appeal submitted on 5/19/2023- approved 5/24/2023. Back dated 1/1/23-5/24/20324.   Called [facility] to notify- reports that medication is on back order and unsure when med will be back in stock.   Called [facility] to see if they have med in stock- they have some and will fill qty that they have for pt.   Called [facility] to notify, reports that they filled med last week at [facility] (qty 30) and paid cash. Advised her that coverage from insurance for med was back dated from 1/1/2023 and to notify pharmacy for reimbursement. Will cancel rx at [facility], as [facility] reports that all meds should be filled at [facility].   Med cancelled at [facility]- new rx pended to [facility].

## 2023-05-30 NOTE — PROGRESS NOTES
Subjective    Ms. Gibbs is 67 y.o. female    Chief Complaint: UTI    History of Present Illness  Patient presents for hospital follow-up she was admitted for fever and flank pain left greater than right she was seen in the ER and treated for complicated UTI a week prior.  She then presented back to the ER for worsening pain and fever.  He revealed enlarging left renal lesion concerning for hemorrhagic cyst or infected cyst.  Patient improved with antibiotic treatment no longer has fever or chills she is feeling better she still having flank pain not as acute as previous but still there.  She has not seen any gross hematuria and states she has some incontinence but no other acute urine symptoms.  Renal ultrasound prior to this appointment but she needed a CT scan for follow-up as per Dr. De La Cruz notes.     The following portions of the patient's history were reviewed and updated as appropriate: allergies, current medications, past family history, past medical history, past social history, past surgical history and problem list.    Review of Systems   Constitutional:  Negative for chills and fever.   Genitourinary:  Positive for flank pain. Negative for hematuria.   All other systems reviewed and are negative.      Current Outpatient Medications:     albuterol sulfate  (90 Base) MCG/ACT inhaler, Inhale 2 puffs Every 4 (Four) Hours As Needed for Wheezing., Disp: , Rfl:     Budeson-Glycopyrrol-Formoterol (Breztri Aerosphere) 160-9-4.8 MCG/ACT aerosol inhaler, Inhale 2 puffs 2 (Two) Times a Day., Disp: 10.7 g, Rfl: 11    citalopram (CeleXA) 40 MG tablet, Take 1 tablet by mouth Daily., Disp: 90 tablet, Rfl: 1    gabapentin (NEURONTIN) 600 MG tablet, Take 1 tablet by mouth Every 12 (Twelve) Hours., Disp: , Rfl:     HYDROcodone-acetaminophen (NORCO)  MG per tablet, Take 1 tablet by mouth 3 (Three) Times a Day., Disp: , Rfl:     levothyroxine (SYNTHROID, LEVOTHROID) 112 MCG tablet, Take 1 tablet by mouth  Daily., Disp: 90 tablet, Rfl: 1    metoprolol tartrate (LOPRESSOR) 25 MG tablet, Take 1 tablet by mouth Every 8 (Eight) Hours., Disp: 90 tablet, Rfl: 0    mirtazapine (REMERON) 15 MG tablet, Take 1 tablet by mouth every night at bedtime., Disp: 90 tablet, Rfl: 1    modafinil (PROVIGIL) 100 MG tablet, Take 1 tablet by mouth Daily., Disp: 90 tablet, Rfl: 0    ondansetron ODT (ZOFRAN-ODT) 4 MG disintegrating tablet, Place 1 tablet on the tongue Every 6 (Six) Hours As Needed for Nausea., Disp: 12 tablet, Rfl: 0    rizatriptan (MAXALT) 10 MG tablet, Take 1 tablet by mouth 1 (One) Time As Needed for Migraine., Disp: , Rfl:     rOPINIRole (REQUIP) 0.25 MG tablet, Take 1 tablet by mouth every night at bedtime., Disp: 90 tablet, Rfl: 1    tiZANidine (ZANAFLEX) 2 MG tablet, Take 1 tablet by mouth 2 (Two) Times a Day As Needed for Muscle Spasms., Disp: , Rfl:     Advair Diskus 250-50 MCG/ACT DISKUS, INHALE ONE PUFF INTO THE LUNGS TWICE A DAY (Patient not taking: Reported on 6/2/2023), Disp: 60 each, Rfl: 1    ascorbic acid (VITAMIN C) 500 MG tablet, Take 1 tablet by mouth Daily. (Patient not taking: Reported on 6/2/2023), Disp: , Rfl:     cholecalciferol (VITAMIN D3) 25 MCG (1000 UT) tablet, Take 1 tablet by mouth Daily. (Patient not taking: Reported on 6/2/2023), Disp: , Rfl:     Past Medical History:   Diagnosis Date    Back pain     COPD (chronic obstructive pulmonary disease)     Dependence on supplemental oxygen     Disease of thyroid gland     History of degenerative disc disease     Restless leg     Scoliosis        Past Surgical History:   Procedure Laterality Date    CHOLECYSTECTOMY      TUBAL ABDOMINAL LIGATION         Social History     Socioeconomic History    Marital status:    Tobacco Use    Smoking status: Former     Packs/day: 1.00     Years: 40.00     Pack years: 40.00     Types: Cigarettes     Quit date: 5/1/2008     Years since quitting: 15.0    Smokeless tobacco: Never   Vaping Use    Vaping Use:  "Every day    Substances: Nicotine    Devices: Pre-filled pod   Substance and Sexual Activity    Alcohol use: Defer    Drug use: Defer    Sexual activity: Defer       History reviewed. No pertinent family history.    Objective    Temp 98.3 °F (36.8 °C)   Ht 170.2 cm (67\")   Wt 66.3 kg (146 lb 3.2 oz)   BMI 22.90 kg/m²     Physical Exam  Vitals reviewed.   Constitutional:       General: She is not in acute distress.     Appearance: Normal appearance. She is not toxic-appearing.   HENT:      Head: Normocephalic and atraumatic.   Pulmonary:      Effort: Pulmonary effort is normal.   Skin:     General: Skin is warm and dry.   Neurological:      Mental Status: She is alert and oriented to person, place, and time.   Psychiatric:         Mood and Affect: Mood normal.         Behavior: Behavior normal.           Results for orders placed or performed in visit on 06/02/23   POC Urinalysis Dipstick, Multipro    Specimen: Urine   Result Value Ref Range    Color Yellow Yellow, Straw, Dark Yellow, Shivani    Clarity, UA Clear Clear    Glucose, UA Negative Negative mg/dL    Bilirubin Negative Negative    Ketones, UA Negative Negative    Specific Gravity  1.025 1.005 - 1.030    Blood, UA Negative Negative    pH, Urine 5.5 5.0 - 8.0    Protein, POC Negative Negative mg/dL    Urobilinogen, UA 0.2 E.U./dL Normal, 0.2 E.U./dL    Nitrite, UA Negative Negative    Leukocytes Negative Negative     Assessment and Plan    Diagnoses hemorrhagic cyst per CT scan.  All orders for this visit:    1. Urinary tract infection without hematuria, site unspecified (Primary)  -     POC Urinalysis Dipstick, Multipro    2. Left flank pain  -     CT Abdomen Pelvis With Contrast; Future    3. Acute pyelonephritis  -     CT Abdomen Pelvis With Contrast; Future  Patient presents for hospital follow-up she was hospitalized with pyelonephritis possible infected hemorrhagic cyst.  She initially presented with fever ,flank pain.  She has had no more fever or " chills her pain is improved she still has some flank pain she took 14-day course of antibiotics I would like to schedule her for CT abdomen and pelvis with contrast contact her with results and follow-up.

## 2023-06-02 ENCOUNTER — OFFICE VISIT (OUTPATIENT)
Dept: UROLOGY | Facility: CLINIC | Age: 68
End: 2023-06-02

## 2023-06-02 ENCOUNTER — HOSPITAL ENCOUNTER (OUTPATIENT)
Dept: ULTRASOUND IMAGING | Facility: HOSPITAL | Age: 68
Discharge: HOME | End: 2023-06-02

## 2023-06-02 VITALS — TEMPERATURE: 98.3 F | BODY MASS INDEX: 22.95 KG/M2 | HEIGHT: 67 IN | WEIGHT: 146.2 LBS

## 2023-06-02 DIAGNOSIS — N28.89 RENAL MASS: ICD-10-CM

## 2023-06-02 DIAGNOSIS — N10 ACUTE PYELONEPHRITIS: ICD-10-CM

## 2023-06-02 DIAGNOSIS — N39.0 URINARY TRACT INFECTION WITHOUT HEMATURIA, SITE UNSPECIFIED: Primary | ICD-10-CM

## 2023-06-02 DIAGNOSIS — R10.9 LEFT FLANK PAIN: ICD-10-CM

## 2023-06-02 LAB
BILIRUB BLD-MCNC: NEGATIVE MG/DL
CLARITY, POC: CLEAR
COLOR UR: YELLOW
GLUCOSE UR STRIP-MCNC: NEGATIVE MG/DL
KETONES UR QL: NEGATIVE
LEUKOCYTE EST, POC: NEGATIVE
NITRITE UR-MCNC: NEGATIVE MG/ML
PH UR: 5.5 [PH] (ref 5–8)
PROT UR STRIP-MCNC: NEGATIVE MG/DL
RBC # UR STRIP: NEGATIVE /UL
SP GR UR: 1.02 (ref 1–1.03)
UROBILINOGEN UR QL: NORMAL

## 2023-06-02 PROCEDURE — 76775 US EXAM ABDO BACK WALL LIM: CPT

## 2023-06-02 RX ORDER — FLUTICASONE PROPIONATE AND SALMETEROL 50; 250 UG/1; UG/1
POWDER RESPIRATORY (INHALATION)
Qty: 60 EACH | Refills: 1 | Status: SHIPPED | OUTPATIENT
Start: 2023-06-02

## 2023-06-06 ENCOUNTER — READMISSION MANAGEMENT (OUTPATIENT)
Dept: CALL CENTER | Facility: HOSPITAL | Age: 68
End: 2023-06-06
Payer: MEDICARE

## 2023-06-06 NOTE — OUTREACH NOTE
Sepsis Week 3 Survey      Flowsheet Row Responses   [facility] facility patient discharged from? [place]   Does the patient have one of the following disease processes/diagnoses(primary or secondary)? Sepsis   Week 3 attempt successful? No   Unsuccessful attempts Attempt 1            Sabrina CATALAN - Registered Nurse

## 2023-06-08 DIAGNOSIS — N28.89 LEFT RENAL MASS: Primary | ICD-10-CM

## 2023-06-13 ENCOUNTER — TELEPHONE (OUTPATIENT)
Dept: INTERNAL MEDICINE | Facility: CLINIC | Age: 68
End: 2023-06-13
Payer: MEDICARE

## 2023-06-13 NOTE — TELEPHONE ENCOUNTER
Caller: SOHAM    Relationship: [facility]     Best call back number: [phone]        Who are you requesting to speak with (clinical staff, provider,  specific staff member): CLINICAL STAFF    Do you know the name of the person who called: CALL [facility] IF PATIENT STAYING ON BOTH MEDICATIONS. OTHERWISE, DISCONTINUE THE OTHER MEDICATION WITH PHARMACY.    What was the call regarding: SHOULD PATIENT BE ON BREZTRI  INHALER AND ADVAIR DISKUS PRESCRIPTIONS? EITHER ONE OR BOTH?

## 2023-07-19 ENCOUNTER — TELEMEDICINE (OUTPATIENT)
Dept: FAMILY MEDICINE CLINIC | Facility: CLINIC | Age: 68
End: 2023-07-19
Payer: MEDICARE

## 2023-07-19 DIAGNOSIS — G47.33 OSA (OBSTRUCTIVE SLEEP APNEA): Primary | ICD-10-CM

## 2023-07-19 DIAGNOSIS — R53.1 WEAKNESS: ICD-10-CM

## 2023-07-19 DIAGNOSIS — N28.1 RENAL CYST, LEFT: ICD-10-CM

## 2023-07-19 DIAGNOSIS — Z86.19 H/O SEPSIS: ICD-10-CM

## 2023-07-19 DIAGNOSIS — I47.1 PAROXYSMAL SVT (SUPRAVENTRICULAR TACHYCARDIA): ICD-10-CM

## 2023-07-19 PROCEDURE — 99214 OFFICE O/P EST MOD 30 MIN: CPT | Performed by: NURSE PRACTITIONER

## 2023-07-19 RX ORDER — MODAFINIL 200 MG/1
200 TABLET ORAL DAILY
Qty: 30 TABLET | Refills: 1 | Status: SHIPPED | OUTPATIENT
Start: 2023-08-16

## 2023-07-27 RX ORDER — MIRTAZAPINE 15 MG/1
15 TABLET, FILM COATED ORAL
Qty: 90 TABLET | Refills: 1 | OUTPATIENT
Start: 2023-07-27

## 2023-07-27 RX ORDER — ROPINIROLE 0.25 MG/1
0.25 TABLET, FILM COATED ORAL
Qty: 90 TABLET | Refills: 1 | OUTPATIENT
Start: 2023-07-27

## 2023-07-27 RX ORDER — LEVOTHYROXINE SODIUM 112 UG/1
112 TABLET ORAL DAILY
Qty: 90 TABLET | Refills: 1 | OUTPATIENT
Start: 2023-07-27

## 2023-07-27 RX ORDER — CITALOPRAM 40 MG/1
40 TABLET ORAL DAILY
Qty: 90 TABLET | Refills: 1 | OUTPATIENT
Start: 2023-07-27

## 2023-07-31 ENCOUNTER — OFFICE VISIT (OUTPATIENT)
Dept: FAMILY MEDICINE CLINIC | Facility: CLINIC | Age: 68
End: 2023-07-31
Payer: MEDICARE

## 2023-07-31 VITALS
BODY MASS INDEX: 23.29 KG/M2 | WEIGHT: 148.4 LBS | HEIGHT: 67 IN | TEMPERATURE: 98.2 F | SYSTOLIC BLOOD PRESSURE: 132 MMHG | RESPIRATION RATE: 20 BRPM | OXYGEN SATURATION: 93 % | DIASTOLIC BLOOD PRESSURE: 68 MMHG | HEART RATE: 88 BPM

## 2023-07-31 DIAGNOSIS — J44.1 COPD WITH EXACERBATION: Primary | ICD-10-CM

## 2023-07-31 DIAGNOSIS — J44.9 CHRONIC OBSTRUCTIVE PULMONARY DISEASE, UNSPECIFIED COPD TYPE: ICD-10-CM

## 2023-07-31 RX ORDER — GUAIFENESIN 600 MG/1
TABLET, EXTENDED RELEASE ORAL
Qty: 60 TABLET | Refills: 2 | Status: SHIPPED | OUTPATIENT
Start: 2023-07-31 | End: 2023-08-30

## 2023-07-31 RX ORDER — DEXAMETHASONE SODIUM PHOSPHATE 10 MG/ML
10 INJECTION INTRAMUSCULAR; INTRAVENOUS ONCE
Status: COMPLETED | OUTPATIENT
Start: 2023-07-31 | End: 2023-07-31

## 2023-07-31 RX ORDER — AZITHROMYCIN 250 MG/1
TABLET, FILM COATED ORAL
Qty: 6 TABLET | Refills: 0 | Status: SHIPPED | OUTPATIENT
Start: 2023-07-31

## 2023-07-31 RX ADMIN — DEXAMETHASONE SODIUM PHOSPHATE 10 MG: 10 INJECTION INTRAMUSCULAR; INTRAVENOUS at 16:23

## 2023-07-31 NOTE — PROGRESS NOTES
"Chief Complaint  Shortness of Breath (Pt states that she thinks she is having a COPD excerebration or possible pneumonia )    Subjective        Ashley Gibbs presents to [facility]  History of Present Illness    The patient is accompanied by an adult female.    The patient reports that her symptoms started on Thursday, 07/27/2022, when her chest began hurting when she breathes deeply and experienced severe pain when coughing. It was an unproductive cough, and she has been taking Mucinex once daily with a breathing treatment. She is still experiencing pain and feels like her lungs are not functioning well. She is unable to wear a brassiere due to the tightness and pain. Since using Mucinex and the breathing treatments, she has coughed 1 or 2 times, and the mucus is so thick that it needs to be pulled out of her mouth. The mucus was dark yellow in color. She is still using Breztri, albuterol as needed, and DuoNeb and was sent home with cefdinir for her last antibiotic. She cannot take amoxicillin because it causes her to vomit. She is not happy about seeing a pulmonologist.     When she was in the hospital with pneumonia, something was found on her lung. She had a biopsy done, and it was benign. She has not had it checked since then. The CAT scan of her lungs has not been performed.     Objective   Vital Signs:  /68 (BP Location: Right arm, Patient Position: Sitting, Cuff Size: Adult)   Pulse 88   Temp 98.2 øF (36.8 øC) (Infrared)   Resp 20   Ht 170.2 cm (67\")   Wt 67.3 kg (148 lb 6.4 oz)   SpO2 93%   BMI 23.24 kg/mý   Estimated body mass index is 23.24 kg/mý as calculated from the following:    Height as of this encounter: 170.2 cm (67\").    Weight as of this encounter: 67.3 kg (148 lb 6.4 oz).       BMI is within normal parameters. No other follow-up for BMI required.      Physical Exam  Vitals and nursing note reviewed.   Constitutional:       General: She is not in " acute distress.     Appearance: She is well-developed.   HENT:      Right Ear: Tympanic membrane and ear canal normal.      Left Ear: Tympanic membrane and ear canal normal.      Nose: Nose normal.      Right Sinus: No maxillary sinus tenderness or frontal sinus tenderness.      Left Sinus: No maxillary sinus tenderness or frontal sinus tenderness.      Mouth/Throat:      Mouth: Mucous membranes are moist.      Pharynx: Oropharynx is clear. Uvula midline. No uvula swelling.   Eyes:      Conjunctiva/sclera: Conjunctivae normal.   Neck:      Thyroid: No thyromegaly.      Trachea: No tracheal deviation.   Cardiovascular:      Rate and Rhythm: Normal rate and regular rhythm.      Heart sounds: Normal heart sounds.   Pulmonary:      Effort: Pulmonary effort is normal. Accessory muscle usage present.      Comments: Congestion  Musculoskeletal:      Cervical back: Neck supple.   Lymphadenopathy:      Cervical: No cervical adenopathy.   Skin:     General: Skin is warm and dry.   Neurological:      Mental Status: She is alert.   Psychiatric:         Behavior: Behavior normal.      Result Review :                   Assessment and Plan   Diagnoses and all orders for this visit:    1. COPD with exacerbation (Primary)  -     dexamethasone (DECADRON) injection 10 mg    2. Chronic obstructive pulmonary disease, unspecified COPD type  -     Ambulatory Referral to Pulmonology    Other orders  -     azithromycin (Zithromax Z-Jonas) 250 MG tablet; Take 2 tablets by mouth on day 1, then 1 tablet daily on days 2-5  Dispense: 6 tablet; Refill: 0  -     guaiFENesin (Mucinex) 600 MG 12 hr tablet; Take 2 tablets by mouth 2 (Two) Times a Day for 5 days, THEN 2 tablets Every Night for 25 days.  Dispense: 60 tablet; Refill: 2        1. COPD exacerbation  - Steroid injection given today.  - DuoNeb treatment every 4 hours around the clock for the next 24 to 48 hours.   - Azithromycin is ordered.   - Prescription strength Mucinex is ordered.   -  Referral to pulmonology placed.  Continue current copd medications for now.   - CAT scan of lung ordered 04/2023 needs to be scheduled and number for scheduling given.        Follow Up   Return in about 1 week (around 8/7/2023), or if symptoms worsen or fail to improve.  Patient was given instructions and counseling regarding her condition or for health maintenance advice. Please see specific information pulled into the AVS if appropriate.     Transcribed from ambient dictation for CARRIE Medina by Tonya Colón.  08/01/23   10:07 CDT    Patient or patient representative verbalized consent to the visit recording.  I have personally performed the services described in this document as transcribed by the above individual, and it is both accurate and complete.

## 2023-08-07 ENCOUNTER — TELEPHONE (OUTPATIENT)
Dept: FAMILY MEDICINE CLINIC | Facility: CLINIC | Age: 68
End: 2023-08-07
Payer: MEDICARE

## 2023-08-26 DIAGNOSIS — I47.1 PAROXYSMAL SVT (SUPRAVENTRICULAR TACHYCARDIA): ICD-10-CM

## 2023-08-29 RX ORDER — ALBUTEROL SULFATE 90 UG/1
AEROSOL, METERED RESPIRATORY (INHALATION)
Qty: 18 G | Refills: 2 | OUTPATIENT
Start: 2023-08-29

## 2023-08-29 RX ORDER — GUAIFENESIN 600 MG/1
TABLET, EXTENDED RELEASE ORAL
Qty: 70 TABLET | Refills: 2 | OUTPATIENT
Start: 2023-08-29

## 2023-08-29 NOTE — TELEPHONE ENCOUNTER
Refill not appropriate.  Ventolin inhaler discontinued. Too early to refill mucus relief.     Rx Refill Note  Requested Prescriptions     Pending Prescriptions Disp Refills    Ventolin  (90 Base) MCG/ACT inhaler [Pharmacy Med Name: VENTOLIN HFA 90 MCG INHALER 108 (90 BAS Aerosol] 18 g 2     Sig: Inhale 2 puffs Every 4 (Four) Hours As Needed for Wheezing.    Mucus Relief 600 MG 12 hr tablet [Pharmacy Med Name: MUCUS RELIEF 600 MG TB12 600 Tablet] 70 tablet 2     Sig: TAKE TWO TABLETS BY MOUTH TWICE A DAY FOR 5 DAYS THEN TAKE TWO TABLETS BY MOUTH EVERY EVENING FOR 25 DAYS      Last office visit with prescribing clinician: 7/31/2023   Last telemedicine visit with prescribing clinician: 7/19/2023   Next office visit with prescribing clinician: 10/20/2023                         Would you like a call back once the refill request has been completed: [] Yes [] No    If the office needs to give you a call back, can they leave a voicemail: [] Yes [] No    Fanta Mark LPN  08/29/23, 14:04 CDT

## 2023-08-29 NOTE — TELEPHONE ENCOUNTER
Rx Refill Note  Requested Prescriptions     Pending Prescriptions Disp Refills    metoprolol tartrate (LOPRESSOR) 25 MG tablet [Pharmacy Med Name: METOPROLOL TARTRATE 25 MG T 25 Tablet] 90 tablet 0     Sig: Take 1 tablet by mouth Every 8 (Eight) Hours.      Last office visit with prescribing clinician: 7/31/2023   Last telemedicine visit with prescribing clinician: 7/19/2023   Next office visit with prescribing clinician: 8/28/2023                         Would you like a call back once the refill request has been completed: [] Yes [] No    If the office needs to give you a call back, can they leave a voicemail: [] Yes [] No    Fanta Mark LPN  08/29/23, 14:03 CDT

## 2023-08-31 DIAGNOSIS — G47.33 OSA (OBSTRUCTIVE SLEEP APNEA): ICD-10-CM

## 2023-08-31 RX ORDER — MODAFINIL 200 MG/1
200 TABLET ORAL DAILY
Qty: 30 TABLET | Refills: 1 | OUTPATIENT
Start: 2023-08-31

## 2023-09-25 DIAGNOSIS — G47.33 OSA (OBSTRUCTIVE SLEEP APNEA): ICD-10-CM

## 2023-09-25 NOTE — TELEPHONE ENCOUNTER
Rx Refill Note  Requested Prescriptions     Pending Prescriptions Disp Refills    modafinil (PROVIGIL) 200 MG tablet [Pharmacy Med Name: MODAFINIL 200 MG  Tablet] 30 tablet 1     Sig: Take 1 tablet by mouth Daily.      Last office visit with prescribing clinician: 7/31/2023   Next office visit with prescribing clinician: 10/20/2023       Nori Ramsey CMA  09/25/23, 14:42 CDT

## 2023-09-27 RX ORDER — MODAFINIL 200 MG/1
200 TABLET ORAL DAILY
Qty: 30 TABLET | Refills: 0 | Status: SHIPPED | OUTPATIENT
Start: 2023-09-27

## 2023-10-23 ENCOUNTER — OFFICE VISIT (OUTPATIENT)
Dept: FAMILY MEDICINE CLINIC | Facility: CLINIC | Age: 68
End: 2023-10-23
Payer: MEDICARE

## 2023-10-23 VITALS
SYSTOLIC BLOOD PRESSURE: 96 MMHG | OXYGEN SATURATION: 94 % | DIASTOLIC BLOOD PRESSURE: 62 MMHG | BODY MASS INDEX: 23.23 KG/M2 | HEIGHT: 67 IN | WEIGHT: 148 LBS | HEART RATE: 64 BPM | TEMPERATURE: 97.7 F | RESPIRATION RATE: 18 BRPM

## 2023-10-23 DIAGNOSIS — J06.9 UPPER RESPIRATORY TRACT INFECTION, UNSPECIFIED TYPE: ICD-10-CM

## 2023-10-23 DIAGNOSIS — R50.9 FEVER, UNSPECIFIED FEVER CAUSE: Primary | ICD-10-CM

## 2023-10-23 PROCEDURE — 1160F RVW MEDS BY RX/DR IN RCRD: CPT | Performed by: NURSE PRACTITIONER

## 2023-10-23 PROCEDURE — 1159F MED LIST DOCD IN RCRD: CPT | Performed by: NURSE PRACTITIONER

## 2023-10-23 PROCEDURE — 87428 SARSCOV & INF VIR A&B AG IA: CPT | Performed by: NURSE PRACTITIONER

## 2023-10-23 PROCEDURE — 99213 OFFICE O/P EST LOW 20 MIN: CPT | Performed by: NURSE PRACTITIONER

## 2023-10-23 RX ORDER — SENNOSIDES 8.6 MG
650 CAPSULE ORAL EVERY 8 HOURS PRN
Qty: 100 TABLET | Refills: 2 | Status: SHIPPED | OUTPATIENT
Start: 2023-10-23

## 2023-10-23 RX ORDER — METHYLPREDNISOLONE 4 MG/1
TABLET ORAL
Qty: 21 TABLET | Refills: 0 | Status: SHIPPED | OUTPATIENT
Start: 2023-10-23

## 2023-10-23 NOTE — PROGRESS NOTES
"Chief Complaint  Cough (Pt presents with cough and fever last few days)    Subjective        Ashley Gibbs presents to [facility]  History of Present Illness  The patient presents today with complaints of a headache since Friday, 10/21/2022. She is accompanied by an adult female.    The patient reports that she started running a fever yesterday, 10/22/2023. She states that she was sore, weak, and smelled. Her temperature was 105 degrees Fahrenheit. The adult female states that she gave the patient Tylenol to bring her temperature down. The adult female states that the patient has had a fever since last night and this morning. The patient denies any pain other than her headache. She is not coughing that much. They have an at-home COVID-19 test.    Objective   Vital Signs:  BP 96/62 (BP Location: Right arm, Patient Position: Sitting, Cuff Size: Adult)   Pulse 64   Temp 97.7 °F (36.5 °C) (Temporal)   Resp 18   Ht 170.2 cm (67\")   Wt 67.1 kg (148 lb)   SpO2 94%   BMI 23.18 kg/m²   Estimated body mass index is 23.18 kg/m² as calculated from the following:    Height as of this encounter: 170.2 cm (67\").    Weight as of this encounter: 67.1 kg (148 lb).       BMI is within normal parameters. No other follow-up for BMI required.      Physical Exam  Vitals and nursing note reviewed.   Constitutional:       General: She is not in acute distress.     Appearance: She is well-developed. She is ill-appearing.   HENT:      Right Ear: Ear canal normal. Tympanic membrane is injected.      Left Ear: Tympanic membrane and ear canal normal.      Nose: Nose normal.      Right Sinus: No maxillary sinus tenderness or frontal sinus tenderness.      Left Sinus: No maxillary sinus tenderness or frontal sinus tenderness.      Mouth/Throat:      Mouth: Mucous membranes are moist.      Pharynx: Oropharynx is clear. Uvula midline. No uvula swelling.   Eyes:      Conjunctiva/sclera: Conjunctivae normal. "   Neck:      Thyroid: No thyromegaly.      Trachea: No tracheal deviation.   Cardiovascular:      Rate and Rhythm: Normal rate and regular rhythm.      Heart sounds: Normal heart sounds.   Pulmonary:      Effort: Pulmonary effort is normal.      Breath sounds: Normal breath sounds.   Musculoskeletal:      Cervical back: Neck supple.   Lymphadenopathy:      Cervical: No cervical adenopathy.   Skin:     General: Skin is warm and dry.   Neurological:      Mental Status: She is alert.   Psychiatric:         Behavior: Behavior normal.        Result Review :                   Assessment and Plan   Diagnoses and all orders for this visit:    1. Fever, unspecified fever cause (Primary)  -     [facility] Miscellaneous - , Nasopharynx; Future  -     POCT SARS-CoV-2 Antigen NOLVIA + Flu  -     [facility] Miscellaneous - , Nasopharynx    2. Upper respiratory tract infection, unspecified type    Other orders  -     acetaminophen (Tylenol 8 Hour) 650 MG 8 hr tablet; Take 1 tablet by mouth Every 8 (Eight) Hours As Needed for Moderate Pain or Headache.  Dispense: 100 tablet; Refill: 2  -     methylPREDNISolone (MEDROL) 4 MG dose pack; Take as directed on package instructions.  Dispense: 21 tablet; Refill: 0      Plan:   Poct covid/flu negative  [facility] swab for respiratory illness  Tylenol prescribed for pain/fever  Medrol dose chao sent  No antibiotic as this is likely viral  Follow up if respiratory status worsens         Follow Up   Return in about 1 week (around 10/30/2023), or if symptoms worsen or fail to improve.  Patient was given instructions and counseling regarding her condition or for health maintenance advice. Please see specific information pulled into the AVS if appropriate.     Transcribed from ambient dictation for CARRIE Medina by Alana Riley.  10/23/23   17:08 CDT    Patient or patient representative verbalized consent to the visit recording.  I have personally performed the services described in this  document as transcribed by the above individual, and it is both accurate and complete.

## 2023-10-24 ENCOUNTER — TELEPHONE (OUTPATIENT)
Dept: FAMILY MEDICINE CLINIC | Facility: CLINIC | Age: 68
End: 2023-10-24
Payer: MEDICARE

## 2023-10-24 NOTE — TELEPHONE ENCOUNTER
[facility] Medicine Services  INPATIENT PROGRESS NOTE    Length of Stay: 4  Date of Admission: 5/15/2019  Primary Care Physician: Woody Mathis DO    Subjective     Chief Complaint:     Shortness of breath, lower extremity edema    HPI     The patient has continued to have good diuresis on IV Lasix.  He has had a total of 14,700 cc out.  His heart rate is under reasonably good control with the addition of low-dose beta-blocker added by cardiology.  He has been on every 6 hours Cardizem and I will convert that to Cardizem  mg daily.  IV Lasix will be discontinued in favor of oral Lasix every morning.  The patient appears to be euvolemic and should be appropriate for discharge tomorrow.    Review of Systems     All pertinent negatives and positives are as above. All other systems have been reviewed and are negative unless otherwise stated.     Objective    Temp:  [97.5 °F (36.4 °C)-99.7 °F (37.6 °C)] 99.1 °F (37.3 °C)  Heart Rate:  [78-97] 92  Resp:  [18] 18  BP: ()/(49-75) 95/51    Lab Results (last 24 hours)     Procedure Component Value Units Date/Time    Manual Differential [[id number]]  (Abnormal) Collected:  05/19/19 0303    Specimen:  Blood Updated:  05/19/19 0523     Neutrophil % 71.0 %      Lymphocyte % 15.0 %      Monocyte % 13.0 %      Atypical Lymphocyte % 1.0 %      Neutrophils Absolute 3.27 10*3/mm3      Lymphocytes Absolute 0.69 10*3/mm3      Monocytes Absolute 0.60 10*3/mm3      Anisocytosis Slight/1+     WBC Morphology Normal     Platelet Estimate Adequate    CBC & Differential [[id number]] Collected:  05/19/19 0303    Specimen:  Blood Updated:  05/19/19 0523    Narrative:       The following orders were created for panel order CBC & Differential.  Procedure                               Abnormality         Status                     ---------                               -----------         ------                     CBC Auto    Caller: Ashley Gibbs    Relationship: Self    Best call back number: [phone]     Who are you requesting to speak with (clinical staff, provider,  specific staff member): CLINICAL     What was the call regarding: PATIENT REQUESTING CALLBACK REGARDING HER LAB RESULTS.           Differential[[id number]]        Abnormal            Final result                 Please view results for these tests on the individual orders.    CBC Auto Differential [[id number]]  (Abnormal) Collected:  05/19/19 0303    Specimen:  Blood Updated:  05/19/19 0523     WBC 4.60 10*3/mm3      RBC 3.35 10*6/mm3      Hemoglobin 10.9 g/dL      Hematocrit 32.1 %      MCV 95.8 fL      MCH 32.5 pg      MCHC 34.0 g/dL      RDW 18.2 %      RDW-SD 62.7 fl      MPV 12.1 fL      Platelets 138 10*3/mm3     Narrative:       The previously reported component NRBC is no longer being reported.    Basic Metabolic Panel [[id number]]  (Abnormal) Collected:  05/19/19 0303    Specimen:  Blood Updated:  05/19/19 0355     Glucose 97 mg/dL      BUN 29 mg/dL      Creatinine 0.81 mg/dL      Sodium 131 mmol/L      Potassium 4.1 mmol/L      Chloride 96 mmol/L      CO2 27.0 mmol/L      Calcium 9.0 mg/dL      eGFR Non African Amer 92 mL/min/1.73      BUN/Creatinine Ratio 35.8     Anion Gap 8.0 mmol/L     Narrative:       GFR Normal >60  Chronic Kidney Disease <60  Kidney Failure <15          Imaging Results (last 24 hours)     ** No results found for the last 24 hours. **             Intake/Output Summary (Last 24 hours) at 5/19/2019 1436  Last data filed at 5/19/2019 1214  Gross per 24 hour   Intake 960 ml   Output 3150 ml   Net -2190 ml       Physical Exam  The patient's wife is present in the room with him.  Constitutional: He is oriented to person, place, and time. He appears well-developed and well-nourished. He is cooperative.   HENT:   Head: Normocephalic and atraumatic.   Nose: Nose normal.   Mouth/Throat: Oropharynx is clear and moist.   Eyes: Conjunctivae are normal. No scleral icterus.   Neck: Normal range of motion. Neck supple. No JVD present.   Cardiovascular: Normal rate, regular rhythm, normal heart sounds and intact distal pulses.   No murmur heard.  Pulmonary/Chest: Effort normal and breath sounds normal. He has no wheezes.  No rales  present.  Abdominal: Soft. Bowel sounds are normal. He exhibits no mass. There is no tenderness.   Musculoskeletal: Normal range of motion.  Trace edema BLE of ankles and forefoot.  Neurological: He is alert and oriented to person, place, and time. Coordination normal.   Skin: Skin is warm and dry. No rash noted.   Psychiatric: He has a normal mood and affect.        Results Review:  I have reviewed the labs, radiology results, and diagnostic studies since my last progress note and made treatment changes reflective of the results.   I have reviewed the current medications.    Assessment/Plan     Active Hospital Problems    Diagnosis   • **Paroxysmal atrial fibrillation with rapid ventricular response (CMS/HCC)   • Acute systolic (congestive) heart failure (CMS/HCC)   • Pleural effusion, bilateral   • Elevated troponin   • Diffuse large B cell lymphoma (CMS/HCC)   • Lower extremity edema   • Acute pulmonary edema (CMS/HCC)   • Anemia       PLAN:  Discontinue every 6 hours Cardizem in favor of Cardizem 360 CD 1 p.o. Daily  Discontinue IV Lasix in favor of Lasix 40 mg p.o. every morning  Possible discharge tomorrow    Stephane Ruvalcaba DO   05/19/19   2:36 PM

## 2023-10-24 NOTE — TELEPHONE ENCOUNTER
Called patient and told patient that she needs to continue medications and that the steroid pack can take a couple days to fully get in her system.  Advised patient as long as she doesn't have any kidney issues that she can rotate tylenol and ibuprofen as needed.    Patient asked about lab results.  I told patient that the results have not been resulted by the provider yet.  At this time we are not seeing anything showing up but the provider will make the final review and we will let her know.   Patient stated she feels bad.  I told patient we can get her on the schedule or if she feels really bad that she can go to the ER.  Patient voiced understanding.

## 2023-10-24 NOTE — TELEPHONE ENCOUNTER
Caller: Ashley Gibbs    Relationship: Self    Best call back number: [phone]     Caller requesting test results: SELF    What test was performed: NOSE CULTURE    When was the test performed: 10.23.23    Where was the test performed: IN OFFICE    Additional notes: WAS WANTING TO SEE IF RESULTS ARE IN. PATIENT IS STATING STILL VERY SICK AND FEELING BAD. FEVER .9

## 2023-10-25 DIAGNOSIS — G47.33 OSA (OBSTRUCTIVE SLEEP APNEA): ICD-10-CM

## 2023-10-25 RX ORDER — MODAFINIL 200 MG/1
200 TABLET ORAL DAILY
Qty: 30 TABLET | Refills: 0 | Status: SHIPPED | OUTPATIENT
Start: 2023-10-25

## 2023-10-25 NOTE — TELEPHONE ENCOUNTER
Called granddaughter and advised that patient needed to go to the ER. Granddaughter stated that patient had taken 2 of the tylenol instead of the one as prescribed and she doesn't have a fever no more.  Asked if patient could come by or a UA due to possible UTI.  Spoke with Padmaja.  Advised granddaughter that patient needed to go to the ER because there could be several things such as pnuemonia or dehydration from having fever and not enough liquids.  Also advised granddaughter that patient only needs to take one tylenol every 8 hours and not 2 as she has done bc it could cause liver problems.  Granddaughter voiced understanding but stated patient would probably not go to the ER bc patient doesn't like ER.    I advised that it is important.  Granddaughter voiced understanding.

## 2023-10-25 NOTE — TELEPHONE ENCOUNTER
Rx Refill Note  Requested Prescriptions     Pending Prescriptions Disp Refills    modafinil (PROVIGIL) 200 MG tablet [Pharmacy Med Name: MODAFINIL 200 MG  Tablet] 30 tablet 0     Sig: Take 1 tablet by mouth Daily.      Last office visit with prescribing clinician: 10/23/2023   Next office visit with prescribing clinician: Visit date not found       Nori Ramsey CMA  10/25/23, 11:57 CDT

## 2023-10-25 NOTE — TELEPHONE ENCOUNTER
[facility] called pt to see if she had contreras questions/concerns. NA [facility] for call back

## 2023-10-25 NOTE — TELEPHONE ENCOUNTER
Patients granddaughter Hope called and wanted to let us know that Ashley is not better today, she is actually worse with wheezing and coughing and a temp of 103, she thinks it could be from the possible UTI, but she wants to know if she can do anything at all or give her anything different to help her. She would like a nurse to call her please. She also asked about lab results I told her that they have not came back yet.

## 2023-10-27 RX ORDER — MIRTAZAPINE 15 MG/1
15 TABLET, FILM COATED ORAL
Qty: 90 TABLET | Refills: 1 | Status: SHIPPED | OUTPATIENT
Start: 2023-10-27

## 2023-10-27 RX ORDER — LEVOTHYROXINE SODIUM 112 UG/1
112 TABLET ORAL DAILY
Qty: 90 TABLET | Refills: 1 | Status: SHIPPED | OUTPATIENT
Start: 2023-10-27

## 2023-10-27 NOTE — TELEPHONE ENCOUNTER
Rx Refill Note  Requested Prescriptions     Pending Prescriptions Disp Refills    levothyroxine (SYNTHROID, LEVOTHROID) 112 MCG tablet [Pharmacy Med Name: LEVOTHYROXINE SODIUM 112  Tablet] 90 tablet 1     Sig: Take 1 tablet by mouth Daily.    mirtazapine (REMERON) 15 MG tablet [Pharmacy Med Name: MIRTAZAPINE 15 MG TAB 15 Tablet] 90 tablet 1     Sig: Take 1 tablet by mouth every night at bedtime.      Last office visit with prescribing clinician: 10/23/2023         Polly Leach MA  10/27/23, 09:35 CDT

## 2023-11-10 ENCOUNTER — TELEPHONE (OUTPATIENT)
Dept: FAMILY MEDICINE CLINIC | Facility: CLINIC | Age: 68
End: 2023-11-10
Payer: MEDICARE

## 2023-11-10 RX ORDER — ALBUTEROL SULFATE 90 UG/1
2 AEROSOL, METERED RESPIRATORY (INHALATION) EVERY 4 HOURS PRN
Qty: 18 G | Refills: 2 | Status: SHIPPED | OUTPATIENT
Start: 2023-11-10

## 2023-11-10 RX ORDER — ROPINIROLE 0.25 MG/1
0.25 TABLET, FILM COATED ORAL
Qty: 90 TABLET | Refills: 1 | Status: SHIPPED | OUTPATIENT
Start: 2023-11-10 | End: 2023-11-10 | Stop reason: SDUPTHER

## 2023-11-10 RX ORDER — ROPINIROLE 0.25 MG/1
0.25 TABLET, FILM COATED ORAL
Qty: 90 TABLET | Refills: 0 | Status: SHIPPED | OUTPATIENT
Start: 2023-11-10

## 2023-11-10 NOTE — TELEPHONE ENCOUNTER
Rx Refill Note  Requested Prescriptions     Pending Prescriptions Disp Refills    rOPINIRole (REQUIP) 0.25 MG tablet [Pharmacy Med Name: ROPINIROLE HCL 0.25 MG TAB 0.25 Tablet] 90 tablet 1     Sig: Take 1 tablet by mouth every night at bedtime.    Ventolin  (90 Base) MCG/ACT inhaler [Pharmacy Med Name: VENTOLIN HFA 90 MCG INHALER 108 (90 BAS Aerosol] 18 g 2     Sig: Inhale 2 puffs Every 4 (Four) Hours As Needed for Wheezing or Shortness of Air.      Last office visit with prescribing clinician: 10/23/2023     Next office visit with prescribing clinician:   Pt not scheduled- called pt to schedule apt- pt not able to come in until 11/16- but reports that she out of requip medication and needs     Zoraida Goddard MA  11/10/23, 15:35 CST

## 2023-11-10 NOTE — TELEPHONE ENCOUNTER
Caller: Ashley Gibbs    Relationship: Self    Best call back number: [phone]     What is the best time to reach you: ANYTIME    Who are you requesting to speak with (clinical staff, provider,  specific staff member): CLINICAL      What was the call regarding: PATIENT NEEDS TO DISCUSS REFERRALS FOR BONE DENSITY/MAMMOGRAM/ULTRASOUND/PULMONOLOGY    Is it okay if the provider responds through MyChart: PLEASE CALL

## 2023-11-10 NOTE — TELEPHONE ENCOUNTER
Caller: Ashley Gibbs    Relationship: Self    Best call back number: [phone]     Requested Prescriptions:   Requested Prescriptions     Pending Prescriptions Disp Refills    rOPINIRole (REQUIP) 0.25 MG tablet 90 tablet 1     Sig: Take 1 tablet by mouth every night at bedtime.        Pharmacy where request should be sent: [facility], [place] - [address] [facility] - [phone]  - [phone]     Last office visit with prescribing clinician: 10/23/2023   Last telemedicine visit with prescribing clinician: 7/19/2023   Next office visit with prescribing clinician: 11/10/2023     Additional details provided by patient: TOTALLY OUT    Does the patient have less than a 3 day supply:  [x] Yes  [] No    Would you like a call back once the refill request has been completed: [x] Yes [] No    If the office needs to give you a call back, can they leave a voicemail: [x] Yes [] No    Kera Metz Rep   11/10/23 10:58 CST

## 2023-11-17 ENCOUNTER — OFFICE VISIT (OUTPATIENT)
Dept: FAMILY MEDICINE CLINIC | Facility: CLINIC | Age: 68
End: 2023-11-17
Payer: MEDICARE

## 2023-11-17 VITALS
BODY MASS INDEX: 23.48 KG/M2 | OXYGEN SATURATION: 96 % | SYSTOLIC BLOOD PRESSURE: 130 MMHG | HEIGHT: 67 IN | HEART RATE: 73 BPM | DIASTOLIC BLOOD PRESSURE: 71 MMHG | WEIGHT: 149.6 LBS | TEMPERATURE: 98.4 F | RESPIRATION RATE: 20 BRPM

## 2023-11-17 DIAGNOSIS — E55.9 VITAMIN D DEFICIENCY: ICD-10-CM

## 2023-11-17 DIAGNOSIS — E03.9 HYPOTHYROIDISM, UNSPECIFIED TYPE: ICD-10-CM

## 2023-11-17 DIAGNOSIS — F33.0 MILD EPISODE OF RECURRENT MAJOR DEPRESSIVE DISORDER: ICD-10-CM

## 2023-11-17 DIAGNOSIS — E78.5 HYPERLIPIDEMIA, UNSPECIFIED HYPERLIPIDEMIA TYPE: Primary | ICD-10-CM

## 2023-11-17 DIAGNOSIS — G62.9 NEUROPATHY: ICD-10-CM

## 2023-11-17 DIAGNOSIS — M81.0 AGE-RELATED OSTEOPOROSIS WITHOUT CURRENT PATHOLOGICAL FRACTURE: ICD-10-CM

## 2023-11-17 DIAGNOSIS — G89.4 CHRONIC PAIN DISORDER: ICD-10-CM

## 2023-11-17 DIAGNOSIS — J44.9 CHRONIC OBSTRUCTIVE PULMONARY DISEASE, UNSPECIFIED COPD TYPE: ICD-10-CM

## 2023-11-17 DIAGNOSIS — G47.33 OSA ON CPAP: ICD-10-CM

## 2023-11-17 DIAGNOSIS — I47.10 PAROXYSMAL SVT (SUPRAVENTRICULAR TACHYCARDIA): ICD-10-CM

## 2023-11-17 DIAGNOSIS — G47.19 EXCESSIVE DAYTIME SLEEPINESS: ICD-10-CM

## 2023-11-17 DIAGNOSIS — Z00.00 MEDICARE ANNUAL WELLNESS VISIT, SUBSEQUENT: ICD-10-CM

## 2023-11-17 DIAGNOSIS — R40.0 DAYTIME SOMNOLENCE: ICD-10-CM

## 2023-11-17 DIAGNOSIS — G47.33 OSA (OBSTRUCTIVE SLEEP APNEA): ICD-10-CM

## 2023-11-17 DIAGNOSIS — G25.81 RESTLESS LEGS SYNDROME: ICD-10-CM

## 2023-11-17 NOTE — PROGRESS NOTES
The ABCs of the Annual Wellness Visit  Subsequent Medicare Wellness Visit    Subjective    Ashley Gibbs is a 68 y.o. female who presents for a Subsequent Medicare Wellness Visit.    The following portions of the patient's history were reviewed and   updated as appropriate: allergies, current medications, past family history, past medical history, past social history, past surgical history, and problem list.    Compared to one year ago, the patient feels her physical   health is better.    Compared to one year ago, the patient feels her mental   health is better.    Recent Hospitalizations:  This patient has had a [facility] admission record on file within the last 365 days.    Current Medical Providers:  Patient Care Team:  Padmaja Bermudez APRN as PCP - General (Family Medicine)    Outpatient Medications Prior to Visit   Medication Sig Dispense Refill    acetaminophen (Tylenol 8 Hour) 650 MG 8 hr tablet Take 1 tablet by mouth Every 8 (Eight) Hours As Needed for Moderate Pain or Headache. 100 tablet 2    ascorbic acid (VITAMIN C) 500 MG tablet Take 1 tablet by mouth Daily.      Budeson-Glycopyrrol-Formoterol (Breztri Aerosphere) 160-9-4.8 MCG/ACT aerosol inhaler Inhale 2 puffs 2 (Two) Times a Day. 10.7 g 11    cholecalciferol (VITAMIN D3) 25 MCG (1000 UT) tablet Take 1 tablet by mouth Daily.      citalopram (CeleXA) 40 MG tablet Take 1 tablet by mouth Daily. 90 tablet 1    gabapentin (NEURONTIN) 600 MG tablet Take 1 tablet by mouth Every 12 (Twelve) Hours.      HYDROcodone-acetaminophen (NORCO)  MG per tablet Take 1 tablet by mouth 3 (Three) Times a Day.      levothyroxine (SYNTHROID, LEVOTHROID) 112 MCG tablet Take 1 tablet by mouth Daily. 90 tablet 1    mirtazapine (REMERON) 15 MG tablet Take 1 tablet by mouth every night at bedtime. 90 tablet 1    ondansetron ODT (ZOFRAN-ODT) 4 MG disintegrating tablet Place 1 tablet on the tongue Every 6 (Six) Hours As Needed for Nausea. 12 tablet 0    rizatriptan  (MAXALT) 10 MG tablet Take 1 tablet by mouth 1 (One) Time As Needed for Migraine.      rOPINIRole (REQUIP) 0.25 MG tablet Take 1 tablet by mouth every night at bedtime. 90 tablet 0    tiZANidine (ZANAFLEX) 2 MG tablet Take 1 tablet by mouth 2 (Two) Times a Day As Needed for Muscle Spasms.      Ventolin  (90 Base) MCG/ACT inhaler Inhale 2 puffs Every 4 (Four) Hours As Needed for Wheezing or Shortness of Air. 18 g 2    metoprolol tartrate (LOPRESSOR) 25 MG tablet Take 1 tablet by mouth Every 8 (Eight) Hours. 90 tablet 0    modafinil (PROVIGIL) 200 MG tablet Take 1 tablet by mouth Daily. 30 tablet 0    methylPREDNISolone (MEDROL) 4 MG dose pack Take as directed on package instructions. (Patient not taking: Reported on 11/17/2023) 21 tablet 0     No facility-administered medications prior to visit.       Opioid medication/s are on active medication list.  and I have evaluated her active treatment plan and pain score trends (see table).  Vitals:    11/17/23 1350   PainSc: 0-No pain     I have reviewed the chart for potential of high risk medication and harmful drug interactions in the elderly.          Aspirin is not on active medication list.  Aspirin use is not indicated based on review of current medical condition/s. Risk of harm outweighs potential benefits.  .    Patient Active Problem List   Diagnosis    Acute hypoxemic respiratory failure    Age-related osteoporosis without current pathological fracture    Anxiety    Chronic obstructive pulmonary disease    Chronic pain disorder    CPAP (continuous positive airway pressure) dependence    Depression    Excessive daytime sleepiness    Hemoptysis    Family history of renal cell carcinoma    Hyperlipidemia    Hypothyroidism    Migraines    Mild episode of recurrent major depressive disorder    Neuropathy    Obstructive sleep apnea    Primary insomnia    Renal mass    Restless legs syndrome    On supplemental oxygen therapy    Family history of CHF (congestive  "heart failure)    Family history of diabetes mellitus    6 cm exophytic left upper pole renal cyst, likely hemorrhagic    Acute bilateral pyelonephritis    Hypokalemia    E. coli UTI, present on admission (urinary tract infection)    Paroxysmal supraventricular tachycardia    Sepsis due to Escherichia coli UTI, bilateral pyelonephritis, present on admission     Advance Care Planning   Advance Care Planning     Advance Directive is not on file.  ACP discussion was declined by the patient. Patient does not have an advance directive, declines further assistance.     Objective    Vitals:    23 1350   BP: 130/71   BP Location: Left arm   Patient Position: Sitting   Cuff Size: Adult   Pulse: 73   Resp: 20   Temp: 98.4 °F (36.9 °C)   TempSrc: Infrared   SpO2: 96%   Weight: 67.9 kg (149 lb 9.6 oz)   Height: 170.2 cm (67\")   PainSc: 0-No pain     Estimated body mass index is 23.43 kg/m² as calculated from the following:    Height as of this encounter: 170.2 cm (67\").    Weight as of this encounter: 67.9 kg (149 lb 9.6 oz).    BMI is within normal parameters. No other follow-up for BMI required.      Does the patient have evidence of cognitive impairment? No    Lab Results   Component Value Date    CHLPL 203 (H) 2023    TRIG 119 2023    HDL 83 (H) 2023     2023    VLDL 20 2023        HEALTH RISK ASSESSMENT    Smoking Status:  Social History     Tobacco Use   Smoking Status Former    Packs/day: 1.00    Years: 40.00    Additional pack years: 0.00    Total pack years: 40.00    Types: Cigarettes    Quit date: 2008    Years since quitting: 15.5   Smokeless Tobacco Never     Alcohol Consumption:  Social History     Substance and Sexual Activity   Alcohol Use Defer     Fall Risk Screen:    STEADI Fall Risk Assessment was completed, and patient is at LOW risk for falls.Assessment completed on:2023    Depression Screenin/17/2023     1:47 PM   PHQ-2/PHQ-9 Depression " Screening   Little Interest or Pleasure in Doing Things 0-->not at all   Feeling Down, Depressed or Hopeless 0-->not at all   PHQ-9: Brief Depression Severity Measure Score 0       Health Habits and Functional and Cognitive Screenin/17/2023     1:49 PM   Functional & Cognitive Status   Do you have difficulty preparing food and eating? No   Do you have difficulty bathing yourself, getting dressed or grooming yourself? No   Do you have difficulty using the toilet? No   Do you have difficulty moving around from place to place? No   Do you have trouble with steps or getting out of a bed or a chair? No   Current Diet Well Balanced Diet   Dental Exam Up to date   Eye Exam Not up to date   Exercise (times per week) 3 times per week   Current Exercises Include Walking   Do you need help using the phone?  No   Are you deaf or do you have serious difficulty hearing?  No   Do you need help to go to places out of walking distance? No   Do you need help shopping? No   Do you need help preparing meals?  No   Do you need help with housework?  No   Do you need help with laundry? No   Do you need help taking your medications? No   Do you need help managing money? No   Do you ever drive or ride in a car without wearing a seat belt? No   Have you felt unusual stress, anger or loneliness in the last month? No   Who do you live with? Other   If you need help, do you have trouble finding someone available to you? No   Have you been bothered in the last four weeks by sexual problems? No   Do you have difficulty concentrating, remembering or making decisions? No       Age-appropriate Screening Schedule:  Refer to the list below for future screening recommendations based on patient's age, sex and/or medical conditions. Orders for these recommended tests are listed in the plan section. The patient has been provided with a written plan.    Health Maintenance   Topic Date Due    ZOSTER VACCINE (1 of 2) Never done    Pneumococcal  Vaccine 65+ (2 - PCV) 12/01/2015    MAMMOGRAM  01/17/2019    PAP SMEAR  Never done    INFLUENZA VACCINE  08/01/2023    COVID-19 Vaccine (3 - 2023-24 season) 09/01/2023    COLORECTAL CANCER SCREENING  10/17/2023    ANNUAL WELLNESS VISIT  11/17/2024    LIPID PANEL  11/20/2024    DXA SCAN  11/17/2025    TDAP/TD VACCINES (2 - Td or Tdap) 11/24/2027    HEPATITIS C SCREENING  Completed                  CMS Preventative Services Quick Reference  Risk Factors Identified During Encounter  Chronic Pain: Natural history and expected course discussed. Questions answered.  Depression/Dysphoria: Current medication is effective, no change recommended  Fall Risk-High or Moderate: Discussed Fall Prevention in the home  Immunizations Discussed/Encouraged: Prevnar 20 (Pneumococcal 20-valent conjugate), Shingrix, COVID19, and RSV (Respiratory Syncytial Virus)  Polypharmacy: Medication List reviewed  Tobacco Use/Dependance Risk Ashley Gibbs  reports that she quit smoking about 15 years ago. Her smoking use included cigarettes. She has a 40.00 pack-year smoking history. She has never used smokeless tobacco.. I have educated her on the risk of diseases from using tobacco products such as cancer, COPD, and heart disease.     I advised her to quit and she is not willing to quit.    I spent 3  minutes counseling the patient.          Dental Screening Recommended  Vision Screening Recommended  The above risks/problems have been discussed with the patient.  Pertinent information has been shared with the patient in the After Visit Summary.  An After Visit Summary and PPPS were made available to the patient.    Follow Up:   Next Medicare Wellness visit to be scheduled in 1 year.       Additional E&M Note during same encounter follows:  Patient has multiple medical problems which are significant and separately identifiable that require additional work above and beyond the Medicare Wellness Visit.      Chief Complaint  Medicare  "Wellness-subsequent    Subjective        HPI  Ashley Gibbs is also being seen today for a Medicare wellness visit and chronic follow-up on her medications.    Overall, she is stable. She has had a couple of illnesses over the last couple of months, and she is finally recovering. She still needs to have some testing done, which includes a renal ultrasound and DEXA scan. She is due for a mammogram and a CT scan of her lungs.    She is currently taking Breztri, albuterol inhaler as needed, and a nebulizer at home for her COPD.    She is currently taking Celexa and mirtazapine for her depression and insomnia.    She is on ropinirole for her restless leg syndrome.    Metoprolol was discontinued.     For her back pain she is taking gabapentin. She is on Norco for chronic pain, which she gets from pain management. She has tizanidine as needed.    She is currently taking modafinil for her sleep apnea and fatigue.    She is on levothyroxine for her thyroid.    She is taking Maxalt for her migraines. She reports she has not had a migraine in a while.    She reports she has not taken her vitamin D supplement in a while.    Her last labs were done in 05/2023.          Objective   Vital Signs:  /71 (BP Location: Left arm, Patient Position: Sitting, Cuff Size: Adult)   Pulse 73   Temp 98.4 °F (36.9 °C) (Infrared)   Resp 20   Ht 170.2 cm (67\")   Wt 67.9 kg (149 lb 9.6 oz)   SpO2 96%   BMI 23.43 kg/m²     Physical Exam  Vitals and nursing note reviewed.   Constitutional:       General: She is awake.      Appearance: Normal appearance. She is well-developed, well-groomed and normal weight.   HENT:      Head: Normocephalic and atraumatic.      Right Ear: Hearing, tympanic membrane, ear canal and external ear normal.      Left Ear: Hearing, tympanic membrane, ear canal and external ear normal.      Nose: Nose normal.      Mouth/Throat:      Lips: Pink.      Pharynx: Oropharynx is clear.   Cardiovascular:      Rate and " Rhythm: Normal rate and regular rhythm.      Heart sounds: Normal heart sounds.   Pulmonary:      Effort: Pulmonary effort is normal.      Breath sounds: Normal breath sounds and air entry.   Abdominal:      General: Abdomen is flat. Bowel sounds are normal.      Palpations: Abdomen is soft.   Musculoskeletal:      Right hip: Normal.      Left hip: Tenderness and bony tenderness present. Decreased range of motion. Decreased strength.      Right lower leg: Normal. No edema.      Left lower leg: Tenderness and bony tenderness present. No swelling or deformity.        Legs:    Skin:     General: Skin is warm and dry.      Capillary Refill: Capillary refill takes less than 2 seconds.   Neurological:      Mental Status: She is alert and oriented to person, place, and time.      Sensory: Sensation is intact.      Coordination: Coordination is intact.   Psychiatric:         Attention and Perception: Attention and perception normal.         Mood and Affect: Mood and affect normal.         Speech: Speech normal.         Behavior: Behavior is cooperative.         Thought Content: Thought content normal.         Cognition and Memory: Cognition normal.         Judgment: Judgment normal.                         Assessment and Plan   Diagnoses and all orders for this visit:    1. Hyperlipidemia, unspecified hyperlipidemia type (Primary)  -     CBC & Differential; Future  -     Comprehensive metabolic panel; Future  -     Lipid panel; Future    2. Age-related osteoporosis without current pathological fracture  -     DEXA Bone Density Axial    3. FAUSTO (obstructive sleep apnea)    4. Paroxysmal SVT (supraventricular tachycardia)    5. Chronic obstructive pulmonary disease, unspecified COPD type    6. FAUSTO on CPAP    7. Excessive daytime sleepiness    8. Daytime somnolence    9. Hypothyroidism, unspecified type  -     TSH; Future  -     T4, free; Future    10. Restless legs syndrome    11. Neuropathy    12. Chronic pain disorder    13.  Mild episode of recurrent major depressive disorder  -     Vitamin B12; Future  -     Folate; Future    14. Vitamin D deficiency  -     Vitamin D 25 hydroxy; Future    15. Medicare annual wellness visit, subsequent      1. Medicare annual wellness visit  - Ordered CBC with differential, CMP, lipid panel, TSH, reflex free T4, vitamin D, and hepatitis C Ab screening.     2. Chronic obstructive pulmonary disease  - Referral to Pulmonology  - She will continue taking Breztri and albuterol inhaler as needed.  - Continue nebulized treatments as needed.    3. Depression  - She will continue taking Celexa and mirtazapine as prescribed.    4. Restless leg syndrome  - She will continue taking ropinirole as prescribed.    5. Chronic back pain  - She will continue taking gabapentin as prescribed.  - Continue Norco and tizanidine as needed.  - Continue follow ups with pain management.    6. Hypothyroidism  - She will continue taking levothyroxine as prescribed.    7. Migraine   - She will continue taking Maxalt as needed.    8. Vitamin D deficiency  - She will continue taking vitamin D supplements.    9. Sleep apnea and fatigue  - Continue taking modafinil.  [place], UDS, and controlled substance contract in emr. Advised patient of risks associated with controlled substances including physical and mental dependence, abuse, and mental impairment. Advised patient to use least amount of possible and never overuse or share medications with other people. Patient states understanding of risks and instructions on proper use.       10. Health maintenance  - She will schedule a mammogram.  - She will schedule a DEXA scan.  - She will schedule a renal ultrasound.  - She will have her blood fasting work done today.         Follow Up   No follow-ups on file.  Patient was given instructions and counseling regarding her condition or for health maintenance advice. Please see specific information pulled into the AVS if appropriate.      Transcribed from ambient dictation for CARRIE Medina by Carlyn Murillo.  11/17/23   15:58 CST    Patient or patient representative verbalized consent to the visit recording.  I have personally performed the services described in this document as transcribed by the above individual, and it is both accurate and complete.

## 2023-11-22 DIAGNOSIS — N28.1 RENAL CYST, LEFT: Primary | ICD-10-CM

## 2023-11-22 RX ORDER — CAL/D3/MAG11/ZINC/COP/MANG/BOR 600 MG-800
1 TABLET ORAL DAILY
Qty: 90 TABLET | Refills: 3 | Status: SHIPPED | OUTPATIENT
Start: 2023-11-22

## 2023-11-29 RX ORDER — ERGOCALCIFEROL 1.25 MG/1
50000 CAPSULE ORAL WEEKLY
Qty: 12 CAPSULE | Refills: 0 | Status: SHIPPED | OUTPATIENT
Start: 2023-11-29

## 2023-11-30 DIAGNOSIS — G47.33 OSA (OBSTRUCTIVE SLEEP APNEA): ICD-10-CM

## 2023-11-30 RX ORDER — MODAFINIL 200 MG/1
200 TABLET ORAL DAILY
Qty: 30 TABLET | Refills: 0 | Status: SHIPPED | OUTPATIENT
Start: 2023-11-30

## 2023-11-30 NOTE — TELEPHONE ENCOUNTER
Rx Refill Note  Requested Prescriptions     Pending Prescriptions Disp Refills    modafinil (PROVIGIL) 200 MG tablet [Pharmacy Med Name: MODAFINIL 200 MG  Tablet] 30 tablet 0     Sig: Take 1 tablet by mouth Daily.      Last office visit with prescribing clinician: 11/17/2023   Next office visit with prescribing clinician: 2/15/2024    Last RF: 10/25/2023       Nori Ramsey CMA  11/30/23, 13:27 CST

## 2023-12-08 ENCOUNTER — TELEPHONE (OUTPATIENT)
Dept: FAMILY MEDICINE CLINIC | Facility: CLINIC | Age: 68
End: 2023-12-08
Payer: MEDICARE

## 2023-12-08 NOTE — TELEPHONE ENCOUNTER
Caller: JEANINE    Relationship: [facility]    Best call back number: [phone]    What form or medical record are you requesting: GENETIC REQUISITION FORM    Who is requesting this form or medical record from you: [facility]    How would you like to receive the form or medical records (pick-up, mail, fax): .[phone]    Timeframe paperwork needed: ASAP    Additional notes: CALLER STATED THAT THIS WAS SENT ON 12.7.23 AND WANTS TO MAKE SURE THAT WE RECEIVED IT? PLEASE FILL OUT AND SIGN AND  BACK ASAP.

## 2023-12-12 NOTE — TELEPHONE ENCOUNTER
Caller: ELIDA AT [facility]    Relationship: Other    Best call back number: [phone]    WANTING TO CHECK STATUS OF PAPERWORK.

## 2023-12-19 DIAGNOSIS — G47.33 OSA (OBSTRUCTIVE SLEEP APNEA): ICD-10-CM

## 2023-12-19 RX ORDER — ERGOCALCIFEROL 1.25 MG/1
50000 CAPSULE ORAL WEEKLY
Qty: 12 CAPSULE | Refills: 5 | OUTPATIENT
Start: 2023-12-19

## 2023-12-19 RX ORDER — CITALOPRAM 40 MG/1
40 TABLET ORAL DAILY
Qty: 90 TABLET | Refills: 5 | OUTPATIENT
Start: 2023-12-19

## 2023-12-19 RX ORDER — MODAFINIL 200 MG/1
200 TABLET ORAL DAILY
Qty: 30 TABLET | Refills: 5 | OUTPATIENT
Start: 2023-12-19

## 2023-12-19 NOTE — TELEPHONE ENCOUNTER
Refill Request too soon  Rx Refill Note  Requested Prescriptions     Pending Prescriptions Disp Refills    citalopram (CeleXA) 40 MG tablet [Pharmacy Med Name: CITALOPRAM HBR 40 MG TABLET 40 Tablet] 90 tablet 5     Sig: Take 1 tablet by mouth Daily.    vitamin D (ERGOCALCIFEROL) 1.25 MG (66448 UT) capsule capsule [Pharmacy Med Name: VIT D2 1.25 MG (50,000 UNIT 46542 Capsule] 12 capsule 5     Sig: Take 1 capsule by mouth 1 (One) Time Per Week.    modafinil (PROVIGIL) 200 MG tablet [Pharmacy Med Name: MODAFINIL 200 MG  Tablet] 30 tablet 5     Sig: Take 1 tablet by mouth Daily.      Last office visit with prescribing clinician: 11/17/2023   Last telemedicine visit with prescribing clinician: 7/19/2023   Next office visit with prescribing clinician: 2/15/2024                         Would you like a call back once the refill request has been completed: [] Yes [] No    If the office needs to give you a call back, can they leave a voicemail: [] Yes [] No    Deb Allen, PCT  12/19/23, 16:02 CST

## 2023-12-20 DIAGNOSIS — G47.33 OSA (OBSTRUCTIVE SLEEP APNEA): ICD-10-CM

## 2023-12-20 RX ORDER — CITALOPRAM 40 MG/1
40 TABLET ORAL DAILY
Qty: 90 TABLET | Refills: 1 | Status: SHIPPED | OUTPATIENT
Start: 2023-12-20

## 2023-12-20 RX ORDER — MODAFINIL 200 MG/1
200 TABLET ORAL DAILY
Qty: 30 TABLET | Refills: 0 | Status: SHIPPED | OUTPATIENT
Start: 2023-12-20

## 2023-12-20 RX ORDER — ROPINIROLE 0.25 MG/1
0.25 TABLET, FILM COATED ORAL
Qty: 90 TABLET | Refills: 1 | Status: SHIPPED | OUTPATIENT
Start: 2023-12-20

## 2023-12-21 DIAGNOSIS — R68.89 FLU-LIKE SYMPTOMS: Primary | ICD-10-CM

## 2023-12-21 RX ORDER — OSELTAMIVIR PHOSPHATE 75 MG/1
75 CAPSULE ORAL 2 TIMES DAILY
Qty: 10 CAPSULE | Refills: 0 | Status: SHIPPED | OUTPATIENT
Start: 2023-12-21

## 2023-12-21 NOTE — TELEPHONE ENCOUNTER
Patient's grandchild Hope Davidson called and said she (Antonia) was seen yesterday by Padmaja and that Padmaja said she will call in some Tamiflu for patient. She would like this sent to [facility].

## 2023-12-26 ENCOUNTER — OFFICE VISIT (OUTPATIENT)
Dept: FAMILY MEDICINE CLINIC | Facility: CLINIC | Age: 68
End: 2023-12-26
Payer: MEDICARE

## 2023-12-26 VITALS
HEIGHT: 67 IN | SYSTOLIC BLOOD PRESSURE: 153 MMHG | DIASTOLIC BLOOD PRESSURE: 73 MMHG | HEART RATE: 106 BPM | TEMPERATURE: 98.7 F | BODY MASS INDEX: 23.49 KG/M2 | WEIGHT: 149.69 LBS | OXYGEN SATURATION: 92 % | RESPIRATION RATE: 22 BRPM

## 2023-12-26 DIAGNOSIS — J44.1 COPD WITH EXACERBATION: Primary | ICD-10-CM

## 2023-12-26 RX ORDER — PSEUDOEPHEDRINE HCL 30 MG
30 TABLET ORAL EVERY 4 HOURS PRN
Qty: 20 TABLET | Refills: 0 | Status: SHIPPED | OUTPATIENT
Start: 2023-12-26

## 2023-12-26 RX ORDER — DEXAMETHASONE SODIUM PHOSPHATE 10 MG/ML
10 INJECTION INTRAMUSCULAR; INTRAVENOUS ONCE
Status: COMPLETED | OUTPATIENT
Start: 2023-12-26 | End: 2023-12-26

## 2023-12-26 RX ORDER — METHYLPREDNISOLONE 4 MG/1
TABLET ORAL
Qty: 21 TABLET | Refills: 0 | Status: SHIPPED | OUTPATIENT
Start: 2023-12-26

## 2023-12-26 RX ORDER — FLUTICASONE PROPIONATE 50 MCG
2 SPRAY, SUSPENSION (ML) NASAL DAILY
Qty: 9.9 ML | Refills: 0 | Status: SHIPPED | OUTPATIENT
Start: 2023-12-26

## 2023-12-26 RX ADMIN — DEXAMETHASONE SODIUM PHOSPHATE 10 MG: 10 INJECTION INTRAMUSCULAR; INTRAVENOUS at 10:41

## 2023-12-26 NOTE — PROGRESS NOTES
"Chief Complaint  Cough (Pt is here for a cough. )    Subjective        Ashley Gibbs presents to [facility]  History of Present Illness  Mrs. Gibbs presents today with head congestion and SOA.  She started feeling bad a week ago.  Symptoms are progressively getting worse and worse.  She was exposed to the flu and has taken a course of Tamiflu.  She has COPD.  She has wheezing.      Objective   Vital Signs:  /73 (BP Location: Left arm, Patient Position: Sitting, Cuff Size: Adult)   Pulse 106   Temp 98.7 °F (37.1 °C)   Resp 22   Ht 170.2 cm (67.01\")   Wt 67.9 kg (149 lb 11.1 oz)   SpO2 92%   BMI 23.44 kg/m²   Estimated body mass index is 23.44 kg/m² as calculated from the following:    Height as of this encounter: 170.2 cm (67.01\").    Weight as of this encounter: 67.9 kg (149 lb 11.1 oz).       BMI is within normal parameters. No other follow-up for BMI required.      Physical Exam  Vitals reviewed.   Constitutional:       General: She is not in acute distress.     Appearance: She is not toxic-appearing.   HENT:      Head: Normocephalic and atraumatic.      Nose: Congestion present.      Mouth/Throat:      Mouth: Mucous membranes are moist.      Pharynx: Oropharynx is clear.   Eyes:      Extraocular Movements: Extraocular movements intact.      Conjunctiva/sclera: Conjunctivae normal.      Pupils: Pupils are equal, round, and reactive to light.   Cardiovascular:      Rate and Rhythm: Normal rate and regular rhythm.      Pulses: Normal pulses.      Heart sounds: No murmur heard.  Pulmonary:      Effort: Pulmonary effort is normal. No respiratory distress.      Breath sounds: Decreased breath sounds and wheezing present. No rhonchi.   Abdominal:      General: Bowel sounds are normal. There is no distension.      Palpations: Abdomen is soft.      Tenderness: There is no abdominal tenderness.   Musculoskeletal:         General: No swelling or tenderness. Normal range of " motion.      Cervical back: Normal range of motion and neck supple. No muscular tenderness.   Skin:     General: Skin is warm and dry.      Findings: No erythema or rash.   Neurological:      General: No focal deficit present.      Mental Status: She is alert and oriented to person, place, and time.      Cranial Nerves: No cranial nerve deficit.      Motor: No weakness.   Psychiatric:         Mood and Affect: Mood normal.         Behavior: Behavior normal.            Result Review :               Assessment and Plan   Diagnoses and all orders for this visit:    1. COPD with exacerbation (Primary)  -     dexAMETHasone (DECADRON) injection 10 mg  -     methylPREDNISolone (MEDROL) 4 MG dose pack; Take as directed on package instructions.  Dispense: 21 tablet; Refill: 0  -     XR Chest PA & Lateral (In Office)    Other orders  -     fluticasone (FLONASE) 50 MCG/ACT nasal spray; 2 sprays into the nostril(s) as directed by provider Daily.  Dispense: 9.9 mL; Refill: 0  -     pseudoephedrine (Sudafed) 30 MG tablet; Take 1 tablet by mouth Every 4 (Four) Hours As Needed for Congestion.  Dispense: 20 tablet; Refill: 0    IM Steroid injection  Steroid taper  Flonase and Sudafed for congestion  Mucinex to help clear secretions          Follow Up   No follow-ups on file.  Patient was given instructions and counseling regarding her condition or for health maintenance advice. Please see specific information pulled into the AVS if appropriate.

## 2024-01-22 RX ORDER — ALBUTEROL SULFATE 90 UG/1
2 AEROSOL, METERED RESPIRATORY (INHALATION) EVERY 4 HOURS PRN
Qty: 18 G | Refills: 2 | Status: SHIPPED | OUTPATIENT
Start: 2024-01-22

## 2024-01-22 NOTE — TELEPHONE ENCOUNTER
Rx Refill Note  Requested Prescriptions     Pending Prescriptions Disp Refills    Ventolin  (90 Base) MCG/ACT inhaler [Pharmacy Med Name: VENTOLIN HFA 90 MCG INHALER 108 (90 BAS Aerosol] 18 g 2     Sig: Inhale 2 puffs Every 4 (Four) Hours As Needed for Wheezing or Shortness of Air.      Last office visit with prescribing clinician: 11/17/2023   Last telemedicine visit with prescribing clinician: Visit date not found   Next office visit with prescribing clinician: 2/15/2024                         Would you like a call back once the refill request has been completed: [] Yes [] No    If the office needs to give you a call back, can they leave a voicemail: [] Yes [] No    Deb Allen, PCT  01/22/24, 15:11 CST

## 2024-01-24 DIAGNOSIS — G47.33 OSA (OBSTRUCTIVE SLEEP APNEA): ICD-10-CM

## 2024-01-24 NOTE — TELEPHONE ENCOUNTER
Too early to refill levothyroxine and mirtazapine.     Rx Refill Note  Requested Prescriptions     Pending Prescriptions Disp Refills    mirtazapine (REMERON) 15 MG tablet [Pharmacy Med Name: MIRTAZAPINE 15 MG TAB 15 Tablet] 90 tablet 1     Sig: Take 1 tablet by mouth every night at bedtime.    levothyroxine (SYNTHROID, LEVOTHROID) 112 MCG tablet [Pharmacy Med Name: LEVOTHYROXINE SODIUM 112  Tablet] 90 tablet 1     Sig: Take 1 tablet by mouth Daily.    modafinil (PROVIGIL) 200 MG tablet [Pharmacy Med Name: MODAFINIL 200 MG  Tablet] 30 tablet 0     Sig: Take 1 tablet by mouth Daily.      Last office visit with prescribing clinician: 11/17/2023   Last telemedicine visit with prescribing clinician: Visit date not found   Next office visit with prescribing clinician: 2/15/2024                         Would you like a call back once the refill request has been completed: [] Yes [] No    If the office needs to give you a call back, can they leave a voicemail: [] Yes [] No    Fnata Mark LPN  01/24/24, 11:32 CST

## 2024-01-25 RX ORDER — LEVOTHYROXINE SODIUM 112 UG/1
112 TABLET ORAL DAILY
Qty: 90 TABLET | Refills: 1 | OUTPATIENT
Start: 2024-01-25

## 2024-01-25 RX ORDER — MODAFINIL 200 MG/1
200 TABLET ORAL DAILY
Qty: 30 TABLET | Refills: 0 | Status: SHIPPED | OUTPATIENT
Start: 2024-01-25

## 2024-01-25 RX ORDER — MIRTAZAPINE 15 MG/1
15 TABLET, FILM COATED ORAL
Qty: 90 TABLET | Refills: 1 | OUTPATIENT
Start: 2024-01-25

## 2024-02-15 ENCOUNTER — TELEMEDICINE (OUTPATIENT)
Dept: FAMILY MEDICINE CLINIC | Facility: CLINIC | Age: 69
End: 2024-02-15
Payer: MEDICARE

## 2024-02-15 DIAGNOSIS — G47.33 OSA (OBSTRUCTIVE SLEEP APNEA): Primary | ICD-10-CM

## 2024-02-15 DIAGNOSIS — E03.9 HYPOTHYROIDISM, UNSPECIFIED TYPE: ICD-10-CM

## 2024-02-15 DIAGNOSIS — E78.5 HYPERLIPIDEMIA, UNSPECIFIED HYPERLIPIDEMIA TYPE: ICD-10-CM

## 2024-02-15 DIAGNOSIS — F33.0 MILD EPISODE OF RECURRENT MAJOR DEPRESSIVE DISORDER: ICD-10-CM

## 2024-02-15 DIAGNOSIS — J44.9 CHRONIC OBSTRUCTIVE PULMONARY DISEASE, UNSPECIFIED COPD TYPE: ICD-10-CM

## 2024-02-15 DIAGNOSIS — E55.9 VITAMIN D DEFICIENCY: ICD-10-CM

## 2024-02-15 PROCEDURE — 1159F MED LIST DOCD IN RCRD: CPT | Performed by: NURSE PRACTITIONER

## 2024-02-15 PROCEDURE — 1160F RVW MEDS BY RX/DR IN RCRD: CPT | Performed by: NURSE PRACTITIONER

## 2024-02-15 PROCEDURE — 99213 OFFICE O/P EST LOW 20 MIN: CPT | Performed by: NURSE PRACTITIONER

## 2024-02-15 RX ORDER — ERGOCALCIFEROL 1.25 MG/1
50000 CAPSULE ORAL WEEKLY
Qty: 12 CAPSULE | Refills: 0 | Status: SHIPPED | OUTPATIENT
Start: 2024-02-15

## 2024-02-15 RX ORDER — GUAIFENESIN 600 MG/1
600 TABLET, EXTENDED RELEASE ORAL 2 TIMES DAILY
Qty: 180 TABLET | Refills: 1 | Status: SHIPPED | OUTPATIENT
Start: 2024-02-15

## 2024-02-15 NOTE — PROGRESS NOTES
"Chief Complaint  No chief complaint on file.    Subjective        Ashley Gibbs presents via telehealth video visit with [facility]  History of Present Illness  Patient location: [place], [place]  Provider location: [place]    Here for chronic follow up visit  Reports she is doing well with current treatments  Daytime somnelence from lori stabe with current modafinil   Still seeing pain mgmt  Breathing has been stable, needs refills of mucinex to manage excess mucous   Needs refill vitamin d   No other issues  Labs are utd       Objective   Vital Signs:  There were no vitals taken for this visit.  Estimated body mass index is 23.44 kg/m² as calculated from the following:    Height as of 12/26/23: 170.2 cm (67.01\").    Weight as of 12/26/23: 67.9 kg (149 lb 11.1 oz).       BMI is within normal parameters. No other follow-up for BMI required.      Physical Exam   No vital signs or bmi obtained for this encounter.      Physical exam-  General appearance- Alert, appears comfortable. Dressed appropriately. No distress.   HEENT- external examination of eyes, ears and nose all normal. Head is atraumatic. Hearing normal.   Neck is symmetric, trachea midline.   Normal respiratory effort.   Digits and nails appear healthy. No clubbing, rashes or discolorations.   Normal range of motion of all extremities.  Mood appropriate. Communicates easily. Normal judgement and insight. Oriented to time, place and person. Memory appears intact.     Result Review :                     Assessment and Plan     Diagnoses and all orders for this visit:    1. LORI (obstructive sleep apnea) (Primary)    2. Hyperlipidemia, unspecified hyperlipidemia type    3. Mild episode of recurrent major depressive disorder    4. Vitamin D deficiency  -     vitamin D (ERGOCALCIFEROL) 1.25 MG (20660 UT) capsule capsule; Take 1 capsule by mouth 1 (One) Time Per Week.  Dispense: 12 capsule; Refill: 0    5. Hypothyroidism, " unspecified type    6. Chronic obstructive pulmonary disease, unspecified COPD type  -     guaiFENesin (Mucinex) 600 MG 12 hr tablet; Take 1 tablet by mouth 2 (Two) Times a Day.  Dispense: 180 tablet; Refill: 1      Plan:  Continue all current treatment   Refills sent  Ekasper, UDS, and controlled substance contract in emr. Advised patient of risks associated with controlled substances including physical and mental dependence, abuse, and mental impairment. Advised patient to use least amount of possible and never overuse or share medications with other people. Patient states understanding of risks and instructions on proper use.   Does not need refills at this time of modafinil              Follow Up     Return in about 3 months (around 5/15/2024) for Recheck.  Patient was given instructions and counseling regarding her condition or for health maintenance advice. Please see specific information pulled into the AVS if appropriate.

## 2024-03-06 ENCOUNTER — HOSPITAL ENCOUNTER (OUTPATIENT)
Dept: GENERAL RADIOLOGY | Age: 69
Discharge: HOME | End: 2024-03-06
Payer: MEDICARE

## 2024-03-06 ENCOUNTER — HOSPITAL ENCOUNTER (OUTPATIENT)
Dept: MRI IMAGING | Age: 69
Discharge: HOME | End: 2024-03-06
Payer: MEDICARE

## 2024-03-06 DIAGNOSIS — M54.12 RADICULOPATHY, CERVICAL: ICD-10-CM

## 2024-03-06 DIAGNOSIS — M54.17 RADICULOPATHY, LUMBOSACRAL REGION: ICD-10-CM

## 2024-03-06 DIAGNOSIS — M47.22 OTHER SPONDYLOSIS WITH RADICULOPATHY, CERVICAL REGION: ICD-10-CM

## 2024-03-06 DIAGNOSIS — M54.12 RADICULOPATHY, CERVICAL REGION: ICD-10-CM

## 2024-03-06 DIAGNOSIS — M47.812 CERVICAL SPONDYLOSIS WITHOUT MYELOPATHY: ICD-10-CM

## 2024-03-06 DIAGNOSIS — M51.16 INTERVERTEBRAL DISC DISORDER WITH RADICULOPATHY OF LUMBAR REGION: ICD-10-CM

## 2024-03-06 DIAGNOSIS — M47.816 SPONDYLOSIS WITHOUT MYELOPATHY OR RADICULOPATHY, LUMBAR REGION: ICD-10-CM

## 2024-03-06 DIAGNOSIS — G47.33 OSA (OBSTRUCTIVE SLEEP APNEA): ICD-10-CM

## 2024-03-06 DIAGNOSIS — M51.17 INTERVERTEBRAL DISC DISORDER WITH RADICULOPATHY OF LUMBOSACRAL REGION: ICD-10-CM

## 2024-03-06 DIAGNOSIS — M47.22 CERVICAL SPONDYLOSIS WITH RADICULOPATHY: ICD-10-CM

## 2024-03-06 DIAGNOSIS — M47.816 SPONDYLOSIS OF LUMBAR REGION WITHOUT MYELOPATHY OR RADICULOPATHY: ICD-10-CM

## 2024-03-06 DIAGNOSIS — M51.37 DEGENERATION OF LUMBAR OR LUMBOSACRAL INTERVERTEBRAL DISC: ICD-10-CM

## 2024-03-06 DIAGNOSIS — M51.36 DEGENERATION OF LUMBAR INTERVERTEBRAL DISC: ICD-10-CM

## 2024-03-06 DIAGNOSIS — M54.16 LUMBAR RADICULOPATHY: ICD-10-CM

## 2024-03-06 DIAGNOSIS — M47.817 LUMBOSACRAL SPONDYLOSIS WITHOUT MYELOPATHY: ICD-10-CM

## 2024-03-06 PROCEDURE — 72040 X-RAY EXAM NECK SPINE 2-3 VW: CPT

## 2024-03-06 PROCEDURE — 72141 MRI NECK SPINE W/O DYE: CPT

## 2024-03-06 PROCEDURE — 72148 MRI LUMBAR SPINE W/O DYE: CPT

## 2024-03-06 PROCEDURE — 72100 X-RAY EXAM L-S SPINE 2/3 VWS: CPT

## 2024-03-07 DIAGNOSIS — G47.33 OSA (OBSTRUCTIVE SLEEP APNEA): ICD-10-CM

## 2024-03-07 NOTE — TELEPHONE ENCOUNTER
Caller: Ashley Gibbs    Relationship: Self    Best call back number: [phone]     Requested Prescriptions:   Requested Prescriptions     Pending Prescriptions Disp Refills    modafinil (PROVIGIL) 200 MG tablet 30 tablet 0     Sig: Take 1 tablet by mouth Daily.        Pharmacy where request should be sent: [facility] - [place], [place] - [address] - [phone]  - [phone] FX     Last office visit with prescribing clinician: 11/17/2023   Last telemedicine visit with prescribing clinician: 2/15/2024   Next office visit with prescribing clinician: 5/15/2024     Additional details provided by patient:     Does the patient have less than a 3 day supply:  [x] Yes  [] No    Would you like a call back once the refill request has been completed: [x] Yes [] No      Kera Tate Rep   03/07/24 15:51 CST

## 2024-03-08 RX ORDER — MODAFINIL 200 MG/1
200 TABLET ORAL DAILY
Qty: 30 TABLET | Refills: 0 | Status: SHIPPED | OUTPATIENT
Start: 2024-03-08

## 2024-03-08 RX ORDER — MODAFINIL 200 MG/1
200 TABLET ORAL DAILY
Qty: 30 TABLET | Refills: 5 | OUTPATIENT
Start: 2024-03-08

## 2024-03-08 NOTE — TELEPHONE ENCOUNTER
Refill is a duplicate     Rx Refill Note  Requested Prescriptions     Pending Prescriptions Disp Refills    modafinil (PROVIGIL) 200 MG tablet [Pharmacy Med Name: MODAFINIL 200 MG  Tablet] 30 tablet 5     Sig: Take 1 tablet by mouth Daily.      Last office visit with prescribing clinician: 11/17/2023   Last telemedicine visit with prescribing clinician: 2/15/2024   Next office visit with prescribing clinician: 3/7/2024                         Would you like a call back once the refill request has been completed: [] Yes [] No    If the office needs to give you a call back, can they leave a voicemail: [] Yes [] No    Olivia Newsome MA  03/08/24, 12:11 CST

## 2024-03-08 NOTE — TELEPHONE ENCOUNTER
Rx Refill Note  Requested Prescriptions     Pending Prescriptions Disp Refills    modafinil (PROVIGIL) 200 MG tablet 30 tablet 0     Sig: Take 1 tablet by mouth Daily.      Last office visit with prescribing clinician: 11/17/2023   Next office visit with prescribing clinician: 5/15/2024                         Would you like a call back once the refill request has been completed: [] Yes [] No    If the office needs to give you a call back, can they leave a voicemail: [] Yes [] No    Olivia Newsome MA  03/08/24, 12:11 CST

## 2024-03-19 RX ORDER — ALBUTEROL SULFATE 90 UG/1
2 AEROSOL, METERED RESPIRATORY (INHALATION) EVERY 4 HOURS PRN
Qty: 18 G | Refills: 2 | Status: SHIPPED | OUTPATIENT
Start: 2024-03-19

## 2024-03-19 RX ORDER — CITALOPRAM 40 MG/1
40 TABLET ORAL DAILY
Qty: 90 TABLET | Refills: 1 | OUTPATIENT
Start: 2024-03-19

## 2024-03-22 DIAGNOSIS — J44.1 COPD WITH EXACERBATION: ICD-10-CM

## 2024-03-22 DIAGNOSIS — R11.0 NAUSEA: Primary | ICD-10-CM

## 2024-03-22 RX ORDER — ONDANSETRON 4 MG/1
4 TABLET, FILM COATED ORAL EVERY 8 HOURS PRN
Qty: 30 TABLET | Refills: 0 | Status: SHIPPED | OUTPATIENT
Start: 2024-03-22

## 2024-03-22 RX ORDER — IPRATROPIUM BROMIDE AND ALBUTEROL SULFATE 2.5; .5 MG/3ML; MG/3ML
3 SOLUTION RESPIRATORY (INHALATION) EVERY 4 HOURS PRN
Qty: 90 ML | Refills: 0 | Status: SHIPPED | OUTPATIENT
Start: 2024-03-22

## 2024-03-22 NOTE — TELEPHONE ENCOUNTER
Pt's grandchild- Hope ([employer]) called to check on status of order- needs before weekend. Requesting med for nausea also. Please advise.

## 2024-03-22 NOTE — TELEPHONE ENCOUNTER
Hope- Granddaughter called again and would dennis to know if suppositories for fever and nausea can be called in along with the other medications please.

## 2024-04-02 ENCOUNTER — TELEPHONE (OUTPATIENT)
Dept: FAMILY MEDICINE CLINIC | Facility: CLINIC | Age: 69
End: 2024-04-02
Payer: MEDICARE

## 2024-04-02 DIAGNOSIS — G47.33 OSA (OBSTRUCTIVE SLEEP APNEA): ICD-10-CM

## 2024-04-02 NOTE — TELEPHONE ENCOUNTER
Rx Refill Note  Requested Prescriptions     Pending Prescriptions Disp Refills    modafinil (PROVIGIL) 200 MG tablet 30 tablet 0     Sig: Take 1 tablet by mouth Daily.      Last office visit with prescribing clinician: 11/17/2023     Next office visit with prescribing clinician: 4/2/2024     LRX:3/8/24-1 month   UDS:4/2023  CSA:4/2023          Zoraida Goddard MA  04/02/24, 14:04 CDT

## 2024-04-02 NOTE — TELEPHONE ENCOUNTER
Caller: Ashley Gibbs    Relationship: Self    Best call back number: [phone]     Requested Prescriptions:   Requested Prescriptions     Pending Prescriptions Disp Refills    modafinil (PROVIGIL) 200 MG tablet 30 tablet 0     Sig: Take 1 tablet by mouth Daily.        Pharmacy where request should be sent: [facility] - [place], [place] - [address] - [phone]  - [phone] FX     Last office visit with prescribing clinician: 11/17/2023   Last telemedicine visit with prescribing clinician: 2/15/2024   Next office visit with prescribing clinician: 4/2/2024     Additional details provided by patient:     Does the patient have less than a 3 day supply:  [x] Yes  [] No        Dante Pereira III, Kera Rep   04/02/24 13:28 CDT

## 2024-04-03 RX ORDER — MODAFINIL 200 MG/1
200 TABLET ORAL DAILY
Qty: 30 TABLET | Refills: 0 | Status: SHIPPED | OUTPATIENT
Start: 2024-04-03

## 2024-04-03 RX ORDER — MODAFINIL 200 MG/1
200 TABLET ORAL DAILY
Qty: 30 TABLET | Refills: 0 | OUTPATIENT
Start: 2024-04-03

## 2024-04-17 DIAGNOSIS — E55.9 VITAMIN D DEFICIENCY: ICD-10-CM

## 2024-04-17 RX ORDER — ERGOCALCIFEROL 1.25 MG/1
50000 CAPSULE ORAL WEEKLY
Qty: 12 CAPSULE | Refills: 3 | Status: SHIPPED | OUTPATIENT
Start: 2024-04-17

## 2024-04-25 DIAGNOSIS — G47.33 OSA (OBSTRUCTIVE SLEEP APNEA): ICD-10-CM

## 2024-04-26 RX ORDER — MODAFINIL 200 MG/1
200 TABLET ORAL DAILY
Qty: 30 TABLET | Refills: 0 | Status: SHIPPED | OUTPATIENT
Start: 2024-04-26

## 2024-04-26 NOTE — TELEPHONE ENCOUNTER
Rx Refill Note  Requested Prescriptions     Pending Prescriptions Disp Refills    modafinil (PROVIGIL) 200 MG tablet [Pharmacy Med Name: MODAFINIL 200 MG  Tablet] 30 tablet 0     Sig: Take 1 tablet by mouth Daily.      Last office visit with prescribing clinician: 11/17/2023   Last telemedicine visit with prescribing clinician: 2/15/2024   Next office visit with prescribing clinician: 5/15/2024                         Would you like a call back once the refill request has been completed: [] Yes [] No    If the office needs to give you a call back, can they leave a voicemail: [] Yes [] No    Deb Allen MA  04/26/24, 13:20 CDT

## 2024-05-01 DIAGNOSIS — E03.9 HYPOTHYROIDISM, UNSPECIFIED TYPE: Primary | ICD-10-CM

## 2024-05-01 DIAGNOSIS — G25.81 RESTLESS LEGS SYNDROME: ICD-10-CM

## 2024-05-01 RX ORDER — MIRTAZAPINE 15 MG/1
15 TABLET, FILM COATED ORAL
Qty: 90 TABLET | Refills: 1 | Status: SHIPPED | OUTPATIENT
Start: 2024-05-01 | End: 2024-05-03 | Stop reason: SDUPTHER

## 2024-05-01 RX ORDER — LEVOTHYROXINE SODIUM 112 UG/1
112 TABLET ORAL DAILY
Qty: 90 TABLET | Refills: 1 | Status: SHIPPED | OUTPATIENT
Start: 2024-05-01 | End: 2024-05-03 | Stop reason: SDUPTHER

## 2024-05-03 RX ORDER — MIRTAZAPINE 15 MG/1
15 TABLET, FILM COATED ORAL
Qty: 90 TABLET | Refills: 1 | OUTPATIENT
Start: 2024-05-03

## 2024-05-03 RX ORDER — ROPINIROLE 0.25 MG/1
0.25 TABLET, FILM COATED ORAL
Qty: 90 TABLET | Refills: 1 | Status: SHIPPED | OUTPATIENT
Start: 2024-05-03

## 2024-05-03 RX ORDER — ALBUTEROL SULFATE 90 UG/1
2 AEROSOL, METERED RESPIRATORY (INHALATION) EVERY 4 HOURS PRN
Qty: 18 G | Refills: 2 | Status: SHIPPED | OUTPATIENT
Start: 2024-05-03

## 2024-05-03 RX ORDER — LEVOTHYROXINE SODIUM 112 UG/1
112 TABLET ORAL DAILY
Qty: 90 TABLET | Refills: 1 | OUTPATIENT
Start: 2024-05-03

## 2024-05-03 RX ORDER — LEVOTHYROXINE SODIUM 112 UG/1
112 TABLET ORAL DAILY
Qty: 90 TABLET | Refills: 1 | Status: SHIPPED | OUTPATIENT
Start: 2024-05-03

## 2024-05-03 RX ORDER — MIRTAZAPINE 15 MG/1
15 TABLET, FILM COATED ORAL
Qty: 90 TABLET | Refills: 1 | Status: SHIPPED | OUTPATIENT
Start: 2024-05-03

## 2024-05-03 NOTE — TELEPHONE ENCOUNTER
Requip too soon     Rx Refill Note  Requested Prescriptions     Pending Prescriptions Disp Refills    Ventolin  (90 Base) MCG/ACT inhaler [Pharmacy Med Name: VENTOLIN HFA 90 MCG INHALER 108 (90 BAS Aerosol] 18 g 2     Sig: Inhale 2 puffs Every 4 (Four) Hours As Needed for Wheezing or Shortness of Air.    rOPINIRole (REQUIP) 0.25 MG tablet [Pharmacy Med Name: ROPINIROLE HCL 0.25 MG TAB 0.25 Tablet] 90 tablet 1     Sig: Take 1 tablet by mouth every night at bedtime.      Last office visit with prescribing clinician: 11/17/2023   Last telemedicine visit with prescribing clinician: 2/15/2024   Next office visit with prescribing clinician: 5/3/2024                         Would you like a call back once the refill request has been completed: [] Yes [] No    If the office needs to give you a call back, can they leave a voicemail: [] Yes [] No    Olivia Newsome MA  05/03/24, 14:57 CDT

## 2024-05-03 NOTE — TELEPHONE ENCOUNTER
Pt called to check on the status of order- reports that pharmacy didin't receive refills sent.   Called pharmacy to inquire- reports that refills were NOT received.

## 2024-05-24 DIAGNOSIS — G47.33 OSA (OBSTRUCTIVE SLEEP APNEA): ICD-10-CM

## 2024-05-28 ENCOUNTER — TELEPHONE (OUTPATIENT)
Dept: FAMILY MEDICINE CLINIC | Facility: CLINIC | Age: 69
End: 2024-05-28
Payer: MEDICARE

## 2024-05-28 DIAGNOSIS — G47.33 OSA (OBSTRUCTIVE SLEEP APNEA): ICD-10-CM

## 2024-05-28 RX ORDER — MODAFINIL 200 MG/1
200 TABLET ORAL DAILY
Qty: 30 TABLET | Refills: 0 | Status: CANCELLED | OUTPATIENT
Start: 2024-05-28

## 2024-05-28 NOTE — TELEPHONE ENCOUNTER
Pt needs apt- cancelled 3 month follow up on 5/15/24    Rx Refill Note  Requested Prescriptions      No prescriptions requested or ordered in this encounter      Last office visit with prescribing clinician: 2/15/2024    Next office visit with prescribing clinician: 11/21/2024     LRX: 4/2024-1 month     Zoraida Goddard MA  05/28/24, 16:42 CDT      Called pt to notify that she needs a follow up apt for med refill- NA LVM. HUB to schedule office visit

## 2024-05-28 NOTE — TELEPHONE ENCOUNTER
Caller: Ashley Gibbs    Relationship: Self    Best call back number: [phone]     Requested Prescriptions:   Requested Prescriptions     Pending Prescriptions Disp Refills    modafinil (PROVIGIL) 200 MG tablet 30 tablet 0     Sig: Take 1 tablet by mouth Daily.        Pharmacy where request should be sent: [facility] - [place], [place] - [address] - [phone]  - [phone] FX     Last office visit with prescribing clinician: 11/17/2023   Last telemedicine visit with prescribing clinician: 2/15/2024   Next office visit with prescribing clinician: 11/21/2024     Additional details provided by patient: COMPLETELY OUT     Does the patient have less than a 3 day supply:  [x] Yes  [] No    Would you like a call back once the refill request has been completed: [x] Yes [] No    If the office needs to give you a call back, can they leave a voicemail: [x] Yes [] No    Dante Pereira III, RegSched Rep   05/28/24 13:26 CDT

## 2024-05-29 NOTE — TELEPHONE ENCOUNTER
Rx Refill Note  Requested Prescriptions     Pending Prescriptions Disp Refills    modafinil (PROVIGIL) 200 MG tablet [Pharmacy Med Name: MODAFINIL 200 MG  Tablet] 30 tablet 0     Sig: Take 1 tablet by mouth Daily.      Last office visit with prescribing clinician: 11/17/2023   Next office visit with prescribing clinician: 5/28/2024     Last RF: 4/26/2024    CC: 4/19/2023    UDS: 4/16/2023      Nori Ramsey CMA  05/29/24, 16:32 CDT

## 2024-05-30 ENCOUNTER — OFFICE VISIT (OUTPATIENT)
Dept: FAMILY MEDICINE CLINIC | Facility: CLINIC | Age: 69
End: 2024-05-30
Payer: MEDICARE

## 2024-05-30 VITALS
SYSTOLIC BLOOD PRESSURE: 127 MMHG | RESPIRATION RATE: 20 BRPM | WEIGHT: 152 LBS | HEART RATE: 78 BPM | TEMPERATURE: 97.3 F | DIASTOLIC BLOOD PRESSURE: 59 MMHG | HEIGHT: 67 IN | OXYGEN SATURATION: 96 % | BODY MASS INDEX: 23.86 KG/M2

## 2024-05-30 DIAGNOSIS — F51.01 PRIMARY INSOMNIA: ICD-10-CM

## 2024-05-30 DIAGNOSIS — G89.4 CHRONIC PAIN DISORDER: ICD-10-CM

## 2024-05-30 DIAGNOSIS — E55.9 VITAMIN D DEFICIENCY: ICD-10-CM

## 2024-05-30 DIAGNOSIS — G47.19 EXCESSIVE DAYTIME SLEEPINESS: ICD-10-CM

## 2024-05-30 DIAGNOSIS — M81.0 AGE-RELATED OSTEOPOROSIS WITHOUT CURRENT PATHOLOGICAL FRACTURE: ICD-10-CM

## 2024-05-30 DIAGNOSIS — G43.809 OTHER MIGRAINE WITHOUT STATUS MIGRAINOSUS, NOT INTRACTABLE: ICD-10-CM

## 2024-05-30 DIAGNOSIS — J44.9 CHRONIC OBSTRUCTIVE PULMONARY DISEASE, UNSPECIFIED COPD TYPE: ICD-10-CM

## 2024-05-30 DIAGNOSIS — F33.0 MILD EPISODE OF RECURRENT MAJOR DEPRESSIVE DISORDER: ICD-10-CM

## 2024-05-30 DIAGNOSIS — G47.33 OSA (OBSTRUCTIVE SLEEP APNEA): ICD-10-CM

## 2024-05-30 DIAGNOSIS — Z51.81 THERAPEUTIC DRUG MONITORING: ICD-10-CM

## 2024-05-30 DIAGNOSIS — Z12.11 COLON CANCER SCREENING: Primary | ICD-10-CM

## 2024-05-30 DIAGNOSIS — E03.9 HYPOTHYROIDISM, UNSPECIFIED TYPE: ICD-10-CM

## 2024-05-30 PROCEDURE — 90471 IMMUNIZATION ADMIN: CPT | Performed by: NURSE PRACTITIONER

## 2024-05-30 PROCEDURE — 99214 OFFICE O/P EST MOD 30 MIN: CPT | Performed by: NURSE PRACTITIONER

## 2024-05-30 PROCEDURE — 90679 RSV VACC PREF RECOMB ADJT IM: CPT | Performed by: NURSE PRACTITIONER

## 2024-05-30 PROCEDURE — 90472 IMMUNIZATION ADMIN EACH ADD: CPT | Performed by: NURSE PRACTITIONER

## 2024-05-30 PROCEDURE — 90677 PCV20 VACCINE IM: CPT | Performed by: NURSE PRACTITIONER

## 2024-05-30 PROCEDURE — 1126F AMNT PAIN NOTED NONE PRSNT: CPT | Performed by: NURSE PRACTITIONER

## 2024-05-30 RX ORDER — MODAFINIL 200 MG/1
200 TABLET ORAL 2 TIMES DAILY
Qty: 60 TABLET | Refills: 0 | Status: CANCELLED | OUTPATIENT
Start: 2024-05-30

## 2024-05-30 RX ORDER — BUDESONIDE, GLYCOPYRROLATE, AND FORMOTEROL FUMARATE 160; 9; 4.8 UG/1; UG/1; UG/1
2 AEROSOL, METERED RESPIRATORY (INHALATION) 2 TIMES DAILY
Qty: 10.7 G | Refills: 11 | Status: SHIPPED | OUTPATIENT
Start: 2024-05-30

## 2024-05-30 RX ORDER — MODAFINIL 200 MG/1
200 TABLET ORAL DAILY
Qty: 30 TABLET | Refills: 0 | OUTPATIENT
Start: 2024-05-30

## 2024-05-30 RX ORDER — MODAFINIL 100 MG/1
TABLET ORAL
Qty: 90 TABLET | Refills: 0 | Status: SHIPPED | OUTPATIENT
Start: 2024-05-30

## 2024-05-30 RX ORDER — CITALOPRAM 40 MG/1
40 TABLET ORAL DAILY
Qty: 90 TABLET | Refills: 1 | Status: SHIPPED | OUTPATIENT
Start: 2024-05-30

## 2024-05-30 RX ORDER — GUAIFENESIN 600 MG/1
600 TABLET, EXTENDED RELEASE ORAL 2 TIMES DAILY
Qty: 180 TABLET | Refills: 1 | Status: SHIPPED | OUTPATIENT
Start: 2024-05-30

## 2024-05-30 NOTE — LETTER
[facility]  Vaccine Consent Form    Patient Name:  Ashley Gibbs  Patient :  1955     Vaccine(s) Ordered    Pneumococcal Conjugate Vaccine 20-Valent (PCV20)  Arexvy Vaccine (RSV for Adults > 60)        Screening Checklist  The following questions should be completed prior to vaccination. If you answer “yes” to any question, it does not necessarily mean you should not be vaccinated. It just means we may need to clarify or ask more questions. If a question is unclear, please ask your healthcare provider to explain it.    Yes No   Any fever or moderate to severe illness today (mild illness and/or antibiotic treatment are not contraindications)?     Do you have a history of a serious reaction to any previous vaccinations, such as anaphylaxis, encephalopathy within 7 days, Guillain-Rutland syndrome within 6 weeks, seizure?     Have you received any live vaccine(s) (e.g MMR, CINTHYA) or any other vaccines in the last month (to ensure duplicate doses aren't given)?     Do you have an anaphylactic allergy to latex (DTaP, DTaP-IPV, Hep A, Hep B, MenB, RV, Td, Tdap), baker’s yeast (Hep B, HPV), polysorbates (RSV, nirsevimab, PCV 20, Rotavirrus, Tdap, Shingrix), or gelatin (CINTHYA, MMR)?     Do you have an anaphylactic allergy to neomycin (Rabies, CINTHYA, MMR, IPV, Hep A), polymyxin B (IPV), or streptomycin (IPV)?      Any cancer, leukemia, AIDS, or other immune system disorder? (CINTHYA, MMR, RV)     Do you have a parent, brother, or sister with an immune system problem (if immune competence of vaccine recipient clinically verified, can proceed)? (MMR, CINTHYA)     Any recent steroid treatments for >2 weeks, chemotherapy, or radiation treatment? (CINTHYA, MMR)     Have you received antibody-containing blood transfusions or IVIG in the past 11 months (recommended interval is dependent on product)? (MMR, CINTHYA)     Have you taken antiviral drugs (acyclovir, famciclovir, valacyclovir for CINTHYA) in the last 24 or 48 hours, respectively?      Are  "you pregnant or planning to become pregnant within 1 month? (CINTHYA, MMR, HPV, IPV, MenB, Abrexvy; For Hep B- refer to Engerix-B; For RSV - Abrysvo is indicated for 32-36 weeks of pregnancy from September to January)     For infants, have you ever been told your child has had intussusception or a medical emergency involving obstruction of the intestine (Rotavirus)? If not for an infant, can skip this question.         *Ordering Physicians/APC should be consulted if \"yes\" is checked by the patient or guardian above.  I have received, read, and understand the Vaccine Information Statement (VIS) for each vaccine ordered.  I have considered my or my child's health status as well as the health status of my close contacts.  I have taken the opportunity to discuss my vaccine questions with my or my child's health care provider.   I have requested that the ordered vaccine(s) be given to me or my child.  I understand the benefits and risks of the vaccines.  I understand that I should remain in the clinic for 15 minutes after receiving the vaccine(s).  _________________________________________________________  Signature of Patient or Parent/Legal Guardian ____________________  Date     "

## 2024-05-30 NOTE — PROGRESS NOTES
"Chief Complaint  No chief complaint on file.    Subjective        Ashley Gibbs presents to [facility]  History of Present Illness  Here for 3 month follow up on chronic issues  Reports she is doing well overall  Breathing/copd stable with breztri, albuterol inhaler and nebulizer as needed. Fausto stable. Taking provigil and stable with this. Needs refill today. Needs refill mucinex to manage thick secretions.   Insomnia stable. Depression doing well. Tolerating remeron and celexa.  Hypothyroidism stable. Tsh utd.   Rls stable with Requip.   Migraines stable with maxalt as needed.   Vit d deficiency stable. Taking supplement. Needs level rechecked.   Still seeing pain mgmt      Agreeable to complete recheck dexa scan-osteoporosis, colon cancer screening.   Agreeable complete vaccines due.     Objective   Vital Signs:  /59 (BP Location: Left arm, Patient Position: Sitting, Cuff Size: Adult)   Pulse 78   Temp 97.3 °F (36.3 °C) (Infrared)   Resp 20   Ht 170.2 cm (67\")   Wt 68.9 kg (152 lb)   SpO2 96%   BMI 23.81 kg/m²   Estimated body mass index is 23.81 kg/m² as calculated from the following:    Height as of this encounter: 170.2 cm (67\").    Weight as of this encounter: 68.9 kg (152 lb).       BMI is within normal parameters. No other follow-up for BMI required.      Physical Exam   Result Review :                     Assessment and Plan     Diagnoses and all orders for this visit:    1. Colon cancer screening (Primary)  -     Cologuard - Stool, Per Rectum; Future    2. FAUSTO (obstructive sleep apnea)  -     modafinil (Provigil) 100 MG tablet; Take 2 tablets by mouth Every Morning AND 1 tablet Every Afternoon.  Dispense: 90 tablet; Refill: 0    3. Chronic obstructive pulmonary disease, unspecified COPD type  -     guaiFENesin (Mucinex) 600 MG 12 hr tablet; Take 1 tablet by mouth 2 (Two) Times a Day.  Dispense: 180 tablet; Refill: 1  -     Comprehensive metabolic panel; " Future    4. Vitamin D deficiency  -     Vitamin D 25 hydroxy; Future    5. Therapeutic drug monitoring  -     Compliance Drug Analysis, Ur - Urine, Clean Catch    6. Hypothyroidism, unspecified type    7. Age-related osteoporosis without current pathological fracture    8. Chronic pain disorder    9. Excessive daytime sleepiness    10. Primary insomnia    11. Other migraine without status migrainosus, not intractable    12. Mild episode of recurrent major depressive disorder    Other orders  -     Budeson-Glycopyrrol-Formoterol (Breztri Aerosphere) 160-9-4.8 MCG/ACT aerosol inhaler; Inhale 2 puffs 2 (Two) Times a Day.  Dispense: 10.7 g; Refill: 11  -     citalopram (CeleXA) 40 MG tablet; Take 1 tablet by mouth Daily.  Dispense: 90 tablet; Refill: 1  -     Pneumococcal Conjugate Vaccine 20-Valent (PCV20)  -     Arexvy Vaccine (RSV for Adults > 60)      Plan:  Labs today   Dexa scan  Cologuard ordered for colon cancer screening- pt is average risk.   Ekasper, UDS, and controlled substance contract in emr. Advised patient of risks associated with controlled substances including physical and mental dependence, abuse, and mental impairment. Advised patient to use least amount of possible and never overuse or share medications with other people. Patient states understanding of risks and instructions on proper use.   Uds due today   Refill provigil (x1 month)- advised only can prescribe 1 month at a time due to narcotic  Refill celexa, breztri, mucinex chronic medications. All stable.   Rsv and pcv vaccines today            Follow Up     Return in about 3 months (around 8/30/2024) for Recheck.  Patient was given instructions and counseling regarding her condition or for health maintenance advice. Please see specific information pulled into the AVS if appropriate.

## 2024-05-31 LAB
25(OH)D3+25(OH)D2 SERPL-MCNC: 41.4 NG/ML (ref 30–100)
ALBUMIN SERPL-MCNC: 4.4 G/DL (ref 3.5–5.2)
ALBUMIN/GLOB SERPL: 2.6 G/DL
ALP SERPL-CCNC: 110 U/L (ref 39–117)
ALT SERPL-CCNC: 57 U/L (ref 1–33)
AST SERPL-CCNC: 52 U/L (ref 1–32)
BILIRUB SERPL-MCNC: 0.4 MG/DL (ref 0–1.2)
BUN SERPL-MCNC: 11 MG/DL (ref 8–23)
BUN/CREAT SERPL: 14.5 (ref 7–25)
CALCIUM SERPL-MCNC: 9.3 MG/DL (ref 8.6–10.5)
CHLORIDE SERPL-SCNC: 102 MMOL/L (ref 98–107)
CO2 SERPL-SCNC: 30.5 MMOL/L (ref 22–29)
CREAT SERPL-MCNC: 0.76 MG/DL (ref 0.57–1)
EGFRCR SERPLBLD CKD-EPI 2021: 86.5 ML/MIN/1.73
GLOBULIN SER CALC-MCNC: 1.7 GM/DL
GLUCOSE SERPL-MCNC: 77 MG/DL (ref 65–99)
POTASSIUM SERPL-SCNC: 4.1 MMOL/L (ref 3.5–5.2)
PROT SERPL-MCNC: 6.1 G/DL (ref 6–8.5)
SODIUM SERPL-SCNC: 143 MMOL/L (ref 136–145)

## 2024-06-03 ENCOUNTER — TELEPHONE (OUTPATIENT)
Dept: FAMILY MEDICINE CLINIC | Facility: CLINIC | Age: 69
End: 2024-06-03
Payer: MEDICARE

## 2024-06-07 LAB — DRUGS UR: NORMAL

## 2024-06-10 NOTE — TELEPHONE ENCOUNTER
PATIENT WAS DIRECTED TO CALL TODAY AND LET THE PROVIDER KNOW THAT SHE IS NOT FEELING ANY BETTER.      GOOD CALL BACK [phone]   (3) no apparent problem

## 2024-06-13 ENCOUNTER — TELEPHONE (OUTPATIENT)
Dept: FAMILY MEDICINE CLINIC | Facility: CLINIC | Age: 69
End: 2024-06-13
Payer: MEDICARE

## 2024-06-13 PROBLEM — E55.9 VITAMIN D DEFICIENCY: Status: ACTIVE | Noted: 2024-06-13

## 2024-06-14 ENCOUNTER — TELEPHONE (OUTPATIENT)
Dept: FAMILY MEDICINE CLINIC | Facility: CLINIC | Age: 69
End: 2024-06-14
Payer: MEDICARE

## 2024-06-14 NOTE — TELEPHONE ENCOUNTER
----- Message from Padmaja Bermudez sent at 6/13/2024  4:18 PM CDT -----  Drug screen abnormal for fentanyl. Please check with pt if any recent er visits or if prescribed from pain mgmt. We will need documentation of any of this.  If no documentation then I will not be able to write Provigil any longer.    Called and spoke with [facility] clinical consultation regarding fentanyl. No other medications can cause this. Fentanyl use has to be recent (within 1-3 days) to show up on uds.

## 2024-06-19 NOTE — TELEPHONE ENCOUNTER
Attempted to call and notify pt of results, no answer left a VM. 4 attempts made to contact pt, mailing letter.

## 2024-06-21 NOTE — TELEPHONE ENCOUNTER
"Pt came into the office this morning for an US. I asked to speak with the pt. I notified her of her drug screen results. Pt became very upset and started saying \" No it didn't!\" And started telling me to \"check it again, check it again because it is wrong\". I told pt that she could leave another urine if she would like but that I did not think that Jasm[facility] would send it off. I asked the pt if she had been to the ED recently or if pain management had prescribed the Fentanyl to her. Pt states that neither of these has happened. Pt then stated that she went to pain management on 05/29/2024. Pt states that maybe they gave it to her through her IV. We had the pt sign a records release from and are waiting on records from [facility].   "

## 2024-06-24 DIAGNOSIS — J44.9 CHRONIC OBSTRUCTIVE PULMONARY DISEASE, UNSPECIFIED COPD TYPE: Primary | ICD-10-CM

## 2024-06-24 RX ORDER — ALBUTEROL SULFATE 90 UG/1
2 AEROSOL, METERED RESPIRATORY (INHALATION) EVERY 4 HOURS PRN
Qty: 18 G | Refills: 2 | Status: SHIPPED | OUTPATIENT
Start: 2024-06-24

## 2024-06-24 NOTE — TELEPHONE ENCOUNTER
Rx Refill Note  Requested Prescriptions     Pending Prescriptions Disp Refills    Ventolin  (90 Base) MCG/ACT inhaler [Pharmacy Med Name: VENTOLIN HFA 90 MCG INHALER 108 (90 BAS Aerosol] 18 g 2     Sig: Inhale 2 puffs Every 4 (Four) Hours As Needed for Wheezing or Shortness of Air.      Last office visit with prescribing clinician: 5/30/2024   Last telemedicine visit with prescribing clinician: 2/15/2024   Next office visit with prescribing clinician: 8/30/2024                         Would you like a call back once the refill request has been completed: [] Yes [] No    If the office needs to give you a call back, can they leave a voicemail: [] Yes [] No    Deb Allen MA  06/24/24, 10:42 CDT

## 2024-06-28 ENCOUNTER — PATIENT MESSAGE (OUTPATIENT)
Dept: FAMILY MEDICINE CLINIC | Facility: CLINIC | Age: 69
End: 2024-06-28
Payer: MEDICARE

## 2024-06-28 DIAGNOSIS — N28.9 RENAL LESION: Primary | ICD-10-CM

## 2024-07-01 DIAGNOSIS — G47.33 OSA (OBSTRUCTIVE SLEEP APNEA): ICD-10-CM

## 2024-07-02 RX ORDER — MODAFINIL 100 MG/1
TABLET ORAL
Qty: 66 TABLET | Refills: 2 | OUTPATIENT
Start: 2024-07-02

## 2024-07-02 NOTE — TELEPHONE ENCOUNTER
Refill is a duplicate    Rx Refill Note  Requested Prescriptions     Pending Prescriptions Disp Refills    modafinil (PROVIGIL) 100 MG tablet [Pharmacy Med Name: MODAFINIL 100 MG  Tablet] 66 tablet 2     Sig: Take 2 tablets by mouth Every Morning AND 1 tablet Every Afternoon.      Last office visit with prescribing clinician: 5/30/2024   Last telemedicine visit with prescribing clinician: 2/15/2024   Next office visit with prescribing clinician: 8/30/2024                         Would you like a call back once the refill request has been completed: [] Yes [] No    If the office needs to give you a call back, can they leave a voicemail: [] Yes [] No    Olivia Newsome MA  07/02/24, 10:29 CDT

## 2024-07-03 NOTE — TELEPHONE ENCOUNTER
"Records received do not have information needed in regards to abnormal drug screen. It does not report what specific medications were given during procedure on 5/29/24. The records from the office visit that day states, \"ANESTHESIA: Patient has been examined and cleared for local with sedation anesthesia\", and \"She was transferred to surgery center and performed following procedure: Cervical interluminar epidural injection with fluoroscopic guidance (with steroids). I attempted to call [facility] to discuss with dr toth or his nurse. No answer. Left voicemail asking to return my call.   "

## 2024-07-03 NOTE — TELEPHONE ENCOUNTER
Princess with pain mgmt center [facility] returned my call. She was concerned that we need to fax consent for release of records. Discussed with her that we have sent this and received records but I am needing more information, specifically medications given during procedures. She states once she can locate release of records again she will try to get this information faxed to us. I provided her our fax number.

## 2024-07-05 NOTE — TELEPHONE ENCOUNTER
"I called [facility] to follow up on records request. I informed Betsy that we have sent 3 records request and we only received part of what we need. She states that there is no records release on file and that they never got one. I informed her that we got partial records on Wednesday 07/3/2024 and we need all of them. She reports that there is nothing she can do since we have no sent a records request. I once again informed her that we have sent one 3 times. I informed her that we need the operative reports from 05/29/2024 and what medications where given to the pt during that time. She stated that we need to send a new records release with just that information on it and that it does not need to say all records. I asked her if she could send them to us now as we have already sent one 3 times. She stated that they do not have one and therefore cannot release anything to me. I informed her that we will send the release again but that this has delayed pt care, and we are waiting on records before we can help this pt. She stated \"do not get mad at me\". I informed her that I was not mad at her but that this would be the 4th time we have sent the same request. She told me to fax the release from to [phone] Attn: Medical records Betsy. I then Spoke to my Practice Lead Virgil Mcmahan.   "

## 2024-07-08 ENCOUNTER — TELEPHONE (OUTPATIENT)
Dept: FAMILY MEDICINE CLINIC | Facility: CLINIC | Age: 69
End: 2024-07-08
Payer: MEDICARE

## 2024-07-08 DIAGNOSIS — G47.33 OSA (OBSTRUCTIVE SLEEP APNEA): ICD-10-CM

## 2024-07-08 RX ORDER — MODAFINIL 100 MG/1
TABLET ORAL
Qty: 45 TABLET | Refills: 0 | Status: SHIPPED | OUTPATIENT
Start: 2024-07-08

## 2024-07-08 NOTE — TELEPHONE ENCOUNTER
2 weeks of provigil prescribed while we are waiting on records from 5/29 procedure. The procedure that was faxed does she fentanyl is given during the sedation so I would assume she did receive it on that 5/29 visit as well which would make drug screen appropriate. Follow up today if 5/29 procedure records not received yet. Thanks.

## 2024-07-22 ENCOUNTER — TELEPHONE (OUTPATIENT)
Dept: FAMILY MEDICINE CLINIC | Facility: CLINIC | Age: 69
End: 2024-07-22
Payer: MEDICARE

## 2024-07-22 DIAGNOSIS — G47.33 OSA (OBSTRUCTIVE SLEEP APNEA): ICD-10-CM

## 2024-07-22 RX ORDER — MODAFINIL 100 MG/1
TABLET ORAL
Qty: 90 TABLET | Refills: 0 | Status: SHIPPED | OUTPATIENT
Start: 2024-07-22

## 2024-07-22 NOTE — TELEPHONE ENCOUNTER
Pt called and states she needed a refill on Modafinil. Per her [employer] message on 7/10/2024. Padmaja will send refill in two weeks. Please advise.

## 2024-07-29 DIAGNOSIS — J44.1 COPD WITH EXACERBATION: ICD-10-CM

## 2024-07-29 DIAGNOSIS — R05.9 COUGH, UNSPECIFIED TYPE: ICD-10-CM

## 2024-07-29 DIAGNOSIS — Z20.822 EXPOSURE TO COVID-19 VIRUS: Primary | ICD-10-CM

## 2024-07-29 RX ORDER — METHYLPREDNISOLONE 4 MG/1
TABLET ORAL
Qty: 21 TABLET | Refills: 0 | Status: SHIPPED | OUTPATIENT
Start: 2024-07-29

## 2024-07-29 RX ORDER — IPRATROPIUM BROMIDE AND ALBUTEROL SULFATE 2.5; .5 MG/3ML; MG/3ML
3 SOLUTION RESPIRATORY (INHALATION) EVERY 4 HOURS PRN
Qty: 90 ML | Refills: 0 | Status: SHIPPED | OUTPATIENT
Start: 2024-07-29

## 2024-07-29 RX ORDER — AZITHROMYCIN 250 MG/1
TABLET, FILM COATED ORAL
Qty: 6 TABLET | Refills: 0 | Status: SHIPPED | OUTPATIENT
Start: 2024-07-29

## 2024-08-01 DIAGNOSIS — G25.81 RESTLESS LEGS SYNDROME: ICD-10-CM

## 2024-08-01 DIAGNOSIS — E03.9 HYPOTHYROIDISM, UNSPECIFIED TYPE: ICD-10-CM

## 2024-08-02 RX ORDER — LEVOTHYROXINE SODIUM 112 UG/1
112 TABLET ORAL DAILY
Qty: 90 TABLET | Refills: 1 | OUTPATIENT
Start: 2024-08-02

## 2024-08-02 RX ORDER — MIRTAZAPINE 15 MG/1
15 TABLET, FILM COATED ORAL
Qty: 90 TABLET | Refills: 1 | OUTPATIENT
Start: 2024-08-02

## 2024-08-16 DIAGNOSIS — G47.33 OSA (OBSTRUCTIVE SLEEP APNEA): ICD-10-CM

## 2024-08-20 RX ORDER — MODAFINIL 100 MG/1
TABLET ORAL
Qty: 90 TABLET | Refills: 0 | Status: SHIPPED | OUTPATIENT
Start: 2024-08-20

## 2024-08-20 NOTE — TELEPHONE ENCOUNTER
Rx Refill Note  Requested Prescriptions     Pending Prescriptions Disp Refills    modafinil (PROVIGIL) 100 MG tablet [Pharmacy Med Name: MODAFINIL 100 MG TABS 100 Tablet] 90 tablet 0     Sig: TAKE TWO TABLETS BY MOUTH EVERY MORNING AND TAKE ONE TABLET BY MOUTH EVERY AFTERNOON      Last office visit with prescribing clinician: 5/30/2024   Next office visit with prescribing clinician: 8/30/2024    Last RF: 7/22/2024    CC: 5/30/2024    UDS: 5/30/2024    Nori Ramsey CMA  08/20/24, 08:51 CDT

## 2024-08-23 DIAGNOSIS — J44.9 CHRONIC OBSTRUCTIVE PULMONARY DISEASE, UNSPECIFIED COPD TYPE: ICD-10-CM

## 2024-08-23 RX ORDER — ALBUTEROL SULFATE 90 UG/1
2 AEROSOL, METERED RESPIRATORY (INHALATION) EVERY 4 HOURS PRN
Qty: 18 G | Refills: 2 | Status: SHIPPED | OUTPATIENT
Start: 2024-08-23

## 2024-09-17 DIAGNOSIS — G47.33 OSA (OBSTRUCTIVE SLEEP APNEA): ICD-10-CM

## 2024-09-17 RX ORDER — MODAFINIL 100 MG/1
100 TABLET ORAL DAILY
Qty: 90 TABLET | Refills: 0 | Status: SHIPPED | OUTPATIENT
Start: 2024-09-17

## 2024-09-24 RX ORDER — CITALOPRAM HYDROBROMIDE 40 MG/1
40 TABLET ORAL DAILY
Qty: 90 TABLET | Refills: 1 | Status: SHIPPED | OUTPATIENT
Start: 2024-09-24

## 2024-09-24 NOTE — TELEPHONE ENCOUNTER
Antonia Lopez who is on the verbal called to inquire about the medication for the patient that needed a prior auth. She stated they have been waiting for the prior auth on the medication but the patient has been without for 2 weeks. She would like a call back when possible to discuss please.   
Hope called back again to inquire- reported to her that an expedited appeal has been submitted to pt's insurance for coverage. Advised that this was sent on 5/19/2023 and can take up to 72 business hours for determination to return. Explained to her that I will notify pharmacy and her once this has returned, Apologized for the delay. Verbalized understanding.   
Quality 47: Advance Care Plan: Advance Care Planning discussed and documented in the medical record; patient did not wish or was not able to name a surrogate decision maker or provide an advance care plan.
Detail Level: Detailed
Quality 431: Preventive Care And Screening: Unhealthy Alcohol Use - Screening: Patient not identified as an unhealthy alcohol user when screened for unhealthy alcohol use using a systematic screening method
Quality 226: Preventive Care And Screening: Tobacco Use: Screening And Cessation Intervention: Patient screened for tobacco use and is an ex/non-smoker

## 2024-09-26 ENCOUNTER — OFFICE VISIT (OUTPATIENT)
Dept: FAMILY MEDICINE CLINIC | Facility: CLINIC | Age: 69
End: 2024-09-26
Payer: MEDICARE

## 2024-09-26 VITALS
WEIGHT: 148.4 LBS | BODY MASS INDEX: 23.29 KG/M2 | DIASTOLIC BLOOD PRESSURE: 76 MMHG | HEART RATE: 84 BPM | SYSTOLIC BLOOD PRESSURE: 121 MMHG | TEMPERATURE: 98.4 F | RESPIRATION RATE: 20 BRPM | HEIGHT: 67 IN | OXYGEN SATURATION: 96 %

## 2024-09-26 DIAGNOSIS — G25.81 RESTLESS LEGS SYNDROME: ICD-10-CM

## 2024-09-26 DIAGNOSIS — E03.9 HYPOTHYROIDISM, UNSPECIFIED TYPE: ICD-10-CM

## 2024-09-26 DIAGNOSIS — E55.9 VITAMIN D DEFICIENCY: ICD-10-CM

## 2024-09-26 DIAGNOSIS — N28.9 RENAL LESION: ICD-10-CM

## 2024-09-26 DIAGNOSIS — H53.9 VISUAL CHANGES: ICD-10-CM

## 2024-09-26 DIAGNOSIS — S09.90XD CLOSED HEAD INJURY, SUBSEQUENT ENCOUNTER: ICD-10-CM

## 2024-09-26 DIAGNOSIS — Z12.31 BREAST CANCER SCREENING BY MAMMOGRAM: Primary | ICD-10-CM

## 2024-09-26 DIAGNOSIS — Z87.891 HISTORY OF TOBACCO ABUSE: ICD-10-CM

## 2024-09-26 DIAGNOSIS — J44.9 CHRONIC OBSTRUCTIVE PULMONARY DISEASE, UNSPECIFIED COPD TYPE: ICD-10-CM

## 2024-09-26 DIAGNOSIS — G44.52 NEW DAILY PERSISTENT HEADACHE: ICD-10-CM

## 2024-09-26 PROCEDURE — 99214 OFFICE O/P EST MOD 30 MIN: CPT | Performed by: NURSE PRACTITIONER

## 2024-09-26 PROCEDURE — 1126F AMNT PAIN NOTED NONE PRSNT: CPT | Performed by: NURSE PRACTITIONER

## 2024-09-26 RX ORDER — ALBUTEROL SULFATE 90 UG/1
2 AEROSOL, METERED RESPIRATORY (INHALATION) EVERY 4 HOURS PRN
Qty: 18 G | Refills: 2 | Status: SHIPPED | OUTPATIENT
Start: 2024-09-26

## 2024-09-26 RX ORDER — ROPINIROLE 0.25 MG/1
0.25 TABLET, FILM COATED ORAL
Qty: 90 TABLET | Refills: 1 | Status: SHIPPED | OUTPATIENT
Start: 2024-09-26

## 2024-09-26 RX ORDER — LEVOTHYROXINE SODIUM 112 UG/1
112 TABLET ORAL DAILY
Qty: 90 TABLET | Refills: 1 | Status: SHIPPED | OUTPATIENT
Start: 2024-09-26

## 2024-09-26 RX ORDER — MIRTAZAPINE 15 MG/1
15 TABLET, FILM COATED ORAL
Qty: 90 TABLET | Refills: 1 | Status: SHIPPED | OUTPATIENT
Start: 2024-09-26

## 2024-09-27 LAB
25(OH)D3+25(OH)D2 SERPL-MCNC: 43.5 NG/ML (ref 30–100)
BASOPHILS # BLD AUTO: 0.06 10*3/MM3 (ref 0–0.2)
BASOPHILS NFR BLD AUTO: 0.8 % (ref 0–1.5)
EOSINOPHIL # BLD AUTO: 0.34 10*3/MM3 (ref 0–0.4)
EOSINOPHIL NFR BLD AUTO: 4.8 % (ref 0.3–6.2)
ERYTHROCYTE [DISTWIDTH] IN BLOOD BY AUTOMATED COUNT: 12.6 % (ref 12.3–15.4)
HCT VFR BLD AUTO: 42.6 % (ref 34–46.6)
HGB BLD-MCNC: 13.9 G/DL (ref 12–15.9)
IMM GRANULOCYTES # BLD AUTO: 0.02 10*3/MM3 (ref 0–0.05)
IMM GRANULOCYTES NFR BLD AUTO: 0.3 % (ref 0–0.5)
LYMPHOCYTES # BLD AUTO: 1.66 10*3/MM3 (ref 0.7–3.1)
LYMPHOCYTES NFR BLD AUTO: 23.5 % (ref 19.6–45.3)
MCH RBC QN AUTO: 30.1 PG (ref 26.6–33)
MCHC RBC AUTO-ENTMCNC: 32.6 G/DL (ref 31.5–35.7)
MCV RBC AUTO: 92.2 FL (ref 79–97)
MONOCYTES # BLD AUTO: 0.68 10*3/MM3 (ref 0.1–0.9)
MONOCYTES NFR BLD AUTO: 9.6 % (ref 5–12)
NEUTROPHILS # BLD AUTO: 4.31 10*3/MM3 (ref 1.7–7)
NEUTROPHILS NFR BLD AUTO: 61 % (ref 42.7–76)
NRBC BLD AUTO-RTO: 0 /100 WBC (ref 0–0.2)
PLATELET # BLD AUTO: 284 10*3/MM3 (ref 140–450)
RBC # BLD AUTO: 4.62 10*6/MM3 (ref 3.77–5.28)
TSH SERPL DL<=0.005 MIU/L-ACNC: 0.64 UIU/ML (ref 0.27–4.2)
WBC # BLD AUTO: 7.07 10*3/MM3 (ref 3.4–10.8)

## 2024-10-04 ENCOUNTER — TELEPHONE (OUTPATIENT)
Dept: FAMILY MEDICINE CLINIC | Facility: CLINIC | Age: 69
End: 2024-10-04
Payer: MEDICARE

## 2024-10-04 NOTE — TELEPHONE ENCOUNTER
Called to let pt know scheduling has been trying to reach her about her Ct and MRI. No answer. LOREM

## 2024-10-10 NOTE — PROGRESS NOTES
"Chief Complaint  Sleep Apnea (Follow up )    Subjective        Ashley Gibbs presents to [facility]  History of Present Illness  The patient is a 69-year-old female who presents today for a 3-month follow-up. She is on Provigil for her COPD and sleep apnea.    She reports persistent fatigue, even while taking Provigil.    She has been experiencing daily headaches for the past 3 months, which she describes as different from her previous migraines. She is not experiencing any dizziness.    She recalls an incident a few years ago where she was pulled down by her dog and hit her head on the side of a table. This resulted in a sore spot that remains sensitive to touch. She remembers feeling as if blood was rushing to the side of her face when she stood up and walked that day. An x-ray was performed at the time, but she did not believe it was necessary.    She mentions that her pharmacy provided her with a 3-month supply of meloxicam and she needs to check if she requires any refills. She is currently under the care of a pain management specialist.    In 2019, she had a lung scan at [facility] which revealed a left lower lobe nodule. A subsequent bronchoscopy and biopsy were normal. She has had a couple of chest x-rays since then.    SOCIAL HISTORY  She smoked for 40 years and quit in 2008.    Objective   Vital Signs:  /76 (BP Location: Left arm, Patient Position: Sitting, Cuff Size: Adult)   Pulse 84   Temp 98.4 °F (36.9 °C) (Infrared)   Resp 20   Ht 170.2 cm (67\")   Wt 67.3 kg (148 lb 6.4 oz)   SpO2 96%   BMI 23.24 kg/m²   Estimated body mass index is 23.24 kg/m² as calculated from the following:    Height as of this encounter: 170.2 cm (67\").    Weight as of this encounter: 67.3 kg (148 lb 6.4 oz).       BMI is within normal parameters. No other follow-up for BMI required.      Physical Exam  Vitals and nursing note reviewed.   Constitutional:       General: She is not in acute " distress.     Appearance: She is well-developed.   HENT:      Right Ear: Tympanic membrane and ear canal normal.      Left Ear: Tympanic membrane and ear canal normal.      Nose: Nose normal.      Right Sinus: No maxillary sinus tenderness or frontal sinus tenderness.      Left Sinus: No maxillary sinus tenderness or frontal sinus tenderness.      Mouth/Throat:      Mouth: Mucous membranes are moist.      Pharynx: Oropharynx is clear. Uvula midline. No uvula swelling.   Eyes:      Conjunctiva/sclera: Conjunctivae normal.   Neck:      Thyroid: No thyromegaly.      Trachea: No tracheal deviation.   Cardiovascular:      Rate and Rhythm: Normal rate and regular rhythm.      Heart sounds: Normal heart sounds.   Pulmonary:      Effort: Pulmonary effort is normal.      Breath sounds: Normal breath sounds.   Abdominal:      General: Bowel sounds are normal.      Palpations: Abdomen is soft. There is no mass.      Tenderness: There is no abdominal tenderness. There is no right CVA tenderness or left CVA tenderness.   Musculoskeletal:      Cervical back: Neck supple.   Lymphadenopathy:      Cervical: No cervical adenopathy.   Skin:     General: Skin is warm and dry.   Neurological:      Mental Status: She is alert.   Psychiatric:         Behavior: Behavior normal.        Physical Exam      Result Review :          Results             Assessment and Plan     Diagnoses and all orders for this visit:    1. Breast cancer screening by mammogram (Primary)  -     Mammo Screening Digital Tomosynthesis Bilateral With CAD; Future    2. Closed head injury, subsequent encounter  -     MRI Brain Without Contrast; Future    3. Visual changes  -     MRI Brain Without Contrast; Future    4. New daily persistent headache  -     MRI Brain Without Contrast; Future  -     CBC & Differential  -     Comprehensive metabolic panel; Future    5. History of tobacco abuse  -     CT Chest Low Dose Wo; Future    6. Hypothyroidism, unspecified type  -      levothyroxine (SYNTHROID, LEVOTHROID) 112 MCG tablet; Take 1 tablet by mouth Daily.  Dispense: 90 tablet; Refill: 1  -     TSH    7. Chronic obstructive pulmonary disease, unspecified COPD type  -     Ventolin  (90 Base) MCG/ACT inhaler; Inhale 2 puffs Every 4 (Four) Hours As Needed for Wheezing or Shortness of Air.  Dispense: 18 g; Refill: 2    8. Restless legs syndrome  -     mirtazapine (REMERON) 15 MG tablet; Take 1 tablet by mouth every night at bedtime.  Dispense: 90 tablet; Refill: 1    9. Vitamin D deficiency  -     Vitamin D 25 hydroxy    Other orders  -     rOPINIRole (REQUIP) 0.25 MG tablet; Take 1 tablet by mouth every night at bedtime.  Dispense: 90 tablet; Refill: 1      Assessment & Plan  1. Chronic Obstructive Pulmonary Disease (COPD).  She will continue using the nebulizer solution and albuterol inhaler, which have been refilled. Continue breztri. She is advised to monitor her symptoms and report any changes.      2. Sleep Apnea.  The patient continues to experience fatigue. She is advised to consult a specialist if there is no improvement with the current treatment. The patient reports persistent tiredness despite being on Provigil.   [place], UDS, and controlled substance contract in emr. Advised patient of risks associated with controlled substances including physical and mental dependence, abuse, and mental impairment. Advised patient to use least amount of possible and never overuse or share medications with other people. Patient states understanding of risks and instructions on proper use.     3. Headache.  The patient reports daily headaches for the past 3 months, which are different from her previous migraines. She also experiences soreness and vision changes. An MRI of the brain will be ordered.    4. Lung Nodule.  A follow-up CT scan of the lungs will be performed to monitor the previously identified lung nodule. The patient had a bronchoscopy and biopsy in 2019, which were normal.  Recent chest x-rays have not shown significant changes.    5. Renal cyst  A CT scan of the abdomen will be performed to evaluate the kidneys. The patient has been advised to schedule this along with the MRI.    6. Health Maintenance.  A mammogram is scheduled for today. Fasting labs are due today. Cholesterol levels will be checked during the next visit.    Continue all other current treatment     Follow-up  Patient is scheduled for a follow-up visit in December.       Follow Up     Return in about 3 months (around 12/26/2024).  Patient was given instructions and counseling regarding her condition or for health maintenance advice. Please see specific information pulled into the AVS if appropriate.       Patient or patient representative verbalized consent for the use of Ambient Listening during the visit with  CARRIE Medina for chart documentation. 10/10/2024  16:50 CDT

## 2024-10-18 ENCOUNTER — NURSE TRIAGE (OUTPATIENT)
Dept: CALL CENTER | Facility: HOSPITAL | Age: 69
End: 2024-10-18
Payer: MEDICARE

## 2024-10-18 DIAGNOSIS — G47.33 OSA (OBSTRUCTIVE SLEEP APNEA): Primary | ICD-10-CM

## 2024-10-18 RX ORDER — MODAFINIL 100 MG/1
TABLET ORAL
Qty: 90 TABLET | Refills: 0 | Status: SHIPPED | OUTPATIENT
Start: 2024-10-18

## 2024-10-18 NOTE — TELEPHONE ENCOUNTER
"Reason for Disposition   [1] Prescription refill request for ESSENTIAL medicine (i.e., likelihood of harm to patient if not taken) AND [2] triager unable to refill per department policy    Additional Information   Negative: New-onset or worsening symptoms, see that guideline (e.g., diarrhea, runny nose, sore throat)   Negative: Medicine question not related to refill or renewal   Negative: Caller (e.g., patient or pharmacist) requesting information about a new medicine   Negative: Caller requesting information unrelated to medicine    Answer Assessment - Initial Assessment Questions  1. DRUG NAME: \"What medicine do you need to have refilled?\"      modafinil (PROVIGIL)  2. REFILLS REMAINING: \"How many refills are remaining?\" (Note: The label on the medicine or pill bottle will show how many refills are remaining. If there are no refills remaining, then a renewal may be needed.)      none  3. EXPIRATION DATE: \"What is the expiration date?\" (Note: The label states when the prescription will , and thus can no longer be refilled.)      She will be out after today  4. PRESCRIBING HCP: \"Who prescribed it?\" Reason: If prescribed by specialist, call should be referred to that group.      Padmaja Bermudez, APRN  5. SYMPTOMS: \"Do you have any symptoms?\"      Narcolepsy  6. PREGNANCY: \"Is there any chance that you are pregnant?\" \"When was your last menstrual period?\"      na    Protocols used: Medication Refill and Renewal Call-ADULT-    "

## 2024-10-30 DIAGNOSIS — J44.9 CHRONIC OBSTRUCTIVE PULMONARY DISEASE, UNSPECIFIED COPD TYPE: ICD-10-CM

## 2024-10-30 RX ORDER — ALBUTEROL SULFATE 90 UG/1
2 AEROSOL, METERED RESPIRATORY (INHALATION) EVERY 4 HOURS PRN
Qty: 18 G | Refills: 2 | Status: SHIPPED | OUTPATIENT
Start: 2024-10-30

## 2024-10-30 NOTE — TELEPHONE ENCOUNTER
Rx Refill Note  Requested Prescriptions     Pending Prescriptions Disp Refills    Ventolin  (90 Base) MCG/ACT inhaler [Pharmacy Med Name: VENTOLIN HFA 90 MCG INHALER 108 (90 BAS Aerosol] 18 g 2     Sig: Inhale 2 puffs Every 4 (Four) Hours As Needed for Wheezing or Shortness of Air.      Last office visit with prescribing clinician: Visit date not found   Last telemedicine visit with prescribing clinician: Visit date not found   Next office visit with prescribing clinician: Visit date not found                         Would you like a call back once the refill request has been completed: [] Yes [] No    If the office needs to give you a call back, can they leave a voicemail: [] Yes [] No    Deb Allen MA  10/30/24, 13:29 CDT

## 2024-11-08 ENCOUNTER — HOSPITAL ENCOUNTER (OUTPATIENT)
Dept: CT IMAGING | Facility: HOSPITAL | Age: 69
Discharge: HOME | End: 2024-11-08
Payer: MEDICARE

## 2024-11-08 ENCOUNTER — HOSPITAL ENCOUNTER (OUTPATIENT)
Dept: MRI IMAGING | Facility: HOSPITAL | Age: 69
Discharge: HOME | End: 2024-11-08
Payer: MEDICARE

## 2024-11-08 DIAGNOSIS — Z87.891 HISTORY OF TOBACCO ABUSE: ICD-10-CM

## 2024-11-08 DIAGNOSIS — H53.9 VISUAL CHANGES: ICD-10-CM

## 2024-11-08 DIAGNOSIS — S09.90XD CLOSED HEAD INJURY, SUBSEQUENT ENCOUNTER: ICD-10-CM

## 2024-11-08 DIAGNOSIS — N28.9 RENAL LESION: ICD-10-CM

## 2024-11-08 DIAGNOSIS — G44.52 NEW DAILY PERSISTENT HEADACHE: ICD-10-CM

## 2024-11-08 LAB — CREAT BLDA-MCNC: 0.7 MG/DL (ref 0.6–1.3)

## 2024-11-08 PROCEDURE — 70551 MRI BRAIN STEM W/O DYE: CPT

## 2024-11-08 PROCEDURE — 82565 ASSAY OF CREATININE: CPT

## 2024-11-08 PROCEDURE — 74177 CT ABD & PELVIS W/CONTRAST: CPT

## 2024-11-08 PROCEDURE — 71271 CT THORAX LUNG CANCER SCR C-: CPT

## 2024-11-08 PROCEDURE — 25510000001 IOPAMIDOL 61 % SOLUTION: Performed by: NURSE PRACTITIONER

## 2024-11-08 RX ORDER — ROPINIROLE 0.25 MG/1
0.25 TABLET, FILM COATED ORAL
Qty: 90 TABLET | Refills: 1 | OUTPATIENT
Start: 2024-11-08

## 2024-11-08 RX ORDER — IOPAMIDOL 612 MG/ML
100 INJECTION, SOLUTION INTRAVASCULAR
Status: COMPLETED | OUTPATIENT
Start: 2024-11-08 | End: 2024-11-08

## 2024-11-08 RX ADMIN — IOPAMIDOL 100 ML: 612 INJECTION, SOLUTION INTRAVENOUS at 15:56

## 2024-11-13 ENCOUNTER — TELEPHONE (OUTPATIENT)
Dept: FAMILY MEDICINE CLINIC | Facility: CLINIC | Age: 69
End: 2024-11-13
Payer: MEDICARE

## 2024-11-13 DIAGNOSIS — Z87.891 HISTORY OF TOBACCO ABUSE: ICD-10-CM

## 2024-11-13 DIAGNOSIS — R91.1 PULMONARY NODULE 1 CM OR GREATER IN DIAMETER: Primary | ICD-10-CM

## 2024-11-13 NOTE — TELEPHONE ENCOUNTER
Caller: Ashley Gibbs    Relationship: Self    Best call back number: [phone]     Caller requesting test results: SELF    What test was performed: MRI, CT SCAN, AND CANCER SCREENING    When was the test performed: 11-8-24    Where was the test performed: [facility]    Additional notes: WOULD LIKE RESULTS

## 2024-11-13 NOTE — TELEPHONE ENCOUNTER
Called and spoke with pt about abnormal ct chest and lung nodule needing additional imaging. Pt voiced understanding and will get these scheduled.

## 2024-11-19 ENCOUNTER — TELEPHONE (OUTPATIENT)
Dept: FAMILY MEDICINE CLINIC | Facility: CLINIC | Age: 69
End: 2024-11-19
Payer: MEDICARE

## 2024-11-19 DIAGNOSIS — G47.33 OSA (OBSTRUCTIVE SLEEP APNEA): ICD-10-CM

## 2024-11-19 NOTE — TELEPHONE ENCOUNTER
Caller: Ashley Gibbs    Relationship: Self    Best call back number: [phone]     Requested Prescriptions:   Requested Prescriptions     Pending Prescriptions Disp Refills    modafinil (Provigil) 100 MG tablet 90 tablet 0     Sig: Take 2 tablets by mouth Every Morning AND 1 tablet Every Evening.        Pharmacy where request should be sent: [facility], [place] - [address] - [phone]  - [phone]     Last office visit with prescribing clinician: 9/26/2024   Last telemedicine visit with prescribing clinician: Visit date not found   Next office visit with prescribing clinician: 12/16/2024     Additional details provided by patient:     Does the patient have less than a 3 day supply:  [] Yes  [x] No    Would you like a call back once the refill request has been completed: [] Yes [x] No    If the office needs to give you a call back, can they leave a voicemail: [] Yes [x] No    Dante Pereira III, RegSched Rep   11/19/24 12:05 CST

## 2024-11-20 RX ORDER — MODAFINIL 100 MG/1
TABLET ORAL
Qty: 90 TABLET | Refills: 0 | Status: SHIPPED | OUTPATIENT
Start: 2024-11-20

## 2024-11-20 NOTE — TELEPHONE ENCOUNTER
Rx Refill Note  Requested Prescriptions     Pending Prescriptions Disp Refills    modafinil (Provigil) 100 MG tablet 90 tablet 0     Sig: Take 2 tablets by mouth Every Morning AND 1 tablet Every Evening.      Last office visit with prescribing clinician: 9/26/2024     Next office visit with prescribing clinician: 12/16/2024     LRX:10/18/2024-1 month  UDS/CSA:5/30/2024      Zoraida Goddard MA  11/20/24, 11:55 CST

## 2024-12-09 ENCOUNTER — TELEPHONE (OUTPATIENT)
Dept: FAMILY MEDICINE CLINIC | Facility: CLINIC | Age: 69
End: 2024-12-09
Payer: MEDICARE

## 2024-12-09 NOTE — TELEPHONE ENCOUNTER
Caller: Ashley Gibbs    Relationship: Self    Best call back number: [phone]    What is the best time to reach you: SOON PLEASE     Who are you requesting to speak with (clinical staff, provider,  specific staff member): PROVIDER OR CLINICAL STAFF     What was the call regarding: PATIENT REQUESTING A CALL BACK TO DISCUSS GETTING AN ORDER SENT TO [facility] FOR HER TO BE GIVEN SOMETHING FOR HER NERVES TO TAKE THE EDGE OFF FOR HER NERVOUSNESS ABOUT LYING STILL THAT LONG FOR THE DEXA SCAN  OR CAN SOMETHING BE SENT TO THE PHARMACY IF PROVIDER PREFERS TO GO THAT ROUTE    Is it okay if the provider responds through MyChart: PREFERS A CALL BACK

## 2024-12-10 ENCOUNTER — PATIENT MESSAGE (OUTPATIENT)
Dept: FAMILY MEDICINE CLINIC | Facility: CLINIC | Age: 69
End: 2024-12-10
Payer: MEDICARE

## 2024-12-11 DIAGNOSIS — F40.240 CLAUSTROPHOBIA: Primary | ICD-10-CM

## 2024-12-11 RX ORDER — DIAZEPAM 2 MG/1
2-4 TABLET ORAL ONCE AS NEEDED
Qty: 2 TABLET | Refills: 0 | Status: SHIPPED | OUTPATIENT
Start: 2024-12-11

## 2024-12-13 ENCOUNTER — DOCUMENTATION (OUTPATIENT)
Dept: RADIATION ONCOLOGY | Facility: HOSPITAL | Age: 69
End: 2024-12-13
Payer: MEDICARE

## 2024-12-13 ENCOUNTER — CONSULT (OUTPATIENT)
Age: 69
End: 2024-12-13
Payer: MEDICARE

## 2024-12-13 ENCOUNTER — HOSPITAL ENCOUNTER (OUTPATIENT)
Dept: RADIATION ONCOLOGY | Facility: HOSPITAL | Age: 69
Setting detail: RADIATION/ONCOLOGY SERIES
End: 2024-12-13
Payer: MEDICARE

## 2024-12-13 ENCOUNTER — TELEPHONE (OUTPATIENT)
Age: 69
End: 2024-12-13
Payer: MEDICARE

## 2024-12-13 ENCOUNTER — HOSPITAL ENCOUNTER (OUTPATIENT)
Dept: RADIATION ONCOLOGY | Facility: HOSPITAL | Age: 69
Discharge: HOME | End: 2024-12-13

## 2024-12-13 ENCOUNTER — DOCUMENTATION (OUTPATIENT)
Age: 69
End: 2024-12-13
Payer: MEDICARE

## 2024-12-13 ENCOUNTER — HOSPITAL ENCOUNTER (OUTPATIENT)
Dept: CT IMAGING | Facility: HOSPITAL | Age: 69
Discharge: HOME | End: 2024-12-13
Payer: MEDICARE

## 2024-12-13 ENCOUNTER — TELEPHONE (OUTPATIENT)
Dept: PULMONOLOGY | Facility: CLINIC | Age: 69
End: 2024-12-13
Payer: MEDICARE

## 2024-12-13 VITALS
HEIGHT: 67 IN | OXYGEN SATURATION: 92 % | WEIGHT: 151.3 LBS | BODY MASS INDEX: 23.75 KG/M2 | HEART RATE: 88 BPM | DIASTOLIC BLOOD PRESSURE: 80 MMHG | SYSTOLIC BLOOD PRESSURE: 152 MMHG

## 2024-12-13 DIAGNOSIS — R91.8 MASS OF RIGHT LUNG: Primary | ICD-10-CM

## 2024-12-13 DIAGNOSIS — Z87.891 HISTORY OF TOBACCO ABUSE: ICD-10-CM

## 2024-12-13 DIAGNOSIS — R91.1 PULMONARY NODULE 1 CM OR GREATER IN DIAMETER: ICD-10-CM

## 2024-12-13 DIAGNOSIS — Z87.891 FORMER SMOKER: ICD-10-CM

## 2024-12-13 DIAGNOSIS — R91.8 LUNG MASS: Primary | ICD-10-CM

## 2024-12-13 LAB
CREAT BLDA-MCNC: 0.8 MG/DL (ref 0.6–1.3)
RAD ONC ARIA COURSE ID: NORMAL
RAD ONC ARIA COURSE INTENT: NORMAL
RAD ONC ARIA COURSE LAST TREATMENT DATE: NORMAL
RAD ONC ARIA COURSE START DATE: NORMAL
RAD ONC ARIA COURSE TREATMENT ELAPSED DAYS: 0
RAD ONC ARIA FIRST TREATMENT DATE: NORMAL
RAD ONC ARIA PLAN FRACTIONS TREATED TO DATE: 1
RAD ONC ARIA PLAN ID: NORMAL
RAD ONC ARIA PLAN PRESCRIBED DOSE PER FRACTION: 2.5 GY
RAD ONC ARIA PLAN PRIMARY REFERENCE POINT: NORMAL
RAD ONC ARIA PLAN TOTAL FRACTIONS PRESCRIBED: 5
RAD ONC ARIA PLAN TOTAL PRESCRIBED DOSE: 1250 CGY
RAD ONC ARIA REFERENCE POINT DOSAGE GIVEN TO DATE: 2.5 GY
RAD ONC ARIA REFERENCE POINT ID: NORMAL
RAD ONC ARIA REFERENCE POINT SESSION DOSAGE GIVEN: 2.5 GY

## 2024-12-13 PROCEDURE — 25510000001 IOPAMIDOL 61 % SOLUTION: Performed by: NURSE PRACTITIONER

## 2024-12-13 PROCEDURE — 77290 THER RAD SIMULAJ FIELD CPLX: CPT | Performed by: RADIOLOGY

## 2024-12-13 PROCEDURE — 1125F AMNT PAIN NOTED PAIN PRSNT: CPT | Performed by: RADIOLOGY

## 2024-12-13 PROCEDURE — 82565 ASSAY OF CREATININE: CPT

## 2024-12-13 PROCEDURE — 99205 OFFICE O/P NEW HI 60 MIN: CPT | Performed by: RADIOLOGY

## 2024-12-13 PROCEDURE — 1159F MED LIST DOCD IN RCRD: CPT | Performed by: RADIOLOGY

## 2024-12-13 PROCEDURE — 34310000005 FLUDEOXYGLUCOSE F18 SOLUTION: Performed by: NURSE PRACTITIONER

## 2024-12-13 PROCEDURE — 77334 RADIATION TREATMENT AID(S): CPT | Performed by: RADIOLOGY

## 2024-12-13 PROCEDURE — 78815 PET IMAGE W/CT SKULL-THIGH: CPT

## 2024-12-13 PROCEDURE — 77295 3-D RADIOTHERAPY PLAN: CPT | Performed by: RADIOLOGY

## 2024-12-13 PROCEDURE — G0463 HOSPITAL OUTPT CLINIC VISIT: HCPCS | Performed by: RADIOLOGY

## 2024-12-13 PROCEDURE — 77300 RADIATION THERAPY DOSE PLAN: CPT | Performed by: RADIOLOGY

## 2024-12-13 PROCEDURE — 71260 CT THORAX DX C+: CPT

## 2024-12-13 PROCEDURE — 77412 RADIATION TX DELIVERY LVL 3: CPT | Performed by: RADIOLOGY

## 2024-12-13 PROCEDURE — 77263 THER RADIOLOGY TX PLNG CPLX: CPT | Performed by: RADIOLOGY

## 2024-12-13 PROCEDURE — A9552 F18 FDG: HCPCS | Performed by: NURSE PRACTITIONER

## 2024-12-13 PROCEDURE — 77387 GUIDANCE FOR RADJ TX DLVR: CPT | Performed by: RADIOLOGY

## 2024-12-13 PROCEDURE — 1160F RVW MEDS BY RX/DR IN RCRD: CPT | Performed by: RADIOLOGY

## 2024-12-13 RX ORDER — IOPAMIDOL 612 MG/ML
100 INJECTION, SOLUTION INTRAVASCULAR
Status: COMPLETED | OUTPATIENT
Start: 2024-12-13 | End: 2024-12-13

## 2024-12-13 RX ADMIN — FLUDEOXYGLUCOSE F18 1 DOSE: 300 INJECTION INTRAVENOUS at 09:37

## 2024-12-13 RX ADMIN — IOPAMIDOL 100 ML: 612 INJECTION, SOLUTION INTRAVENOUS at 10:48

## 2024-12-13 NOTE — PROGRESS NOTES
ISAMAR met with Ms. Gibbs who is here for a radiation consultation for a lung mass. ISAMAR introduced self and explained role and source of support. She is 69 years old, and her 17-year-old granddaughter lives with her. She has a strong support system which includes her best friend, granddaughter, grandson, mother, three brothers, and sisters-in-law. Currently, she does not have transportation or financial concerns.     Ms. Gibbs is still trying to process her lung mass and treatment plan. She said it came as a “big shock.” She found out this morning and now she will be receiving her first radiation treatment today. Emotional support provided throughout, utilizing empathy and validating and normalized identified emotions. She does take Celexa at night and has been on it for a while. She does not see a counselor. ISAMAR spoke to her regarding support and peer to peer groups. She was also provided with resources to help decrease anxiety and positive coping strategies. Ms. Gibbs enjoys crafts and it helps her relax.      Ms. Gibbs wants to continue to be independent while going through treatments. She has been fatigued and has not been able to spend much time with her grandbabies and great grandchildren due to her lack of energy. Ms. Gibbs has always been an individual who has helped others, and it will be a changed to have to ask others for assistance. ISAMAR will remain available.

## 2024-12-13 NOTE — TELEPHONE ENCOUNTER
I spoke to this patient and she is in [place] and will present to our department for evaluation as soon as she is able to get here.

## 2024-12-13 NOTE — PATIENT INSTRUCTIONS
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  2)  We will need biopsy of lung to determine type of cancer  3)  MRI brain ordered with contrast, they will call you  4)  Plan on 3-5 radiation treatments for now until we complete biopsy and staging workup.  5)  You will likely need chemotherapy and radiation, but we will talk about treatment in future.

## 2024-12-13 NOTE — PROGRESS NOTES
[facility]  Radiation Oncology Clinic   Rico Argueta MD, FACR  Rupertofranklin Murphy CARRIE  _______________________________________________  [facility]  Department of Radiation Oncology  [address] [place] [id number]  Office: [phone]  Fax: [phone]    DATE: 12/13/2024  PATIENT: Ashley Gibbs  1955                         MEDICAL RECORD #: [id number]    REASON FOR VISIT:    Chief Complaint   Patient presents with    Lung Nodule     1. Lung mass    2. Former smoker                                              REASON FOR VISIT:    Chief Complaint   Patient presents with    Lung Nodule     Ashley Gibbs is a 69 y.o. female that has been referred to our clinic to be evaluated for consideration of radiotherapy to the lung.    On presentation today she reports appetite change, unexpected weight change, eye problems, SOB, vomiting, back/neck pain, and headaches. Denies cough, trouble swallowing, nasuea, diarrhea, light-headedness, weakness, and headaches. She follows .    HISTORY OF PRESENT ILLNESS:    11/08/2024 - MRI Brain without contrast:  No acute intracranial abnormality. Please note that noncontrast technique limits sensitivity for evaluation of intracranial metastatic disease.  Mild scattered foci of white matter signal abnormality, most commonly due to chronic small vessel ischemic changes.  Partially empty sella turcica with mildly prominent Meckel's caves. This can be seen as a normal finding, however also can be seen in the clinical setting of idiopathic intracranial hypertension.    11/08/2024 - CT Chest low dose:  2 cm right upper lobe bilobed nodule with associated mediastinal and right hilar lymphadenopathy, suspicious for malignancy. Minimal adjacent right upper lobe interlobular septal thickening may be related to venous or lymphatic congestion, however lymphangitic carcinomatosis is an additional consideration. Recommend further  evaluation with PET/CT and/or tissue sampling. CT chest with contrast may also be of value to help distinguish the lymph nodes from adjacent vasculature.  Minimal tree-in-bud nodularity in the peripheral the right upper lobe, likely mild infectious process.  Additional small pulmonary nodules in the right upper and right middle lobe.  Lung-RADS 4X:     12/13/2024 - CT Chest with contrast:  2.2 cm bilobed right upper lobe pulmonary nodule, likely representing a primary lung neoplasm. 8.9 cm right hilar lung mass with diffuse mediastinal infiltration, likely representing mary metastasis. This mass encases the right mainstem bronchus and encases the right main pulmonary artery, with occlusion of the right upper lobe pulmonary artery. This mass compresses the SVC, which is slitlike. Patient is at risk for SVC syndrome.    12/13/2024 - PET Scan:  Large irregular hypermetabolic mass in the right upper lobe suprahilar region with probable invasion of the middle mediastinum and right hilum and/or conglomerate hypermetabolic lymphadenopathy. This is worrisome for small cell carcinoma.  Right upper lobe solid nodule is also hypermetabolic and consistent with neoplasm likely pulmonary metastasis.    12/13/2024 - Documentation per :  I spoke with Dr. Ortega this afternoon regarding this patient.  She had a CT scan and PET scan performed today which reveals a large right hilar mass consistent with malignancy.    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    She does not have a tissue diagnosis at this point, however with the radiographic appearance and risk of potential SVC syndrome I have suggested consideration of urgent radiotherapy for 3-5 fractions to hopefully prevent SVC syndrome while awaiting biopsy and tissue diagnosis.    12/13/2024 - Documentation per :  Received call from Dr. Ortega re pt with newly found lung and mediastinal lesion. He wants us to  see her urgently. Look for referral and schedule her with next available slot.     History obtained from  PATIENT, FAMILY, and CHART    PAST MEDICAL HISTORY  Past Medical History:   Diagnosis Date    Anxiety     Arthritis     Asthma     Back pain     COPD (chronic obstructive pulmonary disease)     Dependence on supplemental oxygen     Depression     Disease of thyroid gland     Fibromyalgia, primary     Headache     History of degenerative disc disease     Hypothyroidism     Low back pain     Restless leg     Scoliosis       PAST SURGICAL HISTORY  Past Surgical History:   Procedure Laterality Date    APPENDECTOMY      BREAST BIOPSY Left     CHOLECYSTECTOMY      TUBAL ABDOMINAL LIGATION        FAMILY HISTORY  family history includes Arthritis in her mother; COPD in her father; Diabetes in her father; Heart disease in her father; Hypertension in her mother.    SOCIAL HISTORY  Social History     Tobacco Use    Smoking status: Former     Current packs/day: 0.00     Average packs/day: 1 pack/day for 40.0 years (40.0 ttl pk-yrs)     Types: Cigarettes     Start date: 1968     Quit date: 2008     Years since quittin.6    Smokeless tobacco: Never   Vaping Use    Vaping status: Every Day    Substances: Nicotine    Devices: Pre-filled pod   Substance Use Topics    Alcohol use: Never    Drug use: Never     ALLERGIES  Codeine     MEDICATIONS    Current Outpatient Medications:     acetaminophen (Tylenol 8 Hour) 650 MG 8 hr tablet, Take 1 tablet by mouth Every 8 (Eight) Hours As Needed for Moderate Pain or Headache., Disp: 100 tablet, Rfl: 2    ascorbic acid (VITAMIN C) 500 MG tablet, Take 1 tablet by mouth Daily., Disp: , Rfl:     Budeson-Glycopyrrol-Formoterol (Breztri Aerosphere) 160-9-4.8 MCG/ACT aerosol inhaler, Inhale 2 puffs 2 (Two) Times a Day., Disp: 10.7 g, Rfl: 11    Calcium Carbonate-Vit D-Min (Caltrate 600+D Plus Minerals) 600-800 MG-UNIT tablet, Take 1 tablet by mouth Daily., Disp: 90 tablet, Rfl: 3     cholecalciferol (VITAMIN D3) 25 MCG (1000 UT) tablet, Take 1 tablet by mouth Daily., Disp: , Rfl:     citalopram (CeleXA) 40 MG tablet, Take 1 tablet by mouth Daily., Disp: 90 tablet, Rfl: 1    diazePAM (Valium) 2 MG tablet, Take 1-2 tablets by mouth 1 (One) Time As Needed for Sedation (imaging) for up to 1 dose. May repeat additional tablet in 30 min if needed, Disp: 2 tablet, Rfl: 0    fluticasone (FLONASE) 50 MCG/ACT nasal spray, 2 sprays into the nostril(s) as directed by provider Daily., Disp: 9.9 mL, Rfl: 0    gabapentin (NEURONTIN) 600 MG tablet, Take 1 tablet by mouth Every 12 (Twelve) Hours., Disp: , Rfl:     HYDROcodone-acetaminophen (NORCO)  MG per tablet, Take 1 tablet by mouth 3 (Three) Times a Day., Disp: , Rfl:     ipratropium-albuterol (DUO-NEB) 0.5-2.5 mg/3 ml nebulizer, Take 3 mL by nebulization Every 4 (Four) Hours As Needed for Wheezing or Shortness of Air., Disp: 90 mL, Rfl: 0    levothyroxine (SYNTHROID, LEVOTHROID) 112 MCG tablet, Take 1 tablet by mouth Daily., Disp: 90 tablet, Rfl: 1    mirtazapine (REMERON) 15 MG tablet, Take 1 tablet by mouth every night at bedtime., Disp: 90 tablet, Rfl: 1    modafinil (Provigil) 100 MG tablet, Take 2 tablets by mouth Every Morning AND 1 tablet Every Evening., Disp: 90 tablet, Rfl: 0    ondansetron (Zofran) 4 MG tablet, Take 1 tablet by mouth Every 8 (Eight) Hours As Needed for Nausea or Vomiting., Disp: 30 tablet, Rfl: 0    rizatriptan (MAXALT) 10 MG tablet, Take 1 tablet by mouth 1 (One) Time As Needed for Migraine., Disp: , Rfl:     rOPINIRole (REQUIP) 0.25 MG tablet, Take 1 tablet by mouth every night at bedtime., Disp: 90 tablet, Rfl: 1    tiZANidine (ZANAFLEX) 2 MG tablet, Take 1 tablet by mouth 2 (Two) Times a Day As Needed for Muscle Spasms., Disp: , Rfl:     Ventolin  (90 Base) MCG/ACT inhaler, Inhale 2 puffs Every 4 (Four) Hours As Needed for Wheezing or Shortness of Air., Disp: 18 g, Rfl: 2    vitamin D (ERGOCALCIFEROL) 1.25 MG  "(80517 UT) capsule capsule, Take 1 capsule by mouth 1 (One) Time Per Week., Disp: 12 capsule, Rfl: 3  No current facility-administered medications for this visit.    The following portions of the patient's history were reviewed and updated as appropriate: allergies, current medications, past family history, past medical history, past social history, past surgical history and problem list.    REVIEW OF SYSTEMS  Review of Systems   Constitutional:  Positive for appetite change and unexpected weight change (patient states \"i lose weight, i gain weight\").   Eyes:  Positive for eye problems (vision changes to left eye \"things look distorted\").   Respiratory:  Positive for shortness of breath (with exertion).         Facial edema noted   Cardiovascular: Negative.    Gastrointestinal:  Positive for vomiting (a couple episodes of \"projectile vomiting\").   Endocrine: Negative.    Genitourinary: Negative.     Musculoskeletal:  Positive for back pain (chronic back pain, seen by Pain Mgmt Dr. Hammond; patient states pain in back/right shoulder blade has been increasing) and neck pain (\"sharp pains\" left neck).   Skin: Negative.    Neurological:  Positive for headaches.        MRI Brain w/o contrast 11/8/2024   Hematological: Negative.    Psychiatric/Behavioral: Negative.       I have reviewed and confirmed the accuracy of the ROS as documented by the MA/SAIGEN/RN Carmen Avina LPN    PHYSICAL EXAM  VITAL SIGNS:   Vitals:    12/13/24 1456   BP: 152/80   Pulse: 88   SpO2: 92%  Comment: room air   Weight: 68.6 kg (151 lb 4.8 oz)   Height: 170.2 cm (67\")   PainSc:   6   PainLoc: Neck     Physical Exam     Performance Status: ECOG {[facility] Onc ECOG Status:04647}    Clinical Quality Measures  - Pain Documented by Standardized Tool, FPS Ashley Gibbs reports a pain score of 6.  Given her pain assessment as noted, treatment options were discussed and the following options were decided upon as a follow-up plan to address the patient's " pain: continuation of current treatment plan for pain and use of non-medical modalities (ice, heat, stretching and/or behavior modifications).  Pain Medications               acetaminophen (Tylenol 8 Hour) 650 MG 8 hr tablet Take 1 tablet by mouth Every 8 (Eight) Hours As Needed for Moderate Pain or Headache.    citalopram (CeleXA) 40 MG tablet Take 1 tablet by mouth Daily.    gabapentin (NEURONTIN) 600 MG tablet Take 1 tablet by mouth Every 12 (Twelve) Hours.    HYDROcodone-acetaminophen (NORCO)  MG per tablet Take 1 tablet by mouth 3 (Three) Times a Day.    mirtazapine (REMERON) 15 MG tablet Take 1 tablet by mouth every night at bedtime.    tiZANidine (ZANAFLEX) 2 MG tablet Take 1 tablet by mouth 2 (Two) Times a Day As Needed for Muscle Spasms.          - Body Mass Index Screening and Follow-Up Plan  BMI is within normal parameters. No other follow-up for BMI required.    - Tobacco Use: Screening and Cessation Intervention  Social History    Tobacco Use      Smoking status: Former        Packs/day: 0.00        Years: 1 pack/day for 40.0 years (40.0 ttl pk-yrs)        Types: Cigarettes        Start date: 1968        Quit date: 2008        Years since quittin.6      Smokeless tobacco: Never    - Advanced Care Planning Advance Care Planning   ACP discussion was held with the patient during this visit. Patient does not have an advance directive, information provided.    - PHQ-2 Depression Screening:  Little interest or pleasure in doing things? Not at all   Feeling down, depressed, or hopeless? Not at all   PHQ-2 Total Score 0     ASSESSMENT AND PLAN  1. Lung mass    2. Former smoker      Orders Placed This Encounter   Procedures    MRI Brain With & Without Contrast    Ambulatory Referral to ONC Social Work     RECOMMENDATIONS: Ashley Gibbs was diagnosed with ***    The indications and rationale of lung stereotactic/external beam radiation therapy according to the NCCN Guidelines has been discussed  today. I have extensively reviewed the risks, benefits and alternatives of therapy with this diagnosis. The risks of radiation therapy includes but is not limited to radiation induced pulmonary fibrosis, progression of disease in spite of therapy with either local or systemic failure. I have seen, examined and reviewed this patient's medication list, appropriate labs and imaging studies as well as other physician notes. We discussed the goals and plans of care with the patient and family and answered all questions.     Following this discussion and in consideration of the diagnostic data/evaluation of the patient, I recommended a course of {Rad Onc Lung Dose (Optional):02815}.    Continue ongoing management per primary care physician and other specialists. Thank you for allowing me to assist in this patients care.     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  2)  We will need biopsy of lung to determine type of cancer  3)  MRI brain ordered with contrast, they will call you  4)  Plan on 3-5 radiation treatments for now until we complete biopsy and staging workup.  5)  You will likely need chemotherapy and radiation, but we will talk about treatment in future.    No follow-ups on file.     Time Spent: I spent *** minutes caring for Ashley on this date of service. This time includes time spent by me in the following activities: preparing for the visit, reviewing tests, obtaining and/or reviewing a separately obtained history, performing a medically appropriate examination and/or evaluation, counseling and educating the patient/family/caregiver, ordering medications, tests, or procedures, and documenting information in the medical record.   Carmen Avina LPN  12/13/2024         management per primary care physician and other specialists. Thank you for allowing me to assist in this patients care.     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  2)  We will need biopsy of lung to determine type of cancer  3)  MRI brain ordered with contrast, they will call you  4)  Plan on 3-5 radiation treatments for now until we complete biopsy and staging workup.  5)  You will likely need chemotherapy and radiation, but we will talk about treatment in future.    Time Spent: I spent 70 minutes caring for Ashley on this date of service. This time includes time spent by me in the following activities: preparing for the visit, reviewing tests, obtaining and/or reviewing a separately obtained history, performing a medically appropriate examination and/or evaluation, counseling and educating the patient/family/caregiver, ordering medications, tests, or procedures, and documenting information in the medical record.   Rico Vanegas III, MD  12/13/2024

## 2024-12-13 NOTE — TELEPHONE ENCOUNTER
LVM for patient to call back to schedule appointment.  Okay to schedule with any APRN next week.

## 2024-12-13 NOTE — PROGRESS NOTES
I spoke with Dr. Ortega this afternoon regarding this patient.  She had a CT scan and PET scan performed today which reveals a large right hilar mass consistent with malignancy.  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  She does not have a tissue diagnosis at this point, however with the radiographic appearance and risk of potential SVC syndrome I have suggested consideration of urgent radiotherapy for 3-5 fractions to hopefully prevent SVC syndrome while awaiting biopsy and tissue diagnosis.

## 2024-12-13 NOTE — TELEPHONE ENCOUNTER
Received call from Dr. Ortega re pt with newly found lung and mediastinal lesion.  He wants us to see her urgently.  Look for referral and schedule her with next available slot.

## 2024-12-16 ENCOUNTER — HOSPITAL ENCOUNTER (OUTPATIENT)
Dept: RADIATION ONCOLOGY | Facility: HOSPITAL | Age: 69
Setting detail: RADIATION/ONCOLOGY SERIES
Discharge: HOME | End: 2024-12-16
Payer: MEDICARE

## 2024-12-16 PROBLEM — Z99.89 CPAP (CONTINUOUS POSITIVE AIRWAY PRESSURE) DEPENDENCE: Chronic | Status: ACTIVE | Noted: 2019-03-14

## 2024-12-16 PROBLEM — J44.9 STAGE 3 SEVERE COPD BY GOLD CLASSIFICATION: Chronic | Status: ACTIVE | Noted: 2020-01-30

## 2024-12-16 PROBLEM — Z87.891 FORMER SMOKER: Chronic | Status: ACTIVE | Noted: 2024-12-13

## 2024-12-16 LAB
RAD ONC ARIA COURSE ID: NORMAL
RAD ONC ARIA COURSE INTENT: NORMAL
RAD ONC ARIA COURSE LAST TREATMENT DATE: NORMAL
RAD ONC ARIA COURSE START DATE: NORMAL
RAD ONC ARIA COURSE TREATMENT ELAPSED DAYS: 3
RAD ONC ARIA FIRST TREATMENT DATE: NORMAL
RAD ONC ARIA PLAN FRACTIONS TREATED TO DATE: 2
RAD ONC ARIA PLAN ID: NORMAL
RAD ONC ARIA PLAN PRESCRIBED DOSE PER FRACTION: 2.5 GY
RAD ONC ARIA PLAN PRIMARY REFERENCE POINT: NORMAL
RAD ONC ARIA PLAN TOTAL FRACTIONS PRESCRIBED: 5
RAD ONC ARIA PLAN TOTAL PRESCRIBED DOSE: 1250 CGY
RAD ONC ARIA REFERENCE POINT DOSAGE GIVEN TO DATE: 5 GY
RAD ONC ARIA REFERENCE POINT ID: NORMAL
RAD ONC ARIA REFERENCE POINT SESSION DOSAGE GIVEN: 2.5 GY

## 2024-12-16 PROCEDURE — 77387 GUIDANCE FOR RADJ TX DLVR: CPT | Performed by: RADIOLOGY

## 2024-12-16 PROCEDURE — 77014 CHG CT GUIDANCE RADIATION THERAPY FLDS PLACEMENT: CPT | Performed by: RADIOLOGY

## 2024-12-16 PROCEDURE — 77412 RADIATION TX DELIVERY LVL 3: CPT | Performed by: RADIOLOGY

## 2024-12-16 NOTE — PROGRESS NOTES
Chief Complaint  Lung Mass    Subjective    History of Present Illness {  Problem List  Visit Diagnosis   Encounters  Notes  Medications  Labs  Result Review Imaging  Media: 23}    Ashley Gibbs presents to [facility] for:    History of Present Illness  Management of lung mass.  This is a new patient referral.  She quit smoking in 2008.  1 pack/day for 40 years.  She does vape.  She has cut back.  Low-dose CT obtained showing a mass in the right hilum.  Follow-up CT with contrast confirming these findings.  PET scan showing increased uptake.  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  She is here today for consideration of lung biopsy.    She denies any childhood history of lung disease.  She has a history of asthma and COPD.  No occupational risk factors.  She has a positive family history of lung cancer in an aunt and an uncle.  She has no pets.  No allergy or sinus issues.  No reflux or swallowing issues.    She is on no blood thinning medications.    PFTs in 2019 showing very severe obstructive disease, improved to severe obstructive disease postbronchodilator.  She is on Breztri routinely.  She has breathing treatments she can use when needed.  She does not think the Breztri helps.  She is using the nebulizer 2-3 times per day.    She has a history of sleep apnea.  She no longer has a device.  She is not certain what happened to it.  She moved from one location to another and it got lost in the move.  She has had it for many many years.  She would like to get a new device.  Her DME is Medicare         Current Outpatient Medications:     acetaminophen (Tylenol 8 Hour) 650 MG 8 hr tablet, Take 1 tablet by mouth Every 8 (Eight) Hours As Needed for Moderate Pain or Headache., Disp: 100 tablet, Rfl: 2    ascorbic acid (VITAMIN C) 500 MG tablet, Take 1 tablet by mouth Daily., Disp: , Rfl:     Budeson-Glycopyrrol-Formoterol (Breztri  Aerosphere) 160-9-4.8 MCG/ACT aerosol inhaler, Inhale 2 puffs 2 (Two) Times a Day., Disp: 10.7 g, Rfl: 11    Calcium Carbonate-Vit D-Min (Caltrate 600+D Plus Minerals) 600-800 MG-UNIT tablet, Take 1 tablet by mouth Daily., Disp: 90 tablet, Rfl: 3    cholecalciferol (VITAMIN D3) 25 MCG (1000 UT) tablet, Take 1 tablet by mouth Daily., Disp: , Rfl:     citalopram (CeleXA) 40 MG tablet, Take 1 tablet by mouth Daily., Disp: 90 tablet, Rfl: 1    diazePAM (Valium) 5 MG tablet, Take 1 tablet by mouth Daily As Needed for Anxiety., Disp: 30 tablet, Rfl: 0    gabapentin (NEURONTIN) 600 MG tablet, Take 1 tablet by mouth Every 12 (Twelve) Hours., Disp: , Rfl:     HYDROcodone-acetaminophen (NORCO)  MG per tablet, Take 1 tablet by mouth 3 (Three) Times a Day., Disp: , Rfl:     ipratropium-albuterol (DUO-NEB) 0.5-2.5 mg/3 ml nebulizer, Take 3 mL by nebulization Every 4 (Four) Hours As Needed for Wheezing or Shortness of Air., Disp: 90 mL, Rfl: 0    levothyroxine (SYNTHROID, LEVOTHROID) 112 MCG tablet, Take 1 tablet by mouth Daily., Disp: 90 tablet, Rfl: 1    mirtazapine (REMERON) 15 MG tablet, Take 1 tablet by mouth every night at bedtime., Disp: 90 tablet, Rfl: 1    ondansetron (Zofran) 4 MG tablet, Take 1 tablet by mouth Every 8 (Eight) Hours As Needed for Nausea or Vomiting., Disp: 30 tablet, Rfl: 0    rizatriptan (MAXALT) 10 MG tablet, Take 1 tablet by mouth 1 (One) Time As Needed for Migraine., Disp: , Rfl:     rOPINIRole (REQUIP) 0.25 MG tablet, Take 1 tablet by mouth every night at bedtime., Disp: 90 tablet, Rfl: 1    tiZANidine (ZANAFLEX) 2 MG tablet, Take 1 tablet by mouth 2 (Two) Times a Day As Needed for Muscle Spasms., Disp: , Rfl:     Ventolin  (90 Base) MCG/ACT inhaler, Inhale 2 puffs Every 4 (Four) Hours As Needed for Wheezing or Shortness of Air., Disp: 18 g, Rfl: 2    vitamin D (ERGOCALCIFEROL) 1.25 MG (73166 UT) capsule capsule, Take 1 capsule by mouth 1 (One) Time Per Week., Disp: 12 capsule, Rfl:  "3    Fluticasone-Umeclidin-Vilant (Trelegy Ellipta) 100-62.5-25 MCG/ACT inhaler, Inhale 1 puff Daily for 14 days., Disp: 1 each, Rfl: 0    Social History     Socioeconomic History    Marital status:    Tobacco Use    Smoking status: Former     Current packs/day: 0.00     Average packs/day: 1 pack/day for 40.0 years (40.0 ttl pk-yrs)     Types: Cigarettes     Start date: 1968     Quit date: 2008     Years since quittin.6     Passive exposure: Past    Smokeless tobacco: Never   Vaping Use    Vaping status: Every Day    Substances: Nicotine    Devices: Pre-filled pod   Substance and Sexual Activity    Alcohol use: Never    Drug use: Never    Sexual activity: Not Currently     Partners: Male       Objective   Vital Signs:   /80   Pulse 100   Ht 170.2 cm (67\")   Wt 70.8 kg (156 lb)   SpO2 94% Comment: RA  BMI 24.43 kg/m²     Physical Exam  Cardiovascular:      Rate and Rhythm: Normal rate and regular rhythm.      Heart sounds: No murmur heard.  Pulmonary:      Effort: Pulmonary effort is normal.      Breath sounds: Normal breath sounds.   Musculoskeletal:      Right lower leg: No edema.      Left lower leg: No edema.   Neurological:      Mental Status: She is alert and oriented to person, place, and time.        Result Review :      My interpretation of the PFT : none    No results found for this or any previous visit.    CT Chest With Contrast Diagnostic (2024 10:47) NM PET/CT Skull Base to Mid Thigh (2024 10:42)     My interpretation of imaging: Right upper lobe lung mass with encasement of the right hilum and SVC compression, positive uptake on PET    My interpretation of labs: None      Assessment and Plan {CC Problem List  Visit Diagnosis  ROS  Review (Popup)  Health Maintenance  Quality  BestPractice  Medications  SmartSets  SnapShot Encounters  Media : 23}    Diagnoses and all orders for this visit:    1. Lung mass (Primary)  Comments:  Biopsy recommended.  " Risk and benefits to include anesthesia complication, bleeding and pneumothorax.  She understands and wishes to proceed    2. Former smoker  Comments:  Continue to avoid smoking    3. CPAP (continuous positive airway pressure) dependence  Comments:  Will reach out to [facility] to see if I can prescribe her a new device    4. Stage 3 severe COPD by GOLD classification  Comments:  Stable.  Trial of Trelegy 100 in place of Breztri.  Use albuterol as needed.  Will update PFTs at some point  Orders:  -     Fluticasone-Umeclidin-Vilant (Trelegy Ellipta) 100-62.5-25 MCG/ACT inhaler; Inhale 1 puff Daily for 14 days.  Dispense: 1 each; Refill: 0        Body mass index is 24.43 kg/m².    Ani Singer, APRN  12/18/2024  13:40 CST    Follow Up   Return Isabella to schedule f/u 4-6 days post ION case for results.    Patient was given instructions and counseling regarding her condition or for health maintenance advice. Please see specific information pulled into the AVS if appropriate.

## 2024-12-16 NOTE — H&P (VIEW-ONLY)
Chief Complaint  Lung Mass    Subjective    History of Present Illness {  Problem List  Visit Diagnosis   Encounters  Notes  Medications  Labs  Result Review Imaging  Media: 23}    Ashley Gibbs presents to [facility] PULMON[facility] MEDICINE for:    History of Present Illness  Management of lung mass.  This is a new patient referral.  She quit smoking in 2008.  1 pack/day for 40 years.  She does vape.  She has cut back.  Low-dose CT obtained showing a mass in the right hilum.  Follow-up CT with contrast confirming these findings.  PET scan showing increased uptake.  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  She is here today for consideration of lung biopsy.    She denies any childhood history of lung disease.  She has a history of asthma and COPD.  No occupational risk factors.  She has a positive family history of lung cancer in an aunt and an uncle.  She has no pets.  No allergy or sinus issues.  No reflux or swallowing issues.    She is on no blood thinning medications.    PFTs in 2019 showing very severe obstructive disease, improved to severe obstructive disease postbronchodilator.  She is on Breztri routinely.  She has breathing treatments she can use when needed.  She does not think the Breztri helps.  She is using the nebulizer 2-3 times per day.    She has a history of sleep apnea.  She no longer has a device.  She is not certain what happened to it.  She moved from one location to another and it got lost in the move.  She has had it for many many years.  She would like to get a new device.  Her DME is Medicare         Current Outpatient Medications:     acetaminophen (Tylenol 8 Hour) 650 MG 8 hr tablet, Take 1 tablet by mouth Every 8 (Eight) Hours As Needed for Moderate Pain or Headache., Disp: 100 tablet, Rfl: 2    ascorbic acid (VITAMIN C) 500 MG tablet, Take 1 tablet by mouth Daily., Disp: , Rfl:     Budeson-Glycopyrrol-Formoterol (Breztri  Aerosphere) 160-9-4.8 MCG/ACT aerosol inhaler, Inhale 2 puffs 2 (Two) Times a Day., Disp: 10.7 g, Rfl: 11    Calcium Carbonate-Vit D-Min (Caltrate 600+D Plus Minerals) 600-800 MG-UNIT tablet, Take 1 tablet by mouth Daily., Disp: 90 tablet, Rfl: 3    cholecalciferol (VITAMIN D3) 25 MCG (1000 UT) tablet, Take 1 tablet by mouth Daily., Disp: , Rfl:     citalopram (CeleXA) 40 MG tablet, Take 1 tablet by mouth Daily., Disp: 90 tablet, Rfl: 1    diazePAM (Valium) 5 MG tablet, Take 1 tablet by mouth Daily As Needed for Anxiety., Disp: 30 tablet, Rfl: 0    gabapentin (NEURONTIN) 600 MG tablet, Take 1 tablet by mouth Every 12 (Twelve) Hours., Disp: , Rfl:     HYDROcodone-acetaminophen (NORCO)  MG per tablet, Take 1 tablet by mouth 3 (Three) Times a Day., Disp: , Rfl:     ipratropium-albuterol (DUO-NEB) 0.5-2.5 mg/3 ml nebulizer, Take 3 mL by nebulization Every 4 (Four) Hours As Needed for Wheezing or Shortness of Air., Disp: 90 mL, Rfl: 0    levothyroxine (SYNTHROID, LEVOTHROID) 112 MCG tablet, Take 1 tablet by mouth Daily., Disp: 90 tablet, Rfl: 1    mirtazapine (REMERON) 15 MG tablet, Take 1 tablet by mouth every night at bedtime., Disp: 90 tablet, Rfl: 1    ondansetron (Zofran) 4 MG tablet, Take 1 tablet by mouth Every 8 (Eight) Hours As Needed for Nausea or Vomiting., Disp: 30 tablet, Rfl: 0    rizatriptan (MAXALT) 10 MG tablet, Take 1 tablet by mouth 1 (One) Time As Needed for Migraine., Disp: , Rfl:     rOPINIRole (REQUIP) 0.25 MG tablet, Take 1 tablet by mouth every night at bedtime., Disp: 90 tablet, Rfl: 1    tiZANidine (ZANAFLEX) 2 MG tablet, Take 1 tablet by mouth 2 (Two) Times a Day As Needed for Muscle Spasms., Disp: , Rfl:     Ventolin  (90 Base) MCG/ACT inhaler, Inhale 2 puffs Every 4 (Four) Hours As Needed for Wheezing or Shortness of Air., Disp: 18 g, Rfl: 2    vitamin D (ERGOCALCIFEROL) 1.25 MG (69625 UT) capsule capsule, Take 1 capsule by mouth 1 (One) Time Per Week., Disp: 12 capsule, Rfl:  "3    Fluticasone-Umeclidin-Vilant (Trelegy Ellipta) 100-62.5-25 MCG/ACT inhaler, Inhale 1 puff Daily for 14 days., Disp: 1 each, Rfl: 0    Social History     Socioeconomic History    Marital status:    Tobacco Use    Smoking status: Former     Current packs/day: 0.00     Average packs/day: 1 pack/day for 40.0 years (40.0 ttl pk-yrs)     Types: Cigarettes     Start date: 1968     Quit date: 2008     Years since quittin.6     Passive exposure: Past    Smokeless tobacco: Never   Vaping Use    Vaping status: Every Day    Substances: Nicotine    Devices: Pre-filled pod   Substance and Sexual Activity    Alcohol use: Never    Drug use: Never    Sexual activity: Not Currently     Partners: Male       Objective   Vital Signs:   /80   Pulse 100   Ht 170.2 cm (67\")   Wt 70.8 kg (156 lb)   SpO2 94% Comment: RA  BMI 24.43 kg/m²     Physical Exam  Cardiovascular:      Rate and Rhythm: Normal rate and regular rhythm.      Heart sounds: No murmur heard.  Pulmonary:      Effort: Pulmonary effort is normal.      Breath sounds: Normal breath sounds.   Musculoskeletal:      Right lower leg: No edema.      Left lower leg: No edema.   Neurological:      Mental Status: She is alert and oriented to person, place, and time.        Result Review :      My interpretation of the PFT : none    No results found for this or any previous visit.    CT Chest With Contrast Diagnostic (2024 10:47) NM PET/CT Skull Base to Mid Thigh (2024 10:42)     My interpretation of imaging: Right upper lobe lung mass with encasement of the right hilum and SVC compression, positive uptake on PET    My interpretation of labs: None      Assessment and Plan {CC Problem List  Visit Diagnosis  ROS  Review (Popup)  Health Maintenance  Quality  BestPractice  Medications  SmartSets  SnapShot Encounters  Media : 23}    Diagnoses and all orders for this visit:    1. Lung mass (Primary)  Comments:  Biopsy recommended.  " Risk and benefits to include anesthesia complication, bleeding and pneumothorax.  She understands and wishes to proceed    2. Former smoker  Comments:  Continue to avoid smoking    3. CPAP (continuous positive airway pressure) dependence  Comments:  Will reach out to [facility] to see if I can prescribe her a new device    4. Stage 3 severe COPD by GOLD classification  Comments:  Stable.  Trial of Trelegy 100 in place of Breztri.  Use albuterol as needed.  Will update PFTs at some point  Orders:  -     Fluticasone-Umeclidin-Vilant (Trelegy Ellipta) 100-62.5-25 MCG/ACT inhaler; Inhale 1 puff Daily for 14 days.  Dispense: 1 each; Refill: 0        Body mass index is 24.43 kg/m².    Ani Singer, APRN  12/18/2024  13:40 CST    Follow Up   Return Isabella to schedule f/u 4-6 days post ION case for results.    Patient was given instructions and counseling regarding her condition or for health maintenance advice. Please see specific information pulled into the AVS if appropriate.

## 2024-12-17 ENCOUNTER — HOSPITAL ENCOUNTER (OUTPATIENT)
Dept: RADIATION ONCOLOGY | Facility: HOSPITAL | Age: 69
Setting detail: RADIATION/ONCOLOGY SERIES
Discharge: HOME | End: 2024-12-17
Payer: MEDICARE

## 2024-12-17 ENCOUNTER — OFFICE VISIT (OUTPATIENT)
Dept: FAMILY MEDICINE CLINIC | Facility: CLINIC | Age: 69
End: 2024-12-17
Payer: MEDICARE

## 2024-12-17 VITALS
DIASTOLIC BLOOD PRESSURE: 69 MMHG | BODY MASS INDEX: 23.86 KG/M2 | WEIGHT: 152 LBS | RESPIRATION RATE: 18 BRPM | OXYGEN SATURATION: 94 % | HEART RATE: 92 BPM | HEIGHT: 67 IN | TEMPERATURE: 98.6 F | SYSTOLIC BLOOD PRESSURE: 127 MMHG

## 2024-12-17 DIAGNOSIS — E55.9 VITAMIN D DEFICIENCY: ICD-10-CM

## 2024-12-17 DIAGNOSIS — F51.01 PRIMARY INSOMNIA: ICD-10-CM

## 2024-12-17 DIAGNOSIS — E03.9 HYPOTHYROIDISM, UNSPECIFIED TYPE: ICD-10-CM

## 2024-12-17 DIAGNOSIS — F33.0 MILD EPISODE OF RECURRENT MAJOR DEPRESSIVE DISORDER: ICD-10-CM

## 2024-12-17 DIAGNOSIS — Z13.220 SCREENING CHOLESTEROL LEVEL: ICD-10-CM

## 2024-12-17 DIAGNOSIS — F41.0 PANIC ATTACKS: ICD-10-CM

## 2024-12-17 DIAGNOSIS — F41.9 ANXIETY: ICD-10-CM

## 2024-12-17 DIAGNOSIS — R51.9 NONINTRACTABLE HEADACHE, UNSPECIFIED CHRONICITY PATTERN, UNSPECIFIED HEADACHE TYPE: ICD-10-CM

## 2024-12-17 DIAGNOSIS — Z00.00 MEDICARE ANNUAL WELLNESS VISIT, SUBSEQUENT: Primary | ICD-10-CM

## 2024-12-17 DIAGNOSIS — J44.9 CHRONIC OBSTRUCTIVE PULMONARY DISEASE, UNSPECIFIED COPD TYPE: ICD-10-CM

## 2024-12-17 DIAGNOSIS — R91.8 MASS OF RIGHT LUNG: ICD-10-CM

## 2024-12-17 DIAGNOSIS — J44.1 COPD WITH EXACERBATION: ICD-10-CM

## 2024-12-17 DIAGNOSIS — Z87.891 HISTORY OF TOBACCO ABUSE: ICD-10-CM

## 2024-12-17 DIAGNOSIS — R91.1 PULMONARY NODULE 1 CM OR GREATER IN DIAMETER: ICD-10-CM

## 2024-12-17 DIAGNOSIS — G25.81 RESTLESS LEGS SYNDROME: ICD-10-CM

## 2024-12-17 DIAGNOSIS — G47.33 OSA (OBSTRUCTIVE SLEEP APNEA): ICD-10-CM

## 2024-12-17 LAB
RAD ONC ARIA COURSE ID: NORMAL
RAD ONC ARIA COURSE INTENT: NORMAL
RAD ONC ARIA COURSE LAST TREATMENT DATE: NORMAL
RAD ONC ARIA COURSE START DATE: NORMAL
RAD ONC ARIA COURSE TREATMENT ELAPSED DAYS: 4
RAD ONC ARIA FIRST TREATMENT DATE: NORMAL
RAD ONC ARIA PLAN FRACTIONS TREATED TO DATE: 3
RAD ONC ARIA PLAN ID: NORMAL
RAD ONC ARIA PLAN PRESCRIBED DOSE PER FRACTION: 2.5 GY
RAD ONC ARIA PLAN PRIMARY REFERENCE POINT: NORMAL
RAD ONC ARIA PLAN TOTAL FRACTIONS PRESCRIBED: 5
RAD ONC ARIA PLAN TOTAL PRESCRIBED DOSE: 1250 CGY
RAD ONC ARIA REFERENCE POINT DOSAGE GIVEN TO DATE: 7.5 GY
RAD ONC ARIA REFERENCE POINT ID: NORMAL
RAD ONC ARIA REFERENCE POINT SESSION DOSAGE GIVEN: 2.5 GY

## 2024-12-17 PROCEDURE — 77387 GUIDANCE FOR RADJ TX DLVR: CPT | Performed by: RADIOLOGY

## 2024-12-17 PROCEDURE — 99214 OFFICE O/P EST MOD 30 MIN: CPT | Performed by: NURSE PRACTITIONER

## 2024-12-17 PROCEDURE — 77412 RADIATION TX DELIVERY LVL 3: CPT | Performed by: RADIOLOGY

## 2024-12-17 PROCEDURE — 77014 CHG CT GUIDANCE RADIATION THERAPY FLDS PLACEMENT: CPT | Performed by: RADIOLOGY

## 2024-12-17 PROCEDURE — 1125F AMNT PAIN NOTED PAIN PRSNT: CPT | Performed by: NURSE PRACTITIONER

## 2024-12-17 PROCEDURE — G0439 PPPS, SUBSEQ VISIT: HCPCS | Performed by: NURSE PRACTITIONER

## 2024-12-17 RX ORDER — IPRATROPIUM BROMIDE AND ALBUTEROL SULFATE 2.5; .5 MG/3ML; MG/3ML
3 SOLUTION RESPIRATORY (INHALATION) EVERY 4 HOURS PRN
Qty: 90 ML | Refills: 0 | Status: SHIPPED | OUTPATIENT
Start: 2024-12-17

## 2024-12-17 RX ORDER — DIAZEPAM 5 MG/1
5 TABLET ORAL DAILY PRN
Qty: 30 TABLET | Refills: 0 | Status: SHIPPED | OUTPATIENT
Start: 2024-12-17

## 2024-12-17 NOTE — PROGRESS NOTES
Subjective   The ABCs of the Annual Wellness Visit  Medicare Wellness Visit      Ashley Gibbs is a 69 y.o. patient who presents for a Medicare Wellness Visit.    The following portions of the patient's history were reviewed and   updated as appropriate: allergies, current medications, past family history, past medical history, past social history, past surgical history, and problem list.    Compared to one year ago, the patient's physical   health is worse.  Compared to one year ago, the patient's mental   health is worse.    Recent Hospitalizations:  She was not admitted to the hospital during the last year.     Current Medical Providers:  Patient Care Team:  Padmaja Bermudez APRN as PCP - General (Family Medicine)  Ani Singer APRN as Nurse Practitioner (Pulmonary Disease)    Outpatient Medications Prior to Visit   Medication Sig Dispense Refill    acetaminophen (Tylenol 8 Hour) 650 MG 8 hr tablet Take 1 tablet by mouth Every 8 (Eight) Hours As Needed for Moderate Pain or Headache. 100 tablet 2    ascorbic acid (VITAMIN C) 500 MG tablet Take 1 tablet by mouth Daily.      Budeson-Glycopyrrol-Formoterol (Breztri Aerosphere) 160-9-4.8 MCG/ACT aerosol inhaler Inhale 2 puffs 2 (Two) Times a Day. 10.7 g 11    Calcium Carbonate-Vit D-Min (Caltrate 600+D Plus Minerals) 600-800 MG-UNIT tablet Take 1 tablet by mouth Daily. 90 tablet 3    cholecalciferol (VITAMIN D3) 25 MCG (1000 UT) tablet Take 1 tablet by mouth Daily.      citalopram (CeleXA) 40 MG tablet Take 1 tablet by mouth Daily. 90 tablet 1    gabapentin (NEURONTIN) 600 MG tablet Take 1 tablet by mouth Every 12 (Twelve) Hours.      HYDROcodone-acetaminophen (NORCO)  MG per tablet Take 1 tablet by mouth 3 (Three) Times a Day.      levothyroxine (SYNTHROID, LEVOTHROID) 112 MCG tablet Take 1 tablet by mouth Daily. 90 tablet 1    mirtazapine (REMERON) 15 MG tablet Take 1 tablet by mouth every night at bedtime. 90 tablet 1    ondansetron (Zofran) 4 MG  tablet Take 1 tablet by mouth Every 8 (Eight) Hours As Needed for Nausea or Vomiting. 30 tablet 0    rizatriptan (MAXALT) 10 MG tablet Take 1 tablet by mouth 1 (One) Time As Needed for Migraine.      rOPINIRole (REQUIP) 0.25 MG tablet Take 1 tablet by mouth every night at bedtime. 90 tablet 1    tiZANidine (ZANAFLEX) 2 MG tablet Take 1 tablet by mouth 2 (Two) Times a Day As Needed for Muscle Spasms.      Ventolin  (90 Base) MCG/ACT inhaler Inhale 2 puffs Every 4 (Four) Hours As Needed for Wheezing or Shortness of Air. 18 g 2    vitamin D (ERGOCALCIFEROL) 1.25 MG (30692 UT) capsule capsule Take 1 capsule by mouth 1 (One) Time Per Week. 12 capsule 3    diazePAM (Valium) 2 MG tablet Take 1-2 tablets by mouth 1 (One) Time As Needed for Sedation (imaging) for up to 1 dose. May repeat additional tablet in 30 min if needed 2 tablet 0    fluticasone (FLONASE) 50 MCG/ACT nasal spray 2 sprays into the nostril(s) as directed by provider Daily. 9.9 mL 0    ipratropium-albuterol (DUO-NEB) 0.5-2.5 mg/3 ml nebulizer Take 3 mL by nebulization Every 4 (Four) Hours As Needed for Wheezing or Shortness of Air. 90 mL 0    modafinil (Provigil) 100 MG tablet Take 2 tablets by mouth Every Morning AND 1 tablet Every Evening. 90 tablet 0     No facility-administered medications prior to visit.     Opioid medication/s are on active medication list.  and I have evaluated her active treatment plan and pain score trends (see table).  Vitals:    12/17/24 1534   PainSc:   6   PainLoc: Generalized     I have reviewed the chart for potential of high risk medication and harmful drug interactions in the elderly.        Aspirin is not on active medication list.  Aspirin use is not indicated based on review of current medical condition/s. Risk of harm outweighs potential benefits.  .    Patient Active Problem List   Diagnosis    Acute hypoxemic respiratory failure    Age-related osteoporosis without current pathological fracture    Anxiety     "Stage 3 severe COPD by GOLD classification    Chronic pain disorder    CPAP (continuous positive airway pressure) dependence    Depression    Excessive daytime sleepiness    Hemoptysis    Family history of renal cell carcinoma    Hyperlipidemia    Hypothyroidism    Migraines    Mild episode of recurrent major depressive disorder    Neuropathy    Obstructive sleep apnea    Primary insomnia    Renal mass    Restless legs syndrome    On supplemental oxygen therapy    Family history of CHF (congestive heart failure)    Family history of diabetes mellitus    6 cm exophytic left upper pole renal cyst, likely hemorrhagic    Acute bilateral pyelonephritis    Hypokalemia    E. coli UTI, present on admission (urinary tract infection)    Paroxysmal supraventricular tachycardia    Sepsis due to Escherichia coli UTI, bilateral pyelonephritis, present on admission    Vitamin D deficiency    Lung mass    Former smoker     Advance Care Planning Advance Directive is not on file.  ACP discussion was held with the patient during this visit. Patient does not have an advance directive, information provided.            Objective   Vitals:    12/17/24 1534   BP: 127/69   BP Location: Left arm   Patient Position: Sitting   Cuff Size: Large Adult   Pulse: 92   Resp: 18   Temp: 98.6 °F (37 °C)   TempSrc: Infrared   SpO2: 94%   Weight: 68.9 kg (152 lb)   Height: 170.2 cm (67\")  Comment: per patient   PainSc:   6   PainLoc: Generalized       Estimated body mass index is 23.81 kg/m² as calculated from the following:    Height as of this encounter: 170.2 cm (67\").    Weight as of this encounter: 68.9 kg (152 lb).    BMI is within normal parameters. No other follow-up for BMI required.           Does the patient have evidence of cognitive impairment? No                                                                                                Health  Risk Assessment    Smoking Status:  Social History     Tobacco Use   Smoking Status Former    " Current packs/day: 0.00    Average packs/day: 1 pack/day for 40.0 years (40.0 ttl pk-yrs)    Types: Cigarettes    Start date: 1968    Quit date: 2008    Years since quittin.6   Smokeless Tobacco Never     Alcohol Consumption:  Social History     Substance and Sexual Activity   Alcohol Use Never       Fall Risk Screen  STEADI Fall Risk Assessment was completed, and patient is at LOW risk for falls.Assessment completed on:2024    Depression Screening   Little interest or pleasure in doing things? Not at all   Feeling down, depressed, or hopeless? Not at all   PHQ-2 Total Score 0      Health Habits and Functional and Cognitive Screenin/17/2024     3:39 PM   Functional & Cognitive Status   Do you have difficulty preparing food and eating? No   Do you have difficulty bathing yourself, getting dressed or grooming yourself? No   Do you have difficulty using the toilet? No   Do you have difficulty moving around from place to place? No   Do you have trouble with steps or getting out of a bed or a chair? No   Current Diet Well Balanced Diet   Dental Exam Up to date   Eye Exam Not up to date   Exercise (times per week) 0 times per week   Current Exercises Include No Regular Exercise   Do you need help using the phone?  No   Are you deaf or do you have serious difficulty hearing?  No   Do you need help to go to places out of walking distance? No   Do you need help shopping? No   Do you need help preparing meals?  No   Do you need help with housework?  No   Do you need help with laundry? No   Do you need help taking your medications? No   Do you need help managing money? No   Do you ever drive or ride in a car without wearing a seat belt? No   Have you felt unusual stress, anger or loneliness in the last month? No   Who do you live with? Other   If you need help, do you have trouble finding someone available to you? No   Have you been bothered in the last four weeks by sexual problems? No   Do you  have difficulty concentrating, remembering or making decisions? No           Age-appropriate Screening Schedule:  Refer to the list below for future screening recommendations based on patient's age, sex and/or medical conditions. Orders for these recommended tests are listed in the plan section. The patient has been provided with a written plan.    Health Maintenance List  Health Maintenance   Topic Date Due    ZOSTER VACCINE (1 of 2) Never done    PAP SMEAR  Never done    COVID-19 Vaccine (3 - Pfizer risk series) 11/10/2021    COLORECTAL CANCER SCREENING  10/17/2023    INFLUENZA VACCINE  07/01/2024    LIPID PANEL  11/20/2024    DXA SCAN  11/17/2025    ANNUAL WELLNESS VISIT  12/17/2025    MAMMOGRAM  09/26/2026    TDAP/TD VACCINES (2 - Td or Tdap) 11/24/2027    HEPATITIS C SCREENING  Completed    Pneumococcal Vaccine 65+  Completed    LUNG CANCER SCREENING  Discontinued                                                                                                                                                CMS Preventative Services Quick Reference  Risk Factors Identified During Encounter  Chronic Pain: Natural history and expected course discussed. Questions answered.  Depression/Dysphoria: Current medication is effective, no change recommended  Fall Risk-High or Moderate: Discussed Fall Prevention in the home  Glaucoma or Family History of Glaucoma:  Ophthalmology Appointment Recommended  Immunizations Discussed/Encouraged: Influenza, Prevnar 20 (Pneumococcal 20-valent conjugate), and COVID19  Polypharmacy: Medication List reviewed  Tobacco Use/Dependance Risk (use dotphrase .tobaccocessation for documentation)  Vision Screening Recommended    The above risks/problems have been discussed with the patient.  Pertinent information has been shared with the patient in the After Visit Summary.  An After Visit Summary and PPPS were made available to the patient.    Follow Up:   Next Medicare Wellness visit to be  scheduled in 1 year.         Additional E&M Note during same encounter follows:  Patient has additional, significant, and separately identifiable condition(s)/problem(s) that require work above and beyond the Medicare Wellness Visit     Chief Complaint  Medicare Wellness-subsequent    Subjective    HPI         The patient is a 69-year-old female who presents today for a follow-up visit as well as a Medicare wellness visit.    She has been undergoing daily radiation therapy since the previous Friday, which she reports as progressing well. The mass is encircling the main pulmonary artery, causing significant concern. She has an upcoming appointment with Dr. Morales, who will perform a bronchoscopy. She is currently on a regimen of five radiation treatments, with the final session scheduled for Thursday. A subsequent CT scan with contrast will be ordered to assess the type of cancer. She has been informed that the mass is malignant. She has been experiencing shortness of breath and fatigue. She has reduced her vaping habit and does not believe nicotine patches would be beneficial. She has not tried lozenges. She has not required overnight hospital admission in the past year. She is not on aspirin. She reports no memory issues, alcohol misuse, drug use, or falls. She plans to schedule an eye doctor appointment after the holidays. She has not had a Pap smear in 10 years but has never had an abnormal result. She retains her uterus and other reproductive organs. She has recently started menstruating again and experiences abdominal pain when lying in certain positions.    She has been experiencing stress related to her current health situation. She has found Valium to be effective in managing her anxiety, taking one or two tablets as needed. She was previously on Xanax, which she also found beneficial, but has not taken it since relocating in 2006.    She has been experiencing chronic pain. She is concerned about potential  "discontinuation of her medication due to her ongoing radiation therapy. She has been receiving Narcan from her pain management provider along with hydrocodone.    She is requesting refills for her regular medications, including DuoNeb, which she uses two to three times daily.    SOCIAL HISTORY  She reports no alcohol misuse or drug use. She admits to vaping but has cut down significantly.    MEDICATIONS  Current: Valium, hydrocodone, Narcan, DuoNeb  Past: Xanax          Objective   Vital Signs:  /69 (BP Location: Left arm, Patient Position: Sitting, Cuff Size: Large Adult)   Pulse 92   Temp 98.6 °F (37 °C) (Infrared)   Resp 18   Ht 170.2 cm (67\") Comment: per patient  Wt 68.9 kg (152 lb)   SpO2 94%   BMI 23.81 kg/m²   Physical Exam  Vitals and nursing note reviewed.   Constitutional:       General: She is not in acute distress.     Appearance: She is well-developed.   HENT:      Right Ear: Tympanic membrane and ear canal normal.      Left Ear: Tympanic membrane and ear canal normal.      Nose: Nose normal.      Right Sinus: No maxillary sinus tenderness or frontal sinus tenderness.      Left Sinus: No maxillary sinus tenderness or frontal sinus tenderness.      Mouth/Throat:      Mouth: Mucous membranes are moist.      Pharynx: Oropharynx is clear. Uvula midline. No uvula swelling.   Eyes:      Conjunctiva/sclera: Conjunctivae normal.   Neck:      Thyroid: No thyromegaly.      Trachea: No tracheal deviation.   Cardiovascular:      Rate and Rhythm: Normal rate and regular rhythm.      Heart sounds: Normal heart sounds.   Pulmonary:      Effort: Pulmonary effort is normal.      Breath sounds: Normal breath sounds.   Abdominal:      General: Bowel sounds are normal.      Palpations: Abdomen is soft. There is no mass.      Tenderness: There is no abdominal tenderness. There is no right CVA tenderness or left CVA tenderness.   Musculoskeletal:      Cervical back: Neck supple.   Lymphadenopathy:      " Cervical: No cervical adenopathy.   Skin:     General: Skin is warm and dry.   Neurological:      Mental Status: She is alert.   Psychiatric:         Mood and Affect: Mood is anxious. Affect is tearful.         Behavior: Behavior normal.                       Results  Imaging  CT low-dose chest showed a mass. CT with contrast and a PET scan showed a 7 x 8 cm right lung mass.              Assessment and Plan        1. Right lung mass.  A CT scan on 11/08/2023 revealed a mass in the right lung. Subsequent imaging, including a CT with contrast and a PET scan on 12/13/2023, identified a 7 x 8 cm mass. Radiation oncology has initiated daily radiation therapy to shrink the mass, which is wrapped around the main pulmonary artery. She is scheduled to see Dr. Morales tomorrow for further evaluation and potential biopsy via bronchoscopy. An MRI of the brain with and without contrast has been ordered to assess for any arterial blockages. She is advised to avoid activities that increase the workload on the arteries, such as lifting or straining.    2. Anxiety.  She reports significant stress and anxiety related to her current health situation. A prescription for Valium 5 mg has been provided, with instructions to take half a tablet initially and a full tablet if necessary.  [place], UDS, and controlled substance contract in [place]. Advised patient of risks associated with controlled substances including physical and mental dependence, abuse, and mental impairment. Advised patient to use least amount of possible and never overuse or share medications with other people. Patient states understanding of risks and instructions on proper use.       3. Chronic pain.  She has chronic pain and is concerned about continuing her pain management regimen during radiation therapy. A report will be sent to her pain management team to ensure they are aware of her current treatment plan and can make appropriate adjustments.    4. Copd   A  prescription for DuoNeb has been provided, as she reports using it two to three times a day.    5. Health maintenance.  She is advised to complete her Cologuard test for colon cancer screening. A Pap smear is recommended, and she prefers to have it done in February. Kidney function tests have been ordered.            Follow Up   No follow-ups on file.  Patient was given instructions and counseling regarding her condition or for health maintenance advice. Please see specific information pulled into the AVS if appropriate.  Patient or patient representative verbalized consent for the use of Ambient Listening during the visit with  CARRIE Medina for chart documentation. 12/28/2024  13:35 CST

## 2024-12-18 ENCOUNTER — HOSPITAL ENCOUNTER (OUTPATIENT)
Dept: RADIATION ONCOLOGY | Facility: HOSPITAL | Age: 69
Discharge: HOME | End: 2024-12-18

## 2024-12-18 ENCOUNTER — TELEPHONE (OUTPATIENT)
Dept: FAMILY MEDICINE CLINIC | Facility: CLINIC | Age: 69
End: 2024-12-18
Payer: MEDICARE

## 2024-12-18 ENCOUNTER — HOSPITAL ENCOUNTER (OUTPATIENT)
Dept: RADIATION ONCOLOGY | Facility: HOSPITAL | Age: 69
Setting detail: RADIATION/ONCOLOGY SERIES
Discharge: HOME | End: 2024-12-18
Payer: MEDICARE

## 2024-12-18 ENCOUNTER — PREP FOR SURGERY (OUTPATIENT)
Dept: OTHER | Facility: HOSPITAL | Age: 69
End: 2024-12-18
Payer: MEDICARE

## 2024-12-18 ENCOUNTER — TELEPHONE (OUTPATIENT)
Dept: PULMONOLOGY | Facility: CLINIC | Age: 69
End: 2024-12-18

## 2024-12-18 ENCOUNTER — OFFICE VISIT (OUTPATIENT)
Dept: PULMONOLOGY | Facility: CLINIC | Age: 69
End: 2024-12-18
Payer: MEDICARE

## 2024-12-18 VITALS
HEIGHT: 67 IN | SYSTOLIC BLOOD PRESSURE: 136 MMHG | OXYGEN SATURATION: 94 % | DIASTOLIC BLOOD PRESSURE: 80 MMHG | BODY MASS INDEX: 24.48 KG/M2 | WEIGHT: 156 LBS | HEART RATE: 100 BPM

## 2024-12-18 DIAGNOSIS — G47.33 OBSTRUCTIVE SLEEP APNEA (ADULT) (PEDIATRIC): Primary | ICD-10-CM

## 2024-12-18 DIAGNOSIS — E87.6 HYPOKALEMIA: Primary | ICD-10-CM

## 2024-12-18 DIAGNOSIS — Z87.891 FORMER SMOKER: Chronic | ICD-10-CM

## 2024-12-18 DIAGNOSIS — J44.9 STAGE 3 SEVERE COPD BY GOLD CLASSIFICATION: Chronic | ICD-10-CM

## 2024-12-18 DIAGNOSIS — R91.8 LUNG MASS: Primary | ICD-10-CM

## 2024-12-18 DIAGNOSIS — Z99.89 CPAP (CONTINUOUS POSITIVE AIRWAY PRESSURE) DEPENDENCE: ICD-10-CM

## 2024-12-18 DIAGNOSIS — Z99.89 CPAP (CONTINUOUS POSITIVE AIRWAY PRESSURE) DEPENDENCE: Chronic | ICD-10-CM

## 2024-12-18 PROBLEM — Z99.81 ON SUPPLEMENTAL OXYGEN THERAPY: Chronic | Status: ACTIVE | Noted: 2023-04-19

## 2024-12-18 LAB
ALBUMIN SERPL-MCNC: 3.8 G/DL (ref 3.5–5.2)
ALBUMIN/GLOB SERPL: 1.7 G/DL
ALP SERPL-CCNC: 114 U/L (ref 39–117)
ALT SERPL-CCNC: 20 U/L (ref 1–33)
AST SERPL-CCNC: 27 U/L (ref 1–32)
BILIRUB SERPL-MCNC: 0.3 MG/DL (ref 0–1.2)
BUN SERPL-MCNC: 11 MG/DL (ref 8–23)
BUN/CREAT SERPL: 17.7 (ref 7–25)
CALCIUM SERPL-MCNC: 8.9 MG/DL (ref 8.6–10.5)
CHLORIDE SERPL-SCNC: 99 MMOL/L (ref 98–107)
CHOLEST SERPL-MCNC: 191 MG/DL (ref 0–200)
CO2 SERPL-SCNC: 37.6 MMOL/L (ref 22–29)
CREAT SERPL-MCNC: 0.62 MG/DL (ref 0.57–1)
EGFRCR SERPLBLD CKD-EPI 2021: 97.7 ML/MIN/1.73
GLOBULIN SER CALC-MCNC: 2.3 GM/DL
GLUCOSE SERPL-MCNC: 77 MG/DL (ref 65–99)
HDLC SERPL-MCNC: 68 MG/DL (ref 40–60)
LDLC SERPL CALC-MCNC: 98 MG/DL (ref 0–100)
POTASSIUM SERPL-SCNC: 3.2 MMOL/L (ref 3.5–5.2)
PROT SERPL-MCNC: 6.1 G/DL (ref 6–8.5)
RAD ONC ARIA COURSE ID: NORMAL
RAD ONC ARIA COURSE INTENT: NORMAL
RAD ONC ARIA COURSE LAST TREATMENT DATE: NORMAL
RAD ONC ARIA COURSE START DATE: NORMAL
RAD ONC ARIA COURSE TREATMENT ELAPSED DAYS: 5
RAD ONC ARIA FIRST TREATMENT DATE: NORMAL
RAD ONC ARIA PLAN FRACTIONS TREATED TO DATE: 4
RAD ONC ARIA PLAN ID: NORMAL
RAD ONC ARIA PLAN PRESCRIBED DOSE PER FRACTION: 2.5 GY
RAD ONC ARIA PLAN PRIMARY REFERENCE POINT: NORMAL
RAD ONC ARIA PLAN TOTAL FRACTIONS PRESCRIBED: 5
RAD ONC ARIA PLAN TOTAL PRESCRIBED DOSE: 1250 CGY
RAD ONC ARIA REFERENCE POINT DOSAGE GIVEN TO DATE: 10 GY
RAD ONC ARIA REFERENCE POINT ID: NORMAL
RAD ONC ARIA REFERENCE POINT SESSION DOSAGE GIVEN: 2.5 GY
SODIUM SERPL-SCNC: 144 MMOL/L (ref 136–145)
TRIGL SERPL-MCNC: 144 MG/DL (ref 0–150)
VLDLC SERPL CALC-MCNC: 25 MG/DL (ref 5–40)

## 2024-12-18 PROCEDURE — 77387 GUIDANCE FOR RADJ TX DLVR: CPT | Performed by: RADIOLOGY

## 2024-12-18 PROCEDURE — 77412 RADIATION TX DELIVERY LVL 3: CPT | Performed by: RADIOLOGY

## 2024-12-18 PROCEDURE — 77014 CHG CT GUIDANCE RADIATION THERAPY FLDS PLACEMENT: CPT | Performed by: RADIOLOGY

## 2024-12-18 RX ORDER — POTASSIUM CHLORIDE 750 MG/1
10 TABLET, EXTENDED RELEASE ORAL 2 TIMES DAILY
Qty: 6 TABLET | Refills: 0 | Status: SHIPPED | OUTPATIENT
Start: 2024-12-18

## 2024-12-18 RX ORDER — TOBRAMYCIN 3 MG/ML
2 SOLUTION/ DROPS OPHTHALMIC
Qty: 5 ML | Refills: 0 | Status: SHIPPED | OUTPATIENT
Start: 2024-12-18

## 2024-12-18 RX ORDER — FLUTICASONE FUROATE, UMECLIDINIUM BROMIDE AND VILANTEROL TRIFENATATE 100; 62.5; 25 UG/1; UG/1; UG/1
1 POWDER RESPIRATORY (INHALATION)
Qty: 1 EACH | Refills: 0 | COMMUNITY
Start: 2024-12-18 | End: 2025-01-01

## 2024-12-18 NOTE — TELEPHONE ENCOUNTER
Caller: SELAM SANCHEZ    Relationship: Emergency Contact    Best call back number:  [phone]     What medication are you requesting:  SOMETHING FOR EYE DROPS?  HER EYES ARE RED WITH DISCHARGE    What are your current symptoms:  SEE ABOVE    How long have you been experiencing symptoms:  3 DAYS    Have you had these symptoms before:    [] Yes  [x] No    Have you been treated for these symptoms before:   [] Yes  [x] No    If a prescription is needed, what is your preferred pharmacy and phone number:      Additional notes:  FORGOT TO ASK YESTERDAY

## 2024-12-18 NOTE — PROGRESS NOTES
Chief Complaint  Lung mass    Subjective    History of Present Illness {CC  Problem List  Visit Diagnosis   Encounters  Notes  Medications  Labs  Result Review Imaging  Media: 23}    Ashley Gibbs presents to [facility] for:    History of Present Illness  Management of lung mass, COPD and chronic respiratory failure.    She quit smoking in 2008.  1 pack/day for 40 years.  She does vape.  She has cut back.      Low-dose CT obtained showing a mass in the right hilum.  Follow-up CT with contrast confirming these findings.  PET scan showing increased uptake.  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  We made arrangements for lung biopsy.  Biopsy positive for metastatic small cell lung cancer.      She denies any childhood history of lung disease.  She has a history of asthma and COPD.  No occupational risk factors.  She has a positive family history of lung cancer in an aunt and an uncle.  She has no pets.  No allergy or sinus issues.  No reflux or swallowing issues.     She is on no blood thinning medications.     PFTs in 2019 showing very severe obstructive disease, improved to severe obstructive disease postbronchodilator.  She was recently on Breztri but not benefiting.  I gave her Trelegy 100 to try.  She believes that was much more helpful.  She would like a prescription..  She has breathing treatment she can use when needed.       She has a history of sleep apnea.  She no longer has a device.  She asked for a new order.  I did prescribe her a new auto titrating CPAP.  Not delivered it yet.  She would also like to see if she qualifies for portable oxygen.            Current Outpatient Medications:     acetaminophen (Tylenol 8 Hour) 650 MG 8 hr tablet, Take 1 tablet by mouth Every 8 (Eight) Hours As Needed for Moderate Pain or Headache., Disp: 100 tablet, Rfl: 2    Calcium Carbonate-Vit D-Min (Caltrate 600+D Plus Minerals) 600-800  MG-UNIT tablet, Take 1 tablet by mouth Daily., Disp: 90 tablet, Rfl: 3    cholecalciferol (VITAMIN D3) 25 MCG (1000 UT) tablet, Take 1 tablet by mouth Daily., Disp: , Rfl:     citalopram (CeleXA) 40 MG tablet, Take 1 tablet by mouth Daily., Disp: 90 tablet, Rfl: 1    diazePAM (Valium) 5 MG tablet, Take 1 tablet by mouth Daily As Needed for Anxiety., Disp: 30 tablet, Rfl: 0    gabapentin (NEURONTIN) 600 MG tablet, Take 1 tablet by mouth Every 12 (Twelve) Hours., Disp: , Rfl:     HYDROcodone-acetaminophen (NORCO)  MG per tablet, Take 1 tablet by mouth 3 (Three) Times a Day., Disp: , Rfl:     ipratropium-albuterol (DUO-NEB) 0.5-2.5 mg/3 ml nebulizer, Take 3 mL by nebulization Every 4 (Four) Hours As Needed for Wheezing or Shortness of Air., Disp: 90 mL, Rfl: 0    levothyroxine (SYNTHROID, LEVOTHROID) 112 MCG tablet, Take 1 tablet by mouth Daily., Disp: 90 tablet, Rfl: 1    mirtazapine (REMERON) 15 MG tablet, Take 1 tablet by mouth every night at bedtime., Disp: 90 tablet, Rfl: 1    ondansetron (Zofran) 4 MG tablet, Take 1 tablet by mouth Every 8 (Eight) Hours As Needed for Nausea or Vomiting., Disp: 30 tablet, Rfl: 0    potassium chloride 10 MEQ CR tablet, Take 1 tablet by mouth 2 (Two) Times a Day., Disp: 6 tablet, Rfl: 0    rizatriptan (MAXALT) 10 MG tablet, Take 1 tablet by mouth 1 (One) Time As Needed for Migraine., Disp: , Rfl:     rOPINIRole (REQUIP) 0.25 MG tablet, Take 1 tablet by mouth every night at bedtime., Disp: 90 tablet, Rfl: 1    tiZANidine (ZANAFLEX) 2 MG tablet, Take 1 tablet by mouth 2 (Two) Times a Day As Needed for Muscle Spasms., Disp: , Rfl:     tobramycin 0.3 % solution ophthalmic solution, Administer 2 drops to both eyes Every 4 (Four) Hours While Awake., Disp: 5 mL, Rfl: 0    Ventolin  (90 Base) MCG/ACT inhaler, Inhale 2 puffs Every 4 (Four) Hours As Needed for Wheezing or Shortness of Air., Disp: 18 g, Rfl: 2    vitamin D (ERGOCALCIFEROL) 1.25 MG (92850 UT) capsule capsule, Take  "1 capsule by mouth 1 (One) Time Per Week., Disp: 12 capsule, Rfl: 3    Fluticasone-Umeclidin-Vilant (Trelegy Ellipta) 100-62.5-25 MCG/ACT inhaler, Inhale 1 puff Daily for 30 days., Disp: 1 each, Rfl: 11    Social History     Socioeconomic History    Marital status:    Tobacco Use    Smoking status: Former     Current packs/day: 0.00     Average packs/day: 1 pack/day for 40.0 years (40.0 ttl pk-yrs)     Types: Cigarettes     Start date: 1968     Quit date: 2008     Years since quittin.6     Passive exposure: Past    Smokeless tobacco: Never   Vaping Use    Vaping status: Every Day    Substances: Nicotine    Devices: Pre-filled pod    Passive vaping exposure: Yes   Substance and Sexual Activity    Alcohol use: Never    Drug use: Never    Sexual activity: Not Currently     Partners: Male       Objective   Vital Signs:   /80   Pulse 90   Ht 162 cm (63.78\")   Wt 67.5 kg (148 lb 12.8 oz)   SpO2 93% Comment: r/a  BMI 25.72 kg/m²     Physical Exam  Cardiovascular:      Rate and Rhythm: Normal rate and regular rhythm.      Heart sounds: No murmur heard.  Pulmonary:      Effort: Pulmonary effort is normal.      Breath sounds: Normal breath sounds.   Musculoskeletal:      Right lower leg: No edema.      Left lower leg: No edema.   Neurological:      Mental Status: She is alert and oriented to person, place, and time.        Result Review :      My interpretation of the PFT : none    No results found for this or any previous visit.    Rest/Exercise Pulse Ox Values          2025    11:00   Rest/Exercise Pulse Ox Results   Rest room air SAT % 88   Exercise room air SAT % 91   Exercise on O2 @ Liters 2   Exercise on O2 SAT % 96     My interpretation of imaging:  none    Fine Needle Aspiration (2024 14:00)     My interpretation of labs: Station 4R, station 7 and bronchial wash is consistent with small cell lung cancer      Assessment and Plan {CC Problem List  Visit Diagnosis  ROS  " Review (Popup)  Health Maintenance  Quality  BestPractice  Medications  SmartSets  SnapShot Encounters  Media : 23}    Diagnoses and all orders for this visit:    1. Small cell carcinoma of upper lobe of right lung (Primary)  Comments:  Continue radiation.  Awaiting appointment with oncology    2. Stage 3 severe COPD by GOLD classification  -     Fluticasone-Umeclidin-Vilant (Trelegy Ellipta) 100-62.5-25 MCG/ACT inhaler; Inhale 1 puff Daily for 30 days.  Dispense: 1 each; Refill: 11    3. Obstructive sleep apnea    4. Former smoker  Comments:  Continue to avoid smoking.  Continue to work on abstinence from vaping    5. Shortness of breath  -     Walking Oximetry; Future  -     Walking Oximetry  -     Oxygen Therapy        Body mass index is 25.72 kg/m².    Ani Singer, APRN  1/2/2025  11:47 CST    Follow Up   Return if symptoms worsen or fail to improve.    Patient was given instructions and counseling regarding her condition or for health maintenance advice. Please see specific information pulled into the AVS if appropriate.

## 2024-12-18 NOTE — TELEPHONE ENCOUNTER
Pt called and said her eyes are red and discharge. She was wondering if some eye drops could be called in. Please advise.

## 2024-12-19 ENCOUNTER — HOSPITAL ENCOUNTER (OUTPATIENT)
Dept: RADIATION ONCOLOGY | Facility: HOSPITAL | Age: 69
Discharge: HOME | End: 2024-12-19

## 2024-12-19 ENCOUNTER — HOSPITAL ENCOUNTER (OUTPATIENT)
Dept: CT IMAGING | Facility: HOSPITAL | Age: 69
Discharge: HOME | End: 2024-12-19
Payer: MEDICARE

## 2024-12-19 ENCOUNTER — TELEPHONE (OUTPATIENT)
Dept: FAMILY MEDICINE CLINIC | Facility: CLINIC | Age: 69
End: 2024-12-19
Payer: MEDICARE

## 2024-12-19 ENCOUNTER — PRE-ADMISSION TESTING (OUTPATIENT)
Dept: PREADMISSION TESTING | Facility: HOSPITAL | Age: 69
End: 2024-12-19
Payer: MEDICARE

## 2024-12-19 VITALS
WEIGHT: 159.61 LBS | SYSTOLIC BLOOD PRESSURE: 141 MMHG | OXYGEN SATURATION: 92 % | HEART RATE: 96 BPM | BODY MASS INDEX: 27.25 KG/M2 | DIASTOLIC BLOOD PRESSURE: 75 MMHG | RESPIRATION RATE: 22 BRPM | HEIGHT: 64 IN

## 2024-12-19 DIAGNOSIS — R91.8 LUNG MASS: ICD-10-CM

## 2024-12-19 LAB
DEPRECATED RDW RBC AUTO: 42.2 FL (ref 37–54)
ERYTHROCYTE [DISTWIDTH] IN BLOOD BY AUTOMATED COUNT: 13 % (ref 12.3–15.4)
HCT VFR BLD AUTO: 35.2 % (ref 34–46.6)
HGB BLD-MCNC: 11.5 G/DL (ref 12–15.9)
MCH RBC QN AUTO: 29.9 PG (ref 26.6–33)
MCHC RBC AUTO-ENTMCNC: 32.7 G/DL (ref 31.5–35.7)
MCV RBC AUTO: 91.4 FL (ref 79–97)
PLATELET # BLD AUTO: 199 10*3/MM3 (ref 140–450)
PMV BLD AUTO: 10.1 FL (ref 6–12)
RAD ONC ARIA COURSE END DATE: NORMAL
RAD ONC ARIA COURSE ID: NORMAL
RAD ONC ARIA COURSE ID: NORMAL
RAD ONC ARIA COURSE INTENT: NORMAL
RAD ONC ARIA COURSE INTENT: NORMAL
RAD ONC ARIA COURSE LAST TREATMENT DATE: NORMAL
RAD ONC ARIA COURSE LAST TREATMENT DATE: NORMAL
RAD ONC ARIA COURSE START DATE: NORMAL
RAD ONC ARIA COURSE START DATE: NORMAL
RAD ONC ARIA COURSE TREATMENT ELAPSED DAYS: 6
RAD ONC ARIA COURSE TREATMENT ELAPSED DAYS: 6
RAD ONC ARIA FIRST TREATMENT DATE: NORMAL
RAD ONC ARIA FIRST TREATMENT DATE: NORMAL
RAD ONC ARIA PLAN FRACTIONS TREATED TO DATE: 5
RAD ONC ARIA PLAN FRACTIONS TREATED TO DATE: 5
RAD ONC ARIA PLAN ID: NORMAL
RAD ONC ARIA PLAN ID: NORMAL
RAD ONC ARIA PLAN NAME: NORMAL
RAD ONC ARIA PLAN PRESCRIBED DOSE PER FRACTION: 2.5 GY
RAD ONC ARIA PLAN PRESCRIBED DOSE PER FRACTION: 2.5 GY
RAD ONC ARIA PLAN PRIMARY REFERENCE POINT: NORMAL
RAD ONC ARIA PLAN PRIMARY REFERENCE POINT: NORMAL
RAD ONC ARIA PLAN TOTAL FRACTIONS PRESCRIBED: 5
RAD ONC ARIA PLAN TOTAL FRACTIONS PRESCRIBED: 5
RAD ONC ARIA PLAN TOTAL PRESCRIBED DOSE: 1250 CGY
RAD ONC ARIA PLAN TOTAL PRESCRIBED DOSE: 1250 CGY
RAD ONC ARIA REFERENCE POINT DOSAGE GIVEN TO DATE: 12.5 GY
RAD ONC ARIA REFERENCE POINT DOSAGE GIVEN TO DATE: 12.5 GY
RAD ONC ARIA REFERENCE POINT ID: NORMAL
RAD ONC ARIA REFERENCE POINT ID: NORMAL
RAD ONC ARIA REFERENCE POINT SESSION DOSAGE GIVEN: 2.5 GY
RBC # BLD AUTO: 3.85 10*6/MM3 (ref 3.77–5.28)
WBC NRBC COR # BLD AUTO: 4.51 10*3/MM3 (ref 3.4–10.8)

## 2024-12-19 PROCEDURE — 71250 CT THORAX DX C-: CPT

## 2024-12-19 PROCEDURE — 77387 GUIDANCE FOR RADJ TX DLVR: CPT | Performed by: RADIOLOGY

## 2024-12-19 PROCEDURE — 85027 COMPLETE CBC AUTOMATED: CPT

## 2024-12-19 PROCEDURE — 36415 COLL VENOUS BLD VENIPUNCTURE: CPT

## 2024-12-19 PROCEDURE — 77336 RADIATION PHYSICS CONSULT: CPT | Performed by: RADIOLOGY

## 2024-12-19 PROCEDURE — 77412 RADIATION TX DELIVERY LVL 3: CPT | Performed by: RADIOLOGY

## 2024-12-19 PROCEDURE — 77014 CHG CT GUIDANCE RADIATION THERAPY FLDS PLACEMENT: CPT | Performed by: RADIOLOGY

## 2024-12-19 NOTE — TELEPHONE ENCOUNTER
Caller: SELAM SANCHEZ    Relationship: Emergency Contact    Best call back number: [phone]     What is the best time to reach you: ANYTIME    Who are you requesting to speak with (clinical staff, provider,  specific staff member): PROVIDER    What was the call regarding: HOPE STATED THEY ARE AT THE HOSPITAL FOR PRE-OP FOR PATIENT'S BIOPSY. SHE STATED THE PATIENT' DOCTOR ADVISED THEM TO CONTACT HER PCP TO ORDER A PORTABLE OXYGEN TANK BECAUSE HER OXYGEN LEVELS WERE LOW.    PLEASE CALL WHEN ORDER IS COMPLETE.

## 2024-12-19 NOTE — DISCHARGE INSTRUCTIONS

## 2024-12-20 NOTE — TELEPHONE ENCOUNTER
Called Antonia ([facility]) to notify of providers response. Reports will contact pulmonary office first to see if they are managing her O2, if not advised to call back to schedule apt. Verbalized understanding.

## 2024-12-20 NOTE — TELEPHONE ENCOUNTER
She would have to have visit with ambulating o2 and desaturation. Her o2 sats have always been ok in the office with us so I am not sure if she would qualify. I would assume that pulmonology would order and manage o2 also?

## 2024-12-23 ENCOUNTER — APPOINTMENT (OUTPATIENT)
Dept: GENERAL RADIOLOGY | Facility: HOSPITAL | Age: 69
End: 2024-12-23
Payer: MEDICARE

## 2024-12-23 ENCOUNTER — ANESTHESIA (OUTPATIENT)
Dept: PERIOP | Facility: HOSPITAL | Age: 69
End: 2024-12-23
Payer: MEDICARE

## 2024-12-23 ENCOUNTER — HOSPITAL ENCOUNTER (OUTPATIENT)
Facility: HOSPITAL | Age: 69
Setting detail: HOSPITAL OUTPATIENT SURGERY
Discharge: HOME | End: 2024-12-23
Attending: INTERNAL MEDICINE | Admitting: INTERNAL MEDICINE
Payer: MEDICARE

## 2024-12-23 ENCOUNTER — ANESTHESIA EVENT (OUTPATIENT)
Dept: PERIOP | Facility: HOSPITAL | Age: 69
End: 2024-12-23
Payer: MEDICARE

## 2024-12-23 VITALS
HEART RATE: 90 BPM | OXYGEN SATURATION: 94 % | SYSTOLIC BLOOD PRESSURE: 136 MMHG | RESPIRATION RATE: 20 BRPM | DIASTOLIC BLOOD PRESSURE: 80 MMHG | TEMPERATURE: 98.2 F

## 2024-12-23 DIAGNOSIS — R91.8 LUNG MASS: ICD-10-CM

## 2024-12-23 LAB — KOH PREP NAIL: ABNORMAL

## 2024-12-23 PROCEDURE — 87220 TISSUE EXAM FOR FUNGI: CPT | Performed by: INTERNAL MEDICINE

## 2024-12-23 PROCEDURE — 87070 CULTURE OTHR SPECIMN AEROBIC: CPT | Performed by: INTERNAL MEDICINE

## 2024-12-23 PROCEDURE — 87206 SMEAR FLUORESCENT/ACID STAI: CPT | Performed by: INTERNAL MEDICINE

## 2024-12-23 PROCEDURE — 88341 IMHCHEM/IMCYTCHM EA ADD ANTB: CPT | Performed by: INTERNAL MEDICINE

## 2024-12-23 PROCEDURE — 88342 IMHCHEM/IMCYTCHM 1ST ANTB: CPT | Performed by: INTERNAL MEDICINE

## 2024-12-23 PROCEDURE — 88112 CYTOPATH CELL ENHANCE TECH: CPT | Performed by: INTERNAL MEDICINE

## 2024-12-23 PROCEDURE — 88172 CYTP DX EVAL FNA 1ST EA SITE: CPT | Performed by: INTERNAL MEDICINE

## 2024-12-23 PROCEDURE — 87102 FUNGUS ISOLATION CULTURE: CPT | Performed by: INTERNAL MEDICINE

## 2024-12-23 PROCEDURE — 25810000003 LACTATED RINGERS PER 1000 ML: Performed by: INTERNAL MEDICINE

## 2024-12-23 PROCEDURE — 25010000002 PROPOFOL 10 MG/ML EMULSION: Performed by: NURSE ANESTHETIST, CERTIFIED REGISTERED

## 2024-12-23 PROCEDURE — 87205 SMEAR GRAM STAIN: CPT | Performed by: INTERNAL MEDICINE

## 2024-12-23 PROCEDURE — 87116 MYCOBACTERIA CULTURE: CPT | Performed by: INTERNAL MEDICINE

## 2024-12-23 PROCEDURE — 25010000002 LIDOCAINE PF 2% 2 % SOLUTION: Performed by: NURSE ANESTHETIST, CERTIFIED REGISTERED

## 2024-12-23 PROCEDURE — C1726 CATH, BAL DIL, NON-VASCULAR: HCPCS | Performed by: INTERNAL MEDICINE

## 2024-12-23 PROCEDURE — 25010000002 SUGAMMADEX 200 MG/2ML SOLUTION: Performed by: NURSE ANESTHETIST, CERTIFIED REGISTERED

## 2024-12-23 PROCEDURE — 31652 BRONCH EBUS SAMPLNG 1/2 NODE: CPT | Performed by: INTERNAL MEDICINE

## 2024-12-23 PROCEDURE — 25010000002 FENTANYL CITRATE (PF) 100 MCG/2ML SOLUTION: Performed by: NURSE ANESTHETIST, CERTIFIED REGISTERED

## 2024-12-23 PROCEDURE — 88305 TISSUE EXAM BY PATHOLOGIST: CPT | Performed by: INTERNAL MEDICINE

## 2024-12-23 RX ORDER — NALOXONE HCL 0.4 MG/ML
0.4 VIAL (ML) INJECTION AS NEEDED
Status: CANCELLED | OUTPATIENT
Start: 2024-12-23

## 2024-12-23 RX ORDER — FENTANYL CITRATE 50 UG/ML
50 INJECTION, SOLUTION INTRAMUSCULAR; INTRAVENOUS
Status: DISCONTINUED | OUTPATIENT
Start: 2024-12-23 | End: 2024-12-23 | Stop reason: HOSPADM

## 2024-12-23 RX ORDER — ONDANSETRON 2 MG/ML
4 INJECTION INTRAMUSCULAR; INTRAVENOUS ONCE AS NEEDED
Status: CANCELLED | OUTPATIENT
Start: 2024-12-23

## 2024-12-23 RX ORDER — PROPOFOL 10 MG/ML
VIAL (ML) INTRAVENOUS AS NEEDED
Status: DISCONTINUED | OUTPATIENT
Start: 2024-12-23 | End: 2024-12-23 | Stop reason: SURG

## 2024-12-23 RX ORDER — FENTANYL CITRATE 50 UG/ML
50 INJECTION, SOLUTION INTRAMUSCULAR; INTRAVENOUS
Status: CANCELLED | OUTPATIENT
Start: 2024-12-23

## 2024-12-23 RX ORDER — FENTANYL CITRATE 50 UG/ML
INJECTION, SOLUTION INTRAMUSCULAR; INTRAVENOUS AS NEEDED
Status: DISCONTINUED | OUTPATIENT
Start: 2024-12-23 | End: 2024-12-23 | Stop reason: SURG

## 2024-12-23 RX ORDER — LABETALOL HYDROCHLORIDE 5 MG/ML
5 INJECTION, SOLUTION INTRAVENOUS
Status: DISCONTINUED | OUTPATIENT
Start: 2024-12-23 | End: 2024-12-23 | Stop reason: HOSPADM

## 2024-12-23 RX ORDER — LIDOCAINE HYDROCHLORIDE 10 MG/ML
0.5 INJECTION, SOLUTION EPIDURAL; INFILTRATION; INTRACAUDAL; PERINEURAL ONCE AS NEEDED
Status: DISCONTINUED | OUTPATIENT
Start: 2024-12-23 | End: 2024-12-23 | Stop reason: HOSPADM

## 2024-12-23 RX ORDER — NALOXONE HCL 0.4 MG/ML
0.04 VIAL (ML) INJECTION AS NEEDED
Status: DISCONTINUED | OUTPATIENT
Start: 2024-12-23 | End: 2024-12-23 | Stop reason: HOSPADM

## 2024-12-23 RX ORDER — ROCURONIUM BROMIDE 10 MG/ML
INJECTION, SOLUTION INTRAVENOUS AS NEEDED
Status: DISCONTINUED | OUTPATIENT
Start: 2024-12-23 | End: 2024-12-23 | Stop reason: SURG

## 2024-12-23 RX ORDER — HYDROMORPHONE HYDROCHLORIDE 1 MG/ML
0.5 INJECTION, SOLUTION INTRAMUSCULAR; INTRAVENOUS; SUBCUTANEOUS
Status: DISCONTINUED | OUTPATIENT
Start: 2024-12-23 | End: 2024-12-23 | Stop reason: HOSPADM

## 2024-12-23 RX ORDER — SODIUM CHLORIDE 0.9 % (FLUSH) 0.9 %
3 SYRINGE (ML) INJECTION AS NEEDED
Status: DISCONTINUED | OUTPATIENT
Start: 2024-12-23 | End: 2024-12-23 | Stop reason: HOSPADM

## 2024-12-23 RX ORDER — LIDOCAINE HYDROCHLORIDE 20 MG/ML
INJECTION, SOLUTION EPIDURAL; INFILTRATION; INTRACAUDAL; PERINEURAL AS NEEDED
Status: DISCONTINUED | OUTPATIENT
Start: 2024-12-23 | End: 2024-12-23 | Stop reason: SURG

## 2024-12-23 RX ORDER — LABETALOL HYDROCHLORIDE 5 MG/ML
5 INJECTION, SOLUTION INTRAVENOUS
Status: CANCELLED | OUTPATIENT
Start: 2024-12-23

## 2024-12-23 RX ORDER — OXYCODONE AND ACETAMINOPHEN 10; 325 MG/1; MG/1
1 TABLET ORAL EVERY 4 HOURS PRN
Status: DISCONTINUED | OUTPATIENT
Start: 2024-12-23 | End: 2024-12-23

## 2024-12-23 RX ORDER — ONDANSETRON 2 MG/ML
4 INJECTION INTRAMUSCULAR; INTRAVENOUS
Status: DISCONTINUED | OUTPATIENT
Start: 2024-12-23 | End: 2024-12-23 | Stop reason: HOSPADM

## 2024-12-23 RX ORDER — SODIUM CHLORIDE, SODIUM LACTATE, POTASSIUM CHLORIDE, CALCIUM CHLORIDE 600; 310; 30; 20 MG/100ML; MG/100ML; MG/100ML; MG/100ML
1000 INJECTION, SOLUTION INTRAVENOUS CONTINUOUS
Status: DISCONTINUED | OUTPATIENT
Start: 2024-12-23 | End: 2024-12-23 | Stop reason: HOSPADM

## 2024-12-23 RX ORDER — IBUPROFEN 600 MG/1
600 TABLET, FILM COATED ORAL EVERY 6 HOURS PRN
Status: CANCELLED | OUTPATIENT
Start: 2024-12-23

## 2024-12-23 RX ORDER — FLUMAZENIL 0.1 MG/ML
0.2 INJECTION INTRAVENOUS AS NEEDED
Status: DISCONTINUED | OUTPATIENT
Start: 2024-12-23 | End: 2024-12-23 | Stop reason: HOSPADM

## 2024-12-23 RX ORDER — IBUPROFEN 600 MG/1
600 TABLET, FILM COATED ORAL EVERY 6 HOURS PRN
Status: DISCONTINUED | OUTPATIENT
Start: 2024-12-23 | End: 2024-12-23 | Stop reason: HOSPADM

## 2024-12-23 RX ORDER — HYDROCODONE BITARTRATE AND ACETAMINOPHEN 10; 325 MG/1; MG/1
1 TABLET ORAL ONCE
Status: COMPLETED | OUTPATIENT
Start: 2024-12-23 | End: 2024-12-23

## 2024-12-23 RX ORDER — FLUMAZENIL 0.1 MG/ML
0.2 INJECTION INTRAVENOUS AS NEEDED
Status: CANCELLED | OUTPATIENT
Start: 2024-12-23

## 2024-12-23 RX ADMIN — LIDOCAINE HYDROCHLORIDE 100 MG: 20 INJECTION, SOLUTION EPIDURAL; INFILTRATION; INTRACAUDAL; PERINEURAL at 13:49

## 2024-12-23 RX ADMIN — SUGAMMADEX 200 MG: 100 INJECTION, SOLUTION INTRAVENOUS at 14:19

## 2024-12-23 RX ADMIN — ROCURONIUM BROMIDE 60 MG: 10 INJECTION, SOLUTION INTRAVENOUS at 13:49

## 2024-12-23 RX ADMIN — SODIUM CHLORIDE, POTASSIUM CHLORIDE, SODIUM LACTATE AND CALCIUM CHLORIDE 1000 ML: 600; 310; 30; 20 INJECTION, SOLUTION INTRAVENOUS at 13:05

## 2024-12-23 RX ADMIN — HYDROCODONE BITARTRATE AND ACETAMINOPHEN 1 TABLET: 10; 325 TABLET ORAL at 13:08

## 2024-12-23 RX ADMIN — PROPOFOL 120 MG: 10 INJECTION, EMULSION INTRAVENOUS at 13:49

## 2024-12-23 RX ADMIN — FENTANYL CITRATE 100 MCG: 50 INJECTION, SOLUTION INTRAMUSCULAR; INTRAVENOUS at 13:49

## 2024-12-23 NOTE — OP NOTE
Bronchoscopy Procedure Note (Endobronchial Ultrasound Bronchoscopy)    Date of Operation: 12/23/24  Pre-op Diagnosis: Lung mass  Post-op Diagnosis: Same  Surgeon: Peterson Morales MD  Anesthesia: General    Operation: Flexible fiberoptic bronchoscopy Bronchoscopy, Diagnostic, Transbronchial needle aspirate, and bronch wash was performed.  Consideration was made for Ion robotic bronchoscopy in her case but on further study of the imaging I felt that there was a strong enough chance of getting a diagnosis with EBUS to defer the Ion bronchoscopic part.  Lesion was quite central and I was concerned we might not even get good airway positioning to allow safe and adequate robotic sampling, and it appeared that this mass lesion involving the lung and hilum and mediastinum was all 1 and the same and could be approached with EBUS as the primary diagnostic procedure.  Findings: narrowed rul bronchus and segments, markedly enlarged mediastinal nodes, essentially replaced by invasive mass.  Tanna + on tbna station 4r  Specimen:   Specimens       ID Source Type Tests Collected By Collected At Frozen?    A Bronchus Wash NON-GYNECOLOGIC CYTOLOGY  FUNGAL CULTURE  KOH PREP  AFB CULTURE  RESPIRATORY CULTURE   Peterson Mroales MD 12/23/24 1359     Description: bronch wash    B Mediastinum Lymph Node FINE NEEDLE ASPIRATION   Peterson Morales MD 12/23/24 1400     Description: FNA station 7    C Mediastinum Lymph Node FINE NEEDLE ASPIRATION   Peterson Morales MD 12/23/24 1405     Description: FNA station 4R          Estimated Blood Loss: 2 ml  Complications: none    Indications and History:  The patient is a 69 y.o.  female with Lung mass.  The risks, benefits, complications, treatment options and expected outcomes were discussed with the patient.  The possibilities of reaction to medication, pulmonary aspiration, perforation of a viscus, bleeding, failure to diagnose a condition and creating a complication requiring  transfusion or operation were discussed with the patient who freely signed the consent.  Time out was taken prior to the procedure.    Description of Procedure:    After the induction of general anesthesia, the patient was positioned supine and the Olympus therapeutic bronchoscope was passed through the endotracheal tube.  The scope was then passed into the trachea.     The trachea, mainstem bronchi, RUL, RML, Bronchus Intermedius, RLL, GILA, GILA upper division and lingula, and LLL, and all primary segmental airways were examined and were normal except as described below:    Endobronchial findings:  Trachea: Normal mucosa  Thelma: Normal mucosa  Right main bronchus: Normal mucosa  Right upper lobe bronchus: narrowed, and segmental airways narrowed  Right middle lobe bronchus: Normal mucosa  Right lower lobe bronchus: Normal mucosa  Left main bronchus: Normal mucosa  Left upper lobe bronchus: Normal mucosa  Left lower lobe bronchus: Normal mucosa    The conventional bronchoscope was removed and the EBUS scope was inserted and the mediastinum was examined via linear ultrasound.  Findings: Markedly abnormal mediastinum with mass replacement of mediastinal nodes at station 7 and 4R.  TBNA under ultrasound guidance was collected from station 7 showing lymphocytes on Tanna, station 4R showing atypical cells consistent with malignancy at 4R,.  Iced saline was instilled and aspirated in small aliquots between needle passes for hemostasis with expected but not excessive bleeding associated with needle samples..  After satisfactory confirmation of no persistent abnormal bleeding, the bronchoscope was removed.    The Patient was taken to the Recovery Room in satisfactory condition.      Peterson Morales MD at 14:19 CST, 12/23/2024

## 2024-12-23 NOTE — ANESTHESIA PREPROCEDURE EVALUATION
Anesthesia Evaluation     Patient summary reviewed   NPO Solid Status: > 8 hours  NPO Liquid Status: > 4 hours           Airway   Mallampati: I  TM distance: >3 FB  Neck ROM: full  No difficulty expected  Dental - normal exam     Pulmonary - normal exam   (+) COPD moderate, asthma,sleep apnea  Cardiovascular - normal exam  Exercise tolerance: poor (<4 METS)    (+) hyperlipidemia      Neuro/Psych  (+) headaches, psychiatric history Anxiety and Depression  GI/Hepatic/Renal/Endo    (+) renal disease- ARF, thyroid problem hypothyroidism    Musculoskeletal     (+) back pain, myalgias  Abdominal  - normal exam   Substance History - negative use     OB/GYN          Other   arthritis,                 Anesthesia Plan    ASA 3     general     intravenous induction     Anesthetic plan, risks, benefits, and alternatives have been provided, discussed and informed consent has been obtained with: patient.    CODE STATUS:

## 2024-12-23 NOTE — ANESTHESIA PROCEDURE NOTES
Airway  Urgency: elective    Date/Time: 12/23/2024 1:50 PM  Airway not difficult    General Information and Staff    Patient location during procedure: OR  CRNA/CAA: Desi Paez CRNA    Indications and Patient Condition  Indications for airway management: airway protection    Preoxygenated: yes  Mask difficulty assessment: 0 - not attempted    Final Airway Details  Final airway type: endotracheal airway      Successful airway: ETT  Cuffed: yes   Successful intubation technique: direct laryngoscopy  Facilitating devices/methods: intubating stylet  Endotracheal tube insertion site: oral  Blade: Des  Blade size: 3  ETT size (mm): 8.0  Cormack-Lehane Classification: grade I - full view of glottis  Placement verified by: chest auscultation and capnometry   Cuff volume (mL): 8  Measured from: lips  ETT/EBT  to lips (cm): 22  Number of attempts at approach: 1  Assessment: lips, teeth, and gum same as pre-op    Additional Comments  atraumatic

## 2024-12-23 NOTE — ANESTHESIA POSTPROCEDURE EVALUATION
Patient: Ashley Gibbs    Procedure Summary       Date: 12/23/24 Room / Location: [facility] OR  /  [facility] OR    Anesthesia Start: 1346 Anesthesia Stop: 1429    Procedure: BRONCHOSCOPY WITH ION ROBOT (Right: Bronchus) Diagnosis:       Lung mass      (Lung mass [R91.8])    Surgeons: Peterson Morales MD Provider: Mohsen Amaral CRNA    Anesthesia Type: general ASA Status: 3            Anesthesia Type: general    Vitals  Vitals Value Taken Time   /97 12/23/24 1428   Temp 97.4 °F (36.3 °C) 12/23/24 1426   Pulse 108 12/23/24 1429   Resp 16 12/23/24 1426   SpO2 97 % 12/23/24 1429   Vitals shown include unfiled device data.        Post Anesthesia Care and Evaluation    Patient location during evaluation: PACU  Patient participation: complete - patient participated  Level of consciousness: awake and alert  Pain management: adequate    Airway patency: patent  Anesthetic complications: No anesthetic complications    Cardiovascular status: acceptable  Respiratory status: acceptable  Hydration status: acceptable    Comments: Blood pressure 159/97, pulse 108, temperature 97.4 °F (36.3 °C), temperature source Temporal, resp. rate 16, SpO2 97%, not currently breastfeeding.    Pt discharged from PACU based on iker score >8

## 2024-12-25 LAB
BACTERIA SPEC RESP CULT: NORMAL
GRAM STN SPEC: NORMAL

## 2024-12-27 LAB
BEAKER LAB AP INTRAOPERATIVE CONSULTATION: NORMAL
CYTO UR: NORMAL
LAB AP CASE REPORT: NORMAL
LAB AP CLINICAL INFORMATION: NORMAL
LAB AP SPECIAL STAINS: NORMAL
Lab: NORMAL
PATH REPORT.FINAL DX SPEC: NORMAL
PATH REPORT.GROSS SPEC: NORMAL

## 2025-01-01 DIAGNOSIS — C34.90 SMALL CELL CARCINOMA OF LUNG, UNSPECIFIED LATERALITY, UNSPECIFIED PART OF LUNG: Primary | ICD-10-CM

## 2025-01-01 DIAGNOSIS — R91.8 LUNG MASS: ICD-10-CM

## 2025-01-01 DIAGNOSIS — C34.11 SMALL CELL CARCINOMA OF UPPER LOBE OF RIGHT LUNG: ICD-10-CM

## 2025-01-02 ENCOUNTER — OFFICE VISIT (OUTPATIENT)
Dept: PULMONOLOGY | Facility: CLINIC | Age: 70
End: 2025-01-02
Payer: MEDICARE

## 2025-01-02 VITALS
WEIGHT: 148.8 LBS | HEIGHT: 64 IN | DIASTOLIC BLOOD PRESSURE: 80 MMHG | BODY MASS INDEX: 25.4 KG/M2 | HEART RATE: 90 BPM | SYSTOLIC BLOOD PRESSURE: 130 MMHG | OXYGEN SATURATION: 93 %

## 2025-01-02 DIAGNOSIS — C34.11 SMALL CELL CARCINOMA OF UPPER LOBE OF RIGHT LUNG: Primary | ICD-10-CM

## 2025-01-02 DIAGNOSIS — R06.02 SHORTNESS OF BREATH: ICD-10-CM

## 2025-01-02 DIAGNOSIS — G47.33 OBSTRUCTIVE SLEEP APNEA: Chronic | ICD-10-CM

## 2025-01-02 DIAGNOSIS — Z87.891 FORMER SMOKER: Chronic | ICD-10-CM

## 2025-01-02 DIAGNOSIS — J44.9 STAGE 3 SEVERE COPD BY GOLD CLASSIFICATION: Chronic | ICD-10-CM

## 2025-01-02 RX ORDER — FLUTICASONE FUROATE, UMECLIDINIUM BROMIDE AND VILANTEROL TRIFENATATE 100; 62.5; 25 UG/1; UG/1; UG/1
1 POWDER RESPIRATORY (INHALATION)
Qty: 1 EACH | Refills: 11 | Status: SHIPPED | OUTPATIENT
Start: 2025-01-02 | End: 2025-02-01

## 2025-01-02 NOTE — PROCEDURES
Walking Oximetry    Performed by: Anita Milan CMA  Authorized by: Ani Singer APRN    Rest room air SAT %:  88  Exercise room air SAT %:  91  Exercise on O2 @ Liters:  2  SAT %:  96

## 2025-01-08 ENCOUNTER — TELEPHONE (OUTPATIENT)
Dept: PULMONOLOGY | Facility: CLINIC | Age: 70
End: 2025-01-08
Payer: MEDICARE

## 2025-01-08 NOTE — TELEPHONE ENCOUNTER
Spoke to patient's granddaughter.  Order faxed to [facility] for the portable oxygen concentrator.  They are not wanting to switch to [facility].  [facility] can set patient up on portable oxygen concentrator.  [facility] also faxed over another order they need sign.  Awaiting Ani to sign and then I will fax that as well.

## 2025-01-08 NOTE — TELEPHONE ENCOUNTER
Caller: SELAM SANCHEZ    Relationship: Emergency Contact    Best call back number: [phone]    What is the best time to reach you: ANYTIME    What was the call regarding: PT GRANDDAUGHTER CALLED STATING [facility] SENT OVER A RX DESCRIPTION FOR PT OXYGEN,  PT NEEDING OXYGEN AS OF TODAY. PLEASE ADVISE.

## 2025-01-08 NOTE — TELEPHONE ENCOUNTER
Caller: SELAM SANCHEZ    Relationship to patient: Emergency Contact    Best call back number:     [phone] (Home)       Patient is needing: CALLING TO CHECK STATUS

## 2025-01-08 NOTE — TELEPHONE ENCOUNTER
Spoke to [facility].  [facility], patient currently has it thru [facility].  [facility] is calling Hope, granddaughter to discuss this with her.  I did forward the oxygen order to [facility].

## 2025-01-08 NOTE — TELEPHONE ENCOUNTER
Hub staff attempted to follow warm transfer process and was unsuccessful     Caller: SELAM SANCHEZ    Relationship to patient: Emergency Contact    Best call back number: [phone]    Patient is needing: pt daughter called back in to get update on this request please advise.

## 2025-01-09 ENCOUNTER — TELEPHONE (OUTPATIENT)
Dept: HEMATOLOGY | Age: 70
End: 2025-01-09

## 2025-01-09 DIAGNOSIS — F41.9 ANXIETY: ICD-10-CM

## 2025-01-09 DIAGNOSIS — F41.0 PANIC ATTACKS: ICD-10-CM

## 2025-01-09 RX ORDER — DIAZEPAM 5 MG/1
5 TABLET ORAL DAILY PRN
Qty: 30 TABLET | Refills: 0 | Status: SHIPPED | OUTPATIENT
Start: 2025-01-09

## 2025-01-09 NOTE — TELEPHONE ENCOUNTER
Rx Refill Note  Requested Prescriptions     Pending Prescriptions Disp Refills    diazePAM (Valium) 5 MG tablet 30 tablet 0     Sig: Take 1 tablet by mouth Daily As Needed for Anxiety.      Last office visit with prescribing clinician: 12/17/2024   Last telemedicine visit with prescribing clinician: Visit date not found   Next office visit with prescribing clinician: 1/16/2025                         Would you like a call back once the refill request has been completed: [] Yes [] No    If the office needs to give you a call back, can they leave a voicemail: [] Yes [] No    Deb Allen MA  01/09/25, 09:12 CST

## 2025-01-09 NOTE — TELEPHONE ENCOUNTER
I called patient and reminded patient of their appt on 01/13/2025 and patient confirmed they would be here. I also let patient know that we have moved into our new cancer facility and asked patient if they were aware of where we were now located, and patient voiced understanding of our new location. Patient knows not to arrive early and that we will get labs at the time of the follow up appointment and not the lab appointment time. I also made patient aware to eat and drink plenty of water to hydrate properly before coming to these appointments because this will make their lab draw much easier. Also let the patient know that due to possible inclement weather, we may need to reschedule or start late, if this occurs we will contact them the night before or the day of from a blocked or unknown number so please be sure to answer your phone or check your voicemail.

## 2025-01-09 NOTE — PROGRESS NOTES
[facility], [facility], Counseling, [facility] (Hematology Oncology Patients Only)  -     ondansetron (ZOFRAN) 4 MG tablet; Take 1 tablet by mouth every 6 hours as needed for Nausea or Vomiting  -     promethazine (PHENERGAN) 25 MG tablet; Take 0.5 tablets by mouth every 6 hours as needed for Nausea  -     Amb External Referral To Radiation Oncology  -     oxyCODONE-acetaminophen (PERCOCET)  MG per tablet; Take 1 tablet by mouth every 6 hours as needed for Pain for up to 30 days. Intended supply: 30 days Max Daily Amount: 4 tablets    Oxygen dependent    Care plan discussed with patient    Cancer associated pain  -     oxyCODONE-acetaminophen (PERCOCET)  MG per tablet; Take 1 tablet by mouth every 6 hours as needed for Pain for up to 30 days. Intended supply: 30 days Max Daily Amount: 4 tablets    SVC syndrome    Anxiety associated with cancer diagnosis (HCC)    Coordination of complex care       Assessment & Plan  1. Small cell lung cancer, limited stage, clinical T4N1M1.  She was diagnosed with a right upper lobe mass with invasion of mediastinal and SVC syndrome. She received palliative emergent radiation by Dr. Alli Blanchard at [facility], completing 1250 cGy. A bronchoscopy and FNA biopsy of four lymph nodes were performed by Dr. Donnie Degroot at [facility], with pathology consistent with small cell carcinoma. A PET scan showed a bulk mass in the right hilum measuring 8 cm, with no other evidence of metastatic disease outside the chest, indicating limited stage disease. Concurrent chemoradiation was recommended and discussed with Dr. Alli Perez at [facility], who agreed with this plan. She will be referred back to Dr. Alli Perez for the delivery of carboplatin and etoposide without G-CSF support. A consent for treatment was signed today. The logistics, common side effects, and management of chemotherapy were discussed at length, and she acknowledged understanding. All her questions were answered

## 2025-01-09 NOTE — TELEPHONE ENCOUNTER
Caller: SELAM SANCHEZ    Relationship: Emergency Contact    Best call back number:  [phone]     Requested Prescriptions:   Requested Prescriptions     Pending Prescriptions Disp Refills    diazePAM (Valium) 5 MG tablet 30 tablet 0     Sig: Take 1 tablet by mouth Daily As Needed for Anxiety.        Pharmacy where request should be sent: [facility] - [place], [place] - [address] - [phone]  - [phone] FX     Last office visit with prescribing clinician: 12/17/2024   Last telemedicine visit with prescribing clinician: Visit date not found   Next office visit with prescribing clinician: 1/16/2025     Additional details provided by patient:     Does the patient have less than a 3 day supply:  [x] Yes  [] No    Would you like a call back once the refill request has been completed: [] Yes [x] No    If the office needs to give you a call back, can they leave a voicemail: [] Yes [x] No    Kera Sommer Rep   01/09/25 08:46 CST

## 2025-01-13 ENCOUNTER — OFFICE VISIT (OUTPATIENT)
Dept: HEMATOLOGY | Age: 70
End: 2025-01-13

## 2025-01-13 ENCOUNTER — HOSPITAL ENCOUNTER (OUTPATIENT)
Dept: INFUSION THERAPY | Age: 70
Discharge: HOME | End: 2025-01-13

## 2025-01-13 VITALS
DIASTOLIC BLOOD PRESSURE: 78 MMHG | HEART RATE: 77 BPM | OXYGEN SATURATION: 96 % | BODY MASS INDEX: 25.57 KG/M2 | HEIGHT: 64 IN | WEIGHT: 149.8 LBS | TEMPERATURE: 97.9 F | SYSTOLIC BLOOD PRESSURE: 112 MMHG

## 2025-01-13 DIAGNOSIS — G89.3 CANCER ASSOCIATED PAIN: ICD-10-CM

## 2025-01-13 DIAGNOSIS — Z71.89 COORDINATION OF COMPLEX CARE: ICD-10-CM

## 2025-01-13 DIAGNOSIS — C80.1 ANXIETY ASSOCIATED WITH CANCER DIAGNOSIS (HCC): ICD-10-CM

## 2025-01-13 DIAGNOSIS — C34.01 SMALL CELL CARCINOMA OF HILUM OF RIGHT LUNG (HCC): Primary | ICD-10-CM

## 2025-01-13 DIAGNOSIS — Z99.81 OXYGEN DEPENDENT: ICD-10-CM

## 2025-01-13 DIAGNOSIS — I87.1 SVC SYNDROME: ICD-10-CM

## 2025-01-13 DIAGNOSIS — Z71.89 CARE PLAN DISCUSSED WITH PATIENT: ICD-10-CM

## 2025-01-13 DIAGNOSIS — F41.1 ANXIETY ASSOCIATED WITH CANCER DIAGNOSIS (HCC): ICD-10-CM

## 2025-01-13 PROBLEM — C7A.8 NEUROENDOCRINE CARCINOMA OF LUNG (HCC): Status: ACTIVE | Noted: 2025-01-13

## 2025-01-13 RX ORDER — OXYCODONE AND ACETAMINOPHEN 10; 325 MG/1; MG/1
1 TABLET ORAL EVERY 6 HOURS PRN
Qty: 120 TABLET | Refills: 0 | Status: SHIPPED | OUTPATIENT
Start: 2025-01-13 | End: 2025-02-12

## 2025-01-13 RX ORDER — DIAZEPAM 5 MG/1
5 TABLET ORAL DAILY PRN
COMMUNITY
Start: 2025-01-09

## 2025-01-13 RX ORDER — PROMETHAZINE HYDROCHLORIDE 25 MG/1
12.5 TABLET ORAL EVERY 6 HOURS PRN
Qty: 30 TABLET | Refills: 5 | Status: SHIPPED | OUTPATIENT
Start: 2025-01-13

## 2025-01-13 RX ORDER — ONDANSETRON 4 MG/1
4 TABLET, FILM COATED ORAL EVERY 6 HOURS PRN
Qty: 30 TABLET | Refills: 5 | Status: SHIPPED | OUTPATIENT
Start: 2025-01-13

## 2025-01-13 RX ORDER — GABAPENTIN 600 MG/1
600 TABLET ORAL 2 TIMES DAILY
COMMUNITY
Start: 2024-12-28

## 2025-01-16 ENCOUNTER — OFFICE VISIT (OUTPATIENT)
Dept: FAMILY MEDICINE CLINIC | Facility: CLINIC | Age: 70
End: 2025-01-16
Payer: MEDICARE

## 2025-01-16 VITALS
SYSTOLIC BLOOD PRESSURE: 109 MMHG | WEIGHT: 152 LBS | OXYGEN SATURATION: 95 % | BODY MASS INDEX: 25.95 KG/M2 | DIASTOLIC BLOOD PRESSURE: 72 MMHG | TEMPERATURE: 97.5 F | RESPIRATION RATE: 20 BRPM | HEART RATE: 77 BPM | HEIGHT: 64 IN

## 2025-01-16 DIAGNOSIS — J42 CHRONIC BRONCHITIS, UNSPECIFIED CHRONIC BRONCHITIS TYPE: ICD-10-CM

## 2025-01-16 DIAGNOSIS — G47.33 OSA (OBSTRUCTIVE SLEEP APNEA): ICD-10-CM

## 2025-01-16 DIAGNOSIS — G89.4 CHRONIC PAIN DISORDER: ICD-10-CM

## 2025-01-16 DIAGNOSIS — F41.9 ANXIETY: ICD-10-CM

## 2025-01-16 DIAGNOSIS — C34.91 SMALL CELL CARCINOMA OF RIGHT LUNG, UNSPECIFIED PART OF LUNG: Primary | ICD-10-CM

## 2025-01-16 DIAGNOSIS — F41.0 PANIC ATTACKS: ICD-10-CM

## 2025-01-16 DIAGNOSIS — Z99.81 ON SUPPLEMENTAL OXYGEN THERAPY: Chronic | ICD-10-CM

## 2025-01-16 PROCEDURE — 1126F AMNT PAIN NOTED NONE PRSNT: CPT | Performed by: NURSE PRACTITIONER

## 2025-01-16 PROCEDURE — 1159F MED LIST DOCD IN RCRD: CPT | Performed by: NURSE PRACTITIONER

## 2025-01-16 PROCEDURE — 99214 OFFICE O/P EST MOD 30 MIN: CPT | Performed by: NURSE PRACTITIONER

## 2025-01-16 PROCEDURE — 1160F RVW MEDS BY RX/DR IN RCRD: CPT | Performed by: NURSE PRACTITIONER

## 2025-01-16 RX ORDER — DIAZEPAM 5 MG/1
5 TABLET ORAL EVERY 12 HOURS PRN
Qty: 60 TABLET | Refills: 2 | Status: ON HOLD | OUTPATIENT
Start: 2025-01-16

## 2025-01-16 NOTE — PROGRESS NOTES
"Chief Complaint  COPD    Subjective        Ashley Gibbs presents to [facility]  History of Present Illness  Here for 1 month follow up visit  She has been undergoing radiation with [facility] for palliative measures for her small cell lung cancer  She has seen oncology and has an appt to start chemo upcoming in a few weeks   She is having increased anxiety and is needing to take the valium twice daily typically  She has been started on continuous supplemental oxygen- reports it is helping with dyspnea  Reports oncology has taken over pain medications and pain mgmt while going through treatment. She was under treatment of pain mgmt, which will be on hold while under oncology care. They ask that I assume her gabapentin treatment. She has been on this for several years.   She is holding her modafinil until further notice due to increase risk of blood pressure.       Objective   Vital Signs:  /72 (BP Location: Left arm, Patient Position: Sitting, Cuff Size: Adult)   Pulse 77   Temp 97.5 °F (36.4 °C) (Infrared)   Resp 20   Ht 162 cm (63.78\")   Wt 68.9 kg (152 lb)   SpO2 95%   BMI 26.27 kg/m²   Estimated body mass index is 26.27 kg/m² as calculated from the following:    Height as of this encounter: 162 cm (63.78\").    Weight as of this encounter: 68.9 kg (152 lb).             Physical Exam  Vitals and nursing note reviewed.   Constitutional:       Appearance: She is well-developed.   HENT:      Head: Normocephalic and atraumatic.   Eyes:      Conjunctiva/sclera: Conjunctivae normal.   Cardiovascular:      Rate and Rhythm: Normal rate and regular rhythm.      Pulses: Normal pulses.      Heart sounds: Normal heart sounds.   Pulmonary:      Effort: Pulmonary effort is normal.      Breath sounds: Decreased breath sounds present.   Musculoskeletal:      Cervical back: Normal range of motion.   Skin:     General: Skin is warm and dry.   Neurological:      General: No focal deficit " present.      Mental Status: She is alert and oriented to person, place, and time.   Psychiatric:         Mood and Affect: Mood normal.         Behavior: Behavior normal.        Physical Exam      Result Review :          Results             Assessment and Plan     Diagnoses and all orders for this visit:    1. Small cell carcinoma of right lung, unspecified part of lung (Primary)    2. Anxiety  -     diazePAM (Valium) 5 MG tablet; Take 1 tablet by mouth Every 12 (Twelve) Hours As Needed for Anxiety.  Dispense: 60 tablet; Refill: 2    3. Panic attacks  -     diazePAM (Valium) 5 MG tablet; Take 1 tablet by mouth Every 12 (Twelve) Hours As Needed for Anxiety.  Dispense: 60 tablet; Refill: 2    4. Chronic bronchitis, unspecified chronic bronchitis type    5. FAUSTO (obstructive sleep apnea)    6. On supplemental oxygen therapy    7. Chronic pain disorder      Plan:  [place], UDS, and controlled substance contract in emr. Advised patient of risks associated with controlled substances including physical and mental dependence, abuse, and mental impairment. Advised patient to use least amount of possible and never overuse or share medications with other people. Patient states understanding of risks and instructions on proper use. Risks increased with use of multiple narcotics  Increase valium frequency to twice daily as needed  Will send gabapentin when due, same dose     Continue follow up with oncology, pulmonology, rad onc  Continue o2  Monitor for s/s svc syndrome, medical emergency call 911 or go to er asap if occurs   Assessment & Plan         Follow Up     Return in about 1 month (around 2/16/2025) for Recheck.  Patient was given instructions and counseling regarding her condition or for health maintenance advice. Please see specific information pulled into the AVS if appropriate.       Patient or patient representative verbalized consent for the use of Ambient Listening during the visit with  CARRIE Medina  for chart documentation. 1/30/2025  22:22 CST

## 2025-01-17 ENCOUNTER — HOSPITAL ENCOUNTER (OUTPATIENT)
Dept: MRI IMAGING | Facility: HOSPITAL | Age: 70
Discharge: HOME | End: 2025-01-17
Payer: MEDICARE

## 2025-01-17 DIAGNOSIS — R91.8 LUNG MASS: ICD-10-CM

## 2025-01-17 DIAGNOSIS — Z87.891 FORMER SMOKER: ICD-10-CM

## 2025-01-17 PROCEDURE — 25510000001 GADOPICLENOL 0.5 MMOL/ML SOLUTION: Performed by: RADIOLOGY

## 2025-01-17 PROCEDURE — A9579 GAD-BASE MR CONTRAST NOS,1ML: HCPCS | Performed by: RADIOLOGY

## 2025-01-17 PROCEDURE — 70553 MRI BRAIN STEM W/O & W/DYE: CPT

## 2025-01-17 RX ADMIN — GADOPICLENOL 7 ML: 485.1 INJECTION INTRAVENOUS at 16:11

## 2025-01-21 ENCOUNTER — TELEPHONE (OUTPATIENT)
Dept: RADIATION ONCOLOGY | Facility: HOSPITAL | Age: 70
End: 2025-01-21
Payer: MEDICARE

## 2025-01-21 NOTE — TELEPHONE ENCOUNTER
ISAMAR received call from Ms. Gibbs who had questions regarding gas assistance. ISAMAR explained gas assistance program available. Once she starts treatment a referral can be sent o [place] [facility]. She also asked this writer about assistance with depends. ISAMAR did provide her with contact information for [employer] and this writer will look to see if any are available in the resource room. ISAMAR will follow up with Ms. Gibbs when she has her appointment this Friday.

## 2025-01-22 PROBLEM — Z92.3 HISTORY OF RADIATION THERAPY: Status: ACTIVE | Noted: 2025-01-22

## 2025-01-22 NOTE — PROGRESS NOTES
[facility]  Radiation Oncology Clinic   Rico Argueta MD, FACR  Ruperto Murphy CARRIE  _______________________________________________  [facility]  Department of Radiation Oncology  [address] [place] [id number]  Office: [phone]  Fax: [phone]    DATE: 01/24/2025  PATIENT: Ashley Gibbs  1955                         MEDICAL RECORD #: [id number]    REASON FOR VISIT:    Chief Complaint   Patient presents with    Lung Cancer     1. Lung mass    2. History of radiation therapy    3. Former smoker                                               REASON FOR VISIT:    Chief Complaint   Patient presents with    Lung Cancer     Ashley Gibbs is a 69 y.o. female that has been referred to our clinic to be evaluated for consideration of radiotherapy to the lung.    On presentation today she reports fatigue, SOB, chest pain, and nasuea. Denies appetite change, unexpected weight change, nasuea/vomiting, diarrhea, light-headedness, weakness and headaches. She follows  and .            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 She completed 1250 cGy in 5 fractions to the chest to relieve the superior vena cava compression on 12/19/2024.    11/08/2024 - MRI Brain without contrast:  No acute intracranial abnormality. Please note that noncontrast technique limits sensitivity for evaluation of intracranial metastatic disease.  Mild scattered foci of white matter signal abnormality, most commonly due to chronic small vessel ischemic changes.  Partially empty sella turcica with mildly prominent Meckel's caves. This can be seen as a normal finding, however also can be seen in the clinical setting of idiopathic intracranial hypertension.    11/08/2024 - CT Chest low dose:  2 cm right upper lobe bilobed nodule with associated  mediastinal and right hilar lymphadenopathy, suspicious for malignancy. Minimal adjacent right upper lobe interlobular septal thickening may be related to venous or lymphatic congestion, however lymphangitic carcinomatosis is an additional consideration. Recommend further evaluation with PET/CT and/or tissue sampling. CT chest with contrast may also be of value to help distinguish the lymph nodes from adjacent vasculature.  Minimal tree-in-bud nodularity in the peripheral the right upper lobe, likely mild infectious process.  Additional small pulmonary nodules in the right upper and right middle lobe.  Lung-RADS 4X:     12/13/2024 - CT Chest with contrast:  2.2 cm bilobed right upper lobe pulmonary nodule, likely representing a primary lung neoplasm. 8.9 cm right hilar lung mass with diffuse mediastinal infiltration, likely representing mary metastasis. This mass encases the right mainstem bronchus and encases the right main pulmonary artery, with occlusion of the right upper lobe pulmonary artery. This mass compresses the SVC, which is slitlike. Patient is at risk for SVC syndrome.    12/13/2024 - PET Scan:  Large irregular hypermetabolic mass in the right upper lobe suprahilar region with probable invasion of the middle mediastinum and right hilum and/or conglomerate hypermetabolic lymphadenopathy. This is worrisome for small cell carcinoma.  Right upper lobe solid nodule is also hypermetabolic and consistent with neoplasm likely pulmonary metastasis.    12/13/2024 - Documentation per :  I spoke with Dr. Ortega this afternoon regarding this patient.  She had a CT scan and PET scan performed today which reveals a large right hilar mass consistent with malignancy.    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    She does not have a tissue diagnosis at this point, however with the radiographic appearance and risk of potential SVC syndrome I have suggested  consideration of urgent radiotherapy for 3-5 fractions to hopefully prevent SVC syndrome while awaiting biopsy and tissue diagnosis.    12/13/2024 - Documentation per :  Received call from Dr. Ortega re pt with newly found lung and mediastinal lesion. He wants us to see her urgently. Look for referral and schedule her with next available slot.     12/13/2024 - Consult with :   Following this discussion and in consideration of the diagnostic data/evaluation of the patient, I recommended a course of 5 radiation treatments to relieve the superior vena cava compression while she has additional evaluation to include MRI of the brain and biopsy of the lung tumor.  Continue ongoing management per primary care physician and other specialists. Thank you for allowing me to assist in this patients care.   Plan:  You have a lung cancer with compression of your superior vena cava.  We will need biopsy of lung to determine type of cancer  MRI brain ordered with contrast, they will call you  Plan on 3-5 radiation treatments for now until we complete biopsy and staging workup.  You will likely need chemotherapy and radiation, but we will talk about treatment in future.    12/13/2024 - 12/19/2024 - Completed radiation course:  Received 1250 cGy in 5 fractions to the chest to relieve the superior vena cava compression    12/18/2024 - Appointment with Ani Singer APRN - Pulmonary disease:  Biopsy recommended.  Risk and benefits to include anesthesia complication, bleeding and pneumothorax.    She understands and wishes to proceed    12/19/2024 - CT Chest with contrast:  Stable large right hilar mass with mediastinal invasion and confluent lymphadenopathy as detailed above. Findings are suspicious for small cell carcinoma. There is increased groundglass opacities and interlobular septal thickening within the right upper lobe and right middle lobe. Differential includes postobstructive changes and/or leptomeningeal  carcinomatosis.  Stable 2.2 cm right upper lobe bilobed nodule which was previously hypermetabolic and concerning for metastases.  Trace right pleural effusion.    12/23/2024 - Bronchoscopy with biopsy per :  Station 7 lymph node, endobronchial ultrasound-guided fine-needle aspiration, smear (1) and cellblock:  High-grade neuroendocrine carcinoma consistent with metastatic small cell carcinoma.  Fragments of lymphoid tissue and cartilage present.  Station 4R lymph node, endobronchial ultrasound-guided fine-needle aspiration, smear (1) and cellblock:  High-grade neuroendocrine carcinoma consistent with metastatic small cell carcinoma.  Many background lymphocytes.  Bronchial washings, ThinPrep preparation (1) and cellblock:  A few malignant cells present, high-grade neuroendocrine carcinoma consistent with small cell carcinoma.  Several macrophages.  A few benign squamous epithelial cells and bronchial epithelial cells.  AJCC stage: pTX pNX    01/02/2025 - Appointment with Ani Singer APRN - Pulmonary disease:  Small cell carcinoma of upper lobe of right lung (Primary)  Continue radiation.  Awaiting appointment with oncology    01/13/2025 - Appointment with :  Assessment & Plan  Small cell lung cancer, limited stage, clinical [id number].  She was diagnosed with a right upper lobe mass with invasion of mediastinal and SVC syndrome. She received palliative emergent radiation by Dr. Rico Moreno at [facility], completing 1250 cGy. A bronchoscopy and FNA biopsy of four lymph nodes were performed by Dr. Peterson Morales at [facility], with pathology consistent with small cell carcinoma. A PET scan showed a bulk mass in the right hilum measuring 8 cm, with no other evidence of metastatic disease outside the chest, indicating limited stage disease. Concurrent chemoradiation was recommended and discussed with Dr. Rico Vanegas at [facility], who agreed with this plan. She will be referred back to Dr. Rico Vnaegas  for the delivery of carboplatin and etoposide without G-CSF support. A consent for treatment was signed today. The logistics, common side effects, and management of chemotherapy were discussed at length, and she acknowledged understanding. All her questions were answered appropriately.  Treatment regimen-curative intent  Carboplatin AUC 5 day 1  Etoposide 100 mg/m² days 1-3 q. 21 days  X 4 cycles    PLAN:  RTC with MD in treatment room C# 2  Recommend Carboplatin D1, Etoposide D1-3 every 21 days x4 cycles-once insurance approves  Written/verbal patient education provided  Treatment consent signed  Recommend Zofran 4mg every 6 hrs as needed-script sent  Recommend Phenergan 12.5mg every 6 hrs as needed-script sent  Recommend Percocet 10mg every 6 hrs as needed for pain, while receiving chemotherapy only-script sent  Refer to Dr Rico Vanegas/[facility]  Proceed with MRI brain 1/17/25 at [facility]  Coordination of care with Dr. Rico Vanegas on [facility] radiation oncology  Follow Up:   Return for Treatment & see  in [place] RM C# 2 Carbo Etoposide.  Carbo Etoposide x3 days-once ins approves  Refer to Dr Vanegas/[facility]    01/17/2025 - MRI Brain with and without contrast:  No evidence of intracranial metastatic disease.  Moderate chronic microvascular changes.  Constellation of findings as described above, which can be supportive of idiopathic intracranial hypertension in the appropriate clinical setting.      History obtained from  PATIENT, FAMILY, and CHART    PAST MEDICAL HISTORY  Past Medical History:   Diagnosis Date    Anxiety     Arthritis     Asthma     Back pain     COPD (chronic obstructive pulmonary disease)     Dependence on supplemental oxygen     Depression     Disease of thyroid gland     Fibromyalgia, primary     Headache     History of degenerative disc disease     Hypothyroidism     Low back pain     Restless leg     Scoliosis       PAST SURGICAL HISTORY  Past Surgical History:    Procedure Laterality Date    APPENDECTOMY      BREAST BIOPSY Left     BRONCHOSCOPY Right 2024    Procedure: BRONCHOSCOPY WITH ENDOBRONCHIAL ULTRASOUND;  Surgeon: Peterson Morales MD;  Location: [facility] OR;  Service: Pulmonary;  Laterality: Right;  pre: lung mass  post: lung mass    CHOLECYSTECTOMY      TUBAL ABDOMINAL LIGATION        FAMILY HISTORY  family history includes Arthritis in her mother; COPD in her father; Diabetes in her father; Heart disease in her father; Hypertension in her mother.    SOCIAL HISTORY  Social History     Tobacco Use    Smoking status: Former     Current packs/day: 0.00     Average packs/day: 1 pack/day for 40.0 years (40.0 ttl pk-yrs)     Types: Cigarettes     Start date: 1968     Quit date: 2008     Years since quittin.7     Passive exposure: Past    Smokeless tobacco: Never   Vaping Use    Vaping status: Every Day    Substances: Nicotine    Devices: Pre-filled pod    Passive vaping exposure: Yes   Substance Use Topics    Alcohol use: Never    Drug use: Never     ALLERGIES  Codeine     MEDICATIONS    Current Outpatient Medications:     acetaminophen (Tylenol 8 Hour) 650 MG 8 hr tablet, Take 1 tablet by mouth Every 8 (Eight) Hours As Needed for Moderate Pain or Headache., Disp: 100 tablet, Rfl: 2    Calcium Carbonate-Vit D-Min (Caltrate 600+D Plus Minerals) 600-800 MG-UNIT tablet, Take 1 tablet by mouth Daily., Disp: 90 tablet, Rfl: 3    cholecalciferol (VITAMIN D3) 25 MCG (1000 UT) tablet, Take 1 tablet by mouth Daily., Disp: , Rfl:     citalopram (CeleXA) 40 MG tablet, Take 1 tablet by mouth Daily., Disp: 90 tablet, Rfl: 1    diazePAM (Valium) 5 MG tablet, Take 1 tablet by mouth Every 12 (Twelve) Hours As Needed for Anxiety., Disp: 60 tablet, Rfl: 2    Fluticasone-Umeclidin-Vilant (Trelegy Ellipta) 100-62.5-25 MCG/ACT inhaler, Inhale 1 puff Daily for 30 days., Disp: 1 each, Rfl: 11    HYDROcodone-acetaminophen (NORCO)  MG per tablet, Take 1 tablet by  mouth 3 (Three) Times a Day., Disp: , Rfl:     ipratropium-albuterol (DUO-NEB) 0.5-2.5 mg/3 ml nebulizer, Take 3 mL by nebulization Every 4 (Four) Hours As Needed for Wheezing or Shortness of Air., Disp: 90 mL, Rfl: 0    levothyroxine (SYNTHROID, LEVOTHROID) 112 MCG tablet, Take 1 tablet by mouth Daily., Disp: 90 tablet, Rfl: 1    mirtazapine (REMERON) 15 MG tablet, Take 1 tablet by mouth every night at bedtime., Disp: 90 tablet, Rfl: 1    ondansetron (Zofran) 4 MG tablet, Take 1 tablet by mouth Every 8 (Eight) Hours As Needed for Nausea or Vomiting., Disp: 30 tablet, Rfl: 0    potassium chloride 10 MEQ CR tablet, Take 1 tablet by mouth 2 (Two) Times a Day., Disp: 6 tablet, Rfl: 0    rizatriptan (MAXALT) 10 MG tablet, Take 1 tablet by mouth 1 (One) Time As Needed for Migraine., Disp: , Rfl:     rOPINIRole (REQUIP) 0.25 MG tablet, Take 1 tablet by mouth every night at bedtime., Disp: 90 tablet, Rfl: 1    tiZANidine (ZANAFLEX) 2 MG tablet, Take 1 tablet by mouth 2 (Two) Times a Day As Needed for Muscle Spasms., Disp: , Rfl:     tobramycin 0.3 % solution ophthalmic solution, Administer 2 drops to both eyes Every 4 (Four) Hours While Awake., Disp: 5 mL, Rfl: 0    Ventolin  (90 Base) MCG/ACT inhaler, Inhale 2 puffs Every 4 (Four) Hours As Needed for Wheezing or Shortness of Air., Disp: 18 g, Rfl: 2    vitamin D (ERGOCALCIFEROL) 1.25 MG (93010 UT) capsule capsule, Take 1 capsule by mouth 1 (One) Time Per Week., Disp: 12 capsule, Rfl: 3    gabapentin (NEURONTIN) 600 MG tablet, Take 1 tablet by mouth Every 12 (Twelve) Hours., Disp: 60 tablet, Rfl: 2    The following portions of the patient's history were reviewed and updated as appropriate: allergies, current medications, past family history, past medical history, past social history, past surgical history and problem list.    REVIEW OF SYSTEMS  Review of Systems   Constitutional:  Positive for fatigue.   HENT:  Negative.     Respiratory:  Positive for shortness of  "breath (stable per patient).         2L O2     Cardiovascular:  Positive for chest pain (Occasionally).   Gastrointestinal:  Positive for nausea (has meds).   Endocrine: Negative.    Genitourinary: Negative.     Musculoskeletal: Negative.    Skin: Negative.    Neurological: Negative.    Hematological: Negative.    Psychiatric/Behavioral: Negative.       I have reviewed and confirmed the accuracy of the ROS as documented by the MA/LPN/RN Rico Vanegas III, MD    PHYSICAL EXAM  VITAL SIGNS:   Vitals:    01/24/25 0953   BP: 109/63   Pulse: 88   SpO2: 93%  Comment: 2L O2   Weight: 68 kg (149 lb 14.4 oz)   Height: 160 cm (63\")   PainSc: 0-No pain     Physical Exam  Vitals reviewed.   Constitutional:       Appearance: Normal appearance.   HENT:      Head: Normocephalic.      Nose: Nose normal.   Eyes:      Pupils: Pupils are equal, round, and reactive to light.   Cardiovascular:      Rate and Rhythm: Normal rate and regular rhythm.      Pulses: Normal pulses.      Heart sounds: Normal heart sounds.   Pulmonary:      Effort: Pulmonary effort is normal. No respiratory distress.      Breath sounds: Normal breath sounds. No wheezing.   Abdominal:      General: Bowel sounds are normal.      Palpations: There is no mass.   Musculoskeletal:         General: Normal range of motion.      Cervical back: Normal range of motion and neck supple. No tenderness.   Lymphadenopathy:      Cervical: No cervical adenopathy.   Skin:     General: Skin is warm and dry.      Capillary Refill: Capillary refill takes less than 2 seconds.   Neurological:      General: No focal deficit present.      Mental Status: She is alert and oriented to person, place, and time.      Motor: No weakness.   Psychiatric:         Mood and Affect: Mood normal.         Behavior: Behavior normal.          Performance Status: ECOG (2) Ambulatory and capable of self care, unable to carry out work activity, up and about > 50% or waking hours    Clinical Quality " Measures  - Pain Documented by Standardized Tool, FPS Ashley Gibbs reports a pain score of 0. Given her pain assessment as noted, treatment options were discussed and the following options were decided upon as a follow-up plan to address the patient's pain:  No pain, no plan given .  Pain Medications               acetaminophen (Tylenol 8 Hour) 650 MG 8 hr tablet Take 1 tablet by mouth Every 8 (Eight) Hours As Needed for Moderate Pain or Headache.    citalopram (CeleXA) 40 MG tablet Take 1 tablet by mouth Daily.    HYDROcodone-acetaminophen (NORCO)  MG per tablet Take 1 tablet by mouth 3 (Three) Times a Day.    mirtazapine (REMERON) 15 MG tablet Take 1 tablet by mouth every night at bedtime.    tiZANidine (ZANAFLEX) 2 MG tablet Take 1 tablet by mouth 2 (Two) Times a Day As Needed for Muscle Spasms.          - Body Mass Index Screening and Follow-Up Plan  BMI is >= 25 and <30. (Overweight) The following options were offered after discussion;: none (medical contraindication)    - Tobacco Use: Screening and Cessation Intervention  Social History    Tobacco Use      Smoking status: Former        Packs/day: 0.00        Years: 1 pack/day for 40.0 years (40.0 ttl pk-yrs)        Types: Cigarettes        Start date: 1968        Quit date: 2008        Years since quittin.7        Passive exposure: Past      Smokeless tobacco: Never    - Advanced Care Planning Advance Care Planning   ACP discussion was held with the patient during this visit. Patient does not have an advance directive, information provided.    - PHQ-2 Depression Screening:  Little interest or pleasure in doing things? Not at all   Feeling down, depressed, or hopeless? Not at all   PHQ-2 Total Score 0     ASSESSMENT AND PLAN  1. Lung mass    2. History of radiation therapy    3. Former smoker      No orders of the defined types were placed in this encounter.    RECOMMENDATIONS: Ashley Gibbs was diagnosed with stage IIIa (T4 N3 M0) small cell  carcinoma of the lung.    The indications and rationale of lung stereotactic/external beam radiation therapy according to the NCCN Guidelines has been discussed today. I have extensively reviewed the risks, benefits and alternatives of therapy with this diagnosis. The risks of radiation therapy includes but is not limited to radiation induced pulmonary fibrosis, progression of disease in spite of therapy with either local or systemic failure. I have seen, examined and reviewed this patient's medication list, appropriate labs and imaging studies as well as other physician notes. We discussed the goals and plans of care with the patient and family and answered all questions.     Following this discussion and in consideration of the diagnostic data/evaluation of the patient, I recommended a course of external beam radiation, likely delivered concurrently with systemic therapy under the medical oncology service, I anticipate 7829-5608 cGy over 30/33 treatments, final course pending. Continue ongoing management per primary care physician and other specialists. Thank you for allowing me to assist in this patients care.     Patient Instructions   1)  Plan on about 6 weeks of daily treatments, M-F for 30 minutes daily  2)  Start chemotherapy on Monday, 01/27/2024 with Dr. Isaac as planned  3)  We will call you when to start radiation    Time Spent: I spent 50 minutes caring for Ashley on this date of service. This time includes time spent by me in the following activities: preparing for the visit, reviewing tests, obtaining and/or reviewing a separately obtained history, performing a medically appropriate examination and/or evaluation, counseling and educating the patient/family/caregiver, ordering medications, tests, or procedures, and documenting information in the medical record.   Rico Vanegas III, MD  01/24/2025

## 2025-01-23 ENCOUNTER — HOSPITAL ENCOUNTER (OUTPATIENT)
Dept: RADIATION ONCOLOGY | Facility: HOSPITAL | Age: 70
Setting detail: RADIATION/ONCOLOGY SERIES
End: 2025-01-23
Payer: MEDICARE

## 2025-01-23 DIAGNOSIS — E03.9 HYPOTHYROIDISM, UNSPECIFIED TYPE: ICD-10-CM

## 2025-01-23 DIAGNOSIS — G25.81 RESTLESS LEGS SYNDROME: ICD-10-CM

## 2025-01-23 RX ORDER — LEVOTHYROXINE SODIUM 112 UG/1
112 TABLET ORAL DAILY
Qty: 90 TABLET | Refills: 1 | Status: SHIPPED | OUTPATIENT
Start: 2025-01-23

## 2025-01-23 RX ORDER — MIRTAZAPINE 15 MG/1
15 TABLET, FILM COATED ORAL
Qty: 90 TABLET | Refills: 1 | Status: SHIPPED | OUTPATIENT
Start: 2025-01-23

## 2025-01-24 ENCOUNTER — OFFICE VISIT (OUTPATIENT)
Age: 70
End: 2025-01-24
Payer: MEDICARE

## 2025-01-24 ENCOUNTER — DOCUMENTATION (OUTPATIENT)
Dept: RADIATION ONCOLOGY | Facility: HOSPITAL | Age: 70
End: 2025-01-24
Payer: MEDICARE

## 2025-01-24 VITALS
OXYGEN SATURATION: 93 % | HEART RATE: 88 BPM | BODY MASS INDEX: 26.56 KG/M2 | DIASTOLIC BLOOD PRESSURE: 63 MMHG | HEIGHT: 63 IN | SYSTOLIC BLOOD PRESSURE: 109 MMHG | WEIGHT: 149.9 LBS

## 2025-01-24 DIAGNOSIS — Z87.891 FORMER SMOKER: ICD-10-CM

## 2025-01-24 DIAGNOSIS — R91.8 LUNG MASS: Primary | ICD-10-CM

## 2025-01-24 DIAGNOSIS — Z92.3 HISTORY OF RADIATION THERAPY: ICD-10-CM

## 2025-01-24 LAB
RAD ONC ARIA COURSE END DATE: NORMAL
RAD ONC ARIA COURSE ID: NORMAL
RAD ONC ARIA COURSE INTENT: NORMAL
RAD ONC ARIA COURSE LAST TREATMENT DATE: NORMAL
RAD ONC ARIA COURSE START DATE: NORMAL
RAD ONC ARIA COURSE TREATMENT ELAPSED DAYS: 6
RAD ONC ARIA FIRST TREATMENT DATE: NORMAL
RAD ONC ARIA PLAN FRACTIONS TREATED TO DATE: 5
RAD ONC ARIA PLAN ID: NORMAL
RAD ONC ARIA PLAN NAME: NORMAL
RAD ONC ARIA PLAN PRESCRIBED DOSE PER FRACTION: 2.5 GY
RAD ONC ARIA PLAN PRIMARY REFERENCE POINT: NORMAL
RAD ONC ARIA PLAN TOTAL FRACTIONS PRESCRIBED: 5
RAD ONC ARIA PLAN TOTAL PRESCRIBED DOSE: 1250 CGY
RAD ONC ARIA REFERENCE POINT DOSAGE GIVEN TO DATE: 12.5 GY
RAD ONC ARIA REFERENCE POINT ID: NORMAL

## 2025-01-24 PROCEDURE — G0463 HOSPITAL OUTPT CLINIC VISIT: HCPCS | Performed by: RADIOLOGY

## 2025-01-24 PROCEDURE — 77334 RADIATION TREATMENT AID(S): CPT | Performed by: RADIOLOGY

## 2025-01-24 PROCEDURE — 77470 SPECIAL RADIATION TREATMENT: CPT | Performed by: RADIOLOGY

## 2025-01-24 NOTE — PATIENT INSTRUCTIONS
1)  Plan on about 6 weeks of daily treatments, M-F for 30 minutes daily  2)  Start chemotherapy on Monday, 01/27/2024 with Dr. Isaac as planned  3)  We will call you when to start radiation

## 2025-01-27 ENCOUNTER — OFFICE VISIT (OUTPATIENT)
Dept: HEMATOLOGY | Age: 70
End: 2025-01-27
Payer: MEDICARE

## 2025-01-27 ENCOUNTER — HOSPITAL ENCOUNTER (OUTPATIENT)
Dept: INFUSION THERAPY | Age: 70
Discharge: HOME | End: 2025-01-27
Payer: MEDICARE

## 2025-01-27 VITALS
SYSTOLIC BLOOD PRESSURE: 118 MMHG | OXYGEN SATURATION: 100 % | DIASTOLIC BLOOD PRESSURE: 58 MMHG | TEMPERATURE: 97 F | HEART RATE: 87 BPM | BODY MASS INDEX: 26.09 KG/M2 | RESPIRATION RATE: 20 BRPM | WEIGHT: 152 LBS

## 2025-01-27 DIAGNOSIS — C80.1 CANCER (HCC): Primary | ICD-10-CM

## 2025-01-27 DIAGNOSIS — G89.4 CHRONIC PAIN DISORDER: Primary | ICD-10-CM

## 2025-01-27 DIAGNOSIS — C7A.8 NEUROENDOCRINE CARCINOMA OF LUNG (HCC): Primary | ICD-10-CM

## 2025-01-27 LAB
ALBUMIN SERPL-MCNC: 3.7 G/DL (ref 3.5–5.2)
ALP SERPL-CCNC: 116 U/L (ref 35–104)
ALT SERPL-CCNC: 11 U/L (ref 5–33)
ANION GAP SERPL CALCULATED.3IONS-SCNC: 7 MMOL/L (ref 7–19)
AST SERPL-CCNC: 21 U/L (ref 5–32)
BASOPHILS # BLD: 0.02 K/UL (ref 0–0.2)
BASOPHILS NFR BLD: 0.4 % (ref 0–1)
BILIRUB SERPL-MCNC: <0.2 MG/DL (ref 0–1.2)
BUN SERPL-MCNC: 10 MG/DL (ref 8–23)
CALCIUM SERPL-MCNC: 9.3 MG/DL (ref 8.8–10.2)
CHLORIDE SERPL-SCNC: 100 MMOL/L (ref 98–107)
CO2 SERPL-SCNC: 34 MMOL/L (ref 22–29)
CREAT SERPL-MCNC: 0.7 MG/DL (ref 0.5–0.9)
EOSINOPHIL # BLD: 0.18 K/UL (ref 0–0.6)
EOSINOPHIL NFR BLD: 3.4 % (ref 0–5)
ERYTHROCYTE [DISTWIDTH] IN BLOOD BY AUTOMATED COUNT: 12.9 % (ref 11.5–14.5)
GLUCOSE SERPL-MCNC: 111 MG/DL (ref 70–99)
HCT VFR BLD AUTO: 39.4 % (ref 37–47)
HGB BLD-MCNC: 12.9 G/DL (ref 12–16)
LYMPHOCYTES # BLD: 1.01 K/UL (ref 1.1–4.5)
LYMPHOCYTES NFR BLD: 18.9 % (ref 20–40)
MCH RBC QN AUTO: 29.9 PG (ref 27–31)
MCHC RBC AUTO-ENTMCNC: 32.7 G/DL (ref 33–37)
MCV RBC AUTO: 91.2 FL (ref 81–99)
MONOCYTES # BLD: 0.47 K/UL (ref 0–0.9)
MONOCYTES NFR BLD: 8.8 % (ref 1–10)
NEUTROPHILS # BLD: 3.65 K/UL (ref 1.5–7.5)
NEUTS SEG NFR BLD: 68.1 % (ref 50–65)
PLATELET # BLD AUTO: 260 K/UL (ref 130–400)
PMV BLD AUTO: 9.5 FL (ref 9.4–12.3)
POTASSIUM SERPL-SCNC: 3.9 MMOL/L (ref 3.5–5.1)
PROT SERPL-MCNC: 6.6 G/DL (ref 6.4–8.3)
RBC # BLD AUTO: 4.32 M/UL (ref 4.2–5.4)
SODIUM SERPL-SCNC: 141 MMOL/L (ref 136–145)
WBC # BLD AUTO: 5.35 K/UL (ref 4.8–10.8)

## 2025-01-27 PROCEDURE — 80053 COMPREHEN METABOLIC PANEL: CPT

## 2025-01-27 PROCEDURE — 6360000002 HC RX W HCPCS: Performed by: INTERNAL MEDICINE

## 2025-01-27 PROCEDURE — 96375 TX/PRO/DX INJ NEW DRUG ADDON: CPT

## 2025-01-27 PROCEDURE — 96413 CHEMO IV INFUSION 1 HR: CPT

## 2025-01-27 PROCEDURE — 2580000003 HC RX 258: Performed by: INTERNAL MEDICINE

## 2025-01-27 PROCEDURE — 96417 CHEMO IV INFUS EACH ADDL SEQ: CPT

## 2025-01-27 PROCEDURE — 36415 COLL VENOUS BLD VENIPUNCTURE: CPT

## 2025-01-27 PROCEDURE — 85025 COMPLETE CBC W/AUTO DIFF WBC: CPT

## 2025-01-27 PROCEDURE — 96367 TX/PROPH/DG ADDL SEQ IV INF: CPT

## 2025-01-27 PROCEDURE — 2500000003 HC RX 250 WO HCPCS: Performed by: INTERNAL MEDICINE

## 2025-01-27 RX ORDER — ONDANSETRON 2 MG/ML
8 INJECTION INTRAMUSCULAR; INTRAVENOUS
Status: CANCELLED | OUTPATIENT
Start: 2025-01-28

## 2025-01-27 RX ORDER — SODIUM CHLORIDE 0.9 % (FLUSH) 0.9 %
5-40 SYRINGE (ML) INJECTION PRN
Status: CANCELLED | OUTPATIENT
Start: 2025-01-28

## 2025-01-27 RX ORDER — SODIUM CHLORIDE 9 MG/ML
5-250 INJECTION, SOLUTION INTRAVENOUS PRN
Status: CANCELLED | OUTPATIENT
Start: 2025-01-29

## 2025-01-27 RX ORDER — SODIUM CHLORIDE 9 MG/ML
INJECTION, SOLUTION INTRAVENOUS CONTINUOUS
Status: CANCELLED | OUTPATIENT
Start: 2025-01-29

## 2025-01-27 RX ORDER — HEPARIN 100 UNIT/ML
500 SYRINGE INTRAVENOUS PRN
Status: DISCONTINUED | OUTPATIENT
Start: 2025-01-27 | End: 2025-01-28 | Stop reason: HOSPADM

## 2025-01-27 RX ORDER — SODIUM CHLORIDE 9 MG/ML
5-250 INJECTION, SOLUTION INTRAVENOUS PRN
Status: CANCELLED | OUTPATIENT
Start: 2025-01-28

## 2025-01-27 RX ORDER — DIPHENHYDRAMINE HYDROCHLORIDE 50 MG/ML
50 INJECTION INTRAMUSCULAR; INTRAVENOUS
Status: CANCELLED | OUTPATIENT
Start: 2025-01-27

## 2025-01-27 RX ORDER — ALBUTEROL SULFATE 90 UG/1
4 INHALANT RESPIRATORY (INHALATION) PRN
Status: CANCELLED | OUTPATIENT
Start: 2025-01-29

## 2025-01-27 RX ORDER — ONDANSETRON 2 MG/ML
8 INJECTION INTRAMUSCULAR; INTRAVENOUS
Status: CANCELLED | OUTPATIENT
Start: 2025-01-29

## 2025-01-27 RX ORDER — HYDROCORTISONE SODIUM SUCCINATE 100 MG/2ML
100 INJECTION INTRAMUSCULAR; INTRAVENOUS
Status: CANCELLED | OUTPATIENT
Start: 2025-01-28

## 2025-01-27 RX ORDER — ALBUTEROL SULFATE 90 UG/1
4 INHALANT RESPIRATORY (INHALATION) PRN
Status: CANCELLED | OUTPATIENT
Start: 2025-01-28

## 2025-01-27 RX ORDER — SODIUM CHLORIDE 9 MG/ML
5-250 INJECTION, SOLUTION INTRAVENOUS PRN
Status: CANCELLED | OUTPATIENT
Start: 2025-01-27

## 2025-01-27 RX ORDER — HEPARIN SODIUM (PORCINE) LOCK FLUSH IV SOLN 100 UNIT/ML 100 UNIT/ML
500 SOLUTION INTRAVENOUS PRN
Status: CANCELLED | OUTPATIENT
Start: 2025-01-28

## 2025-01-27 RX ORDER — DIPHENHYDRAMINE HYDROCHLORIDE 50 MG/ML
50 INJECTION INTRAMUSCULAR; INTRAVENOUS
Status: CANCELLED | OUTPATIENT
Start: 2025-01-28

## 2025-01-27 RX ORDER — FAMOTIDINE 10 MG/ML
20 INJECTION, SOLUTION INTRAVENOUS
Status: CANCELLED | OUTPATIENT
Start: 2025-01-29

## 2025-01-27 RX ORDER — FAMOTIDINE 10 MG/ML
20 INJECTION, SOLUTION INTRAVENOUS
Status: CANCELLED | OUTPATIENT
Start: 2025-01-28

## 2025-01-27 RX ORDER — SODIUM CHLORIDE 9 MG/ML
INJECTION, SOLUTION INTRAVENOUS CONTINUOUS
Status: CANCELLED | OUTPATIENT
Start: 2025-01-28

## 2025-01-27 RX ORDER — HEPARIN SODIUM (PORCINE) LOCK FLUSH IV SOLN 100 UNIT/ML 100 UNIT/ML
500 SOLUTION INTRAVENOUS PRN
Status: CANCELLED | OUTPATIENT
Start: 2025-01-27

## 2025-01-27 RX ORDER — ACETAMINOPHEN 325 MG/1
650 TABLET ORAL
Status: CANCELLED | OUTPATIENT
Start: 2025-01-28

## 2025-01-27 RX ORDER — EPINEPHRINE 1 MG/ML
0.3 INJECTION, SOLUTION, CONCENTRATE INTRAVENOUS PRN
Status: CANCELLED | OUTPATIENT
Start: 2025-01-28

## 2025-01-27 RX ORDER — HEPARIN SODIUM (PORCINE) LOCK FLUSH IV SOLN 100 UNIT/ML 100 UNIT/ML
500 SOLUTION INTRAVENOUS PRN
Status: CANCELLED | OUTPATIENT
Start: 2025-01-29

## 2025-01-27 RX ORDER — ALBUTEROL SULFATE 90 UG/1
4 INHALANT RESPIRATORY (INHALATION) PRN
Status: CANCELLED | OUTPATIENT
Start: 2025-01-27

## 2025-01-27 RX ORDER — SODIUM CHLORIDE 0.9 % (FLUSH) 0.9 %
5-40 SYRINGE (ML) INJECTION PRN
Status: DISCONTINUED | OUTPATIENT
Start: 2025-01-27 | End: 2025-01-28 | Stop reason: HOSPADM

## 2025-01-27 RX ORDER — MEPERIDINE HYDROCHLORIDE 50 MG/ML
12.5 INJECTION INTRAMUSCULAR; INTRAVENOUS; SUBCUTANEOUS PRN
Status: CANCELLED | OUTPATIENT
Start: 2025-01-28

## 2025-01-27 RX ORDER — ACETAMINOPHEN 325 MG/1
650 TABLET ORAL
Status: CANCELLED | OUTPATIENT
Start: 2025-01-27

## 2025-01-27 RX ORDER — PROCHLORPERAZINE EDISYLATE 5 MG/ML
5 INJECTION INTRAMUSCULAR; INTRAVENOUS
Status: CANCELLED | OUTPATIENT
Start: 2025-01-27

## 2025-01-27 RX ORDER — GABAPENTIN 600 MG/1
600 TABLET ORAL EVERY 12 HOURS SCHEDULED
Qty: 60 TABLET | Refills: 2 | Status: ON HOLD | OUTPATIENT
Start: 2025-01-27

## 2025-01-27 RX ORDER — EPINEPHRINE 1 MG/ML
0.3 INJECTION, SOLUTION, CONCENTRATE INTRAVENOUS PRN
Status: CANCELLED | OUTPATIENT
Start: 2025-01-29

## 2025-01-27 RX ORDER — DIPHENHYDRAMINE HYDROCHLORIDE 50 MG/ML
50 INJECTION INTRAMUSCULAR; INTRAVENOUS
Status: CANCELLED | OUTPATIENT
Start: 2025-01-29

## 2025-01-27 RX ORDER — EPINEPHRINE 1 MG/ML
0.3 INJECTION, SOLUTION, CONCENTRATE INTRAVENOUS PRN
Status: CANCELLED | OUTPATIENT
Start: 2025-01-27

## 2025-01-27 RX ORDER — FAMOTIDINE 10 MG/ML
20 INJECTION, SOLUTION INTRAVENOUS
Status: CANCELLED | OUTPATIENT
Start: 2025-01-27

## 2025-01-27 RX ORDER — HYDROCORTISONE SODIUM SUCCINATE 100 MG/2ML
100 INJECTION INTRAMUSCULAR; INTRAVENOUS
Status: CANCELLED | OUTPATIENT
Start: 2025-01-27

## 2025-01-27 RX ORDER — ONDANSETRON 2 MG/ML
8 INJECTION INTRAMUSCULAR; INTRAVENOUS
Status: CANCELLED | OUTPATIENT
Start: 2025-01-27

## 2025-01-27 RX ORDER — DEXAMETHASONE SODIUM PHOSPHATE 10 MG/ML
10 INJECTION, SOLUTION INTRAMUSCULAR; INTRAVENOUS ONCE
Status: COMPLETED | OUTPATIENT
Start: 2025-01-27 | End: 2025-01-27

## 2025-01-27 RX ORDER — PALONOSETRON 0.05 MG/ML
0.25 INJECTION, SOLUTION INTRAVENOUS ONCE
Status: COMPLETED | OUTPATIENT
Start: 2025-01-27 | End: 2025-01-27

## 2025-01-27 RX ORDER — PALONOSETRON 0.05 MG/ML
0.25 INJECTION, SOLUTION INTRAVENOUS ONCE
Status: CANCELLED | OUTPATIENT
Start: 2025-01-27 | End: 2025-01-27

## 2025-01-27 RX ORDER — MEPERIDINE HYDROCHLORIDE 50 MG/ML
12.5 INJECTION INTRAMUSCULAR; INTRAVENOUS; SUBCUTANEOUS PRN
Status: CANCELLED | OUTPATIENT
Start: 2025-01-29

## 2025-01-27 RX ORDER — HYDROCORTISONE SODIUM SUCCINATE 100 MG/2ML
100 INJECTION INTRAMUSCULAR; INTRAVENOUS
Status: CANCELLED | OUTPATIENT
Start: 2025-01-29

## 2025-01-27 RX ORDER — MEPERIDINE HYDROCHLORIDE 50 MG/ML
12.5 INJECTION INTRAMUSCULAR; INTRAVENOUS; SUBCUTANEOUS PRN
Status: CANCELLED | OUTPATIENT
Start: 2025-01-27

## 2025-01-27 RX ORDER — SODIUM CHLORIDE 9 MG/ML
INJECTION, SOLUTION INTRAVENOUS CONTINUOUS
Status: CANCELLED | OUTPATIENT
Start: 2025-01-27

## 2025-01-27 RX ORDER — SODIUM CHLORIDE 0.9 % (FLUSH) 0.9 %
5-40 SYRINGE (ML) INJECTION PRN
Status: CANCELLED | OUTPATIENT
Start: 2025-01-29

## 2025-01-27 RX ORDER — ACETAMINOPHEN 325 MG/1
650 TABLET ORAL
Status: CANCELLED | OUTPATIENT
Start: 2025-01-29

## 2025-01-27 RX ORDER — SODIUM CHLORIDE 0.9 % (FLUSH) 0.9 %
5-40 SYRINGE (ML) INJECTION PRN
Status: CANCELLED | OUTPATIENT
Start: 2025-01-27

## 2025-01-27 RX ADMIN — DEXAMETHASONE SODIUM PHOSPHATE 10 MG: 10 INJECTION INTRAMUSCULAR; INTRAVENOUS at 12:10

## 2025-01-27 RX ADMIN — CARBOPLATIN 495 MG: 10 INJECTION, SOLUTION INTRAVENOUS at 12:41

## 2025-01-27 RX ADMIN — SODIUM CHLORIDE 176 MG: 0.9 INJECTION, SOLUTION INTRAVENOUS at 13:15

## 2025-01-27 RX ADMIN — SODIUM CHLORIDE 150 MG: 9 INJECTION, SOLUTION INTRAVENOUS at 12:12

## 2025-01-27 RX ADMIN — HEPARIN 500 UNITS: 100 SYRINGE at 14:18

## 2025-01-27 RX ADMIN — SODIUM CHLORIDE, PRESERVATIVE FREE 10 ML: 5 INJECTION INTRAVENOUS at 14:18

## 2025-01-27 RX ADMIN — PALONOSETRON 0.25 MG: 0.05 INJECTION, SOLUTION INTRAVENOUS at 12:10

## 2025-01-27 NOTE — PROGRESS NOTES
[facility]  [address] [place] [address] [place] [address]  Phone: [phone]  Fax: [phone]          PARADISE Houston MAMIE Janak 69 y.o. White (non-) Judaism     Diagnosis     (Copied from Dr. Bates's visit note.)   Small cell lung cancer, right lung, Dec 2024  Stage  [id number], limited stage     Treatment Summary     (Copied from Dr. Bates's visit note.)   12/13/24 - 12/19/24 Emergent palliative radiation therapy 1250cGy in 5 fractions to right lung  Anticipate chemotherapy with Carboplatin D1, Etoposide D1-3 every 21 days x4 cycles  Anticipate radiation therapy to right lung with Dr Alli Perez/[facility]  Current Outpatient Medications   Medication Sig Dispense Refill    gabapentin (NEURONTIN) 600 MG tablet Take 1 tablet by mouth 2 times daily.      diazePAM (VALIUM) 5 MG tablet Take 1 tablet by mouth daily as needed.      ondansetron (ZOFRAN) 4 MG tablet Take 1 tablet by mouth every 6 hours as needed for Nausea or Vomiting 30 tablet 5    promethazine (PHENERGAN) 25 MG tablet Take 0.5 tablets by mouth every 6 hours as needed for Nausea 30 tablet 5    oxyCODONE-acetaminophen (PERCOCET)  MG per tablet Take 1 tablet by mouth every 6 hours as needed for Pain for up to 30 days. Intended supply: 30 days Max Daily Amount: 4 tablets 120 tablet 0    albuterol sulfate HFA (PROVENTIL;VENTOLIN;PROAIR) 108 (90 Base) MCG/ACT inhaler INHALE 2 PUFFS DAILY AS NEEDED FOR WHEEZING Patient will need appointment for further refills 18 g 0    mirtazapine (REMERON) 15 MG tablet TAKE 1 TABLET BY MOUTH AT BEDTIME 30 tablet 5    citalopram (CELEXA) 40 MG tablet TAKE 1 TABLET BY MOUTH ONCE DAILY 30 tablet 5    levothyroxine (SYNTHROID) 112 MCG tablet TAKE 1 TABLET BY MOUTH ONCE DAILY. 30 tablet 5    OXYGEN Inhale into the lungs daily as needed      ipratropium-albuterol (DUONEB) 0.5-2.5 (3) MG/3ML SOLN nebulizer solution Inhale 3 mLs into  the lungs every 4 hours as needed (Patient not taking: Reported on 1/13/2025)      rOPINIRole (REQUIP) 0.25 MG tablet Take 1 tablet by mouth      tiZANidine (ZANAFLEX) 4 MG tablet Take 1 tablet by mouth 2 times daily      diclofenac sodium (VOLTAREN) 1 % GEL Apply 2 g topically 2 times daily 150 g 1    rizatriptan (MAXALT) 10 MG tablet TAKE 1 TABLET BY MOUTH ONCE AS NEEDED FOR MIGRAINE MAY REPEAT IN 2 HOURS IF NEEDED  9 tablet 0    albuterol (PROVENTIL) (5 MG/ML) 0.5% nebulizer solution Take 1 mL by nebulization 4 times daily as needed for Wheezing 120 each 3    naproxen (NAPROSYN) 500 MG tablet Take 1 tablet by mouth 2 times daily as needed for Pain 20 tablet 0    HYDROcodone-acetaminophen (NORCO)  MG per tablet Take 1 tablet by mouth every 6 hours as needed.      OXYGEN Inhale into the lungs as needed        No current facility-administered medications for this visit.     Facility-Administered Medications Ordered in Other Visits   Medication Dose Route Frequency Provider Last Rate Last Admin    CARBOplatin (PARAPLATIN) 495 mg in sodium chloride 0.9 % 250 mL chemo IVPB  495 mg IntraVENous Once Florencia Bates  mL/hr at 01/27/25 1241 495 mg at 01/27/25 1241    etoposide (VEPESID) 176 mg in sodium chloride 0.9 % 500 mL chemo IVPB  100 mg/m2 (Treatment Plan Recorded) IntraVENous Once Florencia Bates MD        sodium chloride flush 0.9 % injection 5-40 mL  5-40 mL IntraVENous PRN Florencia Bates MD        heparin (PF) 100 UNIT/ML injection 500 Units  500 Units IntraCATHeter PRN Florencia Bates MD              No chief complaint on file.       Visit Note:       PARADISE Houston completed “New to Treatment” visit. JODI introduced self and explained role and source of support. SW provided and reviewed New to Treatment packet with the patient. Patient given the opportunity to ask questions and answered these appropriately.  Patient voices financial concerns regarding cost of gas to

## 2025-01-27 NOTE — PROGRESS NOTES
Lab Results   Component Value Date    WBC 5.35 01/27/2025    HGB 12.9 01/27/2025    HCT 39.4 01/27/2025    MCV 91.2 01/27/2025     01/27/2025     Lab Results   Component Value Date    NEUTROABS 3.65 01/27/2025     Lab Results   Component Value Date     01/27/2025    K 3.9 01/27/2025     01/27/2025    CO2 34 (H) 01/27/2025    BUN 10 01/27/2025    CREATININE 0.7 01/27/2025    GLUCOSE 111 (H) 01/27/2025    CALCIUM 9.3 01/27/2025    BILITOT <0.2 01/27/2025    ALKPHOS 116 (H) 01/27/2025    AST 21 01/27/2025    ALT 11 01/27/2025    LABGLOM >90 01/27/2025    GFRAA >59 06/05/2022    GLOB 2.5 01/17/2017

## 2025-01-28 ENCOUNTER — HOSPITAL ENCOUNTER (OUTPATIENT)
Dept: INFUSION THERAPY | Age: 70
Discharge: HOME | End: 2025-01-28
Payer: MEDICARE

## 2025-01-28 VITALS
RESPIRATION RATE: 18 BRPM | OXYGEN SATURATION: 97 % | HEART RATE: 76 BPM | SYSTOLIC BLOOD PRESSURE: 116 MMHG | DIASTOLIC BLOOD PRESSURE: 54 MMHG | TEMPERATURE: 97.3 F

## 2025-01-28 DIAGNOSIS — C7A.8 NEUROENDOCRINE CARCINOMA OF LUNG (HCC): Primary | ICD-10-CM

## 2025-01-28 PROCEDURE — 96367 TX/PROPH/DG ADDL SEQ IV INF: CPT

## 2025-01-28 PROCEDURE — 2580000003 HC RX 258: Performed by: INTERNAL MEDICINE

## 2025-01-28 PROCEDURE — 6360000002 HC RX W HCPCS: Performed by: INTERNAL MEDICINE

## 2025-01-28 PROCEDURE — 96413 CHEMO IV INFUSION 1 HR: CPT

## 2025-01-28 PROCEDURE — 2500000003 HC RX 250 WO HCPCS: Performed by: INTERNAL MEDICINE

## 2025-01-28 RX ORDER — SODIUM CHLORIDE 0.9 % (FLUSH) 0.9 %
5-40 SYRINGE (ML) INJECTION PRN
Status: DISCONTINUED | OUTPATIENT
Start: 2025-01-28 | End: 2025-01-29 | Stop reason: HOSPADM

## 2025-01-28 RX ORDER — HEPARIN 100 UNIT/ML
500 SYRINGE INTRAVENOUS PRN
Status: DISCONTINUED | OUTPATIENT
Start: 2025-01-28 | End: 2025-01-29 | Stop reason: HOSPADM

## 2025-01-28 RX ADMIN — SODIUM CHLORIDE, PRESERVATIVE FREE 10 ML: 5 INJECTION INTRAVENOUS at 15:37

## 2025-01-28 RX ADMIN — SODIUM CHLORIDE 176 MG: 0.9 INJECTION, SOLUTION INTRAVENOUS at 14:30

## 2025-01-28 RX ADMIN — HEPARIN 500 UNITS: 100 SYRINGE at 15:37

## 2025-01-28 RX ADMIN — DEXAMETHASONE SODIUM PHOSPHATE 16 MG: 10 INJECTION, SOLUTION INTRAMUSCULAR; INTRAVENOUS at 13:57

## 2025-01-29 ENCOUNTER — HOSPITAL ENCOUNTER (OUTPATIENT)
Dept: INFUSION THERAPY | Age: 70
Discharge: HOME | End: 2025-01-29
Payer: MEDICARE

## 2025-01-29 VITALS
HEART RATE: 94 BPM | OXYGEN SATURATION: 97 % | RESPIRATION RATE: 20 BRPM | TEMPERATURE: 97.9 F | DIASTOLIC BLOOD PRESSURE: 63 MMHG | SYSTOLIC BLOOD PRESSURE: 124 MMHG

## 2025-01-29 DIAGNOSIS — C7A.8 NEUROENDOCRINE CARCINOMA OF LUNG (HCC): Primary | ICD-10-CM

## 2025-01-29 PROCEDURE — 77300 RADIATION THERAPY DOSE PLAN: CPT | Performed by: RADIOLOGY

## 2025-01-29 PROCEDURE — 77301 RADIOTHERAPY DOSE PLAN IMRT: CPT | Performed by: RADIOLOGY

## 2025-01-29 PROCEDURE — 96413 CHEMO IV INFUSION 1 HR: CPT

## 2025-01-29 PROCEDURE — 77338 DESIGN MLC DEVICE FOR IMRT: CPT | Performed by: RADIOLOGY

## 2025-01-29 PROCEDURE — 6360000002 HC RX W HCPCS: Performed by: INTERNAL MEDICINE

## 2025-01-29 PROCEDURE — 96367 TX/PROPH/DG ADDL SEQ IV INF: CPT

## 2025-01-29 PROCEDURE — 2580000003 HC RX 258: Performed by: INTERNAL MEDICINE

## 2025-01-29 RX ADMIN — DEXAMETHASONE SODIUM PHOSPHATE 16 MG: 10 INJECTION, SOLUTION INTRAMUSCULAR; INTRAVENOUS at 11:17

## 2025-01-29 RX ADMIN — SODIUM CHLORIDE 176 MG: 0.9 INJECTION, SOLUTION INTRAVENOUS at 11:51

## 2025-01-30 PROBLEM — J42 CHRONIC BRONCHITIS: Status: ACTIVE | Noted: 2025-01-30

## 2025-01-30 PROBLEM — C34.91 SMALL CELL CARCINOMA OF RIGHT LUNG: Status: ACTIVE | Noted: 2025-01-30

## 2025-01-30 PROBLEM — F41.0 PANIC ATTACKS: Status: ACTIVE | Noted: 2025-01-30

## 2025-01-31 ENCOUNTER — HOSPITAL ENCOUNTER (INPATIENT)
Facility: HOSPITAL | Age: 70
LOS: 10 days | Discharge: LEFT BEFORE DISCHARGE | DRG: 208 | End: 2025-02-10
Attending: EMERGENCY MEDICINE | Admitting: INTERNAL MEDICINE
Payer: MEDICARE

## 2025-01-31 ENCOUNTER — APPOINTMENT (OUTPATIENT)
Dept: GENERAL RADIOLOGY | Facility: HOSPITAL | Age: 70
DRG: 208 | End: 2025-01-31
Payer: MEDICARE

## 2025-01-31 ENCOUNTER — TELEPHONE (OUTPATIENT)
Dept: HEMATOLOGY | Age: 70
End: 2025-01-31

## 2025-01-31 DIAGNOSIS — R13.10 DYSPHAGIA, UNSPECIFIED TYPE: ICD-10-CM

## 2025-01-31 DIAGNOSIS — R50.9 FEBRILE ILLNESS: ICD-10-CM

## 2025-01-31 DIAGNOSIS — J96.01 ACUTE RESPIRATORY FAILURE WITH HYPOXIA AND HYPERCAPNIA: Primary | ICD-10-CM

## 2025-01-31 DIAGNOSIS — J96.02 ACUTE RESPIRATORY FAILURE WITH HYPOXIA AND HYPERCAPNIA: Primary | ICD-10-CM

## 2025-01-31 DIAGNOSIS — R41.82 ALTERED MENTAL STATUS, UNSPECIFIED ALTERED MENTAL STATUS TYPE: ICD-10-CM

## 2025-01-31 DIAGNOSIS — D84.821 IMMUNOSUPPRESSED DUE TO CHEMOTHERAPY: ICD-10-CM

## 2025-01-31 DIAGNOSIS — J10.1 INFLUENZA A: ICD-10-CM

## 2025-01-31 DIAGNOSIS — Z79.899 IMMUNOSUPPRESSED DUE TO CHEMOTHERAPY: ICD-10-CM

## 2025-01-31 DIAGNOSIS — N39.0 ACUTE UTI (URINARY TRACT INFECTION): ICD-10-CM

## 2025-01-31 DIAGNOSIS — Z74.09 IMPAIRED MOBILITY: ICD-10-CM

## 2025-01-31 DIAGNOSIS — C34.90 MALIGNANT NEOPLASM OF LUNG, UNSPECIFIED LATERALITY, UNSPECIFIED PART OF LUNG: ICD-10-CM

## 2025-01-31 DIAGNOSIS — T45.1X5A IMMUNOSUPPRESSED DUE TO CHEMOTHERAPY: ICD-10-CM

## 2025-01-31 PROBLEM — J96.00 ACUTE RESPIRATORY FAILURE: Status: ACTIVE | Noted: 2025-01-31

## 2025-01-31 LAB
ALBUMIN SERPL-MCNC: 3.3 G/DL (ref 3.5–5.2)
ALBUMIN/GLOB SERPL: 1.4 G/DL
ALP SERPL-CCNC: 102 U/L (ref 39–117)
ALT SERPL W P-5'-P-CCNC: 19 U/L (ref 1–33)
ANION GAP SERPL CALCULATED.3IONS-SCNC: 11 MMOL/L (ref 5–15)
ANISOCYTOSIS BLD QL: ABNORMAL
ARTERIAL PATENCY WRIST A: ABNORMAL
AST SERPL-CCNC: 17 U/L (ref 1–32)
ATMOSPHERIC PRESS: 746 MMHG
ATMOSPHERIC PRESS: 746 MMHG
ATMOSPHERIC PRESS: 748 MMHG
ATMOSPHERIC PRESS: 748 MMHG
B PARAPERT DNA SPEC QL NAA+PROBE: NOT DETECTED
B PERT DNA SPEC QL NAA+PROBE: NOT DETECTED
BACTERIA UR QL AUTO: ABNORMAL /HPF
BASE EXCESS BLDA CALC-SCNC: -2.2 MMOL/L (ref 0–2)
BASE EXCESS BLDA CALC-SCNC: -2.5 MMOL/L (ref 0–2)
BASE EXCESS BLDA CALC-SCNC: -2.6 MMOL/L (ref 0–2)
BASE EXCESS BLDA CALC-SCNC: -3 MMOL/L (ref 0–2)
BASOPHILS # BLD MANUAL: 0 10*3/MM3 (ref 0–0.2)
BASOPHILS NFR BLD MANUAL: 0 % (ref 0–1.5)
BDY SITE: ABNORMAL
BILIRUB SERPL-MCNC: <0.2 MG/DL (ref 0–1.2)
BILIRUB UR QL STRIP: NEGATIVE
BODY TEMPERATURE: 37
BUN SERPL-MCNC: 23 MG/DL (ref 8–23)
BUN/CREAT SERPL: 13.6 (ref 7–25)
C PNEUM DNA NPH QL NAA+NON-PROBE: NOT DETECTED
CALCIUM SPEC-SCNC: 7.8 MG/DL (ref 8.6–10.5)
CHLORIDE SERPL-SCNC: 100 MMOL/L (ref 98–107)
CLARITY UR: ABNORMAL
CO2 SERPL-SCNC: 26 MMOL/L (ref 22–29)
COLOR UR: YELLOW
CREAT SERPL-MCNC: 1.69 MG/DL (ref 0.57–1)
D-LACTATE SERPL-SCNC: 0.7 MMOL/L (ref 0.5–2)
DEPRECATED RDW RBC AUTO: 47.4 FL (ref 37–54)
EGFRCR SERPLBLD CKD-EPI 2021: 33 ML/MIN/1.73
EOSINOPHIL # BLD MANUAL: 0 10*3/MM3 (ref 0–0.4)
EOSINOPHIL NFR BLD MANUAL: 0 % (ref 0.3–6.2)
EPAP: 6
ERYTHROCYTE [DISTWIDTH] IN BLOOD BY AUTOMATED COUNT: 13.9 % (ref 12.3–15.4)
FLUAV H1 2009 PAND RNA NPH QL NAA+PROBE: DETECTED
FLUBV RNA ISLT QL NAA+PROBE: NOT DETECTED
GAS FLOW AIRWAY: 6 LPM
GLOBULIN UR ELPH-MCNC: 2.4 GM/DL
GLUCOSE SERPL-MCNC: 84 MG/DL (ref 65–99)
GLUCOSE UR STRIP-MCNC: NEGATIVE MG/DL
HADV DNA SPEC NAA+PROBE: NOT DETECTED
HCO3 BLDA-SCNC: 25.5 MMOL/L (ref 20–26)
HCO3 BLDA-SCNC: 26.4 MMOL/L (ref 20–26)
HCO3 BLDA-SCNC: 26.9 MMOL/L (ref 20–26)
HCO3 BLDA-SCNC: 27.8 MMOL/L (ref 20–26)
HCOV 229E RNA SPEC QL NAA+PROBE: NOT DETECTED
HCOV HKU1 RNA SPEC QL NAA+PROBE: NOT DETECTED
HCOV NL63 RNA SPEC QL NAA+PROBE: NOT DETECTED
HCOV OC43 RNA SPEC QL NAA+PROBE: NOT DETECTED
HCT VFR BLD AUTO: 33.7 % (ref 34–46.6)
HGB BLD-MCNC: 10.9 G/DL (ref 12–15.9)
HGB UR QL STRIP.AUTO: ABNORMAL
HMPV RNA NPH QL NAA+NON-PROBE: NOT DETECTED
HPIV1 RNA ISLT QL NAA+PROBE: NOT DETECTED
HPIV2 RNA SPEC QL NAA+PROBE: NOT DETECTED
HPIV3 RNA NPH QL NAA+PROBE: NOT DETECTED
HPIV4 P GENE NPH QL NAA+PROBE: NOT DETECTED
HYALINE CASTS UR QL AUTO: ABNORMAL /LPF
INHALED O2 CONCENTRATION: 50 %
INR PPP: 0.98 (ref 0.91–1.09)
IPAP: 16
KETONES UR QL STRIP: NEGATIVE
LEUKOCYTE ESTERASE UR QL STRIP.AUTO: ABNORMAL
LYMPHOCYTES # BLD MANUAL: 0.55 10*3/MM3 (ref 0.7–3.1)
LYMPHOCYTES NFR BLD MANUAL: 1 % (ref 5–12)
Lab: ABNORMAL
M PNEUMO IGG SER IA-ACNC: NOT DETECTED
MAGNESIUM SERPL-MCNC: 2 MG/DL (ref 1.6–2.4)
MCH RBC QN AUTO: 29.7 PG (ref 26.6–33)
MCHC RBC AUTO-ENTMCNC: 32.3 G/DL (ref 31.5–35.7)
MCV RBC AUTO: 91.8 FL (ref 79–97)
MODALITY: ABNORMAL
MONOCYTES # BLD: 0.09 10*3/MM3 (ref 0.1–0.9)
NEUTROPHILS # BLD AUTO: 8.5 10*3/MM3 (ref 1.7–7)
NEUTROPHILS NFR BLD MANUAL: 81 % (ref 42.7–76)
NEUTS BAND NFR BLD MANUAL: 12 % (ref 0–5)
NEUTS VAC BLD QL SMEAR: ABNORMAL
NITRITE UR QL STRIP: NEGATIVE
NOTIFIED BY: ABNORMAL
OVALOCYTES BLD QL SMEAR: ABNORMAL
PCO2 BLDA: 62.6 MM HG (ref 35–45)
PCO2 BLDA: 64.8 MM HG (ref 35–45)
PCO2 BLDA: 72.1 MM HG (ref 35–45)
PCO2 BLDA: 79.6 MM HG (ref 35–45)
PCO2 TEMP ADJ BLD: 62.6 MM HG (ref 35–45)
PCO2 TEMP ADJ BLD: 64.8 MM HG (ref 35–45)
PCO2 TEMP ADJ BLD: 72.1 MM HG (ref 35–45)
PCO2 TEMP ADJ BLD: 79.6 MM HG (ref 35–45)
PEEP RESPIRATORY: 5 CM[H2O]
PEEP RESPIRATORY: 5 CM[H2O]
PH BLDA: 7.15 PH UNITS (ref 7.35–7.45)
PH BLDA: 7.18 PH UNITS (ref 7.35–7.45)
PH BLDA: 7.22 PH UNITS (ref 7.35–7.45)
PH BLDA: 7.22 PH UNITS (ref 7.35–7.45)
PH UR STRIP.AUTO: 5.5 [PH] (ref 5–8)
PH, TEMP CORRECTED: 7.15 PH UNITS (ref 7.35–7.45)
PH, TEMP CORRECTED: 7.18 PH UNITS (ref 7.35–7.45)
PH, TEMP CORRECTED: 7.22 PH UNITS (ref 7.35–7.45)
PH, TEMP CORRECTED: 7.22 PH UNITS (ref 7.35–7.45)
PLAT MORPH BLD: NORMAL
PLATELET # BLD AUTO: 220 10*3/MM3 (ref 140–450)
PMV BLD AUTO: 10 FL (ref 6–12)
PO2 BLD: 139 MM[HG] (ref 0–500)
PO2 BLD: 185 MM[HG] (ref 0–500)
PO2 BLD: 188 MM[HG] (ref 0–500)
PO2 BLDA: 59.3 MM HG (ref 83–108)
PO2 BLDA: 69.7 MM HG (ref 83–108)
PO2 BLDA: 92.3 MM HG (ref 83–108)
PO2 BLDA: 94.1 MM HG (ref 83–108)
PO2 TEMP ADJ BLD: 59.3 MM HG (ref 83–108)
PO2 TEMP ADJ BLD: 69.7 MM HG (ref 83–108)
PO2 TEMP ADJ BLD: 92.3 MM HG (ref 83–108)
PO2 TEMP ADJ BLD: 94.1 MM HG (ref 83–108)
POIKILOCYTOSIS BLD QL SMEAR: ABNORMAL
POTASSIUM SERPL-SCNC: 3.9 MMOL/L (ref 3.5–5.2)
PROCALCITONIN SERPL-MCNC: 0.31 NG/ML (ref 0–0.25)
PROT SERPL-MCNC: 5.7 G/DL (ref 6–8.5)
PROT UR QL STRIP: ABNORMAL
PROTHROMBIN TIME: 13.5 SECONDS (ref 11.8–14.8)
RBC # BLD AUTO: 3.67 10*6/MM3 (ref 3.77–5.28)
RBC # UR STRIP: ABNORMAL /HPF
REF LAB TEST METHOD: ABNORMAL
RHINOVIRUS RNA SPEC NAA+PROBE: NOT DETECTED
RSV RNA NPH QL NAA+NON-PROBE: NOT DETECTED
SAO2 % BLDCOA: 85.3 % (ref 94–99)
SAO2 % BLDCOA: 93.2 % (ref 94–99)
SAO2 % BLDCOA: 97.4 % (ref 94–99)
SAO2 % BLDCOA: 97.6 % (ref 94–99)
SARS-COV-2 RNA RESP QL NAA+PROBE: NOT DETECTED
SET MECH RESP RATE: 16
SET MECH RESP RATE: 18
SET MECH RESP RATE: 24
SODIUM SERPL-SCNC: 137 MMOL/L (ref 136–145)
SP GR UR STRIP: 1.01 (ref 1–1.03)
SQUAMOUS #/AREA URNS HPF: ABNORMAL /HPF
UROBILINOGEN UR QL STRIP: ABNORMAL
VARIANT LYMPHS NFR BLD MANUAL: 1 % (ref 0–5)
VARIANT LYMPHS NFR BLD MANUAL: 5 % (ref 19.6–45.3)
VENTILATOR MODE: ABNORMAL
VENTILATOR MODE: ABNORMAL
VENTILATOR MODE: AC
VENTILATOR MODE: AC
VT ON VENT VENT: 400 ML
VT ON VENT VENT: 400 ML
WBC # UR STRIP: ABNORMAL /HPF
WBC NRBC COR # BLD AUTO: 9.14 10*3/MM3 (ref 3.4–10.8)

## 2025-01-31 PROCEDURE — 93005 ELECTROCARDIOGRAM TRACING: CPT | Performed by: EMERGENCY MEDICINE

## 2025-01-31 PROCEDURE — 87086 URINE CULTURE/COLONY COUNT: CPT | Performed by: EMERGENCY MEDICINE

## 2025-01-31 PROCEDURE — 25010000002 METHYLPREDNISOLONE PER 125 MG: Performed by: PHYSICIAN ASSISTANT

## 2025-01-31 PROCEDURE — 94761 N-INVAS EAR/PLS OXIMETRY MLT: CPT

## 2025-01-31 PROCEDURE — 25010000002 AZITHROMYCIN PER 500 MG: Performed by: PHYSICIAN ASSISTANT

## 2025-01-31 PROCEDURE — 5A1945Z RESPIRATORY VENTILATION, 24-96 CONSECUTIVE HOURS: ICD-10-PCS | Performed by: EMERGENCY MEDICINE

## 2025-01-31 PROCEDURE — 71045 X-RAY EXAM CHEST 1 VIEW: CPT

## 2025-01-31 PROCEDURE — 94002 VENT MGMT INPAT INIT DAY: CPT

## 2025-01-31 PROCEDURE — 99285 EMERGENCY DEPT VISIT HI MDM: CPT

## 2025-01-31 PROCEDURE — 25010000002 CEFEPIME PER 500 MG: Performed by: EMERGENCY MEDICINE

## 2025-01-31 PROCEDURE — 82803 BLOOD GASES ANY COMBINATION: CPT

## 2025-01-31 PROCEDURE — 51702 INSERT TEMP BLADDER CATH: CPT

## 2025-01-31 PROCEDURE — 81001 URINALYSIS AUTO W/SCOPE: CPT | Performed by: EMERGENCY MEDICINE

## 2025-01-31 PROCEDURE — 25810000003 SODIUM CHLORIDE 0.9 % SOLUTION 250 ML FLEX CONT: Performed by: PHYSICIAN ASSISTANT

## 2025-01-31 PROCEDURE — 94799 UNLISTED PULMONARY SVC/PX: CPT

## 2025-01-31 PROCEDURE — 94640 AIRWAY INHALATION TREATMENT: CPT

## 2025-01-31 PROCEDURE — 25810000003 SODIUM CHLORIDE 0.9 % SOLUTION

## 2025-01-31 PROCEDURE — 85610 PROTHROMBIN TIME: CPT | Performed by: EMERGENCY MEDICINE

## 2025-01-31 PROCEDURE — 25810000003 SODIUM CHLORIDE 0.9 % SOLUTION: Performed by: PHYSICIAN ASSISTANT

## 2025-01-31 PROCEDURE — 93010 ELECTROCARDIOGRAM REPORT: CPT | Performed by: EMERGENCY MEDICINE

## 2025-01-31 PROCEDURE — 36415 COLL VENOUS BLD VENIPUNCTURE: CPT

## 2025-01-31 PROCEDURE — 25810000003 LACTATED RINGERS SOLUTION: Performed by: EMERGENCY MEDICINE

## 2025-01-31 PROCEDURE — 84145 PROCALCITONIN (PCT): CPT | Performed by: EMERGENCY MEDICINE

## 2025-01-31 PROCEDURE — 31500 INSERT EMERGENCY AIRWAY: CPT

## 2025-01-31 PROCEDURE — 80053 COMPREHEN METABOLIC PANEL: CPT | Performed by: EMERGENCY MEDICINE

## 2025-01-31 PROCEDURE — 87040 BLOOD CULTURE FOR BACTERIA: CPT | Performed by: EMERGENCY MEDICINE

## 2025-01-31 PROCEDURE — 83605 ASSAY OF LACTIC ACID: CPT | Performed by: EMERGENCY MEDICINE

## 2025-01-31 PROCEDURE — 36600 WITHDRAWAL OF ARTERIAL BLOOD: CPT

## 2025-01-31 PROCEDURE — 94660 CPAP INITIATION&MGMT: CPT

## 2025-01-31 PROCEDURE — 94664 DEMO&/EVAL PT USE INHALER: CPT

## 2025-01-31 PROCEDURE — 0202U NFCT DS 22 TRGT SARS-COV-2: CPT | Performed by: EMERGENCY MEDICINE

## 2025-01-31 PROCEDURE — 25010000002 FENTANYL CITRATE (PF) 2500 MCG/50ML SOLUTION

## 2025-01-31 PROCEDURE — 25010000002 PROPOFOL 10 MG/ML EMULSION: Performed by: EMERGENCY MEDICINE

## 2025-01-31 PROCEDURE — 83735 ASSAY OF MAGNESIUM: CPT | Performed by: EMERGENCY MEDICINE

## 2025-01-31 PROCEDURE — 85007 BL SMEAR W/DIFF WBC COUNT: CPT | Performed by: EMERGENCY MEDICINE

## 2025-01-31 PROCEDURE — 25010000002 SUCCINYLCHOLINE PER 20 MG: Performed by: EMERGENCY MEDICINE

## 2025-01-31 PROCEDURE — 0BH17EZ INSERTION OF ENDOTRACHEAL AIRWAY INTO TRACHEA, VIA NATURAL OR ARTIFICIAL OPENING: ICD-10-PCS | Performed by: EMERGENCY MEDICINE

## 2025-01-31 PROCEDURE — 25010000002 DEXAMETHASONE PER 1 MG: Performed by: EMERGENCY MEDICINE

## 2025-01-31 PROCEDURE — 74018 RADEX ABDOMEN 1 VIEW: CPT

## 2025-01-31 PROCEDURE — 85025 COMPLETE CBC W/AUTO DIFF WBC: CPT | Performed by: EMERGENCY MEDICINE

## 2025-01-31 PROCEDURE — 25010000002 HEPARIN (PORCINE) PER 1000 UNITS: Performed by: PHYSICIAN ASSISTANT

## 2025-01-31 PROCEDURE — 25810000003 SEPSIS FLUID NS 0.9 % SOLUTION: Performed by: EMERGENCY MEDICINE

## 2025-01-31 RX ORDER — METHYLPREDNISOLONE SODIUM SUCCINATE 40 MG/ML
40 INJECTION, POWDER, LYOPHILIZED, FOR SOLUTION INTRAMUSCULAR; INTRAVENOUS EVERY 8 HOURS
Status: COMPLETED | OUTPATIENT
Start: 2025-02-02 | End: 2025-02-05

## 2025-01-31 RX ORDER — IPRATROPIUM BROMIDE AND ALBUTEROL SULFATE 2.5; .5 MG/3ML; MG/3ML
3 SOLUTION RESPIRATORY (INHALATION)
Status: DISCONTINUED | OUTPATIENT
Start: 2025-01-31 | End: 2025-02-02

## 2025-01-31 RX ORDER — BISACODYL 5 MG/1
5 TABLET, DELAYED RELEASE ORAL DAILY PRN
Status: DISCONTINUED | OUTPATIENT
Start: 2025-01-31 | End: 2025-02-02

## 2025-01-31 RX ORDER — SUCCINYLCHOLINE CHLORIDE 20 MG/ML
200 INJECTION INTRAMUSCULAR; INTRAVENOUS ONCE
Status: COMPLETED | OUTPATIENT
Start: 2025-01-31 | End: 2025-01-31

## 2025-01-31 RX ORDER — POLYETHYLENE GLYCOL 3350 17 G/17G
17 POWDER, FOR SOLUTION ORAL DAILY PRN
Status: DISCONTINUED | OUTPATIENT
Start: 2025-01-31 | End: 2025-02-02

## 2025-01-31 RX ORDER — IPRATROPIUM BROMIDE AND ALBUTEROL SULFATE 2.5; .5 MG/3ML; MG/3ML
3 SOLUTION RESPIRATORY (INHALATION) EVERY 4 HOURS PRN
Status: DISCONTINUED | OUTPATIENT
Start: 2025-01-31 | End: 2025-02-10 | Stop reason: HOSPADM

## 2025-01-31 RX ORDER — ALBUTEROL SULFATE 0.83 MG/ML
2.5 SOLUTION RESPIRATORY (INHALATION) ONCE
Status: COMPLETED | OUTPATIENT
Start: 2025-01-31 | End: 2025-01-31

## 2025-01-31 RX ORDER — CHLORHEXIDINE GLUCONATE 500 MG/1
1 CLOTH TOPICAL EVERY 24 HOURS
Status: DISCONTINUED | OUTPATIENT
Start: 2025-02-01 | End: 2025-02-07

## 2025-01-31 RX ORDER — METHYLPREDNISOLONE SODIUM SUCCINATE 40 MG/ML
20 INJECTION, POWDER, LYOPHILIZED, FOR SOLUTION INTRAMUSCULAR; INTRAVENOUS EVERY 12 HOURS
Status: DISCONTINUED | OUTPATIENT
Start: 2025-02-05 | End: 2025-02-08

## 2025-01-31 RX ORDER — IPRATROPIUM BROMIDE AND ALBUTEROL SULFATE 2.5; .5 MG/3ML; MG/3ML
3 SOLUTION RESPIRATORY (INHALATION) ONCE
Status: COMPLETED | OUTPATIENT
Start: 2025-01-31 | End: 2025-01-31

## 2025-01-31 RX ORDER — AMOXICILLIN 250 MG
2 CAPSULE ORAL 2 TIMES DAILY
Status: DISCONTINUED | OUTPATIENT
Start: 2025-01-31 | End: 2025-02-02

## 2025-01-31 RX ORDER — SODIUM CHLORIDE 9 MG/ML
100 INJECTION, SOLUTION INTRAVENOUS CONTINUOUS
Status: DISPENSED | OUTPATIENT
Start: 2025-01-31 | End: 2025-02-01

## 2025-01-31 RX ORDER — ETOMIDATE 2 MG/ML
15 INJECTION INTRAVENOUS ONCE
Status: COMPLETED | OUTPATIENT
Start: 2025-01-31 | End: 2025-01-31

## 2025-01-31 RX ORDER — LEVOTHYROXINE SODIUM 112 UG/1
112 TABLET ORAL
Status: DISCONTINUED | OUTPATIENT
Start: 2025-01-31 | End: 2025-02-02

## 2025-01-31 RX ORDER — SUCCINYLCHOLINE CHLORIDE 20 MG/ML
100 INJECTION INTRAMUSCULAR; INTRAVENOUS ONCE
Status: DISCONTINUED | OUTPATIENT
Start: 2025-01-31 | End: 2025-01-31

## 2025-01-31 RX ORDER — OSELTAMIVIR PHOSPHATE 30 MG/1
30 CAPSULE ORAL EVERY 12 HOURS SCHEDULED
Status: DISCONTINUED | OUTPATIENT
Start: 2025-01-31 | End: 2025-02-02

## 2025-01-31 RX ORDER — SODIUM CHLORIDE 9 MG/ML
40 INJECTION, SOLUTION INTRAVENOUS AS NEEDED
Status: DISCONTINUED | OUTPATIENT
Start: 2025-01-31 | End: 2025-02-10 | Stop reason: HOSPADM

## 2025-01-31 RX ORDER — METHYLPREDNISOLONE SODIUM SUCCINATE 125 MG/2ML
60 INJECTION, POWDER, LYOPHILIZED, FOR SOLUTION INTRAMUSCULAR; INTRAVENOUS EVERY 6 HOURS
Status: COMPLETED | OUTPATIENT
Start: 2025-01-31 | End: 2025-02-02

## 2025-01-31 RX ORDER — NITROGLYCERIN 0.4 MG/1
0.4 TABLET SUBLINGUAL
Status: DISCONTINUED | OUTPATIENT
Start: 2025-01-31 | End: 2025-02-09

## 2025-01-31 RX ORDER — CHLORHEXIDINE GLUCONATE 500 MG/1
1 CLOTH TOPICAL ONCE
Status: COMPLETED | OUTPATIENT
Start: 2025-01-31 | End: 2025-01-31

## 2025-01-31 RX ORDER — SODIUM CHLORIDE 0.9 % (FLUSH) 0.9 %
10 SYRINGE (ML) INJECTION EVERY 12 HOURS SCHEDULED
Status: DISCONTINUED | OUTPATIENT
Start: 2025-01-31 | End: 2025-02-10 | Stop reason: HOSPADM

## 2025-01-31 RX ORDER — SODIUM CHLORIDE 0.9 % (FLUSH) 0.9 %
10 SYRINGE (ML) INJECTION AS NEEDED
Status: DISCONTINUED | OUTPATIENT
Start: 2025-01-31 | End: 2025-02-10 | Stop reason: HOSPADM

## 2025-01-31 RX ORDER — DEXAMETHASONE SODIUM PHOSPHATE 10 MG/ML
10 INJECTION INTRAMUSCULAR; INTRAVENOUS ONCE
Status: COMPLETED | OUTPATIENT
Start: 2025-01-31 | End: 2025-01-31

## 2025-01-31 RX ORDER — MIRTAZAPINE 15 MG/1
15 TABLET, FILM COATED ORAL NIGHTLY
Status: DISCONTINUED | OUTPATIENT
Start: 2025-01-31 | End: 2025-02-02

## 2025-01-31 RX ORDER — ROPINIROLE 0.25 MG/1
0.25 TABLET, FILM COATED ORAL NIGHTLY
Status: DISCONTINUED | OUTPATIENT
Start: 2025-01-31 | End: 2025-02-02

## 2025-01-31 RX ORDER — BISACODYL 10 MG
10 SUPPOSITORY, RECTAL RECTAL DAILY PRN
Status: DISCONTINUED | OUTPATIENT
Start: 2025-01-31 | End: 2025-02-02

## 2025-01-31 RX ORDER — CITALOPRAM HYDROBROMIDE 20 MG/1
40 TABLET ORAL DAILY
Status: DISCONTINUED | OUTPATIENT
Start: 2025-01-31 | End: 2025-02-02

## 2025-01-31 RX ORDER — HEPARIN SODIUM 5000 [USP'U]/ML
5000 INJECTION, SOLUTION INTRAVENOUS; SUBCUTANEOUS EVERY 8 HOURS SCHEDULED
Status: DISCONTINUED | OUTPATIENT
Start: 2025-01-31 | End: 2025-02-10 | Stop reason: HOSPADM

## 2025-01-31 RX ORDER — BUDESONIDE 0.5 MG/2ML
0.5 INHALANT ORAL
Status: DISCONTINUED | OUTPATIENT
Start: 2025-01-31 | End: 2025-02-09

## 2025-01-31 RX ADMIN — Medication 1 APPLICATION: at 20:08

## 2025-01-31 RX ADMIN — Medication 10 ML: at 20:09

## 2025-01-31 RX ADMIN — IPRATROPIUM BROMIDE AND ALBUTEROL SULFATE 3 ML: .5; 3 SOLUTION RESPIRATORY (INHALATION) at 19:56

## 2025-01-31 RX ADMIN — SODIUM CHLORIDE 2040 ML: 9 INJECTION, SOLUTION INTRAVENOUS at 09:02

## 2025-01-31 RX ADMIN — PROPOFOL INJECTABLE EMULSION 5 MCG/KG/MIN: 10 INJECTION, EMULSION INTRAVENOUS at 12:39

## 2025-01-31 RX ADMIN — SUCCINYLCHOLINE CHLORIDE 200 MG: 20 INJECTION, SOLUTION INTRAMUSCULAR; INTRAVENOUS at 12:32

## 2025-01-31 RX ADMIN — BUDESONIDE 0.5 MG: 0.5 INHALANT RESPIRATORY (INHALATION) at 19:56

## 2025-01-31 RX ADMIN — DEXAMETHASONE SODIUM PHOSPHATE 10 MG: 10 INJECTION INTRAMUSCULAR; INTRAVENOUS at 08:46

## 2025-01-31 RX ADMIN — SODIUM CHLORIDE, POTASSIUM CHLORIDE, SODIUM LACTATE AND CALCIUM CHLORIDE 1000 ML: 600; 310; 30; 20 INJECTION, SOLUTION INTRAVENOUS at 12:54

## 2025-01-31 RX ADMIN — ETOMIDATE INJECTION 15 MG: 2 SOLUTION INTRAVENOUS at 12:31

## 2025-01-31 RX ADMIN — PROPOFOL INJECTABLE EMULSION 50 MCG/KG/MIN: 10 INJECTION, EMULSION INTRAVENOUS at 17:00

## 2025-01-31 RX ADMIN — AZITHROMYCIN DIHYDRATE 500 MG: 500 INJECTION, POWDER, LYOPHILIZED, FOR SOLUTION INTRAVENOUS at 13:29

## 2025-01-31 RX ADMIN — ALBUTEROL SULFATE 2.5 MG: 2.5 SOLUTION RESPIRATORY (INHALATION) at 09:49

## 2025-01-31 RX ADMIN — OSELTAMIVIR PHOSPHATE 30 MG: 30 CAPSULE ORAL at 20:09

## 2025-01-31 RX ADMIN — IPRATROPIUM BROMIDE AND ALBUTEROL SULFATE 3 ML: .5; 3 SOLUTION RESPIRATORY (INHALATION) at 14:03

## 2025-01-31 RX ADMIN — HEPARIN SODIUM 5000 UNITS: 5000 INJECTION, SOLUTION INTRAVENOUS; SUBCUTANEOUS at 21:44

## 2025-01-31 RX ADMIN — DOCUSATE SODIUM 50 MG AND SENNOSIDES 8.6 MG 2 TABLET: 8.6; 5 TABLET, FILM COATED ORAL at 20:08

## 2025-01-31 RX ADMIN — SODIUM CHLORIDE 100 ML/HR: 9 INJECTION, SOLUTION INTRAVENOUS at 23:34

## 2025-01-31 RX ADMIN — SODIUM CHLORIDE 100 ML/HR: 9 INJECTION, SOLUTION INTRAVENOUS at 14:39

## 2025-01-31 RX ADMIN — LEVOTHYROXINE SODIUM 112 MCG: 112 TABLET ORAL at 17:36

## 2025-01-31 RX ADMIN — CITALOPRAM HYDROBROMIDE 40 MG: 20 TABLET ORAL at 17:36

## 2025-01-31 RX ADMIN — ROPINIROLE HYDROCHLORIDE 0.25 MG: 0.25 TABLET, FILM COATED ORAL at 20:08

## 2025-01-31 RX ADMIN — METHYLPREDNISOLONE SODIUM SUCCINATE 60 MG: 125 INJECTION, POWDER, FOR SOLUTION INTRAMUSCULAR; INTRAVENOUS at 17:36

## 2025-01-31 RX ADMIN — BUDESONIDE 0.5 MG: 0.5 INHALANT RESPIRATORY (INHALATION) at 14:10

## 2025-01-31 RX ADMIN — Medication 10 ML: at 12:56

## 2025-01-31 RX ADMIN — FENTANYL CITRATE 50 MCG/HR: 50 INJECTION, SOLUTION INTRAMUSCULAR; INTRAVENOUS at 21:28

## 2025-01-31 RX ADMIN — PROPOFOL INJECTABLE EMULSION 50 MCG/KG/MIN: 10 INJECTION, EMULSION INTRAVENOUS at 20:09

## 2025-01-31 RX ADMIN — SODIUM CHLORIDE, POTASSIUM CHLORIDE, SODIUM LACTATE AND CALCIUM CHLORIDE 1000 ML: 600; 310; 30; 20 INJECTION, SOLUTION INTRAVENOUS at 10:50

## 2025-01-31 RX ADMIN — MIRTAZAPINE 15 MG: 15 TABLET, FILM COATED ORAL at 21:44

## 2025-01-31 RX ADMIN — CEFEPIME 2000 MG: 2 INJECTION, POWDER, FOR SOLUTION INTRAVENOUS at 08:59

## 2025-01-31 RX ADMIN — SODIUM CHLORIDE, POTASSIUM CHLORIDE, SODIUM LACTATE AND CALCIUM CHLORIDE 500 ML: 600; 310; 30; 20 INJECTION, SOLUTION INTRAVENOUS at 14:05

## 2025-01-31 RX ADMIN — HEPARIN SODIUM 5000 UNITS: 5000 INJECTION, SOLUTION INTRAVENOUS; SUBCUTANEOUS at 17:37

## 2025-01-31 RX ADMIN — IPRATROPIUM BROMIDE AND ALBUTEROL SULFATE 3 ML: .5; 3 SOLUTION RESPIRATORY (INHALATION) at 09:50

## 2025-01-31 RX ADMIN — METHYLPREDNISOLONE SODIUM SUCCINATE 60 MG: 125 INJECTION, POWDER, FOR SOLUTION INTRAMUSCULAR; INTRAVENOUS at 22:59

## 2025-01-31 RX ADMIN — CHLORHEXIDINE GLUCONATE 1 APPLICATION: 500 CLOTH TOPICAL at 17:38

## 2025-01-31 NOTE — LETTER
February 6, 2025     Patient: Ashley Gibbs   YOB: 1955   Date of Visit: 1/31/2025-current       To Whom It May Concern:    Patient Ashley Gibbs continues to be receiving care in our facility as an in-patient on the telemetry unit with no plans of discharging today.             Sincerely,        Cammy Chang, Charge RN

## 2025-01-31 NOTE — CONSULTS
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 Rico Vanegas.    She received cycle #1, day #1 of  carboplatin/-16 on 1/27/2025.      Palliative emergent XRT of the chest consisted of 1250 cGy in 5 treatment fractions between 12/13/2024 and 12/19/2024 by Dr. Rico Vanegas at [facility] for the associated SVC due to tumor..    Treatment regimen-curative intent consisted of:  Carboplatin AUC 5 day 1  Etoposide 100 mg/m² days 1-3 q. 21 days  Plan is for X 4 cycles     Ashley was seen this morning, 1/31/2025, in the ED, at [facility] accompanied by 2 granddaughters and her good friend.  The granddaughter reports a fever to 101 degrees, increased difficulty with breathing shortness of breath and wheezing.    CBC          12/19/2024    08:49 1/27/2025    11:20 1/31/2025    08:13   CBC   WBC 4.51  5.35     9.14    RBC 3.85  4.32     3.67    Hemoglobin 11.5  12.9     10.9    Hematocrit 35.2  39.4     33.7    MCV 91.4  91.2     91.8    MCH 29.9  29.9     29.7    MCHC 32.7  32.7     32.3    RDW 13.0  12.9     13.9    Platelets 199  260     220       Details          This result is from an external source.                 UA today, 1/31/2025 showed 3+ leukocytes, 30 mg/dL protein WBCs too numerous to count, bacteria 3+, nitrite negative.    Respiratory panel by PCR today, 1/31/2025 was positive for influenza A H1-Detected  The patient has been desaturating while in the ED, requiring BiPAP assistance.  Blood and urine cultures were obtained.  Antibiotic with cefepime is initiated.    Medical oncology consultation requested            PAST  TUMOR HISTORY COPIED FROM DR. CARDONA'S 1/13/2025 OFFICE VISIT:    HISTORY OF PRESENT ILLNESS:    Diagnosis  Small cell lung cancer, right lung, Dec 2024  Stage  [id number], limited stage     Treatment Summary  12/13/24 - 12/19/24 Emergent palliative radiation therapy 1250cGy in 5 fractions to right lung  Anticipate chemotherapy with Carboplatin D1, Etoposide D1-3 every 21 days x4 cycles  Anticipate radiation therapy to right lung with Dr Rico Vanegas/[facility]     Cancer History  Ms. 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 She has complaints today of fatigue. She states she has stabbing chest pain and mid back pain. She is currently being seen at pain management for pain medication. She presents today on 2L of oxygen dependently.   11/8/24 CT chest low dose ([facility]): 2 cm right upper lobe bilobed nodule with associated mediastinal and right hilar lymphadenopathy, suspicious for malignancy. Minimal adjacent   right upper lobe interlobular septal thickening may be related to venous or lymphatic congestion, however lymphangitic carcinomatosis is an additional consideration. Recommend further evaluation with PET/CT and/or tissue sampling. CT chest with contrast may also be of value to help distinguish the lymph nodes from adjacent vasculature. Minimal tree-in-bud nodularity in the peripheral the right upper lobe, likely mild infectious process.   Additional small pulmonary nodules in the right upper and right middle lobe.   11/8/24 CT abd/pelvis (P): Near complete resolution of the exophytic left upper pole renal lesion of concern likely related to interval rupture/involution. Numerous small bilateral renal cysts are again present.  Mild diffuse wall thickening of the rectum and the distal transverse colon and proximal descending colon. Correlate for proctocolitis.   12/13/24 PET/CT scan (P): There is a  hypermetabolic right upper lobe nodule measures 1.4 cm and has a maximum SUV of 5.5. Right upper lobe suprahilar mass measures at least 8 cm in greatest axial dimension is hypermetabolic with a maximum SUV of 11.4. Mass either invades the middle mediastinum or there is conglomerate hypermetabolic lymphadenopathy. No additional hypermetabolic pulmonary nodule or mass is identified. There is no increased skeletal uptake to suggest osseous metastasis.   12/13/245 CT chest ([facility]): 2.2 cm bilobed right upper lobe pulmonary nodule, likely representing a primary lung neoplasm. 8.9 cm right hilar lung mass with diffuse mediastinal infiltration, likely representing mary metastasis. This mass encases the right mainstem bronchus and encases the right main pulmonary artery, with occlusion of the right upper lobe pulmonary artery. This mass compresses the SVC, which is slitlike. Patient is at risk for SVC syndrome.  12/13/24 Initial evaluation by Dr Rico Vanegas/[facility]iation [facility]cology for superior vena cava syndrome who recommended emergent palliative radiation therapy 1250cGy in 5 fractions.  12/13/24 - 12/19/24 Emergent SBRT 1250cGy in 5 fractions to right lung  12/19/24 CT chest w/o ([facility]): Stable large right hilar mass with mediastinal invasion and confluent lymphadenopathy as detailed above. Findings are suspicious for small cell carcinoma. There is increased groundglass opacities and interlobular septal thickening within the right upper lobe and right middle lobe. Differential includes postobstructive changes and/or leptomeningeal carcinomatosis. Stable 2.2 cm right upper lobe bilobed nodule which was previously hypermetabolic and concerning for metastases. Trace right pleural effusion.   12/23/24 Navigational bronchoscopy/EBUS by Dr Peterson Morales/[facility] Pul[facility]  12/23/24 Station 7 lymph node, endobronchial ultrasound-guided fine-needle aspiration, smear (1) and cellblock: High-grade neuroendocrine carcinoma consistent with  metastatic small cell carcinoma. Fragments of lymphoid tissue and cartilage present.  Station 4R lymph node, endobronchial ultrasound-guided fine-needle aspiration, smear (1) and cellblock: High-grade neuroendocrine carcinoma consistent with metastatic small cell carcinoma. Many background lymphocytes.  Bronchial washings, ThinPrep preparation (1) and cellblock: A few malignant cells present, high-grade neuroendocrine carcinoma consistent with small cell carcinoma. Several macrophages. A few benign squamous epithelial cells and bronchial epithelial cells. AJCC stage: pTX pNX  1/13/2025-essentially, limited stage small cell lung cancer.  Awaiting results of brain MRI.  Recommend concurrent chemoradiation with carboplatin 2 post right to be followed by adjuvant durvalumab.  1/27/2025-initiation cycle #1 of chemotherapy with carboplatin/-16            1. Small cell lung cancer, limited stage, clinical [id number].  She was diagnosed with a right upper lobe mass with invasion of mediastinal and SVC syndrome. She received palliative emergent radiation by Dr. Rico Moreno at [facility], completing 1250 cGy. A bronchoscopy and FNA biopsy of four lymph nodes were performed by Dr. Peterson Morales at [facility], with pathology consistent with small cell carcinoma. A PET scan showed a bulk mass in the right hilum measuring 8 cm, with no other evidence of metastatic disease outside the chest, indicating limited stage disease. Concurrent chemoradiation was recommended and discussed with Dr. Rico Vanegas at [facility], who agreed with this plan. She will be referred back to Dr. Rico Vanegas for the delivery of carboplatin and etoposide without G-CSF support. A consent for treatment was signed today. The logistics, common side effects, and management of chemotherapy were discussed at length, and she acknowledged understanding. All her questions were answered appropriately.     Treatment regimen-curative intent  Carboplatin AUC 5 day  1  Etoposide 100 mg/m² days 1-3 q. 21 days  X 4 cycles      ANTINEOPLASTIC THERAPY EDUCATION  I have explained to the patient the possible side effects of chemotherapy to include but not limited to bone marrow suppression, increased risk of infections, alopecia, GI toxicity such as nausea, vomiting, diarrhea, constipation, allergic reactions, skin rashes, fatigue and rarely risk of fatal outcomes related to direct or indirect effects of treatment. We discussed about strategies to lessen the side effects of treatment when required to include but not limited to doses/regimen modifications by provider, increased patient education awareness of certain toxicities, prompt notification to provider or nursing staff by the patient all certain side effects, proactive measures etc.  We also discussed about the importance of compliance with treatment with providers visits to assure early identification and management of side effects.  Consent signed on 1/13/2025     2. Cancer-related pain.  She has been followed by pain management since 2008 and has been on Norco 10 mg every 6 hours as needed for chronic back pain. She also reports chest wall pain secondary to her malignancy. She requested that her cancer-related pain be managed during her treatment, with plans to return to pain management afterward. Percocet 10 mg every 6 hours as needed was recommended. It is anticipated that her pain will significantly improve once treatment begins.     3. Superior vena cava syndrome.  She is status post palliative TVRT by Dr. Rico Moreno. She will continue concurrent chemo XRT.     PROCEDURE  Bronchoscopy and FNA biopsy of the 4R lymph node was performed by Dr. Peterson Morales at the [facility]. The patient received palliative emergent radiation by Dr. Rivers at [facility] with completion of 1250 cGy.        PLAN:  RTC with MD in treatment room C# 2  Recommend Carboplatin D1, Etoposide D1-3 every 21 days x4 cycles-once insurance approves  Written/verbal  patient education provided  Treatment consent signed  Recommend Zofran 4mg every 6 hrs as needed-script sent  Recommend Phenergan 12.5mg every 6 hrs as needed-script sent  Recommend Percocet 10mg every 6 hrs as needed for pain, while receiving chemotherapy only-script sent  Refer to Dr Rico Vanegas/[facility]  Proceed with MRI brain 1/17/25 at [facility]  Coordination of care with Dr. Rico Ortiz on [facility] radiation oncology              Follow Up:   Return for Treatment & see  in [place] RM C# 2 Carbo Etoposide.  Carbo Etoposide x3 days-once ins approves  Refer to Dr Vanegas/[facility]        Past Medical History:    Past Medical History:   Diagnosis Date    Anxiety     Arthritis     Asthma     Back pain     COPD (chronic obstructive pulmonary disease)     Dependence on supplemental oxygen     Depression     Disease of thyroid gland     Fibromyalgia, primary     Headache     History of degenerative disc disease     Hypothyroidism     Low back pain     Restless leg     Scoliosis        Past Surgical History:    Past Surgical History:   Procedure Laterality Date    APPENDECTOMY      BREAST BIOPSY Left     BRONCHOSCOPY Right 12/23/2024    Procedure: BRONCHOSCOPY WITH ENDOBRONCHIAL ULTRASOUND;  Surgeon: Peterson Morales MD;  Location: [facility] OR;  Service: Pulmonary;  Laterality: Right;  pre: lung mass  post: lung mass    CHOLECYSTECTOMY      TUBAL ABDOMINAL LIGATION         Current Hospital Medications:      Current Facility-Administered Medications:     lactated ringers bolus 1,000 mL, 1,000 mL, Intravenous, Once, Fredi Carrion MD, Last Rate: 2,000 mL/hr at 01/31/25 1050, 1,000 mL at 01/31/25 1050    Current Outpatient Medications:     acetaminophen (Tylenol 8 Hour) 650 MG 8 hr tablet, Take 1 tablet by mouth Every 8 (Eight) Hours As Needed for Moderate Pain or Headache., Disp: 100 tablet, Rfl: 2    Calcium Carbonate-Vit D-Min (Caltrate 600+D Plus Minerals) 600-800 MG-UNIT tablet, Take 1 tablet  by mouth Daily., Disp: 90 tablet, Rfl: 3    cholecalciferol (VITAMIN D3) 25 MCG (1000 UT) tablet, Take 1 tablet by mouth Daily., Disp: , Rfl:     citalopram (CeleXA) 40 MG tablet, Take 1 tablet by mouth Daily., Disp: 90 tablet, Rfl: 1    diazePAM (Valium) 5 MG tablet, Take 1 tablet by mouth Every 12 (Twelve) Hours As Needed for Anxiety., Disp: 60 tablet, Rfl: 2    Fluticasone-Umeclidin-Vilant (Trelegy Ellipta) 100-62.5-25 MCG/ACT inhaler, Inhale 1 puff Daily for 30 days., Disp: 1 each, Rfl: 11    gabapentin (NEURONTIN) 600 MG tablet, Take 1 tablet by mouth Every 12 (Twelve) Hours., Disp: 60 tablet, Rfl: 2    HYDROcodone-acetaminophen (NORCO)  MG per tablet, Take 1 tablet by mouth 3 (Three) Times a Day., Disp: , Rfl:     ipratropium-albuterol (DUO-NEB) 0.5-2.5 mg/3 ml nebulizer, Take 3 mL by nebulization Every 4 (Four) Hours As Needed for Wheezing or Shortness of Air., Disp: 90 mL, Rfl: 0    levothyroxine (SYNTHROID, LEVOTHROID) 112 MCG tablet, Take 1 tablet by mouth Daily., Disp: 90 tablet, Rfl: 1    mirtazapine (REMERON) 15 MG tablet, Take 1 tablet by mouth every night at bedtime., Disp: 90 tablet, Rfl: 1    ondansetron (Zofran) 4 MG tablet, Take 1 tablet by mouth Every 8 (Eight) Hours As Needed for Nausea or Vomiting., Disp: 30 tablet, Rfl: 0    potassium chloride 10 MEQ CR tablet, Take 1 tablet by mouth 2 (Two) Times a Day., Disp: 6 tablet, Rfl: 0    rizatriptan (MAXALT) 10 MG tablet, Take 1 tablet by mouth 1 (One) Time As Needed for Migraine., Disp: , Rfl:     rOPINIRole (REQUIP) 0.25 MG tablet, Take 1 tablet by mouth every night at bedtime., Disp: 90 tablet, Rfl: 1    tiZANidine (ZANAFLEX) 2 MG tablet, Take 1 tablet by mouth 2 (Two) Times a Day As Needed for Muscle Spasms., Disp: , Rfl:     tobramycin 0.3 % solution ophthalmic solution, Administer 2 drops to both eyes Every 4 (Four) Hours While Awake., Disp: 5 mL, Rfl: 0    Ventolin  (90 Base) MCG/ACT inhaler, Inhale 2 puffs Every 4 (Four) Hours As  Needed for Wheezing or Shortness of Air., Disp: 18 g, Rfl: 2    vitamin D (ERGOCALCIFEROL) 1.25 MG (62353 UT) capsule capsule, Take 1 capsule by mouth 1 (One) Time Per Week., Disp: 12 capsule, Rfl: 3    Allergies:   Allergies   Allergen Reactions    Codeine Shortness Of Breath       Social History:    Social History     Socioeconomic History    Marital status:    Tobacco Use    Smoking status: Former     Current packs/day: 0.00     Average packs/day: 1 pack/day for 40.0 years (40.0 ttl pk-yrs)     Types: Cigarettes     Start date: 1968     Quit date: 2008     Years since quittin.7     Passive exposure: Past    Smokeless tobacco: Never   Vaping Use    Vaping status: Every Day    Substances: Nicotine    Devices: Pre-filled pod    Passive vaping exposure: Yes   Substance and Sexual Activity    Alcohol use: Never    Drug use: Never    Sexual activity: Not Currently     Partners: Male       Family History:   Family History   Problem Relation Age of Onset    Arthritis Mother     Hypertension Mother     COPD Father     Diabetes Father     Heart disease Father        REVIEW OF SYSTEMS:    Constitutional: Fever to 101 degrees at home, currently afebrile but tachycardic with blood pressure 93/73  HEENT: no blurring of vision, no double vision, no hearing difficulty, no tinnitus,no ulceration, no dental caries, no dysphagia     Lungs: Severe dyspnea, shortness of breath, requiring BiPAP to maintain oxygenation in the ED  CVS: no palpitation, no chest pain, no shortness of breath  GI: no abdominal pain, no nausea , no vomiting, no constipation  UMER: no dysuria, frequency and urgency, no hematuria, no kidney stones  Musculoskeletal: no joint pain, swelling , stiffness  Endocrine: no polyuria, polydipsia, no cold or heat intolerance  Hematology: no anemia, no easy bruising or bleeding, no history of clotting disorder  Dermatology: no skin rash, no eczema, no pruritus  Psychiatry: no depression, no anxiety,no  "panic attacks, no suicide ideation  Neurology: no syncope, no seizures, no numbness or tingling of hands, no numbness or tingling of feet, no paresis    Vitals:    BP 93/73   Pulse 109   Temp 99.1 °F (37.3 °C) (Oral)   Resp 25   Ht 160 cm (63\")   Wt 68 kg (149 lb 14.6 oz)   SpO2 96%   BMI 26.56 kg/m²     PHYSICAL EXAM:     Fever to 101 degrees at home, currently afebrile but tachycardic with blood pressure 93/73  CONSTITUTIONAL: Alert, appropriate, no acute distress  EYES: Anicteric, EOM intact, pupils equal round   ENT: Mucous membranes moist, no oropharyngeal lesions   NECK: Supple, no masses   CHEST/LUNGS: On BiPAP in the ED.  Extreme bilateral diffuse wheezing noted.  Management per ED physician at the present.  CARDIOVASCULAR: RRR, no murmurs  ABDOMEN: soft nontender, active bowel sounds, no HSM  EXTREMITIES: warm, moves all fours  SKIN: warm, dry with no rashes or lesions  LYMPH: No cervical, clavicular, axillary, or inguinal lymphadenopathy  NEUROLOGIC: follows commands, nonfocal   PSYCH: mood and affect appropriate    CBC  Results from last 7 days   Lab Units 01/31/25  0813 01/27/25  1120   WBC 10*3/mm3 9.14 5.35   HEMOGLOBIN g/dL 10.9* 12.9   HEMATOCRIT % 33.7* 39.4   PLATELETS 10*3/mm3 220 260         Lab Results   Component Value Date     01/31/2025    K 3.9 01/31/2025     01/31/2025    CO2 26.0 01/31/2025    BUN 23 01/31/2025    CREATININE 1.69 (H) 01/31/2025    GLUCOSE 84 01/31/2025    CALCIUM 7.8 (L) 01/31/2025    BILITOT <0.2 01/31/2025    ALKPHOS 102 01/31/2025    AST 17 01/31/2025    ALT 19 01/31/2025    AGRATIO 1.4 01/31/2025    GLOB 2.4 01/31/2025       Lab Results   Component Value Date    INR 0.98 01/31/2025    INR 1.04 07/24/2019    INR 0.90 03/13/2019    PROTIME 13.5 01/31/2025    PROTIME 13 07/24/2019    PROTIME 11.6 (L) 03/13/2019       Cultures:    Lab Results   Component Value Date    BLOODCX No growth at 5 days 05/03/2023     No components found for: " "\"URINCX\"    ASSESSMENT/PLAN:    #1  Limited small cell carcinoma of the lung    Ashley Gibbs is a 65-year-old  female with a diagnosis of 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 Rico Vanegas.    She received cycle #1, day #1 of  carboplatin/-16 on 1/27/2025.      Palliative emergent XRT of the chest consisted of 1250 cGy in 5 treatment fractions between 12/13/2024 and 12/19/2024 by Dr. Rico Vanegas at [facility] for the associated SVC due to tumor..    Treatment regimen-curative intent consisted of:  Carboplatin AUC 5 day 1  Etoposide 100 mg/m² days 1-3 q. 21 days  Plan is for X 4 cycles     CBC today, 1/31/2025 has a WBC of 9.14 hemoglobin of 10.9 and platelet count of 220,000  Fortunately she is not neutropenic.  Daily CBC will be monitored.  Cultures will be followed.      #2  Influenza A, exacerbation of COPD with hypoxemia    Ashley was seen this morning, 1/31/2025, in the ED, at [facility] accompanied by 2 granddaughters and her good friend.  The granddaughter reports a fever to 101 degrees, increased difficulty with breathing shortness of breath and wheezing.  She was brought to the ED as recommended previously by Dr. Isaac should she have similar symptoms.    Chest x-ray 1/31/2025:  Stable chest with chronic changes of emphysema and underlying fibrosis and remote granulomatous disease.      UA today, 1/31/2025 showed 3+ leukocytes, 30 mg/dL protein WBCs too numerous to count, bacteria 3+, nitrite negative.    Respiratory panel by PCR today, 1/31/2025 was positive for influenza A H1-Detected  The patient has been desaturating while in the ED, requiring BiPAP assistance.  Blood and urine cultures were obtained.  Antibiotic with cefepime is initiated.  Medical oncology consultation requested  Agree with measures taken.  Discussed with nursing in the ED, plans are for admission and supportive care with maximum respiratory " assistance, IV antibiotics etc.    Dr. Isaac to follow-up in the morning over the weekend.    Ramón Li MD    01/31/25  11:05 CST

## 2025-01-31 NOTE — ED PROVIDER NOTES
Subjective   History of Present Illness  Patient is a six 9-year-old with a history of lung cancer getting chemotherapy came in with generalized weakness and fatigue and fever.    Fever  Max temp prior to arrival:  Fever  Temp source:  Subjective  Severity:  Moderate  Onset quality:  Gradual  Timing:  Constant  Progression:  Worsening  Chronicity:  New  Relieved by:  Nothing  Worsened by:  Nothing  Ineffective treatments:  None tried  Associated symptoms: no chest pain, no chills, no confusion, no diarrhea, no dysuria, no nausea, no somnolence and no sore throat    Risk factors: immunosuppression    Risk factors: no contaminated food and no contaminated water    Weakness - Generalized  Associated symptoms: fever    Associated symptoms: no chest pain, no diarrhea, no dysuria and no nausea        Review of Systems   Constitutional: Negative.  Positive for fever. Negative for chills.   HENT: Negative.  Negative for sore throat.    Eyes: Negative.    Respiratory: Negative.     Cardiovascular: Negative.  Negative for chest pain.   Gastrointestinal: Negative.  Negative for diarrhea and nausea.   Genitourinary:  Negative for dysuria.   Musculoskeletal: Negative.  Negative for back pain and neck pain.   Skin: Negative.    Neurological: Negative.    Psychiatric/Behavioral:  Negative for confusion.    All other systems reviewed and are negative.      Past Medical History:   Diagnosis Date    Anxiety     Arthritis     Asthma     Back pain     COPD (chronic obstructive pulmonary disease)     Dependence on supplemental oxygen     Depression     Disease of thyroid gland     Fibromyalgia, primary     Headache     History of degenerative disc disease     Hypothyroidism     Low back pain     Restless leg     Scoliosis        Allergies   Allergen Reactions    Codeine Shortness Of Breath       Past Surgical History:   Procedure Laterality Date    APPENDECTOMY      BREAST BIOPSY Left     BRONCHOSCOPY Right 12/23/2024    Procedure:  BRONCHOSCOPY WITH ENDOBRONCHIAL ULTRASOUND;  Surgeon: Peterson Morales MD;  Location: [facility] OR;  Service: Pulmonary;  Laterality: Right;  pre: lung mass  post: lung mass    CHOLECYSTECTOMY      TUBAL ABDOMINAL LIGATION         Family History   Problem Relation Age of Onset    Arthritis Mother     Hypertension Mother     COPD Father     Diabetes Father     Heart disease Father        Social History     Socioeconomic History    Marital status:    Tobacco Use    Smoking status: Former     Current packs/day: 0.00     Average packs/day: 1 pack/day for 40.0 years (40.0 ttl pk-yrs)     Types: Cigarettes     Start date: 1968     Quit date: 2008     Years since quittin.7     Passive exposure: Past    Smokeless tobacco: Never   Vaping Use    Vaping status: Every Day    Substances: Nicotine    Devices: Pre-filled pod    Passive vaping exposure: Yes   Substance and Sexual Activity    Alcohol use: Never    Drug use: Never    Sexual activity: Not Currently     Partners: Male           Objective   Physical Exam  Vitals and nursing note reviewed. Exam conducted with a chaperone present.   Constitutional:       General: She is not in acute distress.     Appearance: Normal appearance. She is well-developed. She is toxic-appearing. She is not diaphoretic.   HENT:      Head: Normocephalic and atraumatic.      Comments: Infraorbital edema.     Right Ear: External ear normal.      Nose: Nose normal.      Mouth/Throat:      Mouth: Mucous membranes are moist.   Eyes:      Conjunctiva/sclera: Conjunctivae normal.      Pupils: Pupils are equal, round, and reactive to light.   Cardiovascular:      Rate and Rhythm: Normal rate and regular rhythm.      Chest Wall: PMI is not displaced.      Pulses: Normal pulses. No decreased pulses.      Heart sounds: Normal heart sounds. No murmur heard.  Pulmonary:      Effort: Tachypnea and respiratory distress present. No accessory muscle usage.      Breath sounds: No stridor.  Examination of the right-lower field reveals decreased breath sounds and wheezing. Examination of the left-lower field reveals decreased breath sounds and wheezing. Decreased breath sounds and wheezing present. No rhonchi or rales.   Chest:      Chest wall: No tenderness or crepitus.   Abdominal:      General: Bowel sounds are normal. There is no distension.      Palpations: Abdomen is soft.      Tenderness: There is no abdominal tenderness.   Musculoskeletal:         General: No swelling or tenderness. Normal range of motion.      Cervical back: Normal range of motion and neck supple. No rigidity.      Comments: Lower extremity exam bilaterally is unremarkable.  There is no right or left calf tenderness .  There is no palpable venous cord.  No obvious difference in the size of the legs.  No pitting edema.  The dorsalis pedis and posterior tibial femoral and popliteal pulses are palpable and +2 bilaterally.  Homans sign is negative   Skin:     General: Skin is warm.      Capillary Refill: Capillary refill takes 2 to 3 seconds.      Coloration: Skin is ashen and pale. Skin is not jaundiced.      Findings: No erythema or rash.   Neurological:      General: No focal deficit present.      Mental Status: She is alert and oriented to person, place, and time. Mental status is at baseline.      GCS: GCS eye subscore is 4. GCS verbal subscore is 5. GCS motor subscore is 6.      Cranial Nerves: Cranial nerves 2-12 are intact. No cranial nerve deficit or facial asymmetry.      Motor: Motor function is intact. No weakness or atrophy.      Coordination: Coordination normal.      Deep Tendon Reflexes: Reflexes are normal and symmetric. Reflexes normal.   Psychiatric:         Mood and Affect: Mood normal.         Intubation    Date/Time: 1/31/2025 12:38 PM    Performed by: Fredi Carrion MD  Authorized by: Fredi Carrion MD    Consent:     Consent obtained:  Emergent situation and written    Consent given by:  Healthcare agent and  guardian    Risks, benefits, and alternatives were discussed: yes      Risks discussed:  Bleeding, death, brain injury, aspiration, dental trauma, hypoxia, pneumothorax and laryngeal injury  Universal protocol:     Site/side marked: yes      Immediately prior to procedure, a time out was called: yes      Patient identity confirmed:  Hospital-assigned identification number  Pre-procedure details:     Indications: altered consciousness, respiratory distress and respiratory failure      Patient status:  Altered mental status    Look externally: no concerns      Hyoid-mental distance: 3 or more finger widths      Hyoid-thyroid distance: 2 or more finger widths      Mallampati score:  III    Obstruction: none      Neck mobility: normal      Pharmacologic strategy: RSI      Induction agents:  Etomidate    Paralytics:  Succinylcholine  Procedure details:     Preoxygenation: BiPAP.    CPR in progress: no      Number of attempts:  1  Successful intubation attempt details:     Intubation method:  Oral    Intubation technique: video assisted      Laryngoscope blade:  Young 3    Bougie used: yes      Grade view: I      Tube size (mm):  7.5    Tube type:  Cuffed    Tube visualized through cords: yes    Placement assessment:     Tube secured with:  Adhesive tape and ETT norton    Breath sounds:  Equal    Placement verification: chest rise, colorimetric ETCO2, direct visualization, equal breath sounds, numeric ETCO2, tube exhalation and waveform ETCO2      CXR findings:  Appropriate position  Post-procedure details:     Procedure completion:  Tolerated             ED Course  ED Course as of 01/31/25 1240   Fri Jan 31, 2025   0834 nsr [TS]   0952 Left ventricular systolic function is normal. Left ventricular ejection fraction appears to be 66 - 70%.  ·  Estimated right ventricular systolic pressure from tricuspid regurgitation is mildly elevated (35-45 mmHg).  ·  The right ventricular cavity is mild to moderately dilated, with  normal systolic function..  ·  The right atrial cavity is dilated.  ·  No significant valvular pathology.  ·  No prior studies available for comparison.      [TS]   0958 Patient respiratory status has gotten worse than what it was before she was respiratory distress when she came in but her oxygen saturation started to drop on the nasal cannula elbow placed on BiPAP since she is got CO2 retention and see how she does she may need to get intubated IV antibiotics have been initiated and given to the patient vitals will be rechecked. [TS]   0959 Recommended 30 mL/kg bolus not received because it would be harmful or detrimental to the patient.  Reason: Concern for Fluid Overload.   Ordered 1000 mL of IV Fluid as bolus.  [TS]   1005 Family was informed of the guarded prognosis of the patient will use BiPAP for now and see how she does if not improved we will have to intubate her. [TS]   1108 Patient was eventually given a total of 30 mL/kg of fluids in the form a LR [TS]   1158 Patient was admitted to the ICU after discussing the case with the ICU ARNP.  In the ED the patient pulled her BiPAP off drop her sats down to 74% is becoming more agitated oxygen saturation are running 84% has been placed back on a BiPAP we will see how she does may need to get intubated. [TS]      ED Course User Index  [TS] Fredi Carrion MD                                                       Medical Decision Making  Problems Addressed:  Acute respiratory failure with hypoxia and hypercapnia: complicated acute illness or injury  Acute UTI (urinary tract infection): complicated acute illness or injury  Altered mental status, unspecified altered mental status type: complicated acute illness or injury  Febrile illness: complicated acute illness or injury  Influenza A: complicated acute illness or injury  Malignant neoplasm of lung, unspecified laterality, unspecified part of lung: complicated acute illness or injury    Amount and/or Complexity of  Data Reviewed  Labs: ordered.  Radiology: ordered.  ECG/medicine tests: ordered.    Risk  Prescription drug management.        Final diagnoses:   Acute respiratory failure with hypoxia and hypercapnia   Febrile illness   Influenza A   Acute UTI (urinary tract infection)   Altered mental status, unspecified altered mental status type   Malignant neoplasm of lung, unspecified laterality, unspecified part of lung   Immunosuppressed due to chemotherapy       ED Disposition  ED Disposition       ED Disposition   Decision to Admit    Condition   --    Comment   Level of Care: Critical Care [6]   Diagnosis: Acute respiratory failure [518.81.ICD-9-CM]   Admitting Physician: ALEXANDRIA OWENS [[id number]]   Attending Physician: ALEXANDRIA OWENS [[id number]]   Certification: I Certify That Inpatient Hospital Services Are Medically Necessary For Greater Than 2 Midnights                 No follow-up provider specified.       Medication List      No changes were made to your prescriptions during this visit.            Fredi Carrion MD  01/31/25 [id number]

## 2025-01-31 NOTE — TELEPHONE ENCOUNTER
Clinic received phone call from [facility] emergency department regarding patient being in department with neutropenic fever.  has stated he is doing additional blood work and will contact again if needed.  has been notified.

## 2025-01-31 NOTE — H&P
[facility] Intensivist Services  HISTORY AND PHYSICAL    Date of Admission: 1/31/2025  Primary Care Physician: Padmaja Bermudez APRN Subjective   Primary Historian: Patient, patient's granddaughter    Chief Complaint: Dyspnea, fever    History of Present Illness  69-year-old female with a past medical history of COPD, thyroid disease, restless leg syndrome, and stage IV small cell lung cancer admitted after presenting to the ED on 1/31/2025 with complaints of fever and dyspnea.  She also had generalized weakness and fatigue.  She states she came to the ER as she was told to come to the ER by her oncologist if she spiked a fever.  She recently underwent her first round of chemotherapy with her last treatment being on Wednesday, January 29, 2025.  The patient tells me she continues to vape.  Her respiratory status declined while she was in the ER and she had to be placed on BiPAP.  She initially showed some improvement; however, she became more confused, trying to take off her BiPAP.  She also remained fairly hypoxic.  For these reasons, she was emergently intubated in the ED.    Significant workup from the emergency department includes the following: Creatinine elevated 1.69 with a baseline of 0.7.  The rest of her CMP was fairly unremarkable.  Procalcitonin was mildly elevated at 0.31.  She is mildly anemic with hemoglobin 10.9 and hematocrit 33.7.  Initial ABG showed pH 7.152, pCO2 79.6, pO2 59.3, bicarb 27.8.  Repeat was slightly improved with pH 7.219, pCO2 62.6, pO2 94.1, and bicarb 25.5.  Respiratory panel noted to be positive for influenza A.  Right hilar mass noted on chest x-ray.  Right greater than left basilar interstitial prominence also noted on chest x-ray.    I saw the patient while she was still in the emergency department, prior to intubation.  She expressed to me that she had developed a fever to 101 °F, and she was instructed to come to the ER if she  spiked a fever.  She tells me that she first noticed the fever this morning.  She has also had some fatigue and generalized weakness.  She has had some dyspnea with this as well.    Patient is to be admitted to the CCU for further medical management and close monitoring.    Review of Systems   Otherwise complete ROS reviewed and negative except as mentioned in the HPI.    Past Medical History:   Past Medical History:   Diagnosis Date    Anxiety     Arthritis     Asthma     Back pain     COPD (chronic obstructive pulmonary disease)     Dependence on supplemental oxygen     Depression     Disease of thyroid gland     Fibromyalgia, primary     Headache     History of degenerative disc disease     Hypothyroidism     Low back pain     Restless leg     Scoliosis      Past Surgical History:  Past Surgical History:   Procedure Laterality Date    APPENDECTOMY      BREAST BIOPSY Left     BRONCHOSCOPY Right 12/23/2024    Procedure: BRONCHOSCOPY WITH ENDOBRONCHIAL ULTRASOUND;  Surgeon: Peterson Morales MD;  Location: [facility];  Service: Pulmonary;  Laterality: Right;  pre: lung mass  post: lung mass    CHOLECYSTECTOMY      TUBAL ABDOMINAL LIGATION       Social History:  reports that she quit smoking about 16 years ago. Her smoking use included cigarettes. She started smoking about 56 years ago. She has a 40 pack-year smoking history. She has been exposed to tobacco smoke. She has never used smokeless tobacco. She reports that she does not drink alcohol and does not use drugs.    Family History: family history includes Arthritis in her mother; COPD in her father; Diabetes in her father; Heart disease in her father; Hypertension in her mother.       Allergies:  Allergies   Allergen Reactions    Codeine Shortness Of Breath       Medications:  Prior to Admission medications    Medication Sig Start Date End Date Taking? Authorizing Provider   acetaminophen (Tylenol 8 Hour) 650 MG 8 hr tablet Take 1 tablet by mouth Every 8  (Eight) Hours As Needed for Moderate Pain or Headache. 10/23/23   LaraPadmaja, APRN   Calcium Carbonate-Vit D-Min (Caltrate 600+D Plus Minerals) 600-800 MG-UNIT tablet Take 1 tablet by mouth Daily. 11/22/23   Lara, Dirkaine, APRN   cholecalciferol (VITAMIN D3) 25 MCG (1000 UT) tablet Take 1 tablet by mouth Daily.    ProviderEmmanuel MD   citalopram (CeleXA) 40 MG tablet Take 1 tablet by mouth Daily. 9/24/24   LaraDirkaine, APRN   diazePAM (Valium) 5 MG tablet Take 1 tablet by mouth Every 12 (Twelve) Hours As Needed for Anxiety. 1/16/25   LaraDirkaine, APRN   Fluticasone-Umeclidin-Vilant (Trelegy Ellipta) 100-62.5-25 MCG/ACT inhaler Inhale 1 puff Daily for 30 days. 1/2/25 2/1/25  Ani Singer APRN   gabapentin (NEURONTIN) 600 MG tablet Take 1 tablet by mouth Every 12 (Twelve) Hours. 1/27/25   LaraDirkaine, APRN   HYDROcodone-acetaminophen (NORCO)  MG per tablet Take 1 tablet by mouth 3 (Three) Times a Day. 7/10/21   ProviderEmmanuel MD   ipratropium-albuterol (DUO-NEB) 0.5-2.5 mg/3 ml nebulizer Take 3 mL by nebulization Every 4 (Four) Hours As Needed for Wheezing or Shortness of Air. 12/17/24   LaraDirkaine, APRN   levothyroxine (SYNTHROID, LEVOTHROID) 112 MCG tablet Take 1 tablet by mouth Daily. 1/23/25   Lara, Dirkaine, APRN   mirtazapine (REMERON) 15 MG tablet Take 1 tablet by mouth every night at bedtime. 1/23/25   LaraDirkaine, APRN   ondansetron (Zofran) 4 MG tablet Take 1 tablet by mouth Every 8 (Eight) Hours As Needed for Nausea or Vomiting. 3/22/24   LaraDirkaine, APRN   potassium chloride 10 MEQ CR tablet Take 1 tablet by mouth 2 (Two) Times a Day. 12/18/24   Padmaja Bermudez APRN   rizatriptan (MAXALT) 10 MG tablet Take 1 tablet by mouth 1 (One) Time As Needed for Migraine. 7/1/21   Provider, MD Emmanuel   rOPINIRole (REQUIP) 0.25 MG tablet Take 1 tablet by mouth every night at bedtime. 9/26/24   Padmaja Bermudez APRN  "  tiZANidine (ZANAFLEX) 2 MG tablet Take 1 tablet by mouth 2 (Two) Times a Day As Needed for Muscle Spasms. 7/10/21   Provider, MD Emmanuel   tobramycin 0.3 % solution ophthalmic solution Administer 2 drops to both eyes Every 4 (Four) Hours While Awake. 12/18/24   Padmaja Bermudez APRN   Ventolin  (90 Base) MCG/ACT inhaler Inhale 2 puffs Every 4 (Four) Hours As Needed for Wheezing or Shortness of Air. 10/30/24   Kristine Dacosta, CHERY, CARRIE   vitamin D (ERGOCALCIFEROL) 1.25 MG (99705 UT) capsule capsule Take 1 capsule by mouth 1 (One) Time Per Week. 4/17/24   Padmaja Bermudez APRN     I have utilized all available immediate resources to obtain, update, or review the patient's current medications (including all prescriptions, over-the-counter products, herbals, cannabis/cannabidiol products, and vitamin/mineral/dietary (nutritional) supplements).    Objective     Vital Signs: /66   Pulse 102   Temp 99.1 °F (37.3 °C) (Oral)   Resp 19   Ht 160 cm (63\")   Wt 68 kg (149 lb 14.6 oz)   SpO2 99%   BMI 26.56 kg/m²   Physical Exam  Vitals reviewed. Exam conducted with a chaperone present.   Constitutional:       General: She is in acute distress.      Appearance: She is ill-appearing.   HENT:      Head: Normocephalic.      Nose: Nose normal.      Mouth/Throat:      Mouth: Mucous membranes are dry.   Eyes:      Extraocular Movements: Extraocular movements intact.   Cardiovascular:      Rate and Rhythm: Regular rhythm. Tachycardia present.      Pulses: Normal pulses.   Pulmonary:      Effort: Pulmonary effort is normal.      Comments: Diminished breath sounds with limited airflow bilaterally   Abdominal:      General: Abdomen is flat. Bowel sounds are normal.      Palpations: Abdomen is soft.      Tenderness: There is no abdominal tenderness.   Musculoskeletal:         General: Normal range of motion.   Skin:     General: Skin is warm.      Capillary Refill: Capillary refill takes less than 2 " seconds.   Neurological:      General: No focal deficit present.      Mental Status: She is alert. Mental status is at baseline.          Results Reviewed:  Lab Results (last 24 hours)       Procedure Component Value Units Date/Time    Blood Gas, Arterial - [[id number]]  (Abnormal) Collected: 01/31/25 1514    Specimen: Arterial Blood Updated: 01/31/25 1515     Site Left Radial     Fabrice's Test N/A     pH, Arterial 7.218 pH units      Comment: 85 Value below critical limit        pCO2, Arterial 64.8 mm Hg      Comment: 83 Value above reference range        pO2, Arterial 92.3 mm Hg      HCO3, Arterial 26.4 mmol/L      Comment: 83 Value above reference range        Base Excess, Arterial -2.2 mmol/L      Comment: 84 Value below reference range        O2 Saturation, Arterial 97.4 %      Temperature 37.0     Barometric Pressure for Blood Gas 746 mmHg      Modality Ventilator     FIO2 50 %      Ventilator Mode AC     Set Tidal Volume 400.000     Set Mech Resp Rate 24.0     PEEP 5.0     Notified By [id number]     Collected by [id number]     Comment: Meter: [id number]     :  [id number]        pCO2, Temperature Corrected 64.8 mm Hg      pH, Temp Corrected 7.218 pH Units      pO2, Temperature Corrected 92.3 mm Hg      PO2/FIO2 185    Blood Gas, Arterial - [[id number]]  (Abnormal) Collected: 01/31/25 1419    Specimen: Arterial Blood Updated: 01/31/25 1420     Site Left Radial     Fabrice's Test N/A     pH, Arterial 7.180 pH units      Comment: 85 Value below critical limit        pCO2, Arterial 72.1 mm Hg      Comment: 86 Value above critical limit        pO2, Arterial 69.7 mm Hg      Comment: 84 Value below reference range        HCO3, Arterial 26.9 mmol/L      Comment: 83 Value above reference range        Base Excess, Arterial -2.6 mmol/L      Comment: 84 Value below reference range        O2 Saturation, Arterial 93.2 %      Comment: 84 Value below reference range        Temperature 37.0     Barometric Pressure for Blood Gas  746 mmHg      Modality Ventilator     FIO2 50 %      Ventilator Mode AC     Set Tidal Volume 400.000     Set Mech Resp Rate 18.0     PEEP 5.0     Notified By [id number]     Collected by [id number]     Comment: Meter: [id number]     :  [id number]        pCO2, Temperature Corrected 72.1 mm Hg      pH, Temp Corrected 7.180 pH Units      pO2, Temperature Corrected 69.7 mm Hg      PO2/FIO2 139    Blood Gas, Arterial - [[id number]]  (Abnormal) Collected: 01/31/25 1055    Specimen: Arterial Blood Updated: 01/31/25 1054     Site Left Brachial     Fabrice's Test N/A     pH, Arterial 7.219 pH units      Comment: 85 Value below critical limit        pCO2, Arterial 62.6 mm Hg      Comment: 83 Value above reference range        pO2, Arterial 94.1 mm Hg      HCO3, Arterial 25.5 mmol/L      Base Excess, Arterial -3.0 mmol/L      Comment: 84 Value below reference range        O2 Saturation, Arterial 97.6 %      Temperature 37.0     Barometric Pressure for Blood Gas 748 mmHg      Modality BiPap     FIO2 50 %      Ventilator Mode BiPAP     Set [facility] Resp Rate 16.0     IPAP 16     Comment: Meter: [id number]     :  [id number]        EPAP 6     Notified By [id number]     Collected by [id number]     pCO2, Temperature Corrected 62.6 mm Hg      pH, Temp Corrected 7.219 pH Units      pO2, Temperature Corrected 94.1 mm Hg      PO2/FIO2 188    Blood Gas, Arterial - [[id number]]  (Abnormal) Collected: 01/31/25 0953    Specimen: Arterial Blood Updated: 01/31/25 0951     Site Left Radial     Fabrice's Test N/A     pH, Arterial 7.152 pH units      Comment: 85 Value below critical limit        pCO2, Arterial 79.6 mm Hg      Comment: 86 Value above critical limit        pO2, Arterial 59.3 mm Hg      Comment: 84 Value below reference range        HCO3, Arterial 27.8 mmol/L      Comment: 83 Value above reference range        Base Excess, Arterial -2.5 mmol/L      Comment: 84 Value below reference range        O2 Saturation, Arterial 85.3 %       Comment: 84 Value below reference range        Temperature 37.0     Barometric Pressure for Blood Gas 748 mmHg      Modality Nasal Cannula     Flow Rate 6.0 lpm      Ventilator Mode NA     Notified By [id number]     Collected by [id number]     Comment: Meter: [id number]     :  [id number]        pCO2, Temperature Corrected 79.6 mm Hg      pH, Temp Corrected 7.152 pH Units      pO2, Temperature Corrected 59.3 mm Hg     Respiratory Panel PCR w/COVID-19(SARS-CoV-2) NABIL/COREY/LULY/PAD/COR/TITO In-House, NP Swab in UTM/VTM, 2 HR TAT - Swab, Nasopharynx [[id number]]  (Abnormal) Collected: 01/31/25 0818    Specimen: Swab from Nasopharynx Updated: 01/31/25 0925     ADENOVIRUS, PCR Not Detected     Coronavirus 229E Not Detected     Coronavirus HKU1 Not Detected     Coronavirus NL63 Not Detected     Coronavirus OC43 Not Detected     COVID19 Not Detected     Human Metapneumovirus Not Detected     Human Rhinovirus/Enterovirus Not Detected     Influenza A H1 2009 PCR Detected     Influenza B PCR Not Detected     Parainfluenza Virus 1 Not Detected     Parainfluenza Virus 2 Not Detected     Parainfluenza Virus 3 Not Detected     Parainfluenza Virus 4 Not Detected     RSV, PCR Not Detected     Bordetella pertussis pcr Not Detected     Bordetella parapertussis PCR Not Detected     Chlamydophila pneumoniae PCR Not Detected     Mycoplasma pneumo by PCR Not Detected    Narrative:      In the setting of a positive respiratory panel with a viral infection PLUS a negative procalcitonin without other underlying concern for bacterial infection, consider observing off antibiotics or discontinuation of antibiotics and continue supportive care. If the respiratory panel is positive for atypical bacterial infection (Bordetella pertussis, Chlamydophila pneumoniae, or Mycoplasma pneumoniae), consider antibiotic de-escalation to target atypical bacterial infection.    Blood Culture - Blood, Hand, Left [[id number]] Collected: 01/31/25 0857    Specimen:  "Blood from Hand, Left Updated: 01/31/25 0909    Procalcitonin [[id number]]  (Abnormal) Collected: 01/31/25 0813    Specimen: Blood Updated: 01/31/25 0907     Procalcitonin 0.31 ng/mL     Narrative:      As a Marker for Sepsis (Non-Neonates):    1. <0.5 ng/mL represents a low risk of severe sepsis and/or septic shock.  2. >2 ng/mL represents a high risk of severe sepsis and/or septic shock.    As a Marker for Lower Respiratory Tract Infections that require antibiotic therapy:    PCT on Admission    Antibiotic Therapy       6-12 Hrs later    >0.5                Strongly Recommended  >0.25 - <0.5        Recommended   0.1 - 0.25          Discouraged              Remeasure/reassess PCT  <0.1                Strongly Discouraged     Remeasure/reassess PCT    As 28 day mortality risk marker: \"Change in Procalcitonin Result\" (>80% or <=80%) if Day 0 (or Day 1) and Day 4 values are available. Refer to http://www.[employer]-pct-calculator.com    Change in PCT <=80%  A decrease of PCT levels below or equal to 80% defines a positive change in PCT test result representing a higher risk for 28-day all-cause mortality of patients diagnosed with severe sepsis for septic shock.    Change in PCT >80%  A decrease of PCT levels of more than 80% defines a negative change in PCT result representing a lower risk for 28-day all-cause mortality of patients diagnosed with severe sepsis or septic shock.       Comprehensive Metabolic Panel [[id number]]  (Abnormal) Collected: 01/31/25 0813    Specimen: Blood Updated: 01/31/25 0901     Glucose 84 mg/dL      BUN 23 mg/dL      Creatinine 1.69 mg/dL      Sodium 137 mmol/L      Potassium 3.9 mmol/L      Chloride 100 mmol/L      CO2 26.0 mmol/L      Calcium 7.8 mg/dL      Total Protein 5.7 g/dL      Albumin 3.3 g/dL      ALT (SGPT) 19 U/L      AST (SGOT) 17 U/L      Alkaline Phosphatase 102 U/L      Total Bilirubin <0.2 mg/dL      Globulin 2.4 gm/dL      A/G Ratio 1.4 g/dL      BUN/Creatinine Ratio 13.6     " Anion Gap 11.0 mmol/L      eGFR 33.0 mL/min/1.73     Narrative:      GFR Categories in Chronic Kidney Disease (CKD)      GFR Category          GFR (mL/min/1.73)    Interpretation  G1                     90 or greater         Normal or high (1)  G2                      60-89                Mild decrease (1)  G3a                   45-59                Mild to moderate decrease  G3b                   30-44                Moderate to severe decrease  G4                    15-29                Severe decrease  G5                    14 or less           Kidney failure          (1)In the absence of evidence of kidney disease, neither GFR category G1 or G2 fulfill the criteria for CKD.    eGFR calculation 2021 CKD-EPI creatinine equation, which does not include race as a factor    Magnesium [[id number]]  (Normal) Collected: 01/31/25 0813    Specimen: Blood Updated: 01/31/25 0901     Magnesium 2.0 mg/dL     Lactic Acid, Plasma [[id number]]  (Normal) Collected: 01/31/25 0813    Specimen: Blood Updated: 01/31/25 0858     Lactate 0.7 mmol/L     CBC & Differential [[id number]]  (Abnormal) Collected: 01/31/25 0813    Specimen: Blood Updated: 01/31/25 0858    Narrative:      The following orders were created for panel order CBC & Differential.  Procedure                               Abnormality         Status                     ---------                               -----------         ------                     CBC Auto Differential[[id number]]        Abnormal            Final result                 Please view results for these tests on the individual orders.    CBC Auto Differential [[id number]]  (Abnormal) Collected: 01/31/25 0813    Specimen: Blood Updated: 01/31/25 0858     WBC 9.14 10*3/mm3      RBC 3.67 10*6/mm3      Hemoglobin 10.9 g/dL      Hematocrit 33.7 %      MCV 91.8 fL      MCH 29.7 pg      MCHC 32.3 g/dL      RDW 13.9 %      RDW-SD 47.4 fl      MPV 10.0 fL      Platelets 220 10*3/mm3     Narrative:      The  previously reported component NRBC is no longer being reported. Previous result was 0.0 /100 WBC (Reference Range: 0.0-0.2 /100 WBC) on 1/31/2025 at 0849 CST.    Manual Differential [[id number]]  (Abnormal) Collected: 01/31/25 0813    Specimen: Blood Updated: 01/31/25 0858     Neutrophil % 81.0 %      Lymphocyte % 5.0 %      Monocyte % 1.0 %      Eosinophil % 0.0 %      Basophil % 0.0 %      Bands %  12.0 %      Atypical Lymphocyte % 1.0 %      Neutrophils Absolute 8.50 10*3/mm3      Lymphocytes Absolute 0.55 10*3/mm3      Monocytes Absolute 0.09 10*3/mm3      Eosinophils Absolute 0.00 10*3/mm3      Basophils Absolute 0.00 10*3/mm3      Anisocytosis Slight/1+     Ovalocytes Slight/1+     Poikilocytes Slight/1+     Vacuolated Neutrophils Slight/1+     Platelet Morphology Normal    Urinalysis With Culture If Indicated - Urine, Catheter [[id number]]  (Abnormal) Collected: 01/31/25 0841    Specimen: Urine, Catheter Updated: 01/31/25 0857     Color, UA Yellow     Appearance, UA Turbid     pH, UA 5.5     Specific Gravity, UA 1.015     Glucose, UA Negative     Ketones, UA Negative     Bilirubin, UA Negative     Blood, UA Trace     Protein, UA 30 mg/dL (1+)     Leuk Esterase, UA Large (3+)     Nitrite, UA Negative     Urobilinogen, UA 0.2 E.U./dL    Narrative:      In absence of clinical symptoms, the presence of pyuria, bacteria, and/or nitrites on the urinalysis result does not correlate with infection.    Urinalysis, Microscopic Only - Urine, Catheter [[id number]]  (Abnormal) Collected: 01/31/25 0841    Specimen: Urine, Catheter Updated: 01/31/25 0857     RBC, UA 0-2 /HPF      WBC, UA Too Numerous to Count /HPF      Bacteria, UA 3+ /HPF      Squamous Epithelial Cells, UA Too Numerous to Count /HPF      Hyaline Casts, UA 3-6 /LPF      Methodology Automated Microscopy    Urine Culture - Urine, Urine, Catheter [[id number]] Collected: 01/31/25 0841    Specimen: Urine, Catheter Updated: 01/31/25 0857    Protime-INR [[id number]]   (Normal) Collected: 01/31/25 0813    Specimen: Blood Updated: 01/31/25 0836     Protime 13.5 Seconds      INR 0.98    Blood Culture - Blood, Arm, Left [[id number]] Collected: 01/31/25 0813    Specimen: Blood from Arm, Left Updated: 01/31/25 0822             XR Chest 1 View    Result Date: 1/31/2025  1. Gastric tube tip projects at the lower esophagus. Consider advancing.  2. Redemonstration and RIGHT hilar mass better demonstrated on prior CT 12/19/2024.  3. RIGHT greater than LEFT basilar interstitial prominence, may represent an acute on chronic process, similar compared to 12/19/2024.  This report was signed and finalized on 1/31/2025 1:13 PM by Dr Cammy Lin MD.      XR Chest 1 View    Result Date: 1/31/2025   1. Stable chest with chronic changes of emphysema and underlying fibrosis and remote granulomatous disease.  This report was signed and finalized on 1/31/2025 10:03 AM by Sigifredo Palomino.      XR Chest 1 View    Result Date: 1/31/2025   1. Chronic changes with emphysema, remote granulomatous disease, and chronic interstitial prominence. 2. No active disease is seen in the chest.  This report was signed and finalized on 1/31/2025 8:40 AM by Sigifredo Palomino.       Result Review:  I have personally reviewed the results from the time of this admission to 1/31/2025 15:37 CST and agree with these findings:  [x]  Laboratory list / accordion  [x]  Microbiology  [x]  Radiology  [x]  EKG/Telemetry   []  Cardiology/Vascular   []  Pathology  []  Old records  []  Other:  Most notable findings include: Creatinine elevated 1.69 with a baseline of 0.7.  The rest of her CMP was fairly unremarkable.  Procalcitonin was mildly elevated at 0.31.  She is mildly anemic with hemoglobin 10.9 and hematocrit 33.7.  Initial ABG showed pH 7.152, pCO2 79.6, pO2 59.3, bicarb 27.8.  Repeat was slightly improved with pH 7.219, pCO2 62.6, pO2 94.1, and bicarb 25.5.  Respiratory panel noted to be positive for influenza A.  Right hilar  mass noted on chest x-ray.  Right greater than left basilar interstitial prominence also noted on chest x-ray.      I have personally reviewed and interpreted the radiology studies and ECG obtained at time of admission.     Assessment / Plan   Assessment:   Active Hospital Problems    Diagnosis     **Acute respiratory failure    69-year-old female with history of metastatic small cell lung cancer, COPD, and thyroid disease among other past medical history admitted on 1/31/2025 after presenting to the ED with fever and dyspnea.  She was initially on BiPAP, but later had to be intubated due to worsening mentation and worsening hypoxia.  She was found to be positive for influenza A.  She was also noted to have an acute kidney injury.  She was admitted to the CCU for further medical management and monitoring.    Treatment Plan  The patient will be admitted to Intensivist service here at [facility].     Acute hypoxemic respiratory failure  -In setting of influenza A  -Patient had to be placed on the ventilator due to worsening mentation and hypoxia  -Blood cultures pending  -Start empiric treatment with Rocephin and azithromycin  -We we will likely begin weaning ventilator as tolerated tomorrow with SATs and SBT's  -Continue to monitor closely in CCU.    Influenza A  -Droplet precautions in place  -Started patient on Tamiflu    Acute kidney injury  -BUN 23 with creatinine 1.69  -Baseline creatinine appears to be near 0.7-0.8  -Monitor urine output  -Avoid hypotension and nephrotoxins  -Daily CMP    Small cell lung cancer  -with invasion of mediastinal structures and SVC syndrome  -Finished 1st round of chemo on 1/29/25  -Oncology following, we appreciate their recommendations    COPD  -Possible exacerbation  -Started patient on Solu-medrol taper  -Monitor      Care Planning  Shared decision making: Patient, attending  Code status and discussions: Full    Disposition  Social Determinants of Health that impact  treatment or disposition: unknown at this time   Estimated length of stay is 3-5 days.     I confirmed that the patient's advanced care plan is present, code status is documented, and a surrogate decision maker is listed in the patient's medical record.     The patient's surrogate decision maker is her granddaughter, Antonia.     Total critical care time: 45 minutes    Due to a high probability of clinically significant, life threatening deterioration, the patient required my highest level of preparedness to intervene emergently and I personally spent this critical care time directly and personally managing the patient.     This critical care time included obtaining a history; examining the patient; pulse oximetry; ordering and review of studies; arranging urgent treatment with development of a management plan; evaluation of patient's response to treatment; frequent reassessment; and, discussions with other providers.    This critical care time was performed to assess and manage the high probability of imminent, life-threatening deterioration that could result in multi-organ failure. It was exclusive of separately billable procedures and treating other patients and teaching time.    Please see MDM section and the rest of the note for further information on patient assessment and treatment.    Part of this note may be an electronic transcription/translation of spoken language to printed text using the Dragon Dictation System    Electronically signed by Francisco Goddard PA-C on 1/31/2025 at 15:45 CST

## 2025-01-31 NOTE — ED NOTES
Nursing report ED to floor  Ashley Gibbs  69 y.o.  female    HPI:   Chief Complaint   Patient presents with    Fever    Weakness - Generalized       Admitting doctor:   Bernardo Rivera MD    Consulting provider(s):  Consults       Date and Time Order Name Status Description    1/31/2025  8:57 AM IP General Consult (Use specialty-specific consult if known)               Admitting diagnosis:   The primary encounter diagnosis was Acute respiratory failure with hypoxia and hypercapnia. Diagnoses of Febrile illness, Influenza A, Acute UTI (urinary tract infection), Altered mental status, unspecified altered mental status type, Malignant neoplasm of lung, unspecified laterality, unspecified part of lung, and Immunosuppressed due to chemotherapy were also pertinent to this visit.    Code status:   Current Code Status       Date Active Code Status Order ID Comments User Context       1/31/2025 1147 CPR (Attempt to Resuscitate) [id number]  Francisco Goddard PA-C ED        Question Answer    Code Status (Patient has no pulse and is not breathing) CPR (Attempt to Resuscitate)    Medical Interventions (Patient has pulse or is breathing) Full Support                    Allergies:   Codeine    Intake and Output    Intake/Output Summary (Last 24 hours) at 1/31/2025 1508  Last data filed at 1/31/2025 1438  Gross per 24 hour   Intake 2600 ml   Output --   Net 2600 ml       Weight:       01/31/25  0802   Weight: 68 kg (149 lb 14.6 oz)       Most recent vitals:   Vitals:    01/31/25 1344 01/31/25 1403 01/31/25 1414 01/31/25 1429   BP: 145/80  115/67 104/66   BP Location:       Patient Position:       Pulse: 107 109 105 102   Resp:  19     Temp:       TempSrc:       SpO2: 99% 99% 99% 99%   Weight:       Height:         Oxygen Therapy: .    Active LDAs/IV Access:   Lines, Drains & Airways       Active LDAs       Name Placement date Placement time Site Days    Peripheral IV 01/31/25 0815 Anterior;Distal;Left Forearm 01/31/25  0815  Forearm   less than 1    Peripheral IV 01/31/25 1230 Posterior;Right Hand 01/31/25  1230  Hand  less than 1    NG/OG Tube Orogastric Left mouth 01/31/25  1440  Left mouth  54 at the lip  less than 1    Urethral Catheter 01/31/25  1215  -- less than 1    ETT  01/31/25  1233  -- less than 1                    Labs (abnormal labs have a star):   Labs Reviewed   RESPIRATORY PANEL PCR W/ COVID-19 (SARS-COV-2), NP SWAB IN UTM/VTP, 2 HR TAT - Abnormal; Notable for the following components:       Result Value    Influenza A H1 2009 PCR Detected (*)     All other components within normal limits    Narrative:     In the setting of a positive respiratory panel with a viral infection PLUS a negative procalcitonin without other underlying concern for bacterial infection, consider observing off antibiotics or discontinuation of antibiotics and continue supportive care. If the respiratory panel is positive for atypical bacterial infection (Bordetella pertussis, Chlamydophila pneumoniae, or Mycoplasma pneumoniae), consider antibiotic de-escalation to target atypical bacterial infection.   COMPREHENSIVE METABOLIC PANEL - Abnormal; Notable for the following components:    Creatinine 1.69 (*)     Calcium 7.8 (*)     Total Protein 5.7 (*)     Albumin 3.3 (*)     eGFR 33.0 (*)     All other components within normal limits    Narrative:     GFR Categories in Chronic Kidney Disease (CKD)      GFR Category          GFR (mL/min/1.73)    Interpretation  G1                     90 or greater         Normal or high (1)  G2                      60-89                Mild decrease (1)  G3a                   45-59                Mild to moderate decrease  G3b                   30-44                Moderate to severe decrease  G4                    15-29                Severe decrease  G5                    14 or less           Kidney failure          (1)In the absence of evidence of kidney disease, neither GFR category G1 or G2 fulfill the criteria for  "CKD.    eGFR calculation 2021 CKD-EPI creatinine equation, which does not include race as a factor   URINALYSIS W/ CULTURE IF INDICATED - Abnormal; Notable for the following components:    Appearance, UA Turbid (*)     Blood, UA Trace (*)     Protein, UA 30 mg/dL (1+) (*)     Leuk Esterase, UA Large (3+) (*)     All other components within normal limits    Narrative:     In absence of clinical symptoms, the presence of pyuria, bacteria, and/or nitrites on the urinalysis result does not correlate with infection.   PROCALCITONIN - Abnormal; Notable for the following components:    Procalcitonin 0.31 (*)     All other components within normal limits    Narrative:     As a Marker for Sepsis (Non-Neonates):    1. <0.5 ng/mL represents a low risk of severe sepsis and/or septic shock.  2. >2 ng/mL represents a high risk of severe sepsis and/or septic shock.    As a Marker for Lower Respiratory Tract Infections that require antibiotic therapy:    PCT on Admission    Antibiotic Therapy       6-12 Hrs later    >0.5                Strongly Recommended  >0.25 - <0.5        Recommended   0.1 - 0.25          Discouraged              Remeasure/reassess PCT  <0.1                Strongly Discouraged     Remeasure/reassess PCT    As 28 day mortality risk marker: \"Change in Procalcitonin Result\" (>80% or <=80%) if Day 0 (or Day 1) and Day 4 values are available. Refer to http://www.[employer]-pct-calculator.com    Change in PCT <=80%  A decrease of PCT levels below or equal to 80% defines a positive change in PCT test result representing a higher risk for 28-day all-cause mortality of patients diagnosed with severe sepsis for septic shock.    Change in PCT >80%  A decrease of PCT levels of more than 80% defines a negative change in PCT result representing a lower risk for 28-day all-cause mortality of patients diagnosed with severe sepsis or septic shock.      CBC WITH AUTO DIFFERENTIAL - Abnormal; Notable for the following components:    " RBC 3.67 (*)     Hemoglobin 10.9 (*)     Hematocrit 33.7 (*)     All other components within normal limits    Narrative:     The previously reported component NRBC is no longer being reported. Previous result was 0.0 /100 WBC (Reference Range: 0.0-0.2 /100 WBC) on 1/31/2025 at 0849 CST.   MANUAL DIFFERENTIAL - Abnormal; Notable for the following components:    Neutrophil % 81.0 (*)     Lymphocyte % 5.0 (*)     Monocyte % 1.0 (*)     Eosinophil % 0.0 (*)     Bands %  12.0 (*)     Neutrophils Absolute 8.50 (*)     Lymphocytes Absolute 0.55 (*)     Monocytes Absolute 0.09 (*)     All other components within normal limits   URINALYSIS, MICROSCOPIC ONLY - Abnormal; Notable for the following components:    WBC, UA Too Numerous to Count (*)     Bacteria, UA 3+ (*)     Squamous Epithelial Cells, UA Too Numerous to Count (*)     All other components within normal limits   BLOOD GAS, ARTERIAL - Abnormal; Notable for the following components:    pH, Arterial 7.152 (*)     pCO2, Arterial 79.6 (*)     pO2, Arterial 59.3 (*)     HCO3, Arterial 27.8 (*)     Base Excess, Arterial -2.5 (*)     O2 Saturation, Arterial 85.3 (*)     pCO2, Temperature Corrected 79.6 (*)     pH, Temp Corrected 7.152 (*)     pO2, Temperature Corrected 59.3 (*)     All other components within normal limits   BLOOD GAS, ARTERIAL - Abnormal; Notable for the following components:    pH, Arterial 7.219 (*)     pCO2, Arterial 62.6 (*)     Base Excess, Arterial -3.0 (*)     pCO2, Temperature Corrected 62.6 (*)     pH, Temp Corrected 7.219 (*)     All other components within normal limits   BLOOD GAS, ARTERIAL - Abnormal; Notable for the following components:    pH, Arterial 7.180 (*)     pCO2, Arterial 72.1 (*)     pO2, Arterial 69.7 (*)     HCO3, Arterial 26.9 (*)     Base Excess, Arterial -2.6 (*)     O2 Saturation, Arterial 93.2 (*)     pCO2, Temperature Corrected 72.1 (*)     pH, Temp Corrected 7.180 (*)     pO2, Temperature Corrected 69.7 (*)     All other  components within normal limits   PROTIME-INR - Normal   LACTIC ACID, PLASMA - Normal   MAGNESIUM - Normal   BLOOD CULTURE   BLOOD CULTURE   URINE CULTURE   BLOOD GAS, ARTERIAL   BLOOD GAS, ARTERIAL   BLOOD GAS, ARTERIAL   BLOOD GAS, ARTERIAL   CBC AND DIFFERENTIAL    Narrative:     The following orders were created for panel order CBC & Differential.  Procedure                               Abnormality         Status                     ---------                               -----------         ------                     CBC Auto Differential[[id number]]        Abnormal            Final result                 Please view results for these tests on the individual orders.       Meds given in ED:   Medications   nitroglycerin (NITROSTAT) SL tablet 0.4 mg (has no administration in time range)   sodium chloride 0.9 % flush 10 mL (10 mL Intravenous Given 1/31/25 1256)   sodium chloride 0.9 % flush 10 mL (has no administration in time range)   sodium chloride 0.9 % infusion 40 mL (has no administration in time range)   mupirocin (BACTROBAN) 2 % nasal ointment 1 Application (has no administration in time range)   Chlorhexidine Gluconate Cloth 2 % pads 1 Application (has no administration in time range)   Chlorhexidine Gluconate Cloth 2 % pads 1 Application (has no administration in time range)   sennosides-docusate (PERICOLACE) 8.6-50 MG per tablet 2 tablet (2 tablets Oral Not Given 1/31/25 1317)     And   polyethylene glycol (MIRALAX) packet 17 g (has no administration in time range)     And   bisacodyl (DULCOLAX) EC tablet 5 mg (has no administration in time range)     And   bisacodyl (DULCOLAX) suppository 10 mg (has no administration in time range)   ipratropium-albuterol (DUO-NEB) nebulizer solution 3 mL (has no administration in time range)   ipratropium-albuterol (DUO-NEB) nebulizer solution 3 mL ( Nebulization Canceled Entry 1/31/25 1630)   methylPREDNISolone sodium succinate (SOLU-Medrol) injection 60 mg (has no  administration in time range)     Followed by   methylPREDNISolone sodium succinate (SOLU-Medrol) injection 40 mg (has no administration in time range)     Followed by   methylPREDNISolone sodium succinate (SOLU-Medrol) injection 20 mg (has no administration in time range)   cefTRIAXone (ROCEPHIN) 1,000 mg in sodium chloride 0.9 % 100 mL MBP (has no administration in time range)   azithromycin (ZITHROMAX) 500 mg in sodium chloride 0.9 % 250 mL IVPB-VTB (500 mg Intravenous New Bag 1/31/25 1329)   oseltamivir (TAMIFLU) capsule 30 mg (30 mg Oral Not Given 1/31/25 1303)   budesonide (PULMICORT) nebulizer solution 0.5 mg (0.5 mg Nebulization Given 1/31/25 1410)   sodium chloride 0.9 % infusion (100 mL/hr Intravenous New Bag 1/31/25 1439)   propofol (DIPRIVAN) infusion 10 mg/mL 100 mL (50 mcg/kg/min × 68 kg Intravenous Rate/Dose Change 1/31/25 1405)   cefepime 2000 mg IVPB in 100 mL NS (MBP) (0 mg Intravenous Stopped 1/31/25 1000)   dexAMETHasone (DECADRON) injection 10 mg (10 mg Intravenous Given 1/31/25 0846)   albuterol (PROVENTIL) nebulizer solution 0.083% 2.5 mg/3mL (2.5 mg Nebulization Given 1/31/25 0949)   ipratropium-albuterol (DUO-NEB) nebulizer solution 3 mL (3 mL Nebulization Given 1/31/25 0950)   lactated ringers bolus 1,000 mL (0 mL Intravenous Stopped 1/31/25 1210)   lactated ringers bolus 1,000 mL (0 mL Intravenous Stopped 1/31/25 1405)   lactated ringers bolus 500 mL (0 mL Intravenous Stopped 1/31/25 1438)   etomidate (AMIDATE) injection 15 mg (15 mg Intravenous Given 1/31/25 1231)   succinylcholine (ANECTINE) injection 200 mg (200 mg Intravenous Given 1/31/25 1232)     propofol, 5-50 mcg/kg/min, Last Rate: 50 mcg/kg/min (01/31/25 1405)  sodium chloride, 100 mL/hr, Last Rate: 100 mL/hr (01/31/25 1439)         NIH Stroke Scale:       Isolation/Infection(s):  No active isolations   Influenza     COVID Testing  Collected .  Resulted .    Nursing report ED to floor:  Mental status: .  Ambulatory status:  .  Precautions: .    ED nurse phone extentsion- ..

## 2025-02-01 ENCOUNTER — APPOINTMENT (OUTPATIENT)
Dept: GENERAL RADIOLOGY | Facility: HOSPITAL | Age: 70
DRG: 208 | End: 2025-02-01
Payer: MEDICARE

## 2025-02-01 LAB
ALBUMIN SERPL-MCNC: 3.1 G/DL (ref 3.5–5.2)
ALBUMIN/GLOB SERPL: 1.2 G/DL
ALP SERPL-CCNC: 119 U/L (ref 39–117)
ALT SERPL W P-5'-P-CCNC: 25 U/L (ref 1–33)
ANION GAP SERPL CALCULATED.3IONS-SCNC: 9 MMOL/L (ref 5–15)
ARTERIAL PATENCY WRIST A: POSITIVE
AST SERPL-CCNC: 25 U/L (ref 1–32)
ATMOSPHERIC PRESS: 757 MMHG
BACTERIA SPEC AEROBE CULT: NORMAL
BASE EXCESS BLDA CALC-SCNC: 0.9 MMOL/L (ref 0–2)
BASOPHILS # BLD MANUAL: 0 10*3/MM3 (ref 0–0.2)
BASOPHILS NFR BLD MANUAL: 0 % (ref 0–1.5)
BDY SITE: ABNORMAL
BILIRUB SERPL-MCNC: <0.2 MG/DL (ref 0–1.2)
BODY TEMPERATURE: 37
BUN SERPL-MCNC: 20 MG/DL (ref 8–23)
BUN/CREAT SERPL: 22.5 (ref 7–25)
CALCIUM SPEC-SCNC: 8 MG/DL (ref 8.6–10.5)
CHLORIDE SERPL-SCNC: 105 MMOL/L (ref 98–107)
CO2 SERPL-SCNC: 25 MMOL/L (ref 22–29)
CREAT SERPL-MCNC: 0.89 MG/DL (ref 0.57–1)
DEPRECATED RDW RBC AUTO: 47.3 FL (ref 37–54)
EGFRCR SERPLBLD CKD-EPI 2021: 71.2 ML/MIN/1.73
EOSINOPHIL # BLD MANUAL: 0 10*3/MM3 (ref 0–0.4)
EOSINOPHIL NFR BLD MANUAL: 0 % (ref 0.3–6.2)
ERYTHROCYTE [DISTWIDTH] IN BLOOD BY AUTOMATED COUNT: 14 % (ref 12.3–15.4)
GLOBULIN UR ELPH-MCNC: 2.6 GM/DL
GLUCOSE SERPL-MCNC: 125 MG/DL (ref 65–99)
HCO3 BLDA-SCNC: 28 MMOL/L (ref 20–26)
HCT VFR BLD AUTO: 32 % (ref 34–46.6)
HGB BLD-MCNC: 10 G/DL (ref 12–15.9)
INHALED O2 CONCENTRATION: 50 %
LYMPHOCYTES # BLD MANUAL: 0 10*3/MM3 (ref 0.7–3.1)
LYMPHOCYTES NFR BLD MANUAL: 0 % (ref 5–12)
Lab: ABNORMAL
MAGNESIUM SERPL-MCNC: 2.1 MG/DL (ref 1.6–2.4)
MCH RBC QN AUTO: 29.2 PG (ref 26.6–33)
MCHC RBC AUTO-ENTMCNC: 31.3 G/DL (ref 31.5–35.7)
MCV RBC AUTO: 93.3 FL (ref 79–97)
MODALITY: ABNORMAL
MONOCYTES # BLD: 0 10*3/MM3 (ref 0.1–0.9)
NEUTROPHILS # BLD AUTO: 10.46 10*3/MM3 (ref 1.7–7)
NEUTROPHILS NFR BLD MANUAL: 100 % (ref 42.7–76)
NEUTS VAC BLD QL SMEAR: ABNORMAL
PCO2 BLDA: 56.4 MM HG (ref 35–45)
PCO2 TEMP ADJ BLD: 56.4 MM HG (ref 35–45)
PEEP RESPIRATORY: 5 CM[H2O]
PH BLDA: 7.3 PH UNITS (ref 7.35–7.45)
PH, TEMP CORRECTED: 7.3 PH UNITS (ref 7.35–7.45)
PHOSPHATE SERPL-MCNC: 3.7 MG/DL (ref 2.5–4.5)
PLAT MORPH BLD: NORMAL
PLATELET # BLD AUTO: 207 10*3/MM3 (ref 140–450)
PMV BLD AUTO: 9.9 FL (ref 6–12)
PO2 BLD: 272 MM[HG] (ref 0–500)
PO2 BLDA: 136 MM HG (ref 83–108)
PO2 TEMP ADJ BLD: 136 MM HG (ref 83–108)
POTASSIUM SERPL-SCNC: 4.7 MMOL/L (ref 3.5–5.2)
PROT SERPL-MCNC: 5.7 G/DL (ref 6–8.5)
QT INTERVAL: 368 MS
QTC INTERVAL: 467 MS
RBC # BLD AUTO: 3.43 10*6/MM3 (ref 3.77–5.28)
RBC MORPH BLD: NORMAL
SAO2 % BLDCOA: 99.2 % (ref 94–99)
SET MECH RESP RATE: 28
SODIUM SERPL-SCNC: 139 MMOL/L (ref 136–145)
VARIANT LYMPHS NFR BLD MANUAL: 0 % (ref 19.6–45.3)
VENTILATOR MODE: AC
VT ON VENT VENT: 400 ML
WBC NRBC COR # BLD AUTO: 10.46 10*3/MM3 (ref 3.4–10.8)

## 2025-02-01 PROCEDURE — 25010000002 PROPOFOL 10 MG/ML EMULSION: Performed by: EMERGENCY MEDICINE

## 2025-02-01 PROCEDURE — 36600 WITHDRAWAL OF ARTERIAL BLOOD: CPT

## 2025-02-01 PROCEDURE — 80053 COMPREHEN METABOLIC PANEL: CPT | Performed by: PHYSICIAN ASSISTANT

## 2025-02-01 PROCEDURE — 94799 UNLISTED PULMONARY SVC/PX: CPT

## 2025-02-01 PROCEDURE — 94003 VENT MGMT INPAT SUBQ DAY: CPT

## 2025-02-01 PROCEDURE — 25010000002 CEFTRIAXONE PER 250 MG: Performed by: PHYSICIAN ASSISTANT

## 2025-02-01 PROCEDURE — 94761 N-INVAS EAR/PLS OXIMETRY MLT: CPT

## 2025-02-01 PROCEDURE — 85007 BL SMEAR W/DIFF WBC COUNT: CPT | Performed by: INTERNAL MEDICINE

## 2025-02-01 PROCEDURE — 82803 BLOOD GASES ANY COMBINATION: CPT

## 2025-02-01 PROCEDURE — 25810000003 SODIUM CHLORIDE 0.9 % SOLUTION 250 ML FLEX CONT: Performed by: PHYSICIAN ASSISTANT

## 2025-02-01 PROCEDURE — 74018 RADEX ABDOMEN 1 VIEW: CPT

## 2025-02-01 PROCEDURE — 85025 COMPLETE CBC W/AUTO DIFF WBC: CPT | Performed by: INTERNAL MEDICINE

## 2025-02-01 PROCEDURE — 25810000003 SODIUM CHLORIDE 0.9 % SOLUTION: Performed by: PHYSICIAN ASSISTANT

## 2025-02-01 PROCEDURE — 25010000002 HEPARIN (PORCINE) PER 1000 UNITS: Performed by: PHYSICIAN ASSISTANT

## 2025-02-01 PROCEDURE — 84100 ASSAY OF PHOSPHORUS: CPT | Performed by: PHYSICIAN ASSISTANT

## 2025-02-01 PROCEDURE — 25010000002 AZITHROMYCIN PER 500 MG: Performed by: PHYSICIAN ASSISTANT

## 2025-02-01 PROCEDURE — 25010000002 METHYLPREDNISOLONE PER 125 MG: Performed by: PHYSICIAN ASSISTANT

## 2025-02-01 PROCEDURE — 83735 ASSAY OF MAGNESIUM: CPT | Performed by: PHYSICIAN ASSISTANT

## 2025-02-01 PROCEDURE — 36415 COLL VENOUS BLD VENIPUNCTURE: CPT | Performed by: PHYSICIAN ASSISTANT

## 2025-02-01 RX ORDER — DEXMEDETOMIDINE HYDROCHLORIDE 4 UG/ML
.2-1.5 INJECTION, SOLUTION INTRAVENOUS
Status: DISCONTINUED | OUTPATIENT
Start: 2025-02-01 | End: 2025-02-04

## 2025-02-01 RX ORDER — PANTOPRAZOLE SODIUM 40 MG/10ML
40 INJECTION, POWDER, LYOPHILIZED, FOR SOLUTION INTRAVENOUS
Status: DISCONTINUED | OUTPATIENT
Start: 2025-02-01 | End: 2025-02-07

## 2025-02-01 RX ADMIN — Medication 10 ML: at 10:31

## 2025-02-01 RX ADMIN — IPRATROPIUM BROMIDE AND ALBUTEROL SULFATE 3 ML: .5; 3 SOLUTION RESPIRATORY (INHALATION) at 10:34

## 2025-02-01 RX ADMIN — HEPARIN SODIUM 5000 UNITS: 5000 INJECTION, SOLUTION INTRAVENOUS; SUBCUTANEOUS at 22:28

## 2025-02-01 RX ADMIN — AZITHROMYCIN DIHYDRATE 500 MG: 500 INJECTION, POWDER, LYOPHILIZED, FOR SOLUTION INTRAVENOUS at 14:13

## 2025-02-01 RX ADMIN — DOCUSATE SODIUM 50 MG AND SENNOSIDES 8.6 MG 2 TABLET: 8.6; 5 TABLET, FILM COATED ORAL at 20:27

## 2025-02-01 RX ADMIN — HEPARIN SODIUM 5000 UNITS: 5000 INJECTION, SOLUTION INTRAVENOUS; SUBCUTANEOUS at 14:13

## 2025-02-01 RX ADMIN — METHYLPREDNISOLONE SODIUM SUCCINATE 60 MG: 125 INJECTION, POWDER, FOR SOLUTION INTRAMUSCULAR; INTRAVENOUS at 22:28

## 2025-02-01 RX ADMIN — DEXMEDETOMIDINE HYDROCHLORIDE 0.2 MCG/KG/HR: 4 INJECTION, SOLUTION INTRAVENOUS at 11:23

## 2025-02-01 RX ADMIN — BUDESONIDE 0.5 MG: 0.5 INHALANT RESPIRATORY (INHALATION) at 19:31

## 2025-02-01 RX ADMIN — CITALOPRAM HYDROBROMIDE 40 MG: 20 TABLET ORAL at 10:30

## 2025-02-01 RX ADMIN — CHLORHEXIDINE GLUCONATE 1 APPLICATION: 500 CLOTH TOPICAL at 03:26

## 2025-02-01 RX ADMIN — Medication 1 APPLICATION: at 20:27

## 2025-02-01 RX ADMIN — Medication 1 APPLICATION: at 10:30

## 2025-02-01 RX ADMIN — PROPOFOL INJECTABLE EMULSION 35 MCG/KG/MIN: 10 INJECTION, EMULSION INTRAVENOUS at 06:10

## 2025-02-01 RX ADMIN — HEPARIN SODIUM 5000 UNITS: 5000 INJECTION, SOLUTION INTRAVENOUS; SUBCUTANEOUS at 05:07

## 2025-02-01 RX ADMIN — SODIUM CHLORIDE 1000 MG: 900 INJECTION INTRAVENOUS at 00:15

## 2025-02-01 RX ADMIN — METHYLPREDNISOLONE SODIUM SUCCINATE 60 MG: 125 INJECTION, POWDER, FOR SOLUTION INTRAMUSCULAR; INTRAVENOUS at 05:06

## 2025-02-01 RX ADMIN — OSELTAMIVIR PHOSPHATE 30 MG: 30 CAPSULE ORAL at 20:27

## 2025-02-01 RX ADMIN — PANTOPRAZOLE SODIUM 40 MG: 40 INJECTION, POWDER, FOR SOLUTION INTRAVENOUS at 10:29

## 2025-02-01 RX ADMIN — METHYLPREDNISOLONE SODIUM SUCCINATE 60 MG: 125 INJECTION, POWDER, FOR SOLUTION INTRAMUSCULAR; INTRAVENOUS at 10:30

## 2025-02-01 RX ADMIN — IPRATROPIUM BROMIDE AND ALBUTEROL SULFATE 3 ML: .5; 3 SOLUTION RESPIRATORY (INHALATION) at 07:02

## 2025-02-01 RX ADMIN — SODIUM CHLORIDE 100 ML/HR: 9 INJECTION, SOLUTION INTRAVENOUS at 10:30

## 2025-02-01 RX ADMIN — PROPOFOL INJECTABLE EMULSION 30 MCG/KG/MIN: 10 INJECTION, EMULSION INTRAVENOUS at 02:21

## 2025-02-01 RX ADMIN — PROPOFOL INJECTABLE EMULSION 40 MCG/KG/MIN: 10 INJECTION, EMULSION INTRAVENOUS at 11:16

## 2025-02-01 RX ADMIN — LEVOTHYROXINE SODIUM 112 MCG: 112 TABLET ORAL at 05:06

## 2025-02-01 RX ADMIN — IPRATROPIUM BROMIDE AND ALBUTEROL SULFATE 3 ML: .5; 3 SOLUTION RESPIRATORY (INHALATION) at 14:21

## 2025-02-01 RX ADMIN — DEXMEDETOMIDINE HYDROCHLORIDE 0.4 MCG/KG/HR: 4 INJECTION, SOLUTION INTRAVENOUS at 23:50

## 2025-02-01 RX ADMIN — IPRATROPIUM BROMIDE AND ALBUTEROL SULFATE 3 ML: .5; 3 SOLUTION RESPIRATORY (INHALATION) at 19:31

## 2025-02-01 RX ADMIN — MIRTAZAPINE 15 MG: 15 TABLET, FILM COATED ORAL at 20:26

## 2025-02-01 RX ADMIN — Medication 10 ML: at 20:27

## 2025-02-01 RX ADMIN — METHYLPREDNISOLONE SODIUM SUCCINATE 60 MG: 125 INJECTION, POWDER, FOR SOLUTION INTRAMUSCULAR; INTRAVENOUS at 16:13

## 2025-02-01 RX ADMIN — SODIUM CHLORIDE 1000 MG: 900 INJECTION INTRAVENOUS at 23:49

## 2025-02-01 RX ADMIN — ROPINIROLE HYDROCHLORIDE 0.25 MG: 0.25 TABLET, FILM COATED ORAL at 20:27

## 2025-02-01 RX ADMIN — PROPOFOL INJECTABLE EMULSION 50 MCG/KG/MIN: 10 INJECTION, EMULSION INTRAVENOUS at 19:05

## 2025-02-01 RX ADMIN — OSELTAMIVIR PHOSPHATE 30 MG: 30 CAPSULE ORAL at 10:30

## 2025-02-01 RX ADMIN — PROPOFOL INJECTABLE EMULSION 50 MCG/KG/MIN: 10 INJECTION, EMULSION INTRAVENOUS at 16:13

## 2025-02-01 RX ADMIN — PROPOFOL INJECTABLE EMULSION 50 MCG/KG/MIN: 10 INJECTION, EMULSION INTRAVENOUS at 23:50

## 2025-02-01 RX ADMIN — BUDESONIDE 0.5 MG: 0.5 INHALANT RESPIRATORY (INHALATION) at 07:02

## 2025-02-01 NOTE — PROGRESS NOTES
RT EQUIPMENT DEVICE RELATED - SKIN ASSESSMENT    Estiven Score:  Estiven Score: 13     RT Medical Equipment/Device:     ETT Santacruz/Anchorfast    Skin Assessment:      Cheek:  Intact  Neck:  Intact  Lips:  Intact  Mouth:  Intact    Device Skin Pressure Protection:  Skin-to-device areas padded:  Anchorfast    Nurse Notification:  Leah Butler, RRT

## 2025-02-01 NOTE — PLAN OF CARE
Goal Outcome Evaluation:  Plan of Care Reviewed With: family, grandchild(melany)        Progress: no change  Outcome Evaluation: Pt received from ER on the ventilator d/t to resp failure. 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 VSS for pt; Pt safety maintained throughout shift.

## 2025-02-01 NOTE — PROGRESS NOTES
[facility] Intensivist Services  INPATIENT PROGRESS NOTE    Patient Name: Ashley Gibbs  Date of Admission: 1/31/2025  Today's Date: 02/01/25  Length of Stay:  LOS: 1 day   Primary Care Physician: Padmaja Bermudez APRN  Next of Kin: Primary Emergency Contact: SELAM SANCHEZ, Darnell Phone: [phone]      Subjective   Chief Complaint: Dyspnea and fever  Fever     Weakness - Generalized  Symptoms include a fever.       69-year-old female with a past medical history of COPD, thyroid disease, restless leg syndrome, and stage IV small cell lung cancer admitted after presenting to the ED on 1/31/2025 with complaints of fever and dyspnea.  She also had generalized weakness and fatigue.  She states she came to the ER as she was told to come to the ER by her oncologist if she spiked a fever.  She recently underwent her first round of chemotherapy with her last treatment being on Wednesday, January 29, 2025.  The patient tells me she continues to vape.  Her respiratory status declined while she was in the ER and she had to be placed on BiPAP.  She initially showed some improvement; however, she became more confused, trying to take off her BiPAP.  She also remained fairly hypoxic.  For these reasons, she was emergently intubated in the ED.     Significant workup from the emergency department includes the following: Creatinine elevated 1.69 with a baseline of 0.7.  The rest of her CMP was fairly unremarkable.  Procalcitonin was mildly elevated at 0.31.  She is mildly anemic with hemoglobin 10.9 and hematocrit 33.7.  Initial ABG showed pH 7.152, pCO2 79.6, pO2 59.3, bicarb 27.8.  Repeat was slightly improved with pH 7.219, pCO2 62.6, pO2 94.1, and bicarb 25.5.  Respiratory panel noted to be positive for influenza A.  Right hilar mass noted on chest x-ray.  Right greater than left basilar interstitial prominence also noted on chest x-ray.     I saw the patient while she was still in the  emergency department, prior to intubation.  She expressed to me that she had developed a fever to 101 °F, and she was instructed to come to the ER if she spiked a fever.  She tells me that she first noticed the fever this morning.  She has also had some fatigue and generalized weakness.  She has had some dyspnea with this as well.     Patient is to be admitted to the CCU for further medical management and close monitoring.    Interval history:  2/1/2025: Patient remains intubated and sedated.  We attempted SAT this morning.  However, patient was unable to tolerate weaning from sedation today.  She became very tachypneic and tachycardic.  Therefore, she was placed back on sedation.  She is currently on propofol and fentanyl.  We are adding Precedex to see if she would be able to wean better from this medication.  She is currently on minimal vent settings, FiO2 30% and PEEP of 5.  RAKEL has significantly improved.  Creatinine down to 0.89 today.    Review of Systems   Constitutional:  Positive for fever.      All pertinent negatives and positives are as above. All other systems have been reviewed and are negative unless otherwise stated.     Past Medical and Past Surgical History   Active and Resolved Problems  Active Hospital Problems    Diagnosis  POA    **Acute respiratory failure [J96.00]  Yes    Influenza A [J10.1]  Yes    Small cell lung cancer [C34.90]  Yes      Resolved Hospital Problems   No resolved problems to display.       Past Medical History:   Past Medical History:   Diagnosis Date    Anxiety     Arthritis     Asthma     Back pain     COPD (chronic obstructive pulmonary disease)     Dependence on supplemental oxygen     Depression     Disease of thyroid gland     Fibromyalgia, primary     Headache     History of degenerative disc disease     Hypertension     Hypothyroidism     Low back pain     Restless leg     Scoliosis      Past Surgical History:   Past Surgical History:   Procedure Laterality Date     APPENDECTOMY      BREAST BIOPSY Left     BRONCHOSCOPY Right 12/23/2024    Procedure: BRONCHOSCOPY WITH ENDOBRONCHIAL ULTRASOUND;  Surgeon: Peterson Morales MD;  Location: [facility];  Service: Pulmonary;  Laterality: Right;  pre: lung mass  post: lung mass    CHOLECYSTECTOMY      TUBAL ABDOMINAL LIGATION       Social and Family History   Family History:  family history includes Arthritis in her mother; COPD in her father; Diabetes in her father; Heart disease in her father; Hypertension in her mother.    Tobacco/Social History:  reports that she quit smoking about 16 years ago. Her smoking use included cigarettes. She started smoking about 56 years ago. She has a 40 pack-year smoking history. She has been exposed to tobacco smoke. She has never used smokeless tobacco. She reports that she does not drink alcohol and does not use drugs.    Allergies   Allergies:   She is allergic to codeine.    Objective    Temp:  [98.3 °F (36.8 °C)-99 °F (37.2 °C)] 98.3 °F (36.8 °C)  Heart Rate:  [] 67  Resp:  [18-28] 25  BP: ()/() 102/58  FiO2 (%):  [30 %-60 %] 30 %  Physical Exam  Vitals and nursing note reviewed. Exam conducted with a chaperone present.   Constitutional:       Appearance: She is ill-appearing.      Interventions: She is sedated and intubated.   HENT:      Head: Normocephalic.      Nose: Nose normal.   Eyes:      Pupils: Pupils are equal, round, and reactive to light.   Cardiovascular:      Rate and Rhythm: Regular rhythm. Tachycardia present.      Pulses: Normal pulses.   Pulmonary:      Effort: She is intubated.      Breath sounds: Wheezing and rhonchi present.      Comments: Scattered wheezing and rhonchi heard throughout lung fields.  Abdominal:      General: Bowel sounds are normal.      Palpations: Abdomen is soft.      Tenderness: There is no abdominal tenderness.   Musculoskeletal:         General: Normal range of motion.      Cervical back: Neck supple.   Skin:     General: Skin is  warm.      Capillary Refill: Capillary refill takes less than 2 seconds.   Neurological:      Comments: Patient sedated. AREVALO, but not following commands.          Inpatient Medications   Medications: Scheduled Meds:azithromycin, 500 mg, Intravenous, Q24H  budesonide, 0.5 mg, Nebulization, BID - RT  cefTRIAXone, 1,000 mg, Intravenous, Q24H  Chlorhexidine Gluconate Cloth, 1 Application, Topical, Q24H  citalopram, 40 mg, Oral, Daily  heparin (porcine), 5,000 Units, Subcutaneous, Q8H  ipratropium-albuterol, 3 mL, Nebulization, 4x Daily - RT  levothyroxine, 112 mcg, Oral, Q AM  methylPREDNISolone sodium succinate, 60 mg, Intravenous, Q6H   Followed by  [START ON 2/2/2025] methylPREDNISolone sodium succinate, 40 mg, Intravenous, Q8H   Followed by  [START ON 2/5/2025] methylPREDNISolone sodium succinate, 20 mg, Intravenous, Q12H  mirtazapine, 15 mg, Oral, Nightly  mupirocin, 1 Application, Each Nare, BID  oseltamivir, 30 mg, Oral, Q12H  pantoprazole, 40 mg, Intravenous, Q AM  rOPINIRole, 0.25 mg, Oral, Nightly  senna-docusate sodium, 2 tablet, Oral, BID  sodium chloride, 10 mL, Intravenous, Q12H      Continuous Infusions:dexmedetomidine, 0.2-1.5 mcg/kg/hr, Last Rate: 0.2 mcg/kg/hr (02/01/25 1123)  fentanyl 10 mcg/mL,  mcg/hr, Last Rate: 75 mcg/hr (02/01/25 1049)  propofol, 5-50 mcg/kg/min, Last Rate: 40 mcg/kg/min (02/01/25 1116)  sodium chloride, 100 mL/hr, Last Rate: 100 mL/hr (02/01/25 1030)      PRN Meds:.  senna-docusate sodium **AND** polyethylene glycol **AND** bisacodyl **AND** bisacodyl    ipratropium-albuterol    nitroglycerin    sodium chloride    sodium chloride    I have reviewed the patient's current medications.   Outpatient Medications     Current Outpatient Medications   Medication Instructions    acetaminophen (TYLENOL 8 HOUR) 650 mg, Oral, Every 8 Hours PRN    cholecalciferol (VITAMIN D3) 1,000 Units, Daily    citalopram (CELEXA) 40 mg, Oral, Daily    diazePAM (VALIUM) 5 mg, Oral, Every 12 Hours  PRN    Fluticasone-Umeclidin-Vilant (Trelegy Ellipta) 100-62.5-25 MCG/ACT inhaler 1 puff, Inhalation, Daily - RT    gabapentin (NEURONTIN) 600 mg, Oral, Every 12 Hours Scheduled    HYDROcodone-acetaminophen (NORCO)  MG per tablet Take 1 tablet by mouth 3 (Three) Times a Day.    ipratropium-albuterol (DUO-NEB) 0.5-2.5 mg/3 ml nebulizer 3 mL, Nebulization, Every 4 Hours PRN    levothyroxine (SYNTHROID, LEVOTHROID) 112 mcg, Oral, Daily    mirtazapine (REMERON) 15 mg, Oral, Every Night at Bedtime    ondansetron (ZOFRAN) 4 mg, Oral, Every 8 Hours PRN    potassium chloride 10 MEQ CR tablet 10 mEq, Oral, 2 Times Daily    rizatriptan (MAXALT) 10 MG tablet Take 1 tablet by mouth 1 (One) Time As Needed for Migraine.    rOPINIRole (REQUIP) 0.25 mg, Oral, Every Night at Bedtime    tiZANidine (ZANAFLEX) 2 mg, 2 Times Daily PRN    tobramycin 0.3 % solution ophthalmic solution 2 drops, Both Eyes, Every 4 Hours While Awake    Ventolin  (90 Base) MCG/ACT inhaler 2 puffs, Inhalation, Every 4 Hours PRN    vitamin D (ERGOCALCIFEROL) 50,000 Units, Oral, Weekly       Current Antibiotics   azithromycin 500 mg IVPB in 250 mL NS (VTB)  cefTRIAXone (ROCEPHIN) 1000 mg IVPB in 100 mL NS (MBP)  oseltamivir - 30 MG    Results:   CBC:      Lab 02/01/25  0254 01/31/25  0813 01/31/25  0813 01/27/25  1120   WBC 10.46   < > 9.14 5.35   HEMOGLOBIN 10.0*   < > 10.9* 12.9   HEMATOCRIT 32.0*   < > 33.7* 39.4   PLATELETS 207  --  220 260   NEUTROS ABS 10.46*  --  8.50* 3.65   LYMPHS ABS  --   --   --  1.01*   MONOS ABS  --   --   --  0.47   EOS ABS 0.00  --  0.00 0.18   MCV 93.3  --  91.8 91.2    < > = values in this interval not displayed.     LIVER FUNCTION TESTS:      Lab 02/01/25  0254 01/31/25  0813 01/27/25  1120   TOTAL PROTEIN 5.7* 5.7* 6.6   ALBUMIN 3.1* 3.3* 3.7   GLOBULIN 2.6 2.4  --    ALT (SGPT) 25 19 11   AST (SGOT) 25 17 21   BILIRUBIN <0.2 <0.2 <0.2   ALK PHOS 119* 102 116*     ABG:      Lab 02/01/25  0427 02/01/25  0254  01/31/25  1514 01/31/25  1419 01/31/25  1055 01/31/25  0953 01/31/25  0813 01/27/25  1120   PH, ARTERIAL 7.305*  --  7.218* 7.180* 7.219* 7.152*  --   --    PO2 .0*  --  92.3 69.7* 94.1 59.3*  --   --    PCO2, ARTERIAL 56.4*  --  64.8* 72.1* 62.6* 79.6*  --   --    HCO3 ART 28.0*  --  26.4* 26.9* 25.5 27.8*  --   --    ANION GAP  --  9.0  --   --   --   --  11.0 7     BMP/MG/PHOS:      Lab 02/01/25  0254 01/31/25  0813 01/27/25  1120   SODIUM 139 137 141   POTASSIUM 4.7 3.9 3.9   CHLORIDE 105 100 100   BUN 20 23 10   CREATININE 0.89 1.69* 0.7   CALCIUM 8.0* 7.8* 9.3   MAGNESIUM 2.1 2.0  --    PHOSPHORUS 3.7  --   --      IMAGING STUDIES:  XR Chest 1 View    Result Date: 1/31/2025  1. Gastric tube tip projects at the lower esophagus. Consider advancing.  2. Redemonstration and RIGHT hilar mass better demonstrated on prior CT 12/19/2024.  3. RIGHT greater than LEFT basilar interstitial prominence, may represent an acute on chronic process, similar compared to 12/19/2024.  This report was signed and finalized on 1/31/2025 1:13 PM by Dr Cammy Lin MD.      XR Chest 1 View    Result Date: 1/31/2025   1. Stable chest with chronic changes of emphysema and underlying fibrosis and remote granulomatous disease.  This report was signed and finalized on 1/31/2025 10:03 AM by Sigifredo Palomino.      XR Chest 1 View    Result Date: 1/31/2025   1. Chronic changes with emphysema, remote granulomatous disease, and chronic interstitial prominence. 2. No active disease is seen in the chest.  This report was signed and finalized on 1/31/2025 8:40 AM by Sigifredo Palomino.       CULTURE DATA:   Blood Culture   Date Value Ref Range Status   01/31/2025 No growth at 24 hours  Preliminary   01/31/2025 No growth at 24 hours  Preliminary        Assessment/Plan   69-year-old female with history of metastatic small cell lung cancer, COPD, and thyroid disease among other past medical history admitted on 1/31/2025 after presenting to the  ED with fever and dyspnea. She was initially on BiPAP, but later had to be intubated due to worsening mentation and worsening hypoxia. She was found to be positive for influenza A. She was also noted to have an acute kidney injury. She was admitted to the CCU for further medical management and monitoring.     Acute hypoxemic respiratory failure   -In setting of influenza A, hx of COPD, and SCLC  -Patient remains on vent, FiO2 30% and PEEP 5  -Remains on empiric treatment with Rocephin and Azithromycin  -Failed SAT today. We will attempt SAT and SBT again tomorrow  -Continue to monitor closely in CCU  -Sedated on precedex, propofol, and fentanyl  -Continue neb treatments, IS, pulmicort  -Blood cultures showing no growth at this time    Influenza A  -Remains on tamiflu  -Droplet precautions in place    Acute kidney injury  -Significantly improved  -Cr down to 0.89 today, likely at/near baseline  -Monitor UOP  -Avoid hypotension and nephrotoxins    Small cell lung cancer  -with invasion of mediastinal structures and SVC syndrome  -Finished 1st round of chemo on 1/29/25  -Oncology following, we appreciate their recommendations    COPD  -Possible exacerbation  -Remains on Solu-medrol taper      CODE STATUS: No CPR  VTE prophylaxis: heparin  GI prophylaxis: protonix  Antibiotics: Rocephin, Azithromycin     Disposition: Critical care management     Total critical care time: 37 minutes    Due to a high probability of clinically significant, life threatening deterioration, the patient required my highest level of preparedness to intervene emergently and I personally spent this critical care time directly and personally managing the patient.     This critical care time included obtaining a history; examining the patient; pulse oximetry; ordering and review of studies; arranging urgent treatment with development of a management plan; evaluation of patient's response to treatment; frequent reassessment; and, discussions with  other providers.    This critical care time was performed to assess and manage the high probability of imminent, life-threatening deterioration that could result in multi-organ failure. It was exclusive of separately billable procedures and treating other patients and teaching time.    Please see MDM section and the rest of the note for further information on patient assessment and treatment.    Part of this note may be an electronic transcription/translation of spoken language to printed text using the Dragon Dictation System    Electronically signed by Francisco Goddard PA-C on 2/1/2025 at 12:07 CST

## 2025-02-01 NOTE — PLAN OF CARE
Goal Outcome Evaluation: Patient remains stable on current ventilator settings. Was able to decrease FiO2 from  50% to 30%. No further changes at this time.    Problem: Mechanical Ventilation Invasive  Goal: Effective Communication  Outcome: Progressing  Goal: Optimal Device Function  Outcome: Progressing  Intervention: Optimize Device Care and Function  Recent Flowsheet Documentation  Taken 2/1/2025 0424 by Loretta Butler RRT  Airway/Ventilation Management: airway patency maintained  Airway Safety Measures:   mask valve resuscitator at bedside   manual resuscitator/mask at bedside   oxygen flowmeter at bedside   suction at bedside  Taken 1/31/2025 2337 by Loretta Butler RRT  Airway/Ventilation Management: airway patency maintained  Airway Safety Measures:   mask valve resuscitator at bedside   manual resuscitator/mask at bedside   oxygen flowmeter at bedside   suction at bedside  Taken 1/31/2025 1956 by Loretta Butler RRT  Airway/Ventilation Management: airway patency maintained  Airway Safety Measures:   mask valve resuscitator at bedside   manual resuscitator/mask at bedside   oxygen flowmeter at bedside   suction at bedside  Goal: Mechanical Ventilation Liberation  Outcome: Progressing  Goal: Optimal Nutrition Delivery  Outcome: Progressing  Goal: Absence of Device-Related Skin and Tissue Injury  Outcome: Progressing  Intervention: Maintain Skin and Tissue Health  Recent Flowsheet Documentation  Taken 2/1/2025 0424 by Loretat Butler RRT  Device Skin Pressure Protection:   skin-to-device areas padded   tubing/devices free from skin contact  Taken 1/31/2025 2337 by Loretta Butler RRT  Device Skin Pressure Protection:   skin-to-device areas padded   tubing/devices free from skin contact  Taken 1/31/2025 1956 by Loretta Butler RRT  Device Skin Pressure Protection:   skin-to-device areas padded   tubing/devices free from skin contact  Goal: Absence of Ventilator-Induced Lung Injury  Outcome:  Progressing  Intervention: Prevent Ventilator-Associated Pneumonia  Recent Flowsheet Documentation  Taken 2/1/2025 0424 by Loretta Butler RRT  Head of Bed (HOB) Positioning: HOB at 30 degrees  Taken 1/31/2025 2337 by Loretta Butler RRT  Head of Bed (HOB) Positioning: HOB at 30 degrees  Taken 1/31/2025 1956 by Loretta Butler RRT  Head of Bed (HOB) Positioning: HOB at 30-45 degrees     Problem: Noninvasive Ventilation Acute  Goal: Effective Unassisted Ventilation and Oxygenation  Intervention: Monitor and Manage Noninvasive Ventilation  Recent Flowsheet Documentation  Taken 2/1/2025 0424 by Loretta Butler RRT  Airway/Ventilation Management: airway patency maintained  Taken 1/31/2025 2337 by Loretta Butler RRT  Airway/Ventilation Management: airway patency maintained  Taken 1/31/2025 1956 by Loretta Butler RRT  Airway/Ventilation Management: airway patency maintained     Problem: Skin Injury Risk Increased  Goal: Skin Health and Integrity  Intervention: Optimize Skin Protection  Recent Flowsheet Documentation  Taken 2/1/2025 0424 by Loretta Butler RRT  Head of Bed (HOB) Positioning: HOB at 30 degrees  Taken 1/31/2025 2337 by Loretta Butler RRT  Head of Bed (HOB) Positioning: HOB at 30 degrees  Taken 1/31/2025 1956 by Loretta Butler RRT  Head of Bed (HOB) Positioning: HOB at 30-45 degrees

## 2025-02-01 NOTE — PLAN OF CARE
Goal Outcome Evaluation:  Plan of Care Reviewed With: patient        Progress: improving  Outcome Evaluation: pt. localizing at times. Opens eyes. Moves all ext. Sedated on vent. Fentanyl and propofol running along with NS. Soft bp. Good UO. Remains in restraints.

## 2025-02-01 NOTE — PROGRESS NOTES
HEMATOLOGY AND MEDICAL ONCOLOGY PROGRESS NOTE    Patient name: Ashley Gibbs  Patient : 1955  VISIT # [id number]  MR #[id number]  Room:     SUBJECTIVE:Afebrile. She is intubated and mechanically ventilated with FiO2 30%, PEEP 5.  Family met at bedside.  She has remained afebrile here.  She is sedated on fentanyl and propofol.    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 Rico Vanegas.     She received cycle #1, day #1 of  carboplatin/-16 on 2025-25.       Palliative emergent XRT of the chest consisted of 1250 cGy in 5 treatment fractions between 2024 and 2024 by Dr. Rico Vanegas at [facility] for the associated SVC due to tumor..     Treatment regimen-curative intent consisted of:  Carboplatin AUC 5 day 1  Etoposide 100 mg/m² days 1-3 q. 21 days  Plan is for X 4 cycles      Ashley was seen this morning, 2025, in the ED, at [facility] accompanied by 2 granddaughters and her good friend.  The granddaughter reports a fever to 101 degrees, increased difficulty with breathing shortness of breath and wheezing.     CBC Results   CBC            2024    08:49 2025    11:20 2025    08:13   CBC   WBC 4.51  5.35     9.14    RBC 3.85  4.32     3.67    Hemoglobin 11.5  12.9     10.9    Hematocrit 35.2  39.4     33.7    MCV 91.4  91.2     91.8    MCH 29.9  29.9     29.7    MCHC 32.7  32.7     32.3    RDW 13.0  12.9     13.9    Platelets 199  260     220         Details           This result is from an external source.                         UA today, 2025 showed 3+ leukocytes, 30 mg/dL protein WBCs too numerous to count, bacteria 3+, nitrite negative.     Respiratory panel by PCR today, 2025 was positive for influenza A H1-Detected  The patient has been desaturating while in the ED, requiring BiPAP assistance.  Blood and urine  cultures were obtained.  Antibiotic with cefepime is initiated.     Medical oncology consultation requested         PAST TUMOR HISTORY COPIED FROM DR. CARDONA'S 1/13/2025 OFFICE VISIT:   HISTORY OF PRESENT ILLNESS:    Diagnosis  Small cell lung cancer, right lung, Dec 2024  Stage  [id number], limited stage     Treatment Summary  12/13/24 - 12/19/24 Emergent palliative radiation therapy 1250cGy in 5 fractions to right lung  Anticipate chemotherapy with Carboplatin D1, Etoposide D1-3 every 21 days x4 cycles  Anticipate radiation therapy to right lung with Dr Rico Vanegas/[facility] Radiation Oncology     Cancer History  Ms. 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 She has complaints today of fatigue. She states she has stabbing chest pain and mid back pain. She is currently being seen at pain management for pain medication. She presents today on 2L of oxygen dependently.   11/8/24 CT chest low dose ([facility]): 2 cm right upper lobe bilobed nodule with associated mediastinal and right hilar lymphadenopathy, suspicious for malignancy. Minimal adjacent   right upper lobe interlobular septal thickening may be related to venous or lymphatic congestion, however lymphangitic carcinomatosis is an additional consideration. Recommend further evaluation with PET/CT and/or tissue sampling. CT chest with contrast may also be of value to help distinguish the lymph nodes from adjacent vasculature. Minimal tree-in-bud nodularity in the peripheral the right upper lobe, likely mild infectious process.   Additional small pulmonary nodules in the right upper and right middle lobe.   11/8/24 CT abd/pelvis ([facility]): Near complete resolution of the exophytic left upper pole renal lesion of concern likely related to interval rupture/involution. Numerous small bilateral renal cysts are again present.  Mild diffuse wall thickening of the rectum and the distal  transverse colon and proximal descending colon. Correlate for proctocolitis.   12/13/24 PET/CT scan ([facility]): There is a hypermetabolic right upper lobe nodule measures 1.4 cm and has a maximum SUV of 5.5. Right upper lobe suprahilar mass measures at least 8 cm in greatest axial dimension is hypermetabolic with a maximum SUV of 11.4. Mass either invades the middle mediastinum or there is conglomerate hypermetabolic lymphadenopathy. No additional hypermetabolic pulmonary nodule or mass is identified. There is no increased skeletal uptake to suggest osseous metastasis.   12/13/245 CT chest ([facility]): 2.2 cm bilobed right upper lobe pulmonary nodule, likely representing a primary lung neoplasm. 8.9 cm right hilar lung mass with diffuse mediastinal infiltration, likely representing mary metastasis. This mass encases the right mainstem bronchus and encases the right main pulmonary artery, with occlusion of the right upper lobe pulmonary artery. This mass compresses the SVC, which is slitlike. Patient is at risk for SVC syndrome.  12/13/24 Initial evaluation by Dr Rico Vanegas/[facility] Radiation Oncology for superior vena cava syndrome who recommended emergent palliative radiation therapy 1250cGy in 5 fractions.  12/13/24 - 12/19/24 Emergent SBRT 1250cGy in 5 fractions to right lung  12/19/24 CT chest w/o ([facility]): Stable large right hilar mass with mediastinal invasion and confluent lymphadenopathy as detailed above. Findings are suspicious for small cell carcinoma. There is increased groundglass opacities and interlobular septal thickening within the right upper lobe and right middle lobe. Differential includes postobstructive changes and/or leptomeningeal carcinomatosis. Stable 2.2 cm right upper lobe bilobed nodule which was previously hypermetabolic and concerning for metastases. Trace right pleural effusion.   12/23/24 Navigational bronchoscopy/EBUS by Dr Peterson Morales/[facility] Pulmonology  12/23/24 Station 7 lymph node, endobronchial  "ultrasound-guided fine-needle aspiration, smear (1) and cellblock: High-grade neuroendocrine carcinoma consistent with metastatic small cell carcinoma. Fragments of lymphoid tissue and cartilage present.  Station 4R lymph node, endobronchial ultrasound-guided fine-needle aspiration, smear (1) and cellblock: High-grade neuroendocrine carcinoma consistent with metastatic small cell carcinoma. Many background lymphocytes.  Bronchial washings, ThinPrep preparation (1) and cellblock: A few malignant cells present, high-grade neuroendocrine carcinoma consistent with small cell carcinoma. Several macrophages. A few benign squamous epithelial cells and bronchial epithelial cells. AJCC stage: pTX pNX  1/13/2025-essentially, limited stage small cell lung cancer.  Awaiting results of brain MRI.  Recommend concurrent chemoradiation with carboplatin 2 post right to be followed by adjuvant durvalumab.  1/27/2025-initiation cycle #1 of chemotherapy with carboplatin/-16           PHYSICAL EXAM:/62   Pulse 106   Temp 98.9 °F (37.2 °C) (Oral)   Resp 28   Ht 160 cm (63\")   Wt 68 kg (149 lb 14.6 oz)   SpO2 (!) 88%   BMI 26.56 kg/m²   CONSTITUTIONAL:sedated, ill-appearing  EYES: Anicteric, EOM intact, pupils equal round   CHEST/LUNGS: CTA, Mechanical ventilator, PEEP 5, FiO2 30%.  CARDIOVASCULAR: RRR, no murmurs  ABDOMEN: soft, active bowel sounds, no HSM  EXTREMITIES: warm,   SKIN: warm,   NEUROLOGIC-sedated      CBC  Results from last 7 days   Lab Units 02/01/25  0254 01/31/25  0813 01/27/25  1120   WBC 10*3/mm3 10.46 9.14 5.35   HEMOGLOBIN g/dL 10.0* 10.9* 12.9   HEMATOCRIT % 32.0* 33.7* 39.4   PLATELETS 10*3/mm3 207 220 260         Lab Results   Component Value Date     02/01/2025    K 4.7 02/01/2025     02/01/2025    CO2 25.0 02/01/2025    BUN 20 02/01/2025    CREATININE 0.89 02/01/2025    GLUCOSE 125 (H) 02/01/2025    CALCIUM 8.0 (L) 02/01/2025    BILITOT <0.2 02/01/2025    ALKPHOS 119 (H) 02/01/2025    " AST 25 02/01/2025    ALT 25 02/01/2025    AGRATIO 1.2 02/01/2025    GLOB 2.6 02/01/2025       Lab Results   Component Value Date    INR 0.98 01/31/2025    INR 1.04 07/24/2019    INR 0.90 03/13/2019    PROTIME 13.5 01/31/2025    PROTIME 13 07/24/2019    PROTIME 11.6 (L) 03/13/2019       ASSESSMENT/PLAN:  # Limited stage small cell lung cancer-status post completion of cycle 1 carboplatin etoposide 1/29/2025.  Status post palliative XRT to the [facility].  Plans for further XRT with Dr. Rico Vanegas.    # COPD exacerbation with hypoxemia-secondary to influenza A.  Chest x-ray showed chronic changes of emphysema and fibrosis.  Continue current supportive care.    #Normocytic anemia- Hb 10.  Monitor    Plan:  Continue current supportive care  Continue antiviral Tamiflu      Future Appointments    Encounter Information   Provider Department Center   2/17/2025 11:30 AM Christopher Isaac MD [id number]   2/17/2025 11:30 AM INFUSION SCHEDULE, [facility]   2/18/2025 12:00 PM INFUSION SCHEDULE, [facility]   2/19/2025 11:30 AM INFUSION SCHEDULE, [facility] Aide Isaac MD    02/01/25  08:20 CST

## 2025-02-01 NOTE — CONSULTS
Inpatient Nutrition Services  Tube Feeding Assessment  Patient Name:  Ashley Gibbs  YOB: 1955  MRN: [id number]  Admit Date:  1/31/2025    Please confirm placement; KUB 1/31 recommending further advancement of enteral access.  Start Peptamen Intense VHP when tube placement confirmed and safe for use. Begin feeding at 25 mL/hr. Increase 10 mL every 8 hr as tolerated. Final goal rate 55 mL/hr. Free water 45mL/hr.      Reason for Assessment       Row Name 02/01/25 1605          Reason for Assessment    Reason For Assessment nurse/nurse practitioner consult;TF/PN     Diagnosis infection/sepsis;renal disease;cancer diagnosis/related complications          Nutrition/Diet History       Row Name 02/01/25 1605          Nutrition/Diet History    Typical Intake (Food/Fluid/EN/PN) ETT OGT. KUB 1/31 recommended further advancement. RN notified. No skin breakdown. Propofol 16.32mL/hr; has received 522 calories from sedation since admission. No BM since admit. No skin breakdown. Tmax 37.2 Celcius. Will initiate enteral feedings and monitor per protocol.     Food Intolerance(s) NKFA per EHR     Factors Affecting Nutritional Intake restricted diet;cognitive status/motor function;enteral/parenteral device(s);respiratory difficulty/therapies          Labs/Tests/Procedures/Meds       Row Name 02/01/25 1607          Labs/Procedures/Meds    Lab Results Reviewed reviewed     Lab Results Comments ALP        Diagnostic Tests/Procedures    Diagnostic Test/Procedure Reviewed reviewed        Medications    Pertinent Medications Reviewed reviewed     Pertinent Medications Comments See MAR            Physical Findings       Row Name 02/01/25 1608          Physical Findings    Overall Physical Appearance ETT, OGT, Estiven Score 14, no edema, no skin breakdown, no BM since admission.            Estimated/Assessed Needs - Anthropometrics       Row Name 02/01/25 1607          Anthropometrics    Weight 68 kg (149 lb 14.6 oz)     Age for  "Calculations 69     VE (L/min) for Calculation 10.4     Tmax (Celcius) for Calculation 37.2     Height for Calculation 1.6 m (5' 3\")     Calculation Weight 68 kg (149 lb 14.6 oz)        Estimated/Assessed Needs    Additional Documentation Fluid Requirements (Group);KCAL/KG (Group);Protein Requirements (Group)        KCAL/KG    KCAL/KG 25 Kcal/Kg (kcal)     25 Kcal/Kg (kcal) 1700        [place] Equation    RMR ([place] Equation) 1459.56        Modified [place] Equation    RMR (Modified [place] Equation) 1583.33        Protein Requirements    Weight Used For Protein Calculations 68 kg (149 lb 14.6 oz)     Est Protein Requirement Amount (gms/kg) 1.5 gm protein     Estimated Protein Requirements (gms/day) 102        Fluid Requirements    Fluid Requirements (mL/day) 2040          Nutrition Prescription Ordered       Row Name 02/01/25 1610          Nutrition Prescription PO    Current PO Diet NPO;Other (comment)  strict        Propofol Considerations    Propofol (mL/hr) 16.32 mL/hr     Propofol (Kcal/day) 431 Kcal/day          Evaluation of Received Nutrient/Fluid Intake       Row Name 02/01/25 1610          Calories Evaluation    Total Calorie Target (kcal) 1700        Protein Evaluation    Total Protein Target (g) 102        Fluid Intake Evaluation    Total Fluid Target (mL) 2040         Problem/Interventions:   Problem 1       Row Name 02/01/25 1612          Nutrition Diagnoses Problem 1    Problem 1 Needs Alternate     Etiology (related to) Medical Diagnosis     Infectious Disease Other (comment)  influenza     Oncology Lung cancer     Pulmonary/Critical Care Chronic respiratory failure;Ventilator     Signs/Symptoms (evidenced by) NPO          Intervention Goal       Row Name 02/01/25 1613          Intervention Goal    General Disease management/therapy;Nutrition support treatment     TF/PN Establish TF tolerance;Tolerate TF at goal;Deliver (%) goal;Deliver estimated need (%)     Deliver % of Goal 75 %  " within 48 hr of initiating enteral feeding     Deliver % of Estimated Need 80 %     Weight Maintain weight          Nutrition Intervention       Row Name 02/01/25 1613          Nutrition Intervention    RD/Tech Action Care plan reviewd;Follow Tx progress;Recommend/ordered     Recommended/Ordered EN          Nutrition Prescription       Row Name 02/01/25 1613          Nutrition Prescription EN    Enteral Prescription Enteral begin/change;Enteral to supply     Enteral Route OG     Product Peptamen Intense VHP     TF Delivery Method Continuous     Continuous TF Goal Rate (mL/hr) 55 mL/hr     Continuous TF Goal Volume (mL) 1210 mL     Water flush (mL)  45 mL     Water Flush Frequency Per hour     New EN Prescription Ordered? Yes        EN to Supply    Kcal/Day 1210 Kcal/Day     Kcal/Kg 18 Kcal/Kg     Kcal/Kg Weight Method Actual weight     Kcal/day with Propofol  1640 Kcal/day with Propofol     Protein (gm/day) 111 gm/day     Meet Estimated Kcal Need (%) 96 %     Meet Estimated Protein Need (%) 109 %     TF Free H2O (mL) 1016 mL     Total Free H2O (mL/day) 2006 mL/day     Fiber Per Day (gm/day) 5 gm/day          Education/Evaluation       Row Name 02/01/25 1616          Education    Education No discharge needs identified at this time        Monitor/Evaluation    Monitor Per protocol       Electronically signed by:  Inna Ford RDN, JOANNE  02/01/25 16:17 CST

## 2025-02-02 ENCOUNTER — APPOINTMENT (OUTPATIENT)
Dept: GENERAL RADIOLOGY | Facility: HOSPITAL | Age: 70
DRG: 208 | End: 2025-02-02
Payer: MEDICARE

## 2025-02-02 LAB
ALBUMIN SERPL-MCNC: 3.1 G/DL (ref 3.5–5.2)
ALBUMIN/GLOB SERPL: 1.1 G/DL
ALP SERPL-CCNC: 105 U/L (ref 39–117)
ALT SERPL W P-5'-P-CCNC: 19 U/L (ref 1–33)
ANION GAP SERPL CALCULATED.3IONS-SCNC: 9 MMOL/L (ref 5–15)
ARTERIAL PATENCY WRIST A: POSITIVE
AST SERPL-CCNC: 16 U/L (ref 1–32)
ATMOSPHERIC PRESS: 754 MMHG
BASE EXCESS BLDA CALC-SCNC: 2.6 MMOL/L (ref 0–2)
BASOPHILS # BLD MANUAL: 0 10*3/MM3 (ref 0–0.2)
BASOPHILS NFR BLD MANUAL: 0 % (ref 0–1.5)
BDY SITE: ABNORMAL
BILIRUB SERPL-MCNC: <0.2 MG/DL (ref 0–1.2)
BODY TEMPERATURE: 37
BUN SERPL-MCNC: 20 MG/DL (ref 8–23)
BUN/CREAT SERPL: 35.1 (ref 7–25)
CALCIUM SPEC-SCNC: 8.1 MG/DL (ref 8.6–10.5)
CHLORIDE SERPL-SCNC: 104 MMOL/L (ref 98–107)
CLUMPED PLATELETS: PRESENT
CO2 SERPL-SCNC: 25 MMOL/L (ref 22–29)
CREAT SERPL-MCNC: 0.57 MG/DL (ref 0.57–1)
DEPRECATED RDW RBC AUTO: 45.5 FL (ref 37–54)
EGFRCR SERPLBLD CKD-EPI 2021: 99.7 ML/MIN/1.73
EOSINOPHIL # BLD MANUAL: 0 10*3/MM3 (ref 0–0.4)
EOSINOPHIL NFR BLD MANUAL: 0 % (ref 0.3–6.2)
ERYTHROCYTE [DISTWIDTH] IN BLOOD BY AUTOMATED COUNT: 13.9 % (ref 12.3–15.4)
GLOBULIN UR ELPH-MCNC: 2.8 GM/DL
GLUCOSE BLDC GLUCOMTR-MCNC: 138 MG/DL (ref 70–130)
GLUCOSE BLDC GLUCOMTR-MCNC: 152 MG/DL (ref 70–130)
GLUCOSE BLDC GLUCOMTR-MCNC: 153 MG/DL (ref 70–130)
GLUCOSE SERPL-MCNC: 141 MG/DL (ref 65–99)
HCO3 BLDA-SCNC: 28 MMOL/L (ref 20–26)
HCT VFR BLD AUTO: 30.4 % (ref 34–46.6)
HGB BLD-MCNC: 9.8 G/DL (ref 12–15.9)
INHALED O2 CONCENTRATION: 30 %
LYMPHOCYTES # BLD MANUAL: 0.21 10*3/MM3 (ref 0.7–3.1)
LYMPHOCYTES NFR BLD MANUAL: 0 % (ref 5–12)
Lab: ABNORMAL
MAGNESIUM SERPL-MCNC: 2.3 MG/DL (ref 1.6–2.4)
MCH RBC QN AUTO: 29.1 PG (ref 26.6–33)
MCHC RBC AUTO-ENTMCNC: 32.2 G/DL (ref 31.5–35.7)
MCV RBC AUTO: 90.2 FL (ref 79–97)
MODALITY: ABNORMAL
MONOCYTES # BLD: 0 10*3/MM3 (ref 0.1–0.9)
NEUTROPHILS # BLD AUTO: 6.75 10*3/MM3 (ref 1.7–7)
NEUTROPHILS NFR BLD MANUAL: 97 % (ref 42.7–76)
NRBC BLD AUTO-RTO: 0 /100 WBC (ref 0–0.2)
OVALOCYTES BLD QL SMEAR: ABNORMAL
PCO2 BLDA: 46 MM HG (ref 35–45)
PCO2 TEMP ADJ BLD: 46 MM HG (ref 35–45)
PEEP RESPIRATORY: 5 CM[H2O]
PH BLDA: 7.39 PH UNITS (ref 7.35–7.45)
PH, TEMP CORRECTED: 7.39 PH UNITS (ref 7.35–7.45)
PHOSPHATE SERPL-MCNC: 3.4 MG/DL (ref 2.5–4.5)
PLATELET # BLD AUTO: 207 10*3/MM3 (ref 140–450)
PMV BLD AUTO: 9.7 FL (ref 6–12)
PO2 BLD: 247 MM[HG] (ref 0–500)
PO2 BLDA: 74.1 MM HG (ref 83–108)
PO2 TEMP ADJ BLD: 74.1 MM HG (ref 83–108)
POIKILOCYTOSIS BLD QL SMEAR: ABNORMAL
POTASSIUM SERPL-SCNC: 4.4 MMOL/L (ref 3.5–5.2)
PROT SERPL-MCNC: 5.9 G/DL (ref 6–8.5)
RBC # BLD AUTO: 3.37 10*6/MM3 (ref 3.77–5.28)
SAO2 % BLDCOA: 95.5 % (ref 94–99)
SET MECH RESP RATE: 28
SODIUM SERPL-SCNC: 138 MMOL/L (ref 136–145)
VARIANT LYMPHS NFR BLD MANUAL: 3 % (ref 19.6–45.3)
VENTILATOR MODE: AC
VT ON VENT VENT: 400 ML
WBC MORPH BLD: NORMAL
WBC NRBC COR # BLD AUTO: 6.96 10*3/MM3 (ref 3.4–10.8)

## 2025-02-02 PROCEDURE — 25010000002 HEPARIN (PORCINE) PER 1000 UNITS: Performed by: PHYSICIAN ASSISTANT

## 2025-02-02 PROCEDURE — 94799 UNLISTED PULMONARY SVC/PX: CPT

## 2025-02-02 PROCEDURE — 94003 VENT MGMT INPAT SUBQ DAY: CPT

## 2025-02-02 PROCEDURE — 84100 ASSAY OF PHOSPHORUS: CPT | Performed by: PHYSICIAN ASSISTANT

## 2025-02-02 PROCEDURE — 25010000002 AZITHROMYCIN PER 500 MG: Performed by: PHYSICIAN ASSISTANT

## 2025-02-02 PROCEDURE — 25010000002 PROPOFOL 10 MG/ML EMULSION: Performed by: EMERGENCY MEDICINE

## 2025-02-02 PROCEDURE — 82948 REAGENT STRIP/BLOOD GLUCOSE: CPT

## 2025-02-02 PROCEDURE — 85007 BL SMEAR W/DIFF WBC COUNT: CPT | Performed by: INTERNAL MEDICINE

## 2025-02-02 PROCEDURE — 82803 BLOOD GASES ANY COMBINATION: CPT

## 2025-02-02 PROCEDURE — 25810000003 SODIUM CHLORIDE 0.9 % SOLUTION

## 2025-02-02 PROCEDURE — 25010000002 METHYLPREDNISOLONE PER 40 MG: Performed by: PHYSICIAN ASSISTANT

## 2025-02-02 PROCEDURE — 25010000002 METHYLPREDNISOLONE PER 125 MG: Performed by: PHYSICIAN ASSISTANT

## 2025-02-02 PROCEDURE — 83735 ASSAY OF MAGNESIUM: CPT | Performed by: PHYSICIAN ASSISTANT

## 2025-02-02 PROCEDURE — 80053 COMPREHEN METABOLIC PANEL: CPT | Performed by: PHYSICIAN ASSISTANT

## 2025-02-02 PROCEDURE — 25810000003 SODIUM CHLORIDE 0.9 % SOLUTION 250 ML FLEX CONT: Performed by: PHYSICIAN ASSISTANT

## 2025-02-02 PROCEDURE — 71045 X-RAY EXAM CHEST 1 VIEW: CPT

## 2025-02-02 PROCEDURE — 85025 COMPLETE CBC W/AUTO DIFF WBC: CPT | Performed by: INTERNAL MEDICINE

## 2025-02-02 PROCEDURE — 25010000002 FENTANYL CITRATE (PF) 2500 MCG/50ML SOLUTION

## 2025-02-02 PROCEDURE — 36415 COLL VENOUS BLD VENIPUNCTURE: CPT | Performed by: PHYSICIAN ASSISTANT

## 2025-02-02 PROCEDURE — 36600 WITHDRAWAL OF ARTERIAL BLOOD: CPT

## 2025-02-02 RX ORDER — POLYETHYLENE GLYCOL 3350 17 G/17G
17 POWDER, FOR SOLUTION ORAL DAILY PRN
Status: DISCONTINUED | OUTPATIENT
Start: 2025-02-02 | End: 2025-02-08

## 2025-02-02 RX ORDER — ARFORMOTEROL TARTRATE 15 UG/2ML
15 SOLUTION RESPIRATORY (INHALATION)
Status: DISCONTINUED | OUTPATIENT
Start: 2025-02-02 | End: 2025-02-07

## 2025-02-02 RX ORDER — LEVOTHYROXINE SODIUM 112 UG/1
112 TABLET ORAL
Status: DISCONTINUED | OUTPATIENT
Start: 2025-02-03 | End: 2025-02-10 | Stop reason: HOSPADM

## 2025-02-02 RX ORDER — BISACODYL 10 MG
10 SUPPOSITORY, RECTAL RECTAL DAILY PRN
Status: DISCONTINUED | OUTPATIENT
Start: 2025-02-02 | End: 2025-02-08

## 2025-02-02 RX ORDER — AMOXICILLIN 250 MG
2 CAPSULE ORAL 2 TIMES DAILY
Status: DISCONTINUED | OUTPATIENT
Start: 2025-02-02 | End: 2025-02-08

## 2025-02-02 RX ORDER — ROPINIROLE 0.25 MG/1
0.25 TABLET, FILM COATED ORAL NIGHTLY
Status: DISCONTINUED | OUTPATIENT
Start: 2025-02-02 | End: 2025-02-10 | Stop reason: HOSPADM

## 2025-02-02 RX ORDER — MIRTAZAPINE 15 MG/1
15 TABLET, FILM COATED ORAL NIGHTLY
Status: DISCONTINUED | OUTPATIENT
Start: 2025-02-02 | End: 2025-02-10 | Stop reason: HOSPADM

## 2025-02-02 RX ORDER — IPRATROPIUM BROMIDE AND ALBUTEROL SULFATE 2.5; .5 MG/3ML; MG/3ML
3 SOLUTION RESPIRATORY (INHALATION)
Status: DISCONTINUED | OUTPATIENT
Start: 2025-02-02 | End: 2025-02-06

## 2025-02-02 RX ORDER — OSELTAMIVIR PHOSPHATE 75 MG/1
75 CAPSULE ORAL EVERY 12 HOURS SCHEDULED
Status: DISCONTINUED | OUTPATIENT
Start: 2025-02-02 | End: 2025-02-04

## 2025-02-02 RX ORDER — CITALOPRAM HYDROBROMIDE 20 MG/1
40 TABLET ORAL DAILY
Status: DISCONTINUED | OUTPATIENT
Start: 2025-02-03 | End: 2025-02-10 | Stop reason: HOSPADM

## 2025-02-02 RX ADMIN — IPRATROPIUM BROMIDE AND ALBUTEROL SULFATE 3 ML: 2.5; .5 SOLUTION RESPIRATORY (INHALATION) at 18:41

## 2025-02-02 RX ADMIN — METHYLPREDNISOLONE SODIUM SUCCINATE 40 MG: 40 INJECTION, POWDER, FOR SOLUTION INTRAMUSCULAR; INTRAVENOUS at 16:33

## 2025-02-02 RX ADMIN — AZITHROMYCIN DIHYDRATE 500 MG: 500 INJECTION, POWDER, LYOPHILIZED, FOR SOLUTION INTRAVENOUS at 13:19

## 2025-02-02 RX ADMIN — OSELTAMIVIR PHOSPHATE 30 MG: 30 CAPSULE ORAL at 08:20

## 2025-02-02 RX ADMIN — MIRTAZAPINE 15 MG: 15 TABLET, FILM COATED ORAL at 20:18

## 2025-02-02 RX ADMIN — PROPOFOL INJECTABLE EMULSION 50 MCG/KG/MIN: 10 INJECTION, EMULSION INTRAVENOUS at 08:58

## 2025-02-02 RX ADMIN — PROPOFOL INJECTABLE EMULSION 50 MCG/KG/MIN: 10 INJECTION, EMULSION INTRAVENOUS at 13:19

## 2025-02-02 RX ADMIN — METHYLPREDNISOLONE SODIUM SUCCINATE 60 MG: 125 INJECTION, POWDER, FOR SOLUTION INTRAMUSCULAR; INTRAVENOUS at 05:06

## 2025-02-02 RX ADMIN — DEXMEDETOMIDINE HYDROCHLORIDE 1 MCG/KG/HR: 4 INJECTION, SOLUTION INTRAVENOUS at 09:52

## 2025-02-02 RX ADMIN — DEXMEDETOMIDINE HYDROCHLORIDE 1 MCG/KG/HR: 4 INJECTION, SOLUTION INTRAVENOUS at 20:42

## 2025-02-02 RX ADMIN — IPRATROPIUM BROMIDE AND ALBUTEROL SULFATE 3 ML: 2.5; .5 SOLUTION RESPIRATORY (INHALATION) at 14:13

## 2025-02-02 RX ADMIN — PROPOFOL INJECTABLE EMULSION 50 MCG/KG/MIN: 10 INJECTION, EMULSION INTRAVENOUS at 20:43

## 2025-02-02 RX ADMIN — HEPARIN SODIUM 5000 UNITS: 5000 INJECTION, SOLUTION INTRAVENOUS; SUBCUTANEOUS at 05:06

## 2025-02-02 RX ADMIN — OSELTAMIVIR PHOSPHATE 75 MG: 75 CAPSULE ORAL at 20:18

## 2025-02-02 RX ADMIN — FENTANYL CITRATE 100 MCG/HR: 50 INJECTION, SOLUTION INTRAMUSCULAR; INTRAVENOUS at 04:18

## 2025-02-02 RX ADMIN — BUDESONIDE 0.5 MG: 0.5 INHALANT RESPIRATORY (INHALATION) at 18:41

## 2025-02-02 RX ADMIN — IPRATROPIUM BROMIDE AND ALBUTEROL SULFATE 3 ML: .5; 3 SOLUTION RESPIRATORY (INHALATION) at 05:43

## 2025-02-02 RX ADMIN — METHYLPREDNISOLONE SODIUM SUCCINATE 60 MG: 125 INJECTION, POWDER, FOR SOLUTION INTRAMUSCULAR; INTRAVENOUS at 11:14

## 2025-02-02 RX ADMIN — CHLORHEXIDINE GLUCONATE 1 APPLICATION: 500 CLOTH TOPICAL at 03:15

## 2025-02-02 RX ADMIN — Medication 1 APPLICATION: at 20:18

## 2025-02-02 RX ADMIN — PANTOPRAZOLE SODIUM 40 MG: 40 INJECTION, POWDER, FOR SOLUTION INTRAVENOUS at 05:06

## 2025-02-02 RX ADMIN — DEXMEDETOMIDINE HYDROCHLORIDE 1 MCG/KG/HR: 4 INJECTION, SOLUTION INTRAVENOUS at 15:23

## 2025-02-02 RX ADMIN — LEVOTHYROXINE SODIUM 112 MCG: 112 TABLET ORAL at 05:06

## 2025-02-02 RX ADMIN — FENTANYL CITRATE 225 MCG/HR: 50 INJECTION, SOLUTION INTRAMUSCULAR; INTRAVENOUS at 20:33

## 2025-02-02 RX ADMIN — DOCUSATE SODIUM 50 MG AND SENNOSIDES 8.6 MG 2 TABLET: 8.6; 5 TABLET, FILM COATED ORAL at 20:18

## 2025-02-02 RX ADMIN — PROPOFOL INJECTABLE EMULSION 50 MCG/KG/MIN: 10 INJECTION, EMULSION INTRAVENOUS at 04:16

## 2025-02-02 RX ADMIN — PROPOFOL INJECTABLE EMULSION 50 MCG/KG/MIN: 10 INJECTION, EMULSION INTRAVENOUS at 16:58

## 2025-02-02 RX ADMIN — CITALOPRAM HYDROBROMIDE 40 MG: 20 TABLET ORAL at 08:20

## 2025-02-02 RX ADMIN — ROPINIROLE HYDROCHLORIDE 0.25 MG: 0.25 TABLET, FILM COATED ORAL at 20:18

## 2025-02-02 RX ADMIN — BUDESONIDE 0.5 MG: 0.5 INHALANT RESPIRATORY (INHALATION) at 05:43

## 2025-02-02 RX ADMIN — DOCUSATE SODIUM 50 MG AND SENNOSIDES 8.6 MG 2 TABLET: 8.6; 5 TABLET, FILM COATED ORAL at 08:20

## 2025-02-02 RX ADMIN — HEPARIN SODIUM 5000 UNITS: 5000 INJECTION, SOLUTION INTRAVENOUS; SUBCUTANEOUS at 13:19

## 2025-02-02 RX ADMIN — HEPARIN SODIUM 5000 UNITS: 5000 INJECTION, SOLUTION INTRAVENOUS; SUBCUTANEOUS at 22:03

## 2025-02-02 RX ADMIN — Medication 10 ML: at 20:42

## 2025-02-02 RX ADMIN — IPRATROPIUM BROMIDE AND ALBUTEROL SULFATE 3 ML: .5; 3 SOLUTION RESPIRATORY (INHALATION) at 10:02

## 2025-02-02 RX ADMIN — Medication 10 ML: at 08:20

## 2025-02-02 RX ADMIN — Medication 1 APPLICATION: at 08:20

## 2025-02-02 NOTE — PLAN OF CARE
Goal Outcome Evaluation:  Plan of Care Reviewed With: patient        Progress: no change  Outcome Evaluation: Pt remains on ventilator support. FiO2 30%. Pt opening eyes and moving all extremities. Propofol at 50. Precedex at 0.6. Fentanyl at 175. Tube feed rate increased to 35. Urine output 725 ml.

## 2025-02-02 NOTE — PROGRESS NOTES
RT EQUIPMENT DEVICE RELATED - SKIN ASSESSMENT    Estiven Score:  Estiven Score: 14     RT Medical Equipment/Device:     ETT Santacruz/Anchorfast    Skin Assessment:      Cheek:  Intact  Neck:  Intact  Lips:  Intact  Mouth:  Intact    Device Skin Pressure Protection:  Skin-to-device areas padded:  Anchorfast    Nurse Notification:  Leah Butler, RRT

## 2025-02-02 NOTE — PROGRESS NOTES
HEMATOLOGY AND MEDICAL ONCOLOGY PROGRESS NOTE    Patient name: Ashley Gibbs  Patient : 1955  VISIT # [id number]  MR #[id number]  Room:     SUBJECTIVE:The patient continues to be intubated/sedated.  Unfortunately, she failed spontaneous breathing trial yesterday.  Upon decrease sedation this morning the patient was very agitated.  Therefore, spontaneously to try will not happen today.  She is persistently afebrile.  Discussed care plan with granddaughter and other family member at bedside.  Discussed care plan with bedside RN.    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 Rico Vanegas.     She received cycle #1, day #1 of  carboplatin/-16 on 2025-25.       Palliative emergent XRT of the chest consisted of 1250 cGy in 5 treatment fractions between 2024 and 2024 by Dr. Rico Vanegas at [facility] for the associated SVC due to tumor..     Treatment regimen-curative intent consisted of:  Carboplatin AUC 5 day 1  Etoposide 100 mg/m² days 1-3 q. 21 days  Plan is for X 4 cycles      Ashley was seen this morning, 2025, in the ED, at [facility] accompanied by 2 granddaughters and her good friend.  The granddaughter reports a fever to 101 degrees, increased difficulty with breathing shortness of breath and wheezing.     CBC Results   CBC            2024    08:49 2025    11:20 2025    08:13   CBC   WBC 4.51  5.35     9.14    RBC 3.85  4.32     3.67    Hemoglobin 11.5  12.9     10.9    Hematocrit 35.2  39.4     33.7    MCV 91.4  91.2     91.8    MCH 29.9  29.9     29.7    MCHC 32.7  32.7     32.3    RDW 13.0  12.9     13.9    Platelets 199  260     220         Details           This result is from an external source.                         UA today, 2025 showed 3+ leukocytes, 30 mg/dL protein WBCs too numerous to count, bacteria  3+, nitrite negative.     Respiratory panel by PCR today, 1/31/2025 was positive for influenza A H1-Detected  The patient has been desaturating while in the ED, requiring BiPAP assistance.  Blood and urine cultures were obtained.  Antibiotic with cefepime is initiated.     Medical oncology consultation requested         PAST TUMOR HISTORY COPIED FROM DR. CARDONA'S 1/13/2025 OFFICE VISIT:   HISTORY OF PRESENT ILLNESS:    Diagnosis  Small cell lung cancer, right lung, Dec 2024  Stage  [id number], limited stage     Treatment Summary  12/13/24 - 12/19/24 Emergent palliative radiation therapy 1250cGy in 5 fractions to right lung  Anticipate chemotherapy with Carboplatin D1, Etoposide D1-3 every 21 days x4 cycles  Anticipate radiation therapy to right lung with Dr Rico Vanegas/[facility]     Cancer History  Ms. 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 She has complaints today of fatigue. She states she has stabbing chest pain and mid back pain. She is currently being seen at pain management for pain medication. She presents today on 2L of oxygen dependently.   11/8/24 CT chest low dose ([facility]): 2 cm right upper lobe bilobed nodule with associated mediastinal and right hilar lymphadenopathy, suspicious for malignancy. Minimal adjacent   right upper lobe interlobular septal thickening may be related to venous or lymphatic congestion, however lymphangitic carcinomatosis is an additional consideration. Recommend further evaluation with PET/CT and/or tissue sampling. CT chest with contrast may also be of value to help distinguish the lymph nodes from adjacent vasculature. Minimal tree-in-bud nodularity in the peripheral the right upper lobe, likely mild infectious process.   Additional small pulmonary nodules in the right upper and right middle lobe.   11/8/24 CT abd/pelvis ([facility]): Near complete resolution of the exophytic  left upper pole renal lesion of concern likely related to interval rupture/involution. Numerous small bilateral renal cysts are again present.  Mild diffuse wall thickening of the rectum and the distal transverse colon and proximal descending colon. Correlate for proctocolitis.   12/13/24 PET/CT scan ([facility]): There is a hypermetabolic right upper lobe nodule measures 1.4 cm and has a maximum SUV of 5.5. Right upper lobe suprahilar mass measures at least 8 cm in greatest axial dimension is hypermetabolic with a maximum SUV of 11.4. Mass either invades the middle mediastinum or there is conglomerate hypermetabolic lymphadenopathy. No additional hypermetabolic pulmonary nodule or mass is identified. There is no increased skeletal uptake to suggest osseous metastasis.   12/13/245 CT chest ([facility]): 2.2 cm bilobed right upper lobe pulmonary nodule, likely representing a primary lung neoplasm. 8.9 cm right hilar lung mass with diffuse mediastinal infiltration, likely representing mary metastasis. This mass encases the right mainstem bronchus and encases the right main pulmonary artery, with occlusion of the right upper lobe pulmonary artery. This mass compresses the SVC, which is slitlike. Patient is at risk for SVC syndrome.  12/13/24 Initial evaluation by Dr Rico Vanegas/[facility] Radiation Oncology for superior vena cava syndrome who recommended emergent palliative radiation therapy 1250cGy in 5 fractions.  12/13/24 - 12/19/24 Emergent SBRT 1250cGy in 5 fractions to right lung  12/19/24 CT chest w/o ([facility]): Stable large right hilar mass with mediastinal invasion and confluent lymphadenopathy as detailed above. Findings are suspicious for small cell carcinoma. There is increased groundglass opacities and interlobular septal thickening within the right upper lobe and right middle lobe. Differential includes postobstructive changes and/or leptomeningeal carcinomatosis. Stable 2.2 cm right upper lobe bilobed nodule which was  "previously hypermetabolic and concerning for metastases. Trace right pleural effusion.   12/23/24 Navigational bronchoscopy/EBUS by Dr Peterson Morales/[facility]  12/23/24 Station 7 lymph node, endobronchial ultrasound-guided fine-needle aspiration, smear (1) and cellblock: High-grade neuroendocrine carcinoma consistent with metastatic small cell carcinoma. Fragments of lymphoid tissue and cartilage present.  Station 4R lymph node, endobronchial ultrasound-guided fine-needle aspiration, smear (1) and cellblock: High-grade neuroendocrine carcinoma consistent with metastatic small cell carcinoma. Many background lymphocytes.  Bronchial washings, ThinPrep preparation (1) and cellblock: A few malignant cells present, high-grade neuroendocrine carcinoma consistent with small cell carcinoma. Several macrophages. A few benign squamous epithelial cells and bronchial epithelial cells. AJCC stage: pTX pNX  1/13/2025-essentially, limited stage small cell lung cancer.  Awaiting results of brain MRI.  Recommend concurrent chemoradiation with carboplatin 2 post right to be followed by adjuvant durvalumab.  1/27/2025-initiation cycle #1 of chemotherapy with carboplatin/-16           PHYSICAL EXAM:/83   Pulse 98   Temp 97.8 °F (36.6 °C) (Oral)   Resp 26   Ht 160 cm (63\")   Wt 72.7 kg (160 lb 4.4 oz)   SpO2 96%   BMI 28.39 kg/m²   CONSTITUTIONAL:sedated, ill-appearing  EYES: Anicteric, EOM intact, pupils equal round   CHEST/LUNGS: CTA, Mechanical ventilator, PEEP 5, FiO2 30%.  CARDIOVASCULAR: RRR, no murmurs  ABDOMEN: soft, active bowel sounds, no HSM  EXTREMITIES: warm,   SKIN: warm,   NEUROLOGIC-sedated      CBC  Results from last 7 days   Lab Units 02/02/25  0335 02/01/25  0254 01/31/25  0813   WBC 10*3/mm3 6.96 10.46 9.14   HEMOGLOBIN g/dL 9.8* 10.0* 10.9*   HEMATOCRIT % 30.4* 32.0* 33.7*   PLATELETS 10*3/mm3 207 207 220         Lab Results   Component Value Date     02/02/2025    K 4.4 02/02/2025     " 02/02/2025    CO2 25.0 02/02/2025    BUN 20 02/02/2025    CREATININE 0.57 02/02/2025    GLUCOSE 141 (H) 02/02/2025    CALCIUM 8.1 (L) 02/02/2025    BILITOT <0.2 02/02/2025    ALKPHOS 105 02/02/2025    AST 16 02/02/2025    ALT 19 02/02/2025    AGRATIO 1.1 02/02/2025    GLOB 2.8 02/02/2025       Lab Results   Component Value Date    INR 0.98 01/31/2025    INR 1.04 07/24/2019    INR 0.90 03/13/2019    PROTIME 13.5 01/31/2025    PROTIME 13 07/24/2019    PROTIME 11.6 (L) 03/13/2019       ASSESSMENT/PLAN:  # Limited stage small cell lung cancer-status post completion of cycle 1 carboplatin etoposide 1/29/2025.  Status post palliative XRT to the SVC.  Plans for further XRT with Dr. Rico Vanegas.    # Acute hypoxic respiratory failure- r/tCOPD exacerbation with + influenza A.  Chest x-ray showed chronic changes of emphysema and fibrosis.  Continue current supportive care.  Failed SBT-02/01/25  Remains critically ill. No plans for SBT today.    #Normocytic anemia- Hb 10->9.8.  Monitor    Plan:  Continue current supportive care  Continue antiviral Tamiflu  Reviewed x ray chest this am    Future Appointments    Encounter Information   Provider Department Center   2/17/2025 11:30 AM Christopher Isaac MD [facility]   2/17/2025 11:30 AM INFUSION SCHEDULE, [facility]   2/18/2025 12:00 PM INFUSION SCHEDULE, [facility]   2/19/2025 11:30 [facility]    02/02/25  07:52 CST

## 2025-02-02 NOTE — PLAN OF CARE
Attempted SAT - patient failed.  Agitation and tachypnea. Precedex started. Propofol @ 50, fentanyl @ 100. UOP adequate. TF started with dietary recommendations. VSS at this time.     Problem: Noninvasive Ventilation Acute  Goal: Effective Unassisted Ventilation and Oxygenation  Outcome: Progressing     Problem: Mechanical Ventilation Invasive  Goal: Effective Communication  Outcome: Progressing  Intervention: Ensure Effective Communication  Recent Flowsheet Documentation  Taken 2/1/2025 1800 by Ethel Pedraza RN  Diversional Activities: television  Taken 2/1/2025 1600 by Ethel Pedraza RN  Diversional Activities: television  Taken 2/1/2025 1400 by Ethel Pedraza RN  Diversional Activities: television  Taken 2/1/2025 1200 by Ethel Pedraza RN  Diversional Activities: television  Taken 2/1/2025 1000 by Ethel Pedraza RN  Diversional Activities: television  Taken 2/1/2025 0800 by Ethel Pedraza RN  Diversional Activities: television  Goal: Optimal Device Function  Outcome: Progressing  Goal: Mechanical Ventilation Liberation  Outcome: Progressing  Goal: Optimal Nutrition Delivery  Outcome: Progressing  Goal: Absence of Device-Related Skin and Tissue Injury  Outcome: Progressing  Goal: Absence of Ventilator-Induced Lung Injury  Outcome: Progressing  Intervention: Prevent Ventilator-Associated Pneumonia  Recent Flowsheet Documentation  Taken 2/1/2025 1700 by Ethel Pedraza RN  Head of Bed (HOB) Positioning: HOB at 30-45 degrees  Taken 2/1/2025 1300 by Ethel Pedraza RN  Head of Bed (HOB) Positioning: HOB at 30-45 degrees  Taken 2/1/2025 1100 by Ethel Pedraza RN  Head of Bed (HOB) Positioning: HOB at 30-45 degrees  Taken 2/1/2025 0900 by Ethel Pedraza RN  Head of Bed (HOB) Positioning: HOB at 30-45 degrees  Taken 2/1/2025 0700 by Ethel Pedraza RN  Head of Bed (HOB) Positioning: HOB at 30-45 degrees     Problem: Adult Inpatient Plan of Care  Goal: Plan of Care Review  Outcome: Progressing  Goal:  Patient-Specific Goal (Individualized)  Outcome: Progressing  Goal: Absence of Hospital-Acquired Illness or Injury  Outcome: Progressing  Intervention: Identify and Manage Fall Risk  Recent Flowsheet Documentation  Taken 2/1/2025 1800 by Ethel Pedraza RN  Safety Promotion/Fall Prevention: safety round/check completed  Taken 2/1/2025 1700 by Ethel Pedraza RN  Safety Promotion/Fall Prevention: safety round/check completed  Taken 2/1/2025 1500 by Ethel Pedraza RN  Safety Promotion/Fall Prevention: safety round/check completed  Taken 2/1/2025 1400 by Ethel Pedraza RN  Safety Promotion/Fall Prevention: safety round/check completed  Taken 2/1/2025 1300 by Ethel Pedraza RN  Safety Promotion/Fall Prevention: safety round/check completed  Taken 2/1/2025 1215 by Ethel Pedraza RN  Safety Promotion/Fall Prevention: safety round/check completed  Taken 2/1/2025 1100 by Ethel Pedraza RN  Safety Promotion/Fall Prevention: safety round/check completed  Taken 2/1/2025 1000 by Ethel Pedraza RN  Safety Promotion/Fall Prevention: safety round/check completed  Taken 2/1/2025 0900 by Ethel Pedraza RN  Safety Promotion/Fall Prevention: safety round/check completed  Taken 2/1/2025 0800 by Ethel Pedraza RN  Safety Promotion/Fall Prevention: safety round/check completed  Taken 2/1/2025 0700 by Ethel Pedraza RN  Safety Promotion/Fall Prevention: safety round/check completed  Intervention: Prevent Skin Injury  Recent Flowsheet Documentation  Taken 2/1/2025 1700 by Ethel Pedraza RN  Body Position:   right   turned  Taken 2/1/2025 1300 by Ethel Pedraza RN  Body Position:   turned   supine  Taken 2/1/2025 1100 by Ethel Pedraza RN  Body Position:   turned   left   upper extremity elevated  Taken 2/1/2025 0900 by Ethel Pedraza RN  Body Position:   right   turned   upper extremity elevated  Taken 2/1/2025 0700 by Ethel Pedraza RN  Body Position:   turned   supine   upper extremity elevated  Intervention: Prevent and  Manage VTE (Venous Thromboembolism) Risk  Recent Flowsheet Documentation  Taken 2/1/2025 0800 by tEhel Pedraza RN  VTE Prevention/Management: (see MAR) other (see comments)  Goal: Optimal Comfort and Wellbeing  Outcome: Progressing  Goal: Readiness for Transition of Care  Outcome: Progressing     Problem: Fall Injury Risk  Goal: Absence of Fall and Fall-Related Injury  Outcome: Progressing  Intervention: Promote Injury-Free Environment  Recent Flowsheet Documentation  Taken 2/1/2025 1800 by Ethel Pedraza RN  Safety Promotion/Fall Prevention: safety round/check completed  Taken 2/1/2025 1700 by Ethel Pedraza RN  Safety Promotion/Fall Prevention: safety round/check completed  Taken 2/1/2025 1500 by Ethel Pedraza RN  Safety Promotion/Fall Prevention: safety round/check completed  Taken 2/1/2025 1400 by Ethel Pedraza RN  Safety Promotion/Fall Prevention: safety round/check completed  Taken 2/1/2025 1300 by Ethel Pedraza RN  Safety Promotion/Fall Prevention: safety round/check completed  Taken 2/1/2025 1215 by Ethel Pedraza RN  Safety Promotion/Fall Prevention: safety round/check completed  Taken 2/1/2025 1100 by Ethel Pedraza RN  Safety Promotion/Fall Prevention: safety round/check completed  Taken 2/1/2025 1000 by Ethel Pedraza RN  Safety Promotion/Fall Prevention: safety round/check completed  Taken 2/1/2025 0900 by Ethel Pedraza RN  Safety Promotion/Fall Prevention: safety round/check completed  Taken 2/1/2025 0800 by Ethel Pedraza RN  Safety Promotion/Fall Prevention: safety round/check completed  Taken 2/1/2025 0700 by Ethel Pedraza RN  Safety Promotion/Fall Prevention: safety round/check completed     Problem: Skin Injury Risk Increased  Goal: Skin Health and Integrity  Outcome: Progressing  Intervention: Optimize Skin Protection  Recent Flowsheet Documentation  Taken 2/1/2025 1700 by Ethel Pedraza RN  Head of Bed (HOB) Positioning: HOB at 30-45 degrees  Taken 2/1/2025 1300 by Keila  Ethel HOGAN RN  Head of Bed (HOB) Positioning: HOB at 30-45 degrees  Taken 2/1/2025 1100 by Ethel Pedraza RN  Head of Bed (HOB) Positioning: HOB at 30-45 degrees  Taken 2/1/2025 0900 by Ethel Pedraza RN  Head of Bed (HOB) Positioning: HOB at 30-45 degrees  Taken 2/1/2025 0700 by Ethel Pedraza RN  Head of Bed (HOB) Positioning: HOB at 30-45 degrees     Problem: Restraint, Nonviolent  Goal: Absence of Harm or Injury  Outcome: Progressing  Intervention: Implement Least Restrictive Safety Strategies  Recent Flowsheet Documentation  Taken 2/1/2025 1800 by Ethel Pedraza RN  Medical Device Protection:   torso covered   tubing secured  Diversional Activities: television  Taken 2/1/2025 1600 by Ethel Pedraza RN  Medical Device Protection:   torso covered   tubing secured  Diversional Activities: television  Taken 2/1/2025 1400 by Ethel Pedraza RN  Medical Device Protection:   torso covered   tubing secured  Diversional Activities: television  Taken 2/1/2025 1200 by Ethel Pedraza RN  Medical Device Protection:   torso covered   tubing secured  Diversional Activities: television  Taken 2/1/2025 1000 by Ethel Pedraza RN  Medical Device Protection:   torso covered   tubing secured  Diversional Activities: television  Taken 2/1/2025 0800 by Ethel Pedraza RN  Medical Device Protection:   torso covered   tubing secured  Diversional Activities: television  Intervention: Protect Skin and Joint Integrity  Recent Flowsheet Documentation  Taken 2/1/2025 1700 by Ethel Pedraza RN  Body Position:   right   turned  Taken 2/1/2025 1300 by Ethel Pedraza RN  Body Position:   turned   supine  Taken 2/1/2025 1100 by Ethel Pedraza RN  Body Position:   turned   left   upper extremity elevated  Taken 2/1/2025 0900 by Ethel Pedraza RN  Body Position:   right   turned   upper extremity elevated  Taken 2/1/2025 0800 by Ethel Pedraza RN  Range of Motion: ROM (range of motion) performed  Taken 2/1/2025 0700 by Ethel Pedraza  RN  Body Position:   turned   supine   upper extremity elevated   Goal Outcome Evaluation:

## 2025-02-02 NOTE — PROGRESS NOTES
"Pharmacy Renal Dose Conversion    Ashley Gibbs is a 69 y.o. year old female  160 cm (63\") 72.7 kg (160 lb 4.4 oz)    Estimated Creatinine Clearance: 91.5 mL/min (by C-G formula based on SCr of 0.57 mg/dL).   Creatinine   Date Value Ref Range Status   02/02/2025 0.57 0.57 - 1.00 mg/dL Final   02/01/2025 0.89 0.57 - 1.00 mg/dL Final   01/31/2025 1.69 (H) 0.57 - 1.00 mg/dL Final   01/27/2025 0.7 0.5 - 0.9 mg/dL Final   12/13/2024 0.80 0.60 - 1.30 mg/dL Final     Comment:     Serial Number: [id number]:  [id number]   11/08/2024 0.70 0.60 - 1.30 mg/dL Final     Comment:     Serial Number: [id number]:  [id number]   07/12/2023 0.80 0.60 - 1.30 mg/dL Final     Comment:     Serial Number: [id number]:  [id number]   09/22/2021 0.6 0.5 - 0.9 mg/dL Final   09/22/2020 0.6 0.5 - 0.9 mg/dL Final       PLAN  Based on prescribing information provided by the drug , oseltamivir 30mg po Q12H,  has been changed to oseltamivir 75mg gtube Q12H    Adjusted per the directives and guidelines established by [facility] Pharmacy and Therapeutics Committee and [facility] Medical Executive Committee.  Pharmacy will continue to monitor daily and make further adjustment(s) accordingly.    Eddie Brady, PharmD  02/02/2511:48 CST    "

## 2025-02-02 NOTE — PROGRESS NOTES
[facility]  INPATIENT PROGRESS NOTE    Patient Name: Ashley Gibbs  Date of Admission: 1/31/2025  Today's Date: 02/02/25  Length of Stay:  LOS: 2 days   Primary Care Physician: Padmaja Bermudez APRN  Next of Kin: Primary Emergency Contact: SELAM SANCHEZ, Darnell Phone: [phone]      Subjective   Chief Complaint: Dyspnea and fever  Fever     Weakness - Generalized  Symptoms include a fever.       69-year-old female with a past medical history of COPD, thyroid disease, restless leg syndrome, and stage IV small cell lung cancer admitted after presenting to the ED on 1/31/2025 with complaints of fever and dyspnea.  She also had generalized weakness and fatigue.  She states she came to the ER as she was told to come to the ER by her oncologist if she spiked a fever.  She recently underwent her first round of chemotherapy with her last treatment being on Wednesday, January 29, 2025.  The patient tells me she continues to vape.  Her respiratory status declined while she was in the ER and she had to be placed on BiPAP.  She initially showed some improvement; however, she became more confused, trying to take off her BiPAP.  She also remained fairly hypoxic.  For these reasons, she was emergently intubated in the ED.     Significant workup from the emergency department includes the following: Creatinine elevated 1.69 with a baseline of 0.7.  The rest of her CMP was fairly unremarkable.  Procalcitonin was mildly elevated at 0.31.  She is mildly anemic with hemoglobin 10.9 and hematocrit 33.7.  Initial ABG showed pH 7.152, pCO2 79.6, pO2 59.3, bicarb 27.8.  Repeat was slightly improved with pH 7.219, pCO2 62.6, pO2 94.1, and bicarb 25.5.  Respiratory panel noted to be positive for influenza A.  Right hilar mass noted on chest x-ray.  Right greater than left basilar interstitial prominence also noted on chest x-ray.     I saw the patient while she was still in the  emergency department, prior to intubation.  She expressed to me that she had developed a fever to 101 °F, and she was instructed to come to the ER if she spiked a fever.  She tells me that she first noticed the fever this morning.  She has also had some fatigue and generalized weakness.  She has had some dyspnea with this as well.     Patient is to be admitted to the CCU for further medical management and close monitoring.    Interval history:  2/1/2025: Patient remains intubated and sedated.  We attempted SAT this morning.  However, patient was unable to tolerate weaning from sedation today.  She became very tachypneic and tachycardic.  Therefore, she was placed back on sedation.  She is currently on propofol and fentanyl.  We are adding Precedex to see if she would be able to wean better from this medication.  She is currently on minimal vent settings, FiO2 30% and PEEP of 5.  RAKEL has significantly improved.  Creatinine down to 0.89 today.    2/2/2025: Remains sedated and intubated. SAT was attempted again today, but patient became tachypneic and tachycardic again. She is currently on fentanyl, propofol, and precedex. She remains on 40% FiO2,  PEEP 5.     Review of Systems   Reason unable to perform ROS: intubated and sedated.   Constitutional:  Positive for fever.      All pertinent negatives and positives are as above. All other systems have been reviewed and are negative unless otherwise stated.     Past Medical and Past Surgical History   Active and Resolved Problems  Active Hospital Problems    Diagnosis  POA    **Acute respiratory failure [J96.00]  Yes    Influenza A [J10.1]  Yes    Small cell lung cancer [C34.90]  Yes      Resolved Hospital Problems   No resolved problems to display.       Past Medical History:   Past Medical History:   Diagnosis Date    Anxiety     Arthritis     Asthma     Back pain     COPD (chronic obstructive pulmonary disease)     Dependence on supplemental oxygen     Depression      Disease of thyroid gland     Fibromyalgia, primary     Headache     History of degenerative disc disease     Hypertension     Hypothyroidism     Low back pain     Restless leg     Scoliosis      Past Surgical History:   Past Surgical History:   Procedure Laterality Date    APPENDECTOMY      BREAST BIOPSY Left     BRONCHOSCOPY Right 12/23/2024    Procedure: BRONCHOSCOPY WITH ENDOBRONCHIAL ULTRASOUND;  Surgeon: Peterson Morales MD;  Location: [facility];  Service: Pulmonary;  Laterality: Right;  pre: lung mass  post: lung mass    CHOLECYSTECTOMY      TUBAL ABDOMINAL LIGATION       Social and Family History   Family History:  family history includes Arthritis in her mother; COPD in her father; Diabetes in her father; Heart disease in her father; Hypertension in her mother.    Tobacco/Social History:  reports that she quit smoking about 16 years ago. Her smoking use included cigarettes. She started smoking about 56 years ago. She has a 40 pack-year smoking history. She has been exposed to tobacco smoke. She has never used smokeless tobacco. She reports that she does not drink alcohol and does not use drugs.    Allergies   Allergies:   She is allergic to codeine.    Objective    Temp:  [97.4 °F (36.3 °C)-98.4 °F (36.9 °C)] 97.4 °F (36.3 °C)  Heart Rate:  [] 111  Resp:  [22-32] 22  BP: (100-163)/(54-97) 140/82  FiO2 (%):  [30 %-40 %] 35 %  Physical Exam  Vitals and nursing note reviewed. Exam conducted with a chaperone present.   Constitutional:       Appearance: She is ill-appearing.      Interventions: She is sedated and intubated.   HENT:      Head: Normocephalic.      Nose: Nose normal.   Eyes:      Pupils: Pupils are equal, round, and reactive to light.   Cardiovascular:      Rate and Rhythm: Regular rhythm. Tachycardia present.      Pulses: Normal pulses.   Pulmonary:      Effort: She is intubated.      Breath sounds: Wheezing present.      Comments: Very limited air movement noted. Occasionally wheezes  appreciated.   Abdominal:      General: Bowel sounds are normal.      Palpations: Abdomen is soft.      Tenderness: There is no abdominal tenderness.   Musculoskeletal:         General: Normal range of motion.      Cervical back: Neck supple.   Skin:     General: Skin is warm.      Capillary Refill: Capillary refill takes less than 2 seconds.   Neurological:      Comments: Patient sedated. AREVALO, but not following commands.          Inpatient Medications   Medications: Scheduled Meds:arformoterol, 15 mcg, Nebulization, BID - RT  azithromycin, 500 mg, Intravenous, Q24H  budesonide, 0.5 mg, Nebulization, BID - RT  cefTRIAXone, 1,000 mg, Intravenous, Q24H  Chlorhexidine Gluconate Cloth, 1 Application, Topical, Q24H  [START ON 2/3/2025] citalopram, 40 mg, Per G Tube, Daily  heparin (porcine), 5,000 Units, Subcutaneous, Q8H  ipratropium-albuterol, 3 mL, Nebulization, Q4H - RT  [START ON 2/3/2025] levothyroxine, 112 mcg, Per G Tube, Q AM  methylPREDNISolone sodium succinate, 40 mg, Intravenous, Q8H   Followed by  [START ON 2/5/2025] methylPREDNISolone sodium succinate, 20 mg, Intravenous, Q12H  mirtazapine, 15 mg, Per G Tube, Nightly  mupirocin, 1 Application, Each Nare, BID  oseltamivir, 75 mg, Per G Tube, Q12H  pantoprazole, 40 mg, Intravenous, Q AM  rOPINIRole, 0.25 mg, Per G Tube, Nightly  senna-docusate sodium, 2 tablet, Per G Tube, BID  sodium chloride, 10 mL, Intravenous, Q12H      Continuous Infusions:dexmedetomidine, 0.2-1.5 mcg/kg/hr, Last Rate: 1 mcg/kg/hr (02/02/25 0952)  fentanyl 10 mcg/mL,  mcg/hr, Last Rate: 225 mcg/hr (02/02/25 0810)  propofol, 5-50 mcg/kg/min, Last Rate: 50 mcg/kg/min (02/02/25 0858)      PRN Meds:.  senna-docusate sodium **AND** polyethylene glycol **AND** [DISCONTINUED] bisacodyl **AND** bisacodyl    ipratropium-albuterol    nitroglycerin    sodium chloride    sodium chloride    I have reviewed the patient's current medications.   Outpatient Medications     Current Outpatient  Medications   Medication Instructions    acetaminophen (TYLENOL 8 HOUR) 650 mg, Oral, Every 8 Hours PRN    cholecalciferol (VITAMIN D3) 1,000 Units, Daily    citalopram (CELEXA) 40 mg, Oral, Daily    diazePAM (VALIUM) 5 mg, Oral, Every 12 Hours PRN    gabapentin (NEURONTIN) 600 mg, Oral, Every 12 Hours Scheduled    HYDROcodone-acetaminophen (NORCO)  MG per tablet Take 1 tablet by mouth 3 (Three) Times a Day.    ipratropium-albuterol (DUO-NEB) 0.5-2.5 mg/3 ml nebulizer 3 mL, Nebulization, Every 4 Hours PRN    levothyroxine (SYNTHROID, LEVOTHROID) 112 mcg, Oral, Daily    mirtazapine (REMERON) 15 mg, Oral, Every Night at Bedtime    ondansetron (ZOFRAN) 4 mg, Oral, Every 8 Hours PRN    potassium chloride 10 MEQ CR tablet 10 mEq, Oral, 2 Times Daily    rizatriptan (MAXALT) 10 MG tablet Take 1 tablet by mouth 1 (One) Time As Needed for Migraine.    rOPINIRole (REQUIP) 0.25 mg, Oral, Every Night at Bedtime    tiZANidine (ZANAFLEX) 2 mg, 2 Times Daily PRN    tobramycin 0.3 % solution ophthalmic solution 2 drops, Both Eyes, Every 4 Hours While Awake    Ventolin  (90 Base) MCG/ACT inhaler 2 puffs, Inhalation, Every 4 Hours PRN    vitamin D (ERGOCALCIFEROL) 50,000 Units, Oral, Weekly       Current Antibiotics   azithromycin 500 mg IVPB in 250 mL NS (VTB)  cefTRIAXone (ROCEPHIN) 1000 mg IVPB in 100 mL NS (MBP)  oseltamivir - 75 MG    Results:   CBC:      Lab 02/02/25  0335 02/01/25  0254 01/31/25  0813 01/31/25  0813 01/27/25  1120   WBC 6.96 10.46   < > 9.14 5.35   HEMOGLOBIN 9.8* 10.0*   < > 10.9* 12.9   HEMATOCRIT 30.4* 32.0*   < > 33.7* 39.4   PLATELETS 207 207  --  220 260   NEUTROS ABS 6.75 10.46*  --  8.50* 3.65   LYMPHS ABS  --   --   --   --  1.01*   MONOS ABS  --   --   --   --  0.47   EOS ABS 0.00 0.00  --  0.00 0.18   MCV 90.2 93.3  --  91.8 91.2    < > = values in this interval not displayed.     LIVER FUNCTION TESTS:      Lab 02/02/25  0335 02/01/25  0254 01/31/25  0813 01/27/25  1120   TOTAL PROTEIN  5.9* 5.7* 5.7* 6.6   ALBUMIN 3.1* 3.1* 3.3* 3.7   GLOBULIN 2.8 2.6 2.4  --    ALT (SGPT) 19 25 19 11   AST (SGOT) 16 25 17 21   BILIRUBIN <0.2 <0.2 <0.2 <0.2   ALK PHOS 105 119* 102 116*     ABG:      Lab 02/02/25  0410 02/02/25  0335 02/01/25  0427 02/01/25  0254 01/31/25  1514 01/31/25  1419 01/31/25  1055 01/31/25  0953 01/31/25  0813 01/27/25  1120   PH, ARTERIAL 7.392  --  7.305*  --  7.218* 7.180* 7.219*   < >  --   --    PO2 ART 74.1*  --  136.0*  --  92.3 69.7* 94.1   < >  --   --    PCO2, ARTERIAL 46.0*  --  56.4*  --  64.8* 72.1* 62.6*   < >  --   --    HCO3 ART 28.0*  --  28.0*  --  26.4* 26.9* 25.5   < >  --   --    ANION GAP  --  9.0  --  9.0  --   --   --   --  11.0 7    < > = values in this interval not displayed.     BMP/MG/PHOS:      Lab 02/02/25  0335 02/01/25  0254 01/31/25  0813 01/27/25  1120   SODIUM 138 139 137 141   POTASSIUM 4.4 4.7 3.9 3.9   CHLORIDE 104 105 100 100   BUN 20 20 23 10   CREATININE 0.57 0.89 1.69* 0.7   CALCIUM 8.1* 8.0* 7.8* 9.3   MAGNESIUM 2.3 2.1 2.0  --    PHOSPHORUS 3.4 3.7  --   --      IMAGING STUDIES:  XR Chest 1 View    Result Date: 2/2/2025  No change in appearance of the chest.  This report was signed and finalized on 2/2/2025 5:25 AM by Dr. Eddie Alicia MD.      XR Abdomen KUB    Result Date: 2/1/2025  FINDINGS/IMPRESSION:  Enteric tube tip is in the mid to distal stomach.  This report was signed and finalized on 2/1/2025 5:46 PM by Dr. Eddie Alicia MD.      XR Chest 1 View    Result Date: 1/31/2025  1. Gastric tube tip projects at the lower esophagus. Consider advancing.  2. Redemonstration and RIGHT hilar mass better demonstrated on prior CT 12/19/2024.  3. RIGHT greater than LEFT basilar interstitial prominence, may represent an acute on chronic process, similar compared to 12/19/2024.  This report was signed and finalized on 1/31/2025 1:13 PM by Dr Cammy Lin MD.       CULTURE DATA:   Blood Culture   Date Value Ref Range Status   01/31/2025 No  growth at 24 hours  Preliminary   01/31/2025 No growth at 24 hours  Preliminary        Assessment/Plan   69-year-old female with history of metastatic small cell lung cancer, COPD, and thyroid disease among other past medical history admitted on 1/31/2025 after presenting to the ED with fever and dyspnea. She was initially on BiPAP, but later had to be intubated due to worsening mentation and worsening hypoxia. She was found to be positive for influenza A. She was also noted to have an acute kidney injury. She was admitted to the CCU for further medical management and monitoring.     Acute hypoxemic respiratory failure   -In setting of influenza A, hx of COPD, and SCLC  -Patient remains on vent, FiO2 40% and PEEP 5  -Remains on empiric treatment with Rocephin and Azithromycin  -Failed SAT today. We will attempt SAT and SBT again tomorrow  -Continue to monitor closely in CCU  -Sedated on precedex, propofol, and fentanyl  -Continue neb treatments, IS, pulmicort. We increased neb treatments to q4h and added Brovana  -Blood cultures showing no growth at this time    Influenza A  -Remains on tamiflu  -Droplet precautions in place    Acute kidney injury  -Resolved  -Cr now at baseline  -Monitor UOP  -Avoid hypotension and nephrotoxins    Small cell lung cancer  -with invasion of mediastinal structures and SVC syndrome  -Finished 1st round of chemo on 1/29/25  -Oncology following, we appreciate their recommendations    COPD  -Possible exacerbation  -Remains on Solu-medrol taper      CODE STATUS: No CPR  VTE prophylaxis: heparin  GI prophylaxis: protonix  Antibiotics: Rocephin, Azithromycin     Disposition: Critical care management     Total critical care time: 35 minutes    Due to a high probability of clinically significant, life threatening deterioration, the patient required my highest level of preparedness to intervene emergently and I personally spent this critical care time directly and personally managing the  patient.     This critical care time included obtaining a history; examining the patient; pulse oximetry; ordering and review of studies; arranging urgent treatment with development of a management plan; evaluation of patient's response to treatment; frequent reassessment; and, discussions with other providers.    This critical care time was performed to assess and manage the high probability of imminent, life-threatening deterioration that could result in multi-organ failure. It was exclusive of separately billable procedures and treating other patients and teaching time.    Please see MDM section and the rest of the note for further information on patient assessment and treatment.    Part of this note may be an electronic transcription/translation of spoken language to printed text using the Dragon Dictation System    Electronically signed by Francisco Goddard PA-C on 2/2/2025 at 12:19 CST

## 2025-02-02 NOTE — PLAN OF CARE
Goal Outcome Evaluation: Patient remains on ventilator. No changes made.    Problem: Mechanical Ventilation Invasive  Goal: Effective Communication  Outcome: Progressing  Goal: Optimal Device Function  Outcome: Progressing  Intervention: Optimize Device Care and Function  Recent Flowsheet Documentation  Taken 2/2/2025 0317 by Loretta Butler RRT  Airway/Ventilation Management: airway patency maintained  Airway Safety Measures:   mask valve resuscitator at bedside   manual resuscitator/mask at bedside   oxygen flowmeter at bedside   suction at bedside  Taken 2/1/2025 2327 by Loretta Butler RRT  Airway/Ventilation Management: airway patency maintained  Airway Safety Measures:   mask valve resuscitator at bedside   manual resuscitator/mask at bedside   oxygen flowmeter at bedside   suction at bedside  Taken 2/1/2025 1931 by Loretta Butler RRT  Airway/Ventilation Management: airway patency maintained  Airway Safety Measures:   mask valve resuscitator at bedside   manual resuscitator/mask at bedside   oxygen flowmeter at bedside   suction at bedside  Goal: Mechanical Ventilation Liberation  Outcome: Progressing  Goal: Optimal Nutrition Delivery  Outcome: Progressing  Goal: Absence of Device-Related Skin and Tissue Injury  Outcome: Progressing  Intervention: Maintain Skin and Tissue Health  Recent Flowsheet Documentation  Taken 2/2/2025 0317 by Loretta Butler RRT  Device Skin Pressure Protection:   skin-to-device areas padded   tubing/devices free from skin contact  Taken 2/1/2025 2327 by Loretta Butler RRT  Device Skin Pressure Protection:   skin-to-device areas padded   tubing/devices free from skin contact  Taken 2/1/2025 1931 by Loretta Butler RRT  Device Skin Pressure Protection:   skin-to-device areas padded   tubing/devices free from skin contact  Goal: Absence of Ventilator-Induced Lung Injury  Outcome: Progressing  Intervention: Prevent Ventilator-Associated Pneumonia  Recent Flowsheet  Documentation  Taken 2/2/2025 0317 by Loretta Butelr RRT  Head of Bed (HOB) Positioning: HOB at 30-45 degrees  Taken 2/1/2025 2327 by Loretta Butler RRT  Head of Bed (HOB) Positioning: HOB at 30-45 degrees  Taken 2/1/2025 1931 by Loretta Butler RRT  Head of Bed (HOB) Positioning: HOB at 30-45 degrees     Problem: Noninvasive Ventilation Acute  Goal: Effective Unassisted Ventilation and Oxygenation  Intervention: Monitor and Manage Noninvasive Ventilation  Recent Flowsheet Documentation  Taken 2/2/2025 0317 by Loretta Butler RRT  Airway/Ventilation Management: airway patency maintained  Taken 2/1/2025 2327 by Loretta Butler RRT  Airway/Ventilation Management: airway patency maintained  Taken 2/1/2025 1931 by Loretta Butler RRT  Airway/Ventilation Management: airway patency maintained     Problem: Skin Injury Risk Increased  Goal: Skin Health and Integrity  Intervention: Optimize Skin Protection  Recent Flowsheet Documentation  Taken 2/2/2025 0317 by Loretta Butler RRT  Head of Bed (HOB) Positioning: HOB at 30-45 degrees  Taken 2/1/2025 2327 by Loretta Butler RRT  Head of Bed (HOB) Positioning: HOB at 30-45 degrees  Taken 2/1/2025 1931 by Loretta Butler RRT  Head of Bed (HOB) Positioning: HOB at 30-45 degrees

## 2025-02-03 ENCOUNTER — APPOINTMENT (OUTPATIENT)
Dept: GENERAL RADIOLOGY | Facility: HOSPITAL | Age: 70
DRG: 208 | End: 2025-02-03
Payer: MEDICARE

## 2025-02-03 LAB
ALBUMIN SERPL-MCNC: 3.5 G/DL (ref 3.5–5.2)
ALBUMIN/GLOB SERPL: 1.3 G/DL
ALP SERPL-CCNC: 99 U/L (ref 39–117)
ALT SERPL W P-5'-P-CCNC: 18 U/L (ref 1–33)
ANION GAP SERPL CALCULATED.3IONS-SCNC: 11 MMOL/L (ref 5–15)
ARTERIAL PATENCY WRIST A: POSITIVE
AST SERPL-CCNC: 16 U/L (ref 1–32)
ATMOSPHERIC PRESS: 752 MMHG
BASE EXCESS BLDA CALC-SCNC: -1.4 MMOL/L (ref 0–2)
BASOPHILS # BLD MANUAL: 0 10*3/MM3 (ref 0–0.2)
BASOPHILS NFR BLD MANUAL: 0 % (ref 0–1.5)
BDY SITE: ABNORMAL
BILIRUB SERPL-MCNC: <0.2 MG/DL (ref 0–1.2)
BODY TEMPERATURE: 37
BUN SERPL-MCNC: 31 MG/DL (ref 8–23)
BUN/CREAT SERPL: 58.5 (ref 7–25)
CALCIUM SPEC-SCNC: 8.2 MG/DL (ref 8.6–10.5)
CHLORIDE SERPL-SCNC: 101 MMOL/L (ref 98–107)
CLUMPED PLATELETS: PRESENT
CO2 SERPL-SCNC: 24 MMOL/L (ref 22–29)
COHGB MFR BLD: 0.3 % (ref 0–5)
CREAT SERPL-MCNC: 0.53 MG/DL (ref 0.57–1)
DEPRECATED RDW RBC AUTO: 45.9 FL (ref 37–54)
EGFRCR SERPLBLD CKD-EPI 2021: 101.5 ML/MIN/1.73
EOSINOPHIL # BLD MANUAL: 0 10*3/MM3 (ref 0–0.4)
EOSINOPHIL NFR BLD MANUAL: 0 % (ref 0.3–6.2)
ERYTHROCYTE [DISTWIDTH] IN BLOOD BY AUTOMATED COUNT: 14 % (ref 12.3–15.4)
FUNGUS WND CULT: NORMAL
GIANT PLATELETS: ABNORMAL
GLOBULIN UR ELPH-MCNC: 2.8 GM/DL
GLUCOSE BLDC GLUCOMTR-MCNC: 125 MG/DL (ref 70–130)
GLUCOSE BLDC GLUCOMTR-MCNC: 126 MG/DL (ref 70–130)
GLUCOSE BLDC GLUCOMTR-MCNC: 134 MG/DL (ref 70–130)
GLUCOSE BLDC GLUCOMTR-MCNC: 136 MG/DL (ref 70–130)
GLUCOSE BLDC GLUCOMTR-MCNC: 154 MG/DL (ref 70–130)
GLUCOSE SERPL-MCNC: 142 MG/DL (ref 65–99)
HCO3 BLDA-SCNC: 25.1 MMOL/L (ref 20–26)
HCT VFR BLD AUTO: 31.4 % (ref 34–46.6)
HCT VFR BLD CALC: 32.6 % (ref 38–51)
HGB BLD-MCNC: 10.1 G/DL (ref 12–15.9)
HGB BLDA-MCNC: 10.6 G/DL (ref 12–16)
INHALED O2 CONCENTRATION: 30 %
LYMPHOCYTES # BLD MANUAL: 0.06 10*3/MM3 (ref 0.7–3.1)
LYMPHOCYTES NFR BLD MANUAL: 0 % (ref 5–12)
Lab: ABNORMAL
MAGNESIUM SERPL-MCNC: 2.3 MG/DL (ref 1.6–2.4)
MCH RBC QN AUTO: 28.8 PG (ref 26.6–33)
MCHC RBC AUTO-ENTMCNC: 32.2 G/DL (ref 31.5–35.7)
MCV RBC AUTO: 89.5 FL (ref 79–97)
METHGB BLD QL: 0.1 % (ref 0–3)
MODALITY: ABNORMAL
MONOCYTES # BLD: 0 10*3/MM3 (ref 0.1–0.9)
MYCOBACTERIUM SPEC CULT: NORMAL
NEUTROPHILS # BLD AUTO: 5.48 10*3/MM3 (ref 1.7–7)
NEUTROPHILS NFR BLD MANUAL: 96.9 % (ref 42.7–76)
NEUTS BAND NFR BLD MANUAL: 2 % (ref 0–5)
NEUTS VAC BLD QL SMEAR: ABNORMAL
NIGHT BLUE STAIN TISS: NORMAL
NIGHT BLUE STAIN TISS: NORMAL
OVALOCYTES BLD QL SMEAR: ABNORMAL
OXYHGB MFR BLDV: 97.9 % (ref 94–99)
PAW @ PEAK INSP FLOW SETTING VENT: 15 CMH2O
PCO2 BLDA: 49.3 MM HG (ref 35–45)
PCO2 TEMP ADJ BLD: 49.3 MM HG (ref 35–45)
PEEP RESPIRATORY: 5 CM[H2O]
PH BLDA: 7.32 PH UNITS (ref 7.35–7.45)
PH, TEMP CORRECTED: 7.32 PH UNITS (ref 7.35–7.45)
PHOSPHATE SERPL-MCNC: 2.9 MG/DL (ref 2.5–4.5)
PLATELET # BLD AUTO: 204 10*3/MM3 (ref 140–450)
PMV BLD AUTO: 10.2 FL (ref 6–12)
PO2 BLDA: 114 MM HG (ref 83–108)
PO2 TEMP ADJ BLD: 114 MM HG (ref 83–108)
POTASSIUM BLDA-SCNC: 3.8 MMOL/L (ref 3.5–5.2)
POTASSIUM SERPL-SCNC: 4.3 MMOL/L (ref 3.5–5.2)
PROT SERPL-MCNC: 6.3 G/DL (ref 6–8.5)
RBC # BLD AUTO: 3.51 10*6/MM3 (ref 3.77–5.28)
SAO2 % BLDCOA: 98.4 % (ref 94–99)
SET MECH RESP RATE: 18
SODIUM BLDA-SCNC: 140 MMOL/L (ref 136–145)
SODIUM SERPL-SCNC: 136 MMOL/L (ref 136–145)
TOXIC GRANULATION: ABNORMAL
VARIANT LYMPHS NFR BLD MANUAL: 1 % (ref 19.6–45.3)
VENTILATOR MODE: ABNORMAL
WBC NRBC COR # BLD AUTO: 5.54 10*3/MM3 (ref 3.4–10.8)

## 2025-02-03 PROCEDURE — 25010000002 PROPOFOL 10 MG/ML EMULSION: Performed by: EMERGENCY MEDICINE

## 2025-02-03 PROCEDURE — 94003 VENT MGMT INPAT SUBQ DAY: CPT

## 2025-02-03 PROCEDURE — 25010000002 FENTANYL CITRATE (PF) 2500 MCG/50ML SOLUTION

## 2025-02-03 PROCEDURE — 25010000002 HEPARIN (PORCINE) PER 1000 UNITS: Performed by: PHYSICIAN ASSISTANT

## 2025-02-03 PROCEDURE — 83050 HGB METHEMOGLOBIN QUAN: CPT

## 2025-02-03 PROCEDURE — 80053 COMPREHEN METABOLIC PANEL: CPT | Performed by: PHYSICIAN ASSISTANT

## 2025-02-03 PROCEDURE — 82805 BLOOD GASES W/O2 SATURATION: CPT

## 2025-02-03 PROCEDURE — 84100 ASSAY OF PHOSPHORUS: CPT | Performed by: PHYSICIAN ASSISTANT

## 2025-02-03 PROCEDURE — 25810000003 SODIUM CHLORIDE 0.9 % SOLUTION

## 2025-02-03 PROCEDURE — 94799 UNLISTED PULMONARY SVC/PX: CPT

## 2025-02-03 PROCEDURE — 71045 X-RAY EXAM CHEST 1 VIEW: CPT

## 2025-02-03 PROCEDURE — 85007 BL SMEAR W/DIFF WBC COUNT: CPT | Performed by: INTERNAL MEDICINE

## 2025-02-03 PROCEDURE — 36600 WITHDRAWAL OF ARTERIAL BLOOD: CPT

## 2025-02-03 PROCEDURE — 82375 ASSAY CARBOXYHB QUANT: CPT

## 2025-02-03 PROCEDURE — 94761 N-INVAS EAR/PLS OXIMETRY MLT: CPT

## 2025-02-03 PROCEDURE — 94664 DEMO&/EVAL PT USE INHALER: CPT

## 2025-02-03 PROCEDURE — 82948 REAGENT STRIP/BLOOD GLUCOSE: CPT

## 2025-02-03 PROCEDURE — 25010000002 METHYLPREDNISOLONE PER 40 MG: Performed by: PHYSICIAN ASSISTANT

## 2025-02-03 PROCEDURE — 85025 COMPLETE CBC W/AUTO DIFF WBC: CPT | Performed by: INTERNAL MEDICINE

## 2025-02-03 PROCEDURE — 83735 ASSAY OF MAGNESIUM: CPT | Performed by: PHYSICIAN ASSISTANT

## 2025-02-03 PROCEDURE — 25010000002 CEFTRIAXONE PER 250 MG: Performed by: PHYSICIAN ASSISTANT

## 2025-02-03 RX ADMIN — PROPOFOL INJECTABLE EMULSION 50 MCG/KG/MIN: 10 INJECTION, EMULSION INTRAVENOUS at 05:54

## 2025-02-03 RX ADMIN — ARFORMOTEROL TARTRATE 15 MCG: 15 SOLUTION RESPIRATORY (INHALATION) at 09:04

## 2025-02-03 RX ADMIN — PROPOFOL INJECTABLE EMULSION 45 MCG/KG/MIN: 10 INJECTION, EMULSION INTRAVENOUS at 10:28

## 2025-02-03 RX ADMIN — MIRTAZAPINE 15 MG: 15 TABLET, FILM COATED ORAL at 20:00

## 2025-02-03 RX ADMIN — OSELTAMIVIR PHOSPHATE 75 MG: 75 CAPSULE ORAL at 08:00

## 2025-02-03 RX ADMIN — Medication 10 ML: at 08:09

## 2025-02-03 RX ADMIN — PROPOFOL INJECTABLE EMULSION 50 MCG/KG/MIN: 10 INJECTION, EMULSION INTRAVENOUS at 02:01

## 2025-02-03 RX ADMIN — IPRATROPIUM BROMIDE AND ALBUTEROL SULFATE 3 ML: 2.5; .5 SOLUTION RESPIRATORY (INHALATION) at 23:43

## 2025-02-03 RX ADMIN — METHYLPREDNISOLONE SODIUM SUCCINATE 40 MG: 40 INJECTION, POWDER, FOR SOLUTION INTRAMUSCULAR; INTRAVENOUS at 17:04

## 2025-02-03 RX ADMIN — DEXMEDETOMIDINE HYDROCHLORIDE 1 MCG/KG/HR: 4 INJECTION, SOLUTION INTRAVENOUS at 22:10

## 2025-02-03 RX ADMIN — FENTANYL CITRATE 225 MCG/HR: 50 INJECTION, SOLUTION INTRAMUSCULAR; INTRAVENOUS at 22:40

## 2025-02-03 RX ADMIN — HEPARIN SODIUM 5000 UNITS: 5000 INJECTION, SOLUTION INTRAVENOUS; SUBCUTANEOUS at 22:10

## 2025-02-03 RX ADMIN — SODIUM CHLORIDE 1000 MG: 900 INJECTION INTRAVENOUS at 23:59

## 2025-02-03 RX ADMIN — IPRATROPIUM BROMIDE AND ALBUTEROL SULFATE 3 ML: 2.5; .5 SOLUTION RESPIRATORY (INHALATION) at 10:25

## 2025-02-03 RX ADMIN — IPRATROPIUM BROMIDE AND ALBUTEROL SULFATE 3 ML: 2.5; .5 SOLUTION RESPIRATORY (INHALATION) at 15:30

## 2025-02-03 RX ADMIN — BUDESONIDE 0.5 MG: 0.5 INHALANT RESPIRATORY (INHALATION) at 19:11

## 2025-02-03 RX ADMIN — ROPINIROLE HYDROCHLORIDE 0.25 MG: 0.25 TABLET, FILM COATED ORAL at 20:00

## 2025-02-03 RX ADMIN — CHLORHEXIDINE GLUCONATE 1 APPLICATION: 500 CLOTH TOPICAL at 03:40

## 2025-02-03 RX ADMIN — IPRATROPIUM BROMIDE AND ALBUTEROL SULFATE 3 ML: 2.5; .5 SOLUTION RESPIRATORY (INHALATION) at 06:34

## 2025-02-03 RX ADMIN — DEXMEDETOMIDINE HYDROCHLORIDE 1 MCG/KG/HR: 4 INJECTION, SOLUTION INTRAVENOUS at 04:26

## 2025-02-03 RX ADMIN — FENTANYL CITRATE 225 MCG/HR: 50 INJECTION, SOLUTION INTRAMUSCULAR; INTRAVENOUS at 08:55

## 2025-02-03 RX ADMIN — CITALOPRAM HYDROBROMIDE 40 MG: 20 TABLET ORAL at 08:00

## 2025-02-03 RX ADMIN — SODIUM CHLORIDE 1000 MG: 900 INJECTION INTRAVENOUS at 00:05

## 2025-02-03 RX ADMIN — LEVOTHYROXINE SODIUM 112 MCG: 112 TABLET ORAL at 06:00

## 2025-02-03 RX ADMIN — METHYLPREDNISOLONE SODIUM SUCCINATE 40 MG: 40 INJECTION, POWDER, FOR SOLUTION INTRAMUSCULAR; INTRAVENOUS at 08:00

## 2025-02-03 RX ADMIN — IPRATROPIUM BROMIDE AND ALBUTEROL SULFATE 3 ML: 2.5; .5 SOLUTION RESPIRATORY (INHALATION) at 03:23

## 2025-02-03 RX ADMIN — HEPARIN SODIUM 5000 UNITS: 5000 INJECTION, SOLUTION INTRAVENOUS; SUBCUTANEOUS at 14:05

## 2025-02-03 RX ADMIN — IPRATROPIUM BROMIDE AND ALBUTEROL SULFATE 3 ML: 2.5; .5 SOLUTION RESPIRATORY (INHALATION) at 00:08

## 2025-02-03 RX ADMIN — HEPARIN SODIUM 5000 UNITS: 5000 INJECTION, SOLUTION INTRAVENOUS; SUBCUTANEOUS at 06:00

## 2025-02-03 RX ADMIN — DEXMEDETOMIDINE HYDROCHLORIDE 1 MCG/KG/HR: 4 INJECTION, SOLUTION INTRAVENOUS at 15:48

## 2025-02-03 RX ADMIN — PANTOPRAZOLE SODIUM 40 MG: 40 INJECTION, POWDER, FOR SOLUTION INTRAVENOUS at 06:00

## 2025-02-03 RX ADMIN — PROPOFOL INJECTABLE EMULSION 45 MCG/KG/MIN: 10 INJECTION, EMULSION INTRAVENOUS at 15:48

## 2025-02-03 RX ADMIN — Medication 1 APPLICATION: at 08:00

## 2025-02-03 RX ADMIN — Medication 1 APPLICATION: at 20:00

## 2025-02-03 RX ADMIN — Medication 10 ML: at 20:00

## 2025-02-03 RX ADMIN — IPRATROPIUM BROMIDE AND ALBUTEROL SULFATE 3 ML: 2.5; .5 SOLUTION RESPIRATORY (INHALATION) at 19:11

## 2025-02-03 RX ADMIN — ARFORMOTEROL TARTRATE 15 MCG: 15 SOLUTION RESPIRATORY (INHALATION) at 23:44

## 2025-02-03 RX ADMIN — PROPOFOL INJECTABLE EMULSION 45 MCG/KG/MIN: 10 INJECTION, EMULSION INTRAVENOUS at 19:30

## 2025-02-03 RX ADMIN — BUDESONIDE 0.5 MG: 0.5 INHALANT RESPIRATORY (INHALATION) at 06:34

## 2025-02-03 RX ADMIN — OSELTAMIVIR PHOSPHATE 75 MG: 75 CAPSULE ORAL at 20:00

## 2025-02-03 RX ADMIN — DEXMEDETOMIDINE HYDROCHLORIDE 1 MCG/KG/HR: 4 INJECTION, SOLUTION INTRAVENOUS at 10:28

## 2025-02-03 RX ADMIN — METHYLPREDNISOLONE SODIUM SUCCINATE 40 MG: 40 INJECTION, POWDER, FOR SOLUTION INTRAMUSCULAR; INTRAVENOUS at 00:05

## 2025-02-03 RX ADMIN — DOCUSATE SODIUM 50 MG AND SENNOSIDES 8.6 MG 2 TABLET: 8.6; 5 TABLET, FILM COATED ORAL at 20:00

## 2025-02-03 RX ADMIN — DOCUSATE SODIUM 50 MG AND SENNOSIDES 8.6 MG 2 TABLET: 8.6; 5 TABLET, FILM COATED ORAL at 08:00

## 2025-02-03 NOTE — PLAN OF CARE
Attempted SAT trial this am - pt air hungry, tachypneic, and agitated. Ruperto Goddard CARRIE notified and pt sedation increased. Propofol @ 50, fentanyl @ 225, precedex @ 1. TF increased to goal of 45. UOP 600mL this shift. Safety maintained.     Problem: Noninvasive Ventilation Acute  Goal: Effective Unassisted Ventilation and Oxygenation  Outcome: Progressing     Problem: Mechanical Ventilation Invasive  Goal: Effective Communication  Outcome: Progressing  Intervention: Ensure Effective Communication  Recent Flowsheet Documentation  Taken 2/2/2025 1800 by Ethel Pedraza RN  Diversional Activities: television  Taken 2/2/2025 1600 by Ethel Pedraza RN  Diversional Activities: television  Taken 2/2/2025 1400 by Ethel Pedraza RN  Diversional Activities: television  Taken 2/2/2025 1200 by Ethel Pedraza RN  Diversional Activities: television  Taken 2/2/2025 1000 by Ethel Pedraza RN  Diversional Activities: television  Taken 2/2/2025 0800 by Ethel Pedraza RN  Diversional Activities: television  Goal: Optimal Device Function  Outcome: Progressing  Goal: Mechanical Ventilation Liberation  Outcome: Progressing  Goal: Optimal Nutrition Delivery  Outcome: Progressing  Goal: Absence of Device-Related Skin and Tissue Injury  Outcome: Progressing  Goal: Absence of Ventilator-Induced Lung Injury  Outcome: Progressing  Intervention: Prevent Ventilator-Associated Pneumonia  Recent Flowsheet Documentation  Taken 2/2/2025 1700 by Ethel Pedraza RN  Head of Bed ([facility]) Positioning: HOB at 30-45 degrees  Taken 2/2/2025 1500 by Ethel Pedraza RN  Head of Bed ([facility]) Positioning: HOB at 30-45 degrees  Taken 2/2/2025 1300 by Ethel Pedraza RN  Head of Bed (HOB) Positioning: HOB at 30-45 degrees  Taken 2/2/2025 1100 by Ethel Pedraza RN  Head of Bed (HOB) Positioning: HOB at 30-45 degrees  Taken 2/2/2025 0900 by Ethel Pedraza RN  Head of Bed (HOB) Positioning: HOB at 30-45 degrees  Taken 2/2/2025 0700 by Ethel Pedraza RN  Head  of Bed (HOB) Positioning: HOB at 30-45 degrees     Problem: Adult Inpatient Plan of Care  Goal: Plan of Care Review  Outcome: Progressing  Goal: Patient-Specific Goal (Individualized)  Outcome: Progressing  Goal: Absence of Hospital-Acquired Illness or Injury  Outcome: Progressing  Intervention: Identify and Manage Fall Risk  Recent Flowsheet Documentation  Taken 2/2/2025 1800 by Ethel Pedraza RN  Safety Promotion/Fall Prevention: safety round/check completed  Taken 2/2/2025 1700 by Ethel Pedraza RN  Safety Promotion/Fall Prevention: safety round/check completed  Taken 2/2/2025 1600 by Ethel Pedraza RN  Safety Promotion/Fall Prevention: safety round/check completed  Taken 2/2/2025 1500 by Ethel Pedraza RN  Safety Promotion/Fall Prevention: safety round/check completed  Taken 2/2/2025 1400 by Ethel Pedraza RN  Safety Promotion/Fall Prevention: safety round/check completed  Taken 2/2/2025 1300 by Ethel Pedraza RN  Safety Promotion/Fall Prevention: safety round/check completed  Taken 2/2/2025 1100 by Ethel Pedraza RN  Safety Promotion/Fall Prevention: safety round/check completed  Taken 2/2/2025 1000 by Ethel Pedraza RN  Safety Promotion/Fall Prevention: safety round/check completed  Taken 2/2/2025 0900 by Ethel Perdaza RN  Safety Promotion/Fall Prevention: safety round/check completed  Taken 2/2/2025 0800 by Ethel Pedraza RN  Safety Promotion/Fall Prevention: safety round/check completed  Taken 2/2/2025 0700 by Ethel Pedraza RN  Safety Promotion/Fall Prevention: safety round/check completed  Intervention: Prevent Skin Injury  Recent Flowsheet Documentation  Taken 2/2/2025 1700 by Ethel Pedraza RN  Body Position:   right   turned   upper extremity elevated   lower extremity elevated  Taken 2/2/2025 1500 by Ethel Pedraza RN  Body Position:   left   turned   upper extremity elevated   lower extremity elevated  Taken 2/2/2025 1300 by Ethel Pedraza RN  Body Position:   supine   upper extremity  elevated  Taken 2/2/2025 1100 by Ethel Pedarza RN  Body Position:   right   turned   upper extremity elevated   lower extremity elevated  Taken 2/2/2025 0900 by Ethel Pedraza RN  Body Position:   supine   upper extremity elevated  Taken 2/2/2025 0700 by Ethel Pedraza RN  Body Position:   left   turned   upper extremity elevated  Intervention: Prevent and Manage VTE (Venous Thromboembolism) Risk  Recent Flowsheet Documentation  Taken 2/2/2025 0800 by Ethel Pedraza RN  VTE Prevention/Management: (see MAR) other (see comments)  Goal: Optimal Comfort and Wellbeing  Outcome: Progressing  Goal: Readiness for Transition of Care  Outcome: Progressing     Problem: Fall Injury Risk  Goal: Absence of Fall and Fall-Related Injury  Outcome: Progressing  Intervention: Promote Injury-Free Environment  Recent Flowsheet Documentation  Taken 2/2/2025 1800 by Ethel Pedraza RN  Safety Promotion/Fall Prevention: safety round/check completed  Taken 2/2/2025 1700 by Ethel Pedraza RN  Safety Promotion/Fall Prevention: safety round/check completed  Taken 2/2/2025 1600 by Ethel Pedraza RN  Safety Promotion/Fall Prevention: safety round/check completed  Taken 2/2/2025 1500 by Ethel Pedraza RN  Safety Promotion/Fall Prevention: safety round/check completed  Taken 2/2/2025 1400 by Ethel Pedraza RN  Safety Promotion/Fall Prevention: safety round/check completed  Taken 2/2/2025 1300 by Ethel Pedraza RN  Safety Promotion/Fall Prevention: safety round/check completed  Taken 2/2/2025 1100 by Ethel Pedraza RN  Safety Promotion/Fall Prevention: safety round/check completed  Taken 2/2/2025 1000 by Ethel Pedraza RN  Safety Promotion/Fall Prevention: safety round/check completed  Taken 2/2/2025 0900 by Ethel Pedraza RN  Safety Promotion/Fall Prevention: safety round/check completed  Taken 2/2/2025 0800 by Ethel Perdaza RN  Safety Promotion/Fall Prevention: safety round/check completed  Taken 2/2/2025 0700 by Ethel Pedraza  RN  Safety Promotion/Fall Prevention: safety round/check completed     Problem: Skin Injury Risk Increased  Goal: Skin Health and Integrity  Outcome: Progressing  Intervention: Optimize Skin Protection  Recent Flowsheet Documentation  Taken 2/2/2025 1700 by Ethel Pedraza RN  Head of Bed (HOB) Positioning: HOB at 30-45 degrees  Taken 2/2/2025 1500 by Ethel Pedraza RN  Head of Bed (HOB) Positioning: HOB at 30-45 degrees  Taken 2/2/2025 1300 by Ethel Pedraza RN  Head of Bed (HOB) Positioning: HOB at 30-45 degrees  Taken 2/2/2025 1100 by Ethel Pedraza RN  Head of Bed (HOB) Positioning: HOB at 30-45 degrees  Taken 2/2/2025 0900 by Ethel Pedraza RN  Head of Bed (HOB) Positioning: HOB at 30-45 degrees  Taken 2/2/2025 0700 by Ethel Pedraza RN  Head of Bed (HOB) Positioning: HOB at 30-45 degrees     Problem: Restraint, Nonviolent  Goal: Absence of Harm or Injury  Outcome: Progressing  Intervention: Implement Least Restrictive Safety Strategies  Recent Flowsheet Documentation  Taken 2/2/2025 1800 by Ethel Pedraza RN  Medical Device Protection:   torso covered   tubing secured  Diversional Activities: television  Taken 2/2/2025 1600 by Ethel Pedraza RN  Medical Device Protection:   torso covered   tubing secured  Diversional Activities: television  Taken 2/2/2025 1400 by Ethel Pedraza RN  Medical Device Protection:   torso covered   tubing secured  Diversional Activities: television  Taken 2/2/2025 1200 by Ethel Pedraza RN  Medical Device Protection:   torso covered   tubing secured  Diversional Activities: television  Taken 2/2/2025 1000 by Ethel Pedraza RN  Medical Device Protection:   torso covered   tubing secured  Diversional Activities: television  Taken 2/2/2025 0800 by Ethel Pedraza RN  Medical Device Protection:   torso covered   tubing secured  Diversional Activities: television  Intervention: Protect Skin and Joint Integrity  Recent Flowsheet Documentation  Taken 2/2/2025 1700 by Ethel Pedraza  JADIEL HOGAN  Body Position:   right   turned   upper extremity elevated   lower extremity elevated  Taken 2/2/2025 1600 by Ethel Pedraza RN  Range of Motion: ROM (range of motion) performed  Taken 2/2/2025 1500 by Ethel Pedraza RN  Body Position:   left   turned   upper extremity elevated   lower extremity elevated  Taken 2/2/2025 1300 by Ethel Pedraza RN  Body Position:   supine   upper extremity elevated  Taken 2/2/2025 1100 by Ethel Pedraza RN  Body Position:   right   turned   upper extremity elevated   lower extremity elevated  Taken 2/2/2025 0900 by Ethel Pedraza RN  Body Position:   supine   upper extremity elevated  Taken 2/2/2025 0700 by Ethel Pedraza RN  Body Position:   left   turned   upper extremity elevated   Goal Outcome Evaluation:

## 2025-02-03 NOTE — PROGRESS NOTES
RT EQUIPMENT DEVICE RELATED - SKIN ASSESSMENT    Estiven Score:  Estiven Score: 14     RT Medical Equipment/Device:     ETT Santacruz/Anchorfast    Skin Assessment:      Cheek:  Intact  Lips:  Intact  Mouth:  Intact    Device Skin Pressure Protection:  Skin-to-device areas padded:  Anchorfast    Nurse Notification:  Leah Owusu   JESSICA Jacob  [facility]  [facility] [address]  Scheduled Appointment: 10/28/2024

## 2025-02-03 NOTE — PLAN OF CARE
Goal Outcome Evaluation:      Patient remains on the vent.  No changes made at this time.

## 2025-02-03 NOTE — PLAN OF CARE
Goal Outcome Evaluation:  Plan of Care Reviewed With: patient        Progress: no change  Outcome Evaluation: Vented and sedated. Attempted weaning. Decreased sedation and had increased WOB, advised by attending to restart sedation at higher dose. VSS. Straight cath x1, 900ml UOP. No BM. Fent at 225mcg. Propofol 45mcg. Precedex 0.1. Turns q2h.       Problem: Noninvasive Ventilation Acute  Goal: Effective Unassisted Ventilation and Oxygenation  Outcome: Not Progressing

## 2025-02-03 NOTE — PLAN OF CARE
Goal Outcome Evaluation:  Plan of Care Reviewed With: patient        Progress: no change  Outcome Evaluation: Pt remains on ventilator support. FiO2 30%. Propofol at 50. Precedex at 1. Fentanyl at 225.  Tube feed at goal rate. Urine output 975 ml.

## 2025-02-03 NOTE — PROGRESS NOTES
[facility] Emergency Room Intensivist Services  INPATIENT PROGRESS NOTE    Patient Name: Ashley Gibbs  Date of Admission: 1/31/2025  Today's Date: 02/03/25  Length of Stay: 3  Primary Care Physician: Padmaja Bermudez APRN    Subjective   ICU Summary:  69-year-old male with stage IV small cell lung cancer, COPD, restless leg, admitted on 1/31/2025 with influenza A infection and acute hypoxic and hypercapnic respiratory failure.  Patient required intubation in the emergency department.  She started on oseltamavir, Rocephin, azithromycin, Pulmicort and DuoNebs.  On Solu-Medrol taper.  Patient has not been able to tolerate ventilator weaning or spontaneous breathing trials.    Interval update:  2/3  Patient remains intubated, sedated, on ventilator.  5 PEEP and 30% FiO2.  Still having elevated peak pressures.  Mildly labored respirations.  Blood pressure stable.     Review of Systems   All pertinent negatives and positives are as above. All other systems have been reviewed and are negative unless otherwise stated.     Objective    Temp:  [97.3 °F (36.3 °C)-99.3 °F (37.4 °C)] 97.3 °F (36.3 °C)  Heart Rate:  [] 113  Resp:  [17-24] 20  BP: (105-159)/(63-90) 119/68  FiO2 (%):  [30 %] 30 %  Physical Exam  Vitals and nursing note reviewed.   Constitutional:       Interventions: She is sedated and intubated.   Eyes:      Pupils: Pupils are equal, round, and reactive to light.   Cardiovascular:      Rate and Rhythm: Normal rate and regular rhythm.      Pulses: Normal pulses.      Heart sounds: Normal heart sounds.   Pulmonary:      Effort: She is intubated.      Comments: Mildly labored respirations, coarse breath sounds bilaterally  Abdominal:      General: There is no distension.      Palpations: Abdomen is soft.   Musculoskeletal:      Comments: Normal muscle bulk and tone   Skin:     General: Skin is warm and dry.      Capillary Refill: Capillary refill takes less than 2 seconds.    Neurological:      Comments: Sedate           Results Review:  Lab Results (last 24 hours)       Procedure Component Value Units Date/Time    Blood Culture - Blood, Hand, Left [[id number]]  (Normal) Collected: 01/31/25 0857    Specimen: Blood from Hand, Left Updated: 02/03/25 0915     Blood Culture No growth at 3 days    Blood Culture - Blood, Arm, Left [[id number]]  (Normal) Collected: 01/31/25 0813    Specimen: Blood from Arm, Left Updated: 02/03/25 0830     Blood Culture No growth at 3 days    POC Glucose Once [[id number]]  (Abnormal) Collected: 02/03/25 0535    Specimen: Blood Updated: 02/03/25 0547     Glucose 136 mg/dL      Comment: : [id number] Brad Farley ID: [id number]       CBC & Differential [[id number]]  (Abnormal) Collected: 02/03/25 0324    Specimen: Blood Updated: 02/03/25 0516    Narrative:      The following orders were created for panel order CBC & Differential.  Procedure                               Abnormality         Status                     ---------                               -----------         ------                     CBC Auto Differential[[id number]]        Abnormal            Final result                 Please view results for these tests on the individual orders.    CBC Auto Differential [[id number]]  (Abnormal) Collected: 02/03/25 0324    Specimen: Blood Updated: 02/03/25 0516     WBC 5.54 10*3/mm3      RBC 3.51 10*6/mm3      Hemoglobin 10.1 g/dL      Hematocrit 31.4 %      MCV 89.5 fL      MCH 28.8 pg      MCHC 32.2 g/dL      RDW 14.0 %      RDW-SD 45.9 fl      MPV 10.2 fL      Platelets 204 10*3/mm3     Manual Differential [[id number]]  (Abnormal) Collected: 02/03/25 0324    Specimen: Blood Updated: 02/03/25 0516     Neutrophil % 96.9 %      Lymphocyte % 1.0 %      Monocyte % 0.0 %      Eosinophil % 0.0 %      Basophil % 0.0 %      Bands %  2.0 %      Neutrophils Absolute 5.48 10*3/mm3      Lymphocytes Absolute 0.06 10*3/mm3      Monocytes Absolute 0.00 10*3/mm3       Eosinophils Absolute 0.00 10*3/mm3      Basophils Absolute 0.00 10*3/mm3      Ovalocytes Mod/2+     Toxic Granulation Slight/1+     Vacuolated Neutrophils Slight/1+     Clumped Platelets Present     Giant Platelets Slight/1+    Comprehensive Metabolic Panel [[id number]]  (Abnormal) Collected: 02/03/25 0324    Specimen: Blood Updated: 02/03/25 0452     Glucose 142 mg/dL      BUN 31 mg/dL      Creatinine 0.53 mg/dL      Sodium 136 mmol/L      Potassium 4.3 mmol/L      Chloride 101 mmol/L      CO2 24.0 mmol/L      Calcium 8.2 mg/dL      Total Protein 6.3 g/dL      Albumin 3.5 g/dL      ALT (SGPT) 18 U/L      AST (SGOT) 16 U/L      Alkaline Phosphatase 99 U/L      Total Bilirubin <0.2 mg/dL      Globulin 2.8 gm/dL      A/G Ratio 1.3 g/dL      BUN/Creatinine Ratio 58.5     Anion Gap 11.0 mmol/L      eGFR 101.5 mL/min/1.73     Narrative:      GFR Categories in Chronic Kidney Disease (CKD)      GFR Category          GFR (mL/min/1.73)    Interpretation  G1                     90 or greater         Normal or high (1)  G2                      60-89                Mild decrease (1)  G3a                   45-59                Mild to moderate decrease  G3b                   30-44                Moderate to severe decrease  G4                    15-29                Severe decrease  G5                    14 or less           Kidney failure          (1)In the absence of evidence of kidney disease, neither GFR category G1 or G2 fulfill the criteria for CKD.    eGFR calculation 2021 CKD-EPI creatinine equation, which does not include race as a factor    Magnesium [[id number]]  (Normal) Collected: 02/03/25 0324    Specimen: Blood Updated: 02/03/25 0452     Magnesium 2.3 mg/dL     Phosphorus [[id number]]  (Normal) Collected: 02/03/25 0324    Specimen: Blood Updated: 02/03/25 0445     Phosphorus 2.9 mg/dL     Blood Gas, Arterial With Co-Ox [[id number]]  (Abnormal) Collected: 02/03/25 0425    Specimen: Arterial Blood Updated: 02/03/25 0428      Site Right Radial     Fabrice's Test Positive     pH, Arterial 7.315 pH units      Comment: 84 Value below reference range        pCO2, Arterial 49.3 mm Hg      Comment: 83 Value above reference range        pO2, Arterial 114.0 mm Hg      Comment: 83 Value above reference range        HCO3, Arterial 25.1 mmol/L      Base Excess, Arterial -1.4 mmol/L      Comment: 84 Value below reference range        O2 Saturation, Arterial 98.4 %      Hemoglobin, Blood Gas 10.6 g/dL      Comment: 84 Value below reference range        Hematocrit, Blood Gas 32.6 %      Comment: 84 Value below reference range        Oxyhemoglobin 97.9 %      Methemoglobin 0.10 %      Carboxyhemoglobin 0.3 %      Temperature 37.0     Sodium, Arterial 140 mmol/L      Potassium, Arterial 3.8 mmol/L      Barometric Pressure for Blood Gas 752 mmHg      Modality Ventilator     FIO2 30 %      Ventilator Mode PC     Set [facility] Resp Rate 18.0     PEEP 5.0     PIP 15 cmH2O      Comment: Meter: [id number]     :  Tosha Owusu RRT        Collected by [id number]     pH, Temp Corrected 7.315 pH Units      pCO2, Temperature Corrected 49.3 mm Hg      pO2, Temperature Corrected 114 mm Hg     POC Glucose Once [[id number]]  (Abnormal) Collected: 02/02/25 2351    Specimen: Blood Updated: 02/03/25 0002     Glucose 154 mg/dL      Comment: : [id number] Dolly (Goddard)  PaigeMeter ID: [id number]       POC Glucose Once [[id number]]  (Abnormal) Collected: 02/02/25 1117    Specimen: Blood Updated: 02/02/25 1129     Glucose 138 mg/dL      Comment: : [id number] Jesús WoodyianMeter ID: [id number]                XR Chest 1 View    Result Date: 2/3/2025   No change in appearance of the chest.  This report was signed and finalized on 2/3/2025 6:53 AM by Dr. Eddie Alicia MD.      XR Chest 1 View    Result Date: 2/2/2025  No change in appearance of the chest.  This report was signed and finalized on 2/2/2025 5:25 AM by Dr. Eddie Alicia MD.      XR Abdomen  KUB    Result Date: 2/1/2025  FINDINGS/IMPRESSION:  Enteric tube tip is in the mid to distal stomach.  This report was signed and finalized on 2/1/2025 5:46 PM by Dr. Eddie Alicia MD.       Result Review:  I have personally reviewed the results from the time of this admission to 2/3/2025 11:18 CST and agree with these findings:  [x]  Laboratory list / accordion  [x]  Microbiology  [x]  Radiology  [x]  EKG/Telemetry   []  Cardiology/Vascular   []  Pathology  []  Old records  []  Other:      Culture Data:   Blood Culture   Date Value Ref Range Status   01/31/2025 No growth at 3 days  Preliminary   01/31/2025 No growth at 3 days  Preliminary     Urine Culture   Date Value Ref Range Status   01/31/2025 <25,000 CFU/mL Mixed Fanta Isolated  Final       I have reviewed the patient's current medications.     Assessment/Plan   Assessment  Active Hospital Problems    Diagnosis     **Acute respiratory failure     Influenza A     Small cell lung cancer    69-year-old female with acute hypoxemic respiratory failure, influenza A infection, small cell lung cancer in CCU for critical care management.    Acute hypoxic respiratory failure  -Secondary to influenza A, small cell lung cancer and baseline COPD  -Remains on vent, in PRVC, tolerating 5 PEEP and 30% FiO2  -Attempted sedation wean prior to spontaneous breathing trial today, patient became labored and dyssynchronous with vent, required reinitiation of sedation  -Continue oseltamavir for influenza A   -Continue empiric Rocephin and azithromycin for COPD exacerbation  -Continue Pulmicort, DuoNeb, Brovana  -Methylprednisolone taper    Influenza A infection  -Continue Tamiflu course  -Droplet precautions    Small cell lung cancer  -Oncology consulted, follow their recommendations  -Is status post first round of chemo and awaiting radiation therapy as outpatient    VTE: Heparin  GI: PPI  NTN: Tube feed  Abx: Rocephin, finished azithromycin  Lines/Tubes: Peripheral IV, OG  tube  CODE STATUS: No CPR    Disposition: Continue critical care management    Total critical care time: Approximately 35 minutes     Due to a high probability of clinically significant, life threatening deterioration, the patient required my highest level of preparedness to intervene emergently and I personally spent this critical care time directly and personally managing the patient.      This critical care time included obtaining a history; examining the patient; pulse oximetry; ordering and review of studies; arranging urgent treatment with development of a management plan; evaluation of patient's response to treatment; frequent reassessment; and, discussions with other providers.     This critical care time was performed to assess and manage the high probability of imminent, life-threatening deterioration that could result in multi-organ failure. It was exclusive of separately billable procedures and treating other patients and teaching time.     Please see MDM section and the rest of the note for further information on patient assessment and treatment.     Part of this note may be an electronic transcription/translation of spoken language to printed text using the Dragon dictation system      Electronically signed by CARRIE Nick on 2/3/2025 at 16:21 CST

## 2025-02-03 NOTE — PROGRESS NOTES
Inpatient Nutrition Services  Tube Feeding Follow-Up  Patient Name:  Ashley Gibbs  YOB: 1955  MRN: [id number]  Admit Date:  1/31/2025   Reason for Assessment       Row Name 02/03/25 1150          Reason for Assessment    Reason For Assessment follow-up protocol;TF/PN          Nutrition/Diet History       Row Name 02/03/25 1150          Nutrition/Diet History    Typical Intake (Food/Fluid/EN/PN) TF tolerated at goal; hanging new bag this shift. Propofol at 20.3mL/hr; received 524 calories/day from sedation based on 72-hr chart review. RN reports small BM Saturday per shift report. No documentation of BM in flowsheets since admission. Glu 125-154. BUN and BUN:Cr increasing trend. KUB 2/1 showed enteric tip in mid to distal stomach. Admit weight 149.9lb bed scale; today's wegith 164.5lb. 1+ edema to arms and feet. No skin breakdown, Estiven Score 13. No NGT output since initial 550mL on admit. Based on 72-hr I/O documentation, nutrient analysis: 595 calories from enteral formula (1119 calories with propofol), 55 g pro/day, and 1240mL/day enteral fluid (enteral formula and free water flush). Additional 3,176mL other fluids/day (IV). UOP average 1583mL/day. Residual 10-130mL. Pt has met enteral feeding goal rate and tolerated within 48 hours of initiating feeding. REC to continue current nutrition support orders that provide 1210 kcal/day, 111 g pro/day, and 2006mL fluid/day (not including IV fluids/meds). Inpatient nutrition services following per protocol.         Electronically signed by:  Inna Ford RDN, JOANNE  02/03/25 11:58 CST

## 2025-02-03 NOTE — CASE MANAGEMENT/SOCIAL WORK
Discharge Planning Assessment  [facility]     Patient Name: Ashley Gibbs  MRN: [id number]  Today's Date: 2/3/2025    Admit Date: 1/31/2025        Discharge Needs Assessment       Row Name 02/03/25 1437       Living Environment    People in Home grandchild(melany)    Current Living Arrangements home    Primary Care Provided by [facility]    Quality of Family Relationships supportive       Discharge Needs Assessment    Equipment Currently Used at Home oxygen;commode;cpap    Discharge Coordination/Progress Patient sedated, intubated and on vent. Spoke with granddaughter at bedside. Patient's granddaughter, Zoraida, lives with patient. Patient was independent and still driving prior to illness. Uses home oxygen supplied by [employer]. DC needs / plan unknown at this time.  will continue to follow.                   Discharge Plan    No documentation.                 Continued Care and Services - Admitted Since 1/31/2025    No active coordination exists for this encounter.          Demographic Summary    No documentation.                  Functional Status    No documentation.                  Psychosocial    No documentation.                  Abuse/Neglect    No documentation.                  Legal    No documentation.                  Substance Abuse    No documentation.                  Patient Forms    No documentation.                     Merlina A Fletcher, RN

## 2025-02-03 NOTE — PROGRESS NOTES
HEMATOLOGY AND MEDICAL ONCOLOGY PROGRESS NOTE    Patient name: Ashley Gibbs  Patient : 1955  VISIT # [id number]  MR #[id number]  Room:     SUBJECTIVE:  The patient continues to be intubated/sedated.    Family present at bedside and encouraged with her overall improvement since admission.  Discussed with ICU nurse, Catherine DUVALL at bedside.  Continuing supportive care.    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 Rico Vanegas.     She received cycle #1, day #1 of  carboplatin/-16 on 2025-25.       Palliative emergent XRT of the chest consisted of 1250 cGy in 5 treatment fractions between 2024 and 2024 by Dr. Rico Vanegas at [facility] for the associated SVC due to tumor..     Treatment regimen-curative intent consisted of:  Carboplatin AUC 5 day 1  Etoposide 100 mg/m² days 1-3 q. 21 days  Plan is for X 4 cycles      Ashley was seen this morning, 2025, in the ED, at [facility] accompanied by 2 granddaughters and her good friend.  The granddaughter reports a fever to 101 degrees, increased difficulty with breathing shortness of breath and wheezing.     CBC Results   CBC            2024    08:49 2025    11:20 2025    08:13   CBC   WBC 4.51  5.35     9.14    RBC 3.85  4.32     3.67    Hemoglobin 11.5  12.9     10.9    Hematocrit 35.2  39.4     33.7    MCV 91.4  91.2     91.8    MCH 29.9  29.9     29.7    MCHC 32.7  32.7     32.3    RDW 13.0  12.9     13.9    Platelets 199  260     220         Details           This result is from an external source.                         UA today, 2025 showed 3+ leukocytes, 30 mg/dL protein WBCs too numerous to count, bacteria 3+, nitrite negative.     Respiratory panel by PCR today, 2025 was positive for influenza A H1-Detected  The patient has been desaturating while in the ED,  requiring BiPAP assistance.  Blood and urine cultures were obtained.  Antibiotic with cefepime is initiated.     Medical oncology consultation requested         PAST TUMOR HISTORY COPIED FROM DR. CARDONA'S 1/13/2025 OFFICE VISIT:   HISTORY OF PRESENT ILLNESS:    Diagnosis  Small cell lung cancer, right lung, Dec 2024  Stage  [id number], limited stage     Treatment Summary  12/13/24 - 12/19/24 Emergent palliative radiation therapy 1250cGy in 5 fractions to right lung  Anticipate chemotherapy with Carboplatin D1, Etoposide D1-3 every 21 days x4 cycles  Anticipate radiation therapy to right lung with Dr Rico Vanegas/[facility]     Cancer History  Ms. 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 She has complaints today of fatigue. She states she has stabbing chest pain and mid back pain. She is currently being seen at pain management for pain medication. She presents today on 2L of oxygen dependently.   11/8/24 CT chest low dose ([facility]): 2 cm right upper lobe bilobed nodule with associated mediastinal and right hilar lymphadenopathy, suspicious for malignancy. Minimal adjacent   right upper lobe interlobular septal thickening may be related to venous or lymphatic congestion, however lymphangitic carcinomatosis is an additional consideration. Recommend further evaluation with PET/CT and/or tissue sampling. CT chest with contrast may also be of value to help distinguish the lymph nodes from adjacent vasculature. Minimal tree-in-bud nodularity in the peripheral the right upper lobe, likely mild infectious process.   Additional small pulmonary nodules in the right upper and right middle lobe.   11/8/24 CT abd/pelvis ([facility]): Near complete resolution of the exophytic left upper pole renal lesion of concern likely related to interval rupture/involution. Numerous small bilateral renal cysts are again present.  Mild diffuse wall  thickening of the rectum and the distal transverse colon and proximal descending colon. Correlate for proctocolitis.   12/13/24 PET/CT scan ([facility]): There is a hypermetabolic right upper lobe nodule measures 1.4 cm and has a maximum SUV of 5.5. Right upper lobe suprahilar mass measures at least 8 cm in greatest axial dimension is hypermetabolic with a maximum SUV of 11.4. Mass either invades the middle mediastinum or there is conglomerate hypermetabolic lymphadenopathy. No additional hypermetabolic pulmonary nodule or mass is identified. There is no increased skeletal uptake to suggest osseous metastasis.   12/13/245 CT chest ([facility]): 2.2 cm bilobed right upper lobe pulmonary nodule, likely representing a primary lung neoplasm. 8.9 cm right hilar lung mass with diffuse mediastinal infiltration, likely representing mary metastasis. This mass encases the right mainstem bronchus and encases the right main pulmonary artery, with occlusion of the right upper lobe pulmonary artery. This mass compresses the SVC, which is slitlike. Patient is at risk for SVC syndrome.  12/13/24 Initial evaluation by Dr Rico Vanegas/[facility] for superior vena cava syndrome who recommended emergent palliative radiation therapy 1250cGy in 5 fractions.  12/13/24 - 12/19/24 Emergent SBRT 1250cGy in 5 fractions to right lung  12/19/24 CT chest w/o ([facility]): Stable large right hilar mass with mediastinal invasion and confluent lymphadenopathy as detailed above. Findings are suspicious for small cell carcinoma. There is increased groundglass opacities and interlobular septal thickening within the right upper lobe and right middle lobe. Differential includes postobstructive changes and/or leptomeningeal carcinomatosis. Stable 2.2 cm right upper lobe bilobed nodule which was previously hypermetabolic and concerning for metastases. Trace right pleural effusion.   12/23/24 Navigational bronchoscopy/EBUS by Dr Peterson Morales/[facility]  "Pulmonology  12/23/24 Station 7 lymph node, endobronchial ultrasound-guided fine-needle aspiration, smear (1) and cellblock: High-grade neuroendocrine carcinoma consistent with metastatic small cell carcinoma. Fragments of lymphoid tissue and cartilage present.  Station 4R lymph node, endobronchial ultrasound-guided fine-needle aspiration, smear (1) and cellblock: High-grade neuroendocrine carcinoma consistent with metastatic small cell carcinoma. Many background lymphocytes.  Bronchial washings, ThinPrep preparation (1) and cellblock: A few malignant cells present, high-grade neuroendocrine carcinoma consistent with small cell carcinoma. Several macrophages. A few benign squamous epithelial cells and bronchial epithelial cells. AJCC stage: pTX pNX  1/13/2025-essentially, limited stage small cell lung cancer.  Awaiting results of brain MRI.  Recommend concurrent chemoradiation with carboplatin 2 post right to be followed by adjuvant durvalumab.  1/27/2025-initiation cycle #1 of chemotherapy with carboplatin/-16           PHYSICAL EXAM:/84   Pulse 75   Temp 97.9 °F (36.6 °C) (Axillary)   Resp 18   Ht 160 cm (63\")   Wt 74.6 kg (164 lb 7.4 oz)   SpO2 96%   BMI 29.13 kg/m²   CONSTITUTIONAL:sedated, ill-appearing  EYES: Anicteric, EOM intact, pupils equal round   CHEST/LUNGS: CTA, Mechanical ventilator, PEEP 5, FiO2 30%.  CARDIOVASCULAR: RRR, no murmurs  ABDOMEN: soft, active bowel sounds, no HSM  EXTREMITIES: warm,   SKIN: warm,   NEUROLOGIC-sedated      CBC  Results from last 7 days   Lab Units 02/03/25  0324 02/02/25  0335 02/01/25  0254   WBC 10*3/mm3 5.54 6.96 10.46   HEMOGLOBIN g/dL 10.1* 9.8* 10.0*   HEMATOCRIT % 31.4* 30.4* 32.0*   PLATELETS 10*3/mm3 204 207 207         Lab Results   Component Value Date     02/03/2025    K 4.3 02/03/2025     02/03/2025    CO2 24.0 02/03/2025    BUN 31 (H) 02/03/2025    CREATININE 0.53 (L) 02/03/2025    GLUCOSE 142 (H) 02/03/2025    CALCIUM 8.2 (L) " 02/03/2025    BILITOT <0.2 02/03/2025    ALKPHOS 99 02/03/2025    AST 16 02/03/2025    ALT 18 02/03/2025    AGRATIO 1.3 02/03/2025    GLOB 2.8 02/03/2025       Lab Results   Component Value Date    INR 0.98 01/31/2025    INR 1.04 07/24/2019    INR 0.90 03/13/2019    PROTIME 13.5 01/31/2025    PROTIME 13 07/24/2019    PROTIME 11.6 (L) 03/13/2019       ASSESSMENT/PLAN:  # Limited stage small cell lung cancer-status post completion of cycle 1 carboplatin etoposide 1/29/2025.  Status post palliative XRT to the C.  Plans for further XRT with Dr. Rico Vanegas.    # Acute hypoxic respiratory failure- r/tCOPD exacerbation with + influenza A.  Chest x-ray showed chronic changes of emphysema and fibrosis.  Continue current supportive care.  Failed SBT-02/01/25  Remains critically ill. No plans for SBT today.    #Normocytic anemia- Hb 10->9.8.  Monitor    Plan:  Continue current supportive care  Continue antiviral Tamiflu  Reviewed x ray chest this am    Future Appointments    Encounter Information   Provider Department Center   2/17/2025 11:30 AM Christopher Isaac MD [facility]   2/17/2025 11:30 AM INFUSION SCHEDULE, [facility] Aide [facility]   2/18/2025 12:00 PM INFUSION SCHEDULE, [facility] Aide [facility]   2/19/2025 11:30 AM INFUSION SCHEDULE, [facility] Aide [facility]       Ramón Li MD    02/03/25  07:17 CST

## 2025-02-03 NOTE — PROGRESS NOTES
RT EQUIPMENT DEVICE RELATED - SKIN ASSESSMENT    Estiven Score:  Estiven Score: 13     RT Medical Equipment/Device:     ETT Santacruz/Anchorfast    Skin Assessment:      Cheek:  Intact  Lips:  Intact  Mouth:  Intact    Device Skin Pressure Protection:  Skin-to-device areas padded:  Anchorfast    Nurse Notification:  Leah Wilkinson, RRT

## 2025-02-03 NOTE — PLAN OF CARE
Problem: Enteral Nutrition  Goal: Feeding Tolerance  Outcome: Progressing   Goal Outcome Evaluation: TF tolerated at goal, see full follow-up note for nutrient analysis. Continue current enteral feeding orders; will adjust as needed. Nutrition services following per protocol.

## 2025-02-04 ENCOUNTER — APPOINTMENT (OUTPATIENT)
Dept: GENERAL RADIOLOGY | Facility: HOSPITAL | Age: 70
DRG: 208 | End: 2025-02-04
Payer: MEDICARE

## 2025-02-04 LAB
ALBUMIN SERPL-MCNC: 3.3 G/DL (ref 3.5–5.2)
ALBUMIN/GLOB SERPL: 1.2 G/DL
ALP SERPL-CCNC: 88 U/L (ref 39–117)
ALT SERPL W P-5'-P-CCNC: 22 U/L (ref 1–33)
ANION GAP SERPL CALCULATED.3IONS-SCNC: 11 MMOL/L (ref 5–15)
ANISOCYTOSIS BLD QL: ABNORMAL
ARTERIAL PATENCY WRIST A: ABNORMAL
ARTERIAL PATENCY WRIST A: POSITIVE
AST SERPL-CCNC: 18 U/L (ref 1–32)
ATMOSPHERIC PRESS: 756 MMHG
ATMOSPHERIC PRESS: 760 MMHG
BASE EXCESS BLDA CALC-SCNC: 3 MMOL/L (ref 0–2)
BASE EXCESS BLDA CALC-SCNC: 5.4 MMOL/L (ref 0–2)
BASOPHILS # BLD MANUAL: 0 10*3/MM3 (ref 0–0.2)
BASOPHILS NFR BLD MANUAL: 0 % (ref 0–1.5)
BDY SITE: ABNORMAL
BDY SITE: ABNORMAL
BILIRUB SERPL-MCNC: 0.2 MG/DL (ref 0–1.2)
BODY TEMPERATURE: 37
BODY TEMPERATURE: 37
BUN SERPL-MCNC: 35 MG/DL (ref 8–23)
BUN/CREAT SERPL: 72.9 (ref 7–25)
CALCIUM SPEC-SCNC: 8.7 MG/DL (ref 8.6–10.5)
CHLORIDE SERPL-SCNC: 100 MMOL/L (ref 98–107)
CLUMPED PLATELETS: PRESENT
CO2 SERPL-SCNC: 26 MMOL/L (ref 22–29)
COHGB MFR BLD: 0.8 % (ref 0–5)
CREAT SERPL-MCNC: 0.48 MG/DL (ref 0.57–1)
DEPRECATED RDW RBC AUTO: 46.5 FL (ref 37–54)
EGFRCR SERPLBLD CKD-EPI 2021: 104 ML/MIN/1.73
EOSINOPHIL # BLD MANUAL: 0 10*3/MM3 (ref 0–0.4)
EOSINOPHIL NFR BLD MANUAL: 0 % (ref 0.3–6.2)
ERYTHROCYTE [DISTWIDTH] IN BLOOD BY AUTOMATED COUNT: 14.1 % (ref 12.3–15.4)
GIANT PLATELETS: ABNORMAL
GLOBULIN UR ELPH-MCNC: 2.8 GM/DL
GLUCOSE BLDC GLUCOMTR-MCNC: 136 MG/DL (ref 70–130)
GLUCOSE SERPL-MCNC: 138 MG/DL (ref 65–99)
HCO3 BLDA-SCNC: 27.5 MMOL/L (ref 20–26)
HCO3 BLDA-SCNC: 28.9 MMOL/L (ref 20–26)
HCT VFR BLD AUTO: 30.5 % (ref 34–46.6)
HCT VFR BLD CALC: 32.4 % (ref 38–51)
HGB BLD-MCNC: 9.9 G/DL (ref 12–15.9)
HGB BLDA-MCNC: 10.6 G/DL (ref 12–16)
INHALED O2 CONCENTRATION: 30 %
LYMPHOCYTES # BLD MANUAL: 0.2 10*3/MM3 (ref 0.7–3.1)
LYMPHOCYTES NFR BLD MANUAL: 0 % (ref 5–12)
Lab: ABNORMAL
MAGNESIUM SERPL-MCNC: 2.1 MG/DL (ref 1.6–2.4)
MCH RBC QN AUTO: 29.4 PG (ref 26.6–33)
MCHC RBC AUTO-ENTMCNC: 32.5 G/DL (ref 31.5–35.7)
MCV RBC AUTO: 90.5 FL (ref 79–97)
METHGB BLD QL: 0.5 % (ref 0–3)
MICROCYTES BLD QL: ABNORMAL
MODALITY: ABNORMAL
MODALITY: ABNORMAL
MONOCYTES # BLD: 0 10*3/MM3 (ref 0.1–0.9)
NEUTROPHILS # BLD AUTO: 3.16 10*3/MM3 (ref 1.7–7)
NEUTROPHILS NFR BLD MANUAL: 93 % (ref 42.7–76)
NEUTS BAND NFR BLD MANUAL: 1 % (ref 0–5)
OXYHGB MFR BLDV: 89 % (ref 94–99)
PCO2 BLDA: 30.9 MM HG (ref 35–45)
PCO2 BLDA: 50.1 MM HG (ref 35–45)
PCO2 TEMP ADJ BLD: 30.9 MM HG (ref 35–45)
PCO2 TEMP ADJ BLD: 50.1 MM HG (ref 35–45)
PEEP RESPIRATORY: 5 CM[H2O]
PH BLDA: 7.37 PH UNITS (ref 7.35–7.45)
PH BLDA: 7.56 PH UNITS (ref 7.35–7.45)
PH, TEMP CORRECTED: 7.37 PH UNITS (ref 7.35–7.45)
PH, TEMP CORRECTED: 7.56 PH UNITS (ref 7.35–7.45)
PHOSPHATE SERPL-MCNC: 2.9 MG/DL (ref 2.5–4.5)
PLATELET # BLD AUTO: 162 10*3/MM3 (ref 140–450)
PMV BLD AUTO: 9.7 FL (ref 6–12)
PO2 BLD: 247 MM[HG] (ref 0–500)
PO2 BLDA: 74.2 MM HG (ref 83–108)
PO2 BLDA: ABNORMAL MM[HG]
PO2 TEMP ADJ BLD: 74.2 MM HG (ref 83–108)
PO2 TEMP ADJ BLD: ABNORMAL MM[HG]
POTASSIUM BLDA-SCNC: ABNORMAL MMOL/L
POTASSIUM SERPL-SCNC: 4.6 MMOL/L (ref 3.5–5.2)
PROT SERPL-MCNC: 6.1 G/DL (ref 6–8.5)
RBC # BLD AUTO: 3.37 10*6/MM3 (ref 3.77–5.28)
SAO2 % BLDCOA: 90.1 % (ref 94–99)
SAO2 % BLDCOA: 94.8 % (ref 94–99)
SET MECH RESP RATE: 16
SODIUM BLDA-SCNC: ABNORMAL MMOL/L
SODIUM SERPL-SCNC: 137 MMOL/L (ref 136–145)
VARIANT LYMPHS NFR BLD MANUAL: 6 % (ref 19.6–45.3)
VENTILATOR MODE: ABNORMAL
VENTILATOR MODE: AC
VT ON VENT VENT: 400 ML
WBC MORPH BLD: NORMAL
WBC NRBC COR # BLD AUTO: 3.36 10*3/MM3 (ref 3.4–10.8)

## 2025-02-04 PROCEDURE — 82375 ASSAY CARBOXYHB QUANT: CPT

## 2025-02-04 PROCEDURE — 36415 COLL VENOUS BLD VENIPUNCTURE: CPT | Performed by: PHYSICIAN ASSISTANT

## 2025-02-04 PROCEDURE — 94003 VENT MGMT INPAT SUBQ DAY: CPT

## 2025-02-04 PROCEDURE — 85025 COMPLETE CBC W/AUTO DIFF WBC: CPT | Performed by: INTERNAL MEDICINE

## 2025-02-04 PROCEDURE — 94799 UNLISTED PULMONARY SVC/PX: CPT

## 2025-02-04 PROCEDURE — 80053 COMPREHEN METABOLIC PANEL: CPT | Performed by: PHYSICIAN ASSISTANT

## 2025-02-04 PROCEDURE — 71045 X-RAY EXAM CHEST 1 VIEW: CPT

## 2025-02-04 PROCEDURE — 25010000002 FUROSEMIDE PER 20 MG: Performed by: NURSE PRACTITIONER

## 2025-02-04 PROCEDURE — 92610 EVALUATE SWALLOWING FUNCTION: CPT | Performed by: SPEECH-LANGUAGE PATHOLOGIST

## 2025-02-04 PROCEDURE — 25010000002 MORPHINE PER 10 MG: Performed by: NURSE PRACTITIONER

## 2025-02-04 PROCEDURE — 25010000002 CEFTRIAXONE PER 250 MG: Performed by: PHYSICIAN ASSISTANT

## 2025-02-04 PROCEDURE — 94664 DEMO&/EVAL PT USE INHALER: CPT

## 2025-02-04 PROCEDURE — 82948 REAGENT STRIP/BLOOD GLUCOSE: CPT

## 2025-02-04 PROCEDURE — 84100 ASSAY OF PHOSPHORUS: CPT | Performed by: PHYSICIAN ASSISTANT

## 2025-02-04 PROCEDURE — 25010000002 METHYLPREDNISOLONE PER 40 MG: Performed by: PHYSICIAN ASSISTANT

## 2025-02-04 PROCEDURE — 94761 N-INVAS EAR/PLS OXIMETRY MLT: CPT

## 2025-02-04 PROCEDURE — 25010000002 HEPARIN (PORCINE) PER 1000 UNITS: Performed by: PHYSICIAN ASSISTANT

## 2025-02-04 PROCEDURE — 82805 BLOOD GASES W/O2 SATURATION: CPT

## 2025-02-04 PROCEDURE — 36600 WITHDRAWAL OF ARTERIAL BLOOD: CPT

## 2025-02-04 PROCEDURE — 82803 BLOOD GASES ANY COMBINATION: CPT

## 2025-02-04 PROCEDURE — 85007 BL SMEAR W/DIFF WBC COUNT: CPT | Performed by: INTERNAL MEDICINE

## 2025-02-04 PROCEDURE — 83735 ASSAY OF MAGNESIUM: CPT | Performed by: PHYSICIAN ASSISTANT

## 2025-02-04 PROCEDURE — 25010000002 PROPOFOL 10 MG/ML EMULSION: Performed by: EMERGENCY MEDICINE

## 2025-02-04 PROCEDURE — 63710000001 ONDANSETRON ODT 4 MG TABLET DISPERSIBLE: Performed by: NURSE PRACTITIONER

## 2025-02-04 PROCEDURE — 83050 HGB METHEMOGLOBIN QUAN: CPT

## 2025-02-04 RX ORDER — OSELTAMIVIR PHOSPHATE 45 MG/1
45 CAPSULE ORAL EVERY 12 HOURS SCHEDULED
Status: DISPENSED | OUTPATIENT
Start: 2025-02-04 | End: 2025-02-05

## 2025-02-04 RX ORDER — OXYCODONE AND ACETAMINOPHEN 7.5; 325 MG/1; MG/1
1 TABLET ORAL EVERY 6 HOURS PRN
Status: DISCONTINUED | OUTPATIENT
Start: 2025-02-04 | End: 2025-02-10 | Stop reason: HOSPADM

## 2025-02-04 RX ORDER — OSELTAMIVIR PHOSPHATE 30 MG/1
30 CAPSULE ORAL EVERY 12 HOURS SCHEDULED
Status: DISPENSED | OUTPATIENT
Start: 2025-02-04 | End: 2025-02-05

## 2025-02-04 RX ORDER — FUROSEMIDE 10 MG/ML
20 INJECTION INTRAMUSCULAR; INTRAVENOUS ONCE
Status: COMPLETED | OUTPATIENT
Start: 2025-02-04 | End: 2025-02-04

## 2025-02-04 RX ORDER — ONDANSETRON 4 MG/1
4 TABLET, ORALLY DISINTEGRATING ORAL EVERY 6 HOURS PRN
Status: DISCONTINUED | OUTPATIENT
Start: 2025-02-04 | End: 2025-02-10 | Stop reason: HOSPADM

## 2025-02-04 RX ORDER — MORPHINE SULFATE 2 MG/ML
2 INJECTION, SOLUTION INTRAMUSCULAR; INTRAVENOUS
Status: DISCONTINUED | OUTPATIENT
Start: 2025-02-04 | End: 2025-02-08

## 2025-02-04 RX ADMIN — MORPHINE SULFATE 2 MG: 2 INJECTION, SOLUTION INTRAMUSCULAR; INTRAVENOUS at 15:04

## 2025-02-04 RX ADMIN — PROPOFOL INJECTABLE EMULSION 45 MCG/KG/MIN: 10 INJECTION, EMULSION INTRAVENOUS at 00:57

## 2025-02-04 RX ADMIN — PANTOPRAZOLE SODIUM 40 MG: 40 INJECTION, POWDER, FOR SOLUTION INTRAVENOUS at 05:49

## 2025-02-04 RX ADMIN — ARFORMOTEROL TARTRATE 15 MCG: 15 SOLUTION RESPIRATORY (INHALATION) at 18:35

## 2025-02-04 RX ADMIN — Medication 1 APPLICATION: at 20:07

## 2025-02-04 RX ADMIN — BUDESONIDE 0.5 MG: 0.5 INHALANT RESPIRATORY (INHALATION) at 06:45

## 2025-02-04 RX ADMIN — FUROSEMIDE 20 MG: 10 INJECTION, SOLUTION INTRAMUSCULAR; INTRAVENOUS at 12:17

## 2025-02-04 RX ADMIN — HEPARIN SODIUM 5000 UNITS: 5000 INJECTION, SOLUTION INTRAVENOUS; SUBCUTANEOUS at 15:04

## 2025-02-04 RX ADMIN — IPRATROPIUM BROMIDE AND ALBUTEROL SULFATE 3 ML: 2.5; .5 SOLUTION RESPIRATORY (INHALATION) at 06:45

## 2025-02-04 RX ADMIN — PROPOFOL INJECTABLE EMULSION 45 MCG/KG/MIN: 10 INJECTION, EMULSION INTRAVENOUS at 04:39

## 2025-02-04 RX ADMIN — MORPHINE SULFATE 2 MG: 2 INJECTION, SOLUTION INTRAMUSCULAR; INTRAVENOUS at 18:13

## 2025-02-04 RX ADMIN — DOCUSATE SODIUM 50 MG AND SENNOSIDES 8.6 MG 2 TABLET: 8.6; 5 TABLET, FILM COATED ORAL at 08:14

## 2025-02-04 RX ADMIN — IPRATROPIUM BROMIDE AND ALBUTEROL SULFATE 3 ML: 2.5; .5 SOLUTION RESPIRATORY (INHALATION) at 18:35

## 2025-02-04 RX ADMIN — HEPARIN SODIUM 5000 UNITS: 5000 INJECTION, SOLUTION INTRAVENOUS; SUBCUTANEOUS at 22:02

## 2025-02-04 RX ADMIN — HEPARIN SODIUM 5000 UNITS: 5000 INJECTION, SOLUTION INTRAVENOUS; SUBCUTANEOUS at 05:49

## 2025-02-04 RX ADMIN — LEVOTHYROXINE SODIUM 112 MCG: 112 TABLET ORAL at 05:49

## 2025-02-04 RX ADMIN — Medication 10 ML: at 20:07

## 2025-02-04 RX ADMIN — ONDANSETRON 4 MG: 4 TABLET, ORALLY DISINTEGRATING ORAL at 19:11

## 2025-02-04 RX ADMIN — IPRATROPIUM BROMIDE AND ALBUTEROL SULFATE 3 ML: 2.5; .5 SOLUTION RESPIRATORY (INHALATION) at 11:27

## 2025-02-04 RX ADMIN — Medication 1 APPLICATION: at 08:15

## 2025-02-04 RX ADMIN — CITALOPRAM HYDROBROMIDE 40 MG: 20 TABLET ORAL at 08:14

## 2025-02-04 RX ADMIN — DEXMEDETOMIDINE HYDROCHLORIDE 0.8 MCG/KG/HR: 4 INJECTION, SOLUTION INTRAVENOUS at 04:39

## 2025-02-04 RX ADMIN — CHLORHEXIDINE GLUCONATE 1 APPLICATION: 500 CLOTH TOPICAL at 04:39

## 2025-02-04 RX ADMIN — ARFORMOTEROL TARTRATE 15 MCG: 15 SOLUTION RESPIRATORY (INHALATION) at 08:53

## 2025-02-04 RX ADMIN — IPRATROPIUM BROMIDE AND ALBUTEROL SULFATE 3 ML: 2.5; .5 SOLUTION RESPIRATORY (INHALATION) at 23:50

## 2025-02-04 RX ADMIN — POLYETHYLENE GLYCOL 3350 17 G: 17 POWDER, FOR SOLUTION ORAL at 02:23

## 2025-02-04 RX ADMIN — OSELTAMIVIR PHOSPHATE 75 MG: 75 CAPSULE ORAL at 08:14

## 2025-02-04 RX ADMIN — METHYLPREDNISOLONE SODIUM SUCCINATE 40 MG: 40 INJECTION, POWDER, FOR SOLUTION INTRAMUSCULAR; INTRAVENOUS at 16:57

## 2025-02-04 RX ADMIN — METHYLPREDNISOLONE SODIUM SUCCINATE 40 MG: 40 INJECTION, POWDER, FOR SOLUTION INTRAMUSCULAR; INTRAVENOUS at 08:14

## 2025-02-04 RX ADMIN — BUDESONIDE 0.5 MG: 0.5 INHALANT RESPIRATORY (INHALATION) at 18:35

## 2025-02-04 RX ADMIN — IPRATROPIUM BROMIDE AND ALBUTEROL SULFATE 3 ML: 2.5; .5 SOLUTION RESPIRATORY (INHALATION) at 03:36

## 2025-02-04 RX ADMIN — METHYLPREDNISOLONE SODIUM SUCCINATE 40 MG: 40 INJECTION, POWDER, FOR SOLUTION INTRAMUSCULAR; INTRAVENOUS at 00:00

## 2025-02-04 RX ADMIN — ONDANSETRON 4 MG: 4 TABLET, ORALLY DISINTEGRATING ORAL at 12:17

## 2025-02-04 RX ADMIN — Medication 10 ML: at 08:15

## 2025-02-04 RX ADMIN — SODIUM CHLORIDE 1000 MG: 900 INJECTION INTRAVENOUS at 23:54

## 2025-02-04 RX ADMIN — IPRATROPIUM BROMIDE AND ALBUTEROL SULFATE 3 ML: 2.5; .5 SOLUTION RESPIRATORY (INHALATION) at 15:17

## 2025-02-04 NOTE — PROGRESS NOTES
[facility] Intensivist Services  INPATIENT PROGRESS NOTE    Patient Name: Ashley Gibbs  Date of Admission: 1/31/2025  Today's Date: 02/04/25  Length of Stay: 4  Primary Care Physician: Padmaja Bermudez APRN    Subjective   ICU Summary:  69-year-old male with stage IV small cell lung cancer, COPD, restless leg, admitted on 1/31/2025 with influenza A infection and acute hypoxic and hypercapnic respiratory failure.  Patient required intubation in the emergency department.  She started on oseltamavir, Rocephin, azithromycin, Pulmicort and DuoNebs.  On Solu-Medrol taper.  Patient has not been able to tolerate ventilator weaning or spontaneous breathing trials.    Interval update:  2/3  Patient remains intubated, sedated, on ventilator.  5 PEEP and 30% FiO2.  Still having elevated peak pressures.  Mildly labored respirations.  Blood pressure stable.     2/4  Intubated, sedated on vent.  Tolerated sedation wean this morning, calm following commands.  Nonlabored respirations.  ABG with compensated respiratory acidosis which is likely her baseline.  Vital signs are stable, no vasoactive drips.    Review of Systems   All pertinent negatives and positives are as above. All other systems have been reviewed and are negative unless otherwise stated.     Objective    Temp:  [96.9 °F (36.1 °C)-98.3 °F (36.8 °C)] 96.9 °F (36.1 °C)  Heart Rate:  [] 80  Resp:  [17-23] 22  BP: (105-158)/(61-92) 108/66  FiO2 (%):  [30 %] 30 %  Physical Exam  Vitals and nursing note reviewed.   Constitutional:       Interventions: She is sedated and intubated.   Eyes:      Pupils: Pupils are equal, round, and reactive to light.   Cardiovascular:      Rate and Rhythm: Normal rate and regular rhythm.      Pulses: Normal pulses.      Heart sounds: Normal heart sounds.   Pulmonary:      Effort: She is intubated.      Comments: Nonlabored respirations, breath sounds clear bilaterally, few crackles in  bases  Abdominal:      General: There is no distension.      Palpations: Abdomen is soft.   Musculoskeletal:      Comments: Normal muscle bulk and tone   Skin:     General: Skin is warm and dry.      Capillary Refill: Capillary refill takes less than 2 seconds.   Neurological:      Comments: Sedate, moves all extremities and follow commands with sedation vacation           Results Review:  Lab Results (last 24 hours)       Procedure Component Value Units Date/Time    Blood Culture - Blood, Arm, Left [[id number]]  (Normal) Collected: 01/31/25 0813    Specimen: Blood from Arm, Left Updated: 02/04/25 0830     Blood Culture No growth at 4 days    POC Glucose Once [[id number]]  (Abnormal) Collected: 02/04/25 0532    Specimen: Blood Updated: 02/04/25 0544     Glucose 136 mg/dL      Comment: : [id number] Brad Farley ID: [id number]       Blood Gas, Arterial - [[id number]]  (Abnormal) Collected: 02/04/25 0344    Specimen: Arterial Blood Updated: 02/04/25 0346     Site Right Radial     Fabrice's Test Positive     pH, Arterial 7.370 pH units      pCO2, Arterial 50.1 mm Hg      Comment: 83 Value above reference range        pO2, Arterial 74.2 mm Hg      Comment: 84 Value below reference range        HCO3, Arterial 28.9 mmol/L      Comment: 83 Value above reference range        Base Excess, Arterial 3.0 mmol/L      Comment: 83 Value above reference range        O2 Saturation, Arterial 94.8 %      Temperature 37.0     Barometric Pressure for Blood Gas 756 mmHg      Modality Ventilator     FIO2 30 %      Ventilator Mode AC     Set Tidal Volume 400.000     Set [facility] Resp Rate 16.0     PEEP 5.0     Collected by [id number]     Comment: Meter: [id number]     :  Chaka Balderas, GEORGES        pCO2, Temperature Corrected 50.1 mm Hg      pH, Temp Corrected 7.370 pH Units      pO2, Temperature Corrected 74.2 mm Hg      PO2/FIO2 247    CBC & Differential [[id number]]  (Abnormal) Collected: 02/04/25 0232    Specimen: Blood  Updated: 02/04/25 0315    Narrative:      The following orders were created for panel order CBC & Differential.  Procedure                               Abnormality         Status                     ---------                               -----------         ------                     CBC Auto Differential[[id number]]        Abnormal            Final result                 Please view results for these tests on the individual orders.    Manual Differential [[id number]]  (Abnormal) Collected: 02/04/25 0232    Specimen: Blood Updated: 02/04/25 0315     Neutrophil % 93.0 %      Lymphocyte % 6.0 %      Monocyte % 0.0 %      Eosinophil % 0.0 %      Basophil % 0.0 %      Bands %  1.0 %      Neutrophils Absolute 3.16 10*3/mm3      Lymphocytes Absolute 0.20 10*3/mm3      Monocytes Absolute 0.00 10*3/mm3      Eosinophils Absolute 0.00 10*3/mm3      Basophils Absolute 0.00 10*3/mm3      Anisocytosis Slight/1+     Microcytes Slight/1+     WBC Morphology Normal     Clumped Platelets Present     Giant Platelets Mod/2+    CBC Auto Differential [[id number]]  (Abnormal) Collected: 02/04/25 0232    Specimen: Blood Updated: 02/04/25 0315     WBC 3.36 10*3/mm3      RBC 3.37 10*6/mm3      Hemoglobin 9.9 g/dL      Hematocrit 30.5 %      MCV 90.5 fL      MCH 29.4 pg      MCHC 32.5 g/dL      RDW 14.1 %      RDW-SD 46.5 fl      MPV 9.7 fL      Platelets 162 10*3/mm3     Narrative:      The previously reported component NRBC is no longer being reported. Previous result was 0.0 /100 WBC (Reference Range: 0.0-0.2 /100 WBC) on 2/4/2025 at 0248 CST.    Comprehensive Metabolic Panel [[id number]]  (Abnormal) Collected: 02/04/25 0232    Specimen: Blood Updated: 02/04/25 0305     Glucose 138 mg/dL      BUN 35 mg/dL      Creatinine 0.48 mg/dL      Sodium 137 mmol/L      Potassium 4.6 mmol/L      Chloride 100 mmol/L      CO2 26.0 mmol/L      Calcium 8.7 mg/dL      Total Protein 6.1 g/dL      Albumin 3.3 g/dL      ALT (SGPT) 22 U/L      AST (SGOT) 18 U/L       Alkaline Phosphatase 88 U/L      Total Bilirubin 0.2 mg/dL      Globulin 2.8 gm/dL      A/G Ratio 1.2 g/dL      BUN/Creatinine Ratio 72.9     Anion Gap 11.0 mmol/L      eGFR 104.0 mL/min/1.73     Narrative:      GFR Categories in Chronic Kidney Disease (CKD)      GFR Category          GFR (mL/min/1.73)    Interpretation  G1                     90 or greater         Normal or high (1)  G2                      60-89                Mild decrease (1)  G3a                   45-59                Mild to moderate decrease  G3b                   30-44                Moderate to severe decrease  G4                    15-29                Severe decrease  G5                    14 or less           Kidney failure          (1)In the absence of evidence of kidney disease, neither GFR category G1 or G2 fulfill the criteria for CKD.    eGFR calculation 2021 CKD-EPI creatinine equation, which does not include race as a factor    Magnesium [[id number]]  (Normal) Collected: 02/04/25 0232    Specimen: Blood Updated: 02/04/25 0305     Magnesium 2.1 mg/dL     Phosphorus [[id number]]  (Normal) Collected: 02/04/25 0232    Specimen: Blood Updated: 02/04/25 0305     Phosphorus 2.9 mg/dL     POC Glucose Once [[id number]]  (Normal) Collected: 02/03/25 2331    Specimen: Blood Updated: 02/03/25 2343     Glucose 125 mg/dL      Comment: : [id number] Brad Farley ID: [id number]       POC Glucose Once [[id number]]  (Normal) Collected: 02/03/25 1742    Specimen: Blood Updated: 02/03/25 1753     Glucose 126 mg/dL      Comment: : [id number] Yadiel ShannonMeter ID: [id number]       POC Glucose Once [[id number]]  (Abnormal) Collected: 02/03/25 1238    Specimen: Blood Updated: 02/03/25 1249     Glucose 134 mg/dL      Comment: : [id number] Choco TaylortonMeter ID: [id number]       Blood Culture - Blood, Hand, Left [[id number]]  (Normal) Collected: 01/31/25 0857    Specimen: Blood from Hand, Left Updated: 02/03/25 0915     Blood Culture  No growth at 3 days             XR Chest 1 View    Result Date: 2/4/2025  1. No significant change since the previous study.   This report was signed and finalized on 2/4/2025 7:06 AM by Dr. Shannan Cobian MD.      XR Chest 1 View    Result Date: 2/3/2025   No change in appearance of the chest.  This report was signed and finalized on 2/3/2025 6:53 AM by Dr. Eddie Alicia MD.       Result Review:  I have personally reviewed the results from the time of this admission to 2/4/2025 08:32 CST and agree with these findings:  [x]  Laboratory list / accordion  [x]  Microbiology  [x]  Radiology  [x]  EKG/Telemetry   []  Cardiology/Vascular   []  Pathology  []  Old records  []  Other:      Culture Data:   Blood Culture   Date Value Ref Range Status   01/31/2025 No growth at 3 days  Preliminary   01/31/2025 No growth at 3 days  Preliminary     Urine Culture   Date Value Ref Range Status   01/31/2025 <25,000 CFU/mL Mixed Fanta Isolated  Final       I have reviewed the patient's current medications.     Assessment/Plan   Assessment  Active Hospital Problems    Diagnosis     **Acute respiratory failure     Influenza A     Small cell lung cancer    69-year-old female with acute hypoxemic respiratory failure, influenza A infection, small cell lung cancer in CCU for critical care management.    Acute hypoxic respiratory failure  -Secondary to influenza A, small cell lung cancer and baseline COPD  -Remains on vent, in PRVC, tolerating 5 PEEP and 30% FiO2  -Tolerated sedation weaned this morning, plan to turn off propofol and attempt SBT  -Hopes to extubate today  -Continue oseltamavir for influenza A   -Continue empiric Rocephin, and has finished azithromycin for COPD exacerbation  -Continue Pulmicort, DuoNeb, Brovana  -Methylprednisolone taper    Influenza A infection  -Continue Tamiflu course  -Droplet precautions    Small cell lung cancer  -Oncology consulted, follow their recommendations  -Is status post first round of  chemo and awaiting radiation therapy as outpatient    VTE: Heparin  GI: PPI  NTN: Tube feed, will hold for SBT  Abx: Rocephin, finished azithromycin  Lines/Tubes: Peripheral IV, OG tube  CODE STATUS: No CPR    Disposition: Continue critical care management    Total critical care time: 35 minutes     Due to a high probability of clinically significant, life threatening deterioration, the patient required my highest level of preparedness to intervene emergently and I personally spent this critical care time directly and personally managing the patient.      This critical care time included obtaining a history; examining the patient; pulse oximetry; ordering and review of studies; arranging urgent treatment with development of a management plan; evaluation of patient's response to treatment; frequent reassessment; and, discussions with other providers.     This critical care time was performed to assess and manage the high probability of imminent, life-threatening deterioration that could result in multi-organ failure. It was exclusive of separately billable procedures and treating other patients and teaching time.     Please see MDM section and the rest of the note for further information on patient assessment and treatment.     Part of this note may be an electronic transcription/translation of spoken language to printed text using the Dragon dictation system      Electronically signed by CARRIE Nick on 2/4/2025 at 08:37 CST

## 2025-02-04 NOTE — PLAN OF CARE
Goal Outcome Evaluation:  Plan of Care Reviewed With: patient        Progress: improving  Outcome Evaluation: Pt remains on ventilator support. VSS. Pt following commands with sedation weaned. Propofol 45. Fentanyl 225. Precedex 0.8. WBC 3.36. Adequate urine output. Prn miralax given x 1.

## 2025-02-04 NOTE — PLAN OF CARE
Problem: Adult Inpatient Plan of Care  Goal: Plan of Care Review  Outcome: Progressing  Goal: Patient-Specific Goal (Individualized)  Outcome: Progressing  Goal: Absence of Hospital-Acquired Illness or Injury  Outcome: Progressing  Intervention: Identify and Manage Fall Risk  Recent Flowsheet Documentation  Taken 2/4/2025 1700 by Luz Marina Angel RN  Safety Promotion/Fall Prevention: safety round/check completed  Taken 2/4/2025 1600 by Luz Marina Angel RN  Safety Promotion/Fall Prevention: safety round/check completed  Taken 2/4/2025 1500 by Luz Marina Angel RN  Safety Promotion/Fall Prevention: safety round/check completed  Taken 2/4/2025 1400 by Luz Marina Angel RN  Safety Promotion/Fall Prevention: safety round/check completed  Taken 2/4/2025 1300 by Luz Marina Angel RN  Safety Promotion/Fall Prevention: safety round/check completed  Taken 2/4/2025 1230 by Luz Marina Angel RN  Safety Promotion/Fall Prevention: safety round/check completed  Taken 2/4/2025 1100 by Luz Marina Angel RN  Safety Promotion/Fall Prevention: safety round/check completed  Taken 2/4/2025 1000 by Luz Marina Angel RN  Safety Promotion/Fall Prevention: safety round/check completed  Taken 2/4/2025 0900 by Luz Marina Angel RN  Safety Promotion/Fall Prevention: safety round/check completed  Taken 2/4/2025 0815 by Luz Marina Angel RN  Safety Promotion/Fall Prevention: safety round/check completed  Taken 2/4/2025 0700 by Luz Marina Angel RN  Safety Promotion/Fall Prevention: safety round/check completed  Intervention: Prevent Skin Injury  Recent Flowsheet Documentation  Taken 2/4/2025 1700 by Luz Marina Angel RN  Body Position:   supine   turned  Taken 2/4/2025 1500 by Luz Marina Angel RN  Body Position:   position changed independently   right  Taken 2/4/2025 1300 by Luz Marina Angel RN  Body Position:   turned   supine  Taken 2/4/2025 1100 by Luz Marina Angel RN  Body Position:   right   turned  Taken 2/4/2025 0900 by Luz Marina Angel RN  Body Position:   left   turned  Taken  2/4/2025 0700 by Luz Marina Angel RN  Body Position:   supine   turned  Goal: Optimal Comfort and Wellbeing  Outcome: Progressing  Goal: Readiness for Transition of Care  Outcome: Progressing     Problem: Fall Injury Risk  Goal: Absence of Fall and Fall-Related Injury  Outcome: Progressing  Intervention: Promote Injury-Free Environment  Recent Flowsheet Documentation  Taken 2/4/2025 1700 by Luz Marina Angel RN  Safety Promotion/Fall Prevention: safety round/check completed  Taken 2/4/2025 1600 by Luz Marina Angel RN  Safety Promotion/Fall Prevention: safety round/check completed  Taken 2/4/2025 1500 by Luz Marina Angel RN  Safety Promotion/Fall Prevention: safety round/check completed  Taken 2/4/2025 1400 by Luz Marina Angel RN  Safety Promotion/Fall Prevention: safety round/check completed  Taken 2/4/2025 1300 by Luz Marina Angel RN  Safety Promotion/Fall Prevention: safety round/check completed  Taken 2/4/2025 1230 by Luz Marina Angel RN  Safety Promotion/Fall Prevention: safety round/check completed  Taken 2/4/2025 1100 by Luz Marina Angel RN  Safety Promotion/Fall Prevention: safety round/check completed  Taken 2/4/2025 1000 by Luz Marina Angel RN  Safety Promotion/Fall Prevention: safety round/check completed  Taken 2/4/2025 0900 by Luz Marina Angel RN  Safety Promotion/Fall Prevention: safety round/check completed  Taken 2/4/2025 0815 by Luz Marina Angel RN  Safety Promotion/Fall Prevention: safety round/check completed  Taken 2/4/2025 0700 by Luz Marina Angel RN  Safety Promotion/Fall Prevention: safety round/check completed     Problem: Skin Injury Risk Increased  Goal: Skin Health and Integrity  Outcome: Progressing  Intervention: Optimize Skin Protection  Recent Flowsheet Documentation  Taken 2/4/2025 1700 by Luz Marina Angel RN  Activity Management: bedrest  Taken 2/4/2025 1500 by Luz Marina Angel RN  Activity Management: bedrest  Head of Bed (HOB) Positioning: HOB at 45 degrees  Taken 2/4/2025 1300 by Luz Marina Angel RN  Activity  Management: bedrest  Head of Bed (HOB) Positioning: HOB at 30-45 degrees  Taken 2/4/2025 1230 by Luz Marina Angel RN  Activity Management: bedrest  Taken 2/4/2025 1100 by Luz Marina Angel RN  Activity Management: bedrest  Head of Bed (HOB) Positioning: HOB at 30-45 degrees  Taken 2/4/2025 0900 by Luz Marina Angel RN  Activity Management: bedrest  Taken 2/4/2025 0815 by Luz Marina Angel RN  Activity Management: bedrest  Taken 2/4/2025 0700 by Luz Marina Angel RN  Activity Management: bedrest     Problem: Comorbidity Management  Goal: Maintenance of COPD Symptom Control  Outcome: Progressing     Problem: Enteral Nutrition  Goal: Absence of Aspiration Signs and Symptoms  Outcome: Progressing  Intervention: Minimize Aspiration Risk  Recent Flowsheet Documentation  Taken 2/4/2025 1500 by Luz Marina Angel RN  Head of Bed (HOB) Positioning: HOB at 45 degrees  Taken 2/4/2025 1300 by Luz Marina Angel RN  Head of Bed (HOB) Positioning: HOB at 30-45 degrees  Taken 2/4/2025 1100 by Luz Marina Angel RN  Head of Bed (HOB) Positioning: HOB at 30-45 degrees  Goal: Safe, Effective Therapy Delivery  Outcome: Progressing  Goal: Feeding Tolerance  Outcome: Progressing   Goal Outcome Evaluation:   Extubated. NPO per speech eval. Emesis x 3. BM. UOP adequate.

## 2025-02-04 NOTE — PLAN OF CARE
Goal Outcome Evaluation:  Plan of Care Reviewed With: patient, child, caregiver (JADIEL Raza)        Progress: no change (Initial Evaluation)       Anticipated Discharge Disposition (SLP): unknown          SLP Swallowing Diagnosis: mild-moderate, oral dysphagia, suspected pharyngeal dysphagia (02/04/25 1241)             SPEECH-LANGUAGE PATHOLOGY EVALUATION - SWALLOW  Subjective: The patient was seen on this date for a Clinical Swallow evaluation.  Patient was alert and cooperative. She appears ill and fatigued upon entering room. She reports nausea, JADIEL Raza aware and gave Zofran.   Significant history: Presented with flu A and found to be in acute hypoxic respiratory failure, ETT 1/31-2/4. Current small cell lung cancer s/p palliative XRT in Dec and 1 chemo very recently. SLP consulted due to failed post extubation screening.   Objective: Oral motor examination results: generalized weakness. Voice is mildly hoarse. She is edentulous and dentures not present.  Textures given during assessment of swallow function included ice and puree consistency.  Assessment: Difficulties were noted with puree consistency.  Observations: Poor oral acceptance of spoon with poor labial closure, anterior loss, multiple swallows, and delayed coughing noted with puree trials.   SLP Findings:  Patient presents with suspected oropharyngeal dysphagia, without esophageal component secondary to medical complexity, weakness, and recent intubation.   Recommendations: Diet Textures: NPO  Medications should be taken  by alternate means. May have ice chips oral care, under staff or family supervision and with the recommended strategies for safe swallowing.   Recommended Strategies: upright for PO and 1:1 assist with all PO. Oral care 2x a shift with suction toothbrush kit.   Other Recommended Evaluations: Re-evaluation at bedside  and VFSS as indicated   Dysphagia therapy is recommended.      Elena Ron MS CCC-SLP 2/4/2025 13:59 CST

## 2025-02-04 NOTE — THERAPY EVALUATION
Acute Care - Speech Language Pathology   Swallow Initial Evaluation [facility]     Patient Name: Ashley Gibbs  : 1955  MRN: [id number]  Today's Date: 2025               Admit Date: 2025    SPEECH-LANGUAGE PATHOLOGY EVALUATION - SWALLOW  Subjective: The patient was seen on this date for a Clinical Swallow evaluation.  Patient was alert and cooperative. She appears ill and fatigued upon entering room. She reports nausea, RN Luz Marina aware and gave Zofran.   Significant history: Presented with flu A and found to be in acute hypoxic respiratory failure, ETT -. Current small cell lung cancer s/p palliative XRT in Dec and 1 chemo very recently. SLP consulted due to failed post extubation screening.   Objective: Oral motor examination results: generalized weakness. Voice is mildly hoarse. She is edentulous and dentures not present.  Textures given during assessment of swallow function included ice and puree consistency.  Assessment: Difficulties were noted with puree consistency.  Observations: Poor oral acceptance of spoon with poor labial closure, anterior loss, multiple swallows, and delayed coughing noted with puree trials.   SLP Findings:  Patient presents with suspected oropharyngeal dysphagia, without esophageal component secondary to medical complexity, weakness, and recent intubation.   Recommendations: Diet Textures: NPO  Medications should be taken  by alternate means. May have ice chips oral care, under staff or family supervision and with the recommended strategies for safe swallowing.   Recommended Strategies: upright for PO and 1:1 assist with all PO. Oral care 2x a shift with suction toothbrush kit.   Other Recommended Evaluations: Re-evaluation at bedside  and VFSS as indicated   Dysphagia therapy is recommended.    Elena Ron MS CCC-SLP 2025 14:01 CST    Visit Dx:     ICD-10-CM ICD-9-CM   1. Acute respiratory failure with hypoxia and hypercapnia  J96.01 518.81    J96.02     2. Febrile illness  R50.9 780.60   3. Influenza A  J10.1 487.1   4. Acute UTI (urinary tract infection)  N39.0 599.0   5. Altered mental status, unspecified altered mental status type  R41.82 780.97   6. Malignant neoplasm of lung, unspecified laterality, unspecified part of lung  C34.90 162.9   7. Immunosuppressed due to chemotherapy  D84.821 V58.69    T45.1X5A     Z79.899    8. Dysphagia, unspecified type  R13.10 787.20     Patient Active Problem List   Diagnosis    Acute hypoxemic respiratory failure    Age-related osteoporosis without current pathological fracture    Anxiety    Stage 3 severe COPD by GOLD classification    Chronic pain disorder    Depression    Excessive daytime sleepiness    Hemoptysis    Family history of renal cell carcinoma    Hyperlipidemia    Hypothyroidism    Migraines    Mild episode of recurrent major depressive disorder    Neuropathy    Obstructive sleep apnea    Primary insomnia    Renal mass    Restless legs syndrome    On supplemental oxygen therapy    Family history of CHF (congestive heart failure)    Family history of diabetes mellitus    6 cm exophytic left upper pole renal cyst, likely hemorrhagic    Acute bilateral pyelonephritis    Hypokalemia    E. coli UTI, present on admission (urinary tract infection)    Paroxysmal supraventricular tachycardia    Sepsis due to Escherichia coli UTI, bilateral pyelonephritis, present on admission    Vitamin D deficiency    Lung mass    Former smoker    History of radiation therapy    Panic attacks    Small cell lung cancer    Chronic bronchitis    Acute respiratory failure    Influenza A     Past Medical History:   Diagnosis Date    Anxiety     Arthritis     Asthma     Back pain     COPD (chronic obstructive pulmonary disease)     Dependence on supplemental oxygen     Depression     Disease of thyroid gland     Fibromyalgia, primary     Headache     History of degenerative disc disease     Hypertension     Hypothyroidism     Low back pain      Restless leg     Scoliosis      Past Surgical History:   Procedure Laterality Date    APPENDECTOMY      BREAST BIOPSY Left     BRONCHOSCOPY Right 12/23/2024    Procedure: BRONCHOSCOPY WITH ENDOBRONCHIAL ULTRASOUND;  Surgeon: Peterson Morales MD;  Location: [facility] OR;  Service: Pulmonary;  Laterality: Right;  pre: lung mass  post: lung mass    CHOLECYSTECTOMY      TUBAL ABDOMINAL LIGATION         SLP Recommendation and Plan  SLP Swallowing Diagnosis: mild-moderate, oral dysphagia, suspected pharyngeal dysphagia (02/04/25 1241)  SLP Diet Recommendation: NPO, ice chips between meals after oral care, with supervision (02/04/25 1241)  Recommended Precautions and Strategies: upright posture during/after eating, 1:1 supervision (02/04/25 1241)  SLP Rec. for Method of Medication Administration: meds via alternate route (02/04/25 1241)     Monitor for Signs of Aspiration: yes, notify SLP if any concerns (02/04/25 1241)  Recommended Diagnostics: reassess via clinical swallow evaluation (02/04/25 1241)  Swallow Criteria for Skilled Therapeutic Interventions Met: demonstrates skilled criteria (02/04/25 1241)  Anticipated Discharge Disposition (SLP): unknown (02/04/25 1241)  Rehab Potential/Prognosis, Swallowing: adequate, monitor progress closely (02/04/25 1241)  Therapy Frequency (Swallow): at least, 2 days per week (02/04/25 1241)  Predicted Duration Therapy Intervention (Days): until discharge (02/04/25 1241)  Oral Care Recommendations: Oral Care BID/PRN, Suction toothbrush (02/04/25 1241)                                        Progress: no change (Initial Evaluation)      SWALLOW EVALUATION (Last 72 Hours)       SLP Adult Swallow Evaluation       Row Name 02/04/25 1241                   Rehab Evaluation    Document Type evaluation  -MG        Subjective Information complains of;nausea/vomiting  -MG        Patient Observations alert;cooperative;agree to therapy  -MG        Patient/Family/Caregiver  Comments/Observations Daughter present  -MG        Patient Effort adequate  -MG        Symptoms Noted During/After Treatment fatigue  -MG           General Information    Patient Profile Reviewed yes  -MG        Pertinent History Of Current Problem Presented with flue A and found to be in acute hypoxic respiratory failure, ETT 1/31-2/4. Current small cell lung cancer s/p palliative XRT in Dec and 1 chemo very recently. SLP consulted due to failed post extubation screening.  -MG        Current Method of Nutrition NPO  -MG        Precautions/Limitations, Vision WFL;for purposes of eval  -MG        Precautions/Limitations, Hearing WFL;for purposes of eval  -MG        Prior Level of Function-Communication WFL  -MG        Prior Level of Function-Swallowing no diet consistency restrictions  -MG        Plans/Goals Discussed with patient and family;agreed upon  -MG        Barriers to Rehab medically complex  -MG        Patient's Goals for Discharge patient did not state  -MG        Family Goals for Discharge family did not state  -MG           Pain    Additional Documentation Pain Scale: FACES Pre/Post-Treatment (Group)  -MG           Pain Scale: FACES Pre/Post-Treatment    Pain: FACES Scale, Pretreatment 0-->no hurt  -MG        Posttreatment Pain Rating 0-->no hurt  -MG           Oral Motor Structure and Function    Dentition Assessment edentulous  -MG        Secretion Management WNL/WFL  -MG        Mucosal Quality sticky  -MG        Volitional Swallow weak  -MG        Volitional Cough weak  -MG           Oral Musculature and Cranial Nerve Assessment    Oral Motor General Assessment WFL  -MG           General Eating/Swallowing Observations    Respiratory Support Currently in Use nasal cannula  -MG        O2 Liters 3L  -MG        Eating/Swallowing Skills fed by SLP  -MG        Positioning During Eating upright in bed  -MG        Utensils Used spoon  -MG        Consistencies Trialed ice chips;pureed  -MG           Clinical  Swallow Eval    Oral Prep Phase impaired  -MG        Oral Transit WFL  -MG        Oral Residue impaired  -MG        Pharyngeal Phase suspected pharyngeal impairment  -MG        Esophageal Phase unremarkable  -MG        Clinical Swallow Evaluation Summary See note  -MG           Oral Prep Concerns    Oral Prep Concerns anterior loss;incomplete or weak lip closure around spoon  -MG        Incomplete or Weak Lip Closure Around Spoon pudding;thin  -MG        Anterior Loss pudding  -MG           Oral Residue Concerns    Oral Residue Concerns diffuse residue throughout oral cavity  -MG        Diffuse Residue Throughout Oral Cavity pudding  -MG           Pharyngeal Phase Concerns    Pharyngeal Phase Concerns cough;multiple swallows;wet vocal quality  -MG        Wet Vocal Quality pudding  -MG        Multiple Swallows pudding  -MG        Cough pudding  -MG           SLP Evaluation Clinical Impression    SLP Swallowing Diagnosis mild-moderate;oral dysphagia;suspected pharyngeal dysphagia  -MG        Functional Impact risk of aspiration/pneumonia;risk of malnutrition;risk of dehydration  -MG        Rehab Potential/Prognosis, Swallowing adequate, monitor progress closely  -MG        Swallow Criteria for Skilled Therapeutic Interventions Met demonstrates skilled criteria  -MG           Recommendations    Therapy Frequency (Swallow) at least;2 days per week  -MG        Predicted Duration Therapy Intervention (Days) until discharge  -MG        SLP Diet Recommendation NPO;ice chips between meals after oral care, with supervision  -MG        Recommended Diagnostics reassess via clinical swallow evaluation  -MG        Recommended Precautions and Strategies upright posture during/after eating;1:1 supervision  -MG        Oral Care Recommendations Oral Care BID/PRN;Suction toothbrush  -MG        SLP Rec. for Method of Medication Administration meds via alternate route  -MG        Monitor for Signs of Aspiration yes;notify SLP if any  concerns  -MG        Anticipated Discharge Disposition (SLP) unknown  -MG           Swallow Goals (SLP)    Swallow LTGs Swallow Long Term Goal (free text)  -MG        Swallow STGs diet tolerance goal selection (SLP)  -MG        Diet Tolerance Goal Selection (SLP) Patient will tolerate trials of  -MG           (LTG) Swallow    (LTG) Swallow Patient will tolerate least restrictive diet without overt s/s of aspiration.  -MG        [place] (Swallow Long Term Goal) independently (over 90% accuracy)  -MG        Time Frame (Swallow Long Term Goal) by discharge  -MG        Barriers (Swallow Long Term Goal) medically complex  -MG        Progress/Outcomes (Swallow Long Term Goal) new goal  -MG           (STG) Patient will tolerate trials of    Consistencies Trialed (Tolerate trials) soft to chew (chopped) textures;pureed textures;honey/ moderately thick liquids;nectar/ mildly thick liquids;thin liquids  -MG        Desired Outcome (Tolerate trials) without signs/symptoms of aspiration;without signs of distress;with adequate oral prep/transit/clearance  -MG        [place] (Tolerate trials) independently (over 90% accuracy)  -MG        Time Frame (Tolerate trials) by discharge  -MG        Progress/Outcomes (Tolerate trials) new goal  -MG                  User Key  (r) = Recorded By, (t) = Taken By, (c) = Cosigned By      Initials Name Effective Dates    MG Elena Ron MS [facility]-SLP 07/11/23 -                     EDUCATION  The patient has been educated in the following areas:   Dysphagia (Swallowing Impairment) Oral Care/Hydration NPO rationale.        SLP GOALS       Row Name 02/04/25 1241             (LTG) Swallow    (LTG) Swallow Patient will tolerate least restrictive diet without overt s/s of aspiration.  -MG      [place] (Swallow Long Term Goal) independently (over 90% accuracy)  -MG      Time Frame (Swallow Long Term Goal) by discharge  -MG      Barriers (Swallow Long Term Goal) medically complex  -MG       Progress/Outcomes (Swallow Long Term Goal) new goal  -MG         (STG) Patient will tolerate trials of    Consistencies Trialed (Tolerate trials) soft to chew (chopped) textures;pureed textures;honey/ moderately thick liquids;nectar/ mildly thick liquids;thin liquids  -MG      Desired Outcome (Tolerate trials) without signs/symptoms of aspiration;without signs of distress;with adequate oral prep/transit/clearance  -MG      [place] (Tolerate trials) independently (over 90% accuracy)  -MG      Time Frame (Tolerate trials) by discharge  -MG      Progress/Outcomes (Tolerate trials) new goal  -MG                User Key  (r) = Recorded By, (t) = Taken By, (c) = Cosigned By      Initials Name Provider Type    Elena Vee MS CCC-SLP Speech and Language Pathologist                         Time Calculation:    Time Calculation- SLP       Row Name 02/04/25 1400             Time Calculation- SLP    SLP Start Time 1241  -MG      SLP Stop Time 1334  -MG      SLP Time Calculation (min) 53 min  -MG      SLP Received On 02/04/25  -MG      SLP Goal Re-Cert Due Date 02/14/25  -MG         Untimed Charges    SLP Eval/Re-eval  ST Eval Oral Pharyng Swallow - 75333  -MG      03205-[facility] Eval Oral Pharyng Swallow Minutes 53  -MG         Total Minutes    Untimed Charges Total Minutes 53  -MG       Total Minutes 53  -MG                User Key  (r) = Recorded By, (t) = Taken By, (c) = Cosigned By      Initials Name Provider Type    Elena Vee MS CCC-SLP Speech and Language Pathologist                    Therapy Charges for Today       Code Description Service Date Service Provider Modifiers Qty    [id number]  ST EVAL ORAL PHARYNG SWALLOW 4 2/4/2025 Elena Ron MS CCC-SLP GN 1                 Elena Ron MS CCC-SUDHAKAR  2/4/2025

## 2025-02-04 NOTE — PROGRESS NOTES
HEMATOLOGY AND MEDICAL ONCOLOGY PROGRESS NOTE    Patient name: Ashley Gibbs  Patient : 1955  VISIT # [id number]  MR #[id number]  Room:     SUBJECTIVE:    The patient continues to be intubated/sedated.    Family not present at bedside.    Continuing supportive care.    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 Rico Vanegas.     She received cycle #1, day #1 of  carboplatin/-16 on 2025-25.       Palliative emergent XRT of the chest consisted of 1250 cGy in 5 treatment fractions between 2024 and 2024 by Dr. Rico Vanegas at [facility] for the associated SVC due to tumor..     Treatment regimen-curative intent consisted of:  Carboplatin AUC 5 day 1  Etoposide 100 mg/m² days 1-3 q. 21 days  Plan is for X 4 cycles      Ashley was seen 2025, in the ED, at [facility] on the day of her admission, accompanied by 2 granddaughters and her good friend.  The granddaughter reports a fever to 101 degrees, increased difficulty with breathing shortness of breath and wheezing.     CBC Results   CBC            2024    08:49 2025    11:20 2025    08:13   CBC   WBC 4.51  5.35     9.14    RBC 3.85  4.32     3.67    Hemoglobin 11.5  12.9     10.9    Hematocrit 35.2  39.4     33.7    MCV 91.4  91.2     91.8    MCH 29.9  29.9     29.7    MCHC 32.7  32.7     32.3    RDW 13.0  12.9     13.9    Platelets 199  260     220         Details           This result is from an external source.                         UA today, 2025 showed 3+ leukocytes, 30 mg/dL protein WBCs too numerous to count, bacteria 3+, nitrite negative.     Respiratory panel by PCR today, 2025 was positive for influenza A H1-Detected  The patient has been desaturating while in the ED, requiring BiPAP assistance.  Blood and urine cultures were obtained.  Antibiotic with  cefepime is initiated.     Medical oncology consultation requested         PAST TUMOR HISTORY COPIED FROM DR. CARDONA'S 1/13/2025 OFFICE VISIT:   HISTORY OF PRESENT ILLNESS:    Diagnosis  Small cell lung cancer, right lung, Dec 2024  Stage  qU2P2L2, limited stage     Treatment Summary  12/13/24 - 12/19/24 Emergent palliative radiation therapy 1250cGy in 5 fractions to right lung  Anticipate chemotherapy with Carboplatin D1, Etoposide D1-3 every 21 days x4 cycles  Anticipate radiation therapy to right lung with Dr Rico Vanegas/[facility]     Cancer History  Ms. 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 She has complaints today of fatigue. She states she has stabbing chest pain and mid back pain. She is currently being seen at pain management for pain medication. She presents today on 2L of oxygen dependently.   11/8/24 CT chest low dose ([facility]): 2 cm right upper lobe bilobed nodule with associated mediastinal and right hilar lymphadenopathy, suspicious for malignancy. Minimal adjacent   right upper lobe interlobular septal thickening may be related to venous or lymphatic congestion, however lymphangitic carcinomatosis is an additional consideration. Recommend further evaluation with PET/CT and/or tissue sampling. CT chest with contrast may also be of value to help distinguish the lymph nodes from adjacent vasculature. Minimal tree-in-bud nodularity in the peripheral the right upper lobe, likely mild infectious process.   Additional small pulmonary nodules in the right upper and right middle lobe.   11/8/24 CT abd/pelvis ([facility]): Near complete resolution of the exophytic left upper pole renal lesion of concern likely related to interval rupture/involution. Numerous small bilateral renal cysts are again present.  Mild diffuse wall thickening of the rectum and the distal transverse colon and proximal descending  colon. Correlate for proctocolitis.   12/13/24 PET/CT scan ([facility]): There is a hypermetabolic right upper lobe nodule measures 1.4 cm and has a maximum SUV of 5.5. Right upper lobe suprahilar mass measures at least 8 cm in greatest axial dimension is hypermetabolic with a maximum SUV of 11.4. Mass either invades the middle mediastinum or there is conglomerate hypermetabolic lymphadenopathy. No additional hypermetabolic pulmonary nodule or mass is identified. There is no increased skeletal uptake to suggest osseous metastasis.   12/13/245 CT chest ([facility]): 2.2 cm bilobed right upper lobe pulmonary nodule, likely representing a primary lung neoplasm. 8.9 cm right hilar lung mass with diffuse mediastinal infiltration, likely representing mary metastasis. This mass encases the right mainstem bronchus and encases the right main pulmonary artery, with occlusion of the right upper lobe pulmonary artery. This mass compresses the SVC, which is slitlike. Patient is at risk for SVC syndrome.  12/13/24 Initial evaluation by Dr Rcio Vanegas/[facility] Radiation Oncology for superior vena cava syndrome who recommended emergent palliative radiation therapy 1250cGy in 5 fractions.  12/13/24 - 12/19/24 Emergent SBRT 1250cGy in 5 fractions to right lung  12/19/24 CT chest w/o ([facility]): Stable large right hilar mass with mediastinal invasion and confluent lymphadenopathy as detailed above. Findings are suspicious for small cell carcinoma. There is increased groundglass opacities and interlobular septal thickening within the right upper lobe and right middle lobe. Differential includes postobstructive changes and/or leptomeningeal carcinomatosis. Stable 2.2 cm right upper lobe bilobed nodule which was previously hypermetabolic and concerning for metastases. Trace right pleural effusion.   12/23/24 Navigational bronchoscopy/EBUS by Dr Peterson Morales/[facility] Pulmonology  12/23/24 Station 7 lymph node, endobronchial ultrasound-guided fine-needle aspiration,  "smear (1) and cellblock: High-grade neuroendocrine carcinoma consistent with metastatic small cell carcinoma. Fragments of lymphoid tissue and cartilage present.  Station 4R lymph node, endobronchial ultrasound-guided fine-needle aspiration, smear (1) and cellblock: High-grade neuroendocrine carcinoma consistent with metastatic small cell carcinoma. Many background lymphocytes.  Bronchial washings, ThinPrep preparation (1) and cellblock: A few malignant cells present, high-grade neuroendocrine carcinoma consistent with small cell carcinoma. Several macrophages. A few benign squamous epithelial cells and bronchial epithelial cells. AJCC stage: pTX pNX  1/13/2025-essentially, limited stage small cell lung cancer.  Awaiting results of brain MRI.  Recommend concurrent chemoradiation with carboplatin 2 post right to be followed by adjuvant durvalumab.  1/27/2025-initiation cycle #1 of chemotherapy with carboplatin/-16           PHYSICAL EXAM:/69   Pulse 68   Temp 96.9 °F (36.1 °C) (Axillary)   Resp 22   Ht 160 cm (63\")   Wt 76 kg (167 lb 8.8 oz)   SpO2 94%   BMI 29.68 kg/m²   CONSTITUTIONAL:sedated, ill-appearing  EYES: Anicteric, EOM intact, pupils equal round   CHEST/LUNGS: CTA, Mechanical ventilator, PEEP 5, FiO2 30%.  CARDIOVASCULAR: RRR, no murmurs  ABDOMEN: soft, active bowel sounds, no HSM  EXTREMITIES: warm,   SKIN: warm,   NEUROLOGIC-sedated      CBC  Results from last 7 days   Lab Units 02/04/25  0232 02/03/25  0324 02/02/25  0335   WBC 10*3/mm3 3.36* 5.54 6.96   HEMOGLOBIN g/dL 9.9* 10.1* 9.8*   HEMATOCRIT % 30.5* 31.4* 30.4*   PLATELETS 10*3/mm3 162 204 207         Lab Results   Component Value Date     02/04/2025    K 4.6 02/04/2025     02/04/2025    CO2 26.0 02/04/2025    BUN 35 (H) 02/04/2025    CREATININE 0.48 (L) 02/04/2025    GLUCOSE 138 (H) 02/04/2025    CALCIUM 8.7 02/04/2025    BILITOT 0.2 02/04/2025    ALKPHOS 88 02/04/2025    AST 18 02/04/2025    ALT 22 02/04/2025    " AGRATIO 1.2 02/04/2025    GLOB 2.8 02/04/2025       Lab Results   Component Value Date    INR 0.98 01/31/2025    INR 1.04 07/24/2019    INR 0.90 03/13/2019    PROTIME 13.5 01/31/2025    PROTIME 13 07/24/2019    PROTIME 11.6 (L) 03/13/2019       ASSESSMENT/PLAN:    #   Limited stage small cell lung cancer    Limited stage small cell lung cancer-status post completion of cycle 1 carboplatin etoposide 1/29/2025.    Status post palliative XRT to the SVC.  Plans for further XRT with Dr. Rico Vanegas.    #  Acute hypoxic respiratory failure-     Acute hypoxic respiratory failure- r/tCOPD exacerbation with + influenza A.  Chest x-ray showed chronic changes of emphysema and fibrosis.  Continue current supportive care.  Failed SBT-02/01/25  Remains critically ill. No plans for SBT today.    #Normocytic anemia- Hb 10->9.8.  Monitor    Plan:  Continue current supportive care  Continue antiviral Tamiflu  Reviewed x ray chest this am    Future Appointments    Encounter Information   Provider Department Center   2/17/2025 11:30 AM Christopher Isaac MD [facility]   2/17/2025 11:30 AM INFUSION SCHEDULE, [facility]   2/18/2025 12:00 PM INFUSION SCHEDULE, [facility]   2/19/2025 11:30 AM INFUSION SCHEDULE, [facility]       Ramón Li MD    02/04/25  07:22 CST

## 2025-02-05 ENCOUNTER — APPOINTMENT (OUTPATIENT)
Dept: GENERAL RADIOLOGY | Facility: HOSPITAL | Age: 70
DRG: 208 | End: 2025-02-05
Payer: MEDICARE

## 2025-02-05 LAB
ALBUMIN SERPL-MCNC: 3.2 G/DL (ref 3.5–5.2)
ALBUMIN/GLOB SERPL: 1.1 G/DL
ALP SERPL-CCNC: 85 U/L (ref 39–117)
ALT SERPL W P-5'-P-CCNC: 22 U/L (ref 1–33)
ANION GAP SERPL CALCULATED.3IONS-SCNC: 11 MMOL/L (ref 5–15)
AST SERPL-CCNC: 22 U/L (ref 1–32)
BACTERIA SPEC AEROBE CULT: NORMAL
BACTERIA SPEC AEROBE CULT: NORMAL
BASOPHILS # BLD AUTO: 0.01 10*3/MM3 (ref 0–0.2)
BASOPHILS NFR BLD AUTO: 0.5 % (ref 0–1.5)
BILIRUB SERPL-MCNC: 0.3 MG/DL (ref 0–1.2)
BUN SERPL-MCNC: 26 MG/DL (ref 8–23)
BUN/CREAT SERPL: 59.1 (ref 7–25)
CALCIUM SPEC-SCNC: 8.7 MG/DL (ref 8.6–10.5)
CHLORIDE SERPL-SCNC: 96 MMOL/L (ref 98–107)
CO2 SERPL-SCNC: 30 MMOL/L (ref 22–29)
CREAT SERPL-MCNC: 0.44 MG/DL (ref 0.57–1)
DEPRECATED RDW RBC AUTO: 45.2 FL (ref 37–54)
EGFRCR SERPLBLD CKD-EPI 2021: 106.2 ML/MIN/1.73
EOSINOPHIL # BLD AUTO: 0 10*3/MM3 (ref 0–0.4)
EOSINOPHIL NFR BLD AUTO: 0 % (ref 0.3–6.2)
ERYTHROCYTE [DISTWIDTH] IN BLOOD BY AUTOMATED COUNT: 13.8 % (ref 12.3–15.4)
GLOBULIN UR ELPH-MCNC: 2.9 GM/DL
GLUCOSE BLDC GLUCOMTR-MCNC: 100 MG/DL (ref 70–130)
GLUCOSE BLDC GLUCOMTR-MCNC: 88 MG/DL (ref 70–130)
GLUCOSE SERPL-MCNC: 95 MG/DL (ref 65–99)
HCT VFR BLD AUTO: 32.8 % (ref 34–46.6)
HGB BLD-MCNC: 10.5 G/DL (ref 12–15.9)
IMM GRANULOCYTES # BLD AUTO: 0.02 10*3/MM3 (ref 0–0.05)
IMM GRANULOCYTES NFR BLD AUTO: 0.9 % (ref 0–0.5)
LYMPHOCYTES # BLD AUTO: 0.2 10*3/MM3 (ref 0.7–3.1)
LYMPHOCYTES NFR BLD AUTO: 9.5 % (ref 19.6–45.3)
MAGNESIUM SERPL-MCNC: 1.8 MG/DL (ref 1.6–2.4)
MCH RBC QN AUTO: 28.9 PG (ref 26.6–33)
MCHC RBC AUTO-ENTMCNC: 32 G/DL (ref 31.5–35.7)
MCV RBC AUTO: 90.4 FL (ref 79–97)
MONOCYTES # BLD AUTO: 0.02 10*3/MM3 (ref 0.1–0.9)
MONOCYTES NFR BLD AUTO: 0.9 % (ref 5–12)
NEUTROPHILS NFR BLD AUTO: 1.86 10*3/MM3 (ref 1.7–7)
NEUTROPHILS NFR BLD AUTO: 88.2 % (ref 42.7–76)
PHOSPHATE SERPL-MCNC: 2.5 MG/DL (ref 2.5–4.5)
PLATELET # BLD AUTO: 139 10*3/MM3 (ref 140–450)
PMV BLD AUTO: 10.2 FL (ref 6–12)
POTASSIUM SERPL-SCNC: 3.8 MMOL/L (ref 3.5–5.2)
PROT SERPL-MCNC: 6.1 G/DL (ref 6–8.5)
RBC # BLD AUTO: 3.63 10*6/MM3 (ref 3.77–5.28)
SODIUM SERPL-SCNC: 137 MMOL/L (ref 136–145)
WBC NRBC COR # BLD AUTO: 2.11 10*3/MM3 (ref 3.4–10.8)

## 2025-02-05 PROCEDURE — 25010000002 CEFTRIAXONE PER 250 MG: Performed by: PHYSICIAN ASSISTANT

## 2025-02-05 PROCEDURE — 25010000002 MORPHINE PER 10 MG: Performed by: NURSE PRACTITIONER

## 2025-02-05 PROCEDURE — 25010000002 DIPHENHYDRAMINE PER 50 MG: Performed by: PHYSICIAN ASSISTANT

## 2025-02-05 PROCEDURE — 85025 COMPLETE CBC W/AUTO DIFF WBC: CPT | Performed by: INTERNAL MEDICINE

## 2025-02-05 PROCEDURE — 63710000001 ONDANSETRON ODT 4 MG TABLET DISPERSIBLE: Performed by: NURSE PRACTITIONER

## 2025-02-05 PROCEDURE — 94664 DEMO&/EVAL PT USE INHALER: CPT

## 2025-02-05 PROCEDURE — 97162 PT EVAL MOD COMPLEX 30 MIN: CPT

## 2025-02-05 PROCEDURE — 94799 UNLISTED PULMONARY SVC/PX: CPT

## 2025-02-05 PROCEDURE — 36415 COLL VENOUS BLD VENIPUNCTURE: CPT | Performed by: PHYSICIAN ASSISTANT

## 2025-02-05 PROCEDURE — 25010000002 HEPARIN (PORCINE) PER 1000 UNITS: Performed by: PHYSICIAN ASSISTANT

## 2025-02-05 PROCEDURE — 25010000002 FUROSEMIDE PER 20 MG: Performed by: NURSE PRACTITIONER

## 2025-02-05 PROCEDURE — 97166 OT EVAL MOD COMPLEX 45 MIN: CPT | Performed by: OCCUPATIONAL THERAPIST

## 2025-02-05 PROCEDURE — 82948 REAGENT STRIP/BLOOD GLUCOSE: CPT

## 2025-02-05 PROCEDURE — 94761 N-INVAS EAR/PLS OXIMETRY MLT: CPT

## 2025-02-05 PROCEDURE — 80053 COMPREHEN METABOLIC PANEL: CPT | Performed by: PHYSICIAN ASSISTANT

## 2025-02-05 PROCEDURE — 71045 X-RAY EXAM CHEST 1 VIEW: CPT

## 2025-02-05 PROCEDURE — 83735 ASSAY OF MAGNESIUM: CPT | Performed by: PHYSICIAN ASSISTANT

## 2025-02-05 PROCEDURE — 92526 ORAL FUNCTION THERAPY: CPT | Performed by: SPEECH-LANGUAGE PATHOLOGIST

## 2025-02-05 PROCEDURE — 25010000002 METHYLPREDNISOLONE PER 40 MG: Performed by: PHYSICIAN ASSISTANT

## 2025-02-05 PROCEDURE — 84100 ASSAY OF PHOSPHORUS: CPT | Performed by: PHYSICIAN ASSISTANT

## 2025-02-05 RX ORDER — FUROSEMIDE 10 MG/ML
20 INJECTION INTRAMUSCULAR; INTRAVENOUS ONCE
Status: COMPLETED | OUTPATIENT
Start: 2025-02-05 | End: 2025-02-05

## 2025-02-05 RX ORDER — DIPHENHYDRAMINE HYDROCHLORIDE 50 MG/ML
12.5 INJECTION INTRAMUSCULAR; INTRAVENOUS EVERY 6 HOURS PRN
Status: DISCONTINUED | OUTPATIENT
Start: 2025-02-05 | End: 2025-02-10 | Stop reason: HOSPADM

## 2025-02-05 RX ORDER — QUETIAPINE FUMARATE 25 MG/1
25 TABLET, FILM COATED ORAL NIGHTLY PRN
Status: DISCONTINUED | OUTPATIENT
Start: 2025-02-05 | End: 2025-02-05

## 2025-02-05 RX ADMIN — FUROSEMIDE 20 MG: 10 INJECTION, SOLUTION INTRAMUSCULAR; INTRAVENOUS at 12:05

## 2025-02-05 RX ADMIN — BUDESONIDE 0.5 MG: 0.5 INHALANT RESPIRATORY (INHALATION) at 06:50

## 2025-02-05 RX ADMIN — ONDANSETRON 4 MG: 4 TABLET, ORALLY DISINTEGRATING ORAL at 01:17

## 2025-02-05 RX ADMIN — IPRATROPIUM BROMIDE AND ALBUTEROL SULFATE 3 ML: 2.5; .5 SOLUTION RESPIRATORY (INHALATION) at 19:14

## 2025-02-05 RX ADMIN — ONDANSETRON 4 MG: 4 TABLET, ORALLY DISINTEGRATING ORAL at 13:14

## 2025-02-05 RX ADMIN — HEPARIN SODIUM 5000 UNITS: 5000 INJECTION, SOLUTION INTRAVENOUS; SUBCUTANEOUS at 15:19

## 2025-02-05 RX ADMIN — HEPARIN SODIUM 5000 UNITS: 5000 INJECTION, SOLUTION INTRAVENOUS; SUBCUTANEOUS at 22:15

## 2025-02-05 RX ADMIN — ARFORMOTEROL TARTRATE 15 MCG: 15 SOLUTION RESPIRATORY (INHALATION) at 08:45

## 2025-02-05 RX ADMIN — Medication 10 ML: at 08:28

## 2025-02-05 RX ADMIN — PANTOPRAZOLE SODIUM 40 MG: 40 INJECTION, POWDER, FOR SOLUTION INTRAVENOUS at 05:25

## 2025-02-05 RX ADMIN — IPRATROPIUM BROMIDE AND ALBUTEROL SULFATE 3 ML: 2.5; .5 SOLUTION RESPIRATORY (INHALATION) at 06:49

## 2025-02-05 RX ADMIN — ARFORMOTEROL TARTRATE 15 MCG: 15 SOLUTION RESPIRATORY (INHALATION) at 19:23

## 2025-02-05 RX ADMIN — CHLORHEXIDINE GLUCONATE 1 APPLICATION: 500 CLOTH TOPICAL at 03:33

## 2025-02-05 RX ADMIN — IPRATROPIUM BROMIDE AND ALBUTEROL SULFATE 3 ML: 2.5; .5 SOLUTION RESPIRATORY (INHALATION) at 11:16

## 2025-02-05 RX ADMIN — Medication 1 APPLICATION: at 08:27

## 2025-02-05 RX ADMIN — MORPHINE SULFATE 2 MG: 2 INJECTION, SOLUTION INTRAMUSCULAR; INTRAVENOUS at 04:33

## 2025-02-05 RX ADMIN — SODIUM CHLORIDE 1000 MG: 900 INJECTION INTRAVENOUS at 22:15

## 2025-02-05 RX ADMIN — METHYLPREDNISOLONE SODIUM SUCCINATE 20 MG: 40 INJECTION, POWDER, FOR SOLUTION INTRAMUSCULAR; INTRAVENOUS at 08:27

## 2025-02-05 RX ADMIN — Medication 10 ML: at 20:28

## 2025-02-05 RX ADMIN — IPRATROPIUM BROMIDE AND ALBUTEROL SULFATE 3 ML: 2.5; .5 SOLUTION RESPIRATORY (INHALATION) at 22:41

## 2025-02-05 RX ADMIN — IPRATROPIUM BROMIDE AND ALBUTEROL SULFATE 3 ML: 2.5; .5 SOLUTION RESPIRATORY (INHALATION) at 14:33

## 2025-02-05 RX ADMIN — METHYLPREDNISOLONE SODIUM SUCCINATE 40 MG: 40 INJECTION, POWDER, FOR SOLUTION INTRAMUSCULAR; INTRAVENOUS at 00:05

## 2025-02-05 RX ADMIN — DIPHENHYDRAMINE HYDROCHLORIDE 12.5 MG: 50 INJECTION, SOLUTION INTRAMUSCULAR; INTRAVENOUS at 22:15

## 2025-02-05 RX ADMIN — BUDESONIDE 0.5 MG: 0.5 INHALANT RESPIRATORY (INHALATION) at 19:18

## 2025-02-05 RX ADMIN — IPRATROPIUM BROMIDE AND ALBUTEROL SULFATE 3 ML: 2.5; .5 SOLUTION RESPIRATORY (INHALATION) at 03:58

## 2025-02-05 RX ADMIN — METHYLPREDNISOLONE SODIUM SUCCINATE 20 MG: 40 INJECTION, POWDER, FOR SOLUTION INTRAMUSCULAR; INTRAVENOUS at 20:27

## 2025-02-05 RX ADMIN — HEPARIN SODIUM 5000 UNITS: 5000 INJECTION, SOLUTION INTRAVENOUS; SUBCUTANEOUS at 05:25

## 2025-02-05 NOTE — PLAN OF CARE
Goal Outcome Evaluation:  Plan of Care Reviewed With: patient, caregiver (JADIEL Valadez)        Progress: improving       Anticipated Discharge Disposition (SLP): unknown             Treatment Assessment (SLP): improved (02/05/25 1056)  Treatment Assessment Comments (SLP): See note (02/05/25 1056)  Plan for Continued Treatment (SLP): goals adjusted to reflect functional improvements demonstrated (02/05/25 1056)      Swallow follow up completed. Patient alert and cooperative. No report of nausea today. Patient completed trials of puree, thin liquids, and regular solids. Anterior loss and holding of puree. She then spit the puree out. I asked why she did that and initially she did no answer. She then reported she hated apple sauce. Thin water completed with no overt s/s of aspiration observed. Vocal quality remains clear throughout. Attempted solid trial but patient is edentulous and report she will not be able to eat solid foods without her dentures.     SLP recommends puree diet with thin liquids. Medications whole with puree option for now. SLP will continue to follow to ensure diet toleration and advance once dentures can be brought in by family.     Elena Ron, MS CCC-SLP 2/5/2025 12:04 CST

## 2025-02-05 NOTE — PROGRESS NOTES
[facility] Intensivist Services  INPATIENT PROGRESS NOTE    Patient Name: Ashley Gibbs  Date of Admission: 1/31/2025  Today's Date: 02/05/25  Length of Stay: 5  Primary Care Physician: Padmaja Bermudez APRN    Subjective   ICU Summary:  69-year-old male with stage IV small cell lung cancer, COPD, restless leg, admitted on 1/31/2025 with influenza A infection and acute hypoxic and hypercapnic respiratory failure.  Patient required intubation in the emergency department.  She started on oseltamavir, Rocephin, azithromycin, Pulmicort and DuoNebs.  On Solu-Medrol taper.  Patient has not been able to tolerate ventilator weaning or spontaneous breathing trials.    Interval update:  2/3  Patient remains intubated, sedated, on ventilator.  5 PEEP and 30% FiO2.  Still having elevated peak pressures.  Mildly labored respirations.  Blood pressure stable.     2/4  Intubated, sedated on vent.  Tolerated sedation wean this morning, calm following commands.  Nonlabored respirations.  ABG with compensated respiratory acidosis which is likely her baseline.  Vital signs are stable, no vasoactive drips.    2/5  Able to extubate on 2/4.  Now tolerating 3 L nasal cannula sats low 90s.  No respiratory distress.  Blood pressure heart rate stable.    Review of Systems   All pertinent negatives and positives are as above. All other systems have been reviewed and are negative unless otherwise stated.     Objective    Temp:  [99.2 °F (37.3 °C)-99.6 °F (37.6 °C)] 99.2 °F (37.3 °C)  Heart Rate:  [] 85  Resp:  [12-20] 19  BP: (141-158)/(60-86) 155/74  Physical Exam  Vitals and nursing note reviewed.   Constitutional:       General: She is not in acute distress.  Eyes:      Pupils: Pupils are equal, round, and reactive to light.   Cardiovascular:      Rate and Rhythm: Normal rate and regular rhythm.      Pulses: Normal pulses.      Heart sounds: Normal heart sounds.   Pulmonary:      Comments:  Nonlabored respirations, breath sounds clear bilaterally, few crackles in bases  Abdominal:      General: There is no distension.      Palpations: Abdomen is soft.   Musculoskeletal:         General: Normal range of motion.   Skin:     General: Skin is warm and dry.      Capillary Refill: Capillary refill takes less than 2 seconds.   Neurological:      General: No focal deficit present.      Mental Status: She is alert and oriented to person, place, and time.           Results Review:  Lab Results (last 24 hours)       Procedure Component Value Units Date/Time    Blood Culture - Blood, Hand, Left [[id number]]  (Normal) Collected: 01/31/25 0857    Specimen: Blood from Hand, Left Updated: 02/05/25 0915     Blood Culture No growth at 5 days    Blood Culture - Blood, Arm, Left [[id number]]  (Normal) Collected: 01/31/25 0813    Specimen: Blood from Arm, Left Updated: 02/05/25 0830     Blood Culture No growth at 5 days    CBC & Differential [[id number]]  (Abnormal) Collected: 02/05/25 0457    Specimen: Blood Updated: 02/05/25 0539    Narrative:      The following orders were created for panel order CBC & Differential.  Procedure                               Abnormality         Status                     ---------                               -----------         ------                     CBC Auto Differential[[id number]]        Abnormal            Final result                 Please view results for these tests on the individual orders.    CBC Auto Differential [[id number]]  (Abnormal) Collected: 02/05/25 0457    Specimen: Blood Updated: 02/05/25 0539     WBC 2.11 10*3/mm3      RBC 3.63 10*6/mm3      Hemoglobin 10.5 g/dL      Hematocrit 32.8 %      MCV 90.4 fL      MCH 28.9 pg      MCHC 32.0 g/dL      RDW 13.8 %      RDW-SD 45.2 fl      MPV 10.2 fL      Platelets 139 10*3/mm3      Neutrophil % 88.2 %      Lymphocyte % 9.5 %      Monocyte % 0.9 %      Eosinophil % 0.0 %      Basophil % 0.5 %      Immature Grans % 0.9 %       Neutrophils, Absolute 1.86 10*3/mm3      Lymphocytes, Absolute 0.20 10*3/mm3      Monocytes, Absolute 0.02 10*3/mm3      Eosinophils, Absolute 0.00 10*3/mm3      Basophils, Absolute 0.01 10*3/mm3      Immature Grans, Absolute 0.02 10*3/mm3     POC Glucose Once [[id number]]  (Normal) Collected: 02/05/25 0527    Specimen: Blood Updated: 02/05/25 0538     Glucose 100 mg/dL      Comment: : [id number] Dolly Merlos)  PaigeMeter ID: [id number]       Comprehensive Metabolic Panel [[id number]]  (Abnormal) Collected: 02/05/25 0305    Specimen: Blood Updated: 02/05/25 0344     Glucose 95 mg/dL      BUN 26 mg/dL      Creatinine 0.44 mg/dL      Sodium 137 mmol/L      Potassium 3.8 mmol/L      Chloride 96 mmol/L      CO2 30.0 mmol/L      Calcium 8.7 mg/dL      Total Protein 6.1 g/dL      Albumin 3.2 g/dL      ALT (SGPT) 22 U/L      AST (SGOT) 22 U/L      Alkaline Phosphatase 85 U/L      Total Bilirubin 0.3 mg/dL      Globulin 2.9 gm/dL      A/G Ratio 1.1 g/dL      BUN/Creatinine Ratio 59.1     Anion Gap 11.0 mmol/L      eGFR 106.2 mL/min/1.73     Narrative:      GFR Categories in Chronic Kidney Disease (CKD)      GFR Category          GFR (mL/min/1.73)    Interpretation  G1                     90 or greater         Normal or high (1)  G2                      60-89                Mild decrease (1)  G3a                   45-59                Mild to moderate decrease  G3b                   30-44                Moderate to severe decrease  G4                    15-29                Severe decrease  G5                    14 or less           Kidney failure          (1)In the absence of evidence of kidney disease, neither GFR category G1 or G2 fulfill the criteria for CKD.    eGFR calculation 2021 CKD-EPI creatinine equation, which does not include race as a factor    Magnesium [[id number]]  (Normal) Collected: 02/05/25 0305    Specimen: Blood Updated: 02/05/25 0344     Magnesium 1.8 mg/dL     Phosphorus [[id number]]  (Normal)  Collected: 02/05/25 0305    Specimen: Blood Updated: 02/05/25 0344     Phosphorus 2.5 mg/dL     POC Glucose Once [[id number]]  (Normal) Collected: 02/05/25 0005    Specimen: Blood Updated: 02/05/25 0015     Glucose 88 mg/dL      Comment: : [id number] Dolly Merlos)  PaigeMeter ID: [id number]                XR Chest 1 View    Result Date: 2/5/2025  1. The removal of the endotracheal tube and gastric tube since the previous study. No change in cardiopulmonary status.   This report was signed and finalized on 2/5/2025 6:42 AM by Dr. Shannan Cobian MD.      XR Chest 1 View    Result Date: 2/4/2025  1. No significant change since the previous study.   This report was signed and finalized on 2/4/2025 7:06 AM by Dr. Shannan Cobian MD.       Result Review:  I have personally reviewed the results from the time of this admission to 2/5/2025 10:56 CST and agree with these findings:  [x]  Laboratory list / accordion  [x]  Microbiology  [x]  Radiology  [x]  EKG/Telemetry   []  Cardiology/Vascular   []  Pathology  []  Old records  []  Other:      Culture Data:   Blood Culture   Date Value Ref Range Status   01/31/2025 No growth at 3 days  Preliminary   01/31/2025 No growth at 3 days  Preliminary     Urine Culture   Date Value Ref Range Status   01/31/2025 <25,000 CFU/mL Mixed Fanta Isolated  Final       I have reviewed the patient's current medications.     Assessment/Plan   Assessment  Active Hospital Problems    Diagnosis     **Acute respiratory failure     Influenza A     Small cell lung cancer    69-year-old female with acute hypoxemic respiratory failure, influenza A infection, small cell lung cancer in CCU for critical care management.    Acute hypoxic respiratory failure  -Secondary to influenza A, small cell lung cancer and baseline COPD  -Extubated 2/4, on 3 L nasal cannula, O2 sat 90%  -Has finished oseltamavir course  -Continue empiric Rocephin course for superimposed pneumonia, on day 4/5, has finished  azithromycin   -Continue Pulmicort, Linda Vivas  -Methylprednisolone taper    Influenza A infection  -Finished oseltamavir  -Continue droplet precautions    Small cell lung cancer  -Oncology consulted, follow their recommendations    VTE: Heparin  GI: PPI  NTN: SLP to evaluate, diet per their rec  Abx: Rocephin, finished azithromycin  Lines/Tubes: Peripheral IV  CODE STATUS: No CPR    Disposition: Stable for transfer to medical floor    50 minutes minimum spent on patient, MDM high     This time included obtaining a history; examining the patient; pulse oximetry; ordering and review of studies; arranging urgent treatment with development of a management plan; evaluation of patient's response to treatment; frequent reassessment; and, discussions with other providers.     Please see rest of the note for further information on patient assessment, MDM, and treatment.     Part of this note may be an electronic transcription/translation of spoken language to printed text using the Dragon dictation system        Electronically signed by CARRIE Nick on 2/5/2025 at 10:56 CST

## 2025-02-05 NOTE — PROGRESS NOTES
Inpatient Nutrition Services  Patient Name:  Ashley Gibbs  YOB: 1955  MRN: [id number]  Admit Date:  1/31/2025   Reason for Assessment       Row Name 02/05/25 1033          Reason for Assessment    Reason For Assessment follow-up protocol          Nutrition/Diet History       Row Name 02/05/25 1033          Nutrition/Diet History    Typical Intake (Food/Fluid/EN/PN) Extubated and enteral access removed. Total time on nutrition support ~ 72 hours. Received average 769mL Peptamen Intense VHP/day, which provided 769 calories (1171 calories/day with sedation), 71 g pro/day, and 1421 mL enteral fluid/day (formula + free water) or 2667 mL total fluid/day (enteral formula, free water flush, and IVFs). Average UOP 1975mL/day and 4 unmeasured. Six stools noted yesterday. Edema: 1+ to both arms and feet, left hand; 2+ edema right hand. Edentulous. NPO; awaiting SLP evaluation to determine if appropriate to iniatiate PO. GCS 14/15; visitor at bedside without questions regarding nutrition. Pt denied food allergies, N/V/D, constipation, previous chewing/swallowing difficulty. Pt did not take vitamins, minerals, or herbal supplements prior to admission. No signifcant weight changes per weight report. If SLP recommends NPO and nutrition support desired, RD available for recommendations. Otherwise, observe SLP recommendations for food/fluid consistency/texture. Nutrition following per protocol.         Electronically signed by:  Inna Ford RDN, JOANNE  02/05/25 10:38 CST

## 2025-02-05 NOTE — PLAN OF CARE
Goal Outcome Evaluation:  Plan of Care Reviewed With: patient           Outcome Evaluation: OT eval completed. Pt presents alert and oriented to self and place, sitting up in bed. She reports at baseline being independent with all adls and mobility, residing at home. Today she demonstrates decreased cognition/safety awareness, impaired balance/postural control, strength and ROM. She was able to follow approx 50% of simple one steps commands with increased time/repetition of direction required to complete. She required Mod-Max A x2 for supine to sit at EOB. Upon sitting up pt found to be incontinent of stool. She was unable to attempt stance for clean up due to weakness, therefore returned back to folwers in bed and rolling completed. She was Dep for all aspects of toileting this date. Pt demonstrates significant globalized weakness this date. She would benefit from skilled OT services to address these deficits and assist with return to PLOF. Recommend d/c to SNF for continued therapy services.    Anticipated Discharge Disposition (OT): skilled nursing facility

## 2025-02-05 NOTE — PLAN OF CARE
Goal Outcome Evaluation:  Plan of Care Reviewed With: patient           Outcome Evaluation: PT eval complete. She is alert and oriented to self only. She reports living with her niece/granddaughter where she was independent with her mobility and ADL's. She demos confusion this session and seems to be a poor historian. She demos significnat weakness in B LE. Grossly demos 2-/5 strength in B LE. Needs max assist x 2 for supine <> sit bed mobility. Needs mod assist for sitting balance at the EOB. She was then assisted back to bed where she required mod/max assist to roll left/right to clean from BM. She is very weak, confused and with impaired balance. Remains a fall risk. PT will cont with mobility training, strengthening and balance training. Ms. Gibbs would currently need SNF placement for rehab.    Anticipated Discharge Disposition (PT): skilled nursing facility

## 2025-02-05 NOTE — PLAN OF CARE
Goal Outcome Evaluation:  Plan of Care Reviewed With: patient        Progress: no change  Outcome Evaluation: Pt A/O x 4. 3 L NC. VSS. Tmax 99.6. Prn zofran given x 2. Prn morphine given x 1. BM x 5. Adequate urine output. WBC 2.11.

## 2025-02-05 NOTE — THERAPY EVALUATION
Patient Name: Ashley Gibbs  : 1955    MRN: [id number]                              Today's Date: 2025       Admit Date: 2025    Visit Dx:     ICD-10-CM ICD-9-CM   1. Acute respiratory failure with hypoxia and hypercapnia  J96.01 518.81    J96.02    2. Febrile illness  R50.9 780.60   3. Influenza A  J10.1 487.1   4. Acute UTI (urinary tract infection)  N39.0 599.0   5. Altered mental status, unspecified altered mental status type  R41.82 780.97   6. Malignant neoplasm of lung, unspecified laterality, unspecified part of lung  C34.90 162.9   7. Immunosuppressed due to chemotherapy  D84.821 V58.69    T45.1X5A     Z79.899    8. Dysphagia, unspecified type  R13.10 787.20     Patient Active Problem List   Diagnosis    Acute hypoxemic respiratory failure    Age-related osteoporosis without current pathological fracture    Anxiety    Stage 3 severe COPD by GOLD classification    Chronic pain disorder    Depression    Excessive daytime sleepiness    Hemoptysis    Family history of renal cell carcinoma    Hyperlipidemia    Hypothyroidism    Migraines    Mild episode of recurrent major depressive disorder    Neuropathy    Obstructive sleep apnea    Primary insomnia    Renal mass    Restless legs syndrome    On supplemental oxygen therapy    Family history of CHF (congestive heart failure)    Family history of diabetes mellitus    6 cm exophytic left upper pole renal cyst, likely hemorrhagic    Acute bilateral pyelonephritis    Hypokalemia    E. coli UTI, present on admission (urinary tract infection)    Paroxysmal supraventricular tachycardia    Sepsis due to Escherichia coli UTI, bilateral pyelonephritis, present on admission    Vitamin D deficiency    Lung mass    Former smoker    History of radiation therapy    Panic attacks    Small cell lung cancer    Chronic bronchitis    Acute respiratory failure    Influenza A     Past Medical History:   Diagnosis Date    Anxiety     Arthritis     Asthma     Back pain      COPD (chronic obstructive pulmonary disease)     Dependence on supplemental oxygen     Depression     Disease of thyroid gland     Fibromyalgia, primary     Headache     History of degenerative disc disease     Hypertension     Hypothyroidism     Low back pain     Restless leg     Scoliosis      Past Surgical History:   Procedure Laterality Date    APPENDECTOMY      BREAST BIOPSY Left     BRONCHOSCOPY Right 12/23/2024    Procedure: BRONCHOSCOPY WITH ENDOBRONCHIAL ULTRASOUND;  Surgeon: Peterson Morales MD;  Location: [facility] OR;  Service: Pulmonary;  Laterality: Right;  pre: lung mass  post: lung mass    CHOLECYSTECTOMY      TUBAL ABDOMINAL LIGATION        General Information       Row Name 02/05/25 1128          OT Time and Intention    Document Type evaluation  ED with Flu A and acute hypoxic and hypercapnic resp. failure. Intubated 1/31 - Ext 2/4.  -JW     Mode of Treatment occupational therapy  -JW     Patient Effort good  -JW       Row Name 02/05/25 1128          General Information    Patient Profile Reviewed yes  -JW     Prior Level of Function independent:;all household mobility;transfer;bed mobility;ADL's  -JW     Existing Precautions/Restrictions fall;oxygen therapy device and L/min  -JW     Barriers to Rehab medically complex  -       Row Name 02/05/25 1128          Living Environment    People in Home grandchild(melany)  -       Row Name 02/05/25 1128          Cognition    Orientation Status (Cognition) oriented to;person  -       Row Name 02/05/25 1128          Safety Issues/Impairments Affecting Functional Mobility    Impairments Affecting Function (Mobility) strength;balance;cognition;endurance/activity tolerance;postural/trunk control;shortness of breath  -JW     Cognitive Impairments, Mobility Safety/Performance insight into deficits/self-awareness;attention;problem-solving/reasoning;judgment;safety precaution awareness;safety precaution follow-through  -JW               User Key  (r) =  Recorded By, (t) = Taken By, (c) = Cosigned By      Initials Name Provider Type    Geovanna Magana, OTR/L, CSRS Occupational Therapist                     Mobility/ADL's       Row Name 02/05/25 1128          Bed Mobility    Bed Mobility supine-sit;sit-supine;rolling right;rolling left  -     Rolling Left Hale (Bed Mobility) moderate assist (50% patient effort);maximum assist (25% patient effort)  -     Rolling Right Hale (Bed Mobility) moderate assist (50% patient effort);maximum assist (25% patient effort)  -     Supine-Sit Hale (Bed Mobility) maximum assist (25% patient effort);2 person assist  -     Sit-Supine Hale (Bed Mobility) maximum assist (25% patient effort);2 person assist  -     Bed Mobility, Safety Issues cognitive deficits limit understanding;decreased use of arms for pushing/pulling;decreased use of legs for bridging/pushing;impaired trunk control for bed mobility  -     Assistive Device (Bed Mobility) head of bed elevated;bed rails  -     Comment, (Bed Mobility) unable to support self at sitting position at EOB. Very slow to respond to commands and tactile cues occasionally needed to complete task.  -       Row Name 02/05/25 1128          Functional Mobility    Functional Mobility- Ind. Level not appropriate to assess  -       Row Name 02/05/25 1128          Activities of Daily Living    BADL Assessment/Intervention lower body dressing;toileting  -       Row Name 02/05/25 1128          Lower Body Dressing Assessment/Training    Hale Level (Lower Body Dressing) don;socks;dependent (less than 25% patient effort)  -     Position (Lower Body Dressing) edge of bed sitting  -       Row Name 02/05/25 1128          Toileting Assessment/Training    Hale Level (Toileting) adjust/manage clothing;change pad/brief;perform perineal hygiene;dependent (less than 25% patient effort)  -     Position (Toileting) supine  -     Comment,  (Toileting) found to have been incontinent of stool  -               User Key  (r) = Recorded By, (t) = Taken By, (c) = Cosigned By      Initials Name Provider Type     Geovanna Ritchie OTR/L, PAUL Occupational Therapist                   Obj/Interventions       [facility] Name 02/05/25 1128          Sensory Assessment (Somatosensory)    Sensory Assessment (Somatosensory) UE sensation intact  -[facility] Name 02/05/25 1128          Vision Assessment/Intervention    Visual Impairment/Limitations WFL  -[facility] Name 02/05/25 1128          Range of Motion Comprehensive    General Range of Motion bilateral upper extremity ROM WFL  -     Comment, General Range of Motion B UEs AROM limited due to weakness. PRON WFL B UEs.  -[facility] Name 02/05/25 1128          Strength Comprehensive (MMT)    Comment, General Manual Muscle Testing (MMT) Assessment B UE strength 2-/5  -[facility] Name 02/05/25 1128          Balance    Balance Assessment sitting static balance;sitting dynamic balance  -     Static Sitting Balance minimal assist  -     Dynamic Sitting Balance moderate assist  -     Position, Sitting Balance unsupported;sitting edge of bed  -               User Key  (r) = Recorded By, (t) = Taken By, (c) = Cosigned By      Initials Name Provider Type     Geovanna Ritchie OTR/L, PAUL Occupational Therapist                   Goals/Plan       Row Name 02/05/25 1128          Dressing Goal 1 (OT)    Activity/Device (Dressing Goal 1, OT) upper body dressing  -     [place]/Cues Needed (Dressing Goal 1, OT) contact guard required  -     Time Frame (Dressing Goal 1, OT) long term goal (LTG);by discharge  -     Progress/Outcome (Dressing Goal 1, OT) new goal  -JW       Row Name 02/05/25 1128          Toileting Goal 1 (OT)    Activity/Device (Toileting Goal 1, OT) toileting skills, all  -     [place] Level/Cues Needed (Toileting Goal 1, OT) moderate assist (50-74% patient effort)  -      Time Frame (Toileting Goal 1, OT) long term goal (LTG);by discharge  -     Progress/Outcome (Toileting Goal 1, OT) new goal  -       Row Name 02/05/25 1128          Strength Goal 1 (OT)    Strength Goal 1 (OT) Pt will increase B UE strength to 3+/5 for improved independence with adls.  -     Time Frame (Strength Goal 1, OT) long term goal (LTG);by discharge  -     Progress/Outcome (Strength Goal 1, OT) new goal  -       Row Name 02/05/25 1128          Therapy Assessment/Plan (OT)    Planned Therapy Interventions (OT) activity tolerance training;adaptive equipment training;BADL retraining;cognitive/visual perception retraining;functional balance retraining;transfer/mobility retraining;strengthening exercise;occupation/activity based interventions;patient/caregiver education/training;ROM/therapeutic exercise  -               User Key  (r) = Recorded By, (t) = Taken By, (c) = Cosigned By      Initials Name Provider Type    Geovanna Magana, OTR/L, CSRS Occupational Therapist                   Clinical Impression       [facility] Name 02/05/25 1128          Pain Assessment    Pretreatment Pain Rating 0/10 - no pain  -     Posttreatment Pain Rating 0/10 - no pain  -JW       Row Name 02/05/25 1128          Plan of Care Review    Plan of Care Reviewed With patient  -     Outcome Evaluation OT eval completed. Pt presents alert and oriented to self and place, sitting up in bed. She reports at baseline being independent with all adls and mobility, residing at home. Today she demonstrates decreased cognition/safety awareness, impaired balance/postural control, strength and ROM. She was able to follow approx 50% of simple one steps commands with increased time/repetition of direction required to complete. She required Mod-Max A x2 for supine to sit at EOB. Upon sitting up pt found to be incontinent of stool. She was unable to attempt stance for clean up due to weakness, therefore returned back to folwers in bed  and rolling completed. She was Dep for all aspects of toileting this date. Pt demonstrates significant globalized weakness this date. She would benefit from skilled OT services to address these deficits and assist with return to PLOF. Recommend d/c to SNF for continued therapy services.  -       Row Name 02/05/25 1128          Therapy Assessment/Plan (OT)    Rehab Potential (OT) good  -     Criteria for Skilled Therapeutic Interventions Met (OT) yes;skilled treatment is necessary  -     Therapy Frequency (OT) 5 times/wk  -     Predicted Duration of Therapy Intervention (OT) 10 days  -       Row Name 02/05/25 1128          Therapy Plan Review/Discharge Plan (OT)    Anticipated Discharge Disposition (OT) skilled nursing facility  -               User Key  (r) = Recorded By, (t) = Taken By, (c) = Cosigned By      Initials Name Provider Type    Geovanna Magana, OTR/L, CSRS Occupational Therapist                   Outcome Measures       Row Name 02/05/25 1128          How much help from another is currently needed...    Putting on and taking off regular lower body clothing? 1  -JW     Bathing (including washing, rinsing, and drying) 1  -JW     Toileting (which includes using toilet bed pan or urinal) 1  -JW     Putting on and taking off regular upper body clothing 2  -JW     Taking care of personal grooming (such as brushing teeth) 2  -JW     Eating meals 3  -JW     AM-PAC 6 Clicks Score (OT) 10  -       Row Name 02/05/25 0753          How much help from another person do you currently need...    Turning from your back to your side while in flat bed without using bedrails? 2  -SP     Moving from lying on back to sitting on the side of a flat bed without bedrails? 2  -SP     Moving to and from a bed to a chair (including a wheelchair)? 1  -SP     Standing up from a chair using your arms (e.g., wheelchair, bedside chair)? 1  -SP     Climbing 3-5 steps with a railing? 1  -SP     To walk in hospital  room? 1  -SP     AM-PAC 6 Clicks Score (PT) 8  -SP       Row Name 02/05/25 1128          Functional Assessment    Outcome Measure Options AM-PAC 6 Clicks Daily Activity (OT)  -               User Key  (r) = Recorded By, (t) = Taken By, (c) = Cosigned By      Initials Name Provider Type    SP Allyson Arredondo, RN Registered Nurse    Geovanna Magana OTR/L, PAUL Occupational Therapist                    Occupational Therapy Education       Title: PT OT SLP Therapies (In Progress)       Topic: Occupational Therapy (In Progress)       Point: ADL training (Done)       Description:   Instruct learner(s) on proper safety adaptation and remediation techniques during self care or transfers.   Instruct in proper use of assistive devices.                  Learning Progress Summary            Patient Acceptance, E, VU by  at 2/5/2025 1253                      Point: Home exercise program (Not Started)       Description:   Instruct learner(s) on appropriate technique for monitoring, assisting and/or progressing therapeutic exercises/activities.                  Learner Progress:  Not documented in this visit.              Point: Precautions (Done)       Description:   Instruct learner(s) on prescribed precautions during self-care and functional transfers.                  Learning Progress Summary            Patient Acceptance, E, VU by ISHAN at 2/5/2025 1253                      Point: Body mechanics (Done)       Description:   Instruct learner(s) on proper positioning and spine alignment during self-care, functional mobility activities and/or exercises.                  Learning Progress Summary            Patient Acceptance, E, VU by  at 2/5/2025 1253                                      User Key       Initials Effective Dates Name Provider Type [facility] 11/15/24 -  Geovanna Ritchie OTR/L, PAUL Occupational Therapist OT                  OT Recommendation and Plan  Planned Therapy Interventions (OT):  activity tolerance training, adaptive equipment training, BADL retraining, cognitive/visual perception retraining, functional balance retraining, transfer/mobility retraining, strengthening exercise, occupation/activity based interventions, patient/caregiver education/training, ROM/therapeutic exercise  Therapy Frequency (OT): 5 times/wk  Plan of Care Review  Plan of Care Reviewed With: patient  Outcome Evaluation: OT eval completed. Pt presents alert and oriented to self and place, sitting up in bed. She reports at baseline being independent with all adls and mobility, residing at home. Today she demonstrates decreased cognition/safety awareness, impaired balance/postural control, strength and ROM. She was able to follow approx 50% of simple one steps commands with increased time/repetition of direction required to complete. She required Mod-Max A x2 for supine to sit at EOB. Upon sitting up pt found to be incontinent of stool. She was unable to attempt stance for clean up due to weakness, therefore returned back to folwers in bed and rolling completed. She was Dep for all aspects of toileting this date. Pt demonstrates significant globalized weakness this date. She would benefit from skilled OT services to address these deficits and assist with return to PLOF. Recommend d/c to SNF for continued therapy services.     Time Calculation:         Time Calculation- OT       Row Name 02/05/25 1128             Time Calculation- OT    OT Start Time 1128  add 7 minutes for CR  -      OT Stop Time 1200  -      OT Time Calculation (min) 32 min  -      OT Received On 02/05/25  -      OT Goal Re-Cert Due Date 02/15/25  -                User Key  (r) = Recorded By, (t) = Taken By, (c) = Cosigned By      Initials Name Provider Type    Geovanna Magana OTMARIA M/L, CSRS Occupational Therapist                  Therapy Charges for Today       Code Description Service Date Service Provider Modifiers Qty    [id number]   OT EVAL MOD COMPLEXITY 3 2/5/2025 Geovanna Ritchie OTR/L, CSRS GO 1                 MATTEO Winters, CSRS  2/5/2025

## 2025-02-05 NOTE — PLAN OF CARE
Goal Outcome Evaluation:              Outcome Evaluation: Nutrition follow-up. Resolve enteral nutrition care plan. Awaiting SLP evaluation. See nutrition follow-up note. Will monitor transition from TF to PO. Follow per protocol.

## 2025-02-05 NOTE — PROGRESS NOTES
HEMATOLOGY AND MEDICAL ONCOLOGY PROGRESS NOTE    Patient name: Ashley Gibbs  Patient : 1955  VISIT # [id number]  MR #[id number]  Room:     SUBJECTIVE:    Extubated, breathing comfortably, smiling.  Family at bedside.  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 Rico Vanegas.     She received cycle #1, day #1 of  carboplatin/-16 on 2025-25.       Palliative emergent XRT of the chest consisted of 1250 cGy in 5 treatment fractions between 2024 and 2024 by Dr. Rico Vanegas at [facility] for the associated SVC due to tumor..     Treatment regimen-curative intent consisted of:  Carboplatin AUC 5 day 1  Etoposide 100 mg/m² days 1-3 q. 21 days  Plan is for X 4 cycles      Ashley was seen 2025, in the ED, at [facility] on the day of her admission, accompanied by 2 granddaughters and her good friend.  The granddaughter reports a fever to 101 degrees, increased difficulty with breathing shortness of breath and wheezing.     CBC Results   CBC            2024    08:49 2025    11:20 2025    08:13   CBC   WBC 4.51  5.35     9.14    RBC 3.85  4.32     3.67    Hemoglobin 11.5  12.9     10.9    Hematocrit 35.2  39.4     33.7    MCV 91.4  91.2     91.8    MCH 29.9  29.9     29.7    MCHC 32.7  32.7     32.3    RDW 13.0  12.9     13.9    Platelets 199  260     220         Details           This result is from an external source.                         UA today, 2025 showed 3+ leukocytes, 30 mg/dL protein WBCs too numerous to count, bacteria 3+, nitrite negative.     Respiratory panel by PCR today, 2025 was positive for influenza A H1-Detected  The patient has been desaturating while in the ED, requiring BiPAP assistance.  Blood and urine cultures were  obtained.  Antibiotic with cefepime is initiated.     Medical oncology consultation requested         PAST TUMOR HISTORY COPIED FROM DR. CARDONA'S 1/13/2025 OFFICE VISIT:   HISTORY OF PRESENT ILLNESS:    Diagnosis  Small cell lung cancer, right lung, Dec 2024  Stage  [id number], limited stage     Treatment Summary  12/13/24 - 12/19/24 Emergent palliative radiation therapy 1250cGy in 5 fractions to right lung  Anticipate chemotherapy with Carboplatin D1, Etoposide D1-3 every 21 days x4 cycles  Anticipate radiation therapy to right lung with Dr Rico Vanegas/[facility] Radiation Oncology     Cancer History  Ms. 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 She has complaints today of fatigue. She states she has stabbing chest pain and mid back pain. She is currently being seen at pain management for pain medication. She presents today on 2L of oxygen dependently.   11/8/24 CT chest low dose ([facility]): 2 cm right upper lobe bilobed nodule with associated mediastinal and right hilar lymphadenopathy, suspicious for malignancy. Minimal adjacent   right upper lobe interlobular septal thickening may be related to venous or lymphatic congestion, however lymphangitic carcinomatosis is an additional consideration. Recommend further evaluation with PET/CT and/or tissue sampling. CT chest with contrast may also be of value to help distinguish the lymph nodes from adjacent vasculature. Minimal tree-in-bud nodularity in the peripheral the right upper lobe, likely mild infectious process.   Additional small pulmonary nodules in the right upper and right middle lobe.   11/8/24 CT abd/pelvis ([facility]): Near complete resolution of the exophytic left upper pole renal lesion of concern likely related to interval rupture/involution. Numerous small bilateral renal cysts are again present.  Mild diffuse wall thickening of the rectum and the distal transverse colon  and proximal descending colon. Correlate for proctocolitis.   12/13/24 PET/CT scan ([facility]): There is a hypermetabolic right upper lobe nodule measures 1.4 cm and has a maximum SUV of 5.5. Right upper lobe suprahilar mass measures at least 8 cm in greatest axial dimension is hypermetabolic with a maximum SUV of 11.4. Mass either invades the middle mediastinum or there is conglomerate hypermetabolic lymphadenopathy. No additional hypermetabolic pulmonary nodule or mass is identified. There is no increased skeletal uptake to suggest osseous metastasis.   12/13/245 CT chest ([facility]): 2.2 cm bilobed right upper lobe pulmonary nodule, likely representing a primary lung neoplasm. 8.9 cm right hilar lung mass with diffuse mediastinal infiltration, likely representing mary metastasis. This mass encases the right mainstem bronchus and encases the right main pulmonary artery, with occlusion of the right upper lobe pulmonary artery. This mass compresses the SVC, which is slitlike. Patient is at risk for SVC syndrome.  12/13/24 Initial evaluation by Dr Rico Vanegas/[facility] Radiation Oncology for superior vena cava syndrome who recommended emergent palliative radiation therapy 1250cGy in 5 fractions.  12/13/24 - 12/19/24 Emergent SBRT 1250cGy in 5 fractions to right lung  12/19/24 CT chest w/o ([facility]): Stable large right hilar mass with mediastinal invasion and confluent lymphadenopathy as detailed above. Findings are suspicious for small cell carcinoma. There is increased groundglass opacities and interlobular septal thickening within the right upper lobe and right middle lobe. Differential includes postobstructive changes and/or leptomeningeal carcinomatosis. Stable 2.2 cm right upper lobe bilobed nodule which was previously hypermetabolic and concerning for metastases. Trace right pleural effusion.   12/23/24 Navigational bronchoscopy/EBUS by Dr Peterson Morales/[facility] Pulmonology  12/23/24 Station 7 lymph node, endobronchial ultrasound-guided  "fine-needle aspiration, smear (1) and cellblock: High-grade neuroendocrine carcinoma consistent with metastatic small cell carcinoma. Fragments of lymphoid tissue and cartilage present.  Station 4R lymph node, endobronchial ultrasound-guided fine-needle aspiration, smear (1) and cellblock: High-grade neuroendocrine carcinoma consistent with metastatic small cell carcinoma. Many background lymphocytes.  Bronchial washings, ThinPrep preparation (1) and cellblock: A few malignant cells present, high-grade neuroendocrine carcinoma consistent with small cell carcinoma. Several macrophages. A few benign squamous epithelial cells and bronchial epithelial cells. AJCC stage: pTX pNX  1/13/2025-essentially, limited stage small cell lung cancer.  Awaiting results of brain MRI.  Recommend concurrent chemoradiation with carboplatin 2 post right to be followed by adjuvant durvalumab.  1/27/2025-initiation cycle #1 of chemotherapy with carboplatin/-16           PHYSICAL EXAM:/69   Pulse 84   Temp 99.6 °F (37.6 °C) (Axillary)   Resp 20   Ht 160 cm (63\")   Wt 71.4 kg (157 lb 6.5 oz)   SpO2 93%   BMI 27.88 kg/m²   CONSTITUTIONAL:sedated, ill-appearing  EYES: Anicteric, EOM intact, pupils equal round   CHEST/LUNGS: CTA, Mechanical ventilator, PEEP 5, FiO2 30%.  CARDIOVASCULAR: RRR, no murmurs  ABDOMEN: soft, active bowel sounds, no HSM  EXTREMITIES: warm,   SKIN: warm,   NEUROLOGIC-sedated      CBC  Results from last 7 days   Lab Units 02/05/25  0457 02/04/25  0232 02/03/25  0324   WBC 10*3/mm3 2.11* 3.36* 5.54   HEMOGLOBIN g/dL 10.5* 9.9* 10.1*   HEMATOCRIT % 32.8* 30.5* 31.4*   PLATELETS 10*3/mm3 139* 162 204         Lab Results   Component Value Date     02/05/2025    K 3.8 02/05/2025    CL 96 (L) 02/05/2025    CO2 30.0 (H) 02/05/2025    BUN 26 (H) 02/05/2025    CREATININE 0.44 (L) 02/05/2025    GLUCOSE 95 02/05/2025    CALCIUM 8.7 02/05/2025    BILITOT 0.3 02/05/2025    ALKPHOS 85 02/05/2025    AST 22 " 02/05/2025    ALT 22 02/05/2025    AGRATIO 1.1 02/05/2025    GLOB 2.9 02/05/2025       Lab Results   Component Value Date    INR 0.98 01/31/2025    INR 1.04 07/24/2019    INR 0.90 03/13/2019    PROTIME 13.5 01/31/2025    PROTIME 13 07/24/2019    PROTIME 11.6 (L) 03/13/2019       ASSESSMENT/PLAN:    #   Limited stage small cell lung cancer    Limited stage small cell lung cancer-status post completion of cycle 1 carboplatin etoposide 1/29/2025.    [place] [facility] [place]  Plans for further XRT with Dr. Rico Vanegas.    #  Acute hypoxic respiratory failure-     Acute hypoxic respiratory failure- r/tCOPD exacerbation with + influenza A.  Chest x-ray showed chronic changes of emphysema and fibrosis.  Continue current supportive care.  Failed SBT-02/01/25  Extubated, planning on returning to the floor    #  Normocytic anemia-     CBC          2/3/2025    03:24 2/4/2025    02:32 2/5/2025    04:57   CBC   WBC 5.54  3.36  2.11    RBC 3.51  3.37  3.63    Hemoglobin 10.1  9.9  10.5    Hematocrit 31.4  30.5  32.8    MCV 89.5  90.5  90.4    MCH 28.8  29.4  28.9    MCHC 32.2  32.5  32.0    RDW 14.0  14.1  13.8    Platelets 204  162  139          Monitor    Plan:  Continue current supportive care  Continue antiviral Tamiflu      Future Appointments    Encounter Information   Provider Department Center   2/17/2025 11:30 AM Christopher Isaac MD [facility]   2/17/2025 11:30 AM INFUSION SCHEDULE, [facility]   2/18/2025 12:00 PM INFUSION SCHEDULE, [facility]   2/19/2025 11:30 AM INFUSION SCHEDULE, [facility]       Ramón Li MD    02/05/25  07:47 CST

## 2025-02-05 NOTE — THERAPY TREATMENT NOTE
Acute Care - Speech Language Pathology   Swallow Treatment Note [facility]     Patient Name: Ashley Gibbs  : 1955  MRN: [id number]  Today's Date: 2025               Admit Date: 2025  Swallow follow up completed. Patient alert and cooperative. No report of nausea today. Patient completed trials of puree, thin liquids, and regular solids. Anterior loss and holding of puree. She then spit the puree out. I asked why she did that and initially she did no answer. She then reported she hated apple sauce. Thin water completed with no overt s/s of aspiration observed. Vocal quality remains clear throughout. Attempted solid trial but patient is edentulous and report she will not be able to eat solid foods without her dentures.     SLP recommends puree diet with thin liquids. Medications whole with puree option for now. SLP will continue to follow to ensure diet toleration and advance once dentures can be brought in by family.   Elena Ron MS CCC-SLP 2025 12:05 CST    Visit Dx:     ICD-10-CM ICD-9-CM   1. Acute respiratory failure with hypoxia and hypercapnia  J96.01 518.81    J96.02    2. Febrile illness  R50.9 780.60   3. Influenza A  J10.1 487.1   4. Acute UTI (urinary tract infection)  N39.0 599.0   5. Altered mental status, unspecified altered mental status type  R41.82 780.97   6. Malignant neoplasm of lung, unspecified laterality, unspecified part of lung  C34.90 162.9   7. Immunosuppressed due to chemotherapy  D84.821 V58.69    T45.1X5A     Z79.899    8. Dysphagia, unspecified type  R13.10 787.20     Patient Active Problem List   Diagnosis    Acute hypoxemic respiratory failure    Age-related osteoporosis without current pathological fracture    Anxiety    Stage 3 severe COPD by GOLD classification    Chronic pain disorder    Depression    Excessive daytime sleepiness    Hemoptysis    Family history of renal cell carcinoma    Hyperlipidemia    Hypothyroidism    Migraines    Mild episode of  recurrent major depressive disorder    Neuropathy    Obstructive sleep apnea    Primary insomnia    Renal mass    Restless legs syndrome    On supplemental oxygen therapy    Family history of CHF (congestive heart failure)    Family history of diabetes mellitus    6 cm exophytic left upper pole renal cyst, likely hemorrhagic    Acute bilateral pyelonephritis    Hypokalemia    E. coli UTI, present on admission (urinary tract infection)    Paroxysmal supraventricular tachycardia    Sepsis due to Escherichia coli UTI, bilateral pyelonephritis, present on admission    Vitamin D deficiency    Lung mass    Former smoker    History of radiation therapy    Panic attacks    Small cell lung cancer    Chronic bronchitis    Acute respiratory failure    Influenza A     Past Medical History:   Diagnosis Date    Anxiety     Arthritis     Asthma     Back pain     COPD (chronic obstructive pulmonary disease)     Dependence on supplemental oxygen     Depression     Disease of thyroid gland     Fibromyalgia, primary     Headache     History of degenerative disc disease     Hypertension     Hypothyroidism     Low back pain     Restless leg     Scoliosis      Past Surgical History:   Procedure Laterality Date    APPENDECTOMY      BREAST BIOPSY Left     BRONCHOSCOPY Right 12/23/2024    Procedure: BRONCHOSCOPY WITH ENDOBRONCHIAL ULTRASOUND;  Surgeon: Peterson Morales MD;  Location: [facility];  Service: Pulmonary;  Laterality: Right;  pre: lung mass  post: lung mass    CHOLECYSTECTOMY      TUBAL ABDOMINAL LIGATION         SLP Recommendation and Plan  SLP Swallowing Diagnosis: mild-moderate, oral dysphagia, suspected pharyngeal dysphagia (02/04/25 1241)  SLP Diet Recommendation: puree, thin liquids (02/05/25 1056)  Recommended Precautions and Strategies: upright posture during/after eating, small bites of food and sips of liquid, general aspiration precautions, fatigue precautions, 1:1 supervision, assist with feeding (02/05/25  1056)  SLP Rec. for Method of Medication Administration: meds whole, meds crushed, with puree, as tolerated (02/05/25 1056)     Monitor for Signs of Aspiration: yes, notify SLP if any concerns (02/05/25 1056)  Recommended Diagnostics: VFSS (MBS) (with any increasing concerns) (02/05/25 1056)  Swallow Criteria for Skilled Therapeutic Interventions Met: demonstrates skilled criteria (02/04/25 1241)  Anticipated Discharge Disposition (SLP): unknown (02/04/25 1241)  Rehab Potential/Prognosis, Swallowing: adequate, monitor progress closely (02/04/25 1241)  Therapy Frequency (Swallow): at least, 2 days per week (02/04/25 1241)  Predicted Duration Therapy Intervention (Days): until discharge (02/04/25 1241)  Oral Care Recommendations: Oral Care BID/PRN, Toothbrush (02/05/25 1056)        Daily Summary of Progress (SLP): progress toward functional goals as expected (02/05/25 1056)               Treatment Assessment (SLP): improved (02/05/25 1056)  Treatment Assessment Comments (SLP): See note (02/05/25 1056)  Plan for Continued Treatment (SLP): goals adjusted to reflect functional improvements demonstrated (02/05/25 1056)         Progress: improving      SWALLOW EVALUATION (Last 72 Hours)       SLP Adult Swallow Evaluation       Row Name 02/05/25 1056 02/04/25 1241                Rehab Evaluation    Document Type therapy note (daily note)  -MG evaluation  -MG       Subjective Information no complaints  -MG complains of;nausea/vomiting  -MG       Patient Observations alert;cooperative;agree to therapy  -MG alert;cooperative;agree to therapy  -MG       Patient/Family/Caregiver Comments/Observations No family present  -MG Daughter present  -MG       Patient Effort adequate  -MG adequate  -MG       Symptoms Noted During/After Treatment none  -MG fatigue  -MG          General Information    Patient Profile Reviewed -- yes  -MG       Pertinent History Of Current Problem -- Presented with flue A and found to be in acute hypoxic  respiratory failure, ETT 1/31-2/4. Current small cell lung cancer s/p palliative XRT in Dec and 1 chemo very recently. SLP consulted due to failed post extubation screening.  -MG       Current Method of Nutrition -- NPO  -MG       Precautions/Limitations, Vision -- WFL;for purposes of eval  -MG       Precautions/Limitations, Hearing -- WFL;for purposes of eval  -MG       Prior Level of Function-Communication -- WFL  -MG       Prior Level of Function-Swallowing -- no diet consistency restrictions  -MG       Plans/Goals Discussed with -- patient and family;agreed upon  -MG       Barriers to Rehab -- medically complex  -MG       Patient's Goals for Discharge -- patient did not state  -MG       Family Goals for Discharge -- family did not state  -MG          Pain    Additional Documentation Pain Scale: FACES Pre/Post-Treatment (Group)  -MG Pain Scale: FACES Pre/Post-Treatment (Group)  -MG          Pain Scale: FACES Pre/Post-Treatment    Pain: FACES Scale, Pretreatment 0-->no hurt  -MG 0-->no hurt  -MG       Posttreatment Pain Rating 0-->no hurt  -MG 0-->no hurt  -MG          Oral Motor Structure and Function    Dentition Assessment -- edentulous  -MG       Secretion Management -- WNL/WFL  -MG       Mucosal Quality -- sticky  -MG       Volitional Swallow -- weak  -MG       Volitional Cough -- weak  -MG          Oral Musculature and Cranial Nerve Assessment    Oral Motor General Assessment -- WFL  -MG          General Eating/Swallowing Observations    Respiratory Support Currently in Use -- nasal cannula  -MG       O2 Liters -- 3L  -MG       Eating/Swallowing Skills -- fed by SLP  -MG       Positioning During Eating -- upright in bed  -MG       Utensils Used -- spoon  -MG       Consistencies Trialed -- ice chips;pureed  -MG          Clinical Swallow Eval    Oral Prep Phase -- impaired  -MG       Oral Transit -- WFL  -MG       Oral Residue -- impaired  -MG       Pharyngeal Phase -- suspected pharyngeal impairment  -MG        Esophageal Phase -- unremarkable  -MG       Clinical Swallow Evaluation Summary -- See note  -MG          Oral Prep Concerns    Oral Prep Concerns -- anterior loss;incomplete or weak lip closure around spoon  -MG       Incomplete or Weak Lip Closure Around Spoon -- pudding;thin  -MG       Anterior Loss -- pudding  -MG          Oral Residue Concerns    Oral Residue Concerns -- diffuse residue throughout oral cavity  -MG       Diffuse Residue Throughout Oral Cavity -- pudding  -MG          Pharyngeal Phase Concerns    Pharyngeal Phase Concerns -- cough;multiple swallows;wet vocal quality  -MG       Wet Vocal Quality -- pudding  -MG       Multiple Swallows -- pudding  -MG       Cough -- pudding  -MG          SLP Evaluation Clinical Impression    SLP Swallowing Diagnosis -- mild-moderate;oral dysphagia;suspected pharyngeal dysphagia  -MG       Functional Impact -- risk of aspiration/pneumonia;risk of malnutrition;risk of dehydration  -MG       Rehab Potential/Prognosis, Swallowing -- adequate, monitor progress closely  -MG       Swallow Criteria for Skilled Therapeutic Interventions Met -- demonstrates skilled criteria  -MG          SLP Treatment Clinical Impressions    Treatment Assessment (SLP) improved  -MG --       Treatment Assessment Comments (SLP) See note  -MG --       Daily Summary of Progress (SLP) progress toward functional goals as expected  -MG --       Plan for Continued Treatment (SLP) goals adjusted to reflect functional improvements demonstrated  -MG --       Care Plan Review evaluation/treatment results reviewed;care plan/treatment goals reviewed;risks/benefits reviewed;current/potential barriers reviewed;patient/other agree to care plan  -MG --       Care Plan Review, Other Participant(s) caregiver  JADIEL Valadez  -MG --          Recommendations    Therapy Frequency (Swallow) -- at least;2 days per week  -MG       Predicted Duration Therapy Intervention (Days) -- until discharge  -MG       SLP Diet  Recommendation puree;thin liquids  -MG NPO;ice chips between meals after oral care, with supervision  -MG       Recommended Diagnostics VFSS ([facility])  with any increasing concerns  -MG reassess via clinical swallow evaluation  -MG       Recommended Precautions and Strategies upright posture during/after eating;small bites of food and sips of liquid;general aspiration precautions;fatigue precautions;1:1 supervision;assist with feeding  -MG upright posture during/after eating;1:1 supervision  -MG       Oral Care Recommendations Oral Care BID/PRN;Toothbrush  -MG Oral Care BID/PRN;Suction toothbrush  -MG       SLP Rec. for Method of Medication Administration meds whole;meds crushed;with puree;as tolerated  -MG meds via alternate route  -MG       Monitor for Signs of Aspiration yes;notify SLP if any concerns  -MG yes;notify SLP if any concerns  -MG       Anticipated Discharge Disposition (SLP) -- unknown  -MG          Swallow Goals (SLP)    Swallow LTGs Swallow Long Term Goal (free text)  -MG Swallow Long Term Goal (free text)  -MG       Swallow STGs diet tolerance goal selection (SLP)  -MG diet tolerance goal selection (SLP)  -MG       Diet Tolerance Goal Selection (SLP) Patient will tolerate trials of  -MG Patient will tolerate trials of  -MG          (LTG) Swallow    (LTG) Swallow Patient will tolerate least restrictive diet without overt s/s of aspiration.  -MG Patient will tolerate least restrictive diet without overt s/s of aspiration.  -MG       [place] (Swallow Long Term Goal) independently (over 90% accuracy)  -MG independently (over 90% accuracy)  -MG       Time Frame (Swallow Long Term Goal) by discharge  -MG by discharge  -MG       Barriers (Swallow Long Term Goal) medically complex  -MG medically complex  -MG       Progress/Outcomes (Swallow Long Term Goal) continuing progress toward goal  -MG new goal  -MG          (STG) Patient will tolerate trials of    Consistencies Trialed (Tolerate trials) soft to  chew (chopped) textures;pureed textures;honey/ moderately thick liquids;nectar/ mildly thick liquids;thin liquids  -MG soft to chew (chopped) textures;pureed textures;honey/ moderately thick liquids;nectar/ mildly thick liquids;thin liquids  -MG       Desired Outcome (Tolerate trials) without signs/symptoms of aspiration;without signs of distress;with adequate oral prep/transit/clearance  -MG without signs/symptoms of aspiration;without signs of distress;with adequate oral prep/transit/clearance  -MG       [place] (Tolerate trials) independently (over 90% accuracy)  -MG independently (over 90% accuracy)  -MG       Time Frame (Tolerate trials) by discharge  -MG by discharge  -MG       Progress/Outcomes (Tolerate trials) continuing progress toward goal  -MG new goal  -MG                 User Key  (r) = Recorded By, (t) = Taken By, (c) = Cosigned By      Initials Name Effective Dates    MG Elena Ron MS [facility]-SLP 07/11/23 -                     EDUCATION  The patient has been educated in the following areas:   Dysphagia (Swallowing Impairment) Oral Care/Hydration Modified Diet Instruction.        SLP GOALS       Row Name 02/05/25 1056 02/04/25 1241          (LTG) Swallow    (LTG) Swallow Patient will tolerate least restrictive diet without overt s/s of aspiration.  -MG Patient will tolerate least restrictive diet without overt s/s of aspiration.  -MG     [place] (Swallow Long Term Goal) independently (over 90% accuracy)  -MG independently (over 90% accuracy)  -MG     Time Frame (Swallow Long Term Goal) by discharge  -MG by discharge  -MG     Barriers (Swallow Long Term Goal) medically complex  -MG medically complex  -MG     Progress/Outcomes (Swallow Long Term Goal) continuing progress toward goal  -MG new goal  -MG        (STG) Patient will tolerate trials of    Consistencies Trialed (Tolerate trials) soft to chew (chopped) textures;pureed textures;honey/ moderately thick liquids;nectar/ mildly thick  liquids;thin liquids  -MG soft to chew (chopped) textures;pureed textures;honey/ moderately thick liquids;nectar/ mildly thick liquids;thin liquids  -MG     Desired Outcome (Tolerate trials) without signs/symptoms of aspiration;without signs of distress;with adequate oral prep/transit/clearance  -MG without signs/symptoms of aspiration;without signs of distress;with adequate oral prep/transit/clearance  -MG     [place] (Tolerate trials) independently (over 90% accuracy)  -MG independently (over 90% accuracy)  -MG     Time Frame (Tolerate trials) by discharge  -MG by discharge  -MG     Progress/Outcomes (Tolerate trials) continuing progress toward goal  -MG new goal  -MG               User Key  (r) = Recorded By, (t) = Taken By, (c) = Cosigned By      Initials Name Provider Type    Elena Vee MS CCC-SUDHAKAR Speech and Language Pathologist                         Time Calculation:    Time Calculation- SLP       Row Name 02/05/25 1204             Time Calculation- SLP    SLP Start Time 1056  -MG      SLP Stop Time 1120  -MG      SLP Time Calculation (min) 24 min  -MG      SLP Received On 02/05/25  -MG         Untimed Charges    18243-MT Treatment Swallow Minutes 24  -MG         Total Minutes    Untimed Charges Total Minutes 24  -MG       Total Minutes 24  -MG                User Key  (r) = Recorded By, (t) = Taken By, (c) = Cosigned By      Initials Name Provider Type    Elena Vee MS CCC-SLP Speech and Language Pathologist                    Therapy Charges for Today       Code Description Service Date Service Provider Modifiers Qty    [id number]  ST EVAL ORAL PHARYNG SWALLOW 4 2/4/2025 Elena Ron MS CCC-SLP GN 1    [id number] HC ST TREATMENT SWALLOW 2 2/5/2025 Elena Ron MS CCC-SLP GN 1                 MS MELVI Sánchez  2/5/2025

## 2025-02-05 NOTE — THERAPY EVALUATION
Patient Name: Ashley Gibbs  : 1955    MRN: [id number]                              Today's Date: 2025       Admit Date: 2025    Visit Dx:     ICD-10-CM ICD-9-CM   1. Acute respiratory failure with hypoxia and hypercapnia  J96.01 518.81    J96.02    2. Febrile illness  R50.9 780.60   3. Influenza A  J10.1 487.1   4. Acute UTI (urinary tract infection)  N39.0 599.0   5. Altered mental status, unspecified altered mental status type  R41.82 780.97   6. Malignant neoplasm of lung, unspecified laterality, unspecified part of lung  C34.90 162.9   7. Immunosuppressed due to chemotherapy  D84.821 V58.69    T45.1X5A     Z79.899    8. Dysphagia, unspecified type  R13.10 787.20   9. Impaired mobility [Z74.09]  Z74.09 799.89     Patient Active Problem List   Diagnosis    Acute hypoxemic respiratory failure    Age-related osteoporosis without current pathological fracture    Anxiety    Stage 3 severe COPD by GOLD classification    Chronic pain disorder    Depression    Excessive daytime sleepiness    Hemoptysis    Family history of renal cell carcinoma    Hyperlipidemia    Hypothyroidism    Migraines    Mild episode of recurrent major depressive disorder    Neuropathy    Obstructive sleep apnea    Primary insomnia    Renal mass    Restless legs syndrome    On supplemental oxygen therapy    Family history of CHF (congestive heart failure)    Family history of diabetes mellitus    6 cm exophytic left upper pole renal cyst, likely hemorrhagic    Acute bilateral pyelonephritis    Hypokalemia    E. coli UTI, present on admission (urinary tract infection)    Paroxysmal supraventricular tachycardia    Sepsis due to Escherichia coli UTI, bilateral pyelonephritis, present on admission    Vitamin D deficiency    Lung mass    Former smoker    History of radiation therapy    Panic attacks    Small cell lung cancer    Chronic bronchitis    Acute respiratory failure    Influenza A     Past Medical History:   Diagnosis Date     Anxiety     Arthritis     Asthma     Back pain     COPD (chronic obstructive pulmonary disease)     Dependence on supplemental oxygen     Depression     Disease of thyroid gland     Fibromyalgia, primary     Headache     History of degenerative disc disease     Hypertension     Hypothyroidism     Low back pain     Restless leg     Scoliosis      Past Surgical History:   Procedure Laterality Date    APPENDECTOMY      BREAST BIOPSY Left     BRONCHOSCOPY Right 12/23/2024    Procedure: BRONCHOSCOPY WITH ENDOBRONCHIAL ULTRASOUND;  Surgeon: Peterson Morales MD;  Location: [facility] OR;  Service: Pulmonary;  Laterality: Right;  pre: lung mass  post: lung mass    CHOLECYSTECTOMY      TUBAL ABDOMINAL LIGATION        General Information       Row Name 02/05/25 1115          Physical Therapy Time and Intention    Document Type evaluation  ED with Flu A and acute hypoxic and hypercapnic resp. failure. Intubated 1/31 - Ext 2/4.  -TONNY     Mode of Treatment physical therapy  -TONNY       Row Name 02/05/25 1115          General Information    Patient Profile Reviewed yes  -TONNY     Prior Level of Function independent:;all household mobility;ADL's  per pt  -TONNY     Existing Precautions/Restrictions fall;oxygen therapy device and L/min  -TONNY     Barriers to Rehab medically complex;cognitive status  -TONNY       Row Name 02/05/25 1115          Living Environment    People in Home grandchild(melany)  -TONNY       Row Name 02/05/25 1115          Cognition    Orientation Status (Cognition) oriented to;person  confused  -TONNY       Row Name 02/05/25 1115          Safety Issues/Impairments Affecting Functional Mobility    Safety Issues Affecting Function (Mobility) friction/shear risk;insight into deficits/self-awareness;judgment;problem-solving;safety precaution awareness;safety precautions follow-through/compliance;awareness of need for assistance  -TONNY     Impairments Affecting Function (Mobility) strength;balance;cognition;endurance/activity  tolerance;postural/trunk control;shortness of breath  -TONNY               User Key  (r) = Recorded By, (t) = Taken By, (c) = Cosigned By      Initials Name Provider Type    Mendel Velasco PT DPT Physical Therapist                   Mobility       Row Name 02/05/25 1115          Bed Mobility    Bed Mobility supine-sit;sit-supine;rolling right;rolling left  -TONNY     Rolling Left Seanor (Bed Mobility) moderate assist (50% patient effort);maximum assist (25% patient effort)  -TONNY     Rolling Right Seanor (Bed Mobility) moderate assist (50% patient effort);maximum assist (25% patient effort)  -TONNY     Supine-Sit Seanor (Bed Mobility) maximum assist (25% patient effort);2 person assist  -TONNY     Sit-Supine Seanor (Bed Mobility) maximum assist (25% patient effort);2 person assist  -TONNY     Assistive Device (Bed Mobility) head of bed elevated;bed rails  -TONNY     Comment, (Bed Mobility) unable to support self at sitting position at EOB. Very slow to respond to commands and tactile cues occasionally needed to complete task.  -TONNY               User Key  (r) = Recorded By, (t) = Taken By, (c) = Cosigned By      Initials Name Provider Type    Mendel Velasco, PT DPT Physical Therapist                   Obj/Interventions       Row Name 02/05/25 1115          Range of Motion Comprehensive    Comment, General Range of Motion B LE AROM impaired 80-90% (weakness)  -TONNY       Row Name 02/05/25 1115          Strength Comprehensive (MMT)    Comment, General Manual Muscle Testing (MMT) Assessment B LE grossly 2-/5  -TONNY       Row Name 02/05/25 1115          Balance    Balance Assessment sitting dynamic balance  -TONNY     Dynamic Sitting Balance moderate assist  -TONNY     Position, Sitting Balance supported;sitting edge of bed  -TONNY       Row Name 02/05/25 1115          Sensory Assessment (Somatosensory)    Sensory Assessment (Somatosensory) LE sensation intact  -TONNY               User Key  (r) = Recorded By, (t) =  Taken By, (c) = Cosigned By      Initials Name Provider Type    Mendel Velasco, PT DPT Physical Therapist                   Goals/Plan       Row Name 02/05/25 1115          Bed Mobility Goal 1 (PT)    Activity/Assistive Device (Bed Mobility Goal 1, PT) sit to supine/supine to sit  -TONNY     Jennings Level/Cues Needed (Bed Mobility Goal 1, PT) minimum assist (75% or more patient effort)  -TONNY     Time Frame (Bed Mobility Goal 1, PT) long term goal (LTG);10 days  -TONNY     Progress/Outcomes (Bed Mobility Goal 1, PT) new goal  -TONNY       Row Name 02/05/25 1115          Transfer Goal 1 (PT)    Activity/Assistive Device (Transfer Goal 1, PT) sit-to-stand/stand-to-sit;bed-to-chair/chair-to-bed  -TONNY     Jennings Level/Cues Needed (Transfer Goal 1, PT) minimum assist (75% or more patient effort)  -TONNY     Time Frame (Transfer Goal 1, PT) long term goal (LTG);10 days  -TONNY     Progress/Outcome (Transfer Goal 1, PT) new goal  -TONNY       Row Name 02/05/25 1115          Gait Training Goal 1 (PT)    Activity/Assistive Device (Gait Training Goal 1, PT) gait (walking locomotion);assistive device use;decrease fall risk;improve balance and speed;increase endurance/gait distance  -TONNY     Jennings Level (Gait Training Goal 1, PT) minimum assist (75% or more patient effort)  -TONNY     Distance (Gait Training Goal 1, PT) 50 ft  -TONNY     Time Frame (Gait Training Goal 1, PT) long term goal (LTG);10 days  -TONNY     Progress/Outcome (Gait Training Goal 1, PT) new goal  -TONNY       Row Name 02/05/25 1115          Therapy Assessment/Plan (PT)    Planned Therapy Interventions (PT) bed mobility training;transfer training;gait training;balance training;home exercise program;patient/family education;postural re-education;strengthening  -TONNY               User Key  (r) = Recorded By, (t) = Taken By, (c) = Cosigned By      Initials Name Provider Type    Mendel Velasco, PT DPT Physical Therapist                   Clinical Impression        Row Name 02/05/25 1115          Pain    Pretreatment Pain Rating 0/10 - no pain  -TONNY       Row Name 02/05/25 1115          Plan of Care Review    Plan of Care Reviewed With patient  -TONNY     Outcome Evaluation PT heidy complete. She is alert and oriented to self only. She reports living with her niece/granddaughter where she was independent with her mobility and ADL's. She demos confusion this session and seems to be a poor historian. She demos significnat weakness in B LE. Grossly demos 2-/5 strength in B LE. Needs max assist x 2 for supine <> sit bed mobility. Needs mod assist for sitting balance at the EOB. She was then assisted back to bed where she required mod/max assist to roll left/right to clean from BM. She is very weak, confused and with impaired balance. Remains a fall risk. PT will cont with mobility training, strengthening and balance training. Ms. Gibbs would currently need SNF placement for rehab.  -TONNY       Row Name 02/05/25 1115          Therapy Assessment/Plan (PT)    Patient/Family Therapy Goals Statement (PT) go home  -TONNY     Rehab Potential (PT) fair  -TONNY     Criteria for Skilled Interventions Met (PT) yes;meets criteria;skilled treatment is necessary  -TONNY     Therapy Frequency (PT) 2 times/day  -TONNY     Predicted Duration of Therapy Intervention (PT) until d/c  -TONNY       Row Name 02/05/25 1115          Positioning and Restraints    Pre-Treatment Position in bed  -TONNY     Post Treatment Position bed  -TONNY     In Bed notified nsg;fowlers;call light within reach;encouraged to call for assist;exit alarm on;patient within staff view;with family/caregiver;legs elevated  -TONNY               User Key  (r) = Recorded By, (t) = Taken By, (c) = Cosigned By      Initials Name Provider Type    Mendel Velasco, PT DPT Physical Therapist                   Outcome Measures       Row Name 02/05/25 1115 02/05/25 0755       How much help from another person do you currently need...    Turning from your back to  your side while in flat bed without using bedrails? 2  -TONNY 2  -SP    Moving from lying on back to sitting on the side of a flat bed without bedrails? 2  -TONNY 2  -SP    Moving to and from a bed to a chair (including a wheelchair)? 1  -TONNY 1  -SP    Standing up from a chair using your arms (e.g., wheelchair, bedside chair)? 1  -TONNY 1  -SP    Climbing 3-5 steps with a railing? 1  -TONNY 1  -SP    To walk in hospital room? 1  -TONNY 1  -SP    AM-PAC 6 Clicks Score (PT) 8  -TONNY 8  -SP    Highest Level of Mobility Goal 3 --> Sit at edge of bed  -TONNY 3 --> Sit at edge of bed  -SP      Row Name 02/05/25 1128 02/05/25 1115       Functional Assessment    Outcome Measure Options AM-PAC 6 Clicks Daily Activity (OT)  -ISHAN AM-PAC 6 Clicks Basic Mobility (PT)  -TONNY              User Key  (r) = Recorded By, (t) = Taken By, (c) = Cosigned By      Initials Name Provider Type    Mendel Velasco, PT DPT Physical Therapist    Allyson Lee, RN Registered Nurse    Geovanna Magana, OTR/L, CSRS Occupational Therapist                                 Physical Therapy Education       Title: PT OT SLP Therapies (In Progress)       Topic: Physical Therapy (In Progress)       Point: Mobility training (In Progress)       Learning Progress Summary            Patient Acceptance, E, NR by TONNY at 2/5/2025 1115    Comment: benefits of activity, progression of PT                      Point: Home exercise program (Not Started)       Learner Progress:  Not documented in this visit.              Point: Body mechanics (Not Started)       Learner Progress:  Not documented in this visit.              Point: Precautions (Not Started)       Learner Progress:  Not documented in this visit.                              User Key       Initials Effective Dates Name Provider Type Discipline    TONNY 02/03/23 -  Mendel Puentes, PT DPT Physical Therapist PT                  PT Recommendation and Plan  Planned Therapy Interventions (PT): bed mobility training,  transfer training, gait training, balance training, home exercise program, patient/family education, postural re-education, strengthening  Outcome Evaluation: PT eval complete. She is alert and oriented to self only. She reports living with her niece/granddaughter where she was independent with her mobility and ADL's. She demos confusion this session and seems to be a poor historian. She demos significnat weakness in B LE. Grossly demos 2-/5 strength in B LE. Needs max assist x 2 for supine <> sit bed mobility. Needs mod assist for sitting balance at the EOB. She was then assisted back to bed where she required mod/max assist to roll left/right to clean from BM. She is very weak, confused and with impaired balance. Remains a fall risk. PT will cont with mobility training, strengthening and balance training. Ms. Gibbs would currently need SNF placement for rehab.     Time Calculation:         PT Charges       Row Name 02/05/25 1355             Time Calculation    Start Time 1115  -TONNY      Stop Time 1155  -TONNY      Time Calculation (min) 40 min  -TONNY      PT Received On 02/05/25  -TONNY      PT Goal Re-Cert Due Date 02/15/25  -TONNY                User Key  (r) = Recorded By, (t) = Taken By, (c) = Cosigned By      Initials Name Provider Type    Mendel Velasco, PT DPT Physical Therapist                  Therapy Charges for Today       Code Description Service Date Service Provider Modifiers Qty    [id number]  PT SUGEY MOD COMPLEXITY 3 2/5/2025 Mendel Puentes PT DPT GP 1            PT G-Codes  Outcome Measure Options: AM-PAC 6 Clicks Daily Activity (OT)  AM-PAC 6 Clicks Score (PT): 8  AM-PAC 6 Clicks Score (OT): 10  PT Discharge Summary  Anticipated Discharge Disposition (PT): skilled nursing facility    Mendel Puentes PT DPT  2/5/2025

## 2025-02-05 NOTE — CASE MANAGEMENT/SOCIAL WORK
Continued Stay Note   [facility]     Patient Name: Ashley Gibbs  MRN: [id number]  Today's Date: 2/5/2025    Admit Date: 1/31/2025    Plan: Home vs other   Discharge Plan       Row Name 02/05/25 1131       Plan    Plan Home vs other    Plan Comments Patient extubated and has orders to move to medical floor.  Patient resides at home with her granddaughter.  Patient has home O2 supplied by [facility].  Patient planning to return home upon discharge.  PT/OT evaluations pending.  SW following for needs.                   Discharge Codes    No documentation.                       ZARIA Gonzalez

## 2025-02-06 ENCOUNTER — APPOINTMENT (OUTPATIENT)
Dept: GENERAL RADIOLOGY | Facility: HOSPITAL | Age: 70
DRG: 208 | End: 2025-02-06
Payer: MEDICARE

## 2025-02-06 LAB
ALBUMIN SERPL-MCNC: 3.2 G/DL (ref 3.5–5.2)
ALBUMIN/GLOB SERPL: 1 G/DL
ALP SERPL-CCNC: 86 U/L (ref 39–117)
ALT SERPL W P-5'-P-CCNC: 17 U/L (ref 1–33)
ANION GAP SERPL CALCULATED.3IONS-SCNC: 12 MMOL/L (ref 5–15)
AST SERPL-CCNC: 17 U/L (ref 1–32)
BASOPHILS # BLD AUTO: 0 10*3/MM3 (ref 0–0.2)
BASOPHILS NFR BLD AUTO: 0 % (ref 0–1.5)
BILIRUB SERPL-MCNC: 0.4 MG/DL (ref 0–1.2)
BUN SERPL-MCNC: 25 MG/DL (ref 8–23)
BUN/CREAT SERPL: 48.1 (ref 7–25)
CALCIUM SPEC-SCNC: 8.9 MG/DL (ref 8.6–10.5)
CHLORIDE SERPL-SCNC: 93 MMOL/L (ref 98–107)
CO2 SERPL-SCNC: 33 MMOL/L (ref 22–29)
CREAT SERPL-MCNC: 0.52 MG/DL (ref 0.57–1)
DEPRECATED RDW RBC AUTO: 42.3 FL (ref 37–54)
EGFRCR SERPLBLD CKD-EPI 2021: 102 ML/MIN/1.73
EOSINOPHIL # BLD AUTO: 0 10*3/MM3 (ref 0–0.4)
EOSINOPHIL NFR BLD AUTO: 0 % (ref 0.3–6.2)
ERYTHROCYTE [DISTWIDTH] IN BLOOD BY AUTOMATED COUNT: 13.2 % (ref 12.3–15.4)
GLOBULIN UR ELPH-MCNC: 3.3 GM/DL
GLUCOSE SERPL-MCNC: 103 MG/DL (ref 65–99)
HCT VFR BLD AUTO: 33.9 % (ref 34–46.6)
HGB BLD-MCNC: 11.2 G/DL (ref 12–15.9)
IMM GRANULOCYTES # BLD AUTO: 0.01 10*3/MM3 (ref 0–0.05)
IMM GRANULOCYTES NFR BLD AUTO: 0.8 % (ref 0–0.5)
LYMPHOCYTES # BLD AUTO: 0.15 10*3/MM3 (ref 0.7–3.1)
LYMPHOCYTES NFR BLD AUTO: 12.7 % (ref 19.6–45.3)
MAGNESIUM SERPL-MCNC: 1.8 MG/DL (ref 1.6–2.4)
MCH RBC QN AUTO: 28.8 PG (ref 26.6–33)
MCHC RBC AUTO-ENTMCNC: 33 G/DL (ref 31.5–35.7)
MCV RBC AUTO: 87.1 FL (ref 79–97)
MONOCYTES # BLD AUTO: 0.05 10*3/MM3 (ref 0.1–0.9)
MONOCYTES NFR BLD AUTO: 4.2 % (ref 5–12)
NEUTROPHILS NFR BLD AUTO: 0.97 10*3/MM3 (ref 1.7–7)
NEUTROPHILS NFR BLD AUTO: 82.3 % (ref 42.7–76)
PHOSPHATE SERPL-MCNC: 3.4 MG/DL (ref 2.5–4.5)
PLATELET # BLD AUTO: 141 10*3/MM3 (ref 140–450)
PMV BLD AUTO: 10.2 FL (ref 6–12)
POTASSIUM SERPL-SCNC: 3.5 MMOL/L (ref 3.5–5.2)
PROT SERPL-MCNC: 6.5 G/DL (ref 6–8.5)
RBC # BLD AUTO: 3.89 10*6/MM3 (ref 3.77–5.28)
SODIUM SERPL-SCNC: 138 MMOL/L (ref 136–145)
WBC NRBC COR # BLD AUTO: 1.18 10*3/MM3 (ref 3.4–10.8)

## 2025-02-06 PROCEDURE — 94799 UNLISTED PULMONARY SVC/PX: CPT

## 2025-02-06 PROCEDURE — 92526 ORAL FUNCTION THERAPY: CPT | Performed by: SPEECH-LANGUAGE PATHOLOGIST

## 2025-02-06 PROCEDURE — 97110 THERAPEUTIC EXERCISES: CPT | Performed by: OCCUPATIONAL THERAPIST

## 2025-02-06 PROCEDURE — 25010000002 FILGRASTIM PER 480 MCG: Performed by: INTERNAL MEDICINE

## 2025-02-06 PROCEDURE — 97530 THERAPEUTIC ACTIVITIES: CPT

## 2025-02-06 PROCEDURE — 83735 ASSAY OF MAGNESIUM: CPT | Performed by: PHYSICIAN ASSISTANT

## 2025-02-06 PROCEDURE — 80053 COMPREHEN METABOLIC PANEL: CPT | Performed by: PHYSICIAN ASSISTANT

## 2025-02-06 PROCEDURE — 85025 COMPLETE CBC W/AUTO DIFF WBC: CPT | Performed by: INTERNAL MEDICINE

## 2025-02-06 PROCEDURE — 94664 DEMO&/EVAL PT USE INHALER: CPT

## 2025-02-06 PROCEDURE — 94760 N-INVAS EAR/PLS OXIMETRY 1: CPT

## 2025-02-06 PROCEDURE — 25010000002 HEPARIN (PORCINE) PER 1000 UNITS: Performed by: PHYSICIAN ASSISTANT

## 2025-02-06 PROCEDURE — 25010000002 CEFTRIAXONE PER 250 MG: Performed by: PHYSICIAN ASSISTANT

## 2025-02-06 PROCEDURE — 97535 SELF CARE MNGMENT TRAINING: CPT | Performed by: OCCUPATIONAL THERAPIST

## 2025-02-06 PROCEDURE — 25010000002 METHYLPREDNISOLONE PER 40 MG: Performed by: PHYSICIAN ASSISTANT

## 2025-02-06 PROCEDURE — 84100 ASSAY OF PHOSPHORUS: CPT | Performed by: PHYSICIAN ASSISTANT

## 2025-02-06 RX ORDER — IPRATROPIUM BROMIDE AND ALBUTEROL SULFATE 2.5; .5 MG/3ML; MG/3ML
3 SOLUTION RESPIRATORY (INHALATION)
Status: DISCONTINUED | OUTPATIENT
Start: 2025-02-07 | End: 2025-02-10 | Stop reason: HOSPADM

## 2025-02-06 RX ADMIN — IPRATROPIUM BROMIDE AND ALBUTEROL SULFATE 3 ML: 2.5; .5 SOLUTION RESPIRATORY (INHALATION) at 18:37

## 2025-02-06 RX ADMIN — ROPINIROLE HYDROCHLORIDE 0.25 MG: 0.25 TABLET, FILM COATED ORAL at 20:45

## 2025-02-06 RX ADMIN — Medication 10 ML: at 20:46

## 2025-02-06 RX ADMIN — HEPARIN SODIUM 5000 UNITS: 5000 INJECTION, SOLUTION INTRAVENOUS; SUBCUTANEOUS at 06:39

## 2025-02-06 RX ADMIN — SODIUM CHLORIDE 1000 MG: 900 INJECTION INTRAVENOUS at 20:45

## 2025-02-06 RX ADMIN — ARFORMOTEROL TARTRATE 15 MCG: 15 SOLUTION RESPIRATORY (INHALATION) at 06:35

## 2025-02-06 RX ADMIN — CITALOPRAM HYDROBROMIDE 40 MG: 20 TABLET ORAL at 08:32

## 2025-02-06 RX ADMIN — FILGRASTIM 480 MCG: 480 INJECTION, SOLUTION INTRAVENOUS; SUBCUTANEOUS at 11:54

## 2025-02-06 RX ADMIN — METHYLPREDNISOLONE SODIUM SUCCINATE 20 MG: 40 INJECTION, POWDER, FOR SOLUTION INTRAMUSCULAR; INTRAVENOUS at 08:26

## 2025-02-06 RX ADMIN — PANTOPRAZOLE SODIUM 40 MG: 40 INJECTION, POWDER, FOR SOLUTION INTRAVENOUS at 06:39

## 2025-02-06 RX ADMIN — IPRATROPIUM BROMIDE AND ALBUTEROL SULFATE 3 ML: 2.5; .5 SOLUTION RESPIRATORY (INHALATION) at 06:36

## 2025-02-06 RX ADMIN — OXYCODONE HYDROCHLORIDE AND ACETAMINOPHEN 1 TABLET: 7.5; 325 TABLET ORAL at 22:42

## 2025-02-06 RX ADMIN — HEPARIN SODIUM 5000 UNITS: 5000 INJECTION, SOLUTION INTRAVENOUS; SUBCUTANEOUS at 21:51

## 2025-02-06 RX ADMIN — IPRATROPIUM BROMIDE AND ALBUTEROL SULFATE 3 ML: 2.5; .5 SOLUTION RESPIRATORY (INHALATION) at 02:48

## 2025-02-06 RX ADMIN — METHYLPREDNISOLONE SODIUM SUCCINATE 20 MG: 40 INJECTION, POWDER, FOR SOLUTION INTRAMUSCULAR; INTRAVENOUS at 20:45

## 2025-02-06 RX ADMIN — HEPARIN SODIUM 5000 UNITS: 5000 INJECTION, SOLUTION INTRAVENOUS; SUBCUTANEOUS at 14:50

## 2025-02-06 RX ADMIN — IPRATROPIUM BROMIDE AND ALBUTEROL SULFATE 3 ML: 2.5; .5 SOLUTION RESPIRATORY (INHALATION) at 15:22

## 2025-02-06 RX ADMIN — ARFORMOTEROL TARTRATE 15 MCG: 15 SOLUTION RESPIRATORY (INHALATION) at 18:45

## 2025-02-06 RX ADMIN — BUDESONIDE 0.5 MG: 0.5 INHALANT RESPIRATORY (INHALATION) at 18:37

## 2025-02-06 RX ADMIN — BUDESONIDE 0.5 MG: 0.5 INHALANT RESPIRATORY (INHALATION) at 06:36

## 2025-02-06 RX ADMIN — Medication 10 ML: at 08:32

## 2025-02-06 RX ADMIN — MIRTAZAPINE 15 MG: 15 TABLET, FILM COATED ORAL at 20:45

## 2025-02-06 RX ADMIN — IPRATROPIUM BROMIDE AND ALBUTEROL SULFATE 3 ML: 2.5; .5 SOLUTION RESPIRATORY (INHALATION) at 10:37

## 2025-02-06 NOTE — NURSING NOTE
Pt transferred to the medical floor on 6L O2. No c/o pain or s/s resp distress. Granddaughter at [facility] transported pt's belongings. Report given to Rosibel DUVALL

## 2025-02-06 NOTE — PLAN OF CARE
Goal Outcome Evaluation:   PT A&Ox4. NSR 80s-90s, BP stable. Currently on 6L n/c. UOP adequate, BM on this shift. No c/o pain. Repeat SLP eval ordered d/t pt being unable to swallow medications. Attempted oral meds with water and popsicle, pt refused apple sauce and pudding. ALEX Ruggiero made aware, held PO meds over night. WBC 1.1 this AM, provider notified via telephone, no new orders. Safety maintained.

## 2025-02-06 NOTE — THERAPY TREATMENT NOTE
Acute Care - Speech Language Pathology   Swallow Treatment Note [facility]     Patient Name: Ashley Gibbs  : 1955  MRN: [id number]  Today's Date: 2025               Admit Date: 2025    Swallow follow up completed. New order was placed by night RN due to concern with trouble swallowing overnight. From reading care plan and speaking with night RN it appears it was more related to cognitive status and want to rather than a choking event or concern. This AM she is adamant that she is not eating anything other than jello. She did attempt a bite of puree fruit but required liquid wash to move through oral transit. Instead of swallowing she had anterior loss out the right side of her mouth. She does have oral weakness and dentition is not present; however, some of the difficulty is related to dislike of the food items or not wanting to complete the swallow. She completed jello trials with prolonged oral manipulation. Medications given with jello again with prolonged manipulation and need for thin liquid wash. No overt s/s of aspiration observed.     Solids were not attempted due to prolonged oral phase with jello alone, there is no way she would handle a soft diet at this time with missing dentition, oral weakness, and reduced cognitive status.     I suspect patient will likely have poor nutritional intake due to current status.     Continue puree diet with thin liquids. Would benefit from preferences being taken to attempt increase in intake. She tells me she like jello, ice cream, and chicken noodle soup.     SLP will continue to follow. Encourage family to bring in dentures if available.       Elena Ron, MS CCC-SLP 2025 08:57 CST          Visit Dx:     ICD-10-CM ICD-9-CM   1. Acute respiratory failure with hypoxia and hypercapnia  J96.01 518.81    J96.02    2. Febrile illness  R50.9 780.60   3. Influenza A  J10.1 487.1   4. Acute UTI (urinary tract infection)  N39.0 599.0   5. Altered mental  status, unspecified altered mental status type  R41.82 780.97   6. Malignant neoplasm of lung, unspecified laterality, unspecified part of lung  C34.90 162.9   7. Immunosuppressed due to chemotherapy  D84.821 V58.69    T45.1X5A     Z79.899    8. Dysphagia, unspecified type  R13.10 787.20   9. Impaired mobility [Z74.09]  Z74.09 799.89     Patient Active Problem List   Diagnosis    Acute hypoxemic respiratory failure    Age-related osteoporosis without current pathological fracture    Anxiety    Stage 3 severe COPD by GOLD classification    Chronic pain disorder    Depression    Excessive daytime sleepiness    Hemoptysis    Family history of renal cell carcinoma    Hyperlipidemia    Hypothyroidism    Migraines    Mild episode of recurrent major depressive disorder    Neuropathy    Obstructive sleep apnea    Primary insomnia    Renal mass    Restless legs syndrome    On supplemental oxygen therapy    Family history of CHF (congestive heart failure)    Family history of diabetes mellitus    6 cm exophytic left upper pole renal cyst, likely hemorrhagic    Acute bilateral pyelonephritis    Hypokalemia    E. coli UTI, present on admission (urinary tract infection)    Paroxysmal supraventricular tachycardia    Sepsis due to Escherichia coli UTI, bilateral pyelonephritis, present on admission    Vitamin D deficiency    Lung mass    Former smoker    History of radiation therapy    Panic attacks    Small cell lung cancer    Chronic bronchitis    Acute respiratory failure    Influenza A     Past Medical History:   Diagnosis Date    Anxiety     Arthritis     Asthma     Back pain     COPD (chronic obstructive pulmonary disease)     Dependence on supplemental oxygen     Depression     Disease of thyroid gland     Fibromyalgia, primary     Headache     History of degenerative disc disease     Hypertension     Hypothyroidism     Low back pain     Restless leg     Scoliosis      Past Surgical History:   Procedure Laterality Date     APPENDECTOMY      BREAST BIOPSY Left     BRONCHOSCOPY Right 12/23/2024    Procedure: BRONCHOSCOPY WITH ENDOBRONCHIAL ULTRASOUND;  Surgeon: Peterson Morales MD;  Location: [facility] OR;  Service: Pulmonary;  Laterality: Right;  pre: lung mass  post: lung mass    CHOLECYSTECTOMY      TUBAL ABDOMINAL LIGATION         SLP Recommendation and Plan     SLP Diet Recommendation: puree, thin liquids (02/05/25 1056)  Recommended Precautions and Strategies: upright posture during/after eating, small bites of food and sips of liquid, general aspiration precautions, fatigue precautions, 1:1 supervision, assist with feeding (02/05/25 1056)  SLP Rec. for Method of Medication Administration: meds whole, meds crushed, with puree, as tolerated (02/05/25 1056)     Monitor for Signs of Aspiration: yes, notify SLP if any concerns (02/05/25 1056)  Recommended Diagnostics: VFSS (MBS) (with any increasing concerns) (02/05/25 1056)                 Oral Care Recommendations: Oral Care BID/PRN, Toothbrush (02/05/25 1056)        Daily Summary of Progress (SLP): progress toward functional goals is gradual (02/06/25 0823)               Treatment Assessment (SLP): continued (02/06/25 0823)  Treatment Assessment Comments (SLP): See note (02/06/25 0823)  Plan for Continued Treatment (SLP): continue treatment per plan of care (02/06/25 0823)         Progress: no change      SWALLOW EVALUATION (Last 72 Hours)       SLP Adult Swallow Evaluation       Row Name 02/06/25 0823 02/05/25 1056 02/04/25 1241             Rehab Evaluation    Document Type therapy note (daily note)  -MG therapy note (daily note)  -MG evaluation  -MG      Subjective Information no complaints  -MG no complaints  -MG complains of;nausea/vomiting  -MG      Patient Observations alert;cooperative;agree to therapy  -MG alert;cooperative;agree to therapy  -MG alert;cooperative;agree to therapy  -MG      Patient/Family/Caregiver Comments/Observations No family present.  -MG No family  present  -MG Daughter present  -MG      Patient Effort good  -MG adequate  -MG adequate  -MG      Symptoms Noted During/After Treatment none  -MG none  -MG fatigue  -MG         General Information    Patient Profile Reviewed -- -- yes  -MG      Pertinent History Of Current Problem -- -- Presented with flue A and found to be in acute hypoxic respiratory failure, ETT 1/31-2/4. Current small cell lung cancer s/p palliative XRT in Dec and 1 chemo very recently. SLP consulted due to failed post extubation screening.  -MG      Current Method of Nutrition -- -- NPO  -MG      Precautions/Limitations, Vision -- -- WFL;for purposes of eval  -MG      Precautions/Limitations, Hearing -- -- WFL;for purposes of eval  -MG      Prior Level of Function-Communication -- -- WFL  -MG      Prior Level of Function-Swallowing -- -- no diet consistency restrictions  -MG      Plans/Goals Discussed with -- -- patient and family;agreed upon  -MG      Barriers to Rehab -- -- medically complex  -MG      Patient's Goals for Discharge -- -- patient did not state  -MG      Family Goals for Discharge -- -- family did not state  -MG         Pain    Additional Documentation Pain Scale: FACES Pre/Post-Treatment (Group)  -MG Pain Scale: FACES Pre/Post-Treatment (Group)  -MG Pain Scale: FACES Pre/Post-Treatment (Group)  -MG         Pain Scale: FACES Pre/Post-Treatment    Pain: FACES Scale, Pretreatment 0-->no hurt  -MG 0-->no hurt  -MG 0-->no hurt  -MG      Posttreatment Pain Rating 0-->no hurt  -MG 0-->no hurt  -MG 0-->no hurt  -MG         Oral Motor Structure and Function    Dentition Assessment -- -- edentulous  -MG      Secretion Management -- -- WNL/WFL  -MG      Mucosal Quality -- -- sticky  -MG      Volitional Swallow -- -- weak  -MG      Volitional Cough -- -- weak  -MG         Oral Musculature and Cranial Nerve Assessment    Oral Motor General Assessment -- -- WFL  -MG         General Eating/Swallowing Observations    Respiratory Support  Currently in Use -- -- nasal cannula  -MG      O2 Liters -- -- 3L  -MG      Eating/Swallowing Skills -- -- fed by SLP  -MG      Positioning During Eating -- -- upright in bed  -MG      Utensils Used -- -- spoon  -MG      Consistencies Trialed -- -- ice chips;pureed  -MG         Clinical Swallow Eval    Oral Prep Phase -- -- impaired  -MG      Oral Transit -- -- WFL  -MG      Oral Residue -- -- impaired  -MG      Pharyngeal Phase -- -- suspected pharyngeal impairment  -MG      Esophageal Phase -- -- unremarkable  -MG      Clinical Swallow Evaluation Summary -- -- See note  -MG         Oral Prep Concerns    Oral Prep Concerns -- -- anterior loss;incomplete or weak lip closure around spoon  -MG      Incomplete or Weak Lip Closure Around Spoon -- -- pudding;thin  -MG      Anterior Loss -- -- pudding  -MG         Oral Residue Concerns    Oral Residue Concerns -- -- diffuse residue throughout oral cavity  -MG      Diffuse Residue Throughout Oral Cavity -- -- pudding  -MG         Pharyngeal Phase Concerns    Pharyngeal Phase Concerns -- -- cough;multiple swallows;wet vocal quality  -MG      Wet Vocal Quality -- -- pudding  -MG      Multiple Swallows -- -- pudding  -MG      Cough -- -- pudding  -MG         SLP Evaluation Clinical Impression    SLP Swallowing Diagnosis -- -- mild-moderate;oral dysphagia;suspected pharyngeal dysphagia  -MG      Functional Impact -- -- risk of aspiration/pneumonia;risk of malnutrition;risk of dehydration  -MG      Rehab Potential/Prognosis, Swallowing -- -- adequate, monitor progress closely  -MG      Swallow Criteria for Skilled Therapeutic Interventions Met -- -- demonstrates skilled criteria  -MG         SLP Treatment Clinical Impressions    Treatment Assessment (SLP) continued  -MG improved  -MG --      Treatment Assessment Comments (SLP) See note  -MG See note  -MG --      Daily Summary of Progress (SLP) progress toward functional goals is gradual  -MG progress toward functional goals as  expected  -MG --      Barriers to Overall Progress (SLP) Cognitive status  -MG -- --      Plan for Continued Treatment (SLP) continue treatment per plan of care  -MG goals adjusted to reflect functional improvements demonstrated  -MG --      Care Plan Review care plan/treatment goals reviewed  -MG evaluation/treatment results reviewed;care plan/treatment goals reviewed;risks/benefits reviewed;current/potential barriers reviewed;patient/other agree to care plan  -MG --      Care Plan Review, Other Participant(s) caregiver  JADIEL Cosme  -MG caregiver  JADIEL Valadez  -MG --         Recommendations    Therapy Frequency (Swallow) -- -- at least;2 days per week  -MG      Predicted Duration Therapy Intervention (Days) -- -- until discharge  -MG      SLP Diet Recommendation -- puree;thin liquids  -MG NPO;ice chips between meals after oral care, with supervision  -MG      Recommended Diagnostics -- VFSS (MBS)  with any increasing concerns  -MG reassess via clinical swallow evaluation  -MG      Recommended Precautions and Strategies -- upright posture during/after eating;small bites of food and sips of liquid;general aspiration precautions;fatigue precautions;1:1 supervision;assist with feeding  -MG upright posture during/after eating;1:1 supervision  -MG      Oral Care Recommendations -- Oral Care BID/PRN;Toothbrush  -MG Oral Care BID/PRN;Suction toothbrush  -MG      SLP Rec. for Method of Medication Administration -- meds whole;meds crushed;with puree;as tolerated  -MG meds via alternate route  -MG      Monitor for Signs of Aspiration -- yes;notify SLP if any concerns  -MG yes;notify SLP if any concerns  -MG      Anticipated Discharge Disposition (SLP) -- -- unknown  -MG         Swallow Goals (SLP)    Swallow LTGs Swallow Long Term Goal (free text)  -MG Swallow Long Term Goal (free text)  -MG Swallow Long Term Goal (free text)  -MG      Swallow STGs diet tolerance goal selection (SLP)  -MG diet tolerance goal selection (SLP)  -MG  diet tolerance goal selection (SLP)  -MG      Diet Tolerance Goal Selection (SLP) Patient will tolerate trials of  -MG Patient will tolerate trials of  -MG Patient will tolerate trials of  -MG         (LTG) Swallow    (LTG) Swallow Patient will tolerate least restrictive diet without overt s/s of aspiration.  -MG Patient will tolerate least restrictive diet without overt s/s of aspiration.  -MG Patient will tolerate least restrictive diet without overt s/s of aspiration.  -MG      [place] (Swallow Long Term Goal) independently (over 90% accuracy)  -MG independently (over 90% accuracy)  -MG independently (over 90% accuracy)  -MG      Time Frame (Swallow Long Term Goal) by discharge  -MG by discharge  -MG by discharge  -MG      Barriers (Swallow Long Term Goal) medically complex  -MG medically complex  -MG medically complex  -MG      Progress/Outcomes (Swallow Long Term Goal) progress slower than expected  -MG continuing progress toward goal  -MG new goal  -MG         (STG) Patient will tolerate trials of    Consistencies Trialed (Tolerate trials) soft to chew (chopped) textures;pureed textures;honey/ moderately thick liquids;nectar/ mildly thick liquids;thin liquids  -MG soft to chew (chopped) textures;pureed textures;honey/ moderately thick liquids;nectar/ mildly thick liquids;thin liquids  -MG soft to chew (chopped) textures;pureed textures;honey/ moderately thick liquids;nectar/ mildly thick liquids;thin liquids  -MG      Desired Outcome (Tolerate trials) without signs/symptoms of aspiration;without signs of distress;with adequate oral prep/transit/clearance  -MG without signs/symptoms of aspiration;without signs of distress;with adequate oral prep/transit/clearance  -MG without signs/symptoms of aspiration;without signs of distress;with adequate oral prep/transit/clearance  -MG      [place] (Tolerate trials) independently (over 90% accuracy)  -MG independently (over 90% accuracy)  -MG independently  (over 90% accuracy)  -MG      Time Frame (Tolerate trials) by discharge  -MG by discharge  -MG by discharge  -MG      Progress/Outcomes (Tolerate trials) progress slower than expected  -MG continuing progress toward goal  -MG new goal  -MG                User Key  (r) = Recorded By, (t) = Taken By, (c) = Cosigned By      Initials Name Effective Dates    MG Fishzahraa Elena L, MS [facility]-SLP 07/11/23 -                     EDUCATION  The patient has been educated in the following areas:   Dysphagia (Swallowing Impairment) Oral Care/Hydration Modified Diet Instruction.        SLP GOALS       Row Name 02/06/25 0823 02/05/25 1056 02/04/25 1241       (LTG) Swallow    (LTG) Swallow Patient will tolerate least restrictive diet without overt s/s of aspiration.  -MG Patient will tolerate least restrictive diet without overt s/s of aspiration.  -MG Patient will tolerate least restrictive diet without overt s/s of aspiration.  -MG    [place] (Swallow Long Term Goal) independently (over 90% accuracy)  -MG independently (over 90% accuracy)  -MG independently (over 90% accuracy)  -MG    Time Frame (Swallow Long Term Goal) by discharge  -MG by discharge  -MG by discharge  -MG    Barriers (Swallow Long Term Goal) medically complex  -MG medically complex  -MG medically complex  -MG    Progress/Outcomes (Swallow Long Term Goal) progress slower than expected  -MG continuing progress toward goal  -MG new goal  -MG       (STG) Patient will tolerate trials of    Consistencies Trialed (Tolerate trials) soft to chew (chopped) textures;pureed textures;honey/ moderately thick liquids;nectar/ mildly thick liquids;thin liquids  -MG soft to chew (chopped) textures;pureed textures;honey/ moderately thick liquids;nectar/ mildly thick liquids;thin liquids  -MG soft to chew (chopped) textures;pureed textures;honey/ moderately thick liquids;nectar/ mildly thick liquids;thin liquids  -MG    Desired Outcome (Tolerate trials) without signs/symptoms of  aspiration;without signs of distress;with adequate oral prep/transit/clearance  -MG without signs/symptoms of aspiration;without signs of distress;with adequate oral prep/transit/clearance  -MG without signs/symptoms of aspiration;without signs of distress;with adequate oral prep/transit/clearance  -MG    [place] (Tolerate trials) independently (over 90% accuracy)  -MG independently (over 90% accuracy)  -MG independently (over 90% accuracy)  -MG    Time Frame (Tolerate trials) by discharge  -MG by discharge  -MG by discharge  -MG    Progress/Outcomes (Tolerate trials) progress slower than expected  -MG continuing progress toward goal  -MG new goal  -MG              User Key  (r) = Recorded By, (t) = Taken By, (c) = Cosigned By      Initials Name Provider Type    Elena Vee MS CCC-SLP Speech and Language Pathologist                         Time Calculation:    Time Calculation- SLP       Row Name 02/06/25 0856             Time Calculation- SLP    SLP Start Time 0823  -MG      SLP Stop Time 0901  -MG      SLP Time Calculation (min) 38 min  -MG      SLP Received On 02/06/25  -MG         Untimed Charges    53914-KK Treatment Swallow Minutes 38  -MG         Total Minutes    Untimed Charges Total Minutes 38  -MG       Total Minutes 38  -MG                User Key  (r) = Recorded By, (t) = Taken By, (c) = Cosigned By      Initials Name Provider Type    Elena Vee MS CCC-SLP Speech and Language Pathologist                    Therapy Charges for Today       Code Description Service Date Service Provider Modifiers Qty    55601483431 HC ST TREATMENT SWALLOW 2 2/5/2025 Elena Ron MS CCC-SLP GN 1    [id number] HC ST TREATMENT SWALLOW 3 2/6/2025 Elena Ron MS CCC-SLP GN 1                 MS MELVI Sánchez  2/6/2025

## 2025-02-06 NOTE — THERAPY TREATMENT NOTE
Acute Care - Physical Therapy Treatment Note  [facility]     Patient Name: Ashley Gibbs  : 1955  MRN: [id number]  Today's Date: 2025      Visit Dx:     ICD-10-CM ICD-9-CM   1. Acute respiratory failure with hypoxia and hypercapnia  J96.01 518.81    J96.02    2. Febrile illness  R50.9 780.60   3. Influenza A  J10.1 487.1   4. Acute UTI (urinary tract infection)  N39.0 599.0   5. Altered mental status, unspecified altered mental status type  R41.82 780.97   6. Malignant neoplasm of lung, unspecified laterality, unspecified part of lung  C34.90 162.9   7. Immunosuppressed due to chemotherapy  D84.821 V58.69    T45.1X5A     Z79.899    8. Dysphagia, unspecified type  R13.10 787.20   9. Impaired mobility [Z74.09]  Z74.09 799.89     Patient Active Problem List   Diagnosis    Acute hypoxemic respiratory failure    Age-related osteoporosis without current pathological fracture    Anxiety    Stage 3 severe COPD by GOLD classification    Chronic pain disorder    Depression    Excessive daytime sleepiness    Hemoptysis    Family history of renal cell carcinoma    Hyperlipidemia    Hypothyroidism    Migraines    Mild episode of recurrent major depressive disorder    Neuropathy    Obstructive sleep apnea    Primary insomnia    Renal mass    Restless legs syndrome    On supplemental oxygen therapy    Family history of CHF (congestive heart failure)    Family history of diabetes mellitus    6 cm exophytic left upper pole renal cyst, likely hemorrhagic    Acute bilateral pyelonephritis    Hypokalemia    E. coli UTI, present on admission (urinary tract infection)    Paroxysmal supraventricular tachycardia    Sepsis due to Escherichia coli UTI, bilateral pyelonephritis, present on admission    Vitamin D deficiency    Lung mass    Former smoker    History of radiation therapy    Panic attacks    Small cell lung cancer    Chronic bronchitis    Acute respiratory failure    Influenza A     Past Medical History:   Diagnosis Date     Anxiety     Arthritis     Asthma     Back pain     COPD (chronic obstructive pulmonary disease)     Dependence on supplemental oxygen     Depression     Disease of thyroid gland     Fibromyalgia, primary     Headache     History of degenerative disc disease     Hypertension     Hypothyroidism     Low back pain     Restless leg     Scoliosis      Past Surgical History:   Procedure Laterality Date    APPENDECTOMY      BREAST BIOPSY Left     BRONCHOSCOPY Right 12/23/2024    Procedure: BRONCHOSCOPY WITH ENDOBRONCHIAL ULTRASOUND;  Surgeon: Peterson Morales MD;  Location: [facility] OR;  Service: Pulmonary;  Laterality: Right;  pre: lung mass  post: lung mass    CHOLECYSTECTOMY      TUBAL ABDOMINAL LIGATION       PT Assessment (Last 12 Hours)       PT Evaluation and Treatment       Row Name 02/06/25 1307 02/06/25 0928       Physical Therapy Time and Intention    Subjective Information no complaints  - --    Document Type therapy note (daily note)  - --    Mode of Treatment physical therapy  - --    Session Not Performed -- other (see comments)  -    Comment, Session Not Performed -- pt just finished with OT. OT reports pt being agitated. Will check back later.  -      Row Name 02/06/25 1307          General Information    Existing Precautions/Restrictions fall;oxygen therapy device and L/min  -     Limitations/Impairments safety/cognitive  -       Row Name 02/06/25 1307          Pain    Pretreatment Pain Rating 0/10 - no pain  -     Posttreatment Pain Rating 0/10 - no pain  -       Row Name 02/06/25 1307          Bed Mobility    Rolling Left [place] (Bed Mobility) verbal cues;moderate assist (50% patient effort)  -     Rolling Right [place] (Bed Mobility) verbal cues;moderate assist (50% patient effort)  -     Scooting/Bridging [place] (Bed Mobility) dependent (less than 25% patient effort);2 person assist  -     Supine-Sit [place] (Bed Mobility) verbal cues;moderate assist  (50% patient effort)  -     Sit-Supine [place] (Bed Mobility) verbal cues;maximum assist (25% patient effort)  -     Bed Mobility, Safety Issues decreased use of arms for pushing/pulling;decreased use of legs for bridging/pushing  -     Comment, (Bed Mobility) rolld for clean up following BM  -       Row Name 02/06/25 1307          Transfers    Comment, (Transfers) did not attempt to transfer due to weakness  -       Row Name 02/06/25 1307          Balance    Comment, Balance CGA for sitting balance with UE support. Pt. sat EOB for 20 minutes  -       Row Name 02/06/25 1307          Hip (Therapeutic Exercise)    Hip (Therapeutic Exercise) AROM (active range of motion)  -     Hip AROM (Therapeutic Exercise) bilateral;flexion;sitting;5 repetitions  -[facility] Name 02/06/25 1307          Knee (Therapeutic Exercise)    Knee (Therapeutic Exercise) AROM (active range of motion)  -     Knee AROM (Therapeutic Exercise) bilateral;LAQ (long arc quad);sitting;10 repetitions  -       Row Name 02/06/25 1307          Ankle (Therapeutic Exercise)    Ankle (Therapeutic Exercise) AROM (active range of motion)  -     Ankle AROM (Therapeutic Exercise) bilateral;dorsiflexion;sitting;15 repititions  -       Row Name 02/06/25 1307          Plan of Care Review    Plan of Care Reviewed With patient  -     Progress improving  -     Outcome Evaluation Pt. agreeable to therapy. She c/o weakness. Pt. not actively moving R UE during session. Pt. needed MOD assist to get to EOB.She was able to maintain sitting balance wt CGA. Actively moved B LEs, but remains weak in legs. Pt. too weak at this time to attept to stand. Pt. needed MAX assist to lay down. Pt. plans to stay with granddaughter following discharge. Will continue to work on strengthening and mobility while she is here.  -       Row Name 02/06/25 1307          Positioning and Restraints    Pre-Treatment Position in bed  -     Post Treatment  Position bed  -MF     In Bed notified nsg;fowlers;call light within reach;encouraged to call for assist;with family/caregiver;side rails up x2;RUE elevated;LUE elevated  -               User Key  (r) = Recorded By, (t) = Taken By, (c) = Cosigned By      Initials Name Provider Type    Danyelle Gill, PTA Physical Therapist Assistant                    Physical Therapy Education       Title: PT OT SLP Therapies (In Progress)       Topic: Physical Therapy (In Progress)       Point: Mobility training (In Progress)       Learning Progress Summary            Patient Acceptance, E, NR by TONNY at 2/5/2025 1115    Comment: benefits of activity, progression of PT                      Point: Home exercise program (Not Started)       Learner Progress:  Not documented in this visit.              Point: Body mechanics (Not Started)       Learner Progress:  Not documented in this visit.              Point: Precautions (Not Started)       Learner Progress:  Not documented in this visit.                              User Key       Initials Effective Dates Name Provider Type Discipline    TONNY 02/03/23 -  Mendel Puentes, PT DPT Physical Therapist PT                  PT Recommendation and Plan     Plan of Care Reviewed With: patient  Progress: improving  Outcome Evaluation: Pt. agreeable to therapy. She c/o weakness. Pt. not actively moving R UE during session. Pt. needed MOD assist to get to EOB.She was able to maintain sitting balance wth CGA. Actively moved B LEs, but remains weak in legs. Pt. too weak at this time to attept to stand. Pt. needed MAX assist to lay down. Pt. plans to stay with granddaughter following discharge. Will continue to work on strengthening and mobility while she is here.   Outcome Measures       Row Name 02/06/25 9011             How much help from another person do you currently need...    Turning from your back to your side while in flat bed without using bedrails? 2  -MF      Moving from lying  on back to sitting on the side of a flat bed without bedrails? 2  -MF      Moving to and from a bed to a chair (including a wheelchair)? 1  -MF      Standing up from a chair using your arms (e.g., wheelchair, bedside chair)? 1  -MF      Climbing 3-5 steps with a railing? 1  -MF      To walk in hospital room? 1  -MF      AM-PAC 6 Clicks Score (PT) 8  -MF         Functional Assessment    Outcome Measure Options AM-PAC 6 Clicks Basic Mobility (PT)  -MF                User Key  (r) = Recorded By, (t) = Taken By, (c) = Cosigned By      Initials Name Provider Type    Danyelle Gill PTA Physical Therapist Assistant                     Time Calculation:    PT Charges       Row Name 02/06/25 1406             Time Calculation    Start Time 1307  -MF      Stop Time 1348  -MF      Time Calculation (min) 41 min  -MF      PT Received On 02/06/25  -MF         Time Calculation- PT    Total Timed Code Minutes- PT 41 minute(s)  -MF         Timed Charges    48770 - PT Therapeutic Activity Minutes 41  -MF         Total Minutes    Timed Charges Total Minutes 41  -MF       Total Minutes 41  -MF                User Key  (r) = Recorded By, (t) = Taken By, (c) = Cosigned By      Initials Name Provider Type     Danyelle Guy PTA Physical Therapist Assistant                  Therapy Charges for Today       Code Description Service Date Service Provider Modifiers Qty    [id number]  PT THERAPEUTIC ACT EA 15 MIN 2/6/2025 Danyelle Guy PTA GP 3            PT G-Codes  Outcome Measure Options: AM-PAC 6 Clicks Basic Mobility (PT)  AM-PAC 6 Clicks Score (PT): 8  AM-PAC 6 Clicks Score (OT): 10    Danyelle Guy PTA  2/6/2025

## 2025-02-06 NOTE — PLAN OF CARE
Goal Outcome Evaluation:  Plan of Care Reviewed With: patient        Progress: no change  Outcome Evaluation: OT tx completed. Pt presents alert and oriented to self, place and cues for situation. Worked on self feeding task this am. She required Dep A to open packages, Max A to maintain grasp on utensil, scoop food and bring to mouth. Similarly with liquids, Max A to maintain grasp on cup and bring to mouth to due weakness. Pt also displayed decreased effort during task and required cues to perform task as independently as possible. Worked through AAROM exercises, 10 reps x 1 set. Left sitting up in bed at end of session. Continue OT POC.    Anticipated Discharge Disposition (OT): skilled nursing facility

## 2025-02-06 NOTE — PLAN OF CARE
Goal Outcome Evaluation:  Plan of Care Reviewed With: patient        Progress: improving  Outcome Evaluation: Pt. agreeable to therapy. She c/o weakness. Pt. not actively moving R UE during session. Pt. needed MOD assist to get to EOB.She was able to maintain sitting balance wth CGA. Actively moved B LEs, but remains weak in legs. Pt. too weak at this time to attept to stand. Pt. needed MAX assist to lay down. Pt. plans to stay with granddaughter following discharge. Will continue to work on strengthening and mobility while she is here.

## 2025-02-06 NOTE — PLAN OF CARE
Goal Outcome Evaluation:  Plan of Care Reviewed With: other (see comments)        Progress: no change  Outcome Evaluation: Pt with very poor appetite per RN in rounds. She dislikes the pureed diet. She has been refusing everything except jello. She likes chicken broth, jello, and ice cream. Have added ice cream as a supplement with all meals. Cont to follow.

## 2025-02-06 NOTE — THERAPY TREATMENT NOTE
Care Management Initial Consult    General Information  Assessment completed with: Patient, Family,    Type of CM/SW Visit: Initial Assessment    Primary Care Provider verified and updated as needed:     Readmission within the last 30 days:        Reason for Consult: discharge planning  Advance Care Planning: Advance Care Planning Reviewed: present on chart          Communication Assessment  Patient's communication style: spoken language (English or Bilingual)    Hearing Difficulty or Deaf: no   Wear Glasses or Blind: no    Cognitive  Cognitive/Neuro/Behavioral: WDL                      Living Environment:   People in home: spouse     Current living Arrangements: house      Able to return to prior arrangements: yes       Family/Social Support:  Care provided by: self, spouse/significant other  Provides care for: no one                Description of Support System:           Current Resources:   Patient receiving home care services: No     Community Resources: None  Equipment currently used at home: wheelchair, manual, walker, standard, walker, rolling  Supplies currently used at home: None    Employment/Financial:  Employment Status:          Financial Concerns:             Does the patient's insurance plan have a 3 day qualifying hospital stay waiver?  Yes     Which insurance plan 3 day waiver is available? Alternative insurance waiver    Will the waiver be used for post-acute placement? No    Lifestyle & Psychosocial Needs:  Social Determinants of Health     Food Insecurity: Low Risk  (11/11/2023)    Food Insecurity     Within the past 12 months, did you worry that your food would run out before you got money to buy more?: No     Within the past 12 months, did the food you bought just not last and you didn t have money to get more?: No   Depression: Not at risk (9/25/2023)    PHQ-2     PHQ-2 Score: 1   Housing Stability: Low Risk  (11/11/2023)    Housing Stability     Do you have housing? : Yes     Are you worried  Patient Name: Ashley Gibbs  : 1955    MRN: [id number]                              Today's Date: 2025       Admit Date: 2025    Visit Dx:     ICD-10-CM ICD-9-CM   1. Acute respiratory failure with hypoxia and hypercapnia  J96.01 518.81    J96.02    2. Febrile illness  R50.9 780.60   3. Influenza A  J10.1 487.1   4. Acute UTI (urinary tract infection)  N39.0 599.0   5. Altered mental status, unspecified altered mental status type  R41.82 780.97   6. Malignant neoplasm of lung, unspecified laterality, unspecified part of lung  C34.90 162.9   7. Immunosuppressed due to chemotherapy  D84.821 V58.69    T45.1X5A     Z79.899    8. Dysphagia, unspecified type  R13.10 787.20   9. Impaired mobility [Z74.09]  Z74.09 799.89     Patient Active Problem List   Diagnosis    Acute hypoxemic respiratory failure    Age-related osteoporosis without current pathological fracture    Anxiety    Stage 3 severe COPD by GOLD classification    Chronic pain disorder    Depression    Excessive daytime sleepiness    Hemoptysis    Family history of renal cell carcinoma    Hyperlipidemia    Hypothyroidism    Migraines    Mild episode of recurrent major depressive disorder    Neuropathy    Obstructive sleep apnea    Primary insomnia    Renal mass    Restless legs syndrome    On supplemental oxygen therapy    Family history of CHF (congestive heart failure)    Family history of diabetes mellitus    6 cm exophytic left upper pole renal cyst, likely hemorrhagic    Acute bilateral pyelonephritis    Hypokalemia    E. coli UTI, present on admission (urinary tract infection)    Paroxysmal supraventricular tachycardia    Sepsis due to Escherichia coli UTI, bilateral pyelonephritis, present on admission    Vitamin D deficiency    Lung mass    Former smoker    History of radiation therapy    Panic attacks    Small cell lung cancer    Chronic bronchitis    Acute respiratory failure    Influenza A     Past Medical History:   Diagnosis Date     Anxiety     Arthritis     Asthma     Back pain     COPD (chronic obstructive pulmonary disease)     Dependence on supplemental oxygen     Depression     Disease of thyroid gland     Fibromyalgia, primary     Headache     History of degenerative disc disease     Hypertension     Hypothyroidism     Low back pain     Restless leg     Scoliosis      Past Surgical History:   Procedure Laterality Date    APPENDECTOMY      BREAST BIOPSY Left     BRONCHOSCOPY Right 12/23/2024    Procedure: BRONCHOSCOPY WITH ENDOBRONCHIAL ULTRASOUND;  Surgeon: Peterson Morales MD;  Location: [facility] OR;  Service: Pulmonary;  Laterality: Right;  pre: lung mass  post: lung mass    CHOLECYSTECTOMY      TUBAL ABDOMINAL LIGATION        General Information       Row Name 02/06/25 0840          OT Time and Intention    Document Type therapy note (daily note)  -     Mode of Treatment occupational therapy  -     Patient Effort adequate  -       Row Name 02/06/25 0840          General Information    Patient Profile Reviewed yes  -     Existing Precautions/Restrictions fall;oxygen therapy device and L/min  -     Barriers to Rehab medically complex  -       Row Name 02/06/25 0840          Cognition    Orientation Status (Cognition) oriented to;person  -       Row Name 02/06/25 0840          Safety Issues/Impairments Affecting Functional Mobility    Impairments Affecting Function (Mobility) strength;balance;cognition;endurance/activity tolerance;postural/trunk control;shortness of breath  -               User Key  (r) = Recorded By, (t) = Taken By, (c) = Cosigned By      Initials Name Provider Type     Geovanna Ritchie, BAUDILIO/L, CSRS Occupational Therapist                     Mobility/ADL's       Row Name 02/06/25 0840          Bed Mobility    Bed Mobility scooting/bridging  -     Scooting/Bridging [place] (Bed Mobility) dependent (less than 25% patient effort)  -     Bed Mobility, Safety Issues cognitive deficits limit  about losing your housing?: No   Tobacco Use: Medium Risk (11/13/2023)    Patient History     Smoking Tobacco Use: Former     Smokeless Tobacco Use: Never     Passive Exposure: Not on file   Financial Resource Strain: Low Risk  (11/11/2023)    Financial Resource Strain     Within the past 12 months, have you or your family members you live with been unable to get utilities (heat, electricity) when it was really needed?: No   Alcohol Use: Not on file   Transportation Needs: Low Risk  (11/11/2023)    Transportation Needs     Within the past 12 months, has lack of transportation kept you from medical appointments, getting your medicines, non-medical meetings or appointments, work, or from getting things that you need?: No   Physical Activity: Not on file   Interpersonal Safety: Low Risk  (10/5/2023)    Interpersonal Safety     Do you feel physically and emotionally safe where you currently live?: Yes     Within the past 12 months, have you been hit, slapped, kicked or otherwise physically hurt by someone?: No     Within the past 12 months, have you been humiliated or emotionally abused in other ways by your partner or ex-partner?: Not on file   Stress: Not on file   Social Connections: Not on file       Functional Status:  Prior to admission patient needed assistance:   Dependent ADLs:: Ambulation-walker, Bathing, Dressing  Dependent IADLs:: Transportation, Meal Preparation, Shopping, Laundry, Cooking, Cleaning       Mental Health Status:          Chemical Dependency Status:                Values/Beliefs:  Spiritual, Cultural Beliefs, Taoism Practices, Values that affect care:                 Additional Information:  Flaca consulted for discharge planning. Flaca met with pt and spouse at bedside.  Pt was pleasant and engaged. Pt lives at home with spouse. There are two steps into the home but she also has a ramp from the garage. Pt has chair lift inside home. Pt has walker, cane, and transport chair but normally uses them  understanding;decreased use of arms for pushing/pulling;decreased use of legs for bridging/pushing;impaired trunk control for bed mobility  -     Comment, (Bed Mobility) once seated upright in bed, worked on self feeding tasks. Very slow to respond and demonstrates decreased effort to complete task as independently as possible.  -       Row Name 02/06/25 0840          Activities of Daily Living    BADL Assessment/Intervention feeding  -       Row Name 02/06/25 0840          Self-Feeding Assessment/Training    [place] (Feeding) prepare tray/open items;dependent (less than 25% patient effort);scoop food and bring to mouth;liquids to mouth;moderate assist (50% patient effort);maximum assist (25% patient effort)  -     Position (Feeding) supported sitting;sitting up in bed  -               User Key  (r) = Recorded By, (t) = Taken By, (c) = Cosigned By      Initials Name Provider Type     Geovanna Ritchie OTR/L, PAUL Occupational Therapist                   Obj/Interventions       Row Name 02/06/25 0840          Range of Motion Comprehensive    Comment, General Range of Motion AAROM completed 10 reps x 1 set in all planes of motion and at all joints in B UEs. Decreased effort displayed during task.  -               User Key  (r) = Recorded By, (t) = Taken By, (c) = Cosigned By      Initials Name Provider Type     Geovanna Ritchie OTR/L, PAUL Occupational Therapist                   Goals/Plan    No documentation.                  Clinical Impression       Row Name 02/06/25 0840          Pain Assessment    Pretreatment Pain Rating 0/10 - no pain  -     Posttreatment Pain Rating 0/10 - no pain  -       Row Name 02/06/25 0840          Plan of Care Review    Plan of Care Reviewed With patient  -     Progress no change  -     Outcome Evaluation OT tx completed. Pt presents alert and oriented to self, place and cues for situation. Worked on self feeding task this am. She required  when outside the home. Spouse assists with all ADLs/IADLs. She is not normally on oxygen. Pt declining TCU but open to HC.     Pt would like  transport on discharge.    Social work will continue to follow and assist with discharge planning as needed.    SALMA Driscoll, [facility]  Inpatient Care Coordination  [facility]  [phone]       SALMA Driscoll       Dep A to open packages, Max A to maintain grasp on utensil, scoop food and bring to mouth. Similarly with liquids, Max A to maintain grasp on cup and bring to mouth to due weakness. Pt also displayed decreased effort during task and required cues to perform task as independently as possible. Worked through AAROM exercises, 10 reps x 1 set. Left sitting up in bed at end of session. Continue OT POC.  -       Row Name 02/06/25 0840          Therapy Plan Review/Discharge Plan (OT)    Anticipated Discharge Disposition (OT) skilled nursing facility  -       Row Name 02/06/25 0840          Positioning and Restraints    Pre-Treatment Position in bed  -     Post Treatment Position bed  -JW     In Bed notified nsg;fowlers;call light within reach;encouraged to call for assist;exit alarm on;side rails up x3;patient within staff view  -               User Key  (r) = Recorded By, (t) = Taken By, (c) = Cosigned By      Initials Name Provider Type    Geovanna Magana, OTR/L, CSRS Occupational Therapist                   Outcome Measures    No documentation.                   Occupational Therapy Education       Title: PT OT SLP Therapies (In Progress)       Topic: Occupational Therapy (In Progress)       Point: ADL training (Done)       Description:   Instruct learner(s) on proper safety adaptation and remediation techniques during self care or transfers.   Instruct in proper use of assistive devices.                  Learning Progress Summary            Patient Acceptance, E, VU by ISHAN at 2/5/2025 7306                      Point: Home exercise program (Not Started)       Description:   Instruct learner(s) on appropriate technique for monitoring, assisting and/or progressing therapeutic exercises/activities.                  Learner Progress:  Not documented in this visit.              Point: Precautions (Done)       Description:   Instruct learner(s) on prescribed precautions during self-care and functional  transfers.                  Learning Progress Summary            Patient Acceptance, E, VU by  at 2/5/2025 1253                      Point: Body mechanics (Done)       Description:   Instruct learner(s) on proper positioning and spine alignment during self-care, functional mobility activities and/or exercises.                  Learning Progress Summary            Patient Acceptance, E, VU by  at 2/5/2025 1253                                      User Key       Initials Effective Dates Name Provider Type Discipline     11/15/24 -  Geovanna Ritchie, OTR/L, CSRS Occupational Therapist OT                  OT Recommendation and Plan  Planned Therapy Interventions (OT): activity tolerance training, adaptive equipment training, BADL retraining, cognitive/visual perception retraining, functional balance retraining, transfer/mobility retraining, strengthening exercise, occupation/activity based interventions, patient/caregiver education/training, ROM/therapeutic exercise  Therapy Frequency (OT): 5 times/wk  Plan of Care Review  Plan of Care Reviewed With: patient  Progress: no change  Outcome Evaluation: OT tx completed. Pt presents alert and oriented to self, place and cues for situation. Worked on self feeding task this am. She required Dep A to open packages, Max A to maintain grasp on utensil, scoop food and bring to mouth. Similarly with liquids, Max A to maintain grasp on cup and bring to mouth to due weakness. Pt also displayed decreased effort during task and required cues to perform task as independently as possible. Worked through AAROM exercises, 10 reps x 1 set. Left sitting up in bed at end of session. Continue OT POC.     Time Calculation:         Time Calculation- OT       Row Name 02/06/25 0840             Time Calculation-     OT Start Time 0840  -      OT Stop Time 0920  -      OT Time Calculation (min) 40 min  -      Total Timed Code Minutes- OT 40 minute(s)  -      OT Received On  02/06/25  -         Timed Charges    88397 - OT Therapeutic Exercise Minutes 10  -JW      21362 - OT Self Care/Mgmt Minutes 30  -         Total Minutes    Timed Charges Total Minutes 40  -       Total Minutes 40  -JW                User Key  (r) = Recorded By, (t) = Taken By, (c) = Cosigned By      Initials Name Provider Type    Geovanna Magana OTR/L, CSRS Occupational Therapist                  Therapy Charges for Today       Code Description Service Date Service Provider Modifiers Qty    12934007845 HC OT EVAL MOD COMPLEXITY 3 2/5/2025 Geovanna Ritchie OTR/L, CSRS GO 1    10113671521 HC OT THER PROC EA 15 MIN 2/6/2025 Geovanna Ritchie OTR/L, CSRS GO 1    44939218722 HC OT SELF CARE/MGMT/TRAIN EA 15 MIN 2/6/2025 Geovanna Ritchie OTR/L, CSRS GO 2                 BAUDILIO Winters/L, CSRS  2/6/2025

## 2025-02-06 NOTE — CASE MANAGEMENT/SOCIAL WORK
"Continued Stay Note  [facility]     Patient Name: Ashley Gibbs  MRN: [id number]  Today's Date: 2/6/2025    Admit Date: 1/31/2025    Plan: Home with 24 hr care and HH   Discharge Plan       Row Name 02/06/25 1155       Plan    Plan Home with 24 hr care and HH    Plan Comments ISAMAR spoke with patient's \"daughter\" Antonia Lopez regarding discharge plan.  Antonia advised she is not patient's biological daughter but that patient raised her.  Antonia states she plans for patient to come to her home upon discharge and patient can stay with her as long as needed.  Patient is currently agreeable to this plan but still experiencing some confusion.  Would need to review plan with patient again to confirm this is what she prefers.  Antonia advised they would need a hospital bed set up at her home and HH services.  ISAMAR did inform Antonia that the recommendation is SNF placement due to weakness and that patient would need 24 hour around the clock care.  Antonia advised she can provide 24 hour care.  Antonia is also requesting completion of a living will prior to patient's discharge, which would be with pastoral care.  Will follow up with patient tomorrow regarding discharge plan.                   Discharge Codes    No documentation.                       ZARIA Gonzalez    "

## 2025-02-06 NOTE — PROGRESS NOTES
HEMATOLOGY AND MEDICAL ONCOLOGY PROGRESS NOTE    Patient name: Ashley Gibbs  Patient : 1955  VISIT # [id number]  MR #[id number]  Room:     SUBJECTIVE:    Extubated, breathing comfortably, smiling.  CCU nursing assisting patient with clean up at bedside.  Planning on transfer to the regular floor unit when bed available.    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 Rico Vanegas.     She received cycle #1, day #1 of  carboplatin/-16 on 2025-25.       Palliative emergent XRT of the chest consisted of 1250 cGy in 5 treatment fractions between 2024 and 2024 by Dr. Rico Vanegas at [facility] for the associated SVC due to tumor..     Treatment regimen-curative intent consisted of:  Carboplatin AUC 5 day 1  Etoposide 100 mg/m² days 1-3 q. 21 days  Plan is for X 4 cycles      Ashley was seen 2025, in the ED, at [facility] on the day of her admission, accompanied by 2 granddaughters and her good friend.  The granddaughter reports a fever to 101 degrees, increased difficulty with breathing shortness of breath and wheezing.     CBC Results   CBC            2024    08:49 2025    11:20 2025    08:13   CBC   WBC 4.51  5.35     9.14    RBC 3.85  4.32     3.67    Hemoglobin 11.5  12.9     10.9    Hematocrit 35.2  39.4     33.7    MCV 91.4  91.2     91.8    MCH 29.9  29.9     29.7    MCHC 32.7  32.7     32.3    RDW 13.0  12.9     13.9    Platelets 199  260     220         Details           This result is from an external source.                         UA today, 2025 showed 3+ leukocytes, 30 mg/dL protein WBCs too numerous to count, bacteria 3+, nitrite negative.     Respiratory panel by PCR today,  1/31/2025 was positive for influenza A H1-Detected  The patient has been desaturating while in the ED, requiring BiPAP assistance.  Blood and urine cultures were obtained.  Antibiotic with cefepime is initiated.     Medical oncology consultation requested         PAST TUMOR HISTORY COPIED FROM DR. CARDONA'S 1/13/2025 OFFICE VISIT:   HISTORY OF PRESENT ILLNESS:    Diagnosis  Small cell lung cancer, right lung, Dec 2024  Stage  [id number], limited stage     Treatment Summary  12/13/24 - 12/19/24 Emergent palliative radiation therapy 1250cGy in 5 fractions to right lung  Anticipate chemotherapy with Carboplatin D1, Etoposide D1-3 every 21 days x4 cycles  Anticipate radiation therapy to right lung with Dr Rico Vanegas/[facility]     Cancer History  Ms. 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 She has complaints today of fatigue. She states she has stabbing chest pain and mid back pain. She is currently being seen at pain management for pain medication. She presents today on 2L of oxygen dependently.   11/8/24 CT chest low dose ([facility]): 2 cm right upper lobe bilobed nodule with associated mediastinal and right hilar lymphadenopathy, suspicious for malignancy. Minimal adjacent   right upper lobe interlobular septal thickening may be related to venous or lymphatic congestion, however lymphangitic carcinomatosis is an additional consideration. Recommend further evaluation with PET/CT and/or tissue sampling. CT chest with contrast may also be of value to help distinguish the lymph nodes from adjacent vasculature. Minimal tree-in-bud nodularity in the peripheral the right upper lobe, likely mild infectious process.   Additional small pulmonary nodules in the right upper and right middle lobe.   11/8/24 CT abd/pelvis ([facility]): Near complete resolution of the exophytic left upper pole renal lesion of concern likely related to  interval rupture/involution. Numerous small bilateral renal cysts are again present.  Mild diffuse wall thickening of the rectum and the distal transverse colon and proximal descending colon. Correlate for proctocolitis.   12/13/24 PET/CT scan ([facility]): There is a hypermetabolic right upper lobe nodule measures 1.4 cm and has a maximum SUV of 5.5. Right upper lobe suprahilar mass measures at least 8 cm in greatest axial dimension is hypermetabolic with a maximum SUV of 11.4. Mass either invades the middle mediastinum or there is conglomerate hypermetabolic lymphadenopathy. No additional hypermetabolic pulmonary nodule or mass is identified. There is no increased skeletal uptake to suggest osseous metastasis.   12/13/245 CT chest ([facility]): 2.2 cm bilobed right upper lobe pulmonary nodule, likely representing a primary lung neoplasm. 8.9 cm right hilar lung mass with diffuse mediastinal infiltration, likely representing mary metastasis. This mass encases the right mainstem bronchus and encases the right main pulmonary artery, with occlusion of the right upper lobe pulmonary artery. This mass compresses the SVC, which is slitlike. Patient is at risk for SVC syndrome.  12/13/24 Initial evaluation by Dr Rico Vanegas/[facility] for superior vena cava syndrome who recommended emergent palliative radiation therapy 1250cGy in 5 fractions.  12/13/24 - 12/19/24 Emergent SBRT 1250cGy in 5 fractions to right lung  12/19/24 CT chest w/o ([facility]): Stable large right hilar mass with mediastinal invasion and confluent lymphadenopathy as detailed above. Findings are suspicious for small cell carcinoma. There is increased groundglass opacities and interlobular septal thickening within the right upper lobe and right middle lobe. Differential includes postobstructive changes and/or leptomeningeal carcinomatosis. Stable 2.2 cm right upper lobe bilobed nodule which was previously hypermetabolic and concerning for metastases. Trace  "right pleural effusion.   12/23/24 Navigational bronchoscopy/EBUS by Dr Peterson Morales/[facility]  12/23/24 Station 7 lymph node, endobronchial ultrasound-guided fine-needle aspiration, smear (1) and cellblock: High-grade neuroendocrine carcinoma consistent with metastatic small cell carcinoma. Fragments of lymphoid tissue and cartilage present.  Station 4R lymph node, endobronchial ultrasound-guided fine-needle aspiration, smear (1) and cellblock: High-grade neuroendocrine carcinoma consistent with metastatic small cell carcinoma. Many background lymphocytes.  Bronchial washings, ThinPrep preparation (1) and cellblock: A few malignant cells present, high-grade neuroendocrine carcinoma consistent with small cell carcinoma. Several macrophages. A few benign squamous epithelial cells and bronchial epithelial cells. AJCC stage: pTX pNX  1/13/2025-essentially, limited stage small cell lung cancer.  Awaiting results of brain MRI.  Recommend concurrent chemoradiation with carboplatin 2 post right to be followed by adjuvant durvalumab.  1/27/2025-initiation cycle #1 of chemotherapy with carboplatin/-16           PHYSICAL EXAM:/77   Pulse 91   Temp 98.8 °F (37.1 °C) (Axillary)   Resp 23   Ht 160 cm (63\")   Wt 71.4 kg (157 lb 6.5 oz)   SpO2 92%   BMI 27.88 kg/m²   CONSTITUTIONAL:sedated, ill-appearing  EYES: Anicteric, EOM intact, pupils equal round   CHEST/LUNGS: CTA, Mechanical ventilator, PEEP 5, FiO2 30%.  CARDIOVASCULAR: RRR, no murmurs  ABDOMEN: soft, active bowel sounds, no HSM  EXTREMITIES: warm,   SKIN: warm,   NEUROLOGIC-sedated      CBC  Results from last 7 days   Lab Units 02/06/25  0228 02/05/25  0457 02/04/25  0232   WBC 10*3/mm3 1.18* 2.11* 3.36*   HEMOGLOBIN g/dL 11.2* 10.5* 9.9*   HEMATOCRIT % 33.9* 32.8* 30.5*   PLATELETS 10*3/mm3 141 139* 162         Lab Results   Component Value Date     02/06/2025    K 3.5 02/06/2025    CL 93 (L) 02/06/2025    CO2 33.0 (H) 02/06/2025    BUN 25 (H) " 02/06/2025    CREATININE 0.52 (L) 02/06/2025    GLUCOSE 103 (H) 02/06/2025    CALCIUM 8.9 02/06/2025    BILITOT 0.4 02/06/2025    ALKPHOS 86 02/06/2025    AST 17 02/06/2025    ALT 17 02/06/2025    AGRATIO 1.0 02/06/2025    GLOB 3.3 02/06/2025       Lab Results   Component Value Date    INR 0.98 01/31/2025    INR 1.04 07/24/2019    INR 0.90 03/13/2019    PROTIME 13.5 01/31/2025    PROTIME 13 07/24/2019    PROTIME 11.6 (L) 03/13/2019       ASSESSMENT/PLAN:    #   Limited stage small cell lung cancer    Limited stage small cell lung cancer-status post completion of cycle 1 carboplatin etoposide 1/29/2025.    Status post palliative XRT to the SVC.  Plans for further XRT with Dr. Rico Vanegas.    #  Acute hypoxic respiratory failure-     Acute hypoxic respiratory failure- r/tCOPD exacerbation with + influenza A.  Chest x-ray showed chronic changes of emphysema and fibrosis.  Continue current supportive care.  Failed SBT-02/01/25  Extubated, planning on returning to the floor    #  Normocytic anemia-     CBC          2/4/2025    02:32 2/5/2025    04:57 2/6/2025    02:28   CBC   WBC 3.36  2.11  1.18    RBC 3.37  3.63  3.89    Hemoglobin 9.9  10.5  11.2    Hematocrit 30.5  32.8  33.9    MCV 90.5  90.4  87.1    MCH 29.4  28.9  28.8    MCHC 32.5  32.0  33.0    RDW 14.1  13.8  13.2    Platelets 162  139  141      Hemoglobin improving  WBCs going down secondary to chemotherapy, neutropenic with ANC of 0.97  Will start Neupogen today, 2/6/2025    Monitor    Plan:  Continue current supportive care  Continue Rocephin      Future Appointments    Encounter Information   Provider Department Center   2/17/2025 11:30 AM Christopher Isaac MD [facility]   2/17/2025 11:30 AM INFUSION SCHEDULE, [facility]   2/18/2025 12:00 PM INFUSION SCHEDULE, [facility]   2/19/2025 11:30 AM INFUSION SCHEDULE, [facility]  [facility] Aide Li MD    02/06/25  07:00 CST

## 2025-02-06 NOTE — PLAN OF CARE
Goal Outcome Evaluation:  Plan of Care Reviewed With: patient, caregiver (JADIEL Cosme)        Progress: no change       Anticipated Discharge Disposition (SLP): unknown             Treatment Assessment (SLP): continued (02/06/25 0823)  Treatment Assessment Comments (SLP): See note (02/06/25 0823)  Plan for Continued Treatment (SLP): continue treatment per plan of care (02/06/25 0823)      Swallow follow up completed. New order was placed by night RN due to concern with trouble swallowing overnight. From reading care plan and speaking with night RN it appears it was more related to cognitive status and want to rather than a choking event or concern. This AM she is adamant that she is not eating anything other than jello. She did attempt a bite of puree fruit but required liquid wash to move through oral transit. Instead of swallowing she had anterior loss out the right side of her mouth. She does have oral weakness and dentition is not present; however, some of the difficulty is related to dislike of the food items or not wanting to complete the swallow. She completed jello trials with prolonged oral manipulation. Medications given with jello again with prolonged manipulation and need for thin liquid wash. No overt s/s of aspiration observed.     Solids were not attempted due to prolonged oral phase with jello alone, there is no way she would handle a soft diet at this time with missing dentition, oral weakness, and reduced cognitive status.     I suspect patient will likely have poor nutritional intake due to current status.     Continue puree diet with thin liquids. Would benefit from preferences being taken to attempt increase in intake. She tells me she like jello, ice cream, and chicken noodle soup.     SLP will continue to follow. Encourage family to bring in dentures if available.     Elena Ron, MS CCC-SLP 2/6/2025 08:55 CST

## 2025-02-06 NOTE — PROGRESS NOTES
[facility] Intensivist Services  INPATIENT PROGRESS NOTE    Patient Name: Ashley Gibbs  Date of Admission: 1/31/2025  Today's Date: 02/06/25  Length of Stay: 6  Primary Care Physician: Padmaja Bermudez APRN    Subjective   ICU Summary:  69-year-old male with stage IV small cell lung cancer, COPD, restless leg, admitted on 1/31/2025 with influenza A infection and acute hypoxic and hypercapnic respiratory failure.  Patient required intubation in the emergency department.  She started on oseltamavir, Rocephin, azithromycin, Pulmicort and DuoNebs.  On Solu-Medrol taper.  Patient has not been able to tolerate ventilator weaning or spontaneous breathing trials.    Interval update:  2/3  Patient remains intubated, sedated, on ventilator.  5 PEEP and 30% FiO2.  Still having elevated peak pressures.  Mildly labored respirations.  Blood pressure stable.     2/4  Intubated, sedated on vent.  Tolerated sedation wean this morning, calm following commands.  Nonlabored respirations.  ABG with compensated respiratory acidosis which is likely her baseline.  Vital signs are stable, no vasoactive drips.    2/5  Able to extubate on 2/4.  Now tolerating 3 L nasal cannula sats low 90s.  No respiratory distress.  Blood pressure heart rate stable.    2/6  Patient now on 6 L nasal cannula.  She tells me she is feeling much better overall.  Blood pressure and heart rate remained stable.  She is having no significant respiratory distress.  WBC is now down to 1.18.  She was started on Neupogen per oncology for this.    Review of Systems   All pertinent negatives and positives are as above. All other systems have been reviewed and are negative unless otherwise stated.     Objective    Temp:  [97.5 °F (36.4 °C)-99.8 °F (37.7 °C)] 99.8 °F (37.7 °C)  Heart Rate:  [76-97] 89  Resp:  [21-26] 26  BP: (127-162)/(63-90) 139/73  Physical Exam  Vitals and nursing note reviewed.   Constitutional:       General: She  is not in acute distress.     Appearance: She is ill-appearing.   HENT:      Head: Normocephalic.      Mouth/Throat:      Mouth: Mucous membranes are moist.   Eyes:      Pupils: Pupils are equal, round, and reactive to light.   Cardiovascular:      Rate and Rhythm: Normal rate and regular rhythm.      Pulses: Normal pulses.      Heart sounds: Normal heart sounds.   Pulmonary:      Comments: Nonlabored respirations, breath sounds clear bilaterally, diminished at bases  Abdominal:      General: There is no distension.      Palpations: Abdomen is soft.      Tenderness: There is no abdominal tenderness.   Musculoskeletal:         General: Normal range of motion.   Skin:     General: Skin is warm and dry.      Capillary Refill: Capillary refill takes less than 2 seconds.   Neurological:      General: No focal deficit present.      Mental Status: She is alert and oriented to person, place, and time.           Results Review:  Lab Results (last 24 hours)       Procedure Component Value Units Date/Time    Phosphorus [[id number]]  (Normal) Collected: 02/06/25 0228    Specimen: Blood Updated: 02/06/25 0317     Phosphorus 3.4 mg/dL     Comprehensive Metabolic Panel [[id number]]  (Abnormal) Collected: 02/06/25 0228    Specimen: Blood Updated: 02/06/25 0304     Glucose 103 mg/dL      BUN 25 mg/dL      Creatinine 0.52 mg/dL      Sodium 138 mmol/L      Potassium 3.5 mmol/L      Chloride 93 mmol/L      CO2 33.0 mmol/L      Calcium 8.9 mg/dL      Total Protein 6.5 g/dL      Albumin 3.2 g/dL      ALT (SGPT) 17 U/L      AST (SGOT) 17 U/L      Alkaline Phosphatase 86 U/L      Total Bilirubin 0.4 mg/dL      Globulin 3.3 gm/dL      A/G Ratio 1.0 g/dL      BUN/Creatinine Ratio 48.1     Anion Gap 12.0 mmol/L      eGFR 102.0 mL/min/1.73     Narrative:      GFR Categories in Chronic Kidney Disease (CKD)      GFR Category          GFR (mL/min/1.73)    Interpretation  G1                     90 or greater         Normal or high (1)  G2                       60-89                Mild decrease (1)  G3a                   45-59                Mild to moderate decrease  G3b                   30-44                Moderate to severe decrease  G4                    15-29                Severe decrease  G5                    14 or less           Kidney failure          (1)In the absence of evidence of kidney disease, neither GFR category G1 or G2 fulfill the criteria for CKD.    eGFR calculation 2021 CKD-EPI creatinine equation, which does not include race as a factor    Magnesium [[id number]]  (Normal) Collected: 02/06/25 0228    Specimen: Blood Updated: 02/06/25 0304     Magnesium 1.8 mg/dL     CBC & Differential [[id number]]  (Abnormal) Collected: 02/06/25 0228    Specimen: Blood Updated: 02/06/25 0255    Narrative:      The following orders were created for panel order CBC & Differential.  Procedure                               Abnormality         Status                     ---------                               -----------         ------                     CBC Auto Differential[[id number]]        Abnormal            Final result                 Please view results for these tests on the individual orders.    CBC Auto Differential [[id number]]  (Abnormal) Collected: 02/06/25 0228    Specimen: Blood Updated: 02/06/25 0255     WBC 1.18 10*3/mm3      RBC 3.89 10*6/mm3      Hemoglobin 11.2 g/dL      Hematocrit 33.9 %      MCV 87.1 fL      MCH 28.8 pg      MCHC 33.0 g/dL      RDW 13.2 %      RDW-SD 42.3 fl      MPV 10.2 fL      Platelets 141 10*3/mm3      Neutrophil % 82.3 %      Lymphocyte % 12.7 %      Monocyte % 4.2 %      Eosinophil % 0.0 %      Basophil % 0.0 %      Immature Grans % 0.8 %      Neutrophils, Absolute 0.97 10*3/mm3      Lymphocytes, Absolute 0.15 10*3/mm3      Monocytes, Absolute 0.05 10*3/mm3      Eosinophils, Absolute 0.00 10*3/mm3      Basophils, Absolute 0.00 10*3/mm3      Immature Grans, Absolute 0.01 10*3/mm3              XR Chest 1  View    Result Date: 2/5/2025  1. The removal of the endotracheal tube and gastric tube since the previous study. No change in cardiopulmonary status.   This report was signed and finalized on 2/5/2025 6:42 AM by Dr. Shannan Cobian MD.       Result Review:  I have personally reviewed the results from the time of this admission to 2/6/2025 12:05 CST and agree with these findings:  [x]  Laboratory list / accordion  [x]  Microbiology  [x]  Radiology  [x]  EKG/Telemetry   []  Cardiology/Vascular   []  Pathology  []  Old records  []  Other:      Culture Data:   Blood Culture   Date Value Ref Range Status   01/31/2025 No growth at 3 days  Preliminary   01/31/2025 No growth at 3 days  Preliminary     Urine Culture   Date Value Ref Range Status   01/31/2025 <25,000 CFU/mL Mixed Fanta Isolated  Final       I have reviewed the patient's current medications.     Assessment/Plan   Assessment  Active Hospital Problems    Diagnosis     **Acute respiratory failure     Influenza A     Small cell lung cancer    69-year-old female with acute hypoxemic respiratory failure, influenza A infection, small cell lung cancer in CCU for critical care management.    Acute hypoxic respiratory failure  -Secondary to influenza A, small cell lung cancer and baseline COPD  -Extubated 2/4, on 6 L nasal cannula currently, O2 sats low 90s%  -Has finished oseltamavir course  -Continue empiric Rocephin course for superimposed pneumonia, on day 5/5, has finished azithromycin   -Continue Pulmicort, DuoNeb, Brovana  -Continue Methylprednisolone taper    2. Influenza A infection  -Finished oseltamavir  -Continue droplet precautions    3. Small cell lung cancer  -Oncology consulted, follow their recommendations    4. Leukopenia  -Patient has started Neupogen per oncology    VTE: Heparin  GI: PPI  NTN: SLP to evaluate, diet per their rec  Abx: Rocephin (day 5/5), finished azithromycin  Lines/Tubes: Peripheral IV  CODE STATUS: No CPR    Disposition: Stable  for transfer to medical floor       Please see rest of the note for further information on patient assessment, MDM, and treatment.      Patient is suitable to be transferred to the medical floor.  I have spoken with Dr. King, hospitalist, who has accepted this patient.  The patient will go to room 462.  Dr. Edwards will be the attending physician.     Part of this note may be an electronic transcription/translation of spoken language to printed text using the Dragon dictation system        Electronically signed by Francisco Goddard PA-C on 2/6/2025 at 12:05 CST

## 2025-02-07 PROBLEM — D69.6 THROMBOCYTOPENIA: Status: ACTIVE | Noted: 2025-02-07

## 2025-02-07 PROBLEM — D72.819 LEUKOPENIA: Status: ACTIVE | Noted: 2025-02-07

## 2025-02-07 LAB
ALBUMIN SERPL-MCNC: 3.1 G/DL (ref 3.5–5.2)
ALBUMIN/GLOB SERPL: 0.9 G/DL
ALP SERPL-CCNC: 83 U/L (ref 39–117)
ALT SERPL W P-5'-P-CCNC: 17 U/L (ref 1–33)
ANION GAP SERPL CALCULATED.3IONS-SCNC: 12 MMOL/L (ref 5–15)
AST SERPL-CCNC: 16 U/L (ref 1–32)
BASOPHILS # BLD MANUAL: 0 10*3/MM3 (ref 0–0.2)
BASOPHILS NFR BLD MANUAL: 0 % (ref 0–1.5)
BILIRUB SERPL-MCNC: 0.4 MG/DL (ref 0–1.2)
BUN SERPL-MCNC: 19 MG/DL (ref 8–23)
BUN/CREAT SERPL: 45.2 (ref 7–25)
CALCIUM SPEC-SCNC: 9 MG/DL (ref 8.6–10.5)
CHLORIDE SERPL-SCNC: 95 MMOL/L (ref 98–107)
CO2 SERPL-SCNC: 34 MMOL/L (ref 22–29)
CREAT SERPL-MCNC: 0.42 MG/DL (ref 0.57–1)
DEPRECATED RDW RBC AUTO: 40.3 FL (ref 37–54)
EGFRCR SERPLBLD CKD-EPI 2021: 107.4 ML/MIN/1.73
EOSINOPHIL # BLD MANUAL: 0 10*3/MM3 (ref 0–0.4)
EOSINOPHIL NFR BLD MANUAL: 0 % (ref 0.3–6.2)
ERYTHROCYTE [DISTWIDTH] IN BLOOD BY AUTOMATED COUNT: 12.8 % (ref 12.3–15.4)
GIANT PLATELETS: ABNORMAL
GLOBULIN UR ELPH-MCNC: 3.3 GM/DL
GLUCOSE SERPL-MCNC: 116 MG/DL (ref 65–99)
HCT VFR BLD AUTO: 34.4 % (ref 34–46.6)
HGB BLD-MCNC: 11.4 G/DL (ref 12–15.9)
LYMPHOCYTES # BLD MANUAL: 0.22 10*3/MM3 (ref 0.7–3.1)
LYMPHOCYTES NFR BLD MANUAL: 8 % (ref 5–12)
MAGNESIUM SERPL-MCNC: 2 MG/DL (ref 1.6–2.4)
MCH RBC QN AUTO: 28.6 PG (ref 26.6–33)
MCHC RBC AUTO-ENTMCNC: 33.1 G/DL (ref 31.5–35.7)
MCV RBC AUTO: 86.4 FL (ref 79–97)
MONOCYTES # BLD: 0.08 10*3/MM3 (ref 0.1–0.9)
NEUTROPHILS # BLD AUTO: 0.65 10*3/MM3 (ref 1.7–7)
NEUTROPHILS NFR BLD MANUAL: 60 % (ref 42.7–76)
NEUTS BAND NFR BLD MANUAL: 9 % (ref 0–5)
NEUTS VAC BLD QL SMEAR: ABNORMAL
PHOSPHATE SERPL-MCNC: 2.7 MG/DL (ref 2.5–4.5)
PLATELET # BLD AUTO: 120 10*3/MM3 (ref 140–450)
PMV BLD AUTO: 10.4 FL (ref 6–12)
POLYCHROMASIA BLD QL SMEAR: ABNORMAL
POTASSIUM SERPL-SCNC: 3.2 MMOL/L (ref 3.5–5.2)
PROT SERPL-MCNC: 6.4 G/DL (ref 6–8.5)
RBC # BLD AUTO: 3.98 10*6/MM3 (ref 3.77–5.28)
SMALL PLATELETS BLD QL SMEAR: ABNORMAL
SODIUM SERPL-SCNC: 141 MMOL/L (ref 136–145)
TOXIC GRANULATION: ABNORMAL
VARIANT LYMPHS NFR BLD MANUAL: 19 % (ref 19.6–45.3)
VARIANT LYMPHS NFR BLD MANUAL: 4 % (ref 0–5)
WBC NRBC COR # BLD AUTO: 0.94 10*3/MM3 (ref 3.4–10.8)

## 2025-02-07 PROCEDURE — 25010000002 MORPHINE PER 10 MG: Performed by: NURSE PRACTITIONER

## 2025-02-07 PROCEDURE — 97530 THERAPEUTIC ACTIVITIES: CPT

## 2025-02-07 PROCEDURE — 25010000002 FILGRASTIM PER 480 MCG: Performed by: INTERNAL MEDICINE

## 2025-02-07 PROCEDURE — 94761 N-INVAS EAR/PLS OXIMETRY MLT: CPT

## 2025-02-07 PROCEDURE — 36415 COLL VENOUS BLD VENIPUNCTURE: CPT | Performed by: PHYSICIAN ASSISTANT

## 2025-02-07 PROCEDURE — 85025 COMPLETE CBC W/AUTO DIFF WBC: CPT | Performed by: INTERNAL MEDICINE

## 2025-02-07 PROCEDURE — 94664 DEMO&/EVAL PT USE INHALER: CPT

## 2025-02-07 PROCEDURE — 25010000002 HEPARIN (PORCINE) PER 1000 UNITS: Performed by: PHYSICIAN ASSISTANT

## 2025-02-07 PROCEDURE — 85007 BL SMEAR W/DIFF WBC COUNT: CPT | Performed by: INTERNAL MEDICINE

## 2025-02-07 PROCEDURE — 94799 UNLISTED PULMONARY SVC/PX: CPT

## 2025-02-07 PROCEDURE — 25010000002 METHYLPREDNISOLONE PER 40 MG: Performed by: PHYSICIAN ASSISTANT

## 2025-02-07 PROCEDURE — 83735 ASSAY OF MAGNESIUM: CPT | Performed by: PHYSICIAN ASSISTANT

## 2025-02-07 PROCEDURE — 80053 COMPREHEN METABOLIC PANEL: CPT | Performed by: PHYSICIAN ASSISTANT

## 2025-02-07 PROCEDURE — 92526 ORAL FUNCTION THERAPY: CPT

## 2025-02-07 PROCEDURE — 97535 SELF CARE MNGMENT TRAINING: CPT

## 2025-02-07 PROCEDURE — 84100 ASSAY OF PHOSPHORUS: CPT | Performed by: PHYSICIAN ASSISTANT

## 2025-02-07 RX ORDER — LANSOPRAZOLE 30 MG/1
30 TABLET, ORALLY DISINTEGRATING, DELAYED RELEASE ORAL
Status: DISCONTINUED | OUTPATIENT
Start: 2025-02-08 | End: 2025-02-10 | Stop reason: HOSPADM

## 2025-02-07 RX ORDER — POTASSIUM CHLORIDE 750 MG/1
40 CAPSULE, EXTENDED RELEASE ORAL EVERY 4 HOURS
Status: COMPLETED | OUTPATIENT
Start: 2025-02-07 | End: 2025-02-07

## 2025-02-07 RX ADMIN — POTASSIUM CHLORIDE 40 MEQ: 750 CAPSULE, EXTENDED RELEASE ORAL at 12:08

## 2025-02-07 RX ADMIN — Medication 10 ML: at 21:06

## 2025-02-07 RX ADMIN — MIRTAZAPINE 15 MG: 15 TABLET, FILM COATED ORAL at 21:05

## 2025-02-07 RX ADMIN — METHYLPREDNISOLONE SODIUM SUCCINATE 20 MG: 40 INJECTION, POWDER, FOR SOLUTION INTRAMUSCULAR; INTRAVENOUS at 08:54

## 2025-02-07 RX ADMIN — OXYCODONE HYDROCHLORIDE AND ACETAMINOPHEN 1 TABLET: 7.5; 325 TABLET ORAL at 12:21

## 2025-02-07 RX ADMIN — HEPARIN SODIUM 5000 UNITS: 5000 INJECTION, SOLUTION INTRAVENOUS; SUBCUTANEOUS at 14:46

## 2025-02-07 RX ADMIN — Medication 10 ML: at 08:55

## 2025-02-07 RX ADMIN — CITALOPRAM HYDROBROMIDE 40 MG: 20 TABLET ORAL at 08:54

## 2025-02-07 RX ADMIN — HEPARIN SODIUM 5000 UNITS: 5000 INJECTION, SOLUTION INTRAVENOUS; SUBCUTANEOUS at 21:05

## 2025-02-07 RX ADMIN — PANTOPRAZOLE SODIUM 40 MG: 40 INJECTION, POWDER, FOR SOLUTION INTRAVENOUS at 05:07

## 2025-02-07 RX ADMIN — FILGRASTIM 480 MCG: 480 INJECTION, SOLUTION INTRAVENOUS; SUBCUTANEOUS at 08:55

## 2025-02-07 RX ADMIN — IPRATROPIUM BROMIDE AND ALBUTEROL SULFATE 3 ML: .5; 3 SOLUTION RESPIRATORY (INHALATION) at 06:03

## 2025-02-07 RX ADMIN — ROPINIROLE HYDROCHLORIDE 0.25 MG: 0.25 TABLET, FILM COATED ORAL at 21:05

## 2025-02-07 RX ADMIN — IPRATROPIUM BROMIDE AND ALBUTEROL SULFATE 3 ML: .5; 3 SOLUTION RESPIRATORY (INHALATION) at 13:51

## 2025-02-07 RX ADMIN — HEPARIN SODIUM 5000 UNITS: 5000 INJECTION, SOLUTION INTRAVENOUS; SUBCUTANEOUS at 05:07

## 2025-02-07 RX ADMIN — ARFORMOTEROL TARTRATE 15 MCG: 15 SOLUTION RESPIRATORY (INHALATION) at 06:13

## 2025-02-07 RX ADMIN — POTASSIUM CHLORIDE 40 MEQ: 750 CAPSULE, EXTENDED RELEASE ORAL at 16:46

## 2025-02-07 RX ADMIN — IPRATROPIUM BROMIDE AND ALBUTEROL SULFATE 3 ML: .5; 3 SOLUTION RESPIRATORY (INHALATION) at 10:02

## 2025-02-07 RX ADMIN — MORPHINE SULFATE 2 MG: 2 INJECTION, SOLUTION INTRAMUSCULAR; INTRAVENOUS at 22:55

## 2025-02-07 RX ADMIN — OXYCODONE HYDROCHLORIDE AND ACETAMINOPHEN 1 TABLET: 7.5; 325 TABLET ORAL at 18:38

## 2025-02-07 RX ADMIN — LEVOTHYROXINE SODIUM 112 MCG: 112 TABLET ORAL at 05:07

## 2025-02-07 RX ADMIN — METHYLPREDNISOLONE SODIUM SUCCINATE 20 MG: 40 INJECTION, POWDER, FOR SOLUTION INTRAMUSCULAR; INTRAVENOUS at 21:05

## 2025-02-07 RX ADMIN — OXYCODONE HYDROCHLORIDE AND ACETAMINOPHEN 1 TABLET: 7.5; 325 TABLET ORAL at 05:07

## 2025-02-07 RX ADMIN — BUDESONIDE 0.5 MG: 0.5 INHALANT RESPIRATORY (INHALATION) at 06:08

## 2025-02-07 NOTE — THERAPY TREATMENT NOTE
Patient Name: Ashley Gibbs  : 1955    MRN: [id number]                              Today's Date: 2025       Admit Date: 2025    Visit Dx:     ICD-10-CM ICD-9-CM   1. Acute respiratory failure with hypoxia and hypercapnia  J96.01 518.81    J96.02    2. Febrile illness  R50.9 780.60   3. Influenza A  J10.1 487.1   4. Acute UTI (urinary tract infection)  N39.0 599.0   5. Altered mental status, unspecified altered mental status type  R41.82 780.97   6. Malignant neoplasm of lung, unspecified laterality, unspecified part of lung  C34.90 162.9   7. Immunosuppressed due to chemotherapy  D84.821 V58.69    T45.1X5A     Z79.899    8. Dysphagia, unspecified type  R13.10 787.20   9. Impaired mobility [Z74.09]  Z74.09 799.89     Patient Active Problem List   Diagnosis    Acute respiratory failure with hypoxemia    Age-related osteoporosis without current pathological fracture    Anxiety    Stage 3 severe COPD by GOLD classification    Chronic pain disorder    Depression    Excessive daytime sleepiness    Hemoptysis    Family history of renal cell carcinoma    Hyperlipidemia    Hypothyroidism    Migraines    Mild episode of recurrent major depressive disorder    Neuropathy    Obstructive sleep apnea    Primary insomnia    Renal mass    Restless legs syndrome    On supplemental oxygen therapy    Family history of CHF (congestive heart failure)    Family history of diabetes mellitus    6 cm exophytic left upper pole renal cyst, likely hemorrhagic    Acute bilateral pyelonephritis    Hypokalemia    E. coli UTI, present on admission (urinary tract infection)    Paroxysmal supraventricular tachycardia    Sepsis due to Escherichia coli UTI, bilateral pyelonephritis, present on admission    Vitamin D deficiency    Lung mass    Former smoker    History of radiation therapy    Panic attacks    Small cell lung cancer    Chronic bronchitis    Acute respiratory failure    Influenza A    Leukopenia due to chemotherapy     Thrombocytopenia     Past Medical History:   Diagnosis Date    Anxiety     Arthritis     Asthma     Back pain     COPD (chronic obstructive pulmonary disease)     Dependence on supplemental oxygen     Depression     Disease of thyroid gland     Fibromyalgia, primary     Headache     History of degenerative disc disease     Hypertension     Hypothyroidism     Low back pain     Restless leg     Scoliosis      Past Surgical History:   Procedure Laterality Date    APPENDECTOMY      BREAST BIOPSY Left     BRONCHOSCOPY Right 12/23/2024    Procedure: BRONCHOSCOPY WITH ENDOBRONCHIAL ULTRASOUND;  Surgeon: Peterson Morales MD;  Location: [facility];  Service: Pulmonary;  Laterality: Right;  pre: lung mass  post: lung mass    CHOLECYSTECTOMY      TUBAL ABDOMINAL LIGATION        General Information       Row Name 02/07/25 1125          OT Time and Intention    Subjective Information no complaints  -LS     Document Type therapy note (daily note)  -LS     Mode of Treatment occupational therapy  -       Row Name 02/07/25 1125          General Information    Existing Precautions/Restrictions fall;oxygen therapy device and L/min  -LS       Row Name 02/07/25 1125          Cognition    Orientation Status (Cognition) oriented x 4  -       Row Name 02/07/25 1125          Safety Issues/Impairments Affecting Functional Mobility    Safety Issues Affecting Function (Mobility) safety precaution awareness;safety precautions follow-through/compliance;insight into deficits/self-awareness;awareness of need for assistance;positioning of assistive device  -     Impairments Affecting Function (Mobility) strength;balance;cognition;endurance/activity tolerance;shortness of breath  -     Cognitive Impairments, Mobility Safety/Performance awareness, need for assistance;insight into deficits/self-awareness;judgment;problem-solving/reasoning;safety precaution awareness;safety precaution follow-through;sequencing abilities  -                User Key  (r) = Recorded By, (t) = Taken By, (c) = Cosigned By      Initials Name Provider Type    Estrella Echevarria OTR/L Occupational Therapist                     Mobility/ADL's       Row Name 02/07/25 1125          Bed Mobility    Bed Mobility supine-sit  -     Supine-Sit [place] (Bed Mobility) minimum assist (75% patient effort);verbal cues;nonverbal cues (demo/gesture)  -     Assistive Device (Bed Mobility) bed rails;head of bed elevated  -       Row Name 02/07/25 [facility]5          Transfers    Transfers sit-stand transfer;bed-chair transfer  -       Row Name 02/07/25 [facility]5          Bed-Chair Transfer    Bed-Chair [place] (Transfers) minimum assist (75% patient effort);moderate assist (50% patient effort);verbal cues;nonverbal cues (demo/gesture)  -     Assistive Device (Bed-Chair Transfers) walker, front-wheeled  -       Row Name 02/07/25 [facility]5          Sit-Stand Transfer    Sit-Stand [place] (Transfers) minimum assist (75% patient effort);verbal cues;nonverbal cues (demo/gesture)  -     Assistive Device (Sit-Stand Transfers) walker, front-wheeled  -               User Key  (r) = Recorded By, (t) = Taken By, (c) = Cosigned By      Initials Name Provider Type    Estrella Echevarria, LILIANAR/L Occupational Therapist                   Obj/Interventions    No documentation.                  Goals/Plan    No documentation.                  Clinical Impression       Row Name 02/07/25 [facility]5          Pain Assessment    Pretreatment Pain Rating 0/10 - no pain  -LS     Posttreatment Pain Rating 0/10 - no pain  -       Row Name 02/07/25 [facility]5          Plan of Care Review    Plan of Care Reviewed With patient  -LS     Progress improving  -     Outcome Evaluation OT tx completed. Pt in fowlers upon therapist arrival; A&Ox4; No c/o pain; 5L BNC with SpO2 95% at rest. Increased supplemental O2 to 7L BNC prior to activity. Pt performed supine>sit utilizing bedrail with HOB elevated with Min A and  verbal/visual cues for sequencing. Pt performed sit>stand utilizing rwx with Min A and verbal/tactile cues for sequencing and body mechanics. Once standing, Pt was able to take a few steps from bed>chair utilizing rwx with Min-Mod A and constant verbal/tactile cues for sequencing, body mechanics, and positioning of AD. SpO2 remained WNL while Pt remained on 7L with activity. Cont OT POC. Recommend SNF at discharge.  -       Row Name 02/07/25 1125          Therapy Plan Review/Discharge Plan (OT)    Anticipated Discharge Disposition (OT) skilled nursing facility  -       Row Name 02/07/25 1125          Positioning and Restraints    Pre-Treatment Position in bed  -LS     Post Treatment Position chair  -LS     In Chair reclined;call light within reach;encouraged to call for assist;exit alarm on;legs elevated;notified nsg  -               User Key  (r) = Recorded By, (t) = Taken By, (c) = Cosigned By      Initials Name Provider Type    Estrella Echevarria OTR/L Occupational Therapist                   Outcome Measures       Row Name 02/07/25 1125          How much help from another is currently needed...    Putting on and taking off regular lower body clothing? 1  -LS     Bathing (including washing, rinsing, and drying) 2  -LS     Toileting (which includes using toilet bed pan or urinal) 1  -LS     Putting on and taking off regular upper body clothing 2  -LS     Taking care of personal grooming (such as brushing teeth) 2  -LS     Eating meals 3  -LS     AM-PAC 6 Clicks Score (OT) 11  -       Row Name 02/07/25 1125          Functional Assessment    Outcome Measure Options AM-PAC 6 Clicks Daily Activity (OT)  -LS               User Key  (r) = Recorded By, (t) = Taken By, (c) = Cosigned By      Initials Name Provider Type    Estrella Echevarria OTR/L Occupational Therapist                    Occupational Therapy Education       Title: PT OT SLP Therapies (In Progress)       Topic: Occupational Therapy (In Progress)        Point: ADL training (Done)       Description:   Instruct learner(s) on proper safety adaptation and remediation techniques during self care or transfers.   Instruct in proper use of assistive devices.                  Learning Progress Summary            Patient Acceptance, E, VU,NR by  at 2/7/2025 1311    Acceptance, E, VU by ISHAN at 2/5/2025 1253                      Point: Home exercise program (Not Started)       Description:   Instruct learner(s) on appropriate technique for monitoring, assisting and/or progressing therapeutic exercises/activities.                  Learner Progress:  Not documented in this visit.              Point: Precautions (Done)       Description:   Instruct learner(s) on prescribed precautions during self-care and functional transfers.                  Learning Progress Summary            Patient Acceptance, E, VU,NR by  at 2/7/2025 1311    Acceptance, E, VU by ISHAN at 2/5/2025 1253                      Point: Body mechanics (Done)       Description:   Instruct learner(s) on proper positioning and spine alignment during self-care, functional mobility activities and/or exercises.                  Learning Progress Summary            Patient Acceptance, E, VU,NR by  at 2/7/2025 1311    Acceptance, E, VU by ISHAN at 2/5/2025 1253                                      User Key       Initials Effective Dates Name Provider Type Discipline     11/15/24 -  Geovanna Ritchie OTR/L, CSRS Occupational Therapist OT     06/20/22 -  Estrella Saldivar OTR/L Occupational Therapist OT                  OT Recommendation and Plan     Plan of Care Review  Plan of Care Reviewed With: patient  Progress: improving  Outcome Evaluation: OT tx completed. Pt in fowlers upon therapist arrival; A&Ox4; No c/o pain; 5L BNC with SpO2 95% at rest. Increased supplemental O2 to 7L BNC prior to activity. Pt performed supine>sit utilizing bedrail with HOB elevated with Min A and verbal/visual cues for sequencing. Pt  performed sit>stand utilizing rwx with Min A and verbal/tactile cues for sequencing and body mechanics. Once standing, Pt was able to take a few steps from bed>chair utilizing rwx with Min-Mod A and constant verbal/tactile cues for sequencing, body mechanics, and positioning of AD. SpO2 remained WNL while Pt remained on 7L with activity. Cont OT POC. Recommend SNF at discharge.     Time Calculation:         Time Calculation- OT       Row Name 02/07/25 1125             Time Calculation- OT    OT Start Time 1125  -LS      OT Stop Time 1154  -LS      OT Time Calculation (min) 29 min  -LS      Total Timed Code Minutes- OT 29 minute(s)  -LS      OT Received On 02/07/25  -                User Key  (r) = Recorded By, (t) = Taken By, (c) = Cosigned By      Initials Name Provider Type    Estrella Echevarria OTR/L Occupational Therapist                  Therapy Charges for Today       Code Description Service Date Service Provider Modifiers Qty    24706961444 HC OT SELF CARE/MGMT/TRAIN EA 15 MIN 2/7/2025 Estrella Saldivar OTR/L GO 2                 BAUDILIO Foley/MARK  2/7/2025

## 2025-02-07 NOTE — THERAPY TREATMENT NOTE
Acute Care - Physical Therapy Treatment Note  [facility]     Patient Name: Ashley Gibbs  : 1955  MRN: [id number]  Today's Date: 2025      Visit Dx:     ICD-10-CM ICD-9-CM   1. Acute respiratory failure with hypoxia and hypercapnia  J96.01 518.81    J96.02    2. Febrile illness  R50.9 780.60   3. Influenza A  J10.1 487.1   4. Acute UTI (urinary tract infection)  N39.0 599.0   5. Altered mental status, unspecified altered mental status type  R41.82 780.97   6. Malignant neoplasm of lung, unspecified laterality, unspecified part of lung  C34.90 162.9   7. Immunosuppressed due to chemotherapy  D84.821 V58.69    T45.1X5A     Z79.899    8. Dysphagia, unspecified type  R13.10 787.20   9. Impaired mobility [Z74.09]  Z74.09 799.89     Patient Active Problem List   Diagnosis    Acute respiratory failure with hypoxemia    Age-related osteoporosis without current pathological fracture    Anxiety    Stage 3 severe COPD by GOLD classification    Chronic pain disorder    Depression    Excessive daytime sleepiness    Hemoptysis    Family history of renal cell carcinoma    Hyperlipidemia    Hypothyroidism    Migraines    Mild episode of recurrent major depressive disorder    Neuropathy    Obstructive sleep apnea    Primary insomnia    Renal mass    Restless legs syndrome    On supplemental oxygen therapy    Family history of CHF (congestive heart failure)    Family history of diabetes mellitus    6 cm exophytic left upper pole renal cyst, likely hemorrhagic    Acute bilateral pyelonephritis    Hypokalemia    E. coli UTI, present on admission (urinary tract infection)    Paroxysmal supraventricular tachycardia    Sepsis due to Escherichia coli UTI, bilateral pyelonephritis, present on admission    Vitamin D deficiency    Lung mass    Former smoker    History of radiation therapy    Panic attacks    Small cell lung cancer    Chronic bronchitis    Acute respiratory failure    Influenza A    Leukopenia due to chemotherapy     Thrombocytopenia     Past Medical History:   Diagnosis Date    Anxiety     Arthritis     Asthma     Back pain     COPD (chronic obstructive pulmonary disease)     Dependence on supplemental oxygen     Depression     Disease of thyroid gland     Fibromyalgia, primary     Headache     History of degenerative disc disease     Hypertension     Hypothyroidism     Low back pain     Restless leg     Scoliosis      Past Surgical History:   Procedure Laterality Date    APPENDECTOMY      BREAST BIOPSY Left     BRONCHOSCOPY Right 12/23/2024    Procedure: BRONCHOSCOPY WITH ENDOBRONCHIAL ULTRASOUND;  Surgeon: Peterson Morales MD;  Location: [facility] OR;  Service: Pulmonary;  Laterality: Right;  pre: lung mass  post: lung mass    CHOLECYSTECTOMY      TUBAL ABDOMINAL LIGATION       PT Assessment (Last 12 Hours)       PT Evaluation and Treatment       Row Name 02/07/25 1018          Physical Therapy Time and Intention    Subjective Information complains of;weakness  -AE     Document Type therapy note (daily note)  -AE     Mode of Treatment physical therapy  -AE       Row Name 02/07/25 1018          General Information    Existing Precautions/Restrictions fall  -AE       Row Name 02/07/25 1018          Pain    Pretreatment Pain Rating 0/10 - no pain  -AE     Posttreatment Pain Rating 0/10 - no pain  -AE       Row Name 02/07/25 1018          Bed Mobility    Scooting/[place] (Bed Mobility) moderate assist (50% patient effort);2 person assist  -AE     Supine-Sit [place] (Bed Mobility) minimum assist (75% patient effort);verbal cues  -AE     Sit-Supine [place] (Bed Mobility) contact guard;verbal cues  -AE     Comment, (Bed Mobility) PRACTICED BED MOBLITY X 2  -AE       Row Name 02/07/25 1018          Sit-Stand Transfer    Sit-Stand [place] (Transfers) maximum assist (25% patient effort);2 person assist  HHA  -AE       Row Name 02/07/25 1018          Vital Signs    Pre SpO2 (%) 94  -AE     O2 Delivery Pre  Treatment supplemental O2  5L  -AE     O2 Delivery Intra Treatment supplemental O2  7L  -AE     Post SpO2 (%) 90  -AE     O2 Delivery Post Treatment supplemental O2  5L  -AE       Row Name 02/07/25 1018          Positioning and Restraints    Pre-Treatment Position in bed  -AE     Post Treatment Position bed  -AE     In Bed fowlers;exit alarm on;with family/caregiver  -AE               User Key  (r) = Recorded By, (t) = Taken By, (c) = Cosigned By      Initials Name Provider Type    AE Indira Jeffries, PTA Physical Therapist Assistant                    Physical Therapy Education       Title: PT OT SLP Therapies (In Progress)       Topic: Physical Therapy (In Progress)       Point: Mobility training (In Progress)       Learning Progress Summary            Patient Acceptance, E, NR by TONNY at 2/5/2025 1115    Comment: benefits of activity, progression of PT                      Point: Home exercise program (Not Started)       Learner Progress:  Not documented in this visit.              Point: Body mechanics (Not Started)       Learner Progress:  Not documented in this visit.              Point: Precautions (Not Started)       Learner Progress:  Not documented in this visit.                              User Key       Initials Effective Dates Name Provider Type Discipline    TONNY 02/03/23 -  Mendel Puentes, PT DPT Physical Therapist PT                  PT Recommendation and Plan     Progress: improving  Outcome Evaluation: Pt on 5L 94% and needed 7L for activity.Pt was min x1 supine to sit and min x1 to scoot.Pt practiced bed mobilty x 2 then stood max x2 HHA.PT will continue to progress patient.02 back on 5L 90%.   Outcome Measures       Row Name 02/06/25 1307             How much help from another person do you currently need...    Turning from your back to your side while in flat bed without using bedrails? 2  -MF      Moving from lying on back to sitting on the side of a flat bed without bedrails? 2  -MF       Moving to and from a bed to a chair (including a wheelchair)? 1  -MF      Standing up from a chair using your arms (e.g., wheelchair, bedside chair)? 1  -MF      Climbing 3-5 steps with a railing? 1  -MF      To walk in hospital room? 1  -MF      AM-PAC 6 Clicks Score (PT) 8  -MF         Functional Assessment    Outcome Measure Options AM-PAC 6 Clicks Basic Mobility (PT)  -MF                User Key  (r) = Recorded By, (t) = Taken By, (c) = Cosigned By      Initials Name Provider Type    Danyelle Gill PTA Physical Therapist Assistant                     Time Calculation:    PT Charges       Row Name 02/07/25 1108             Time Calculation    Start Time 1018  -AE      Stop Time 1102  -AE      Time Calculation (min) 44 min  -AE      PT Received On 02/07/25  -AE      PT Goal Re-Cert Due Date 02/15/25  -AE         Time Calculation- PT    Total Timed Code Minutes- PT 44 minute(s)  -AE         Timed Charges    43146 - PT Therapeutic Activity Minutes 44  -AE         Total Minutes    Timed Charges Total Minutes 44  -AE       Total Minutes 44  -AE                User Key  (r) = Recorded By, (t) = Taken By, (c) = Cosigned By      Initials Name Provider Type    AE Indira Jeffries PTA Physical Therapist Assistant                  Therapy Charges for Today       Code Description Service Date Service Provider Modifiers Qty    97883799747 HC PT THERAPEUTIC ACT EA 15 MIN 2/7/2025 Indira Jeffries PTA GP 3            PT G-Codes  Outcome Measure Options: AM-PAC 6 Clicks Basic Mobility (PT)  AM-PAC 6 Clicks Score (PT): 15  AM-PAC 6 Clicks Score (OT): 10    Indira Jeffries PTA  2/7/2025

## 2025-02-07 NOTE — PROGRESS NOTES
Pharmacy Dosing Service  Automatic IV to PO Conversion  Protonix    Assessment/Action/Plan:  Patient meets criteria listed below. Protonix 40 mg IV every 24 hours has been changed to Lansoprazole solutab 30 mg per tube  every 24 hours.       Subjective:  Ashley Gibbs is a 69 y.o. female who meets the following criteria for IV to PO therapy conversion     Policy Criteria:  Tolerating oral fluids or 40ml/hour of enteral nutrition and oral route not otherwise compromised  Receiving other oral medications on a scheduled basis    Additional Factors Considered:  Anti-emetic usage  Patient disposition per documentation  Disease states or conditions contraindicating oral conversion    Objective:  Diet Order   Procedures    Diet: Regular/House; Neutropenic/Low Microbial; Texture: Pureed (NDD 1); Fluid Consistency: Thin (IDDSI 0)       Active Medications    Current Facility-Administered Medications:     sennosides-docusate (PERICOLACE) 8.6-50 MG per tablet 2 tablet, 2 tablet, Per G Tube, BID, 2 tablet at 02/04/25 0814 **AND** polyethylene glycol (MIRALAX) packet 17 g, 17 g, Per G Tube, Daily PRN, 17 g at 02/04/25 0223 **AND** [DISCONTINUED] bisacodyl (DULCOLAX) EC tablet 5 mg, 5 mg, Oral, Daily PRN **AND** bisacodyl (DULCOLAX) suppository 10 mg, 10 mg, Rectal, Daily PRN, Eve Owens MD    budesonide (PULMICORT) nebulizer solution 0.5 mg, 0.5 mg, Nebulization, BID - RT, Francisco Goddard PA-C, 0.5 mg at 02/07/25 0608    citalopram (CeleXA) tablet 40 mg, 40 mg, Per G Tube, Daily, Eve Owens MD, 40 mg at 02/07/25 0854    diphenhydrAMINE (BENADRYL) injection 12.5 mg, 12.5 mg, Intravenous, Q6H PRN, Mohsen Garcia PA-C, 12.5 mg at 02/05/25 2215    filgrastim (NEUPOGEN) injection 480 mcg, 480 mcg, Subcutaneous, Daily, Ramón Li MD, 480 mcg at 02/07/25 0855    heparin (porcine) 5000 UNIT/ML injection 5,000 Units, 5,000 Units, Subcutaneous, Q8H, Francisco Goddard PA-C, 5,000 Units at 02/07/25 0504     ipratropium-albuterol (DUO-NEB) nebulizer solution 3 mL, 3 mL, Nebulization, Q4H PRN, Francisco Goddard PA-C    ipratropium-albuterol (DUO-NEB) nebulizer solution 3 mL, 3 mL, Nebulization, 4x Daily - RT, Rony Edwards MD, 3 mL at 02/07/25 1351    [START ON 2/8/2025] lansoprazole (PREVACID SOLUTAB) disintegrating tablet Tablet Delayed Release Dispersible 30 mg, 30 mg, Per G Tube, QAM AC, Rony Edwards MD    levothyroxine (SYNTHROID, LEVOTHROID) tablet 112 mcg, 112 mcg, Per G Tube, Q AM, Eve Owens MD, 112 mcg at 02/07/25 0507    [COMPLETED] methylPREDNISolone sodium succinate (SOLU-Medrol) injection 60 mg, 60 mg, Intravenous, Q6H, 60 mg at 02/02/25 1114 **FOLLOWED BY** [COMPLETED] methylPREDNISolone sodium succinate (SOLU-Medrol) injection 40 mg, 40 mg, Intravenous, Q8H, 40 mg at 02/05/25 0005 **FOLLOWED BY** methylPREDNISolone sodium succinate (SOLU-Medrol) injection 20 mg, 20 mg, Intravenous, Q12H, Francisco Goddard PA-C, 20 mg at 02/07/25 0854    mirtazapine (REMERON) tablet 15 mg, 15 mg, Per G Tube, Nightly, Eve Owens MD, 15 mg at 02/06/25 2045    Morphine sulfate (PF) injection 2 mg, 2 mg, Intravenous, Q2H PRN, aD Brennan APRN, 2 mg at 02/05/25 0433    nitroglycerin (NITROSTAT) SL tablet 0.4 mg, 0.4 mg, Sublingual, Q5 Min PRN, Francisco Goddard PA-C    ondansetron ODT (ZOFRAN-ODT) disintegrating tablet 4 mg, 4 mg, Translingual, Q6H PRN, Da Brennan APRN, 4 mg at 02/05/25 1314    oxyCODONE-acetaminophen (PERCOCET) 7.5-325 MG per tablet 1 tablet, 1 tablet, Oral, Q6H PRN, Da Brennan, CARRIE, 1 tablet at 02/07/25 1221    potassium chloride (MICRO-K/KLOR-CON) CR capsule, 40 mEq, Oral, Q4H, Rony Edwards MD, 40 mEq at 02/07/25 1208    rOPINIRole (REQUIP) tablet 0.25 mg, 0.25 mg, Per G Tube, Nightly, Eve Owens MD, 0.25 mg at 02/06/25 2045    sodium chloride 0.9 % flush 10 mL, 10 mL, Intravenous, Q12H, Francisco Goddard PA-C, 10 mL at 02/07/25 0855    sodium  chloride 0.9 % flush 10 mL, 10 mL, Intravenous, PRN, Francisco Goddard PA-C    sodium chloride 0.9 % infusion 40 mL, 40 mL, Intravenous, PRN, Francisco Goddard PA-C J Von Bokel, PharmD  02/07/2514:10 CST

## 2025-02-07 NOTE — PLAN OF CARE
Goal Outcome Evaluation:              Outcome Evaluation: Pt in room alone. Neutropenic precautions. Dietitian updated diet. Intake in the last 72 hrs: 0%; 150 ml. Pt tells dietitian that she is not eating because she does not like the food. Rationale for diet was explained, and dietitian offered ONS, but she declined, noting she just wants toast and partida. Family brought her dentures to the hospital. Dietitian messaged SLP to evaluate if it's safe for diet advancement. NKFA. Denies N/V. C/o diarrhea. UBW at home: 148 lb. New labs: K 3.4, Creat 0.42, Cl 95, Alb 3.1, Glu 116. Last BM: 2/6. Edema per flowsheets: 1+ arm, hands, and feet edema. Dietitian encouraged pt to eat as tolerated and to work with food ambassadors about meal preferences. Will monitor

## 2025-02-07 NOTE — PROGRESS NOTES
[facility] Medicine Services  INPATIENT PROGRESS NOTE    Patient Name: Ashley Gibbs  Date of Admission: 1/31/2025  Today's Date: 02/07/25  Length of Stay: 7  Primary Care Physician: Padmaja Bermudez APRN    Subjective   Chief Complaint: follow-up respiratory failure  HPI   Patient currently on 5 L by nasal cannula.  She typically uses 3 L by nasal cannula at home.  She reports that she has worked with physical therapy today, but does have generalized weakness, and reported that she was able to stand briefly.  Discussed with her daughter at bedside.  She denies any pain.  Patient did wonder if she could get her home CPAP device and bring into the hospital for use.    Review of Systems   All pertinent negatives and positives are as above. All other systems have been reviewed and are negative unless otherwise stated.     Objective    Temp:  [97.8 °F (36.6 °C)-98.6 °F (37 °C)] 98.3 °F (36.8 °C)  Heart Rate:  [65-96] 89  Resp:  [18-20] 20  BP: (114-155)/(55-83) 155/76  Physical Exam  Vitals reviewed.   Constitutional:       Appearance: She is ill-appearing.   HENT:      Head: Normocephalic.      Mouth/Throat:      Mouth: Mucous membranes are moist.   Cardiovascular:      Rate and Rhythm: Normal rate.   Pulmonary:      Effort: Pulmonary effort is normal.      Breath sounds: Wheezing present. No rhonchi.      Comments: On 5LNC  Skin:     General: Skin is warm.   Neurological:      Mental Status: She is alert.      Motor: Weakness present.   Psychiatric:         Mood and Affect: Mood normal.         Results Review:  I have reviewed the labs, radiology results, and diagnostic studies.    Laboratory Data:   Results from last 7 days   Lab Units 02/07/25  0347 02/06/25  0228 02/05/25  0457   WBC 10*3/mm3 0.94* 1.18* 2.11*   HEMOGLOBIN g/dL 11.4* 11.2* 10.5*   HEMATOCRIT % 34.4 33.9* 32.8*   PLATELETS 10*3/mm3 120* 141 139*        Results from last 7 days   Lab Units 02/07/25  0347  "02/06/25  0228 02/05/25  0305   SODIUM mmol/L 141 138 137   POTASSIUM mmol/L 3.2* 3.5 3.8   CHLORIDE mmol/L 95* 93* 96*   CO2 mmol/L 34.0* 33.0* 30.0*   BUN mg/dL 19 25* 26*   CREATININE mg/dL 0.42* 0.52* 0.44*   CALCIUM mg/dL 9.0 8.9 8.7   BILIRUBIN mg/dL 0.4 0.4 0.3   ALK PHOS U/L 83 86 85   ALT (SGPT) U/L 17 17 22   AST (SGOT) U/L 16 17 22   GLUCOSE mg/dL 116* 103* 95       Culture Data:   No results found for: \"BLOODCX\", \"URINECX\", \"WOUNDCX\", \"MRSACX\", \"RESPCX\", \"STOOLCX\"    Radiology Data:   Imaging Results (Last 24 Hours)       ** No results found for the last 24 hours. **            I have reviewed the patient's current medications.     Assessment/Plan   Assessment  Active Hospital Problems    Diagnosis     Leukopenia due to chemotherapy     Thrombocytopenia     Influenza A     Small cell lung cancer     Former smoker     Acute respiratory failure with hypoxemia        Treatment Plan  Transferred out of ICU on 2/6/2025  Extubated 2/4  Currently on 5LNC  Completed 3 days of azithromycin.  Has received 5 days of IV ceftriaxone, and will plan for 2 additional days of treatment.  Scheduled DuoNebs in addition to Pulmicort nebulizers  Currently on IV Solu-Medrol 20 mg IV every 12 hours and plan to continue taper; likely transition to prednisone tomorrow  Neupogen has been added by oncology.  Per Oncology note:  She received cycle #1, day #1 of  carboplatin/-16 on 1/27/2025-01/29/25.       Palliative emergent XRT of the chest consisted of 1250 cGy in 5 treatment fractions between 12/13/2024 and 12/19/2024 by Dr. Rico Vanegas at [facility] for the associated SVC due to tumor.    K supplement  Repeat CBC with diff and BMP in AM  Patient finished course of Tamiflu on 2/4/2025  Continue PT and OT  12.  Currently on heparin for DVT prophylaxis; monitor platelet trend closely  13.  Discussed with her daughter this afternoon to bring in her home CPAP device for use starting tonight in the hospital  14.  Dispo " planning    Medical Decision Making  Number and Complexity of problems: 2 acute; high complexity      Conditions and Status        Condition is improving.     [facility] Data  External documents reviewed: none  Cardiac tracing (EKG, telemetry) interpretation: no new EKGs  Radiology interpretation: no new radiology studies  Labs reviewed: as above  Any tests that were considered but not ordered: none     Decision rules/scores evaluated (example [id number], [place], etc): none     Discussed with: patient and daughter     Care Planning  Shared decision making: Discussed with patient and daughter with agreement to proceed with treatment plan as outlined  Code status and discussions: DO NOT RESUSCITATE    Disposition  Social Determinants of Health that impact treatment or disposition: none apparent at this time  I expect the patient to be discharged to: D and will see how patient progress with PT.  She really wants to go back home.        Electronically signed by Rony Edwards MD, 02/07/25, 11:07 CST.

## 2025-02-07 NOTE — PROGRESS NOTES
Enter Query Response Below      Query Response: bacterial pneumonia unspecified.              If applicable, please update the problem list.

## 2025-02-07 NOTE — PLAN OF CARE
Goal Outcome Evaluation:  Plan of Care Reviewed With: patient        Progress: improving  Outcome Evaluation: Pt on 5L 94% and needed 7L for activity.Pt was min x1 supine to sit and min x1 to scoot.Pt practiced bed mobilty x 2 then stood max x2 HHA.PT will continue to progress patient.02 back on 5L 90%.

## 2025-02-07 NOTE — THERAPY TREATMENT NOTE
Acute Care - Speech Language Pathology   Swallow Treatment Note [facility]     Patient Name: Ashley Gibbs  : 1955  MRN: [id number]  Today's Date: 2025               Admit Date: 2025  SLP treatment complete. The pt is alert and oriented x3 sitting upright in bed at time of SLP arrival. She now has her upper dentures present. They were cleaned and placed and she completed trials of regular solids and thin liquids. Decreased rotary chew noted with prolonged bolus formation and moderate oral residue post swallow completion even following 2x thin liquid wash. No overt s/s of aspiration noted this date. Pt ok to upgrade to a soft diet with chopped meats. SLP will continue to follow to ensure diet tolerance.    Alli De, CCC-SLP 2025 15:11 CST    Visit Dx:     ICD-10-CM ICD-9-CM   1. Acute respiratory failure with hypoxia and hypercapnia  J96.01 518.81    J96.02    2. Febrile illness  R50.9 780.60   3. Influenza A  J10.1 487.1   4. Acute UTI (urinary tract infection)  N39.0 599.0   5. Altered mental status, unspecified altered mental status type  R41.82 780.97   6. Malignant neoplasm of lung, unspecified laterality, unspecified part of lung  C34.90 162.9   7. Immunosuppressed due to chemotherapy  D84.821 V58.69    T45.1X5A     Z79.899    8. Dysphagia, unspecified type  R13.10 787.20   9. Impaired mobility [Z74.09]  Z74.09 799.89     Patient Active Problem List   Diagnosis    Acute respiratory failure with hypoxemia    Age-related osteoporosis without current pathological fracture    Anxiety    Stage 3 severe COPD by GOLD classification    Chronic pain disorder    Depression    Excessive daytime sleepiness    Hemoptysis    Family history of renal cell carcinoma    Hyperlipidemia    Hypothyroidism    Migraines    Mild episode of recurrent major depressive disorder    Neuropathy    Obstructive sleep apnea    Primary insomnia    Renal mass    Restless legs syndrome    On supplemental oxygen therapy     Family history of CHF (congestive heart failure)    Family history of diabetes mellitus    6 cm exophytic left upper pole renal cyst, likely hemorrhagic    Acute bilateral pyelonephritis    Hypokalemia    E. coli UTI, present on admission (urinary tract infection)    Paroxysmal supraventricular tachycardia    Sepsis due to Escherichia coli UTI, bilateral pyelonephritis, present on admission    Vitamin D deficiency    Lung mass    Former smoker    History of radiation therapy    Panic attacks    Small cell lung cancer    Chronic bronchitis    Acute respiratory failure    Influenza A    Leukopenia due to chemotherapy    Thrombocytopenia     Past Medical History:   Diagnosis Date    Anxiety     Arthritis     Asthma     Back pain     COPD (chronic obstructive pulmonary disease)     Dependence on supplemental oxygen     Depression     Disease of thyroid gland     Fibromyalgia, primary     Headache     History of degenerative disc disease     Hypertension     Hypothyroidism     Low back pain     Restless leg     Scoliosis      Past Surgical History:   Procedure Laterality Date    APPENDECTOMY      BREAST BIOPSY Left     BRONCHOSCOPY Right 12/23/2024    Procedure: BRONCHOSCOPY WITH ENDOBRONCHIAL ULTRASOUND;  Surgeon: Peterson Morales MD;  Location: [facility];  Service: Pulmonary;  Laterality: Right;  pre: lung mass  post: lung mass    CHOLECYSTECTOMY      TUBAL ABDOMINAL LIGATION         SLP Recommendation and Plan  SLP Swallowing Diagnosis: mild-moderate, oral dysphagia, suspected pharyngeal dysphagia (02/07/25 1315)                    Swallow Criteria for Skilled Therapeutic Interventions Met: demonstrates skilled criteria (02/07/25 1315)     Rehab Potential/Prognosis, Swallowing: adequate, monitor progress closely (02/07/25 1315)                 Daily Summary of Progress (SLP): progress toward functional goals as expected (02/07/25 1315)               Treatment Assessment (SLP): continued (02/07/25 1315)     Plan  for Continued Treatment (SLP): continue treatment per plan of care (02/07/25 1315)         Progress: improving      SWALLOW EVALUATION (Last 72 Hours)       SLP Adult Swallow Evaluation       Row Name 02/07/25 1315 02/06/25 0823 02/05/25 1056             Rehab Evaluation    Document Type evaluation  -CS therapy note (daily note)  -MG therapy note (daily note)  -MG      Subjective Information no complaints  -CS no complaints  -MG no complaints  -MG      Patient Observations alert;cooperative;agree to therapy  -CS alert;cooperative;agree to therapy  -MG alert;cooperative;agree to therapy  -MG      Patient/Family/Caregiver Comments/Observations No family present  -CS No family present.  -MG No family present  -MG      Patient Effort adequate  -CS good  -MG adequate  -MG      Symptoms Noted During/After Treatment -- none  -MG none  -MG         Pain    Pretreatment Pain Rating 0/10 - no pain  -CS -- --      Posttreatment Pain Rating 0/10 - no pain  -CS -- --      Additional Documentation -- Pain Scale: FACES Pre/Post-Treatment (Group)  -MG Pain Scale: FACES Pre/Post-Treatment (Group)  -MG         Pain Scale: FACES Pre/Post-Treatment    Pain: FACES Scale, Pretreatment -- 0-->no hurt  -MG 0-->no hurt  -MG      Posttreatment Pain Rating -- 0-->no hurt  -MG 0-->no hurt  -MG         SLP Evaluation Clinical Impression    SLP Swallowing Diagnosis mild-moderate;oral dysphagia;suspected pharyngeal dysphagia  -CS -- --      Functional Impact risk of aspiration/pneumonia;risk of malnutrition;risk of dehydration  -CS -- --      Rehab Potential/Prognosis, Swallowing adequate, monitor progress closely  -CS -- --      Swallow Criteria for Skilled Therapeutic Interventions Met demonstrates skilled criteria  -CS -- --         SLP Treatment Clinical Impressions    Treatment Assessment (SLP) continued  -CS continued  -MG improved  -MG      Treatment Assessment Comments (SLP) -- See note  -MG See note  -MG      Daily Summary of Progress  (SLP) progress toward functional goals as expected  -CS progress toward functional goals is gradual  -MG progress toward functional goals as expected  -MG      Barriers to Overall Progress (SLP) -- Cognitive status  -MG --      Plan for Continued Treatment (SLP) continue treatment per plan of care  -CS continue treatment per plan of care  -MG goals adjusted to reflect functional improvements demonstrated  -MG      Care Plan Review care plan/treatment goals reviewed  -CS care plan/treatment goals reviewed  -MG evaluation/treatment results reviewed;care plan/treatment goals reviewed;risks/benefits reviewed;current/potential barriers reviewed;patient/other agree to care plan  -MG      Care Plan Review, Other Participant(s) caregiver  -CS caregiver  JADIEL Cosme  -MG caregiver  JADIEL Valadez  -MG         Recommendations    SLP Diet Recommendation -- -- puree;thin liquids  -MG      Recommended Diagnostics -- -- VFSS (MBS)  with any increasing concerns  -MG      Recommended Precautions and Strategies -- -- upright posture during/after eating;small bites of food and sips of liquid;general aspiration precautions;fatigue precautions;1:1 supervision;assist with feeding  -MG      Oral Care Recommendations -- -- Oral Care BID/PRN;Toothbrush  -MG      SLP Rec. for Method of Medication Administration -- -- meds whole;meds crushed;with puree;as tolerated  -MG      Monitor for Signs of Aspiration -- -- yes;notify SLP if any concerns  -MG         Swallow Goals (SLP)    Swallow LTGs Swallow Long Term Goal (free text)  -CS Swallow Long Term Goal (free text)  -MG Swallow Long Term Goal (free text)  -MG      Swallow STGs diet tolerance goal selection (SLP)  -CS diet tolerance goal selection (SLP)  -MG diet tolerance goal selection (SLP)  -MG      Diet Tolerance Goal Selection (SLP) Patient will tolerate trials of  -CS Patient will tolerate trials of  -MG Patient will tolerate trials of  -MG         (LTG) Swallow    (LTG) Swallow Patient will  tolerate least restrictive diet without overt s/s of aspiration.  -CS Patient will tolerate least restrictive diet without overt s/s of aspiration.  -MG Patient will tolerate least restrictive diet without overt s/s of aspiration.  -MG      [place] (Swallow Long Term Goal) independently (over 90% accuracy)  -CS independently (over 90% accuracy)  -MG independently (over 90% accuracy)  -MG      Time Frame (Swallow Long Term Goal) by discharge  -CS by discharge  -MG by discharge  -MG      Barriers (Swallow Long Term Goal) medically complex  -CS medically complex  -MG medically complex  -MG      Progress/Outcomes (Swallow Long Term Goal) goal ongoing  -CS progress slower than expected  -MG continuing progress toward goal  -MG         (STG) Patient will tolerate trials of    Consistencies Trialed (Tolerate trials) soft to chew (chopped) textures;pureed textures;honey/ moderately thick liquids;nectar/ mildly thick liquids;thin liquids  -CS soft to chew (chopped) textures;pureed textures;honey/ moderately thick liquids;nectar/ mildly thick liquids;thin liquids  -MG soft to chew (chopped) textures;pureed textures;honey/ moderately thick liquids;nectar/ mildly thick liquids;thin liquids  -MG      Desired Outcome (Tolerate trials) without signs/symptoms of aspiration;without signs of distress;with adequate oral prep/transit/clearance  -CS without signs/symptoms of aspiration;without signs of distress;with adequate oral prep/transit/clearance  -MG without signs/symptoms of aspiration;without signs of distress;with adequate oral prep/transit/clearance  -MG      [place] (Tolerate trials) independently (over 90% accuracy)  -CS independently (over 90% accuracy)  -MG independently (over 90% accuracy)  -MG      Time Frame (Tolerate trials) by discharge  -CS by discharge  -MG by discharge  -MG      Progress/Outcomes (Tolerate trials) goal ongoing  -CS progress slower than expected  -MG continuing progress toward goal  -MG                 User Key  (r) = Recorded By, (t) = Taken By, (c) = Cosigned By      Initials Name Effective Dates    CS Alli De, [facility]-SLP 05/07/24 -     MG Elena Ron, MS CCC-SLP 07/11/23 -                     EDUCATION  The patient has been educated in the following areas:   Dysphagia (Swallowing Impairment).        SLP GOALS       Row Name 02/07/25 1315 02/06/25 0823 02/05/25 1056       (LTG) Swallow    (LTG) Swallow Patient will tolerate least restrictive diet without overt s/s of aspiration.  -CS Patient will tolerate least restrictive diet without overt s/s of aspiration.  -MG Patient will tolerate least restrictive diet without overt s/s of aspiration.  -MG    [place] (Swallow Long Term Goal) independently (over 90% accuracy)  -CS independently (over 90% accuracy)  -MG independently (over 90% accuracy)  -MG    Time Frame (Swallow Long Term Goal) by discharge  -CS by discharge  -MG by discharge  -MG    Barriers (Swallow Long Term Goal) medically complex  -CS medically complex  -MG medically complex  -MG    Progress/Outcomes (Swallow Long Term Goal) goal ongoing  -CS progress slower than expected  -MG continuing progress toward goal  -MG       (STG) Patient will tolerate trials of    Consistencies Trialed (Tolerate trials) soft to chew (chopped) textures;pureed textures;honey/ moderately thick liquids;nectar/ mildly thick liquids;thin liquids  -CS soft to chew (chopped) textures;pureed textures;honey/ moderately thick liquids;nectar/ mildly thick liquids;thin liquids  -MG soft to chew (chopped) textures;pureed textures;honey/ moderately thick liquids;nectar/ mildly thick liquids;thin liquids  -MG    Desired Outcome (Tolerate trials) without signs/symptoms of aspiration;without signs of distress;with adequate oral prep/transit/clearance  -CS without signs/symptoms of aspiration;without signs of distress;with adequate oral prep/transit/clearance  -MG without signs/symptoms of aspiration;without  signs of distress;with adequate oral prep/transit/clearance  -MG    [place] (Tolerate trials) independently (over 90% accuracy)  -CS independently (over 90% accuracy)  -MG independently (over 90% accuracy)  -MG    Time Frame (Tolerate trials) by discharge  -CS by discharge  -MG by discharge  -MG    Progress/Outcomes (Tolerate trials) goal ongoing  -CS progress slower than expected  -MG continuing progress toward goal  -MG              User Key  (r) = Recorded By, (t) = Taken By, (c) = Cosigned By      Initials Name Provider Type    Alli Azevedo [facility]-SLP Speech and Language Pathologist    MG Elena Ron, MS CCC-SLP Speech and Language Pathologist                         Time Calculation:    Time Calculation- SLP       Row Name 02/07/25 1510             Time Calculation- SLP    SLP Start Time 1315  -CS      SLP Stop Time 1340  -CS      SLP Time Calculation (min) 25 min  -CS      SLP Received On 02/07/25  -CS         Untimed Charges    [id number]-GQ Treatment Swallow Minutes 25  -CS         Total Minutes    Untimed Charges Total Minutes 25  -CS       Total Minutes 25  -CS                User Key  (r) = Recorded By, (t) = Taken By, (c) = Cosigned By      Initials Name Provider Type    CS Alli De CCC-SLP Speech and Language Pathologist                    Therapy Charges for Today       Code Description Service Date Service Provider Modifiers Qty    [id number]  ST TREATMENT SWALLOW 2 2/7/2025 Alli De CCC-SLP GN 1                 Alli De CCC-SUDHAKAR  2/7/2025

## 2025-02-07 NOTE — PLAN OF CARE
Goal Outcome Evaluation:  Plan of Care Reviewed With: patient        Progress: improving  Outcome Evaluation: OT tx completed. Pt in fowlers upon therapist arrival; A&Ox4; No c/o pain; 5L BNC with SpO2 95% at rest. Increased supplemental O2 to 7L BNC prior to activity. Pt performed supine>sit utilizing bedrail with HOB elevated with Min A and verbal/visual cues for sequencing. Pt performed sit>stand utilizing rwx with Min A and verbal/tactile cues for sequencing and body mechanics. Once standing, Pt was able to take a few steps from bed>chair utilizing rwx with Min-Mod A and constant verbal/tactile cues for sequencing, body mechanics, and positioning of AD. SpO2 remained WNL while Pt remained on 7L with activity. Cont OT POC. Recommend SNF at discharge.    Anticipated Discharge Disposition (OT): skilled nursing facility

## 2025-02-07 NOTE — PLAN OF CARE
SLP treatment complete. The pt is alert and oriented x3 sitting upright in bed at time of SLP arrival. She now has her upper dentures present. They were cleaned and placed and she completed trials of regular solids and thin liquids. Decreased rotary chew noted with prolonged bolus formation and moderate oral residue post swallow completion even following 2x thin liquid wash. No overt s/s of aspiration noted this date. Pt ok to upgrade to a soft diet with chopped meats. SLP will continue to follow to ensure diet tolerance.

## 2025-02-07 NOTE — PROGRESS NOTES
Assessment tool to be used for patients with existing breathing treatments ordered by hospitalist                               Respiratory Therapist Driven Protocol - RT to Assess and Treat Algorithm    Item 0 Points 1 Point 2 Points 3 Points 4 Points Subtotal   Mental Status Alert, orientated, cooperative Lethargic, follows commands Confused, not following commands Obtunded or Somnolent Comatose 2   Respiratory Pattern Regular RR  8-16 breaths/minute Increased RR  18-25 breaths/minute Dyspnea on exertion, irregular RR  26-30/minute Shortness of breath,  RR 31-35 breaths/minute Accessory muscle use, severe SOB  RR > 35 breath/minute 1   Breath Sounds Clear Decreased unilaterally Decreased bilaterally Basilar crackles Wheezing and/or rhonchi 2   Cough Strong, spontaneous non-productive Strong productive Weak, non-productive Weak productive or weak with rhonchi Absent or may require suctioning 0   Pulmonary Status Nonsmoker or no previous history > 1 year quit < 1 PPD  < 1 year quit >  or = 1 PPD Diagnosed pulmonary disease (severe or chronic) Severe or chronic pulmonary disease with exacerbation 3   Surgical Status None General surgery (non-abdominal or non-thoracic) Lower abdominal Thoracic or upper abdominal Thoracic with pulmonary disease 0   Chest X-ray Clear Chronic changes Infiltrates, atelectasis or pleural effusion Infiltrates > 1 lobe Diffuse infiltrates and atelectasis and/or effusions 1   Activity level Ambulatory Ambulatory with assistance Non-ambulatory Paraplegic Quadriplegic 2                     Total Score 11   Score    Drug Therapy Frequency  20 or >    Q4 Duoneb & Q3 Albuterol PRN 15 - 19     Q6 Duoneb & Q4 Albuterol PRN 10 - 14    QID Duoneb & Q4 Albuterol PRN 5 - 9    TID Duoneb & Q6 Albuterol PRN 0 - 4    Q4 PRN Duoneb or Q4 PRN Albuterol    Incentive Spirometry - Initial RT instruct    Lung Expansion Therapy (PEP) Bronchopulmonary Hygiene (CPT)   Q4 & PRN - Severe  atelectasis, poor oxygenation Q4 - copious secretions, dyspnea, unable to sleep, mucus plugging   QID - High risk for persistent atelectasis, existence of atelectasis QID & Q4 PRN - Moderate secretion production   TID - At risk for developing atelectasis TID - small amounts of secretions with poor cough   BID - prevention of atelectasis BID - unable to breathe deeply and cough spontaneously   *RT Protocol patients will be re-assessed/re-evaluated every 48 hours.    *Patients who are home nebulizer treatments will be protocoled to no less than their home regimen and will remain     on their home regimen with re-evaluations as needed with changes in patient condition.    RT Comments/Recommendations: change frequency to QID Duoneb & Albuterol Q4PRN per protocol.

## 2025-02-07 NOTE — PROGRESS NOTES
HEMATOLOGY AND MEDICAL ONCOLOGY PROGRESS NOTE    Patient name: Ashley Gibbs  Patient : 1955  VISIT # [id number]  MR #[id number]  Room:     SUBJECTIVE:    Transferred to the regular floor.  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 Rico Vanegas.     She received cycle #1, day #1 of  carboplatin/-16 on 2025-25.       Palliative emergent XRT of the chest consisted of 1250 cGy in 5 treatment fractions between 2024 and 2024 by Dr. Rico Vanegas at [facility] for the associated SVC due to tumor..     Treatment regimen-curative intent consisted of:  Carboplatin AUC 5 day 1  Etoposide 100 mg/m² days 1-3 q. 21 days  Plan is for X 4 cycles      Ashley was seen 2025, in the ED, at [facility] on the day of her admission, accompanied by 2 granddaughters and her good friend.  The granddaughter reports a fever to 101 degrees, increased difficulty with breathing shortness of breath and wheezing.     CBC Results   CBC            2024    08:49 2025    11:20 2025    08:13   CBC   WBC 4.51  5.35     9.14    RBC 3.85  4.32     3.67    Hemoglobin 11.5  12.9     10.9    Hematocrit 35.2  39.4     33.7    MCV 91.4  91.2     91.8    MCH 29.9  29.9     29.7    MCHC 32.7  32.7     32.3    RDW 13.0  12.9     13.9    Platelets 199  260     220         Details           This result is from an external source.                         UA today, 2025 showed 3+ leukocytes, 30 mg/dL protein WBCs too numerous to count, bacteria 3+, nitrite negative.     Respiratory panel by PCR today, 2025 was positive  for influenza A H1-Detected  The patient has been desaturating while in the ED, requiring BiPAP assistance.  Blood and urine cultures were obtained.  Antibiotic with cefepime is initiated.     Medical oncology consultation requested         PAST TUMOR HISTORY COPIED FROM DR. CARDONA'S 1/13/2025 OFFICE VISIT:   HISTORY OF PRESENT ILLNESS:    Diagnosis  Small cell lung cancer, right lung, Dec 2024  Stage  [id number], limited stage     Treatment Summary  12/13/24 - 12/19/24 Emergent palliative radiation therapy 1250cGy in 5 fractions to right lung  Anticipate chemotherapy with Carboplatin D1, Etoposide D1-3 every 21 days x4 cycles  Anticipate radiation therapy to right lung with Dr Rico Vanegas/[facility]     Cancer History  Ms. 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 She has complaints today of fatigue. She states she has stabbing chest pain and mid back pain. She is currently being seen at pain management for pain medication. She presents today on 2L of oxygen dependently.   11/8/24 CT chest low dose ([facility]): 2 cm right upper lobe bilobed nodule with associated mediastinal and right hilar lymphadenopathy, suspicious for malignancy. Minimal adjacent   right upper lobe interlobular septal thickening may be related to venous or lymphatic congestion, however lymphangitic carcinomatosis is an additional consideration. Recommend further evaluation with PET/CT and/or tissue sampling. CT chest with contrast may also be of value to help distinguish the lymph nodes from adjacent vasculature. Minimal tree-in-bud nodularity in the peripheral the right upper lobe, likely mild infectious process.   Additional small pulmonary nodules in the right upper and right middle lobe.   11/8/24 CT abd/pelvis ([facility]): Near complete resolution of the exophytic left upper pole renal lesion of concern likely related to interval  rupture/involution. Numerous small bilateral renal cysts are again present.  Mild diffuse wall thickening of the rectum and the distal transverse colon and proximal descending colon. Correlate for proctocolitis.   12/13/24 PET/CT scan ([facility]): There is a hypermetabolic right upper lobe nodule measures 1.4 cm and has a maximum SUV of 5.5. Right upper lobe suprahilar mass measures at least 8 cm in greatest axial dimension is hypermetabolic with a maximum SUV of 11.4. Mass either invades the middle mediastinum or there is conglomerate hypermetabolic lymphadenopathy. No additional hypermetabolic pulmonary nodule or mass is identified. There is no increased skeletal uptake to suggest osseous metastasis.   12/13/245 CT chest ([facility]): 2.2 cm bilobed right upper lobe pulmonary nodule, likely representing a primary lung neoplasm. 8.9 cm right hilar lung mass with diffuse mediastinal infiltration, likely representing mary metastasis. This mass encases the right mainstem bronchus and encases the right main pulmonary artery, with occlusion of the right upper lobe pulmonary artery. This mass compresses the SVC, which is slitlike. Patient is at risk for SVC syndrome.  12/13/24 Initial evaluation by Dr Rico Vanegas/[facility] Radiation Oncology for superior vena cava syndrome who recommended emergent palliative radiation therapy 1250cGy in 5 fractions.  12/13/24 - 12/19/24 Emergent SBRT 1250cGy in 5 fractions to right lung  12/19/24 CT chest w/o ([facility]): Stable large right hilar mass with mediastinal invasion and confluent lymphadenopathy as detailed above. Findings are suspicious for small cell carcinoma. There is increased groundglass opacities and interlobular septal thickening within the right upper lobe and right middle lobe. Differential includes postobstructive changes and/or leptomeningeal carcinomatosis. Stable 2.2 cm right upper lobe bilobed nodule which was previously hypermetabolic and concerning for metastases. Trace right  "pleural effusion.   12/23/24 Navigational bronchoscopy/EBUS by Dr Peterson Morales/[facility] Pulmonology  12/23/24 Station 7 lymph node, endobronchial ultrasound-guided fine-needle aspiration, smear (1) and cellblock: High-grade neuroendocrine carcinoma consistent with metastatic small cell carcinoma. Fragments of lymphoid tissue and cartilage present.  Station 4R lymph node, endobronchial ultrasound-guided fine-needle aspiration, smear (1) and cellblock: High-grade neuroendocrine carcinoma consistent with metastatic small cell carcinoma. Many background lymphocytes.  Bronchial washings, ThinPrep preparation (1) and cellblock: A few malignant cells present, high-grade neuroendocrine carcinoma consistent with small cell carcinoma. Several macrophages. A few benign squamous epithelial cells and bronchial epithelial cells. AJCC stage: pTX pNX  1/13/2025-essentially, limited stage small cell lung cancer.  Awaiting results of brain MRI.  Recommend concurrent chemoradiation with carboplatin 2 post right to be followed by adjuvant durvalumab.  1/27/2025-initiation cycle #1 of chemotherapy with carboplatin/-16           PHYSICAL EXAM:/55 (BP Location: Right arm, Patient Position: Lying)   Pulse 85   Temp 98.6 °F (37 °C) (Oral)   Resp 18   Ht 160 cm (62.99\")   Wt 65.6 kg (144 lb 9.6 oz)   SpO2 91%   BMI 25.62 kg/m²   CONSTITUTIONAL:sedated, ill-appearing  EYES: Anicteric, EOM intact, pupils equal round   CHEST/LUNGS: CTA, Mechanical ventilator, PEEP 5, FiO2 30%.  CARDIOVASCULAR: RRR, no murmurs  ABDOMEN: soft, active bowel sounds, no HSM  EXTREMITIES: warm,   SKIN: warm,   NEUROLOGIC-sedated      CBC  Results from last 7 days   Lab Units 02/07/25  0347 02/06/25  0228 02/05/25  0457   WBC 10*3/mm3 0.94* 1.18* 2.11*   HEMOGLOBIN g/dL 11.4* 11.2* 10.5*   HEMATOCRIT % 34.4 33.9* 32.8*   PLATELETS 10*3/mm3 120* 141 139*         Lab Results   Component Value Date     02/07/2025    K 3.2 (L) 02/07/2025    CL 95 (L) " 02/07/2025    CO2 34.0 (H) 02/07/2025    BUN 19 02/07/2025    CREATININE 0.42 (L) 02/07/2025    GLUCOSE 116 (H) 02/07/2025    CALCIUM 9.0 02/07/2025    BILITOT 0.4 02/07/2025    ALKPHOS 83 02/07/2025    AST 16 02/07/2025    ALT 17 02/07/2025    AGRATIO 0.9 02/07/2025    GLOB 3.3 02/07/2025       Lab Results   Component Value Date    INR 0.98 01/31/2025    INR 1.04 07/24/2019    INR 0.90 03/13/2019    PROTIME 13.5 01/31/2025    PROTIME 13 07/24/2019    PROTIME 11.6 (L) 03/13/2019       ASSESSMENT/PLAN:    #   Limited stage small cell lung cancer    Limited stage small cell lung cancer-status post completion of cycle 1 carboplatin etoposide 1/29/2025.    Status post palliative XRT to the SVC.  Plans for further XRT with Dr. Rico Vanegas.    #  Acute hypoxic respiratory failure-     Acute hypoxic respiratory failure- r/tCOPD exacerbation with + influenza A.  Chest x-ray showed chronic changes of emphysema and fibrosis.  Continue current supportive care.  Failed SBT-02/01/25  Extubated, planning on returning to the floor    #  Normocytic anemia-     CBC          2/5/2025    04:57 2/6/2025    02:28 2/7/2025    03:47   CBC   WBC 2.11  1.18  0.94    RBC 3.63  3.89  3.98    Hemoglobin 10.5  11.2  11.4    Hematocrit 32.8  33.9  34.4    MCV 90.4  87.1  86.4    MCH 28.9  28.8  28.6    MCHC 32.0  33.0  33.1    RDW 13.8  13.2  12.8    Platelets 139  141  120      Hemoglobin improving, 11.4 today  WBCs going down 0.94 secondary to chemotherapy, neutropenic with ANC of 0.65  Neupogen 480 mcg daily initiated 2/6/2025, and continuing daily    Monitor    Plan:  Continue current supportive care  Continue Rocephin      Future Appointments    Encounter Information   Provider Department Center   2/17/2025 11:30 AM Christopher Isaac MD [facility]   2/17/2025 11:30 AM INFUSION SCHEDULE, [facility] Aide [facility]   2/18/2025 12:00 PM INFUSION SCHEDULE, [facility] Aide [facility]   2/19/2025 11:30 AM INFUSION SCHEDULE, [facility] Aide Li MD    02/07/25  06:04 CST

## 2025-02-07 NOTE — PLAN OF CARE
Goal Outcome Evaluation:           Progress: no change  Outcome Evaluation: Pt had 3 episodes pf fecal incontence; c/o pain due to excoriation around anus.  Problem: Adult Inpatient Plan of Care  Goal: Plan of Care Review  Outcome: Progressing  Flowsheets (Taken 2/7/2025 0247)  Progress: no change  Outcome Evaluation:   Pt had 3 episodes pf fecal incontence   c/o pain due to excoriation around anus.  Goal: Patient-Specific Goal (Individualized)  Outcome: Progressing  Goal: Absence of Hospital-Acquired Illness or Injury  Outcome: Progressing  Intervention: Identify and Manage Fall Risk  Recent Flowsheet Documentation  Taken 2/7/2025 0200 by Lakshmi Londono RN  Safety Promotion/Fall Prevention: safety round/check completed  Taken 2/7/2025 0000 by Lakshmi Londono RN  Safety Promotion/Fall Prevention: safety round/check completed  Taken 2/6/2025 2200 by Lakshmi Londono RN  Safety Promotion/Fall Prevention: safety round/check completed  Taken 2/6/2025 1950 by Lakshmi Londono RN  Safety Promotion/Fall Prevention: safety round/check completed  Intervention: Prevent Skin Injury  Recent Flowsheet Documentation  Taken 2/7/2025 0200 by Lakshmi Londono RN  Body Position: patient/family refused  Taken 2/7/2025 0000 by Lakshmi Londono RN  Body Position:   turned   neutral head position   sitting up in bed  Taken 2/6/2025 2200 by Lakshmi Londono RN  Body Position:   right   turned   side-lying  Taken 2/6/2025 1950 by Lakshmi Londono RN  Body Position:   left   turned   side-lying  Intervention: Prevent and Manage VTE (Venous Thromboembolism) Risk  Recent Flowsheet Documentation  Taken 2/6/2025 1950 by Lakshmi Londono RN  VTE Prevention/Management: SCDs (sequential compression devices) off  Goal: Optimal Comfort and Wellbeing  Outcome: Progressing  Goal: Readiness for Transition of Care  Outcome: Progressing     Problem: Fall Injury Risk  Goal: Absence of Fall and Fall-Related Injury  Outcome: Progressing  Intervention:  Promote Injury-Free Environment  Recent Flowsheet Documentation  Taken 2/7/2025 0200 by Lakshmi Londono RN  Safety Promotion/Fall Prevention: safety round/check completed  Taken 2/7/2025 0000 by Lakshmi Londono RN  Safety Promotion/Fall Prevention: safety round/check completed  Taken 2/6/2025 2200 by Lakshmi Londono RN  Safety Promotion/Fall Prevention: safety round/check completed  Taken 2/6/2025 1950 by Lakshmi Londono RN  Safety Promotion/Fall Prevention: safety round/check completed     Problem: Skin Injury Risk Increased  Goal: Skin Health and Integrity  Outcome: Progressing  Intervention: Optimize Skin Protection  Recent Flowsheet Documentation  Taken 2/7/2025 0000 by Lakshmi Londono RN  Head of Bed (HOB) Positioning: HOB at 20-30 degrees  Taken 2/6/2025 2200 by Lakshmi Londono RN  Head of Bed (HOB) Positioning: HOB at 20-30 degrees  Taken 2/6/2025 1950 by Lakshmi Londono RN  Head of Bed (HOB) Positioning: HOB at 20-30 degrees     Problem: Comorbidity Management  Goal: Maintenance of COPD Symptom Control  Outcome: Progressing

## 2025-02-08 LAB
ANION GAP SERPL CALCULATED.3IONS-SCNC: 9 MMOL/L (ref 5–15)
BASOPHILS # BLD MANUAL: 0 10*3/MM3 (ref 0–0.2)
BASOPHILS NFR BLD MANUAL: 0 % (ref 0–1.5)
BUN SERPL-MCNC: 21 MG/DL (ref 8–23)
BUN/CREAT SERPL: 43.8 (ref 7–25)
CALCIUM SPEC-SCNC: 9 MG/DL (ref 8.6–10.5)
CHLORIDE SERPL-SCNC: 100 MMOL/L (ref 98–107)
CO2 SERPL-SCNC: 34 MMOL/L (ref 22–29)
CREAT SERPL-MCNC: 0.48 MG/DL (ref 0.57–1)
DEPRECATED RDW RBC AUTO: 41.5 FL (ref 37–54)
EGFRCR SERPLBLD CKD-EPI 2021: 104 ML/MIN/1.73
EOSINOPHIL # BLD MANUAL: 0 10*3/MM3 (ref 0–0.4)
EOSINOPHIL NFR BLD MANUAL: 0 % (ref 0.3–6.2)
ERYTHROCYTE [DISTWIDTH] IN BLOOD BY AUTOMATED COUNT: 12.7 % (ref 12.3–15.4)
GIANT PLATELETS: ABNORMAL
GLUCOSE SERPL-MCNC: 114 MG/DL (ref 65–99)
HCT VFR BLD AUTO: 34.6 % (ref 34–46.6)
HGB BLD-MCNC: 11.1 G/DL (ref 12–15.9)
LYMPHOCYTES # BLD MANUAL: 0.19 10*3/MM3 (ref 0.7–3.1)
LYMPHOCYTES NFR BLD MANUAL: 9.1 % (ref 5–12)
MCH RBC QN AUTO: 28.5 PG (ref 26.6–33)
MCHC RBC AUTO-ENTMCNC: 32.1 G/DL (ref 31.5–35.7)
MCV RBC AUTO: 88.7 FL (ref 79–97)
MONOCYTES # BLD: 0.08 10*3/MM3 (ref 0.1–0.9)
NEUTROPHILS # BLD AUTO: 0.62 10*3/MM3 (ref 1.7–7)
NEUTROPHILS NFR BLD MANUAL: 61.6 % (ref 42.7–76)
NEUTS BAND NFR BLD MANUAL: 8.1 % (ref 0–5)
NEUTS VAC BLD QL SMEAR: ABNORMAL
PLATELET # BLD AUTO: 107 10*3/MM3 (ref 140–450)
PMV BLD AUTO: 10.1 FL (ref 6–12)
POLYCHROMASIA BLD QL SMEAR: ABNORMAL
POTASSIUM SERPL-SCNC: 3.8 MMOL/L (ref 3.5–5.2)
RBC # BLD AUTO: 3.9 10*6/MM3 (ref 3.77–5.28)
SMALL PLATELETS BLD QL SMEAR: ABNORMAL
SODIUM SERPL-SCNC: 143 MMOL/L (ref 136–145)
TOXIC GRANULATION: ABNORMAL
VARIANT LYMPHS NFR BLD MANUAL: 18.2 % (ref 19.6–45.3)
VARIANT LYMPHS NFR BLD MANUAL: 3 % (ref 0–5)
WBC NRBC COR # BLD AUTO: 0.89 10*3/MM3 (ref 3.4–10.8)

## 2025-02-08 PROCEDURE — 25010000002 FILGRASTIM PER 480 MCG: Performed by: INTERNAL MEDICINE

## 2025-02-08 PROCEDURE — 85007 BL SMEAR W/DIFF WBC COUNT: CPT | Performed by: INTERNAL MEDICINE

## 2025-02-08 PROCEDURE — 94799 UNLISTED PULMONARY SVC/PX: CPT

## 2025-02-08 PROCEDURE — 63710000001 PREDNISONE PER 1 MG: Performed by: INTERNAL MEDICINE

## 2025-02-08 PROCEDURE — 25010000002 HEPARIN (PORCINE) PER 1000 UNITS: Performed by: PHYSICIAN ASSISTANT

## 2025-02-08 PROCEDURE — 94664 DEMO&/EVAL PT USE INHALER: CPT

## 2025-02-08 PROCEDURE — 85025 COMPLETE CBC W/AUTO DIFF WBC: CPT | Performed by: INTERNAL MEDICINE

## 2025-02-08 PROCEDURE — 80048 BASIC METABOLIC PNL TOTAL CA: CPT | Performed by: INTERNAL MEDICINE

## 2025-02-08 PROCEDURE — 97110 THERAPEUTIC EXERCISES: CPT

## 2025-02-08 PROCEDURE — 94761 N-INVAS EAR/PLS OXIMETRY MLT: CPT

## 2025-02-08 RX ORDER — PREDNISONE 20 MG/1
40 TABLET ORAL
Status: DISCONTINUED | OUTPATIENT
Start: 2025-02-08 | End: 2025-02-10 | Stop reason: HOSPADM

## 2025-02-08 RX ORDER — CEFDINIR 300 MG/1
300 CAPSULE ORAL EVERY 12 HOURS SCHEDULED
Status: COMPLETED | OUTPATIENT
Start: 2025-02-08 | End: 2025-02-09

## 2025-02-08 RX ADMIN — OXYCODONE HYDROCHLORIDE AND ACETAMINOPHEN 1 TABLET: 7.5; 325 TABLET ORAL at 11:53

## 2025-02-08 RX ADMIN — Medication 10 ML: at 09:49

## 2025-02-08 RX ADMIN — BUDESONIDE 0.5 MG: 0.5 INHALANT RESPIRATORY (INHALATION) at 05:54

## 2025-02-08 RX ADMIN — CEFDINIR 300 MG: 300 CAPSULE ORAL at 20:07

## 2025-02-08 RX ADMIN — PREDNISONE 40 MG: 20 TABLET ORAL at 09:48

## 2025-02-08 RX ADMIN — CITALOPRAM HYDROBROMIDE 40 MG: 20 TABLET ORAL at 09:48

## 2025-02-08 RX ADMIN — HEPARIN SODIUM 5000 UNITS: 5000 INJECTION, SOLUTION INTRAVENOUS; SUBCUTANEOUS at 15:36

## 2025-02-08 RX ADMIN — CEFDINIR 300 MG: 300 CAPSULE ORAL at 11:53

## 2025-02-08 RX ADMIN — BUDESONIDE 0.5 MG: 0.5 INHALANT RESPIRATORY (INHALATION) at 19:26

## 2025-02-08 RX ADMIN — OXYCODONE HYDROCHLORIDE AND ACETAMINOPHEN 1 TABLET: 7.5; 325 TABLET ORAL at 18:59

## 2025-02-08 RX ADMIN — HEPARIN SODIUM 5000 UNITS: 5000 INJECTION, SOLUTION INTRAVENOUS; SUBCUTANEOUS at 20:07

## 2025-02-08 RX ADMIN — LEVOTHYROXINE SODIUM 112 MCG: 112 TABLET ORAL at 06:23

## 2025-02-08 RX ADMIN — IPRATROPIUM BROMIDE AND ALBUTEROL SULFATE 3 ML: .5; 3 SOLUTION RESPIRATORY (INHALATION) at 19:26

## 2025-02-08 RX ADMIN — MIRTAZAPINE 15 MG: 15 TABLET, FILM COATED ORAL at 20:07

## 2025-02-08 RX ADMIN — ROPINIROLE HYDROCHLORIDE 0.25 MG: 0.25 TABLET, FILM COATED ORAL at 20:07

## 2025-02-08 RX ADMIN — IPRATROPIUM BROMIDE AND ALBUTEROL SULFATE 3 ML: .5; 3 SOLUTION RESPIRATORY (INHALATION) at 10:33

## 2025-02-08 RX ADMIN — IPRATROPIUM BROMIDE AND ALBUTEROL SULFATE 3 ML: .5; 3 SOLUTION RESPIRATORY (INHALATION) at 05:49

## 2025-02-08 RX ADMIN — HEPARIN SODIUM 5000 UNITS: 5000 INJECTION, SOLUTION INTRAVENOUS; SUBCUTANEOUS at 06:23

## 2025-02-08 RX ADMIN — IPRATROPIUM BROMIDE AND ALBUTEROL SULFATE 3 ML: .5; 3 SOLUTION RESPIRATORY (INHALATION) at 14:03

## 2025-02-08 RX ADMIN — LANSOPRAZOLE 30 MG: 30 TABLET, ORALLY DISINTEGRATING ORAL at 09:48

## 2025-02-08 RX ADMIN — FILGRASTIM 480 MCG: 480 INJECTION, SOLUTION INTRAVENOUS; SUBCUTANEOUS at 11:53

## 2025-02-08 NOTE — THERAPY TREATMENT NOTE
Acute Care - Physical Therapy Treatment Note  [facility]     Patient Name: Ashley Gibbs  : 1955  MRN: [id number]  Today's Date: 2025      Visit Dx:     ICD-10-CM ICD-9-CM   1. Acute respiratory failure with hypoxia and hypercapnia  J96.01 518.81    J96.02    2. Febrile illness  R50.9 780.60   3. Influenza A  J10.1 487.1   4. Acute UTI (urinary tract infection)  N39.0 599.0   5. Altered mental status, unspecified altered mental status type  R41.82 780.97   6. Malignant neoplasm of lung, unspecified laterality, unspecified part of lung  C34.90 162.9   7. Immunosuppressed due to chemotherapy  D84.821 V58.69    T45.1X5A     Z79.899    8. Dysphagia, unspecified type  R13.10 787.20   9. Impaired mobility [Z74.09]  Z74.09 799.89     Patient Active Problem List   Diagnosis    Acute respiratory failure with hypoxemia    Age-related osteoporosis without current pathological fracture    Anxiety    Stage 3 severe COPD by GOLD classification    Chronic pain disorder    Depression    Excessive daytime sleepiness    Hemoptysis    Family history of renal cell carcinoma    Hyperlipidemia    Hypothyroidism    Migraines    Mild episode of recurrent major depressive disorder    Neuropathy    Obstructive sleep apnea    Primary insomnia    Renal mass    Restless legs syndrome    On supplemental oxygen therapy    Family history of CHF (congestive heart failure)    Family history of diabetes mellitus    6 cm exophytic left upper pole renal cyst, likely hemorrhagic    Acute bilateral pyelonephritis    Hypokalemia    E. coli UTI, present on admission (urinary tract infection)    Paroxysmal supraventricular tachycardia    Sepsis due to Escherichia coli UTI, bilateral pyelonephritis, present on admission    Vitamin D deficiency    Lung mass    Former smoker    History of radiation therapy    Panic attacks    Small cell lung cancer    Chronic bronchitis    Acute respiratory failure    Influenza A    Leukopenia due to chemotherapy     Thrombocytopenia     Past Medical History:   Diagnosis Date    Anxiety     Arthritis     Asthma     Back pain     COPD (chronic obstructive pulmonary disease)     Dependence on supplemental oxygen     Depression     Disease of thyroid gland     Fibromyalgia, primary     Headache     History of degenerative disc disease     Hypertension     Hypothyroidism     Low back pain     Restless leg     Scoliosis      Past Surgical History:   Procedure Laterality Date    APPENDECTOMY      BREAST BIOPSY Left     BRONCHOSCOPY Right 12/23/2024    Procedure: BRONCHOSCOPY WITH ENDOBRONCHIAL ULTRASOUND;  Surgeon: Peterson Morales MD;  Location: [facility] OR;  Service: Pulmonary;  Laterality: Right;  pre: lung mass  post: lung mass    CHOLECYSTECTOMY      TUBAL ABDOMINAL LIGATION       PT Assessment (Last 12 Hours)       PT Evaluation and Treatment       Row Name 02/08/25 1528          Physical Therapy Time and Intention    Subjective Information no complaints  -WK     Document Type therapy note (daily note)  -WK     Mode of Treatment physical therapy  -WK     Comment WBC 0.89  -WK       Row Name 02/08/25 1528          General Information    Existing Precautions/Restrictions fall;oxygen therapy device and L/min  -WK       Row Name 02/08/25 1528          Pain    Pretreatment Pain Rating 0/10 - no pain  -WK     Posttreatment Pain Rating 0/10 - no pain  -WK       Row Name 02/08/25 1528          Bed Mobility    Comment, (Bed Mobility) fatigued due to amb into BR  -WK       Row Name 02/08/25 1528          Motor Skills    Comments, Therapeutic Exercise AROM BLE beside SLR (AAROM) 10  x2reps in bed  -WK     Additional Documentation Comments, Therapeutic Exercise (Row)  -WK       Row Name 02/08/25 1528          Vital Signs    Pretreatment Heart Rate (beats/min) 102  -WK     Intratreatment Heart Rate (beats/min) 106  -WK     Posttreatment Heart Rate (beats/min) 102  -WK       Row Name 02/08/25 1528          Positioning and Restraints     Pre-Treatment Position in bed  -WK     Post Treatment Position bed  -WK     In Bed fowlers;call light within reach;encouraged to call for assist  -WK               User Key  (r) = Recorded By, (t) = Taken By, (c) = Cosigned By      Initials Name Provider Type    WK Marialuisa Morales PTA Physical Therapist Assistant                    Physical Therapy Education       Title: PT OT SLP Therapies (In Progress)       Topic: Physical Therapy (In Progress)       Point: Mobility training (In Progress)       Learning Progress Summary            Patient Acceptance, E, NR by TONNY at 2/5/2025 1115    Comment: benefits of activity, progression of PT                      Point: Home exercise program (Not Started)       Learner Progress:  Not documented in this visit.              Point: Body mechanics (Not Started)       Learner Progress:  Not documented in this visit.              Point: Precautions (Not Started)       Learner Progress:  Not documented in this visit.                              User Key       Initials Effective Dates Name Provider Type Rebeca LANCASTER 02/03/23 -  Mendel Puentes, PT DPT Physical Therapist PT                  PT Recommendation and Plan         Outcome Measures       Row Name 02/06/25 1307             How much help from another person do you currently need...    Turning from your back to your side while in flat bed without using bedrails? 2  -MF      Moving from lying on back to sitting on the side of a flat bed without bedrails? 2  -MF      Moving to and from a bed to a chair (including a wheelchair)? 1  -MF      Standing up from a chair using your arms (e.g., wheelchair, bedside chair)? 1  -MF      Climbing 3-5 steps with a railing? 1  -MF      To walk in hospital room? 1  -MF      AM-PAC 6 Clicks Score (PT) 8  -MF         Functional Assessment    Outcome Measure Options AM-PAC 6 Clicks Basic Mobility (PT)  -MF                User Key  (r) = Recorded By, (t) = Taken By, (c) = Cosigned By       Initials Name Provider Type     Danyelle Guy PTA Physical Therapist Assistant                     Time Calculation:    PT Charges       Row Name 02/08/25 1551             Time Calculation    Start Time 1528  -WK      Stop Time 1551  -WK      Time Calculation (min) 23 min  -WK      PT Received On 02/08/25  -WK         Time Calculation- PT    Total Timed Code Minutes- PT 23 minute(s)  -WK         Timed Charges    25559 - PT Therapeutic Exercise Minutes 23  -WK         Total Minutes    Timed Charges Total Minutes 23  -WK       Total Minutes 23  -WK                User Key  (r) = Recorded By, (t) = Taken By, (c) = Cosigned By      Initials Name Provider Type    WK Marialuisa Morales PTA Physical Therapist Assistant                  Therapy Charges for Today       Code Description Service Date Service Provider Modifiers Qty    [id number] HC PT THER PROC EA 15 MIN 2/8/2025 Marialuisa Morales PTA GP 2            PT G-Codes  Outcome Measure Options: AM-PAC 6 Clicks Daily Activity (OT)  AM-PAC 6 Clicks Score (PT): 16  AM-PAC 6 Clicks Score (OT): 11    Marialuisa Morales PTA  2/8/2025

## 2025-02-08 NOTE — PLAN OF CARE
Goal Outcome Evaluation:           Progress: no change  Outcome Evaluation: Pt still having multiple episodes of fecal incont - worsening excoriation; creams applied with each change

## 2025-02-08 NOTE — PROGRESS NOTES
HEMATOLOGY AND MEDICAL ONCOLOGY PROGRESS NOTE    Patient name: Ashley Gibbs  Patient : 1955  VISIT # [id number]  MR #[id number]  Room:     SUBJECTIVE:    She says she had a fairly good night.  Sitting up comfortably in bed, breathing well in no acute distress.  Wants to sit in the chair.  Discussed with nursing staff to help her with transfer.    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 Rico Vanegas.     She received cycle #1, day #1 of  carboplatin/-16 on 2025-25.       Palliative emergent XRT of the chest consisted of 1250 cGy in 5 treatment fractions between 2024 and 2024 by Dr. Rico Vanegas at [facility] for the associated SVC due to tumor..     Treatment regimen-curative intent consisted of:  Carboplatin AUC 5 day 1  Etoposide 100 mg/m² days 1-3 q. 21 days  Plan is for X 4 cycles      Ashley was seen 2025, in the ED, at [facility] on the day of her admission, accompanied by 2 granddaughters and her good friend.  The granddaughter reports a fever to 101 degrees, increased difficulty with breathing shortness of breath and wheezing.     CBC Results   CBC            2024    08:49 2025    11:20 2025    08:13   CBC   WBC 4.51  5.35     9.14    RBC 3.85  4.32     3.67    Hemoglobin 11.5  12.9     10.9    Hematocrit 35.2  39.4     33.7    MCV 91.4  91.2     91.8    MCH 29.9  29.9     29.7    MCHC 32.7  32.7     32.3    RDW 13.0  12.9     13.9    Platelets 199  260     220         Details           This result is from an external source.                         UA today, 2025 showed 3+ leukocytes, 30 mg/dL protein WBCs too numerous to count,  bacteria 3+, nitrite negative.     Respiratory panel by PCR today, 1/31/2025 was positive for influenza A H1-Detected  The patient has been desaturating while in the ED, requiring BiPAP assistance.  Blood and urine cultures were obtained.  Antibiotic with cefepime is initiated.     Medical oncology consultation requested         PAST TUMOR HISTORY COPIED FROM DR. CARDONA'S 1/13/2025 OFFICE VISIT:   HISTORY OF PRESENT ILLNESS:    Diagnosis  Small cell lung cancer, right lung, Dec 2024  Stage  [id number], limited stage     Treatment Summary  12/13/24 - 12/19/24 Emergent palliative radiation therapy 1250cGy in 5 fractions to right lung  Anticipate chemotherapy with Carboplatin D1, Etoposide D1-3 every 21 days x4 cycles  Anticipate radiation therapy to right lung with Dr Rico Vanegas/[facility] Radiation On[facility]     Cancer History  Ms. 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 She has complaints today of fatigue. She states she has stabbing chest pain and mid back pain. She is currently being seen at pain management for pain medication. She presents today on 2L of oxygen dependently.   11/8/24 CT chest low dose ([facility]): 2 cm right upper lobe bilobed nodule with associated mediastinal and right hilar lymphadenopathy, suspicious for malignancy. Minimal adjacent   right upper lobe interlobular septal thickening may be related to venous or lymphatic congestion, however lymphangitic carcinomatosis is an additional consideration. Recommend further evaluation with PET/CT and/or tissue sampling. CT chest with contrast may also be of value to help distinguish the lymph nodes from adjacent vasculature. Minimal tree-in-bud nodularity in the peripheral the right upper lobe, likely mild infectious process.   Additional small pulmonary nodules in the right upper and right middle lobe.   11/8/24 CT abd/pelvis ([facility]): Near complete resolution of the  exophytic left upper pole renal lesion of concern likely related to interval rupture/involution. Numerous small bilateral renal cysts are again present.  Mild diffuse wall thickening of the rectum and the distal transverse colon and proximal descending colon. Correlate for proctocolitis.   12/13/24 PET/CT scan ([facility]): There is a hypermetabolic right upper lobe nodule measures 1.4 cm and has a maximum SUV of 5.5. Right upper lobe suprahilar mass measures at least 8 cm in greatest axial dimension is hypermetabolic with a maximum SUV of 11.4. Mass either invades the middle mediastinum or there is conglomerate hypermetabolic lymphadenopathy. No additional hypermetabolic pulmonary nodule or mass is identified. There is no increased skeletal uptake to suggest osseous metastasis.   12/13/245 CT chest ([facility]): 2.2 cm bilobed right upper lobe pulmonary nodule, likely representing a primary lung neoplasm. 8.9 cm right hilar lung mass with diffuse mediastinal infiltration, likely representing mary metastasis. This mass encases the right mainstem bronchus and encases the right main pulmonary artery, with occlusion of the right upper lobe pulmonary artery. This mass compresses the SVC, which is slitlike. Patient is at risk for SVC syndrome.  12/13/24 Initial evaluation by Dr Rico Vanegas/[facility] Radiation Oncology for superior vena cava syndrome who recommended emergent palliative radiation therapy 1250cGy in 5 fractions.  12/13/24 - 12/19/24 Emergent SBRT 1250cGy in 5 fractions to right lung  12/19/24 CT chest w/o ([facility]): Stable large right hilar mass with mediastinal invasion and confluent lymphadenopathy as detailed above. Findings are suspicious for small cell carcinoma. There is increased groundglass opacities and interlobular septal thickening within the right upper lobe and right middle lobe. Differential includes postobstructive changes and/or leptomeningeal carcinomatosis. Stable 2.2 cm right upper lobe bilobed nodule which  "was previously hypermetabolic and concerning for metastases. Trace right pleural effusion.   12/23/24 Navigational bronchoscopy/EBUS by Dr Peterson Morales/[facility] Pulmonology  12/23/24 Station 7 lymph node, endobronchial ultrasound-guided fine-needle aspiration, smear (1) and cellblock: High-grade neuroendocrine carcinoma consistent with metastatic small cell carcinoma. Fragments of lymphoid tissue and cartilage present.  Station 4R lymph node, endobronchial ultrasound-guided fine-needle aspiration, smear (1) and cellblock: High-grade neuroendocrine carcinoma consistent with metastatic small cell carcinoma. Many background lymphocytes.  Bronchial washings, ThinPrep preparation (1) and cellblock: A few malignant cells present, high-grade neuroendocrine carcinoma consistent with small cell carcinoma. Several macrophages. A few benign squamous epithelial cells and bronchial epithelial cells. AJCC stage: pTX pNX  1/13/2025-essentially, limited stage small cell lung cancer.  Awaiting results of brain MRI.  Recommend concurrent chemoradiation with carboplatin 2 post right to be followed by adjuvant durvalumab.  1/27/2025-initiation cycle #1 of chemotherapy with carboplatin/-16           PHYSICAL EXAM:/80 (BP Location: Left arm, Patient Position: Lying) Comment: Lakshmi RN notified  Pulse 88   Temp 98.2 °F (36.8 °C) (Oral)   Resp 18   Ht 160 cm (62.99\")   Wt 64.9 kg (143 lb)   SpO2 93%   BMI 25.34 kg/m²   CONSTITUTIONAL:sedated, ill-appearing  EYES: Anicteric, EOM intact, pupils equal round   CHEST/LUNGS: CTA, Mechanical ventilator, PEEP 5, FiO2 30%.  CARDIOVASCULAR: RRR, no murmurs  ABDOMEN: soft, active bowel sounds, no HSM  EXTREMITIES: warm,   SKIN: warm,   NEUROLOGIC-sedated      CBC  Results from last 7 days   Lab Units 02/08/25  0249 02/07/25  0347 02/06/25  0228   WBC 10*3/mm3 0.89* 0.94* 1.18*   HEMOGLOBIN g/dL 11.1* 11.4* 11.2*   HEMATOCRIT % 34.6 34.4 33.9*   PLATELETS 10*3/mm3 107* 120* 141         Lab " Results   Component Value Date     02/08/2025    K 3.8 02/08/2025     02/08/2025    CO2 34.0 (H) 02/08/2025    BUN 21 02/08/2025    CREATININE 0.48 (L) 02/08/2025    GLUCOSE 114 (H) 02/08/2025    CALCIUM 9.0 02/08/2025    BILITOT 0.4 02/07/2025    ALKPHOS 83 02/07/2025    AST 16 02/07/2025    ALT 17 02/07/2025    AGRATIO 0.9 02/07/2025    GLOB 3.3 02/07/2025       Lab Results   Component Value Date    INR 0.98 01/31/2025    INR 1.04 07/24/2019    INR 0.90 03/13/2019    PROTIME 13.5 01/31/2025    PROTIME 13 07/24/2019    PROTIME 11.6 (L) 03/13/2019       ASSESSMENT/PLAN:    #   Limited stage small cell lung cancer    Limited stage small cell lung cancer-status post completion of cycle 1 carboplatin etoposide 1/29/2025.    Status post palliative XRT to the [facility] [place].  Plans for further XRT with Dr. Rico Vanegas.    #  Acute hypoxic respiratory failure-     Acute hypoxic respiratory failure- r/tCOPD exacerbation with + influenza A.  Chest x-ray showed chronic changes of emphysema and fibrosis.  Continue current supportive care.  Failed SBT-02/01/25  Extubated, planning on returning to the floor    #  Normocytic anemia-     CBC          2/6/2025    02:28 2/7/2025    03:47 2/8/2025    02:49   CBC   WBC 1.18  0.94  0.89    RBC 3.89  3.98  3.90    Hemoglobin 11.2  11.4  11.1    Hematocrit 33.9  34.4  34.6    MCV 87.1  86.4  88.7    MCH 28.8  28.6  28.5    MCHC 33.0  33.1  32.1    RDW 13.2  12.8  12.7    Platelets 141  120  107      Hemoglobin stable, 11.1 today  WBCs going down 0.89 secondary to chemotherapy, neutropenic with ANC of 0.62  Neupogen 480 mcg daily initiated 2/6/2025, and continuing daily    Monitor    Plan:  Continue current supportive care  Continue Rocephin      Future Appointments    Encounter Information   Provider Department Center   2/17/2025 11:30 AM Christopher Isaac MD [facility] [place] [facility]   2/17/2025 11:30 AM INFUSION SCHEDULE, [facility]  [place] [facility]   2/18/2025 12:00 PM INFUSION SCHEDULE, [facility]   2/19/2025 11:30 AM INFUSION SCHEDULE, [facility] Li MD    02/08/25  07:33 CST

## 2025-02-08 NOTE — PROGRESS NOTES
[facility] Medicine Services  INPATIENT PROGRESS NOTE    Patient Name: Ashley Gibbs  Date of Admission: 1/31/2025  Today's Date: 02/08/25  Length of Stay: 8  Primary Care Physician: Padmaja Bermudez APRN    Subjective   Chief Complaint: follow-up respiratory failure  HPI   Patient currently on 5 L by nasal cannula.  She was sitting up in the bedside chair this morning.  She denied any pain symptoms.  She denied any new shortness of breath symptoms.  She voiced no new complaints.    Review of Systems   All pertinent negatives and positives are as above. All other systems have been reviewed and are negative unless otherwise stated.     Objective    Temp:  [98.2 °F (36.8 °C)-100.2 °F (37.9 °C)] 99.4 °F (37.4 °C)  Heart Rate:  [] 99  Resp:  [18-22] 20  BP: (141-164)/(67-86) 148/80  Physical Exam  Vitals reviewed.   Constitutional:       General: She is not in acute distress.     Appearance: She is not toxic-appearing.      Comments: Sitting up in bedside chair   HENT:      Head: Normocephalic.      Mouth/Throat:      Mouth: Mucous membranes are moist.   Cardiovascular:      Rate and Rhythm: Normal rate.   Pulmonary:      Effort: Pulmonary effort is normal.      Breath sounds: Wheezing present. No rhonchi.      Comments: On 5LNC  Skin:     General: Skin is warm.   Neurological:      Mental Status: She is alert.      Motor: Weakness present.   Psychiatric:         Mood and Affect: Mood normal.         Results Review:  I have reviewed the labs, radiology results, and diagnostic studies.    Laboratory Data:   Results from last 7 days   Lab Units 02/08/25  0249 02/07/25 0347 02/06/25 0228   WBC 10*3/mm3 0.89* 0.94* 1.18*   HEMOGLOBIN g/dL 11.1* 11.4* 11.2*   HEMATOCRIT % 34.6 34.4 33.9*   PLATELETS 10*3/mm3 107* 120* 141        Results from last 7 days   Lab Units 02/08/25  0249 02/07/25 0347 02/06/25 0228 02/05/25  0305   SODIUM mmol/L 143 141 138 137   POTASSIUM mmol/L 3.8  "3.2* 3.5 3.8   CHLORIDE mmol/L 100 95* 93* 96*   CO2 mmol/L 34.0* 34.0* 33.0* 30.0*   BUN mg/dL 21 19 25* 26*   CREATININE mg/dL 0.48* 0.42* 0.52* 0.44*   CALCIUM mg/dL 9.0 9.0 8.9 8.7   BILIRUBIN mg/dL  --  0.4 0.4 0.3   ALK PHOS U/L  --  83 86 85   ALT (SGPT) U/L  --  17 17 22   AST (SGOT) U/L  --  16 17 22   GLUCOSE mg/dL 114* 116* 103* 95       Culture Data:   No results found for: \"BLOODCX\", \"URINECX\", \"WOUNDCX\", \"MRSACX\", \"RESPCX\", \"STOOLCX\"    Radiology Data:   Imaging Results (Last 24 Hours)       ** No results found for the last 24 hours. **            I have reviewed the patient's current medications.     Assessment/Plan   Assessment  Active Hospital Problems    Diagnosis     Leukopenia due to chemotherapy     Thrombocytopenia     Influenza A     Small cell lung cancer     Former smoker     Acute respiratory failure with hypoxemia        Treatment Plan  Transferred out of ICU on 2/6/2025  Extubated 2/4  Remains on 5LNC (typically on 3LNC at home)  Completed 3 days of azithromycin.  Has received 5 days of IV ceftriaxone, and will plan for 2 additional days of PO Omnicef  Scheduled DuoNebs in addition to Pulmicort nebulizers  Transition from Solumedrol to PO Prednisone today  Neupogen has been added by oncology.  Per Oncology note:  She received cycle #1, day #1 of  carboplatin/-16 on 1/27/2025-01/29/25.       Palliative emergent XRT of the chest consisted of 1250 cGy in 5 treatment fractions between 12/13/2024 and 12/19/2024 by Dr. Rico Vanegas at [facility] for the associated SVC due to tumor.    Patient finished course of Tamiflu on 2/4/2025  Continue PT and OT  10.  Currently on heparin for DVT prophylaxis; monitor platelet trend closely  11.  CPAP at night with sleep  12.  Dispo planning    Medical Decision Making  Number and Complexity of problems: 2 acute; moderate complexity      Conditions and Status        Condition is improving.     MDM Data  External documents reviewed: none  Cardiac tracing (EKG, " telemetry) interpretation: no new EKGs  Radiology interpretation: no new radiology studies  Labs reviewed: as above  Any tests that were considered but not ordered: none     Decision rules/scores evaluated (example [id number], [place], etc): none     Discussed with: patient and son     Care Planning  Shared decision making: Discussed with patient with agreement to proceed with treatment plan as outlined  Code status and discussions: DO NOT RESUSCITATE    Disposition  Social Determinants of Health that impact treatment or disposition: none apparent at this time  I expect the patient to be discharged to: D and will see how patient progress with PT.  She really wants to go back home.        Electronically signed by Rony Edwards MD, 02/08/25, 10:59 CST.

## 2025-02-09 LAB
BASOPHILS # BLD MANUAL: 0 10*3/MM3 (ref 0–0.2)
BASOPHILS NFR BLD MANUAL: 0 % (ref 0–1.5)
CLUMPED PLATELETS: PRESENT
DEPRECATED RDW RBC AUTO: 41.6 FL (ref 37–54)
DOHLE BOD BLD QL SMEAR: PRESENT
EOSINOPHIL # BLD MANUAL: 0.05 10*3/MM3 (ref 0–0.4)
EOSINOPHIL NFR BLD MANUAL: 1 % (ref 0.3–6.2)
ERYTHROCYTE [DISTWIDTH] IN BLOOD BY AUTOMATED COUNT: 12.9 % (ref 12.3–15.4)
GIANT PLATELETS: ABNORMAL
HCT VFR BLD AUTO: 38 % (ref 34–46.6)
HGB BLD-MCNC: 12.5 G/DL (ref 12–15.9)
LYMPHOCYTES # BLD MANUAL: 0.66 10*3/MM3 (ref 0.7–3.1)
LYMPHOCYTES NFR BLD MANUAL: 11.1 % (ref 5–12)
MCH RBC QN AUTO: 29.1 PG (ref 26.6–33)
MCHC RBC AUTO-ENTMCNC: 32.9 G/DL (ref 31.5–35.7)
MCV RBC AUTO: 88.4 FL (ref 79–97)
MONOCYTES # BLD: 0.52 10*3/MM3 (ref 0.1–0.9)
NEUTROPHILS # BLD AUTO: 3.46 10*3/MM3 (ref 1.7–7)
NEUTROPHILS NFR BLD MANUAL: 52.5 % (ref 42.7–76)
NEUTS BAND NFR BLD MANUAL: 21.2 % (ref 0–5)
PLATELET # BLD AUTO: 129 10*3/MM3 (ref 140–450)
PMV BLD AUTO: 10.6 FL (ref 6–12)
POLYCHROMASIA BLD QL SMEAR: ABNORMAL
RBC # BLD AUTO: 4.3 10*6/MM3 (ref 3.77–5.28)
SMALL PLATELETS BLD QL SMEAR: ABNORMAL
TOXIC GRANULATION: ABNORMAL
VARIANT LYMPHS NFR BLD MANUAL: 14.1 % (ref 19.6–45.3)
WBC NRBC COR # BLD AUTO: 4.69 10*3/MM3 (ref 3.4–10.8)

## 2025-02-09 PROCEDURE — 63710000001 PREDNISONE PER 1 MG: Performed by: INTERNAL MEDICINE

## 2025-02-09 PROCEDURE — 94799 UNLISTED PULMONARY SVC/PX: CPT

## 2025-02-09 PROCEDURE — 94664 DEMO&/EVAL PT USE INHALER: CPT

## 2025-02-09 PROCEDURE — 25010000002 FILGRASTIM PER 480 MCG: Performed by: INTERNAL MEDICINE

## 2025-02-09 PROCEDURE — 85007 BL SMEAR W/DIFF WBC COUNT: CPT | Performed by: INTERNAL MEDICINE

## 2025-02-09 PROCEDURE — 25010000002 HEPARIN (PORCINE) PER 1000 UNITS: Performed by: PHYSICIAN ASSISTANT

## 2025-02-09 PROCEDURE — 85025 COMPLETE CBC W/AUTO DIFF WBC: CPT | Performed by: INTERNAL MEDICINE

## 2025-02-09 RX ORDER — BUDESONIDE AND FORMOTEROL FUMARATE DIHYDRATE 160; 4.5 UG/1; UG/1
2 AEROSOL RESPIRATORY (INHALATION)
Status: DISCONTINUED | OUTPATIENT
Start: 2025-02-09 | End: 2025-02-10 | Stop reason: HOSPADM

## 2025-02-09 RX ADMIN — MIRTAZAPINE 15 MG: 15 TABLET, FILM COATED ORAL at 20:13

## 2025-02-09 RX ADMIN — OXYCODONE HYDROCHLORIDE AND ACETAMINOPHEN 1 TABLET: 7.5; 325 TABLET ORAL at 14:15

## 2025-02-09 RX ADMIN — IPRATROPIUM BROMIDE AND ALBUTEROL SULFATE 3 ML: .5; 3 SOLUTION RESPIRATORY (INHALATION) at 20:05

## 2025-02-09 RX ADMIN — LEVOTHYROXINE SODIUM 112 MCG: 112 TABLET ORAL at 05:46

## 2025-02-09 RX ADMIN — CEFDINIR 300 MG: 300 CAPSULE ORAL at 08:22

## 2025-02-09 RX ADMIN — IPRATROPIUM BROMIDE AND ALBUTEROL SULFATE 3 ML: .5; 3 SOLUTION RESPIRATORY (INHALATION) at 06:40

## 2025-02-09 RX ADMIN — ROPINIROLE HYDROCHLORIDE 0.25 MG: 0.25 TABLET, FILM COATED ORAL at 20:13

## 2025-02-09 RX ADMIN — OXYCODONE HYDROCHLORIDE AND ACETAMINOPHEN 1 TABLET: 7.5; 325 TABLET ORAL at 20:13

## 2025-02-09 RX ADMIN — BUDESONIDE AND FORMOTEROL FUMARATE DIHYDRATE 2 PUFF: 160; 4.5 AEROSOL RESPIRATORY (INHALATION) at 20:05

## 2025-02-09 RX ADMIN — Medication 10 ML: at 08:33

## 2025-02-09 RX ADMIN — IPRATROPIUM BROMIDE AND ALBUTEROL SULFATE 3 ML: .5; 3 SOLUTION RESPIRATORY (INHALATION) at 15:45

## 2025-02-09 RX ADMIN — CEFDINIR 300 MG: 300 CAPSULE ORAL at 20:13

## 2025-02-09 RX ADMIN — PREDNISONE 40 MG: 20 TABLET ORAL at 08:22

## 2025-02-09 RX ADMIN — HEPARIN SODIUM 5000 UNITS: 5000 INJECTION, SOLUTION INTRAVENOUS; SUBCUTANEOUS at 14:18

## 2025-02-09 RX ADMIN — IPRATROPIUM BROMIDE AND ALBUTEROL SULFATE 3 ML: .5; 3 SOLUTION RESPIRATORY (INHALATION) at 10:23

## 2025-02-09 RX ADMIN — LANSOPRAZOLE 30 MG: 30 TABLET, ORALLY DISINTEGRATING ORAL at 08:22

## 2025-02-09 RX ADMIN — OXYCODONE HYDROCHLORIDE AND ACETAMINOPHEN 1 TABLET: 7.5; 325 TABLET ORAL at 00:48

## 2025-02-09 RX ADMIN — FILGRASTIM 480 MCG: 480 INJECTION, SOLUTION INTRAVENOUS; SUBCUTANEOUS at 10:03

## 2025-02-09 RX ADMIN — HEPARIN SODIUM 5000 UNITS: 5000 INJECTION, SOLUTION INTRAVENOUS; SUBCUTANEOUS at 05:47

## 2025-02-09 RX ADMIN — BUDESONIDE 0.5 MG: 0.5 INHALANT RESPIRATORY (INHALATION) at 06:40

## 2025-02-09 RX ADMIN — OXYCODONE HYDROCHLORIDE AND ACETAMINOPHEN 1 TABLET: 7.5; 325 TABLET ORAL at 08:23

## 2025-02-09 RX ADMIN — HEPARIN SODIUM 5000 UNITS: 5000 INJECTION, SOLUTION INTRAVENOUS; SUBCUTANEOUS at 20:13

## 2025-02-09 RX ADMIN — CITALOPRAM HYDROBROMIDE 40 MG: 20 TABLET ORAL at 08:22

## 2025-02-09 NOTE — PROGRESS NOTES
HEMATOLOGY AND MEDICAL ONCOLOGY PROGRESS NOTE    Patient name: Ashley Gibbs  Patient : 1955  VISIT # [id number]  MR #[id number]  Room:     SUBJECTIVE:    She says she had a fairly good night.  Breathing well in no acute distress.      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 Rico Vanegas.     She received cycle #1, day #1 of  carboplatin/-16 on 2025-25.       Palliative emergent XRT of the chest consisted of 1250 cGy in 5 treatment fractions between 2024 and 2024 by Dr. Rico Vanegas at [facility] for the associated SVC due to tumor..     Treatment regimen-curative intent consisted of:  Carboplatin AUC 5 day 1  Etoposide 100 mg/m² days 1-3 q. 21 days  Plan is for X 4 cycles      Ashley was seen 2025, in the ED, at [facility] on the day of her admission, accompanied by 2 granddaughters and her good friend.  The granddaughter reports a fever to 101 degrees, increased difficulty with breathing shortness of breath and wheezing.     CBC Results   CBC            2024    08:49 2025    11:20 2025    08:13   CBC   WBC 4.51  5.35     9.14    RBC 3.85  4.32     3.67    Hemoglobin 11.5  12.9     10.9    Hematocrit 35.2  39.4     33.7    MCV 91.4  91.2     91.8    MCH 29.9  29.9     29.7    MCHC 32.7  32.7     32.3    RDW 13.0  12.9     13.9    Platelets 199  260     220         Details           This result is from an external source.                         UA today, 2025 showed 3+ leukocytes, 30 mg/dL protein WBCs too numerous to count, bacteria 3+, nitrite negative.     Respiratory panel by PCR today, 2025 was positive for influenza A H1-Detected  The patient has  been desaturating while in the ED, requiring BiPAP assistance.  Blood and urine cultures were obtained.  Antibiotic with cefepime is initiated.     Medical oncology consultation requested         PAST TUMOR HISTORY COPIED FROM DR. CARDONA'S 1/13/2025 OFFICE VISIT:   HISTORY OF PRESENT ILLNESS:    Diagnosis  Small cell lung cancer, right lung, Dec 2024  Stage  [id number], limited stage     Treatment Summary  12/13/24 - 12/19/24 Emergent palliative radiation therapy 1250cGy in 5 fractions to right lung  Anticipate chemotherapy with Carboplatin D1, Etoposide D1-3 every 21 days x4 cycles  Anticipate radiation therapy to right lung with Dr Rico Vanegas/[facility]     Cancer History  Ms. 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 She has complaints today of fatigue. She states she has stabbing chest pain and mid back pain. She is currently being seen at pain management for pain medication. She presents today on 2L of oxygen dependently.   11/8/24 CT chest low dose ([facility]): 2 cm right upper lobe bilobed nodule with associated mediastinal and right hilar lymphadenopathy, suspicious for malignancy. Minimal adjacent   right upper lobe interlobular septal thickening may be related to venous or lymphatic congestion, however lymphangitic carcinomatosis is an additional consideration. Recommend further evaluation with PET/CT and/or tissue sampling. CT chest with contrast may also be of value to help distinguish the lymph nodes from adjacent vasculature. Minimal tree-in-bud nodularity in the peripheral the right upper lobe, likely mild infectious process.   Additional small pulmonary nodules in the right upper and right middle lobe.   11/8/24 CT abd/pelvis ([facility]): Near complete resolution of the exophytic left upper pole renal lesion of concern likely related to interval rupture/involution. Numerous small bilateral renal cysts are  again present.  Mild diffuse wall thickening of the rectum and the distal transverse colon and proximal descending colon. Correlate for proctocolitis.   12/13/24 PET/CT scan ([facility]): There is a hypermetabolic right upper lobe nodule measures 1.4 cm and has a maximum SUV of 5.5. Right upper lobe suprahilar mass measures at least 8 cm in greatest axial dimension is hypermetabolic with a maximum SUV of 11.4. Mass either invades the middle mediastinum or there is conglomerate hypermetabolic lymphadenopathy. No additional hypermetabolic pulmonary nodule or mass is identified. There is no increased skeletal uptake to suggest osseous metastasis.   12/13/245 CT chest ([facility]): 2.2 cm bilobed right upper lobe pulmonary nodule, likely representing a primary lung neoplasm. 8.9 cm right hilar lung mass with diffuse mediastinal infiltration, likely representing mary metastasis. This mass encases the right mainstem bronchus and encases the right main pulmonary artery, with occlusion of the right upper lobe pulmonary artery. This mass compresses the SVC, which is slitlike. Patient is at risk for SVC syndrome.  12/13/24 Initial evaluation by Dr Rico Vanegas/[facility]iation [facility] for superior vena cava syndrome who recommended emergent palliative radiation therapy 1250cGy in 5 fractions.  12/13/24 - 12/19/24 Emergent SBRT 1250cGy in 5 fractions to right lung  12/19/24 CT chest w/o ([facility]): Stable large right hilar mass with mediastinal invasion and confluent lymphadenopathy as detailed above. Findings are suspicious for small cell carcinoma. There is increased groundglass opacities and interlobular septal thickening within the right upper lobe and right middle lobe. Differential includes postobstructive changes and/or leptomeningeal carcinomatosis. Stable 2.2 cm right upper lobe bilobed nodule which was previously hypermetabolic and concerning for metastases. Trace right pleural effusion.   12/23/24 Navigational bronchoscopy/EBUS by   "Peterson Morales/[facility]  12/23/24 Station 7 lymph node, endobronchial ultrasound-guided fine-needle aspiration, smear (1) and cellblock: High-grade neuroendocrine carcinoma consistent with metastatic small cell carcinoma. Fragments of lymphoid tissue and cartilage present.  Station 4R lymph node, endobronchial ultrasound-guided fine-needle aspiration, smear (1) and cellblock: High-grade neuroendocrine carcinoma consistent with metastatic small cell carcinoma. Many background lymphocytes.  Bronchial washings, ThinPrep preparation (1) and cellblock: A few malignant cells present, high-grade neuroendocrine carcinoma consistent with small cell carcinoma. Several macrophages. A few benign squamous epithelial cells and bronchial epithelial cells. AJCC stage: pTX pNX  1/13/2025-essentially, limited stage small cell lung cancer.  Awaiting results of brain MRI.  Recommend concurrent chemoradiation with carboplatin 2 post right to be followed by adjuvant durvalumab.  1/27/2025-initiation cycle #1 of chemotherapy with carboplatin/-16           PHYSICAL EXAM:/58 (BP Location: Left arm, Patient Position: Lying)   Pulse 92   Temp 98.2 °F (36.8 °C) (Axillary)   Resp 18   Ht 160 cm (62.99\")   Wt 64.9 kg (143 lb)   SpO2 96%   BMI 25.34 kg/m²   CONSTITUTIONAL:sedated, ill-appearing  EYES: Anicteric, EOM intact, pupils equal round   CHEST/LUNGS: CTA, Mechanical ventilator, PEEP 5, FiO2 30%.  CARDIOVASCULAR: RRR, no murmurs  ABDOMEN: soft, active bowel sounds, no HSM  EXTREMITIES: warm,   SKIN: warm,   NEUROLOGIC-sedated      CBC  Results from last 7 days   Lab Units 02/09/25  0622 02/08/25  0249 02/07/25  0347   WBC 10*3/mm3 4.69 0.89* 0.94*   HEMOGLOBIN g/dL 12.5 11.1* 11.4*   HEMATOCRIT % 38.0 34.6 34.4   PLATELETS 10*3/mm3 129* 107* 120*         Lab Results   Component Value Date     02/08/2025    K 3.8 02/08/2025     02/08/2025    CO2 34.0 (H) 02/08/2025    BUN 21 02/08/2025    CREATININE 0.48 (L) " 02/08/2025    GLUCOSE 114 (H) 02/08/2025    CALCIUM 9.0 02/08/2025    BILITOT 0.4 02/07/2025    ALKPHOS 83 02/07/2025    AST 16 02/07/2025    ALT 17 02/07/2025    AGRATIO 0.9 02/07/2025    GLOB 3.3 02/07/2025       Lab Results   Component Value Date    INR 0.98 01/31/2025    INR 1.04 07/24/2019    INR 0.90 03/13/2019    PROTIME 13.5 01/31/2025    PROTIME 13 07/24/2019    PROTIME 11.6 (L) 03/13/2019       ASSESSMENT/PLAN:    #   Limited stage small cell lung cancer    Limited stage small cell lung cancer-status post completion of cycle 1 carboplatin etoposide 1/29/2025.    Status post palliative XRT to the [facility].  Plans for further XRT with Dr. Rico Vanegas.    #  Acute hypoxic respiratory failure-     Acute hypoxic respiratory failure- r/tCOPD exacerbation with + influenza A.  Chest x-ray showed chronic changes of emphysema and fibrosis.  Continue current supportive care.  Failed SBT-02/01/25  Extubated, planning on returning to the floor    #  Normocytic anemia-     CBC          2/7/2025    03:47 2/8/2025    02:49 2/9/2025    06:22   CBC   WBC 0.94  0.89  4.69    RBC 3.98  3.90  4.30    Hemoglobin 11.4  11.1  12.5    Hematocrit 34.4  34.6  38.0    MCV 86.4  88.7  88.4    MCH 28.6  28.5  29.1    MCHC 33.1  32.1  32.9    RDW 12.8  12.7  12.9    Platelets 120  107  129      Hemoglobin stable, 12.5 today  WBCs  4.69 with ANC 3.46  Neupogen 480 mcg daily initiated 2/6/2025, and continuing daily    Monitor    Plan:  Continue current supportive care  Continue Rocephin      Future Appointments    Encounter Information   Provider Department Center   2/17/2025 11:30 AM Christopher Isaac MD [facility]   2/17/2025 11:30 AM [facility]   2/18/2025 12:00 PM INFUSION SCHEDULE, [facility]   2/19/2025 11:30 AM INFUSION SCHEDULE, [facility]     OK with  me to discharge.     Ramón Li MD    02/09/25  07:29 CST

## 2025-02-09 NOTE — PLAN OF CARE
Problem: Adult Inpatient Plan of Care  Goal: Plan of Care Review  Outcome: Progressing  Flowsheets (Taken 2/9/2025 1752)  Progress: no change  Outcome Evaluation: VSS. A&Ox4. pt c/o pain, PRN pain medication given. S/St  with PAC and PVC on tele. safety maintained.  Plan of Care Reviewed With: patient

## 2025-02-09 NOTE — PROGRESS NOTES
[facility] Medicine Services  INPATIENT PROGRESS NOTE    Patient Name: Ashley Gibbs  Date of Admission: 1/31/2025  Today's Date: 02/09/25  Length of Stay: 9  Primary Care Physician: Padmaja Bermudez APRN    Subjective   Chief Complaint: follow-up respiratory failure  HPI   Patient was on 4 L by nasal cannula when I saw her at home.  She indicated to me that she is normally on 2 L by nasal cannula in the outpatient setting.  She reports that she has gotten much better over the past couple of days, and indicated that she was really eager to get back home.  No family at bedside this morning.  She voices no new complaints.    Review of Systems   All pertinent negatives and positives are as above. All other systems have been reviewed and are negative unless otherwise stated.     Objective    Temp:  [98.2 °F (36.8 °C)-99.4 °F (37.4 °C)] 98.9 °F (37.2 °C)  Heart Rate:  [] 86  Resp:  [18-20] 18  BP: (120-159)/(58-82) 159/82  Physical Exam  Vitals reviewed.   Constitutional:       General: She is not in acute distress.     Appearance: She is not toxic-appearing.      Comments: Resting in bed   HENT:      Head: Normocephalic.      Mouth/Throat:      Mouth: Mucous membranes are moist.   Cardiovascular:      Rate and Rhythm: Normal rate.   Pulmonary:      Effort: Pulmonary effort is normal.      Breath sounds: Wheezing (faint, expiratory) present. No rhonchi.      Comments: On 4LNC  Skin:     General: Skin is warm.   Neurological:      Mental Status: She is alert.      Motor: Weakness present.   Psychiatric:         Mood and Affect: Mood normal.         Results Review:  I have reviewed the labs, radiology results, and diagnostic studies.    Laboratory Data:   Results from last 7 days   Lab Units 02/09/25  0622 02/08/25  0249 02/07/25  0347   WBC 10*3/mm3 4.69 0.89* 0.94*   HEMOGLOBIN g/dL 12.5 11.1* 11.4*   HEMATOCRIT % 38.0 34.6 34.4   PLATELETS 10*3/mm3 129* 107* 120*       "  Results from last 7 days   Lab Units 02/08/25  0249 02/07/25  0347 02/06/25  0228 02/05/25  0305   SODIUM mmol/L 143 141 138 137   POTASSIUM mmol/L 3.8 3.2* 3.5 3.8   CHLORIDE mmol/L 100 95* 93* 96*   CO2 mmol/L 34.0* 34.0* 33.0* 30.0*   BUN mg/dL 21 19 25* 26*   CREATININE mg/dL 0.48* 0.42* 0.52* 0.44*   CALCIUM mg/dL 9.0 9.0 8.9 8.7   BILIRUBIN mg/dL  --  0.4 0.4 0.3   ALK PHOS U/L  --  83 86 85   ALT (SGPT) U/L  --  17 17 22   AST (SGOT) U/L  --  16 17 22   GLUCOSE mg/dL 114* 116* 103* 95       Culture Data:   No results found for: \"BLOODCX\", \"URINECX\", \"WOUNDCX\", \"MRSACX\", \"RESPCX\", \"STOOLCX\"    Radiology Data:   Imaging Results (Last 24 Hours)       ** No results found for the last 24 hours. **            I have reviewed the patient's current medications.     Assessment/Plan   Assessment  Active Hospital Problems    Diagnosis     Leukopenia due to chemotherapy     Thrombocytopenia     Influenza A     Small cell lung cancer     Former smoker     Acute respiratory failure with hypoxemia        Treatment Plan  Transferred out of ICU on 2/6/2025  Extubated 2/4  Down to 4LNC this morning (typically on 2-3LNC at home at baseline)  Completed 3 days of azithromycin.  Has received 5 days of IV ceftriaxone, and will plan for 2 additional days of PO Omnicef (last day today)  Scheduled DuoNebs  Transition Pulmicort nebulizers to Symbicort today  Now on PO Prednisone with plan for taper  Neupogen has been added by oncology.  Per Oncology note:  She received cycle #1, day #1 of  carboplatin/-16 on 1/27/2025-01/29/25.       Palliative emergent XRT of the chest consisted of 1250 cGy in 5 treatment fractions between 12/13/2024 and 12/19/2024 by Dr. Rico Vanegas at [facility] for the associated SVC due to tumor.    Patient finished course of Tamiflu on 2/4/2025  Continue PT and OT  10.  Currently on heparin for DVT prophylaxis; monitor platelet trend closely - up to 129 this morning  11.  CPAP at night with sleep  12.  OK to d/c " telemetry  13.  Dispo planning.  Patient eager to go back home, and would like to see how she does with PT and OT to determine if home with HH vs. SNF.    Medical Decision Making  Number and Complexity of problems: 2 acute; moderate complexity      Conditions and Status        Condition is improving.     [facility] Data  External documents reviewed: none  Cardiac tracing (EKG, telemetry) interpretation: no new EKGs  Radiology interpretation: no new radiology studies  Labs reviewed: as above  Any tests that were considered but not ordered: none     Decision rules/scores evaluated (example [id number], [place], etc): none     Discussed with: patient      Care Planning  Shared decision making: Discussed with patient with agreement to proceed with treatment plan as outlined  Code status and discussions: DO NOT RESUSCITATE    Disposition  Social Determinants of Health that impact treatment or disposition: none apparent at this time  I expect the patient to be discharged to: [facility] and will see how patient progress with PT and OT.  She really wants to go back home.        Electronically signed by Rony Edwards MD, 02/09/25, 10:28 CST.

## 2025-02-09 NOTE — PLAN OF CARE
Goal Outcome Evaluation:     Patient alert and oriented during shift. Respiration even and unlabored. Iv site patent and intact. Patient complaint of back pain during shift, prn medication given. Family at bedside with patient. Call light and water within reach of patient. Bed lock and low. Bed alarm present and activated.       Problem: Mechanical Ventilation Invasive  Goal: Optimal Device Function  Intervention: Optimize Device Care and Function  Recent Flowsheet Documentation  Taken 2/9/2025 1147 by Chiara Gallagher, RN  Oral Care: lip/mouth moisturizer applied  Goal: Absence of Ventilator-Induced Lung Injury  Intervention: Prevent Ventilator-Associated Pneumonia  Recent Flowsheet Documentation  Taken 2/9/2025 1147 by Chiara Gallagher, RN  Oral Care: lip/mouth moisturizer applied     Problem: Adult Inpatient Plan of Care  Goal: Plan of Care Review  Outcome: Progressing  Goal: Patient-Specific Goal (Individualized)  Outcome: Progressing  Goal: Absence of Hospital-Acquired Illness or Injury  Outcome: Progressing  Goal: Optimal Comfort and Wellbeing  Outcome: Progressing  Goal: Readiness for Transition of Care  Outcome: Progressing     Problem: Fall Injury Risk  Goal: Absence of Fall and Fall-Related Injury  Outcome: Progressing     Problem: Skin Injury Risk Increased  Goal: Skin Health and Integrity  Outcome: Progressing     Problem: Comorbidity Management  Goal: Maintenance of COPD Symptom Control  Outcome: Progressing     Problem: Enteral Nutrition  Goal: Absence of Aspiration Signs and Symptoms  Intervention: Minimize Aspiration Risk  Recent Flowsheet Documentation  Taken 2/9/2025 1147 by Chiara Gallagher, RN  Oral Care: lip/mouth moisturizer applied

## 2025-02-09 NOTE — PLAN OF CARE
Problem: Fall Injury Risk  Goal: Absence of Fall and Fall-Related Injury  Outcome: Progressing  Intervention: Identify and Manage Contributors  Recent Flowsheet Documentation  Taken 2/8/2025 0800 by Rosa Kelly RN  Medication Review/Management: medications reviewed  Intervention: Promote Injury-Free Environment  Recent Flowsheet Documentation  Taken 2/8/2025 1200 by Rosa Kelly RN  Safety Promotion/Fall Prevention:   safety round/check completed   assistive device/personal items within reach   clutter free environment maintained   lighting adjusted   nonskid shoes/slippers when out of bed  Taken 2/8/2025 1000 by Rosa Kelly RN  Safety Promotion/Fall Prevention:   safety round/check completed   assistive device/personal items within reach   clutter free environment maintained   lighting adjusted   nonskid shoes/slippers when out of bed  Taken 2/8/2025 0800 by Rosa Kelly RN  Safety Promotion/Fall Prevention:   safety round/check completed   nonskid shoes/slippers when out of bed   clutter free environment maintained   Goal Outcome Evaluation:         No significant changes to patient status this shift. Pt remained AAOx4. Pt up to bathroom with assistance. Due to wait for assistance to get off the  toilet, pt scooted to the floor after having bowel movement. Pt not sure why she did it. Pt assisted back to bed with 2 people and gait belt. Pt sustained no injuries from sliding to the floor. VSS. Pt remained on O2 at 4L. Bed remained in low locked position with side rails up X2 and call light in reach. Bed alarm activated.

## 2025-02-10 VITALS
RESPIRATION RATE: 20 BRPM | WEIGHT: 137.8 LBS | SYSTOLIC BLOOD PRESSURE: 142 MMHG | HEART RATE: 114 BPM | BODY MASS INDEX: 24.41 KG/M2 | TEMPERATURE: 98.5 F | OXYGEN SATURATION: 93 % | DIASTOLIC BLOOD PRESSURE: 72 MMHG | HEIGHT: 63 IN

## 2025-02-10 LAB
ALBUMIN SERPL-MCNC: 3.5 G/DL (ref 3.5–5.2)
ALBUMIN/GLOB SERPL: 1.2 G/DL
ALP SERPL-CCNC: 109 U/L (ref 39–117)
ALT SERPL W P-5'-P-CCNC: 18 U/L (ref 1–33)
ANION GAP SERPL CALCULATED.3IONS-SCNC: 12 MMOL/L (ref 5–15)
ANION GAP SERPL CALCULATED.3IONS-SCNC: 12 MMOL/L (ref 5–15)
AST SERPL-CCNC: 16 U/L (ref 1–32)
BASOPHILS # BLD MANUAL: 0 10*3/MM3 (ref 0–0.2)
BASOPHILS NFR BLD MANUAL: 0 % (ref 0–1.5)
BILIRUB SERPL-MCNC: 0.4 MG/DL (ref 0–1.2)
BUN SERPL-MCNC: 18 MG/DL (ref 8–23)
BUN SERPL-MCNC: 18 MG/DL (ref 8–23)
BUN/CREAT SERPL: 32.1 (ref 7–25)
BUN/CREAT SERPL: 32.1 (ref 7–25)
CALCIUM SPEC-SCNC: 8.5 MG/DL (ref 8.6–10.5)
CALCIUM SPEC-SCNC: 8.5 MG/DL (ref 8.6–10.5)
CHLORIDE SERPL-SCNC: 97 MMOL/L (ref 98–107)
CHLORIDE SERPL-SCNC: 97 MMOL/L (ref 98–107)
CO2 SERPL-SCNC: 34 MMOL/L (ref 22–29)
CO2 SERPL-SCNC: 34 MMOL/L (ref 22–29)
CREAT SERPL-MCNC: 0.56 MG/DL (ref 0.57–1)
CREAT SERPL-MCNC: 0.56 MG/DL (ref 0.57–1)
D-LACTATE SERPL-SCNC: 1.4 MMOL/L (ref 0.5–2)
DEPRECATED RDW RBC AUTO: 41.8 FL (ref 37–54)
DOHLE BOD BLD QL SMEAR: PRESENT
EGFRCR SERPLBLD CKD-EPI 2021: 100.2 ML/MIN/1.73
EGFRCR SERPLBLD CKD-EPI 2021: 100.2 ML/MIN/1.73
EOSINOPHIL # BLD MANUAL: 0 10*3/MM3 (ref 0–0.4)
EOSINOPHIL NFR BLD MANUAL: 0 % (ref 0.3–6.2)
ERYTHROCYTE [DISTWIDTH] IN BLOOD BY AUTOMATED COUNT: 13 % (ref 12.3–15.4)
GIANT PLATELETS: ABNORMAL
GLOBULIN UR ELPH-MCNC: 3 GM/DL
GLUCOSE SERPL-MCNC: 95 MG/DL (ref 65–99)
GLUCOSE SERPL-MCNC: 95 MG/DL (ref 65–99)
HCT VFR BLD AUTO: 35.3 % (ref 34–46.6)
HGB BLD-MCNC: 11.4 G/DL (ref 12–15.9)
LYMPHOCYTES # BLD MANUAL: 1.39 10*3/MM3 (ref 0.7–3.1)
LYMPHOCYTES NFR BLD MANUAL: 3 % (ref 5–12)
MAGNESIUM SERPL-MCNC: 1.5 MG/DL (ref 1.6–2.4)
MCH RBC QN AUTO: 28.4 PG (ref 26.6–33)
MCHC RBC AUTO-ENTMCNC: 32.3 G/DL (ref 31.5–35.7)
MCV RBC AUTO: 88 FL (ref 79–97)
MONOCYTES # BLD: 0.3 10*3/MM3 (ref 0.1–0.9)
NEUTROPHILS # BLD AUTO: 8.25 10*3/MM3 (ref 1.7–7)
NEUTROPHILS NFR BLD MANUAL: 59 % (ref 42.7–76)
NEUTS BAND NFR BLD MANUAL: 24 % (ref 0–5)
NEUTS VAC BLD QL SMEAR: ABNORMAL
PLATELET # BLD AUTO: 129 10*3/MM3 (ref 140–450)
PMV BLD AUTO: 10.4 FL (ref 6–12)
POLYCHROMASIA BLD QL SMEAR: ABNORMAL
POTASSIUM SERPL-SCNC: 2.5 MMOL/L (ref 3.5–5.2)
POTASSIUM SERPL-SCNC: 2.5 MMOL/L (ref 3.5–5.2)
PROCALCITONIN SERPL-MCNC: 0.21 NG/ML (ref 0–0.25)
PROT SERPL-MCNC: 6.5 G/DL (ref 6–8.5)
RBC # BLD AUTO: 4.01 10*6/MM3 (ref 3.77–5.28)
SODIUM SERPL-SCNC: 143 MMOL/L (ref 136–145)
SODIUM SERPL-SCNC: 143 MMOL/L (ref 136–145)
TOXIC GRANULATION: ABNORMAL
VARIANT LYMPHS NFR BLD MANUAL: 12 % (ref 19.6–45.3)
VARIANT LYMPHS NFR BLD MANUAL: 2 % (ref 0–5)
WBC NRBC COR # BLD AUTO: 9.94 10*3/MM3 (ref 3.4–10.8)

## 2025-02-10 PROCEDURE — 85025 COMPLETE CBC W/AUTO DIFF WBC: CPT | Performed by: INTERNAL MEDICINE

## 2025-02-10 PROCEDURE — 83735 ASSAY OF MAGNESIUM: CPT | Performed by: INTERNAL MEDICINE

## 2025-02-10 PROCEDURE — 94799 UNLISTED PULMONARY SVC/PX: CPT

## 2025-02-10 PROCEDURE — 85007 BL SMEAR W/DIFF WBC COUNT: CPT | Performed by: INTERNAL MEDICINE

## 2025-02-10 PROCEDURE — 84145 PROCALCITONIN (PCT): CPT | Performed by: INTERNAL MEDICINE

## 2025-02-10 PROCEDURE — 94664 DEMO&/EVAL PT USE INHALER: CPT

## 2025-02-10 PROCEDURE — 25010000002 HEPARIN (PORCINE) PER 1000 UNITS: Performed by: PHYSICIAN ASSISTANT

## 2025-02-10 PROCEDURE — 63710000001 PREDNISONE PER 1 MG: Performed by: INTERNAL MEDICINE

## 2025-02-10 PROCEDURE — 87040 BLOOD CULTURE FOR BACTERIA: CPT | Performed by: INTERNAL MEDICINE

## 2025-02-10 PROCEDURE — 80053 COMPREHEN METABOLIC PANEL: CPT | Performed by: INTERNAL MEDICINE

## 2025-02-10 PROCEDURE — 83605 ASSAY OF LACTIC ACID: CPT | Performed by: INTERNAL MEDICINE

## 2025-02-10 RX ORDER — POTASSIUM CHLORIDE 29.8 MG/ML
10 INJECTION INTRAVENOUS
Status: DISPENSED | OUTPATIENT
Start: 2025-02-10 | End: 2025-02-10

## 2025-02-10 RX ORDER — POTASSIUM CHLORIDE 750 MG/1
40 CAPSULE, EXTENDED RELEASE ORAL ONCE
Status: COMPLETED | OUTPATIENT
Start: 2025-02-10 | End: 2025-02-10

## 2025-02-10 RX ADMIN — CITALOPRAM HYDROBROMIDE 40 MG: 20 TABLET ORAL at 08:24

## 2025-02-10 RX ADMIN — BUDESONIDE AND FORMOTEROL FUMARATE DIHYDRATE 2 PUFF: 160; 4.5 AEROSOL RESPIRATORY (INHALATION) at 07:29

## 2025-02-10 RX ADMIN — IPRATROPIUM BROMIDE AND ALBUTEROL SULFATE 3 ML: .5; 3 SOLUTION RESPIRATORY (INHALATION) at 07:29

## 2025-02-10 RX ADMIN — Medication 10 ML: at 08:25

## 2025-02-10 RX ADMIN — POTASSIUM CHLORIDE 40 MEQ: 750 CAPSULE, EXTENDED RELEASE ORAL at 08:24

## 2025-02-10 RX ADMIN — HEPARIN SODIUM 5000 UNITS: 5000 INJECTION, SOLUTION INTRAVENOUS; SUBCUTANEOUS at 05:40

## 2025-02-10 RX ADMIN — PREDNISONE 40 MG: 20 TABLET ORAL at 08:24

## 2025-02-10 RX ADMIN — OXYCODONE HYDROCHLORIDE AND ACETAMINOPHEN 1 TABLET: 7.5; 325 TABLET ORAL at 05:12

## 2025-02-10 RX ADMIN — LEVOTHYROXINE SODIUM 112 MCG: 112 TABLET ORAL at 05:40

## 2025-02-10 NOTE — PLAN OF CARE
Problem: Adult Inpatient Plan of Care  Goal: Plan of Care Review  Outcome: Progressing  Flowsheets (Taken 2/10/2025 5319)  Progress: no change  Outcome Evaluation: VSS. A&Ox4. PRN pain medication given. remains on 4L NC. removed 18g IV from L forearm per pt request. safety maintained

## 2025-02-10 NOTE — DISCHARGE SUMMARY
[facility] Medicine Services  DISCHARGE SUMMARY       Date of Admission: 1/31/2025  Date of Discharge:  2/10/2025  Primary Care Physician: Padmaja Bermudez APRN    Presenting Problem/History of Present Illness:  Fever/dyspnea    Final Discharge Diagnoses:  Active Hospital Problems    Diagnosis     Leukopenia due to chemotherapy     Thrombocytopenia     Influenza A     Small cell lung cancer     Former smoker     Acute respiratory failure with hypoxemia        Consults:   Heme-onc  Intensivist    Procedures Performed: Intubation/extubation    Pertinent Test Results:   Results for orders placed during the hospital encounter of 05/03/23    Adult Transthoracic Echo Complete W/ Cont if Necessary Per Protocol    Interpretation Summary    Left ventricular systolic function is normal. Left ventricular ejection fraction appears to be 66 - 70%.    Estimated right ventricular systolic pressure from tricuspid regurgitation is mildly elevated (35-45 mmHg).    The right ventricular cavity is mild to moderately dilated, with normal systolic function..    The right atrial cavity is dilated.    No significant valvular pathology.    No prior studies available for comparison.      Imaging Results (All)       Procedure Component Value Units Date/Time    XR Chest 1 View [[id number]] Collected: 02/05/25 0640     Updated: 02/05/25 0645    Narrative:      EXAMINATION: XR CHEST 1 VW-     2/5/2025 3:08 AM     HISTORY: f/u extubation; J96.01-Acute respiratory failure with hypoxia;  J96.02-Acute respiratory failure with hypercapnia; R50.9-Fever,  unspecified; J10.1-Influenza due to other identified influenza virus  with other respiratory manifestations; N39.0-Urinary tract infection,  site not specified; R41.82-Altered mental status, unspecified;  C34.90-Malignant neoplasm of unspecified part of unspecified bronchus      A frontal projection of the chest is compared with the previous study  dated  2/4/2025.     The lungs are moderately well-expanded.     There is removal of the endotracheal tube and gastric tube in the  interval.     There is scattered coarse linear and nodular interstitial change in the  lungs bilaterally which are similar to the previous study representing a  chronic inflammatory process/fibrosis.     There is no pleural effusion, pulmonary congestion or pneumothorax.     The heart size is not optimally evaluated due to the AP projection.  Atheromatous change of thoracic aorta noted.     No acute bony abnormality.       Impression:      1. The removal of the endotracheal tube and gastric tube since the  previous study. No change in cardiopulmonary status.        This report was signed and finalized on 2/5/2025 6:42 AM by Dr. Shannan Cobian MD.       XR Chest 1 View [[id number]] Collected: 02/04/25 0705     Updated: 02/04/25 0709    Narrative:      EXAMINATION: XR CHEST 1 VW-     2/4/2025 3:12 AM     HISTORY: vent; J96.01-Acute respiratory failure with hypoxia;  J96.02-Acute respiratory failure with hypercapnia; R50.9-Fever,  unspecified; J10.1-Influenza due to other identified influenza virus  with other respiratory manifestations; N39.0-Urinary tract infection,  site not specified; R41.82-Altered mental status, unspecified;  C34.90-Malignant neoplasm of unspecified part of unspecified bronchus or  lung; D     A frontal projection of the chest is compared with the previous study  dated 2/3/2025.     The lungs are moderately expanded.     There are linear interstitial changes in the lungs bilaterally more so  in the lower lungs representing a chronic process/scarring.     There is no pleural effusion, pulmonary congestion or pneumothorax.     The heart size is in the normal range. Atheromatous change of thoracic  aorta are noted.     Endotracheal tube is in place. The distal end of the gastric tube is not  included and not evaluated. No change since the previous study.     There is no  acute bony abnormality.       Impression:      1. No significant change since the previous study.        This report was signed and finalized on 2/4/2025 7:06 AM by Dr. Shannan Cobian MD.       XR Chest 1 View [[id number]] Collected: 02/03/25 0651     Updated: 02/03/25 0656    Narrative:      EXAM/TECHNIQUE: XR CHEST 1 VW-     INDICATION: vent; J96.01-Acute respiratory failure with hypoxia;  J96.02-Acute respiratory failure with hypercapnia; R50.9-Fever,  unspecified; J10.1-Influenza due to other identified influenza virus  with other respiratory manifestations; N39.0-Urinary tract infection,  site not specified; R41.82-Altered mental status, unspecified;  C34.90-Malignant neoplasm of unspecified part of unspecified bronchus or  lung; D     COMPARISON: Prior day     FINDINGS:     Endotracheal tube is 3.5 cm above the andres. Enteric tube terminates  below the diaphragm out of the field-of-view.     Cardiac silhouette is within normal limits and stable.     No pleural effusion or pneumothorax. No definite consolidation. Right  hilar mass is better demonstrated on prior chest CT. No change in  diffuse interstitial coarsening.       Impression:         No change in appearance of the chest.     This report was signed and finalized on 2/3/2025 6:53 AM by Dr. Eddie Alicia MD.       XR Chest 1 View [[id number]] Collected: 02/02/25 0524     Updated: 02/02/25 0528    Narrative:      EXAM/TECHNIQUE: XR CHEST 1 VW-     INDICATION: respiratory failure; J96.01-Acute respiratory failure with  hypoxia; J96.02-Acute respiratory failure with hypercapnia; R50.9-Fever,  unspecified; J10.1-Influenza due to other identified influenza virus  with other respiratory manifestations; N39.0-Urinary tract infection,  site not specified; R41.82-Altered mental status, unspecified;  C34.90-Malignant neoplasm of unspecified part of unspecified bron     COMPARISON: 1/31/2025     FINDINGS:     Endotracheal tube is 4.5 cm above the andres.  Enteric tube terminates  below the diaphragm out of the field-of-view.     Cardiac silhouette is within normal limits.     No pleural effusion or pneumothorax. No consolidation. Right hilar mass  is better demonstrated on prior chest CT. No change in diffuse  interstitial prominence/coarsening. No new airspace opacity.       Impression:      No change in appearance of the chest.     This report was signed and finalized on 2/2/2025 5:25 AM by Dr. Eddie Alicia MD.       XR Abdomen KUB [[id number]] Collected: 02/01/25 1746     Updated: 02/01/25 1750    Narrative:      EXAM/TECHNIQUE: XR ABDOMEN KUB-     INDICATION: ogt placement; J96.01-Acute respiratory failure with  hypoxia; J96.02-Acute respiratory failure with hypercapnia; R50.9-Fever,  unspecified; J10.1-Influenza due to other identified influenza virus  with other respiratory manifestations; N39.0-Urinary tract infection,  site not specified; R41.82-Altered mental status, unspecified;  C34.90-Malignant neoplasm of unspecified part of unspecified bronchus o     COMPARISON: None available.       Impression:      FINDINGS/IMPRESSION:     Enteric tube tip is in the mid to distal stomach.     This report was signed and finalized on 2/1/2025 5:46 PM by Dr. Eddie Alicia MD.       XR Abdomen 1 View [[id number]] Collected: 01/31/25 1503     Updated: 01/31/25 1507    Narrative:      EXAMINATION: XR ABDOMEN 1 VW-  1/31/2025 3:03 PM     HISTORY: NG tube placement.     FINDINGS: KUB radiograph reveals an NG tube has been successfully  advanced into the proximal stomach. The tip remains within the cardia  and further advancement is recommended.     This report was signed and finalized on 1/31/2025 3:04 PM by Dr. Srikanth Galo MD.       XR Chest 1 View [[id number]] Collected: 01/31/25 1309     Updated: 01/31/25 1316    Narrative:      EXAM: XR CHEST 1 VW-      DATE: 1/31/2025 11:51 AM     HISTORY: confirm tube placement; J96.01-Acute respiratory failure  with  hypoxia; J96.02-Acute respiratory failure with hypercapnia; R50.9-Fever,  unspecified; J10.1-Influenza due to other identified influenza virus  with other respiratory manifestations; N39.0-Urinary tract infection,  site not specified; R41.82-Altered mental status, unspecified;  C34.90-Malignant neoplasm of unspecified part of unspecified b       COMPARISON: 1/31/2025.     TECHNIQUE:  Single view. Frontal view of the chest. 1 images.       FINDINGS:    Endotracheal tube tip projects at the mid trachea. Normal course of  gastric tube, tip projects at the lower esophagus.     No pneumothorax or pleural effusion. Prominent interstitial opacities  predominantly at the lung bases, RIGHT greater than LEFT, which could  represent an acute on chronic process.     Redemonstration RIGHT hilar mass compared to 12/19/2024 considering  differences in technique.     Calcified aortic atherosclerosis. Cardiac mediastinal silhouette appear  within normal limits. No acute bony findings.          Impression:      1. Gastric tube tip projects at the lower esophagus. Consider advancing.     2. Redemonstration and RIGHT hilar mass better demonstrated on prior CT  12/19/2024.     3. RIGHT greater than LEFT basilar interstitial prominence, may  represent an acute on chronic process, similar compared to 12/19/2024.     This report was signed and finalized on 1/31/2025 1:13 PM by Dr Cammy Lin MD.       XR Chest 1 View [[id number]] Collected: 01/31/25 1002     Updated: 01/31/25 1006    Narrative:      EXAM: XR CHEST 1 VW-      DATE: 1/31/2025 8:50 AM     HISTORY: wheezing       COMPARISON: 1/31/2025.     TECHNIQUE:  Frontal view(s) of the chest submitted.     FINDINGS:    There are chronic changes of emphysema and bilateral interstitial  prominence likely due to underlying fibrosis. No effusion or  pneumothorax is seen. Heart size is within normal limits. There is  calcification of the thoracic aorta. There is bilateral  glenohumeral  joint osteoarthritis and AC joint arthropathy.          Impression:         1. Stable chest with chronic changes of emphysema and underlying  fibrosis and remote granulomatous disease.     This report was signed and finalized on 1/31/2025 10:03 AM by Sigifredo Palomino.       XR Chest 1 View [[id number]] Collected: 01/31/25 0838     Updated: 01/31/25 0843    Narrative:      EXAM: XR CHEST 1 VW-      DATE: 1/31/2025 7:25 AM     HISTORY: Shortness of air       COMPARISON: 12/26/2023.     TECHNIQUE:  Frontal view(s) of the chest submitted.     FINDINGS:    There are chronic changes with emphysema. Mild interstitial prominence  is unchanged and also likely chronic. There are calcified granulomas.  There is calcification of the thoracic aorta. Heart size is within  normal limits. No effusion or pneumothorax is seen. There is a skinfold  on the right. There is right greater than left glenohumeral joint  osteoarthritis and mild bilateral AC joint arthropathy.          Impression:         1. Chronic changes with emphysema, remote granulomatous disease, and  chronic interstitial prominence.  2. No active disease is seen in the chest.     This report was signed and finalized on 1/31/2025 8:40 AM by Sigifredo Palomino.             LAB RESULTS:      Lab 02/10/25  0848 02/10/25  0701 02/10/25  0209 02/09/25  0622 02/08/25  0249 02/07/25  0347 02/06/25  0228 02/05/25  0457   WBC  --   --  9.94 4.69 0.89* 0.94* 1.18* 2.11*   HEMOGLOBIN  --   --  11.4* 12.5 11.1* 11.4* 11.2* 10.5*   HEMATOCRIT  --   --  35.3 38.0 34.6 34.4 33.9* 32.8*   PLATELETS  --   --  129* 129* 107* 120* 141 139*   NEUTROS ABS  --   --  8.25* 3.46 0.62* 0.65* 0.97* 1.86   IMMATURE GRANS (ABS)  --   --   --   --   --   --  0.01 0.02   LYMPHS ABS  --   --   --   --   --   --  0.15* 0.20*   MONOS ABS  --   --   --   --   --   --  0.05* 0.02*   EOS ABS  --   --  0.00 0.05 0.00 0.00 0.00 0.00   MCV  --   --  88.0 88.4 88.7 86.4 87.1 90.4   PROCALCITONIN   --  0.21  --   --   --   --   --   --    LACTATE 1.4  --   --   --   --   --   --   --          Lab 02/10/25  0701 02/08/25  0249 02/07/25 0347 02/06/25 0228 02/05/25  0305 02/04/25  0232   SODIUM 143  143 143 141 138 137 137   POTASSIUM 2.5*  2.5* 3.8 3.2* 3.5 3.8 4.6   CHLORIDE 97*  97* 100 95* 93* 96* 100   CO2 34.0*  34.0* 34.0* 34.0* 33.0* 30.0* 26.0   ANION GAP 12.0  12.0 9.0 12.0 12.0 11.0 11.0   BUN 18  18 21 19 25* 26* 35*   CREATININE 0.56*  0.56* 0.48* 0.42* 0.52* 0.44* 0.48*   EGFR 100.2  100.2 104.0 107.4 102.0 106.2 104.0   GLUCOSE 95  95 114* 116* 103* 95 138*   CALCIUM 8.5*  8.5* 9.0 9.0 8.9 8.7 8.7   MAGNESIUM 1.5*  --  2.0 1.8 1.8 2.1   PHOSPHORUS  --   --  2.7 3.4 2.5 2.9         Lab 02/10/25  0701 02/07/25 0347 02/06/25 0228 02/05/25  0305 02/04/25  0232   TOTAL PROTEIN 6.5 6.4 6.5 6.1 6.1   ALBUMIN 3.5 3.1* 3.2* 3.2* 3.3*   GLOBULIN 3.0 3.3 3.3 2.9 2.8   ALT (SGPT) 18 17 17 22 22   AST (SGOT) 16 16 17 22 18   BILIRUBIN 0.4 0.4 0.4 0.3 0.2   ALK PHOS 109 83 86 85 88                     Lab 02/04/25  1048 02/04/25  0344   PH, ARTERIAL 7.559* 7.370   PCO2, ARTERIAL 30.9* 50.1*   PO2 ART  --  74.2*   O2 SATURATION ART 90.1* 94.8   FIO2  --  30   HCO3 ART 27.5* 28.9*   BASE EXCESS ART 5.4* 3.0*   CARBOXYHEMOGLOBIN 0.8  --      Brief Urine Lab Results  (Last result in the past 365 days)        Color   Clarity   Blood   Leuk Est   Nitrite   Protein   CREAT   Urine HCG        01/31/25 0841 Yellow   Turbid   Trace   Large (3+)   Negative   30 mg/dL (1+)                 Microbiology Results (last 10 days)       ** No results found for the last 240 hours. **            Hospital Course:   -Leukopenia secondary to chemotherapy  Heme-onc was consulted in hospital and patient was initiated on Neupogen and white cell counts normalized to within normal limits.  Hematologist has recommended to discontinue Neupogen, but unfortunately patient left AGAINST MEDICAL ADVICE this morning [this is my first  "day in service and I was unable to visit with patient before she left AGAINST MEDICAL ADVICE].    -Thrombocytopenia  Heme-onc was on consult and help with management, anticoagulation as well as antiplatelets with avoided, patient's platelet count improved to above 100 and she was cleared for discharge by heme-onc.    -Influenza instruction which respiratory failure  Patient was initially admitted to intensive care unit and was managed initially with intubation.  She is status post extubation and has been stable on room air.  Fluid was managed conservatively.    -History of small cell lung cancer  Patient will continue management outpatient as per schedule.      Physical Exam on Discharge:  /72 (BP Location: Left arm, Patient Position: Lying)   Pulse 114   Temp 98.5 °F (36.9 °C) (Oral)   Resp 20   Ht 160 cm (62.99\")   Wt 62.5 kg (137 lb 12.8 oz)   SpO2 93%   BMI 24.42 kg/m²   Physical Exam  Constitutional:       General: She is not in acute distress.     Appearance: She is not toxic-appearing.      Comments: Resting in bed   HENT:      Head: Normocephalic.      Mouth/Throat:      Mouth: Mucous membranes are moist.   Cardiovascular:      Rate and Rhythm: Normal rate.   Pulmonary:      Effort: Pulmonary effort is normal.      Breath sounds: Wheezing (faint, expiratory) present. No rhonchi.      Comments: On 4LNC  Skin:     General: Skin is warm.   Neurological:      Mental Status: She is alert.      Motor: Weakness present.   Psychiatric:         Mood and Affect: Mood normal.     Condition on Discharge: Left AGAINST MEDICAL ADVICE    Discharge Disposition:  Left Against Medical Advice    Discharge Medications:     Discharge Medications        ASK your doctor about these medications        Instructions Start Date   acetaminophen 650 MG 8 hr tablet  Commonly known as: Tylenol 8 Hour   650 mg, Oral, Every 8 Hours PRN      cholecalciferol 25 MCG (1000 UT) tablet  Commonly known as: VITAMIN D3   1,000 Units, " Daily      citalopram 40 MG tablet  Commonly known as: CeleXA   40 mg, Oral, Daily      diazePAM 5 MG tablet  Commonly known as: Valium   5 mg, Oral, Every 12 Hours PRN      gabapentin 600 MG tablet  Commonly known as: NEURONTIN   600 mg, Oral, Every 12 Hours Scheduled      HYDROcodone-acetaminophen  MG per tablet  Commonly known as: NORCO   Take 1 tablet by mouth 3 (Three) Times a Day.      ipratropium-albuterol 0.5-2.5 mg/3 ml nebulizer  Commonly known as: DUO-NEB   3 mL, Nebulization, Every 4 Hours PRN      levothyroxine 112 MCG tablet  Commonly known as: SYNTHROID, LEVOTHROID   112 mcg, Oral, Daily      mirtazapine 15 MG tablet  Commonly known as: REMERON   15 mg, Oral, Every Night at Bedtime      ondansetron 4 MG tablet  Commonly known as: Zofran   4 mg, Oral, Every 8 Hours PRN      potassium chloride 10 MEQ CR tablet   10 mEq, Oral, 2 Times Daily      rizatriptan 10 MG tablet  Commonly known as: MAXALT   Take 1 tablet by mouth 1 (One) Time As Needed for Migraine.      rOPINIRole 0.25 MG tablet  Commonly known as: REQUIP   0.25 mg, Oral, Every Night at Bedtime      tiZANidine 2 MG tablet  Commonly known as: ZANAFLEX   2 mg, 2 Times Daily PRN      tobramycin 0.3 % solution ophthalmic solution   2 drops, Both Eyes, Every 4 Hours While Awake      Trelegy Ellipta 100-62.5-25 MCG/ACT inhaler  Generic drug: Fluticasone-Umeclidin-Vilant  Ask about: Should I take this medication?   1 puff, Inhalation, Daily - RT      Ventolin  (90 Base) MCG/ACT inhaler  Generic drug: albuterol sulfate HFA   2 puffs, Inhalation, Every 4 Hours PRN      vitamin D 1.25 MG (28490 UT) capsule capsule  Commonly known as: ERGOCALCIFEROL   50,000 Units, Oral, Weekly               This patient has current or prior documentation of an left ventricular ejection fraction (LVEF) of less than or equal to 40%.      Discharge Diet: As prior    Activity at Discharge: AMA    Follow-up Appointments:   Future Appointments   Date Time Provider  Department Center   12/31/2025  3:00 PM Padmaja Bermudez APRN [facility]       Test Results Pending at Discharge: None    Electronically signed by Basil Dwyer MD, 02/10/25, 16:52 CST.    Time: 35 minutes minutes.

## 2025-02-10 NOTE — THERAPY DISCHARGE NOTE
Acute Care - Physical Therapy Discharge Summary  [facility]       Patient Name: Ashley Gibbs  : 1955  MRN: [id number]    Today's Date: 2/10/2025                 Admit Date: 2025      PT Recommendation and Plan    Visit Dx:    ICD-10-CM ICD-9-CM   1. Acute respiratory failure with hypoxia and hypercapnia  J96.01 518.81    J96.02    2. Febrile illness  R50.9 780.60   3. Influenza A  J10.1 487.1   4. Acute UTI (urinary tract infection)  N39.0 599.0   5. Altered mental status, unspecified altered mental status type  R41.82 780.97   6. Malignant neoplasm of lung, unspecified laterality, unspecified part of lung  C34.90 162.9   7. Immunosuppressed due to chemotherapy  D84.821 V58.69    T45.1X5A     Z79.899    8. Dysphagia, unspecified type  R13.10 787.20   9. Impaired mobility [Z74.09]  Z74.09 799.89                PT Rehab Goals       Row Name 02/10/25 1300             Bed Mobility Goal 1 (PT)    Activity/Assistive Device (Bed Mobility Goal 1, PT) sit to supine/supine to sit  -AB      [place] Level/Cues Needed (Bed Mobility Goal 1, PT) minimum assist (75% or more patient effort)  -AB      Time Frame (Bed Mobility Goal 1, PT) long term goal (LTG);10 days  -AB      Progress/Outcomes (Bed Mobility Goal 1, PT) goal partially met  -AB         Transfer Goal 1 (PT)    Activity/Assistive Device (Transfer Goal 1, PT) sit-to-stand/stand-to-sit;bed-to-chair/chair-to-bed  -AB      [place] Level/Cues Needed (Transfer Goal 1, PT) minimum assist (75% or more patient effort)  -AB      Time Frame (Transfer Goal 1, PT) long term goal (LTG);10 days  -AB      Progress/Outcome (Transfer Goal 1, PT) goal partially met  -AB         Gait Training Goal 1 (PT)    Activity/Assistive Device (Gait Training Goal 1, PT) gait (walking locomotion);assistive device use;decrease fall risk;improve balance and speed;increase endurance/gait distance  -AB      [place] Level (Gait Training Goal 1, PT) minimum assist (75% or more  patient effort)  -AB      Distance (Gait Training Goal 1, PT) 50 ft  -AB      Time Frame (Gait Training Goal 1, PT) long term goal (LTG);10 days  -AB      Progress/Outcome (Gait Training Goal 1, PT) goal not met  -AB                User Key  (r) = Recorded By, (t) = Taken By, (c) = Cosigned By      Initials Name Provider Type Discipline    Ethel Washington, PTA Physical Therapist Assistant PT                        PT Discharge Summary  Anticipated Discharge Disposition (PT): skilled nursing facility  Reason for Discharge: other (comment) (Left AMA)  Outcomes Achieved: Refer to plan of care for updates on goals achieved  Discharge Destination: other (comment)      Ethel Parish PTA   2/10/2025

## 2025-02-10 NOTE — NURSING NOTE
Pt demanding to be discharged today. Made Dr. Johnston aware. Pt did not want to wait for md to make rounds and wanted to leave AMA, Called and spoke with daughter Parris.  Parris came to  pt and pt still did not want to wait for md to make rounds so left with daughter

## 2025-02-10 NOTE — PROGRESS NOTES
HEMATOLOGY AND MEDICAL ONCOLOGY PROGRESS NOTE    Patient name: Ashley Gibbs  Patient : 1955  VISIT # [id number]  MR #[id number]  Room:     SUBJECTIVE:  Continuing on 4 L nasal cannula, IV discontinued earlier this morning.    Breathing well in no acute distress.      Hemoglobin stable, 11.4 today  WBCs  9.94 with ANC 8.25  Neupogen 480 mcg daily initiated 2025,   WBCs 9.94 today, 2/10/2025, discontinue Neupogen    OK with me to discharge when cleared by hospitalist    Future Appointments    Encounter Information   Provider Department Center   2025 11:30 AM Christopher Isaac MD [facility]   2025 11:30 AM INFUSION SCHEDULE, [facility] HOD   2025 12:00 PM INFUSION SCHEDULE, [facility] HOD   2025 11:30 AM INFUSION SCHEDULE, [facility] A[facility] HOD                 HPI:  Ashley Gibbs is a 65-year-old  female with a diagnosis of 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 Rico Vanegas.     She received cycle #1, day #1 of  carboplatin/-16 on 2025-25.       Palliative emergent XRT of the chest consisted of 1250 cGy in 5 treatment fractions between 2024 and 2024 by Dr. Rico Vanegas at [facility] for the associated SVC due to tumor..     Treatment regimen-curative intent consisted of:  Carboplatin AUC 5 day 1  Etoposide 100 mg/m² days 1-3 q. 21 days  Plan is for X 4 cycles      Ashley was seen 2025, in the ED, at [facility] on the day of her admission, accompanied by 2 granddaughters and her good friend.  The granddaughter reports a fever to 101 degrees, increased difficulty with breathing shortness of breath and wheezing.     CBC Results   CBC            2024    08:49 2025    11:20 2025    08:13   CBC   WBC 4.51  5.35      9.14    RBC 3.85  4.32     3.67    Hemoglobin 11.5  12.9     10.9    Hematocrit 35.2  39.4     33.7    MCV 91.4  91.2     91.8    MCH 29.9  29.9     29.7    MCHC 32.7  32.7     32.3    RDW 13.0  12.9     13.9    Platelets 199  260     220         Details           This result is from an external source.                         UA today, 1/31/2025 showed 3+ leukocytes, 30 mg/dL protein WBCs too numerous to count, bacteria 3+, nitrite negative.     Respiratory panel by PCR today, 1/31/2025 was positive for influenza A H1-Detected  The patient has been desaturating while in the ED, requiring BiPAP assistance.  Blood and urine cultures were obtained.  Antibiotic with cefepime is initiated.     Medical oncology consultation requested         PAST TUMOR HISTORY COPIED FROM DR. CARDONA'S 1/13/2025 OFFICE VISIT:   HISTORY OF PRESENT ILLNESS:    Diagnosis  Small cell lung cancer, right lung, Dec 2024  Stage  [id number], limited stage     Treatment Summary  12/13/24 - 12/19/24 Emergent palliative radiation therapy 1250cGy in 5 fractions to right lung  Anticipate chemotherapy with Carboplatin D1, Etoposide D1-3 every 21 days x4 cycles  Anticipate radiation therapy to right lung with Dr Rioc Vanegas/[facility]     Cancer History  Ms. 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 She has complaints today of fatigue. She states she has stabbing chest pain and mid back pain. She is currently being seen at pain management for pain medication. She presents today on 2L of oxygen dependently.   11/8/24 CT chest low dose ([facility]): 2 cm right upper lobe bilobed nodule with associated mediastinal and right hilar lymphadenopathy, suspicious for malignancy. Minimal adjacent   right upper lobe interlobular septal thickening may be related to venous or lymphatic congestion, however lymphangitic carcinomatosis is an additional consideration.  Recommend further evaluation with PET/CT and/or tissue sampling. CT chest with contrast may also be of value to help distinguish the lymph nodes from adjacent vasculature. Minimal tree-in-bud nodularity in the peripheral the right upper lobe, likely mild infectious process.   Additional small pulmonary nodules in the right upper and right middle lobe.   11/8/24 CT abd/pelvis ([facility]): Near complete resolution of the exophytic left upper pole renal lesion of concern likely related to interval rupture/involution. Numerous small bilateral renal cysts are again present.  Mild diffuse wall thickening of the rectum and the distal transverse colon and proximal descending colon. Correlate for proctocolitis.   12/13/24 PET/CT scan ([facility]): There is a hypermetabolic right upper lobe nodule measures 1.4 cm and has a maximum SUV of 5.5. Right upper lobe suprahilar mass measures at least 8 cm in greatest axial dimension is hypermetabolic with a maximum SUV of 11.4. Mass either invades the middle mediastinum or there is conglomerate hypermetabolic lymphadenopathy. No additional hypermetabolic pulmonary nodule or mass is identified. There is no increased skeletal uptake to suggest osseous metastasis.   12/13/245 CT chest ([facility]): 2.2 cm bilobed right upper lobe pulmonary nodule, likely representing a primary lung neoplasm. 8.9 cm right hilar lung mass with diffuse mediastinal infiltration, likely representing mary metastasis. This mass encases the right mainstem bronchus and encases the right main pulmonary artery, with occlusion of the right upper lobe pulmonary artery. This mass compresses the SVC, which is slitlike. Patient is at risk for SVC syndrome.  12/13/24 Initial evaluation by Dr Rico Vanegas/[facility]iation [facility]cology for superior vena cava syndrome who recommended emergent palliative radiation therapy 1250cGy in 5 fractions.  12/13/24 - 12/19/24 Emergent SBRT 1250cGy in 5 fractions to right lung  12/19/24 CT chest w/o ([facility]):  "Stable large right hilar mass with mediastinal invasion and confluent lymphadenopathy as detailed above. Findings are suspicious for small cell carcinoma. There is increased groundglass opacities and interlobular septal thickening within the right upper lobe and right middle lobe. Differential includes postobstructive changes and/or leptomeningeal carcinomatosis. Stable 2.2 cm right upper lobe bilobed nodule which was previously hypermetabolic and concerning for metastases. Trace right pleural effusion.   12/23/24 Navigational bronchoscopy/EBUS by Dr Peterson Morales/[facility]  12/23/24 Station 7 lymph node, endobronchial ultrasound-guided fine-needle aspiration, smear (1) and cellblock: High-grade neuroendocrine carcinoma consistent with metastatic small cell carcinoma. Fragments of lymphoid tissue and cartilage present.  Station 4R lymph node, endobronchial ultrasound-guided fine-needle aspiration, smear (1) and cellblock: High-grade neuroendocrine carcinoma consistent with metastatic small cell carcinoma. Many background lymphocytes.  Bronchial washings, ThinPrep preparation (1) and cellblock: A few malignant cells present, high-grade neuroendocrine carcinoma consistent with small cell carcinoma. Several macrophages. A few benign squamous epithelial cells and bronchial epithelial cells. AJCC stage: pTX pNX  1/13/2025-essentially, limited stage small cell lung cancer.  Awaiting results of brain MRI.  Recommend concurrent chemoradiation with carboplatin 2 post right to be followed by adjuvant durvalumab.  1/27/2025-initiation cycle #1 of chemotherapy with carboplatin/-16           PHYSICAL EXAM:/72 (BP Location: Left arm, Patient Position: Lying)   Pulse 98   Temp 98.5 °F (36.9 °C) (Oral)   Resp 20   Ht 160 cm (62.99\")   Wt 62.5 kg (137 lb 12.8 oz)   SpO2 91%   BMI 24.42 kg/m²   CONSTITUTIONAL:sedated, ill-appearing  EYES: Anicteric, EOM intact, pupils equal round   CHEST/LUNGS: CTA, Mechanical " ventilator, PEEP 5, FiO2 30%.  CARDIOVASCULAR: RRR, no murmurs  ABDOMEN: soft, active bowel sounds, no HSM  EXTREMITIES: warm,   SKIN: warm,   NEUROLOGIC-sedated      CBC  Results from last 7 days   Lab Units 02/10/25  0209 02/09/25  0622 02/08/25  0249   WBC 10*3/mm3 9.94 4.69 0.89*   HEMOGLOBIN g/dL 11.4* 12.5 11.1*   HEMATOCRIT % 35.3 38.0 34.6   PLATELETS 10*3/mm3 129* 129* 107*         Lab Results   Component Value Date     02/08/2025    K 3.8 02/08/2025     02/08/2025    CO2 34.0 (H) 02/08/2025    BUN 21 02/08/2025    CREATININE 0.48 (L) 02/08/2025    GLUCOSE 114 (H) 02/08/2025    CALCIUM 9.0 02/08/2025    BILITOT 0.4 02/07/2025    ALKPHOS 83 02/07/2025    AST 16 02/07/2025    ALT 17 02/07/2025    AGRATIO 0.9 02/07/2025    GLOB 3.3 02/07/2025       Lab Results   Component Value Date    INR 0.98 01/31/2025    INR 1.04 07/24/2019    INR 0.90 03/13/2019    PROTIME 13.5 01/31/2025    PROTIME 13 07/24/2019    PROTIME 11.6 (L) 03/13/2019       ASSESSMENT/PLAN:    #   Limited stage small cell lung cancer    Limited stage small cell lung cancer-status post completion of cycle 1 carboplatin etoposide 1/29/2025.    Status post palliative XRT to the SVC.  Plans for further XRT with Dr. Rico Vanegas.    #  Acute hypoxic respiratory failure-     Acute hypoxic respiratory failure- r/tCOPD exacerbation with + influenza A.  Chest x-ray showed chronic changes of emphysema and fibrosis.  Continue current supportive care.  Failed SBT-02/01/25  Extubated, planning on returning to the floor    #  Normocytic anemia-     CBC          2/8/2025    02:49 2/9/2025    06:22 2/10/2025    02:09   CBC   WBC 0.89  4.69  9.94    RBC 3.90  4.30  4.01    Hemoglobin 11.1  12.5  11.4    Hematocrit 34.6  38.0  35.3    MCV 88.7  88.4  88.0    MCH 28.5  29.1  28.4    MCHC 32.1  32.9  32.3    RDW 12.7  12.9  13.0    Platelets 107  129  129      Hemoglobin stable, 11.4 today  WBCs  9.94 with ANC 8.25  Neupogen 480 mcg daily initiated  2/6/2025,   WBCs 9.94 today, 2/10/2025, discontinue Neupogen    Monitor    Plan:  Continue current supportive care  Continue Rocephin      Future Appointments    Encounter Information   Provider Department [place]   2/17/2025 11:30 AM Christopher Isaac MD [facility]   2/17/2025 11:30 AM INFUSION SCHEDULE, [facility]   2/18/2025 12:00 PM INFUSION SCHEDULE, [facility]   2/19/2025 11:30 AM INFUSION SCHEDULE, [facility]     OK with me to discharge.     Ramón Li MD    02/10/25  06:59 CST

## 2025-02-10 NOTE — THERAPY DISCHARGE NOTE
Acute Care - Occupational Therapy Discharge Summary  [facility]     Patient Name: Ashley Gibbs  : 1955  MRN: [id number]    Today's Date: 2/10/2025                 Admit Date: 2025        OT Recommendation and Plan    Visit Dx:    ICD-10-CM ICD-9-CM   1. Acute respiratory failure with hypoxia and hypercapnia  J96.01 518.81    J96.02    2. Febrile illness  R50.9 780.60   3. Influenza A  J10.1 487.1   4. Acute UTI (urinary tract infection)  N39.0 599.0   5. Altered mental status, unspecified altered mental status type  R41.82 780.97   6. Malignant neoplasm of lung, unspecified laterality, unspecified part of lung  C34.90 162.9   7. Immunosuppressed due to chemotherapy  D84.821 V58.69    T45.1X5A     Z79.899    8. Dysphagia, unspecified type  R13.10 787.20   9. Impaired mobility [Z74.09]  Z74.09 799.89                OT Rehab Goals       Row Name 02/10/25 1400             Dressing Goal 1 (OT)    Activity/Device (Dressing Goal 1, OT) upper body dressing  -      [place]/Cues Needed (Dressing Goal 1, OT) contact guard required  -      Time Frame (Dressing Goal 1, OT) long term goal (LTG);by discharge  -      Progress/Outcome (Dressing Goal 1, OT) goal not met;discharged from facility  -         Toileting Goal 1 (OT)    Activity/Device (Toileting Goal 1, OT) toileting skills, all  -      [place] Level/Cues Needed (Toileting Goal 1, OT) moderate assist (50-74% patient effort)  -      Time Frame (Toileting Goal 1, OT) long term goal (LTG);by discharge  -      Progress/Outcome (Toileting Goal 1, OT) goal not met;discharged from facility  -         Strength Goal 1 (OT)    Strength Goal 1 (OT) Pt will increase B UE strength to 3+/5 for improved independence with adls.  -      Time Frame (Strength Goal 1, OT) long term goal (LTG);by discharge  -      Progress/Outcome (Strength Goal 1, OT) goal not met;discharged from facility  -                User Key  (r) = Recorded By, (t) = Taken  By, (c) = Cosigned By      Initials Name Provider Type Discipline    Geovanna Magana, BAUDILIO/L, CSRS Occupational Therapist OT                        Timed Therapy Charges  Total Units: 3      Suggested Charges  Total Units: 3      Procedure Name Documented Minutes Units Code    HC OT SELF CARE/MGMT/TRAIN EA 15 MIN 30 2   99815 (CPT®)      HC OT THER PROC EA 15 MIN 10 1   75626 (CPT®)                 Documented Minutes  Total Minutes: 40      Therapy Provided Minutes    96373 - OT Self Care/Mgmt Minutes 30    68510 - OT Therapeutic Exercise Minutes 10                        OT Discharge Summary  Anticipated Discharge Disposition (OT): skilled nursing facility  Reason for Discharge: other (comment), Non-compliant (left AMA)  Outcomes Achieved: Other, Refer to plan of care for updates on goals achieved (left AMA)  Discharge Destination: other (comment), Home with assist (left AMA)      MATTEO Winters, CSRS  2/10/2025

## 2025-02-10 NOTE — THERAPY DISCHARGE NOTE
Acute Care - Speech Language Pathology Discharge Summary  [facility]       Patient Name: Ashley Gibbs  : 1955  MRN: [id number]    Today's Date: 2/10/2025                   Admit Date: 2025      SLP Recommendation and Plan  Soft solids and thin liquids    Visit Dx:    ICD-10-CM ICD-9-CM   1. Acute respiratory failure with hypoxia and hypercapnia  J96.01 518.81    J96.02    2. Febrile illness  R50.9 780.60   3. Influenza A  J10.1 487.1   4. Acute UTI (urinary tract infection)  N39.0 599.0   5. Altered mental status, unspecified altered mental status type  R41.82 780.97   6. Malignant neoplasm of lung, unspecified laterality, unspecified part of lung  C34.90 162.9   7. Immunosuppressed due to chemotherapy  D84.821 V58.69    T45.1X5A     Z79.899    8. Dysphagia, unspecified type  R13.10 787.20   9. Impaired mobility [Z74.09]  Z74.09 799.89                SLP GOALS       Row Name 02/10/25 1500             (LTG) Swallow    (LTG) Swallow Patient will tolerate least restrictive diet without overt s/s of aspiration.  -MB      Neal (Swallow Long Term Goal) independently (over 90% accuracy)  -MB      Time Frame (Swallow Long Term Goal) by discharge  -MB      Barriers (Swallow Long Term Goal) medically complex  -MB      Progress/Outcomes (Swallow Long Term Goal) goal not met  -MB         (STG) Patient will tolerate trials of    Consistencies Trialed (Tolerate trials) soft to chew (chopped) textures;pureed textures;honey/ moderately thick liquids;nectar/ mildly thick liquids;thin liquids  -MB      Desired Outcome (Tolerate trials) without signs/symptoms of aspiration;without signs of distress;with adequate oral prep/transit/clearance  -MB      Neal (Tolerate trials) independently (over 90% accuracy)  -MB      Time Frame (Tolerate trials) by discharge  -MB      Progress/Outcomes (Tolerate trials) goal not met  -MB                User Key  (r) = Recorded By, (t) = Taken By, (c) = Cosigned By       Initials Name Provider Type    Ari Clark CCC-SLP Speech and Language Pathologist                    [email or web address]@      SLP Discharge Summary  Anticipated Discharge Disposition (SLP): unknown  Reason for Discharge: discharge from this facility  Progress Toward Achieving Short/long Term Goals: goals not met within established timelines  Discharge Destination: other (see comments) (Left AMA)      Ari Bello CCC-SLP  2/10/2025

## 2025-02-11 DIAGNOSIS — J44.9 CHRONIC OBSTRUCTIVE PULMONARY DISEASE, UNSPECIFIED COPD TYPE: ICD-10-CM

## 2025-02-11 RX ORDER — ALBUTEROL SULFATE 90 UG/1
2 AEROSOL, METERED RESPIRATORY (INHALATION) EVERY 4 HOURS PRN
Qty: 18 G | Refills: 2 | Status: SHIPPED | OUTPATIENT
Start: 2025-02-11

## 2025-02-13 ENCOUNTER — TELEMEDICINE (OUTPATIENT)
Dept: FAMILY MEDICINE CLINIC | Facility: CLINIC | Age: 70
End: 2025-02-13
Payer: MEDICARE

## 2025-02-13 ENCOUNTER — TELEPHONE (OUTPATIENT)
Dept: FAMILY MEDICINE CLINIC | Facility: CLINIC | Age: 70
End: 2025-02-13
Payer: MEDICARE

## 2025-02-13 DIAGNOSIS — C34.91 SMALL CELL CARCINOMA OF RIGHT LUNG, UNSPECIFIED PART OF LUNG: ICD-10-CM

## 2025-02-13 DIAGNOSIS — R26.2 DIFFICULTY WALKING: Primary | ICD-10-CM

## 2025-02-13 DIAGNOSIS — Z79.899 ON ANTINEOPLASTIC CHEMOTHERAPY: ICD-10-CM

## 2025-02-13 DIAGNOSIS — C80.1: ICD-10-CM

## 2025-02-13 DIAGNOSIS — J44.9 CHRONIC OBSTRUCTIVE PULMONARY DISEASE, UNSPECIFIED COPD TYPE: ICD-10-CM

## 2025-02-13 DIAGNOSIS — R53.1 WEAKNESS: ICD-10-CM

## 2025-02-13 DIAGNOSIS — Z91.81 AT RISK FOR FALLS: ICD-10-CM

## 2025-02-13 RX ORDER — CITALOPRAM HYDROBROMIDE 40 MG/1
40 TABLET ORAL DAILY
Qty: 90 TABLET | Refills: 1 | Status: SHIPPED | OUTPATIENT
Start: 2025-02-13

## 2025-02-13 NOTE — TELEPHONE ENCOUNTER
Patient's daughter Hope called to see if she can get a wheelchair ordered for her mom.  She has an appointment today in our office,and she doesn't think she can get her out of the house without one. Please advise.

## 2025-02-13 NOTE — PROGRESS NOTES
"Chief Complaint  No chief complaint on file.    Subjective    {Problem List  Visit Diagnosis   Encounters  Notes  Medications  Labs  Result Review Imaging  Media :23}    Ashley Gibbs presents to [facility]  History of Present Illness  The patient is a 69-year-old female who presents today for a video visit for a hospital follow-up.    She reports persistent weakness, which has resulted in her inability to ambulate. She expresses a desire to receive the influenza vaccine and is seeking a prescription for a wheelchair due to her current mobility limitations. Additionally, she requests a bedside commode and a walker to aid in her daily activities. She is interested in receiving physical therapy to improve her strength and mobility.    She has been without her Synthroid medication for several days and is uncertain about the date of her last refill. She also mentions that she has misplaced her Celexa prescription.    MEDICATIONS  Current: Synthroid, Celexa, gabapentin    IMMUNIZATIONS  She is up to date on her pneumonia vaccine.    Objective   Vital Signs:  There were no vitals taken for this visit.  Estimated body mass index is 24.42 kg/m² as calculated from the following:    Height as of 2/6/25: 160 cm (62.99\").    Weight as of 2/10/25: 62.5 kg (137 lb 12.8 oz).       BMI is within normal parameters. No other follow-up for BMI required.      Physical Exam   Physical Exam      Result Review :{Labs  Result Review  Imaging  Med Tab  Media  Procedures :23}    {The following data was reviewed by (Optional):74708}  {Ambulatory Labs (Optional):29541}  {Data reviewed (Optional):13846:::1}  Results             Assessment and Plan {CC Problem List  Visit Diagnosis   ROS  Review (Popup)  [facility]  Quality  BestPractice  Medications  SmartSets  SnapShot Encounters  Media :23}    There are no diagnoses linked to this encounter.  Assessment & Plan  1. " Post-hospitalization follow-up.  Her recent influenza infection, coupled with a compromised immune system and pre-existing pulmonary conditions, led to a significant decline in her health status. She was intubated during her hospital stay but has since shown remarkable recovery. Orders for a standard wheelchair, a folding walker with a seat, and a bedside commode will be placed to assist with her mobility. She will receive her COVID-19 and influenza vaccines at the clinic. Home health services will be arranged to provide physical therapy for strength training.    2. Medication management.  Prescriptions for Synthroid and Celexa will be renewed. She received gabapentin yesterday.    Follow-up  The patient will follow up in 1 month.     {Time Spent (Optional):87949}  Follow Up {Instructions Charge Capture  Follow-up Communications :23}    No follow-ups on file.  Patient was given instructions and counseling regarding her condition or for health maintenance advice. Please see specific information pulled into the AVS if appropriate.       {SANTOS [employer] Provider Statement:39358}

## 2025-02-14 ENCOUNTER — TELEPHONE (OUTPATIENT)
Dept: FAMILY MEDICINE CLINIC | Facility: CLINIC | Age: 70
End: 2025-02-14
Payer: MEDICARE

## 2025-02-14 RX ORDER — CITALOPRAM HYDROBROMIDE 40 MG/1
40 TABLET ORAL DAILY
Qty: 90 TABLET | Refills: 1 | OUTPATIENT
Start: 2025-02-14

## 2025-02-14 NOTE — PROGRESS NOTES
Transitional Care Follow Up Visit  Subjective     Ashley Gibbs is a 69 y.o. female who presents for a transitional care management visit.    Within 48 business hours after discharge our office contacted her via telephone to coordinate her care and needs.      I reviewed and discussed the details of that call along with the discharge summary, hospital problems, inpatient lab results, inpatient diagnostic studies, and consultation reports with Ashley.     Current outpatient and discharge medications have been reconciled for the patient.  Reviewed by: CARRIE Medina          5/7/2023    12:36 PM   Date of TCM Phone Call   [facility]   Date of Admission 5/3/2023   Date of Discharge 5/7/2023   Discharge Disposition Home or Self Care     Risk for Readmission (LACE) Score: 12 (2/10/2025  5:00 AM)      History of Present Illness   Course During Hospital Stay:  Hospital Course:   -Leukopenia secondary to chemotherapy  Heme-onc was consulted in hospital and patient was initiated on Neupogen and white cell counts normalized to within normal limits.  Hematologist has recommended to discontinue Neupogen, but unfortunately patient left AGAINST MEDICAL ADVICE this morning [this is my first day in service and I was unable to visit with patient before she left AGAINST MEDICAL ADVICE].     -Thrombocytopenia  Heme-onc was on consult and help with management, anticoagulation as well as antiplatelets with avoided, patient's platelet count improved to above 100 and she was cleared for discharge by heme-onc.     -Influenza instruction which respiratory failure  Patient was initially admitted to intensive care unit and was managed initially with intubation.  She is status post extubation and has been stable on room air.  Fluid was managed conservatively.     -History of small cell lung cancer  Patient will continue management outpatient as per schedule.        Physical Exam on Discharge:  /72 (BP Location: Left arm,  "Patient Position: Lying)   Pulse 114   Temp 98.5 °F (36.9 °C) (Oral)   Resp 20   Ht 160 cm (62.99\")   Wt 62.5 kg (137 lb 12.8 oz)   SpO2 93%   BMI 24.42 kg/m²   Physical Exam  Constitutional:       General: She is not in acute distress.     Appearance: She is not toxic-appearing.      Comments: Resting in bed   HENT:      Head: Normocephalic.      Mouth/Throat:      Mouth: Mucous membranes are moist.   Cardiovascular:      Rate and Rhythm: Normal rate.   Pulmonary:      Effort: Pulmonary effort is normal.      Breath sounds: Wheezing (faint, expiratory) present. No rhonchi.      Comments: On 4LNC  Skin:     General: Skin is warm.   Neurological:      Mental Status: She is alert.      Motor: Weakness present.   Psychiatric:         Mood and Affect: Mood normal.      Condition on Discharge: Left AGAINST MEDICAL ADVICE     Discharge Disposition:  Left Against Medical Advice     ==============================  Since dc    The patient is a 69-year-old female who presents today for a video visit for a hospital follow-up.    She reports persistent weakness, which has resulted in her inability to ambulate. She expresses a desire to receive the influenza vaccine and is seeking a prescription for a wheelchair due to her current mobility limitations. Additionally, she requests a bedside commode and a walker to aid in her daily activities. She is interested in receiving physical therapy to improve her strength and mobility.    She has been without her Synthroid medication for several days and is uncertain about the date of her last refill. She also mentions that she has misplaced her Celexa prescription.    MEDICATIONS  Current: Synthroid, Celexa, gabapentin    IMMUNIZATIONS  She is up to date on her pneumonia vaccine.    Objective   Vital Signs:  There were no vitals taken for this visit.  Estimated body mass index is 24.42 kg/m² as calculated from the following:    Height as of 2/6/25: 160 cm (62.99\").    Weight as of " 2/10/25: 62.5 kg (137 lb 12.8 oz).       BMI is within normal parameters. No other follow-up for BMI required.      Physical Exam   Physical Exam    No vital signs or bmi obtained for this encounter.      Physical exam-  General appearance- Alert, appears comfortable. Dressed appropriately. No distress.   HEENT- external examination of eyes, ears and nose all normal. Head is atraumatic. Hearing normal.   Neck is symmetric, trachea midline.   Normal respiratory effort.   Digits and nails appear healthy. No clubbing, rashes or discolorations.   Normal range of motion of all extremities.  Mood appropriate. Communicates easily. Normal judgement and insight. Oriented to time, place and person. Memory appears intact.     Result Review :      Common labs          2/8/2025    02:49 2/9/2025    06:22 2/10/2025    02:09 2/10/2025    07:01   Common Labs   Glucose 114    95     95    BUN 21    18     18    Creatinine 0.48    0.56     0.56    Sodium 143    143     143    Potassium 3.8    2.5     2.5    Chloride 100    97     97    Calcium 9.0    8.5     8.5    Albumin    3.5    Total Bilirubin    0.4    Alkaline Phosphatase    109    AST (SGOT)    16    ALT (SGPT)    18    WBC 0.89  4.69  9.94     Hemoglobin 11.1  12.5  11.4     Hematocrit 34.6  38.0  35.3     Platelets 107  129  129         Results             Assessment and Plan     Diagnoses and all orders for this visit:    1. Difficulty walking (Primary)  -     Commode Chair  Bedside Commode with Fixed Arms  -     Walker  Folding Walker Wheels with Seat (Rollator)  -     Wheelchair  -     Ambulatory Referral to Home Health    2. Chronic obstructive pulmonary disease, unspecified COPD type  -     Commode Chair  Bedside Commode with Fixed Arms  -     Walker  Folding Walker Wheels with Seat (Rollator)  -     Wheelchair  -     Ambulatory Referral to Home Health    3. Small cell carcinoma of right lung, unspecified part of lung  -     Commode Chair   Bedside Commode with Fixed Arms  -     Walker  Folding Walker Wheels with Seat (Rollator)  -     Wheelchair  -     Ambulatory Referral to Home Health    4. On antineoplastic chemotherapy    5. At risk for falls  -     Commode Chair  Bedside Commode with Fixed Arms  -     Walker  Folding Walker Wheels with Seat (Rollator)  -     Wheelchair  -     Ambulatory Referral to Home Health    6. Weakness  -     Commode Chair  Bedside Commode with Fixed Arms  -     Walker  Folding Walker Wheels with Seat (Rollator)  -     Wheelchair  -     Ambulatory Referral to Home Health    7. Cancer treatment started  -     Commode Chair  Bedside Commode with Fixed Arms  -     Walker  Folding Walker Wheels with Seat (Rollator)  -     Wheelchair  -     Ambulatory Referral to Home Health    Other orders  -     Fluzone High-Dose 65+yrs (4521-3064)  -     COVID-19 (Pfizer) 12yrs+ (COMIRNATY)  -     citalopram (CeleXA) 40 MG tablet; Take 1 tablet by mouth Daily.  Dispense: 90 tablet; Refill: 1      Assessment & Plan  1. Post-hospitalization follow-up.  Her recent influenza infection, coupled with a compromised immune system and pre-existing pulmonary conditions, led to a significant decline in her health status. She was intubated during her hospital stay but has since shown remarkable recovery. Orders for a standard wheelchair, a folding walker with a seat, and a bedside commode will be placed to assist with her mobility. She will receive her COVID-19 and influenza vaccines at the clinic. Home health services will be arranged to provide physical therapy for strength training.    2. Medication management.  Prescriptions for Synthroid and Celexa will be renewed. She received gabapentin yesterday.    Follow-up  The patient will follow up in 1 month.       Follow Up     No follow-ups on file.  Patient was given instructions and counseling regarding her condition or for health maintenance advice. Please see  specific information pulled into the AVS if appropriate.       Patient or patient representative verbalized consent for the use of Ambient Listening during the visit with  CARRIE Medina for chart documentation. 2/14/2025  17:01 CST        The following portions of the patient's history were reviewed and updated as appropriate: allergies, current medications, past family history, past medical history, past social history, past surgical history, and problem list.    Review of Systems    Objective   There were no vitals taken for this visit.  Physical Exam    Assessment & Plan   Diagnoses and all orders for this visit:    1. Difficulty walking (Primary)  -     Commode Chair  Bedside Commode with Fixed Arms  -     Walker  Folding Walker Wheels with Seat (Rollator)  -     Wheelchair  -     Ambulatory Referral to Home Health    2. Chronic obstructive pulmonary disease, unspecified COPD type  -     Commode Chair  Bedside Commode with Fixed Arms  -     Walker  Folding Walker Wheels with Seat (Rollator)  -     Wheelchair  -     Ambulatory Referral to Home Health    3. Small cell carcinoma of right lung, unspecified part of lung  -     Commode Chair  Bedside Commode with Fixed Arms  -     Walker  Folding Walker Wheels with Seat (Rollator)  -     Wheelchair  -     Ambulatory Referral to Home Health    4. On antineoplastic chemotherapy    5. At risk for falls  -     Commode Chair  Bedside Commode with Fixed Arms  -     Walker  Folding Walker Wheels with Seat (Rollator)  -     Wheelchair  -     Ambulatory Referral to Home Health    6. Weakness  -     Commode Chair  Bedside Commode with Fixed Arms  -     Walker  Folding Walker Wheels with Seat (Rollator)  -     Wheelchair  -     Ambulatory Referral to Home Health    7. Cancer treatment started  -     Commode Chair  Bedside Commode with Fixed Arms  -     Walker  Folding Walker Wheels with Seat (Rollator)  -      Wheelchair  -     Ambulatory Referral to Home Health    Other orders  -     Fluzone High-Dose 65+yrs (4122-1706)  -     COVID-19 (Pfizer) 12yrs+ (COMIRNATY)  -     citalopram (CeleXA) 40 MG tablet; Take 1 tablet by mouth Daily.  Dispense: 90 tablet; Refill: 1

## 2025-02-14 NOTE — TELEPHONE ENCOUNTER
Caller: SELAM SANCHEZ    Relationship: Emergency Contact    Best call back number:  [phone]     What form or medical record are you requesting: OFFICE NOTE SENT TO [facility] FOR WHEELCHAIR.  THEY ALREADY HAVE THE ORDER    Who is requesting this form or medical record from you: [facility]    How would you like to receive the form or medical records (pick-up, mail, fax): FAX  If fax, what is the fax number:  [phone]        Timeframe paperwork needed:  DAUGHTER ASKING FOR HIGH PRIORITY    Additional notes:

## 2025-02-15 LAB
BACTERIA SPEC AEROBE CULT: NORMAL
BACTERIA SPEC AEROBE CULT: NORMAL

## 2025-02-17 ENCOUNTER — TELEPHONE (OUTPATIENT)
Dept: RADIATION ONCOLOGY | Facility: HOSPITAL | Age: 70
End: 2025-02-17
Payer: MEDICARE

## 2025-02-17 ENCOUNTER — OFFICE VISIT (OUTPATIENT)
Dept: HEMATOLOGY | Age: 70
End: 2025-02-17
Payer: MEDICARE

## 2025-02-17 ENCOUNTER — HOSPITAL ENCOUNTER (OUTPATIENT)
Dept: INFUSION THERAPY | Age: 70
Discharge: HOME | End: 2025-02-17
Payer: MEDICARE

## 2025-02-17 ENCOUNTER — TELEPHONE (OUTPATIENT)
Dept: VASCULAR SURGERY | Age: 70
End: 2025-02-17

## 2025-02-17 VITALS
HEART RATE: 112 BPM | SYSTOLIC BLOOD PRESSURE: 129 MMHG | HEIGHT: 64 IN | RESPIRATION RATE: 22 BRPM | TEMPERATURE: 97.6 F | OXYGEN SATURATION: 94 % | WEIGHT: 141 LBS | DIASTOLIC BLOOD PRESSURE: 73 MMHG | BODY MASS INDEX: 24.07 KG/M2

## 2025-02-17 DIAGNOSIS — R30.0 DYSURIA: Primary | ICD-10-CM

## 2025-02-17 DIAGNOSIS — R30.0 DYSURIA: ICD-10-CM

## 2025-02-17 DIAGNOSIS — C34.01 SMALL CELL CARCINOMA OF HILUM OF RIGHT LUNG (HCC): Primary | ICD-10-CM

## 2025-02-17 DIAGNOSIS — Z99.81 OXYGEN DEPENDENT: ICD-10-CM

## 2025-02-17 DIAGNOSIS — E87.6 HYPOKALEMIA: ICD-10-CM

## 2025-02-17 DIAGNOSIS — C34.01 SMALL CELL CARCINOMA OF HILUM OF RIGHT LUNG (HCC): ICD-10-CM

## 2025-02-17 DIAGNOSIS — I87.8 POOR VENOUS ACCESS: ICD-10-CM

## 2025-02-17 DIAGNOSIS — Z51.11 CHEMOTHERAPY MANAGEMENT, ENCOUNTER FOR: ICD-10-CM

## 2025-02-17 DIAGNOSIS — T45.1X5D ADVERSE EFFECT OF CHEMOTHERAPY, SUBSEQUENT ENCOUNTER: ICD-10-CM

## 2025-02-17 DIAGNOSIS — E83.42 HYPOMAGNESEMIA: ICD-10-CM

## 2025-02-17 DIAGNOSIS — G89.3 CANCER ASSOCIATED PAIN: ICD-10-CM

## 2025-02-17 DIAGNOSIS — Z71.89 CARE PLAN DISCUSSED WITH PATIENT: ICD-10-CM

## 2025-02-17 LAB
ALBUMIN SERPL-MCNC: 3.5 G/DL (ref 3.5–5.2)
ALP SERPL-CCNC: 119 U/L (ref 35–104)
ALT SERPL-CCNC: 17 U/L (ref 5–33)
ANION GAP SERPL CALCULATED.3IONS-SCNC: 9 MMOL/L (ref 8–16)
AST SERPL-CCNC: 17 U/L (ref 5–32)
BASOPHILS # BLD: 0.03 K/UL (ref 0–0.2)
BASOPHILS NFR BLD: 0.3 % (ref 0–1)
BILIRUB SERPL-MCNC: 0.3 MG/DL (ref 0.2–1.2)
BILIRUB UR QL STRIP: NEGATIVE
BUN SERPL-MCNC: 8 MG/DL (ref 8–23)
CALCIUM SERPL-MCNC: 9.3 MG/DL (ref 8.8–10.2)
CHLORIDE SERPL-SCNC: 93 MMOL/L (ref 98–107)
CLARITY UR: CLEAR
CO2 SERPL-SCNC: 42 MMOL/L (ref 22–29)
COLOR UR: YELLOW
CREAT SERPL-MCNC: 0.5 MG/DL (ref 0.5–0.9)
EOSINOPHIL # BLD: 0 K/UL (ref 0–0.6)
EOSINOPHIL NFR BLD: 0 % (ref 0–5)
ERYTHROCYTE [DISTWIDTH] IN BLOOD BY AUTOMATED COUNT: 13.7 % (ref 11.5–14.5)
GLUCOSE SERPL-MCNC: 119 MG/DL (ref 70–99)
GLUCOSE UR STRIP.AUTO-MCNC: NEGATIVE MG/DL
HCT VFR BLD AUTO: 34.9 % (ref 37–47)
HGB BLD-MCNC: 11.3 G/DL (ref 12–16)
HGB UR STRIP.AUTO-MCNC: NEGATIVE MG/L
KETONES UR STRIP.AUTO-MCNC: NEGATIVE MG/DL
LEUKOCYTE ESTERASE UR QL STRIP.AUTO: NEGATIVE
LYMPHOCYTES # BLD: 1.42 K/UL (ref 1.1–4.5)
LYMPHOCYTES NFR BLD: 13.7 % (ref 20–40)
MAGNESIUM SERPL-MCNC: 1.5 MG/DL (ref 1.6–2.4)
MCH RBC QN AUTO: 30.1 PG (ref 27–31)
MCHC RBC AUTO-ENTMCNC: 32.4 G/DL (ref 33–37)
MCV RBC AUTO: 92.8 FL (ref 81–99)
MONOCYTES # BLD: 0.7 K/UL (ref 0–0.9)
MONOCYTES NFR BLD: 6.7 % (ref 1–10)
NEUTROPHILS # BLD: 7.97 K/UL (ref 1.5–7.5)
NEUTS SEG NFR BLD: 76.7 % (ref 50–65)
NITRITE UR QL STRIP.AUTO: NEGATIVE
PH UR STRIP.AUTO: 6.5 [PH] (ref 5–8)
PHOSPHATE SERPL-MCNC: 3 MG/DL (ref 2.5–4.5)
PLATELET # BLD AUTO: 330 K/UL (ref 130–400)
PMV BLD AUTO: 9.3 FL (ref 9.4–12.3)
POTASSIUM SERPL-SCNC: 3.1 MMOL/L (ref 3.5–5.1)
PROT SERPL-MCNC: 6.7 G/DL (ref 6.4–8.3)
PROT UR STRIP.AUTO-MCNC: ABNORMAL MG/DL
RBC # BLD AUTO: 3.76 M/UL (ref 4.2–5.4)
SODIUM SERPL-SCNC: 144 MMOL/L (ref 136–145)
SP GR UR STRIP.AUTO: 1.02 (ref 1–1.03)
UROBILINOGEN UR STRIP.AUTO-MCNC: 0.2 E.U./DL
WBC # BLD AUTO: 10.39 K/UL (ref 4.8–10.8)

## 2025-02-17 PROCEDURE — 1090F PRES/ABSN URINE INCON ASSESS: CPT | Performed by: INTERNAL MEDICINE

## 2025-02-17 PROCEDURE — 99215 OFFICE O/P EST HI 40 MIN: CPT | Performed by: INTERNAL MEDICINE

## 2025-02-17 PROCEDURE — 85025 COMPLETE CBC W/AUTO DIFF WBC: CPT

## 2025-02-17 PROCEDURE — 3017F COLORECTAL CA SCREEN DOC REV: CPT | Performed by: INTERNAL MEDICINE

## 2025-02-17 PROCEDURE — G2211 COMPLEX E/M VISIT ADD ON: HCPCS | Performed by: INTERNAL MEDICINE

## 2025-02-17 PROCEDURE — 1036F TOBACCO NON-USER: CPT | Performed by: INTERNAL MEDICINE

## 2025-02-17 PROCEDURE — 84100 ASSAY OF PHOSPHORUS: CPT

## 2025-02-17 PROCEDURE — 80053 COMPREHEN METABOLIC PANEL: CPT

## 2025-02-17 PROCEDURE — G8420 CALC BMI NORM PARAMETERS: HCPCS | Performed by: INTERNAL MEDICINE

## 2025-02-17 PROCEDURE — 1126F AMNT PAIN NOTED NONE PRSNT: CPT | Performed by: INTERNAL MEDICINE

## 2025-02-17 PROCEDURE — 1123F ACP DISCUSS/DSCN MKR DOCD: CPT | Performed by: INTERNAL MEDICINE

## 2025-02-17 PROCEDURE — G8428 CUR MEDS NOT DOCUMENT: HCPCS | Performed by: INTERNAL MEDICINE

## 2025-02-17 PROCEDURE — 83735 ASSAY OF MAGNESIUM: CPT

## 2025-02-17 PROCEDURE — G8399 PT W/DXA RESULTS DOCUMENT: HCPCS | Performed by: INTERNAL MEDICINE

## 2025-02-17 PROCEDURE — 99213 OFFICE O/P EST LOW 20 MIN: CPT

## 2025-02-17 PROCEDURE — 81003 URINALYSIS AUTO W/O SCOPE: CPT

## 2025-02-17 RX ORDER — OXYCODONE AND ACETAMINOPHEN 10; 325 MG/1; MG/1
1 TABLET ORAL EVERY 6 HOURS PRN
Qty: 120 TABLET | Refills: 0 | Status: CANCELLED | OUTPATIENT
Start: 2025-02-17 | End: 2025-03-19

## 2025-02-17 RX ORDER — OXYCODONE AND ACETAMINOPHEN 10; 325 MG/1; MG/1
1 TABLET ORAL EVERY 6 HOURS PRN
Qty: 120 TABLET | Refills: 0 | Status: SHIPPED | OUTPATIENT
Start: 2025-02-17 | End: 2025-03-19

## 2025-02-17 RX ORDER — POTASSIUM CHLORIDE 750 MG/1
20 CAPSULE, EXTENDED RELEASE ORAL 2 TIMES DAILY
Qty: 60 CAPSULE | Refills: 1 | Status: SHIPPED | OUTPATIENT
Start: 2025-02-17

## 2025-02-17 NOTE — TELEPHONE ENCOUNTER
Called and left the patient a message to help her schedule her port placement.  I left my back line office number for the patient to return my phone call.

## 2025-02-17 NOTE — TELEPHONE ENCOUNTER
Called [facility] to confirm receipt of ov notes for pt. Reports that they didn't received- having issues with fax line today. Provided another fax number to re-send to. [phone]- faxed

## 2025-02-17 NOTE — TELEPHONE ENCOUNTER
I left a message on Mrs. Gibbs's v/m this morning. I called back again this afternoon with no answer. I called her daughter, Antonia. Antonia said they saw Dr. Isaac today and he said to hold off on chemo and radiation this week because she was so weak. Mrs. Gibbs will be reassessed Monday 2/24 by Dr. Isaac to see if she can proceed with chemo and XRT. The patients daughter, Antonia, said they would call us Monday and update us on the patients status. Informed Dr. Vanegas.

## 2025-02-17 NOTE — PROGRESS NOTES
Tx will be delayed today.  Fozia was hospitalized for 2 weeks at [facility] and on the vent with flu A and a UTI.  Left AMA and K+ was 2.5 last week. Pt recc to stay today until labs resulted but she refused. Labs reviewed with Dr. Bates.  K+ 3.1.  Micro K 20mEq BID called in.  Referral for port placement entered.  Tx moved out 1 week if port placed by then.

## 2025-02-18 ENCOUNTER — TELEPHONE (OUTPATIENT)
Dept: VASCULAR SURGERY | Age: 70
End: 2025-02-18

## 2025-02-18 RX ORDER — MAGNESIUM OXIDE 400 MG/1
400 TABLET ORAL DAILY
Qty: 30 TABLET | Refills: 1 | Status: SHIPPED | OUTPATIENT
Start: 2025-02-18

## 2025-02-18 NOTE — TELEPHONE ENCOUNTER
Called and left a message for the patient to try to schedule her port placement.  I left a voicemail on the patient's line and also on the patient's sisters line.

## 2025-02-18 NOTE — TELEPHONE ENCOUNTER
Called [facility] back to check on receipt of office visit notes faxed for pt. Reports that they still have not received. Sent via secured email to [email or web address]

## 2025-02-20 ENCOUNTER — TELEPHONE (OUTPATIENT)
Dept: VASCULAR SURGERY | Age: 70
End: 2025-02-20

## 2025-02-20 NOTE — TELEPHONE ENCOUNTER
Called and spoke with Shamika the RN @ [facility] to let her know that I have struggled to get in touch with the patient to schedule for a port placement.  She let me know to call the patient's Granddaughter, Tessie.  I called Tessie and we got the procedure scheduled for Tuesday, 02/25/2025.  We went over the following instructions.  I will also let Shamika know that I did get this procedure scheduled as the patient has chemo again on 02/26/2025.    Fozia Briceno    Port Placement Directions    Report to the [facility] (go in the         front door and to the left) on Tuesday, 02/25/2025 @ 9:00 AM.   Nothing to eat or drink after midnight the night before the procedure.  Please take all medications as normally scheduled to take unless listed below with a sip of water.  If you have sleep apnea and require C-PAP, please bring this with you to the hospital.  Bring a list of all of your allergies and medications with you to the hospital.  Please let our nurse know if you have had an allergy to iodine, shellfish, or x-ray dye.  Let the nurse know if you take any of the following:  Over the counter herbal supplements  Diclofenec, indomethacin, ketoprofen, Caridopa/levadopa, naproxen, sulindac, piroxicam, glucosamine, Chondrotin, cocchine, or methotrexate.  Plan to stay at the hospital for 4 - 6 hours before being released  by the physician. You will need someone to drive you home after the procedure.  9. Other Directions: For any questions or concerns please contact our office @        [phone] and ask to speak to Saniya.   10.  You will need a  to take you home.      Unless instructed otherwise by your physician, cleanse incision/puncture site twice daily with soap and water.  Apply dry gauze. Do not get in tub.  Okay to shower.  Do not apply any salve, cream, peroxide or alcohol to the incision.  Call with any increasing redness or drainage

## 2025-02-21 ENCOUNTER — HOSPITAL ENCOUNTER (INPATIENT)
Facility: HOSPITAL | Age: 70
LOS: 3 days | Discharge: HOME | End: 2025-02-25
Attending: EMERGENCY MEDICINE | Admitting: INTERNAL MEDICINE
Payer: MEDICARE

## 2025-02-21 DIAGNOSIS — T40.601A NARCOTIC OVERDOSE, ACCIDENTAL OR UNINTENTIONAL, INITIAL ENCOUNTER: ICD-10-CM

## 2025-02-21 DIAGNOSIS — R79.89 ELEVATED TROPONIN: ICD-10-CM

## 2025-02-21 DIAGNOSIS — R41.82 ALTERED MENTAL STATUS, UNSPECIFIED ALTERED MENTAL STATUS TYPE: Primary | ICD-10-CM

## 2025-02-21 DIAGNOSIS — R13.10 DYSPHAGIA, UNSPECIFIED TYPE: ICD-10-CM

## 2025-02-21 DIAGNOSIS — J96.01 ACUTE RESPIRATORY FAILURE WITH HYPOXIA: ICD-10-CM

## 2025-02-21 DIAGNOSIS — E87.5 HYPERKALEMIA: ICD-10-CM

## 2025-02-21 DIAGNOSIS — Z74.09 IMPAIRED MOBILITY: ICD-10-CM

## 2025-02-21 DIAGNOSIS — E72.20 HYPERAMMONEMIA: ICD-10-CM

## 2025-02-21 LAB
HOLD SPECIMEN: NORMAL
WHOLE BLOOD HOLD COAG: NORMAL
WHOLE BLOOD HOLD SPECIMEN: NORMAL

## 2025-02-21 PROCEDURE — 82077 ASSAY SPEC XCP UR&BREATH IA: CPT | Performed by: EMERGENCY MEDICINE

## 2025-02-21 PROCEDURE — 83690 ASSAY OF LIPASE: CPT | Performed by: EMERGENCY MEDICINE

## 2025-02-21 PROCEDURE — 85610 PROTHROMBIN TIME: CPT | Performed by: EMERGENCY MEDICINE

## 2025-02-21 PROCEDURE — 99291 CRITICAL CARE FIRST HOUR: CPT

## 2025-02-21 PROCEDURE — 80053 COMPREHEN METABOLIC PANEL: CPT | Performed by: EMERGENCY MEDICINE

## 2025-02-21 PROCEDURE — 93005 ELECTROCARDIOGRAM TRACING: CPT | Performed by: EMERGENCY MEDICINE

## 2025-02-21 PROCEDURE — 83735 ASSAY OF MAGNESIUM: CPT | Performed by: EMERGENCY MEDICINE

## 2025-02-21 PROCEDURE — 85025 COMPLETE CBC W/AUTO DIFF WBC: CPT | Performed by: EMERGENCY MEDICINE

## 2025-02-21 PROCEDURE — 84484 ASSAY OF TROPONIN QUANT: CPT | Performed by: EMERGENCY MEDICINE

## 2025-02-21 PROCEDURE — 85730 THROMBOPLASTIN TIME PARTIAL: CPT | Performed by: EMERGENCY MEDICINE

## 2025-02-21 PROCEDURE — 80179 DRUG ASSAY SALICYLATE: CPT | Performed by: EMERGENCY MEDICINE

## 2025-02-21 PROCEDURE — 83605 ASSAY OF LACTIC ACID: CPT | Performed by: EMERGENCY MEDICINE

## 2025-02-21 PROCEDURE — 80143 DRUG ASSAY ACETAMINOPHEN: CPT | Performed by: EMERGENCY MEDICINE

## 2025-02-21 RX ORDER — SODIUM CHLORIDE 0.9 % (FLUSH) 0.9 %
10 SYRINGE (ML) INJECTION AS NEEDED
Status: DISCONTINUED | OUTPATIENT
Start: 2025-02-21 | End: 2025-02-25 | Stop reason: HOSPADM

## 2025-02-21 RX ORDER — NALOXONE HYDROCHLORIDE 1 MG/ML
2 INJECTION INTRAMUSCULAR; INTRAVENOUS; SUBCUTANEOUS ONCE
Status: DISCONTINUED | OUTPATIENT
Start: 2025-02-22 | End: 2025-02-22

## 2025-02-21 RX ORDER — NALOXONE HYDROCHLORIDE 1 MG/ML
2 INJECTION INTRAMUSCULAR; INTRAVENOUS; SUBCUTANEOUS ONCE
Status: COMPLETED | OUTPATIENT
Start: 2025-02-22 | End: 2025-02-22

## 2025-02-21 NOTE — Clinical Note
Level of Care: Critical Care [6]   Diagnosis: Altered mental status, unspecified altered mental status type [4204996]   Admitting Physician: ALEXANDRIA OWESN [[id number]]   Attending Physician: ALEXANDRIA OWENS [[id number]]   Isolate for COVID?: No [0]   Certification: I Certify That Inpatient Hospital Services Are Medically Necessary For Greater Than 2 Midnights

## 2025-02-22 ENCOUNTER — APPOINTMENT (OUTPATIENT)
Dept: GENERAL RADIOLOGY | Facility: HOSPITAL | Age: 70
End: 2025-02-22
Payer: MEDICARE

## 2025-02-22 ENCOUNTER — APPOINTMENT (OUTPATIENT)
Dept: CT IMAGING | Facility: HOSPITAL | Age: 70
End: 2025-02-22
Payer: MEDICARE

## 2025-02-22 PROBLEM — T40.601A NARCOTIC OVERDOSE: Status: ACTIVE | Noted: 2025-02-22

## 2025-02-22 PROBLEM — R41.82 ALTERED MENTAL STATUS: Status: ACTIVE | Noted: 2025-02-22

## 2025-02-22 PROBLEM — R41.82 ALTERED MENTAL STATUS, UNSPECIFIED ALTERED MENTAL STATUS TYPE: Status: ACTIVE | Noted: 2025-02-22

## 2025-02-22 LAB
ALBUMIN SERPL-MCNC: 3.5 G/DL (ref 3.5–5.2)
ALBUMIN/GLOB SERPL: 0.9 G/DL
ALP SERPL-CCNC: 141 U/L (ref 39–117)
ALT SERPL W P-5'-P-CCNC: 17 U/L (ref 1–33)
AMMONIA BLD-SCNC: 64 UMOL/L (ref 11–51)
AMPHET+METHAMPHET UR QL: NEGATIVE
AMPHETAMINES UR QL: NEGATIVE
ANION GAP SERPL CALCULATED.3IONS-SCNC: 17 MMOL/L (ref 5–15)
ANION GAP SERPL CALCULATED.3IONS-SCNC: 7 MMOL/L (ref 5–15)
APAP SERPL-MCNC: <5 MCG/ML (ref 0–30)
APTT PPP: 38.6 SECONDS (ref 24.5–36)
ARTERIAL PATENCY WRIST A: ABNORMAL
ARTERIAL PATENCY WRIST A: ABNORMAL
ARTERIAL PATENCY WRIST A: POSITIVE
AST SERPL-CCNC: 18 U/L (ref 1–32)
ATMOSPHERIC PRESS: 760 MMHG
ATMOSPHERIC PRESS: 765 MMHG
ATMOSPHERIC PRESS: 766 MMHG
B PARAPERT DNA SPEC QL NAA+PROBE: NOT DETECTED
B PERT DNA SPEC QL NAA+PROBE: NOT DETECTED
BARBITURATES UR QL SCN: NEGATIVE
BASE EXCESS BLDA CALC-SCNC: 11.1 MMOL/L (ref 0–2)
BASE EXCESS BLDA CALC-SCNC: 11.9 MMOL/L (ref 0–2)
BASE EXCESS BLDA CALC-SCNC: 12.8 MMOL/L (ref 0–2)
BASOPHILS # BLD AUTO: 0.11 10*3/MM3 (ref 0–0.2)
BASOPHILS NFR BLD AUTO: 0.7 % (ref 0–1.5)
BDY SITE: ABNORMAL
BENZODIAZ UR QL SCN: POSITIVE
BILIRUB SERPL-MCNC: 0.3 MG/DL (ref 0–1.2)
BILIRUB UR QL STRIP: NEGATIVE
BODY TEMPERATURE: 37
BUN SERPL-MCNC: 8 MG/DL (ref 8–23)
BUN SERPL-MCNC: 9 MG/DL (ref 8–23)
BUN/CREAT SERPL: 12.3 (ref 7–25)
BUN/CREAT SERPL: 12.7 (ref 7–25)
BUPRENORPHINE SERPL-MCNC: NEGATIVE NG/ML
C PNEUM DNA NPH QL NAA+NON-PROBE: NOT DETECTED
CALCIUM SPEC-SCNC: 9.3 MG/DL (ref 8.6–10.5)
CALCIUM SPEC-SCNC: 9.3 MG/DL (ref 8.6–10.5)
CANNABINOIDS SERPL QL: NEGATIVE
CHLORIDE SERPL-SCNC: 93 MMOL/L (ref 98–107)
CHLORIDE SERPL-SCNC: 97 MMOL/L (ref 98–107)
CLARITY UR: CLEAR
CO2 SERPL-SCNC: 28 MMOL/L (ref 22–29)
CO2 SERPL-SCNC: 36 MMOL/L (ref 22–29)
COCAINE UR QL: NEGATIVE
COHGB MFR BLD: 1.5 % (ref 0–5)
COHGB MFR BLD: 1.6 % (ref 0–5)
COLOR UR: YELLOW
CREAT SERPL-MCNC: 0.65 MG/DL (ref 0.57–1)
CREAT SERPL-MCNC: 0.71 MG/DL (ref 0.57–1)
D-LACTATE SERPL-SCNC: 1.1 MMOL/L (ref 0.5–2)
DEPRECATED RDW RBC AUTO: 48.7 FL (ref 37–54)
EGFRCR SERPLBLD CKD-EPI 2021: 93.3 ML/MIN/1.73
EGFRCR SERPLBLD CKD-EPI 2021: 96.6 ML/MIN/1.73
EOSINOPHIL # BLD AUTO: 0.01 10*3/MM3 (ref 0–0.4)
EOSINOPHIL NFR BLD AUTO: 0.1 % (ref 0.3–6.2)
ERYTHROCYTE [DISTWIDTH] IN BLOOD BY AUTOMATED COUNT: 14.8 % (ref 12.3–15.4)
ETHANOL UR QL: <0.01 %
FENTANYL UR-MCNC: NEGATIVE NG/ML
FLUAV SUBTYP SPEC NAA+PROBE: NOT DETECTED
FLUBV RNA ISLT QL NAA+PROBE: NOT DETECTED
GAS FLOW AIRWAY: 2.5 LPM
GAS FLOW AIRWAY: 5 LPM
GAS FLOW AIRWAY: 6 LPM
GEN 5 1HR TROPONIN T REFLEX: 55 NG/L
GLOBULIN UR ELPH-MCNC: 3.9 GM/DL
GLUCOSE BLDC GLUCOMTR-MCNC: 139 MG/DL (ref 70–130)
GLUCOSE SERPL-MCNC: 126 MG/DL (ref 65–99)
GLUCOSE SERPL-MCNC: 68 MG/DL (ref 65–99)
GLUCOSE UR STRIP-MCNC: ABNORMAL MG/DL
HADV DNA SPEC NAA+PROBE: NOT DETECTED
HCO3 BLDA-SCNC: 37.5 MMOL/L (ref 20–26)
HCO3 BLDA-SCNC: 38.6 MMOL/L (ref 20–26)
HCO3 BLDA-SCNC: 40.5 MMOL/L (ref 20–26)
HCOV 229E RNA SPEC QL NAA+PROBE: NOT DETECTED
HCOV HKU1 RNA SPEC QL NAA+PROBE: NOT DETECTED
HCOV NL63 RNA SPEC QL NAA+PROBE: NOT DETECTED
HCOV OC43 RNA SPEC QL NAA+PROBE: NOT DETECTED
HCT VFR BLD AUTO: 36.4 % (ref 34–46.6)
HCT VFR BLD CALC: 33.4 % (ref 38–51)
HCT VFR BLD CALC: 34.4 % (ref 38–51)
HGB BLD-MCNC: 11.3 G/DL (ref 12–15.9)
HGB BLDA-MCNC: 10.9 G/DL (ref 12–16)
HGB BLDA-MCNC: 11.2 G/DL (ref 12–16)
HGB UR QL STRIP.AUTO: NEGATIVE
HMPV RNA NPH QL NAA+NON-PROBE: NOT DETECTED
HPIV1 RNA ISLT QL NAA+PROBE: NOT DETECTED
HPIV2 RNA SPEC QL NAA+PROBE: NOT DETECTED
HPIV3 RNA NPH QL NAA+PROBE: NOT DETECTED
HPIV4 P GENE NPH QL NAA+PROBE: NOT DETECTED
IMM GRANULOCYTES # BLD AUTO: 0.18 10*3/MM3 (ref 0–0.05)
IMM GRANULOCYTES NFR BLD AUTO: 1.2 % (ref 0–0.5)
INR PPP: 0.95 (ref 0.91–1.09)
KETONES UR QL STRIP: NEGATIVE
LEUKOCYTE ESTERASE UR QL STRIP.AUTO: NEGATIVE
LIPASE SERPL-CCNC: 14 U/L (ref 13–60)
LYMPHOCYTES # BLD AUTO: 1.17 10*3/MM3 (ref 0.7–3.1)
LYMPHOCYTES NFR BLD AUTO: 7.9 % (ref 19.6–45.3)
Lab: ABNORMAL
M PNEUMO IGG SER IA-ACNC: NOT DETECTED
MAGNESIUM SERPL-MCNC: 2 MG/DL (ref 1.6–2.4)
MCH RBC QN AUTO: 29.4 PG (ref 26.6–33)
MCHC RBC AUTO-ENTMCNC: 31 G/DL (ref 31.5–35.7)
MCV RBC AUTO: 94.5 FL (ref 79–97)
METHADONE UR QL SCN: NEGATIVE
METHGB BLD QL: 0.5 % (ref 0–3)
METHGB BLD QL: 0.6 % (ref 0–3)
MODALITY: ABNORMAL
MONOCYTES # BLD AUTO: 1.03 10*3/MM3 (ref 0.1–0.9)
MONOCYTES NFR BLD AUTO: 7 % (ref 5–12)
NEUTROPHILS NFR BLD AUTO: 12.32 10*3/MM3 (ref 1.7–7)
NEUTROPHILS NFR BLD AUTO: 83.1 % (ref 42.7–76)
NITRITE UR QL STRIP: NEGATIVE
NOTIFIED BY: ABNORMAL
NOTIFIED BY: ABNORMAL
NOTIFIED WHO: ABNORMAL
NRBC BLD AUTO-RTO: 0 /100 WBC (ref 0–0.2)
OPIATES UR QL: NEGATIVE
OXYCODONE UR QL SCN: POSITIVE
OXYHGB MFR BLDV: 94.4 % (ref 94–99)
OXYHGB MFR BLDV: 95.7 % (ref 94–99)
PCO2 BLDA: 52.4 MM HG (ref 35–45)
PCO2 BLDA: 66.5 MM HG (ref 35–45)
PCO2 BLDA: 68.8 MM HG (ref 35–45)
PCO2 TEMP ADJ BLD: 52.4 MM HG (ref 35–45)
PCO2 TEMP ADJ BLD: 66.5 MM HG (ref 35–45)
PCO2 TEMP ADJ BLD: 68.8 MM HG (ref 35–45)
PCP UR QL SCN: NEGATIVE
PH BLDA: 7.37 PH UNITS (ref 7.35–7.45)
PH BLDA: 7.38 PH UNITS (ref 7.35–7.45)
PH BLDA: 7.46 PH UNITS (ref 7.35–7.45)
PH UR STRIP.AUTO: 7.5 [PH] (ref 5–8)
PH, TEMP CORRECTED: 7.37 PH UNITS (ref 7.35–7.45)
PH, TEMP CORRECTED: 7.38 PH UNITS (ref 7.35–7.45)
PH, TEMP CORRECTED: 7.46 PH UNITS (ref 7.35–7.45)
PLATELET # BLD AUTO: 480 10*3/MM3 (ref 140–450)
PMV BLD AUTO: 9.3 FL (ref 6–12)
PO2 BLDA: 47.6 MM HG (ref 83–108)
PO2 BLDA: 79.9 MM HG (ref 83–108)
PO2 BLDA: 87.6 MM HG (ref 83–108)
PO2 TEMP ADJ BLD: 47.6 MM HG (ref 83–108)
PO2 TEMP ADJ BLD: 79.9 MM HG (ref 83–108)
PO2 TEMP ADJ BLD: 87.6 MM HG (ref 83–108)
POTASSIUM BLDA-SCNC: 4.8 MMOL/L (ref 3.5–5.2)
POTASSIUM BLDA-SCNC: 4.9 MMOL/L (ref 3.5–5.2)
POTASSIUM SERPL-SCNC: 5.3 MMOL/L (ref 3.5–5.2)
POTASSIUM SERPL-SCNC: 6 MMOL/L (ref 3.5–5.2)
PROT SERPL-MCNC: 7.4 G/DL (ref 6–8.5)
PROT UR QL STRIP: NEGATIVE
PROTHROMBIN TIME: 13.1 SECONDS (ref 11.8–14.8)
RBC # BLD AUTO: 3.85 10*6/MM3 (ref 3.77–5.28)
RHINOVIRUS RNA SPEC NAA+PROBE: NOT DETECTED
RSV RNA NPH QL NAA+NON-PROBE: NOT DETECTED
SALICYLATES SERPL-MCNC: <0.3 MG/DL
SAO2 % BLDCOA: 85.6 % (ref 94–99)
SAO2 % BLDCOA: 96.5 % (ref 94–99)
SAO2 % BLDCOA: 97.7 % (ref 94–99)
SARS-COV-2 RNA RESP QL NAA+PROBE: NOT DETECTED
SODIUM BLDA-SCNC: 139 MMOL/L (ref 136–145)
SODIUM BLDA-SCNC: 139 MMOL/L (ref 136–145)
SODIUM SERPL-SCNC: 138 MMOL/L (ref 136–145)
SODIUM SERPL-SCNC: 140 MMOL/L (ref 136–145)
SP GR UR STRIP: 1.01 (ref 1–1.03)
TRICYCLICS UR QL SCN: NEGATIVE
TROPONIN T % DELTA: 20
TROPONIN T NUMERIC DELTA: 9 NG/L
TROPONIN T SERPL HS-MCNC: 46 NG/L
TROPONIN T SERPL HS-MCNC: 62 NG/L
UROBILINOGEN UR QL STRIP: ABNORMAL
VENTILATOR MODE: ABNORMAL
WBC NRBC COR # BLD AUTO: 14.82 10*3/MM3 (ref 3.4–10.8)

## 2025-02-22 PROCEDURE — 82375 ASSAY CARBOXYHB QUANT: CPT

## 2025-02-22 PROCEDURE — 84484 ASSAY OF TROPONIN QUANT: CPT

## 2025-02-22 PROCEDURE — 92610 EVALUATE SWALLOWING FUNCTION: CPT | Performed by: SPEECH-LANGUAGE PATHOLOGIST

## 2025-02-22 PROCEDURE — 94761 N-INVAS EAR/PLS OXIMETRY MLT: CPT

## 2025-02-22 PROCEDURE — 84484 ASSAY OF TROPONIN QUANT: CPT | Performed by: EMERGENCY MEDICINE

## 2025-02-22 PROCEDURE — 36600 WITHDRAWAL OF ARTERIAL BLOOD: CPT

## 2025-02-22 PROCEDURE — 80307 DRUG TEST PRSMV CHEM ANLYZR: CPT

## 2025-02-22 PROCEDURE — 25810000003 SODIUM CHLORIDE 0.9 % SOLUTION: Performed by: EMERGENCY MEDICINE

## 2025-02-22 PROCEDURE — 87040 BLOOD CULTURE FOR BACTERIA: CPT | Performed by: EMERGENCY MEDICINE

## 2025-02-22 PROCEDURE — 25010000002 FUROSEMIDE PER 20 MG: Performed by: EMERGENCY MEDICINE

## 2025-02-22 PROCEDURE — 25010000002 CALCIUM GLUCONATE-NACL 1-0.675 GM/50ML-% SOLUTION: Performed by: EMERGENCY MEDICINE

## 2025-02-22 PROCEDURE — 70450 CT HEAD/BRAIN W/O DYE: CPT

## 2025-02-22 PROCEDURE — 83050 HGB METHEMOGLOBIN QUAN: CPT

## 2025-02-22 PROCEDURE — 94799 UNLISTED PULMONARY SVC/PX: CPT

## 2025-02-22 PROCEDURE — 72125 CT NECK SPINE W/O DYE: CPT

## 2025-02-22 PROCEDURE — 80048 BASIC METABOLIC PNL TOTAL CA: CPT

## 2025-02-22 PROCEDURE — 82140 ASSAY OF AMMONIA: CPT | Performed by: EMERGENCY MEDICINE

## 2025-02-22 PROCEDURE — 25810000003 SODIUM CHLORIDE 0.9 % SOLUTION 250 ML FLEX CONT: Performed by: INTERNAL MEDICINE

## 2025-02-22 PROCEDURE — 0202U NFCT DS 22 TRGT SARS-COV-2: CPT | Performed by: EMERGENCY MEDICINE

## 2025-02-22 PROCEDURE — 82803 BLOOD GASES ANY COMBINATION: CPT

## 2025-02-22 PROCEDURE — 25010000002 ENOXAPARIN PER 10 MG: Performed by: INTERNAL MEDICINE

## 2025-02-22 PROCEDURE — 71045 X-RAY EXAM CHEST 1 VIEW: CPT

## 2025-02-22 PROCEDURE — 36415 COLL VENOUS BLD VENIPUNCTURE: CPT

## 2025-02-22 PROCEDURE — 82805 BLOOD GASES W/O2 SATURATION: CPT

## 2025-02-22 PROCEDURE — 82948 REAGENT STRIP/BLOOD GLUCOSE: CPT

## 2025-02-22 PROCEDURE — 81003 URINALYSIS AUTO W/O SCOPE: CPT | Performed by: EMERGENCY MEDICINE

## 2025-02-22 PROCEDURE — 94660 CPAP INITIATION&MGMT: CPT

## 2025-02-22 PROCEDURE — 25010000002 AZITHROMYCIN PER 500 MG: Performed by: INTERNAL MEDICINE

## 2025-02-22 PROCEDURE — 94664 DEMO&/EVAL PT USE INHALER: CPT

## 2025-02-22 PROCEDURE — 63710000001 INSULIN REGULAR HUMAN PER 5 UNITS: Performed by: EMERGENCY MEDICINE

## 2025-02-22 PROCEDURE — 93010 ELECTROCARDIOGRAM REPORT: CPT | Performed by: INTERNAL MEDICINE

## 2025-02-22 PROCEDURE — 25010000002 METHYLPREDNISOLONE PER 125 MG: Performed by: INTERNAL MEDICINE

## 2025-02-22 PROCEDURE — 25010000002 NALOXONE HCL 2 MG/2ML SOLUTION PREFILLED SYRINGE: Performed by: EMERGENCY MEDICINE

## 2025-02-22 PROCEDURE — P9612 CATHETERIZE FOR URINE SPEC: HCPCS

## 2025-02-22 RX ORDER — TOBRAMYCIN 3 MG/ML
2 SOLUTION/ DROPS OPHTHALMIC
Status: DISCONTINUED | OUTPATIENT
Start: 2025-02-22 | End: 2025-02-25 | Stop reason: HOSPADM

## 2025-02-22 RX ORDER — CITALOPRAM HYDROBROMIDE 20 MG/1
40 TABLET ORAL DAILY
Status: DISCONTINUED | OUTPATIENT
Start: 2025-02-22 | End: 2025-02-25 | Stop reason: HOSPADM

## 2025-02-22 RX ORDER — ACETAMINOPHEN 325 MG/1
650 TABLET ORAL EVERY 4 HOURS PRN
Status: DISCONTINUED | OUTPATIENT
Start: 2025-02-22 | End: 2025-02-25 | Stop reason: HOSPADM

## 2025-02-22 RX ORDER — DEXTROSE MONOHYDRATE 25 G/50ML
25 INJECTION, SOLUTION INTRAVENOUS ONCE
Status: COMPLETED | OUTPATIENT
Start: 2025-02-22 | End: 2025-02-22

## 2025-02-22 RX ORDER — FENTANYL 25 UG/1
1 PATCH TRANSDERMAL
Status: DISCONTINUED | OUTPATIENT
Start: 2025-02-22 | End: 2025-02-23

## 2025-02-22 RX ORDER — BISACODYL 5 MG/1
5 TABLET, DELAYED RELEASE ORAL DAILY PRN
Status: DISCONTINUED | OUTPATIENT
Start: 2025-02-22 | End: 2025-02-25 | Stop reason: HOSPADM

## 2025-02-22 RX ORDER — IPRATROPIUM BROMIDE AND ALBUTEROL SULFATE 2.5; .5 MG/3ML; MG/3ML
3 SOLUTION RESPIRATORY (INHALATION)
Status: DISCONTINUED | OUTPATIENT
Start: 2025-02-22 | End: 2025-02-25 | Stop reason: HOSPADM

## 2025-02-22 RX ORDER — SODIUM CHLORIDE 0.9 % (FLUSH) 0.9 %
10 SYRINGE (ML) INJECTION AS NEEDED
Status: DISCONTINUED | OUTPATIENT
Start: 2025-02-22 | End: 2025-02-25 | Stop reason: HOSPADM

## 2025-02-22 RX ORDER — SODIUM CHLORIDE 0.9 % (FLUSH) 0.9 %
10 SYRINGE (ML) INJECTION EVERY 12 HOURS SCHEDULED
Status: DISCONTINUED | OUTPATIENT
Start: 2025-02-22 | End: 2025-02-25 | Stop reason: HOSPADM

## 2025-02-22 RX ORDER — CALCIUM GLUCONATE 94 MG/ML
1 INJECTION, SOLUTION INTRAVENOUS ONCE
Status: DISCONTINUED | OUTPATIENT
Start: 2025-02-22 | End: 2025-02-22 | Stop reason: SDUPTHER

## 2025-02-22 RX ORDER — METHYLPREDNISOLONE SODIUM SUCCINATE 40 MG/ML
20 INJECTION, POWDER, LYOPHILIZED, FOR SOLUTION INTRAMUSCULAR; INTRAVENOUS EVERY 6 HOURS
Status: DISCONTINUED | OUTPATIENT
Start: 2025-02-25 | End: 2025-02-25 | Stop reason: HOSPADM

## 2025-02-22 RX ORDER — DIAZEPAM 5 MG/1
2.5 TABLET ORAL EVERY 12 HOURS PRN
Status: DISCONTINUED | OUTPATIENT
Start: 2025-02-22 | End: 2025-02-25

## 2025-02-22 RX ORDER — FUROSEMIDE 10 MG/ML
80 INJECTION INTRAMUSCULAR; INTRAVENOUS ONCE
Status: COMPLETED | OUTPATIENT
Start: 2025-02-22 | End: 2025-02-22

## 2025-02-22 RX ORDER — CHLORHEXIDINE GLUCONATE 500 MG/1
1 CLOTH TOPICAL EVERY 24 HOURS
Status: DISCONTINUED | OUTPATIENT
Start: 2025-02-23 | End: 2025-02-25 | Stop reason: HOSPADM

## 2025-02-22 RX ORDER — IPRATROPIUM BROMIDE AND ALBUTEROL SULFATE 2.5; .5 MG/3ML; MG/3ML
3 SOLUTION RESPIRATORY (INHALATION) EVERY 4 HOURS PRN
Status: DISCONTINUED | OUTPATIENT
Start: 2025-02-22 | End: 2025-02-25 | Stop reason: HOSPADM

## 2025-02-22 RX ORDER — ONDANSETRON 2 MG/ML
4 INJECTION INTRAMUSCULAR; INTRAVENOUS EVERY 6 HOURS PRN
Status: DISCONTINUED | OUTPATIENT
Start: 2025-02-22 | End: 2025-02-25 | Stop reason: HOSPADM

## 2025-02-22 RX ORDER — ONDANSETRON 4 MG/1
4 TABLET, ORALLY DISINTEGRATING ORAL EVERY 8 HOURS PRN
Status: DISCONTINUED | OUTPATIENT
Start: 2025-02-22 | End: 2025-02-25 | Stop reason: HOSPADM

## 2025-02-22 RX ORDER — GABAPENTIN 300 MG/1
600 CAPSULE ORAL EVERY 12 HOURS SCHEDULED
Status: DISCONTINUED | OUTPATIENT
Start: 2025-02-22 | End: 2025-02-25 | Stop reason: HOSPADM

## 2025-02-22 RX ORDER — METHYLPREDNISOLONE SODIUM SUCCINATE 40 MG/ML
40 INJECTION, POWDER, LYOPHILIZED, FOR SOLUTION INTRAMUSCULAR; INTRAVENOUS EVERY 6 HOURS
Status: COMPLETED | OUTPATIENT
Start: 2025-02-24 | End: 2025-02-25

## 2025-02-22 RX ORDER — ENOXAPARIN SODIUM 100 MG/ML
40 INJECTION SUBCUTANEOUS EVERY 24 HOURS
Status: DISCONTINUED | OUTPATIENT
Start: 2025-02-22 | End: 2025-02-25 | Stop reason: HOSPADM

## 2025-02-22 RX ORDER — SODIUM CHLORIDE 9 MG/ML
40 INJECTION, SOLUTION INTRAVENOUS AS NEEDED
Status: DISCONTINUED | OUTPATIENT
Start: 2025-02-22 | End: 2025-02-25 | Stop reason: HOSPADM

## 2025-02-22 RX ORDER — ROPINIROLE 0.25 MG/1
0.25 TABLET, FILM COATED ORAL NIGHTLY
Status: DISCONTINUED | OUTPATIENT
Start: 2025-02-22 | End: 2025-02-25 | Stop reason: HOSPADM

## 2025-02-22 RX ORDER — CALCIUM GLUCONATE 20 MG/ML
1000 INJECTION, SOLUTION INTRAVENOUS ONCE
Status: COMPLETED | OUTPATIENT
Start: 2025-02-22 | End: 2025-02-22

## 2025-02-22 RX ORDER — AMOXICILLIN 250 MG
2 CAPSULE ORAL 2 TIMES DAILY
Status: DISCONTINUED | OUTPATIENT
Start: 2025-02-22 | End: 2025-02-25 | Stop reason: HOSPADM

## 2025-02-22 RX ORDER — POLYETHYLENE GLYCOL 3350 17 G/17G
17 POWDER, FOR SOLUTION ORAL DAILY PRN
Status: DISCONTINUED | OUTPATIENT
Start: 2025-02-22 | End: 2025-02-25 | Stop reason: HOSPADM

## 2025-02-22 RX ORDER — LEVOTHYROXINE SODIUM 112 UG/1
112 TABLET ORAL DAILY
Status: DISCONTINUED | OUTPATIENT
Start: 2025-02-22 | End: 2025-02-25 | Stop reason: HOSPADM

## 2025-02-22 RX ORDER — MIRTAZAPINE 15 MG/1
15 TABLET, FILM COATED ORAL NIGHTLY
Status: DISCONTINUED | OUTPATIENT
Start: 2025-02-22 | End: 2025-02-25 | Stop reason: HOSPADM

## 2025-02-22 RX ORDER — METHYLPREDNISOLONE SODIUM SUCCINATE 125 MG/2ML
60 INJECTION, POWDER, LYOPHILIZED, FOR SOLUTION INTRAMUSCULAR; INTRAVENOUS EVERY 6 HOURS
Status: COMPLETED | OUTPATIENT
Start: 2025-02-22 | End: 2025-02-23

## 2025-02-22 RX ORDER — CHLORHEXIDINE GLUCONATE 500 MG/1
1 CLOTH TOPICAL ONCE
Status: COMPLETED | OUTPATIENT
Start: 2025-02-22 | End: 2025-02-22

## 2025-02-22 RX ORDER — BUDESONIDE 0.5 MG/2ML
0.5 INHALANT ORAL
Status: DISCONTINUED | OUTPATIENT
Start: 2025-02-22 | End: 2025-02-25

## 2025-02-22 RX ORDER — BISACODYL 10 MG
10 SUPPOSITORY, RECTAL RECTAL DAILY PRN
Status: DISCONTINUED | OUTPATIENT
Start: 2025-02-22 | End: 2025-02-25 | Stop reason: HOSPADM

## 2025-02-22 RX ORDER — LACTULOSE 10 G/15ML
30 SOLUTION ORAL ONCE
Status: DISCONTINUED | OUTPATIENT
Start: 2025-02-22 | End: 2025-02-25 | Stop reason: HOSPADM

## 2025-02-22 RX ORDER — ACETAMINOPHEN 650 MG/1
650 SUPPOSITORY RECTAL EVERY 4 HOURS PRN
Status: DISCONTINUED | OUTPATIENT
Start: 2025-02-22 | End: 2025-02-25 | Stop reason: HOSPADM

## 2025-02-22 RX ADMIN — ENOXAPARIN SODIUM 40 MG: 100 INJECTION SUBCUTANEOUS at 13:17

## 2025-02-22 RX ADMIN — IPRATROPIUM BROMIDE AND ALBUTEROL SULFATE 3 ML: .5; 3 SOLUTION RESPIRATORY (INHALATION) at 07:23

## 2025-02-22 RX ADMIN — TOBRAMYCIN OPHTHALMIC SOLUTION 2 DROP: 3 SOLUTION/ DROPS OPHTHALMIC at 13:16

## 2025-02-22 RX ADMIN — IPRATROPIUM BROMIDE AND ALBUTEROL SULFATE 3 ML: .5; 3 SOLUTION RESPIRATORY (INHALATION) at 19:28

## 2025-02-22 RX ADMIN — INSULIN HUMAN 10 UNITS: 100 INJECTION, SOLUTION PARENTERAL at 01:10

## 2025-02-22 RX ADMIN — SODIUM CHLORIDE 1000 ML: 9 INJECTION, SOLUTION INTRAVENOUS at 04:54

## 2025-02-22 RX ADMIN — TOBRAMYCIN OPHTHALMIC SOLUTION 2 DROP: 3 SOLUTION/ DROPS OPHTHALMIC at 18:16

## 2025-02-22 RX ADMIN — Medication 10 ML: at 21:54

## 2025-02-22 RX ADMIN — IPRATROPIUM BROMIDE AND ALBUTEROL SULFATE 3 ML: .5; 3 SOLUTION RESPIRATORY (INHALATION) at 14:30

## 2025-02-22 RX ADMIN — NALOXONE HYDROCHLORIDE 2 MG: 1 INJECTION INTRAMUSCULAR; INTRAVENOUS; SUBCUTANEOUS at 00:00

## 2025-02-22 RX ADMIN — TOBRAMYCIN OPHTHALMIC SOLUTION 2 DROP: 3 SOLUTION/ DROPS OPHTHALMIC at 21:55

## 2025-02-22 RX ADMIN — SENNOSIDES, DOCUSATE SODIUM 2 TABLET: 50; 8.6 TABLET, FILM COATED ORAL at 13:17

## 2025-02-22 RX ADMIN — CITALOPRAM HYDROBROMIDE 40 MG: 20 TABLET ORAL at 13:17

## 2025-02-22 RX ADMIN — IPRATROPIUM BROMIDE AND ALBUTEROL SULFATE 3 ML: .5; 3 SOLUTION RESPIRATORY (INHALATION) at 10:24

## 2025-02-22 RX ADMIN — ROPINIROLE HYDROCHLORIDE 0.25 MG: 0.25 TABLET, FILM COATED ORAL at 21:54

## 2025-02-22 RX ADMIN — LEVOTHYROXINE SODIUM 112 MCG: 112 TABLET ORAL at 13:17

## 2025-02-22 RX ADMIN — DEXTROSE MONOHYDRATE 25 G: 25 INJECTION, SOLUTION INTRAVENOUS at 01:09

## 2025-02-22 RX ADMIN — METHYLPREDNISOLONE SODIUM SUCCINATE 60 MG: 125 INJECTION, POWDER, FOR SOLUTION INTRAMUSCULAR; INTRAVENOUS at 13:16

## 2025-02-22 RX ADMIN — FUROSEMIDE 80 MG: 10 INJECTION, SOLUTION INTRAVENOUS at 01:09

## 2025-02-22 RX ADMIN — AZITHROMYCIN DIHYDRATE 500 MG: 500 INJECTION, POWDER, LYOPHILIZED, FOR SOLUTION INTRAVENOUS at 13:16

## 2025-02-22 RX ADMIN — BUDESONIDE 0.5 MG: 0.5 INHALANT RESPIRATORY (INHALATION) at 07:23

## 2025-02-22 RX ADMIN — BUDESONIDE 0.5 MG: 0.5 INHALANT RESPIRATORY (INHALATION) at 19:29

## 2025-02-22 RX ADMIN — FENTANYL 1 PATCH: 25 PATCH TRANSDERMAL at 11:32

## 2025-02-22 RX ADMIN — Medication 1 APPLICATION: at 18:16

## 2025-02-22 RX ADMIN — Medication 10 ML: at 13:17

## 2025-02-22 RX ADMIN — GABAPENTIN 600 MG: 300 CAPSULE ORAL at 13:17

## 2025-02-22 RX ADMIN — CHLORHEXIDINE GLUCONATE 1 APPLICATION: 500 CLOTH TOPICAL at 13:17

## 2025-02-22 RX ADMIN — CALCIUM GLUCONATE 1000 MG: 20 INJECTION, SOLUTION INTRAVENOUS at 01:08

## 2025-02-22 RX ADMIN — METHYLPREDNISOLONE SODIUM SUCCINATE 60 MG: 125 INJECTION, POWDER, FOR SOLUTION INTRAMUSCULAR; INTRAVENOUS at 18:51

## 2025-02-22 RX ADMIN — SODIUM CHLORIDE 1000 ML: 9 INJECTION, SOLUTION INTRAVENOUS at 01:21

## 2025-02-22 RX ADMIN — MIRTAZAPINE 15 MG: 15 TABLET, FILM COATED ORAL at 21:54

## 2025-02-22 NOTE — PROGRESS NOTES
"Pharmacy Dosing Service  Anticoagulation  Enoxaparin    Assessment:  Current order: Enoxaparin 40 mg SQ every 24 hours for VTE prophylaxis.  Dose is appropriate based on indication and patient factors (age, weight, and renal function).  No changes at this time.  Pharmacy will continue to monitor and evaluate.    Plan:  Continue current dosage. Continue routine follow-up evaluation.       Subjective:  Ashley Gibbs is a 69 y.o. female on enoxaparin for VTE prophylaxis.    1. Altered mental status, unspecified altered mental status type    2. Elevated troponin    3. Hyperkalemia    4. Hyperammonemia    5. Dysphagia, unspecified type        Objective:  [Ht: 170.2 cm (67\"); Wt: 68.1 kg (150 lb 2.1 oz); BMI: Body mass index is 23.51 kg/m².]  Estimated Creatinine Clearance: 90.3 mL/min (by C-G formula based on SCr of 0.65 mg/dL).   Creatinine   Date Value Ref Range Status   02/22/2025 0.65 0.57 - 1.00 mg/dL Final   02/21/2025 0.71 0.57 - 1.00 mg/dL Final   02/17/2025 0.5 0.5 - 0.9 mg/dL Final   02/10/2025 0.56 (L) 0.57 - 1.00 mg/dL Final   02/10/2025 0.56 (L) 0.57 - 1.00 mg/dL Final   01/27/2025 0.7 0.5 - 0.9 mg/dL Final   12/13/2024 0.80 0.60 - 1.30 mg/dL Final     Comment:     Serial Number: [id number]:  [id number]   11/08/2024 0.70 0.60 - 1.30 mg/dL Final     Comment:     Serial Number: [id number]:  [id number]   07/12/2023 0.80 0.60 - 1.30 mg/dL Final     Comment:     Serial Number: [id number]:  [id number]   09/22/2021 0.6 0.5 - 0.9 mg/dL Final     Lab Results   Component Value Date    INR 0.95 02/21/2025    INR 0.98 01/31/2025    INR 1.04 07/24/2019    PROTIME 13.1 02/21/2025    PROTIME 13.5 01/31/2025    PROTIME 13 07/24/2019      Lab Results   Component Value Date    HGB 11.3 (L) 02/21/2025    HGB 11.3 (L) 02/17/2025    HGB 11.4 (L) 02/10/2025      Lab Results   Component Value Date    HCT 36.4 02/21/2025    HCT 34.9 (L) 02/17/2025    HCT 35.3 02/10/2025      Lab Results   Component Value Date     (H) " "02/21/2025     02/17/2025     (L) 02/10/2025    No results found for: \"DDIMER\"  COVID19   Date Value Ref Range Status   02/22/2025 Not Detected Not Detected - Ref. Range Final         Thierry Antoine, PharmD  02/22/25 12:40 CST     "

## 2025-02-22 NOTE — ED NOTES
Nursing report ED to floor  Ashley Gibbs  69 y.o.  female    HPI:   Chief Complaint   Patient presents with    Drug Overdose       Admitting doctor:   Bernardo Rivera MD    Consulting provider(s):  Consults       No orders found for last 30 day(s).             Admitting diagnosis:   The primary encounter diagnosis was Altered mental status, unspecified altered mental status type. Diagnoses of Elevated troponin, Hyperkalemia, and Hyperammonemia were also pertinent to this visit.    Code status:   Current Code Status       Date Active Code Status Order ID Comments User Context       Prior            Allergies:   Codeine    Intake and Output    Intake/Output Summary (Last 24 hours) at 2/22/2025 0535  Last data filed at 2/22/2025 0351  Gross per 24 hour   Intake 1050 ml   Output 1000 ml   Net 50 ml       Weight:       02/21/25  2259   Weight: 69.9 kg (154 lb)       Most recent vitals:   Vitals:    02/22/25 0422 02/22/25 0458 02/22/25 0501 02/22/25 0513   BP:       Pulse: 116 120 116 114   Resp: 25 (!) 33 28 26   Temp:       TempSrc:       SpO2: 92% (!) 89% (!) 89% 90%   Weight:       Height:         Oxygen Therapy: .    Active LDAs/IV Access:   Lines, Drains & Airways       Active LDAs       Name Placement date Placement time Site Days    Peripheral IV 02/21/25 2300 Left;Posterior Hand 02/21/25  2300  Hand  less than 1    Peripheral IV 02/22/25 0048 Anterior;Right Hand 02/22/25  0048  Hand  less than 1    External Urinary Catheter 02/22/25  0200  --  less than 1                    Labs (abnormal labs have a star):   Labs Reviewed   COMPREHENSIVE METABOLIC PANEL - Abnormal; Notable for the following components:       Result Value    Glucose 126 (*)     Potassium 6.0 (*)     Chloride 97 (*)     CO2 36.0 (*)     Alkaline Phosphatase 141 (*)     All other components within normal limits    Narrative:     GFR Categories in Chronic Kidney Disease (CKD)      GFR Category          GFR (mL/min/1.73)    Interpretation  G1                      90 or greater         Normal or high (1)  G2                      60-89                Mild decrease (1)  G3a                   45-59                Mild to moderate decrease  G3b                   30-44                Moderate to severe decrease  G4                    15-29                Severe decrease  G5                    14 or less           Kidney failure          (1)In the absence of evidence of kidney disease, neither GFR category G1 or G2 fulfill the criteria for CKD.    eGFR calculation 2021 CKD-EPI creatinine equation, which does not include race as a factor   APTT - Abnormal; Notable for the following components:    PTT 38.6 (*)     All other components within normal limits    Narrative:     PTT = The equivalent PTT values for the therapeutic range of heparin levels at 0.3 to 0.7 U/ml are 77 - 99 seconds.   URINALYSIS W/ CULTURE IF INDICATED - Abnormal; Notable for the following components:    Glucose,  mg/dL (Trace) (*)     All other components within normal limits    Narrative:     In absence of clinical symptoms, the presence of pyuria, bacteria, and/or nitrites on the urinalysis result does not correlate with infection.  Urine microscopic not indicated.   TROPONIN - Abnormal; Notable for the following components:    HS Troponin T 46 (*)     All other components within normal limits    Narrative:     High Sensitive Troponin T Reference Range:  <14.0 ng/L- Negative Female for AMI  <22.0 ng/L- Negative Male for AMI  >=14 - Abnormal Female indicating possible myocardial injury.  >=22 - Abnormal Male indicating possible myocardial injury.   Clinicians would have to utilize clinical acumen, EKG, Troponin, and serial changes to determine if it is an Acute Myocardial Infarction or myocardial injury due to an underlying chronic condition.        AMMONIA - Abnormal; Notable for the following components:    Ammonia 64 (*)     All other components within normal limits   CBC WITH AUTO  DIFFERENTIAL - Abnormal; Notable for the following components:    WBC 14.82 (*)     Hemoglobin 11.3 (*)     MCHC 31.0 (*)     Platelets 480 (*)     Neutrophil % 83.1 (*)     Lymphocyte % 7.9 (*)     Eosinophil % 0.1 (*)     Immature Grans % 1.2 (*)     Neutrophils, Absolute 12.32 (*)     Monocytes, Absolute 1.03 (*)     Immature Grans, Absolute 0.18 (*)     All other components within normal limits   HIGH SENSITIVITIY TROPONIN T 1HR - Abnormal; Notable for the following components:    HS Troponin T 55 (*)     Troponin T % Delta 20 (*)     All other components within normal limits    Narrative:     High Sensitive Troponin T Reference Range:  <14.0 ng/L- Negative Female for AMI  <22.0 ng/L- Negative Male for AMI  >=14 - Abnormal Female indicating possible myocardial injury.  >=22 - Abnormal Male indicating possible myocardial injury.   Clinicians would have to utilize clinical acumen, EKG, Troponin, and serial changes to determine if it is an Acute Myocardial Infarction or myocardial injury due to an underlying chronic condition.        BLOOD GAS, ARTERIAL W/CO-OXIMETRY - Abnormal; Notable for the following components:    pCO2, Arterial 68.8 (*)     pO2, Arterial 79.9 (*)     HCO3, Arterial 40.5 (*)     Base Excess, Arterial 12.8 (*)     Hemoglobin, Blood Gas 10.9 (*)     Hematocrit, Blood Gas 33.4 (*)     pCO2, Temperature Corrected 68.8 (*)     pO2, Temperature Corrected 79.9 (*)     All other components within normal limits   BLOOD GAS, ARTERIAL W/CO-OXIMETRY - Abnormal; Notable for the following components:    pCO2, Arterial 66.5 (*)     HCO3, Arterial 38.6 (*)     Base Excess, Arterial 11.1 (*)     Hemoglobin, Blood Gas 11.2 (*)     Hematocrit, Blood Gas 34.4 (*)     pCO2, Temperature Corrected 66.5 (*)     All other components within normal limits   URINE DRUG SCREEN - Abnormal; Notable for the following components:    Benzodiazepine Screen, Urine Positive (*)     Oxycodone Screen, Urine Positive (*)     All  other components within normal limits    Narrative:     Cutoff For Drugs Screened:    Amphetamines               500 ng/ml  Barbiturates               200 ng/ml  Benzodiazepines            150 ng/ml  Cocaine                    150 ng/ml  Methadone                  200 ng/ml  Opiates                    100 ng/ml  Phencyclidine               25 ng/ml  THC                         50 ng/ml  Methamphetamine            500 ng/ml  Tricyclic Antidepressants  300 ng/ml  Oxycodone                  100 ng/ml  Buprenorphine               10 ng/ml    The normal value for all drugs tested is negative. This report includes unconfirmed screening results, with the cutoff values listed, to be used for medical treatment purposes only.  Unconfirmed results must not be used for non-medical purposes such as employment or legal testing.  Clinical consideration should be applied to any drug of abuse test, particularly when unconfirmed results are used.     BASIC METABOLIC PANEL - Abnormal; Notable for the following components:    Potassium 5.3 (*)     Chloride 93 (*)     Anion Gap 17.0 (*)     All other components within normal limits    Narrative:     GFR Categories in Chronic Kidney Disease (CKD)      GFR Category          GFR (mL/min/1.73)    Interpretation  G1                     90 or greater         Normal or high (1)  G2                      60-89                Mild decrease (1)  G3a                   45-59                Mild to moderate decrease  G3b                   30-44                Moderate to severe decrease  G4                    15-29                Severe decrease  G5                    14 or less           Kidney failure          (1)In the absence of evidence of kidney disease, neither GFR category G1 or G2 fulfill the criteria for CKD.    eGFR calculation 2021 CKD-EPI creatinine equation, which does not include race as a factor   TROPONIN - Abnormal; Notable for the following components:    HS Troponin T 62 (*)      All other components within normal limits    Narrative:     High Sensitive Troponin T Reference Range:  <14.0 ng/L- Negative Female for AMI  <22.0 ng/L- Negative Male for AMI  >=14 - Abnormal Female indicating possible myocardial injury.  >=22 - Abnormal Male indicating possible myocardial injury.   Clinicians would have to utilize clinical acumen, EKG, Troponin, and serial changes to determine if it is an Acute Myocardial Infarction or myocardial injury due to an underlying chronic condition.        RESPIRATORY PANEL PCR W/ COVID-19 (SARS-COV-2), NP SWAB IN UTM/VTP, 2 HR TAT - Normal    Narrative:     In the setting of a positive respiratory panel with a viral infection PLUS a negative procalcitonin without other underlying concern for bacterial infection, consider observing off antibiotics or discontinuation of antibiotics and continue supportive care. If the respiratory panel is positive for atypical bacterial infection (Bordetella pertussis, Chlamydophila pneumoniae, or Mycoplasma pneumoniae), consider antibiotic de-escalation to target atypical bacterial infection.   PROTIME-INR - Normal   LIPASE - Normal   LACTIC ACID, PLASMA - Normal   SALICYLATE LEVEL - Normal   ACETAMINOPHEN LEVEL - Normal   MAGNESIUM - Normal   FENTANYL, URINE - Normal    Narrative:     Negative Threshold:      Fentanyl 5 ng/mL     The normal value for the drug tested is negative. This report includes final unconfirmed screening results to be used for medical treatment purposes only. Unconfirmed results must not be used for non-medical purposes such as employment or legal testing. Clinical consideration should be applied to any drug of abuse test, particularly when unconfirmed results are used.          BLOOD CULTURE   BLOOD CULTURE   RAINBOW DRAW    Narrative:     The following orders were created for panel order [place] Draw.  Procedure                               Abnormality         Status                     ---------                                -----------         ------                     Green Top (Gel)[[id number]]                                  Final result               Lavender Top[[id number]]                                     Final result               Red Top[[id number]]                                          Final result               Brand Top[[id number]]                                         Final result               Light Blue Top[[id number]]                                   Final result                 Please view results for these tests on the individual orders.   BLOOD GAS, ARTERIAL   ETHANOL    Narrative:     Not for legal purposes. Chain of Custody not followed.    BLOOD GAS, ARTERIAL   GREEN TOP   LAVENDER TOP   RED TOP   GRAY TOP   LIGHT BLUE TOP   CBC AND DIFFERENTIAL    Narrative:     The following orders were created for panel order CBC & Differential.  Procedure                               Abnormality         Status                     ---------                               -----------         ------                     CBC Auto Differential[[id number]]        Abnormal            Final result                 Please view results for these tests on the individual orders.       Meds given in ED:   Medications   sodium chloride 0.9 % flush 10 mL (has no administration in time range)   lactulose solution 30 g (30 g Oral Not Given 2/22/25 0534)   Naloxone HCl (NARCAN) injection 2 mg (2 mg Intravenous Given 2/22/25 0000)   sodium chloride 0.9 % bolus 1,000 mL (0 mL Intravenous Stopped 2/22/25 0151)   insulin regular (humuLIN R,novoLIN R) injection 10 Units (10 Units Intravenous Given 2/22/25 0110)   dextrose (D50W) (25 g/50 mL) IV injection 25 g (25 g Intravenous Given 2/22/25 0109)   furosemide (LASIX) injection 80 mg (80 mg Intravenous Given 2/22/25 0109)   calcium gluconate 1000 Mg/50ml 0.675% NaCl IV SOLN (0 mg Intravenous Stopped 2/22/25 0123)   sodium chloride 0.9 % bolus 1,000 mL (1,000 mL Intravenous New Bag  2/22/25 1502)           NIH Stroke Scale:       Isolation/Infection(s):  No active isolations   Influenza     COVID Testing  Collected .  Resulted .    Nursing report ED to floor:  Mental status: A/O x 1 (can tell us her name, but not able to stay alert enough to answer any other questions.)  Ambulatory status: Bed Rest  Precautions: Skin, Fall,     ED nurse phone [phone]

## 2025-02-22 NOTE — PLAN OF CARE
Goal Outcome Evaluation:  Plan of Care Reviewed With: caregiver, patient        Progress: no change (eval)  Outcome Evaluation: ST clinical bedside swallow evaluation completed. Pt admitted for acute narcotic overdose. Hx of COPD, fibromyalgia, scoliosis. Pt is alert and oriented x4 and follows commands without difficulty. Pt is edentulous and does typically wear upper and lower dentures but are not currently available. Full range of consistencies presented except mech soft. Pt demo with timely swallow without difficulties. Poor rotary chew of regular solids. No overt s/s of aspiration noted with PO trials. ST cannot fully r/o silent aspiration.     Pt okay to begin mech soft diet with thin liquids d/t edentulous state. Ok to upgrade to solids if dentures are present or pt request. Meds whole with thin liquids. RN to continue to monitor for any increased lung congestion. General aspiration precautions with PO. ST to sign off at this time. MD to reconsult if change or new concern.    Anticipated Discharge Disposition (SLP): unknown          SLP Swallowing Diagnosis: swallow WFL/no suspected pharyngeal impairment (02/22/25 1023)

## 2025-02-22 NOTE — THERAPY DISCHARGE NOTE
Acute Care - Speech Language Pathology   Swallow Initial Evaluation/Discharge [facility]     Patient Name: Ashley Gibbs  : 1955  MRN: [id number]  Today's Date: 2025               Admit Date: 2025   clinical bedside swallow evaluation completed. Pt admitted for acute narcotic overdose. Hx of COPD, fibromyalgia, scoliosis. Pt is alert and oriented x4 and follows commands without difficulty. Pt is edentulous and does typically wear upper and lower dentures but are not currently available. Full range of consistencies presented except mech soft. Pt demo with timely swallow without difficulties. Poor rotary chew of regular solids. No overt s/s of aspiration noted with PO trials. ST cannot fully r/o silent aspiration.     Pt okay to begin mech soft diet with thin liquids d/t edentulous state. Ok to upgrade to solids if dentures are present or pt request. Meds whole with thin liquids. RN to continue to monitor for any increased lung congestion. General aspiration precautions with PO. ST to sign off at this time. MD to reconsult if change or new concern.    Rosibel Guajardo M.S. CCC-SLP, [facility]  2025  10:55 CST    Visit Dx:    ICD-10-CM ICD-9-CM   1. Altered mental status, unspecified altered mental status type  R41.82 780.97   2. Elevated troponin  R79.89 790.6   3. Hyperkalemia  E87.5 276.7   4. Hyperammonemia  E72.20 270.6   5. Dysphagia, unspecified type  R13.10 787.20     Patient Active Problem List   Diagnosis    Acute respiratory failure with hypoxemia    Age-related osteoporosis without current pathological fracture    Anxiety    Stage 3 severe COPD by GOLD classification    Chronic pain disorder    Depression    Excessive daytime sleepiness    Hemoptysis    Family history of renal cell carcinoma    Hyperlipidemia    Hypothyroidism    Migraines    Mild episode of recurrent major depressive disorder    Neuropathy    Obstructive sleep apnea    Primary insomnia    Renal mass    Restless legs syndrome     On supplemental oxygen therapy    Family history of CHF (congestive heart failure)    Family history of diabetes mellitus    6 cm exophytic left upper pole renal cyst, likely hemorrhagic    Acute bilateral pyelonephritis    Hypokalemia    E. coli UTI, present on admission (urinary tract infection)    Paroxysmal supraventricular tachycardia    Sepsis due to Escherichia coli UTI, bilateral pyelonephritis, present on admission    Vitamin D deficiency    Lung mass    Former smoker    History of radiation therapy    Panic attacks    Small cell lung cancer    Chronic bronchitis    Acute respiratory failure    Influenza A    Leukopenia due to chemotherapy    Thrombocytopenia    Altered mental status    Altered mental status, unspecified altered mental status type    Narcotic overdose     Past Medical History:   Diagnosis Date    Anxiety     Arthritis     Asthma     Back pain     COPD (chronic obstructive pulmonary disease)     Dependence on supplemental oxygen     Depression     Disease of thyroid gland     Fibromyalgia, primary     Headache     History of degenerative disc disease     Hypertension     Hypothyroidism     Low back pain     Restless leg     Scoliosis      Past Surgical History:   Procedure Laterality Date    APPENDECTOMY      BREAST BIOPSY Left     BRONCHOSCOPY Right 12/23/2024    Procedure: BRONCHOSCOPY WITH ENDOBRONCHIAL ULTRASOUND;  Surgeon: Peterson Morales MD;  Location: [facility];  Service: Pulmonary;  Laterality: Right;  pre: lung mass  post: lung mass    CHOLECYSTECTOMY      TUBAL ABDOMINAL LIGATION         SLP Recommendation and Plan  SLP Swallowing Diagnosis: swallow WFL/no suspected pharyngeal impairment (02/22/25 1023)  SLP Diet Recommendation: soft to chew textures, ground, thin liquids (02/22/25 1023)     Monitor for Signs of Aspiration: yes, notify SLP if any concerns (02/22/25 1023)     Swallow Criteria for Skilled Therapeutic Interventions Met: no problems identified which require  skilled intervention, baseline status (02/22/25 1023)  Anticipated Discharge Disposition (SLP): unknown (02/22/25 1053)     Therapy Frequency (Swallow): evaluation only (02/22/25 1023)              Anticipated Discharge Disposition (SLP): unknown (02/22/25 1053)           Reason for Discharge: other (see comments) (eval only) (02/22/25 1053)                Progress: no change (eval) (02/22/25 1051)  Outcome Evaluation: ST clinical bedside swallow evaluation completed. Pt admitted for acute narcotic overdose. Hx of COPD, fibromyalgia, scoliosis. Pt is alert and oriented x4 and follows commands without difficulty. Pt is edentulous and does typically wear upper and lower dentures but are not currently available. Full range of consistencies presented except mech soft. Pt demo with timely swallow without difficulties. Poor rotary chew of regular solids. No overt s/s of aspiration noted with PO trials. ST cannot fully r/o silent aspiration. Pt okay to begin mech soft diet with thin liquids. Meds whole with thin liquids. RN to continue to monitor for any increased lung congestion. General aspiration precautions with PO. ST to sign off at this time. MD to reconsult if change or new concern. (02/22/25 1051)    SWALLOW EVALUATION (Last 72 Hours)       SLP Adult Swallow Evaluation       Row Name 02/22/25 1023                   Rehab Evaluation    Document Type evaluation  -BN        Subjective Information complains of;pain  -BN        Patient Observations alert;cooperative  -BN        Patient/Family/Caregiver Comments/Observations no family present  -BN        Patient Effort good  -BN           General Information    Patient Profile Reviewed yes  -BN        Pertinent History Of Current Problem acute narcotic overdose. Hx of COPD, fibromyalgia, scoliosis.  -BN        Current Method of Nutrition regular textures;thin liquids  -BN        Precautions/Limitations, Vision WFL;for purposes of eval  -BN        Precautions/Limitations,  Hearing WFL;for purposes of eval  -BN        Prior Level of Function-Communication WFL  -BN        Prior Level of Function-Swallowing no diet consistency restrictions  -BN        Plans/Goals Discussed with patient  -BN        Barriers to Rehab none identified  -BN        Patient's Goals for Discharge patient did not state  -BN           Pain    Pretreatment Pain Rating 8/10  -BN        Posttreatment Pain Rating 8/10  -BN        Pain Location back  -BN        Pre/Posttreatment Pain Comment repositioned and appeared comfortable with no complaints  -BN           Oral Motor Structure and Function    Dentition Assessment edentulous, dentures not available  -BN        Secretion Management WNL/WFL  -BN        Mucosal Quality moist, healthy  -BN           Oral Musculature and Cranial Nerve Assessment    Oral Motor General Assessment WFL  -BN           General Eating/Swallowing Observations    Respiratory Support Currently in Use nasal cannula  -BN        Eating/Swallowing Skills fed by SLP  -BN        Positioning During Eating upright in bed  -BN        Utensils Used spoon;straw  -BN        Consistencies Trialed regular textures;pudding thick;honey-thick liquids;nectar/syrup-thick liquids;thin liquids  -BN           Clinical Swallow Eval    Oral Prep Phase impaired  -BN        Oral Transit WFL  -BN        Oral Residue WFL  -BN        Pharyngeal Phase WFL  -BN        Esophageal Phase unremarkable  -BN        Clinical Swallow Evaluation Summary  clinical bedside swallow evaluation completed. Pt admitted for acute narcotic overdose. Hx of COPD, fibromyalgia, scoliosis. Pt is alert and oriented x4 and follows commands without difficulty. Pt is edentulous and does typically wear upper and lower dentures but are not currently available. Full range of consistencies presented except mech soft. Pt demo with timely swallow without difficulties. Poor rotary chew of regular solids. No overt s/s of aspiration noted with PO trials. ST  cannot fully r/o silent aspiration. Pt okay to begin [facility] soft diet with thin liquids. Meds whole with thin liquids. RN to continue to monitor for any increased lung congestion. General aspiration precautions with PO. ST to sign off at this time. MD to reconsult if change or new concern.  -BN           Oral Prep Concerns    Oral Prep Concerns poor rotary chew  -BN        Poor Rotary Chew regular consistencies  -BN           SLP Evaluation Clinical Impression    SLP Swallowing Diagnosis swallow WFL/no suspected pharyngeal impairment  -BN        Swallow Criteria for Skilled Therapeutic Interventions Met no problems identified which require skilled intervention;baseline status  -BN           SLP Treatment Clinical Impressions    Care Plan Review evaluation/treatment results reviewed;care plan/treatment goals reviewed;risks/benefits reviewed;current/potential barriers reviewed;patient/other agree to care plan  -BN        Care Plan Review, Other Participant(s) caregiver  -BN           Recommendations    Therapy Frequency (Swallow) evaluation only  -BN        SLP Diet Recommendation soft to chew textures;ground;thin liquids  -BN        Recommended Precautions and Strategies upright posture during/after eating;small bites of food and sips of liquid;general aspiration precautions  -BN        Oral Care Recommendations Oral Care BID/PRN  -BN        SLP Rec. for Method of Medication Administration meds whole  -BN        Monitor for Signs of Aspiration yes;notify SLP if any concerns  -BN        Anticipated Discharge Disposition (SLP) unknown  -BN                  User Key  (r) = Recorded By, (t) = Taken By, (c) = Cosigned By      Initials Name Effective Dates    Rosibel Mullins MS-CCC/SLP, [facility] 07/11/23 -                     EDUCATION  The patient has been educated in the following areas:   Dysphagia (Swallowing Impairment).                   Time Calculation:    Time Calculation- SLP       Row Name 02/22/25 1118              Time Calculation- SLP    SLP Start Time 1023  -      SLP Stop Time 1054  -      SLP Time Calculation (min) 31 min  -BN      SLP Received On 02/22/25  -         Untimed Charges    [id number] Eval Oral Pharyng Swallow Minutes 31  -BN         Total Minutes    Untimed Charges Total Minutes 31  -BN       Total Minutes 31  -BN                User Key  (r) = Recorded By, (t) = Taken By, (c) = Cosigned By      Initials Name Provider Type    Rosibel Mullins MS-CCC/SLP, [facility] Speech and Language Pathologist                    Therapy Charges for Today       Code Description Service Date Service Provider Modifiers Qty    [id number] HC ST EVAL ORAL PHARYNG SWALLOW 2 2/22/2025 Rosibel Guajardo MS-CCC/SLP, [facility] GN 1                 SLP Discharge Summary  Anticipated Discharge Disposition (SLP): unknown  Reason for Discharge: other (see comments) (eval only)  Progress Toward Achieving Short/long Term Goals: other (see comments) (eval only)  Discharge Destination: other (see comments) (still in acute care)    ERAN Hobson/SLP, [facility]  2/22/2025

## 2025-02-22 NOTE — PLAN OF CARE
Goal Outcome Evaluation:      Pt admitted from ER this AM. Pt A&Ox3; disoriented to situation only. Arouses spontaneously. ST 110s, BP stable. On 4L n/c, tolerating well. Voids spontaneously, external cath in place. LBM 2/21. No c/o at this time. Safety maintained.

## 2025-02-22 NOTE — H&P
[facility] Intensivist Services    Date of Admission: 2/21/2025  Date of Note: 02/22/25  Primary Care Physician: Padmaja Bermudez APRN   LOS: 0 days     History   Next of Kin:  Primary Emergency Contact: SELAM SANCHEZ, Darnell Phone: [phone]   Code Status: Full medical management, no CPR, no intubation.  Patient would like to do palliative chemo but would consider doing hospice.  Her granddaughter had another family member die on hospice and it was very quick so hospice has a negative connotation.  Palliative care or comfort care terminology is much more readily received.    She is a 69 y.o. 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  Extensive small cell cancer by definition.  She has chronic back pain.  Family thinks she took too many of her oxycodone because she forgot that she had taken them already.      She is on long-term oxygen therapy 2 L.      Her granddaughter is the power of .    She also has a past medical history of Anxiety, Arthritis, Asthma, Back pain, COPD (chronic obstructive pulmonary disease), Dependence on supplemental oxygen, Depression, Disease of thyroid gland, Fibromyalgia, primary, Headache, History of degenerative disc disease, Hypertension, Hypothyroidism, Low back pain, Restless leg, and Scoliosis.    Patient began struggling to breathe.  9 1 1 was called.  Narcan was given.  Patient improved.  In the ER hospitalist team recommended ICU evaluation.     The ICU team was asked to evaluate the patient for altered mental status secondary to narcotic overdose in the setting of diffuse cancer versus other, she apparently responded to Narcan in the ambulance:  Hospitalist service requested patient be admitted to the ICU due to concern about degradation.    Medication review shows the patient takes Ventolin, Breztri was recently discontinued in November and she was started on Trelegy recently.      For depression she takes citalopram 40 mg daily, mirtazapine 15 mg daily, has a prescription for diazepam 5 mg.    For chronic pain patient has gabapentin 600 mg twice daily and she has switched from hydrocodone 10 mg 4 times a day sometime in January over 2 oxycodone which she just filled 120 tablets on 2/17/2025.  She has also been prescribed tizanidine 2 mg twice daily but she last filled that December 28 for only 30 days.    Patient has obstructive sleep apnea and has been prescribed modafinil 100 mg.  She uses her CPAP machine.  She also appears to have restless leg syndrome and is on ropinirole 0.25 mg daily.    She is hypothyroid on 112 mcg of levothyroxine.    PET SCAN 12/2024:   1. Large irregular hypermetabolic mass in the right upper lobe suprahilar region with probable invasion of the middle mediastinum and  right hilum and/or conglomerate hypermetabolic lymphadenopathy. This is  worrisome for small cell carcinoma.  2. Right upper lobe solid nodule is also hypermetabolic and consistent  with neoplasm likely pulmonary metastasis.    2/22: Patient is wide-awake this morning.  Answering questions appropriately.  Has a cough which she says has been going on for 2 to 3 days.    Past Medical History     Active and Resolved Problems  Active Hospital Problems    Diagnosis  POA    **Altered mental status [R41.82]  Yes    Altered mental status, unspecified altered mental status type [R41.82]  Yes      Resolved Hospital Problems   No resolved problems to display.       Past Medical History:   Past Medical History:   Diagnosis Date    Anxiety     Arthritis     Asthma     Back pain     COPD (chronic obstructive pulmonary disease)     Dependence on supplemental oxygen      Depression     Disease of thyroid gland     Fibromyalgia, primary     Headache     History of degenerative disc disease     Hypertension     Hypothyroidism     Low back pain     Restless leg     Scoliosis        Prior Surgeries: She  has a past surgical history that includes Cholecystectomy; Tubal ligation; Appendectomy; Breast biopsy (Left); and Bronchoscopy (Right, 12/23/2024).    Past Surgical History:   Past Surgical History:   Procedure Laterality Date    APPENDECTOMY      BREAST BIOPSY Left     BRONCHOSCOPY Right 12/23/2024    Procedure: BRONCHOSCOPY WITH ENDOBRONCHIAL ULTRASOUND;  Surgeon: Peterson Morales MD;  Location: [facility];  Service: Pulmonary;  Laterality: Right;  pre: lung mass  post: lung mass    CHOLECYSTECTOMY      TUBAL ABDOMINAL LIGATION         Social and Family History     Family History:  family history includes Arthritis in her mother; COPD in her father; Diabetes in her father; Heart disease in her father; Hypertension in her mother.    Tobacco/Social History:  reports that she quit smoking about 16 years ago. Her smoking use included cigarettes. She started smoking about 56 years ago. She has a 40 pack-year smoking history. She has been exposed to tobacco smoke. She has never used smokeless tobacco. She reports that she does not drink alcohol and does not use drugs.    Allergies     Allergies:   She is allergic to codeine.    Labs   Basic Labs:  CBC:      Lab 02/21/25  2303 02/17/25  1303   WBC 14.82* 10.39   HEMOGLOBIN 11.3* 11.3*   HEMATOCRIT 36.4 34.9*   PLATELETS 480* 330   NEUTROS ABS 12.32* 7.97*   IMMATURE GRANS (ABS) 0.18*  --    LYMPHS ABS 1.17 1.42   MONOS ABS 1.03* 0.70   EOS ABS 0.01 0.00   MCV 94.5 92.8       LIVER FUNCTION AND COAG TESTS:      Lab 02/21/25  2303 02/17/25  1303   TOTAL PROTEIN 7.4 6.7   ALBUMIN 3.5 3.5   GLOBULIN 3.9  --    ALT (SGPT) 17 17   AST (SGOT) 18 17   BILIRUBIN 0.3 0.3   ALK PHOS 141* 119*   LIPASE 14  --    PROTIME 13.1  --    APTT 38.6*  --     INR 0.95  --        ABG:      Lab 02/22/25  0249 02/22/25  0158 02/22/25  0028 02/21/25 2303 02/17/25  1303   PH, ARTERIAL  --  7.372 7.378  --   --    PO2 ART  --  87.6 79.9*  --   --    PCO2, ARTERIAL  --  66.5* 68.8*  --   --    HCO3 ART  --  38.6* 40.5*  --   --    ANION GAP 17.0*  --   --  7.0 9       LACTATE:      Lab 02/21/25  2303   LACTATE 1.1       CMP:      Lab 02/22/25  0249 02/22/25  0158 02/22/25 0028 02/21/25 2303 02/17/25  1303   SODIUM 138  --   --  140 144   SODIUM, ARTERIAL  --  139 139  --   --    POTASSIUM 5.3*  --   --  6.0* 3.1*   CHLORIDE 93*  --   --  97* 93*   BUN 8  --   --  9 8   CREATININE 0.65  --   --  0.71 0.5   CALCIUM 9.3  --   --  9.3 9.3   MAGNESIUM  --   --   --  2.0  --    GLUCOSE 68  --   --  126* 119*   EGFR 96.6  --   --  93.3  --        Diabetic:      Inpatient Medications   Scheduled Meds:lactulose, 30 g, Oral, Once      Continuous Infusions:naloxone infusion, 0.4 mg/hr      PRN Meds:.  [COMPLETED] Insert Peripheral IV **AND** sodium chloride    I have reviewed the patient's current medications.   Outpatient Medications     Current Outpatient Medications   Medication Instructions    acetaminophen (TYLENOL 8 HOUR) 650 mg, Oral, Every 8 Hours PRN    cholecalciferol (VITAMIN D3) 1,000 Units, Daily    citalopram (CELEXA) 40 mg, Oral, Daily    diazePAM (VALIUM) 5 mg, Oral, Every 12 Hours PRN    gabapentin (NEURONTIN) 600 mg, Oral, Every 12 Hours Scheduled    ipratropium-albuterol (DUO-NEB) 0.5-2.5 mg/3 ml nebulizer 3 mL, Nebulization, Every 4 Hours PRN    levothyroxine (SYNTHROID, LEVOTHROID) 112 mcg, Oral, Daily    mirtazapine (REMERON) 15 mg, Oral, Every Night at Bedtime    ondansetron (ZOFRAN) 4 mg, Oral, Every 8 Hours PRN    potassium chloride 10 MEQ CR tablet 10 mEq, Oral, 2 Times Daily    rizatriptan (MAXALT) 10 MG tablet Take 1 tablet by mouth 1 (One) Time As Needed for Migraine.    rOPINIRole (REQUIP) 0.25 mg, Oral, Every Night at Bedtime    tiZANidine (ZANAFLEX) 2  "mg, 2 Times Daily PRN    tobramycin 0.3 % solution ophthalmic solution 2 drops, Both Eyes, Every 4 Hours While Awake    Ventolin  (90 Base) MCG/ACT inhaler 2 puffs, Inhalation, Every 4 Hours PRN    vitamin D (ERGOCALCIFEROL) 50,000 Units, Oral, Weekly       Current Antibiotics   This patient does not have an active medication from one of the medication groupers.    Exam   Vent settings for last 24 hours:       Vital signs for last 24 hours:  Temp:  [99.1 °F (37.3 °C)] 99.1 °F (37.3 °C)  Heart Rate:  [110-142] 110  Resp:  [25-33] 26  BP: ()/(52-96) 103/62    Vitals: Her  height is 165.1 cm (65\") and weight is 69.9 kg (154 lb). Her oral temperature is 99.1 °F (37.3 °C). Her blood pressure is 103/62 and her pulse is 110. Her respiration is 26 and oxygen saturation is 91%.     PHYSICAL FINDINGS: SEE VITALS ABOVE  GENERAL APPEARANCE: ° Awake. ° Alert. ° Oriented to time, place, and person. ° Well developed. ° Well nourished. ° In no acute distress. ° Not ill-appearing. ° No jaundice.  HEAD: Injuries: No evidence of a head injury. ° Appearance: Head normocephalic.  NECK: No masses ° Thyroid: ° Not diffusely enlarged.  EYES: General/bilateral: Extraocular Movements: ° Normal. Pupils: ° PERRLA. Sclera: ° Showed no icterus.  THROAT: No stridor auscultated/heard.  EARS: Hearing: ° No hearing loss noted.  NOSE: General/bilateral: External Deformities: ° No external nose deformities.  LYMPH NODES: No cervical or generalized adenopathy.  CHEST: ° Visual inspection revealed no abnormalities.  LUNGS: ° CRACKLES--cough productive of sputum.  CARDIOVASCULAR: JVD not increased. Heart Rate And Rhythm: Normal. ° Heart Sounds: No S3/ S4 heard. ° Murmurs: No murmurs were heard. ° Edema: No edema noted.  BACK: No obvious deformity noted.  ABDOMEN: Visual Inspection: Abdomen was not distended. Auscultation: ° Abdominal auscultation revealed no abnormalities. ° Bowel sounds were normal. ° Palpation: ° Abdomen was soft. ° No " abdominal tenderness. ° No mass was palpated in the abdomen. ° Soft. °Liver: Not visibly enlarged. Spleen: Not visibly enlarged.  MUSCULOSKELETAL SYSTEM : General/bilateral: ° Normal movement of all extremities.  FEET/EXTREMITIES: General/bilateral: ° No visible swelling of the feet.  NEUROLOGICAL: Nonfocal. °  SKIN: ° General appearance was normal. ° No new rashes noted.      Intake/Output   Intake/Output this shift:  I/O this shift:  In: 1050 [IV Piggyback:1050]  Out: 1000 [Urine:1000]    Intake/Output last 3 shifts:  No intake/output data recorded.    Results and Cultures Review     Result Review:  I have personally reviewed the results from the time of this admission to 2/22/2025 06:03 CST and agree with these findings:  [x]  Laboratory list / accordion  [x]  Microbiology  [x]  Radiology  [x]  EKG/Telemetry   [x]  Cardiology/Vascular   [x]  Pathology  [x]  Old records  []  Other:  Most notable findings include:   Viral panel negative.  Imaging reviewed.        Assessment/Plan       Acute hypoxemic respiratory failure: Likely multifactorial, patient has COPD diagnosis without PFTs and obstructive sleep apnea and uses CPAP or patient.  She does have imaging in 2013 consistent with bronchitis and I suspect chronic bronchitis could contribute to her chronic respiratory distress.  She might benefit from long-term azithromycin therapy on discharge.  Has a productive sputum which has gotten worse last 2 to 3 days.  Treat for acute exacerbation of COPD. COPD: DuoNebs and budesonide ordered.  Holding patient's Trelegy.  Start steroids.    Extensive small cell cancer: Defer to oncology. According to the [employer] [place] (NCI), the average survival rate for extensive SCLC without treatment is 2-4 months after diagnosis. With treatment, extensive SCLC typically has a median survival rate of 7-11 months.  Patient is currently at 3 months.  Given that she is likely to live less than 8 months even with aggressive  "treatment I am going to involve palliative care.      Obstructive sleep apnea: Complicated by hypersomnia currently on modafinil at home.  She uses CPAP.  Continue same.    Chronic pain: Patient needs palliative care consultation due to extensive small cell cancer.  Started fentanyl patch today.  Cording to the granddaughter the patient probably takes too many of her pills because she forgets that she took them.  Patient also has pain that comes and goes waxes and wanes.  That confuses the patient.  Hopefully fentanyl will provide her with more appropriate chronic pain care in the setting of terminal cancer.    Depression: Restart patient's home medications.  Hypothyroidism: Restart home medications.  VTE prophylaxis: Lovenox.  CODE STATUS: No CPR, no intubation but full medical management.  Noninvasive ventilation okay.  Palliative care consult.  Extensive small cell cancer dx December.  Family thinks \"she is going to be okay if she does treatment\" for the patient but per the granddaughter they understand this is terminal.  Patient does not want intubation, does not want CPR due to location of tumor.  Wants to continue with palliative chemo and radiation because she wants to make it to a graduation in May.  Granddaughter is supposedly next of kin, we need the power of  paperwork.  Granddaughter was accidentally listed as the daughter in the chart.  We spoke this morning.    Total critical care time: 110 minutes    Due to a high probability of clinically significant, life threatening deterioration, the patient required my highest level of preparedness to intervene emergently and I personally spent this critical care time directly and personally managing the patient.     This critical care time included obtaining a history, examining the patient, pulse oximetry and vital sign review, ordering and review of studies, arranging urgent treatment with development of a management plan with the multidisciplinary " team, evaluation of patient's response to treatment, frequent reassessment, and discussions with other providers.    This critical care time was performed to assess and manage the high probability of imminent, life-threatening deterioration that could result in multi-organ failure. It was exclusive of separately billable procedures and treating other patients and teaching time.    Please see the rest of the note for further information on patient assessment and treatment.    Part of this note may be an electronic transcription/translation of spoken language to printed text using the Dragon Dictation System    Bernardo Rivera MD  2/22/2025 at 06:03 CST  Pulmonary, Critical Care  [facility]  Please call with any questions  (Cell [phone])

## 2025-02-22 NOTE — ED PROVIDER NOTES
Subjective   History of Present Illness  Pt presents to the ED with report of AMS - initially reported as possible drug overdose.  Pt was found by family with AMS today.  They were unsure if she possibly forgot she had taken her medications and took an extra dose.  Family states that patient has been having periods of confusion/forgetfullness.   EMS reportedly gave patient narcan and her mentation improved.  At this time - pt is unresponsive.  No known injuries.  Pt was recently hospitalized d/t flu/resp failure.  Has hx of lung CA.  No hx of brain mets.          Review of Systems   Reason unable to perform ROS: AMS/unresponsive.       Past Medical History:   Diagnosis Date    Anxiety     Arthritis     Asthma     Back pain     COPD (chronic obstructive pulmonary disease)     Dependence on supplemental oxygen     Depression     Disease of thyroid gland     Fibromyalgia, primary     Headache     History of degenerative disc disease     Hypertension     Hypothyroidism     Low back pain     Restless leg     Scoliosis        Allergies   Allergen Reactions    Codeine Shortness Of Breath       Past Surgical History:   Procedure Laterality Date    APPENDECTOMY      BREAST BIOPSY Left     BRONCHOSCOPY Right 2024    Procedure: BRONCHOSCOPY WITH ENDOBRONCHIAL ULTRASOUND;  Surgeon: Peterson Morales MD;  Location: [facility] OR;  Service: Pulmonary;  Laterality: Right;  pre: lung mass  post: lung mass    CHOLECYSTECTOMY      TUBAL ABDOMINAL LIGATION         Family History   Problem Relation Age of Onset    Arthritis Mother     Hypertension Mother     COPD Father     Diabetes Father     Heart disease Father        Social History     Socioeconomic History    Marital status:    Tobacco Use    Smoking status: Former     Current packs/day: 0.00     Average packs/day: 1 pack/day for 40.0 years (40.0 ttl pk-yrs)     Types: Cigarettes     Start date: 1968     Quit date: 2008     Years since quittin.8      Passive exposure: Past    Smokeless tobacco: Never   Vaping Use    Vaping status: Every Day    Substances: Nicotine    Devices: Pre-filled pod    Passive vaping exposure: Yes   Substance and Sexual Activity    Alcohol use: Never    Drug use: Never    Sexual activity: Not Currently     Partners: Male           Objective   Physical Exam  Vitals and nursing note reviewed.   Constitutional:       General: She is in acute distress.      Appearance: She is ill-appearing.   HENT:      Head: Normocephalic and atraumatic.      Nose: Nose normal.      Mouth/Throat:      Mouth: Mucous membranes are dry.      Comments: Absent gag reflex  Eyes:      Comments: Pupils 6mm bilat -reactive   Cardiovascular:      Rate and Rhythm: Regular rhythm. Tachycardia present.      Pulses: Normal pulses.      Heart sounds: Normal heart sounds.   Pulmonary:      Breath sounds: Rhonchi present.      Comments: Pt with tachypnea/belly breathing.    Abdominal:      General: Abdomen is flat.      Palpations: Abdomen is soft.   Musculoskeletal:         General: No signs of injury.      Cervical back: Normal range of motion and neck supple.   Skin:     General: Skin is warm and dry.      Capillary Refill: Capillary refill takes less than 2 seconds.   Neurological:      Comments: Pt unresponsive         Procedures           ED Course      Labs Reviewed   COMPREHENSIVE METABOLIC PANEL - Abnormal; Notable for the following components:       Result Value    Glucose 126 (*)     Potassium 6.0 (*)     Chloride 97 (*)     CO2 36.0 (*)     Alkaline Phosphatase 141 (*)     All other components within normal limits    Narrative:     GFR Categories in Chronic Kidney Disease (CKD)      GFR Category          GFR (mL/min/1.73)    Interpretation  G1                     90 or greater         Normal or high (1)  G2                      60-89                Mild decrease (1)  G3a                   45-59                Mild to moderate decrease  G3b                    30-44                Moderate to severe decrease  G4                    15-29                Severe decrease  G5                    14 or less           Kidney failure          (1)In the absence of evidence of kidney disease, neither GFR category G1 or G2 fulfill the criteria for CKD.    eGFR calculation 2021 CKD-EPI creatinine equation, which does not include race as a factor   APTT - Abnormal; Notable for the following components:    PTT 38.6 (*)     All other components within normal limits    Narrative:     PTT = The equivalent PTT values for the therapeutic range of heparin levels at 0.3 to 0.7 U/ml are 77 - 99 seconds.   URINALYSIS W/ CULTURE IF INDICATED - Abnormal; Notable for the following components:    Glucose,  mg/dL (Trace) (*)     All other components within normal limits    Narrative:     In absence of clinical symptoms, the presence of pyuria, bacteria, and/or nitrites on the urinalysis result does not correlate with infection.  Urine microscopic not indicated.   TROPONIN - Abnormal; Notable for the following components:    HS Troponin T 46 (*)     All other components within normal limits    Narrative:     High Sensitive Troponin T Reference Range:  <14.0 ng/L- Negative Female for AMI  <22.0 ng/L- Negative Male for AMI  >=14 - Abnormal Female indicating possible myocardial injury.  >=22 - Abnormal Male indicating possible myocardial injury.   Clinicians would have to utilize clinical acumen, EKG, Troponin, and serial changes to determine if it is an Acute Myocardial Infarction or myocardial injury due to an underlying chronic condition.        AMMONIA - Abnormal; Notable for the following components:    Ammonia 64 (*)     All other components within normal limits   CBC WITH AUTO DIFFERENTIAL - Abnormal; Notable for the following components:    WBC 14.82 (*)     Hemoglobin 11.3 (*)     MCHC 31.0 (*)     Platelets 480 (*)     Neutrophil % 83.1 (*)     Lymphocyte % 7.9 (*)     Eosinophil % 0.1  (*)     Immature Grans % 1.2 (*)     Neutrophils, Absolute 12.32 (*)     Monocytes, Absolute 1.03 (*)     Immature Grans, Absolute 0.18 (*)     All other components within normal limits   HIGH SENSITIVITIY TROPONIN T 1HR - Abnormal; Notable for the following components:    HS Troponin T 55 (*)     Troponin T % Delta 20 (*)     All other components within normal limits    Narrative:     High Sensitive Troponin T Reference Range:  <14.0 ng/L- Negative Female for AMI  <22.0 ng/L- Negative Male for AMI  >=14 - Abnormal Female indicating possible myocardial injury.  >=22 - Abnormal Male indicating possible myocardial injury.   Clinicians would have to utilize clinical acumen, EKG, Troponin, and serial changes to determine if it is an Acute Myocardial Infarction or myocardial injury due to an underlying chronic condition.        BLOOD GAS, ARTERIAL W/CO-OXIMETRY - Abnormal; Notable for the following components:    pCO2, Arterial 68.8 (*)     pO2, Arterial 79.9 (*)     HCO3, Arterial 40.5 (*)     Base Excess, Arterial 12.8 (*)     Hemoglobin, Blood Gas 10.9 (*)     Hematocrit, Blood Gas 33.4 (*)     pCO2, Temperature Corrected 68.8 (*)     pO2, Temperature Corrected 79.9 (*)     All other components within normal limits   BLOOD GAS, ARTERIAL W/CO-OXIMETRY - Abnormal; Notable for the following components:    pCO2, Arterial 66.5 (*)     HCO3, Arterial 38.6 (*)     Base Excess, Arterial 11.1 (*)     Hemoglobin, Blood Gas 11.2 (*)     Hematocrit, Blood Gas 34.4 (*)     pCO2, Temperature Corrected 66.5 (*)     All other components within normal limits   RESPIRATORY PANEL PCR W/ COVID-19 (SARS-COV-2), NP SWAB IN [facility], 2 HR TAT - Normal    Narrative:     In the setting of a positive respiratory panel with a viral infection PLUS a negative procalcitonin without other underlying concern for bacterial infection, consider observing off antibiotics or discontinuation of antibiotics and continue supportive care. If the respiratory  panel is positive for atypical bacterial infection (Bordetella pertussis, Chlamydophila pneumoniae, or Mycoplasma pneumoniae), consider antibiotic de-escalation to target atypical bacterial infection.   PROTIME-INR - Normal   LIPASE - Normal   LACTIC ACID, PLASMA - Normal   SALICYLATE LEVEL - Normal   ACETAMINOPHEN LEVEL - Normal   MAGNESIUM - Normal   BLOOD CULTURE   BLOOD CULTURE   RAINBOW DRAW    Narrative:     The following orders were created for panel order [place] Draw.  Procedure                               Abnormality         Status                     ---------                               -----------         ------                     Green Top (Gel)[[id number]]                                  Final result               Lavender Top[[id number]]                                     Final result               Red Top[[id number]]                                          Final result               Brand Top[[id number]]                                         Final result               Light Blue Top[[id number]]                                   Final result                 Please view results for these tests on the individual orders.   BLOOD GAS, ARTERIAL   ETHANOL    Narrative:     Not for legal purposes. Chain of Custody not followed.    BLOOD GAS, ARTERIAL   URINE DRUG SCREEN   BASIC METABOLIC PANEL   GREEN TOP   LAVENDER TOP   RED TOP   GRAY TOP   LIGHT BLUE TOP   CBC AND DIFFERENTIAL    Narrative:     The following orders were created for panel order CBC & Differential.  Procedure                               Abnormality         Status                     ---------                               -----------         ------                     CBC Auto Differential[[id number]]        Abnormal            Final result                 Please view results for these tests on the individual orders.     XR Chest 1 View    (Results Pending)   CT Head Without Contrast    (Results Pending)   CT Head Without Contrast     (Results Pending)   CT Cervical Spine Without Contrast    (Results Pending)                                                        Medical Decision Making  0015 - Pt given 2g IV narcan and did have improvement in mentation/moving around and grabbing at things. She continues to be altered but improved    0145 - Pt mentation has improved - now conversive.  She does not recall taking any medications, however I suspect this is medication related.  Initial head CT was degraded by motion and had a question of a c1 injury.  Ordered repeat head CT and c-spine CT.  Pt denies any trauma.  Her potassium was elevated and pt was given Ca gluc/insulin/glucose/lasix    0430: Pt has been improved during ED stay.  Continues to have some tachycardia and tachypnea but these are better.  As her mentation has improved - did place her on cpap.  No vomiting during stay.  Pt has no acute findings on CT head/cspine.  Has elevated ammonia but otherwise nml LFTs.  She has some elevated troponin - this is likely d/t secondary rise and not ACS.  Pt with + hyperkalemia.  The exact etiology of her AMS is still somewhat unclear.  She did respond to narcan X 2 and has had some progressive improvement s/o toxic etiology.  Discussed at length with family.  Will admit for further mgmt - d/w Dr. Flores    Addendum: Pt had decreased mentation again in EC.  Given her labile mentation and concerns for decline - Dr. Flores felt she would be better in ICU.  [place] [facility] [place]  He has accepted for ICU admission    Problems Addressed:  Altered mental status, unspecified altered mental status type: complicated acute illness or injury  Elevated troponin: complicated acute illness or injury  Hyperammonemia: complicated acute illness or injury  Hyperkalemia: complicated acute illness or injury    Amount and/or Complexity of Data Reviewed  Labs: ordered.  Radiology: ordered.  ECG/medicine tests: ordered.    Risk  OTC  drugs.  Prescription drug management.  Decision regarding hospitalization.    Critical Care  Total time providing critical care: 45 minutes        Final diagnoses:   Altered mental status, unspecified altered mental status type   Elevated troponin   Hyperkalemia   Hyperammonemia       ED Disposition  ED Disposition       ED Disposition   Decision to Admit    Condition   --    Comment   --               No follow-up provider specified.       Medication List      No changes were made to your prescriptions during this visit.            ТатьянаstineChaka S, DO  02/22/25 0006       ТатьянаstineChaka S, DO  02/22/25 0016       HelpblakestineChaka S, DO  02/22/25 0148       HelphenstineChaka S, DO  02/22/25 0435       HelphenstineChaka S, DO  02/22/25 0510

## 2025-02-23 ENCOUNTER — APPOINTMENT (OUTPATIENT)
Dept: GENERAL RADIOLOGY | Facility: HOSPITAL | Age: 70
End: 2025-02-23
Payer: MEDICARE

## 2025-02-23 ENCOUNTER — APPOINTMENT (OUTPATIENT)
Dept: CT IMAGING | Facility: HOSPITAL | Age: 70
End: 2025-02-23
Payer: MEDICARE

## 2025-02-23 LAB
ANION GAP SERPL CALCULATED.3IONS-SCNC: 11 MMOL/L (ref 5–15)
BASOPHILS # BLD AUTO: 0.03 10*3/MM3 (ref 0–0.2)
BASOPHILS NFR BLD AUTO: 0.3 % (ref 0–1.5)
BUN SERPL-MCNC: 13 MG/DL (ref 8–23)
BUN/CREAT SERPL: 24.5 (ref 7–25)
CALCIUM SPEC-SCNC: 9.4 MG/DL (ref 8.6–10.5)
CHLORIDE SERPL-SCNC: 94 MMOL/L (ref 98–107)
CO2 SERPL-SCNC: 35 MMOL/L (ref 22–29)
CREAT SERPL-MCNC: 0.53 MG/DL (ref 0.57–1)
DEPRECATED RDW RBC AUTO: 46.9 FL (ref 37–54)
EGFRCR SERPLBLD CKD-EPI 2021: 101.5 ML/MIN/1.73
EOSINOPHIL # BLD AUTO: 0 10*3/MM3 (ref 0–0.4)
EOSINOPHIL NFR BLD AUTO: 0 % (ref 0.3–6.2)
ERYTHROCYTE [DISTWIDTH] IN BLOOD BY AUTOMATED COUNT: 14.7 % (ref 12.3–15.4)
GLUCOSE BLDC GLUCOMTR-MCNC: 130 MG/DL (ref 70–130)
GLUCOSE SERPL-MCNC: 137 MG/DL (ref 65–99)
HCT VFR BLD AUTO: 37.1 % (ref 34–46.6)
HGB BLD-MCNC: 11.6 G/DL (ref 12–15.9)
IMM GRANULOCYTES # BLD AUTO: 0.1 10*3/MM3 (ref 0–0.05)
IMM GRANULOCYTES NFR BLD AUTO: 0.9 % (ref 0–0.5)
LYMPHOCYTES # BLD AUTO: 0.64 10*3/MM3 (ref 0.7–3.1)
LYMPHOCYTES NFR BLD AUTO: 5.5 % (ref 19.6–45.3)
MAGNESIUM SERPL-MCNC: 2.1 MG/DL (ref 1.6–2.4)
MCH RBC QN AUTO: 29.1 PG (ref 26.6–33)
MCHC RBC AUTO-ENTMCNC: 31.3 G/DL (ref 31.5–35.7)
MCV RBC AUTO: 93.2 FL (ref 79–97)
MONOCYTES # BLD AUTO: 0.11 10*3/MM3 (ref 0.1–0.9)
MONOCYTES NFR BLD AUTO: 0.9 % (ref 5–12)
NEUTROPHILS NFR BLD AUTO: 10.72 10*3/MM3 (ref 1.7–7)
NEUTROPHILS NFR BLD AUTO: 92.4 % (ref 42.7–76)
NRBC BLD AUTO-RTO: 0 /100 WBC (ref 0–0.2)
PLATELET # BLD AUTO: 514 10*3/MM3 (ref 140–450)
PMV BLD AUTO: 9.7 FL (ref 6–12)
POTASSIUM SERPL-SCNC: 5 MMOL/L (ref 3.5–5.2)
RBC # BLD AUTO: 3.98 10*6/MM3 (ref 3.77–5.28)
SODIUM SERPL-SCNC: 140 MMOL/L (ref 136–145)
WBC NRBC COR # BLD AUTO: 11.6 10*3/MM3 (ref 3.4–10.8)

## 2025-02-23 PROCEDURE — 25010000002 METHYLPREDNISOLONE PER 125 MG: Performed by: INTERNAL MEDICINE

## 2025-02-23 PROCEDURE — 94660 CPAP INITIATION&MGMT: CPT

## 2025-02-23 PROCEDURE — 83735 ASSAY OF MAGNESIUM: CPT | Performed by: PHYSICIAN ASSISTANT

## 2025-02-23 PROCEDURE — 80048 BASIC METABOLIC PNL TOTAL CA: CPT | Performed by: PHYSICIAN ASSISTANT

## 2025-02-23 PROCEDURE — 36415 COLL VENOUS BLD VENIPUNCTURE: CPT | Performed by: PHYSICIAN ASSISTANT

## 2025-02-23 PROCEDURE — 82948 REAGENT STRIP/BLOOD GLUCOSE: CPT

## 2025-02-23 PROCEDURE — 94799 UNLISTED PULMONARY SVC/PX: CPT

## 2025-02-23 PROCEDURE — 94761 N-INVAS EAR/PLS OXIMETRY MLT: CPT

## 2025-02-23 PROCEDURE — 25510000001 IOPAMIDOL PER 1 ML: Performed by: INTERNAL MEDICINE

## 2025-02-23 PROCEDURE — 71275 CT ANGIOGRAPHY CHEST: CPT

## 2025-02-23 PROCEDURE — 25810000003 SODIUM CHLORIDE 0.9 % SOLUTION 250 ML FLEX CONT: Performed by: INTERNAL MEDICINE

## 2025-02-23 PROCEDURE — 25010000002 ENOXAPARIN PER 10 MG: Performed by: INTERNAL MEDICINE

## 2025-02-23 PROCEDURE — 71045 X-RAY EXAM CHEST 1 VIEW: CPT

## 2025-02-23 PROCEDURE — 25010000002 AZITHROMYCIN PER 500 MG: Performed by: INTERNAL MEDICINE

## 2025-02-23 PROCEDURE — 85025 COMPLETE CBC W/AUTO DIFF WBC: CPT | Performed by: PHYSICIAN ASSISTANT

## 2025-02-23 PROCEDURE — 97167 OT EVAL HIGH COMPLEX 60 MIN: CPT | Performed by: OCCUPATIONAL THERAPIST

## 2025-02-23 RX ORDER — POTASSIUM CHLORIDE 750 MG/1
20 CAPSULE, EXTENDED RELEASE ORAL 2 TIMES DAILY
COMMUNITY

## 2025-02-23 RX ORDER — OXYCODONE AND ACETAMINOPHEN 10; 325 MG/1; MG/1
1 TABLET ORAL EVERY 6 HOURS PRN
COMMUNITY

## 2025-02-23 RX ORDER — IOPAMIDOL 755 MG/ML
100 INJECTION, SOLUTION INTRAVASCULAR
Status: COMPLETED | OUTPATIENT
Start: 2025-02-23 | End: 2025-02-23

## 2025-02-23 RX ORDER — OXYCODONE HYDROCHLORIDE 5 MG/1
5 TABLET ORAL EVERY 6 HOURS PRN
Status: DISCONTINUED | OUTPATIENT
Start: 2025-02-23 | End: 2025-02-25

## 2025-02-23 RX ADMIN — OXYCODONE HYDROCHLORIDE 5 MG: 5 TABLET ORAL at 14:10

## 2025-02-23 RX ADMIN — Medication 1 APPLICATION: at 17:04

## 2025-02-23 RX ADMIN — METHYLPREDNISOLONE SODIUM SUCCINATE 60 MG: 125 INJECTION, POWDER, FOR SOLUTION INTRAMUSCULAR; INTRAVENOUS at 12:52

## 2025-02-23 RX ADMIN — IPRATROPIUM BROMIDE AND ALBUTEROL SULFATE 3 ML: .5; 3 SOLUTION RESPIRATORY (INHALATION) at 14:35

## 2025-02-23 RX ADMIN — Medication 10 ML: at 08:11

## 2025-02-23 RX ADMIN — METHYLPREDNISOLONE SODIUM SUCCINATE 60 MG: 125 INJECTION, POWDER, FOR SOLUTION INTRAMUSCULAR; INTRAVENOUS at 06:32

## 2025-02-23 RX ADMIN — TOBRAMYCIN OPHTHALMIC SOLUTION 2 DROP: 3 SOLUTION/ DROPS OPHTHALMIC at 17:05

## 2025-02-23 RX ADMIN — LEVOTHYROXINE SODIUM 112 MCG: 112 TABLET ORAL at 08:10

## 2025-02-23 RX ADMIN — Medication 1 APPLICATION: at 05:35

## 2025-02-23 RX ADMIN — GABAPENTIN 600 MG: 300 CAPSULE ORAL at 08:11

## 2025-02-23 RX ADMIN — TOBRAMYCIN OPHTHALMIC SOLUTION 2 DROP: 3 SOLUTION/ DROPS OPHTHALMIC at 05:35

## 2025-02-23 RX ADMIN — BUDESONIDE 0.5 MG: 0.5 INHALANT RESPIRATORY (INHALATION) at 19:08

## 2025-02-23 RX ADMIN — IPRATROPIUM BROMIDE AND ALBUTEROL SULFATE 3 ML: .5; 3 SOLUTION RESPIRATORY (INHALATION) at 06:42

## 2025-02-23 RX ADMIN — TOBRAMYCIN OPHTHALMIC SOLUTION 2 DROP: 3 SOLUTION/ DROPS OPHTHALMIC at 21:51

## 2025-02-23 RX ADMIN — ROPINIROLE HYDROCHLORIDE 0.25 MG: 0.25 TABLET, FILM COATED ORAL at 20:00

## 2025-02-23 RX ADMIN — Medication 10 ML: at 20:02

## 2025-02-23 RX ADMIN — CITALOPRAM HYDROBROMIDE 40 MG: 20 TABLET ORAL at 08:11

## 2025-02-23 RX ADMIN — ENOXAPARIN SODIUM 40 MG: 100 INJECTION SUBCUTANEOUS at 12:52

## 2025-02-23 RX ADMIN — BUDESONIDE 0.5 MG: 0.5 INHALANT RESPIRATORY (INHALATION) at 06:42

## 2025-02-23 RX ADMIN — MIRTAZAPINE 15 MG: 15 TABLET, FILM COATED ORAL at 20:00

## 2025-02-23 RX ADMIN — CHLORHEXIDINE GLUCONATE 1 APPLICATION: 500 CLOTH TOPICAL at 03:22

## 2025-02-23 RX ADMIN — OXYCODONE HYDROCHLORIDE 5 MG: 5 TABLET ORAL at 21:51

## 2025-02-23 RX ADMIN — GABAPENTIN 600 MG: 300 CAPSULE ORAL at 20:00

## 2025-02-23 RX ADMIN — METHYLPREDNISOLONE SODIUM SUCCINATE 60 MG: 125 INJECTION, POWDER, FOR SOLUTION INTRAMUSCULAR; INTRAVENOUS at 19:58

## 2025-02-23 RX ADMIN — IPRATROPIUM BROMIDE AND ALBUTEROL SULFATE 3 ML: .5; 3 SOLUTION RESPIRATORY (INHALATION) at 10:23

## 2025-02-23 RX ADMIN — TOBRAMYCIN OPHTHALMIC SOLUTION 2 DROP: 3 SOLUTION/ DROPS OPHTHALMIC at 11:05

## 2025-02-23 RX ADMIN — TOBRAMYCIN OPHTHALMIC SOLUTION 2 DROP: 3 SOLUTION/ DROPS OPHTHALMIC at 14:11

## 2025-02-23 RX ADMIN — IPRATROPIUM BROMIDE AND ALBUTEROL SULFATE 3 ML: .5; 3 SOLUTION RESPIRATORY (INHALATION) at 19:08

## 2025-02-23 RX ADMIN — AZITHROMYCIN DIHYDRATE 500 MG: 500 INJECTION, POWDER, LYOPHILIZED, FOR SOLUTION INTRAVENOUS at 12:52

## 2025-02-23 RX ADMIN — IOPAMIDOL 100 ML: 755 INJECTION, SOLUTION INTRAVENOUS at 10:53

## 2025-02-23 RX ADMIN — METHYLPREDNISOLONE SODIUM SUCCINATE 60 MG: 125 INJECTION, POWDER, FOR SOLUTION INTRAMUSCULAR; INTRAVENOUS at 00:38

## 2025-02-23 NOTE — PLAN OF CARE
"Goal Outcome Evaluation:  Plan of Care Reviewed With: patient, daughter        Progress: no change  Outcome Evaluation: OT eval complete.  RN reports pt requesting to get into chair.  Upon entering room pt on Vapotherm 40L/60% SATs 90.  Ms. Gibbs expresses frustration with her ICU stay and states, \"I am going home.  I can just wean myself off this oxygen when I get back on my machine at home.\"  OTR provided edu regarding purpose & benefits of OT tx & OOB with safe guided O2 titration vs. self titration.  Pt able to come to EOB at S & t/f to chair CGA x 2 to assist with lines.  DeSAT 80s.  Increased to 40L/80% after coming to sit to chair.  Pt tolerated sitting for 5 min and then OTR assisted her back to bed.  Pt then reported to APRN that RN staff \"made me get in the chair.\"  OTR offered recount of events to ensure accuracy.  The pt would benefit from skilled OT tx to improve her knowledge, safety, & indep with ADLs.  She is a fall risk, deSAT risk, & demo's limited knowledge/acceptance of her current condition. Recommendation regarding DC is progress pending.    Anticipated Discharge Disposition (OT): home with 24/7 care, home with home health, sub acute care setting, skilled nursing facility                        "

## 2025-02-23 NOTE — THERAPY EVALUATION
Patient Name: Ashley Gibbs  : 1955    MRN: [id number]                              Today's Date: 2025       Admit Date: 2025    Visit Dx:     ICD-10-CM ICD-9-CM   1. Altered mental status, unspecified altered mental status type  R41.82 780.97   2. Elevated troponin  R79.89 790.6   3. Hyperkalemia  E87.5 276.7   4. Hyperammonemia  E72.20 270.6   5. Dysphagia, unspecified type  R13.10 787.20     Patient Active Problem List   Diagnosis    Acute respiratory failure with hypoxemia    Age-related osteoporosis without current pathological fracture    Anxiety    Stage 3 severe COPD by GOLD classification    Chronic pain disorder    Depression    Excessive daytime sleepiness    Hemoptysis    Family history of renal cell carcinoma    Hyperlipidemia    Hypothyroidism    Migraines    Mild episode of recurrent major depressive disorder    Neuropathy    Obstructive sleep apnea    Primary insomnia    Renal mass    Restless legs syndrome    On supplemental oxygen therapy    Family history of CHF (congestive heart failure)    Family history of diabetes mellitus    6 cm exophytic left upper pole renal cyst, likely hemorrhagic    Acute bilateral pyelonephritis    Hypokalemia    E. coli UTI, present on admission (urinary tract infection)    Paroxysmal supraventricular tachycardia    Sepsis due to Escherichia coli UTI, bilateral pyelonephritis, present on admission    Vitamin D deficiency    Lung mass    Former smoker    History of radiation therapy    Panic attacks    Small cell lung cancer    Chronic bronchitis    Acute respiratory failure    Influenza A    Leukopenia due to chemotherapy    Thrombocytopenia    Altered mental status    Altered mental status, unspecified altered mental status type    Narcotic overdose     Past Medical History:   Diagnosis Date    Anxiety     Arthritis     Asthma     Back pain     COPD (chronic obstructive pulmonary disease)     Dependence on supplemental oxygen     Depression      "Disease of thyroid gland     Fibromyalgia, primary     Headache     History of degenerative disc disease     Hypertension     Hypothyroidism     Low back pain     Restless leg     Scoliosis      Past Surgical History:   Procedure Laterality Date    APPENDECTOMY      BREAST BIOPSY Left     BRONCHOSCOPY Right 12/23/2024    Procedure: BRONCHOSCOPY WITH ENDOBRONCHIAL ULTRASOUND;  Surgeon: Peterson Morales MD;  Location: [facility] OR;  Service: Pulmonary;  Laterality: Right;  pre: lung mass  post: lung mass    CHOLECYSTECTOMY      TUBAL ABDOMINAL LIGATION        General Information       Row Name 02/23/25 0820          OT Time and Intention    Subjective Information complains of  \"I want to go home\"  -     Document Type evaluation  -     Mode of Treatment occupational therapy  -     Patient Effort good  -     Symptoms Noted During/After Treatment shortness of breath  -       Row Name 02/23/25 0820          General Information    Patient Profile Reviewed yes  -     Prior Level of Function independent:;all household mobility;ADL's;max assist:;home management;cooking;cleaning;driving;shopping  -     Existing Precautions/Restrictions fall;oxygen therapy device and L/min  -     Barriers to Rehab medically complex  -       Row Name 02/23/25 0820          Living Environment    People in Home grandchild(melany)  -       Row Name 02/23/25 0820          Home Main Entrance    Number of Stairs, Main Entrance two  -       Row Name 02/23/25 0820          Stairs Within Home, Primary    Stairs, Within Home, Primary 2nd story bedroom  -     Number of Stairs, Within Home, Primary twelve  -       Row Name 02/23/25 0820          Cognition    Orientation Status (Cognition) oriented x 4  -       Row Name 02/23/25 0820          Safety Issues/Impairments Affecting Functional Mobility    Safety Issues Affecting Function (Mobility) at risk behavior observed;friction/shear risk  -     Impairments Affecting Function " (Mobility) endurance/activity tolerance;shortness of breath  -               User Key  (r) = Recorded By, (t) = Taken By, (c) = Cosigned By      Initials Name Provider Type    Santa Lebron, OTR/L Occupational Therapist                     Mobility/ADL's       Row Name 02/23/25 0820          Bed Mobility    Bed Mobility supine-sit  -     Supine-Sit [place] (Bed Mobility) supervision  -     Assistive Device (Bed Mobility) head of bed elevated;bed rails  -       Row Name 02/23/25 0820          Transfers    Transfers sit-stand transfer;stand-sit transfer;bed-chair transfer  -       Row Name 02/23/25 0820          Bed-Chair Transfer    Bed-Chair [place] (Transfers) contact guard;2 person assist;verbal cues;nonverbal cues (demo/gesture)  -       Row Name 02/23/25 0820          Sit-Stand Transfer    Sit-Stand [place] (Transfers) contact guard;2 person assist  -       Row Name 02/23/25 0820          Stand-Sit Transfer    Stand-Sit [place] (Transfers) contact guard;2 person assist  -       Row Name 02/23/25 0820          Functional Mobility    Functional Mobility- Ind. Level contact guard assist;2 person assist required;verbal cues required  -     Functional Mobility- Comment to chair  -       Row Name 02/23/25 0820          Activities of Daily Living    BADL Assessment/Intervention feeding  -       Row Name 02/23/25 0820          Self-Feeding Assessment/Training    [place] Level (Feeding) independent;finger foods  -     Position (Feeding) sitting up in bed  -     Comment, (Feeding) deSAT  -               User Key  (r) = Recorded By, (t) = Taken By, (c) = Cosigned By      Initials Name Provider Type    Santa Lebron, OTR/L Occupational Therapist                   Obj/Interventions       Row Name 02/23/25 0820          Vision Assessment/Intervention    Visual Impairment/Limitations WFL  -       Row Name 02/23/25 0820          Range of Motion Comprehensive     General Range of Motion no range of motion deficits identified  -       Row Name 02/23/25 0820          Strength Comprehensive (MMT)    General Manual Muscle Testing (MMT) Assessment no strength deficits identified  -[facility] Name 02/23/25 0820          Motor Skills    Motor Skills functional endurance  -     Functional Endurance poor -  -       Row Name 02/23/25 0820          Balance    Balance Assessment sitting static balance;sitting dynamic balance;sit to stand dynamic balance;standing static balance;standing dynamic balance  -     Static Sitting Balance supervision  -     Dynamic Sitting Balance supervision  -     Position, Sitting Balance sitting edge of bed  -     Sit to Stand Dynamic Balance contact guard;2-person assist;verbal cues  -     Static Standing Balance contact guard;2-person assist;verbal cues  -     Dynamic Standing Balance contact guard;2-person assist;verbal cues  -     Position/Device Used, Standing Balance supported  -               User Key  (r) = Recorded By, (t) = Taken By, (c) = Cosigned By      Initials Name Provider Type     Santa Billy, OTR/L Occupational Therapist                   Goals/Plan       [facility] Name 02/23/25 0820          Transfer Goal 1 (OT)    Activity/Assistive Device (Transfer Goal 1, OT) sit-to-stand/stand-to-sit;bed-to-chair/chair-to-bed;toilet;shower chair  -     [place] Level/Cues Needed (Transfer Goal 1, OT) supervision required  -     Time Frame (Transfer Goal 1, OT) long term goal (LTG);by discharge  -     Strategies/Barriers (Transfers Goal 1, OT) with SATs WN  -     Progress/Outcome (Transfer Goal 1, OT) new goal  -[facility] Name 02/23/25 0820          Dressing Goal 1 (OT)    Activity/Device (Dressing Goal 1, OT) lower body dressing  -     [place]/Cues Needed (Dressing Goal 1, OT) minimum assist (75% or more patient effort)  -     Time Frame (Dressing Goal 1, OT) long term goal (LTG);10 days;by discharge   -CH     Strategies/Barriers (Dressing Goal 1, OT) AE/DME PRN with SATs WNL  -CH     Progress/Outcome (Dressing Goal 1, OT) new goal  -       Row Name 02/23/25 0820          Toileting Goal 1 (OT)    Activity/Device (Toileting Goal 1, OT) toileting skills, all;adjust/manage clothing;perform perineal hygiene;commode, 3-in-1  -CH     [place] Level/Cues Needed (Toileting Goal 1, OT) moderate assist (50-74% patient effort)  -     Time Frame (Toileting Goal 1, OT) by discharge  -     Progress/Outcome (Toileting Goal 1, OT) new goal  -       Row Name 02/23/25 0820          Problem Specific Goal 1 (OT)    Problem Specific Goal 1 (OT) Pt will report 1 safety strategy to reduce risk of falls regarding O2 tubing to improve fxl independence  -CH     Time Frame (Problem Specific Goal 1, OT) long term goal (LTG)  -CH     Progress/Outcome (Problem Specific Goal 1, OT) new goal  -       Row Name 02/23/25 0820          Therapy Assessment/Plan (OT)    Planned Therapy Interventions (OT) activity tolerance training;adaptive equipment training;BADL retraining;edema control/reduction;functional balance retraining;IADL retraining;occupation/activity based interventions;patient/caregiver education/training;ROM/therapeutic exercise;transfer/mobility retraining  -               User Key  (r) = Recorded By, (t) = Taken By, (c) = Cosigned By      Initials Name Provider Type     Santa Billy, OTR/L Occupational Therapist                   Clinical Impression       Row Name 02/23/25 0820          Pain Assessment    Additional Documentation Pain Scale: FACES Pre/Post-Treatment (Group)  -       Row Name 02/23/25 0820          Pain Scale: FACES Pre/Post-Treatment    Pain: FACES Scale, Pretreatment 4-->hurts little more  -     Posttreatment Pain Rating 4-->hurts little more  -       Row Name 02/23/25 0820          Plan of Care Review    Plan of Care Reviewed With patient;daughter  -     Progress no change  -     Outcome  "Evaluation OT eval complete.  RN reports pt requesting to get into chair.  Upon entering room pt on Vapotherm 40L/60% SATs 90.  Ms. Gibbs expresses frustration with her ICU stay and states, \"I am going home.  I can just wean myself off this oxygen when I get back on my machine at home.\"  OTR provided edu regarding purpose & benefits of OT tx & OOB with safe guided O2 titration vs. self titration.  Pt able to come to EOB at S & t/f to chair CGA x 2 to assist with lines.  DeSAT 80s.  Increased to 40L/80% after coming to sit to chair.  Pt tolerated sitting for 5 min and then OTR assisted her back to bed.  Pt then reported to APRN that RN staff \"made me get in the chair.\"  OTR offered recount of events to ensure accuracy.  The pt would benefit from skilled OT tx to improve her knowledge, safety, & indep with ADLs.  She is a fall risk, deSAT risk, & demo's limited knowledge/acceptance of her current condition. Recommendation regarding DC is progress pending.  -       Row Name 02/23/25 0820          Therapy Assessment/Plan (OT)    Patient/Family Therapy Goal Statement (OT) \"I am going home\"  -     Rehab Potential (OT) limited  -     Criteria for Skilled Therapeutic Interventions Met (OT) yes;skilled treatment is necessary  -     Therapy Frequency (OT) 5 times/wk  -     Predicted Duration of Therapy Intervention (OT) 10 days  -       Row Name 02/23/25 0820          Therapy Plan Review/Discharge Plan (OT)    Anticipated Discharge Disposition (OT) home with 24/7 care;home with home health;sub acute care setting;skilled nursing facility  -       Row Name 02/23/25 0820          Positioning and Restraints    Pre-Treatment Position in bed  -     Post Treatment Position bed  -     In Bed fowlers;call light within reach;encouraged to call for assist;exit alarm on;with family/caregiver;side rails up x3  -               User Key  (r) = Recorded By, (t) = Taken By, (c) = Cosigned By      Initials Name Provider " Type    Santa Lebron OTR/L Occupational Therapist                   Outcome Measures       Row Name 02/23/25 0820          How much help from another is currently needed...    Putting on and taking off regular lower body clothing? 2  -CH     Bathing (including washing, rinsing, and drying) 2  -CH     Toileting (which includes using toilet bed pan or urinal) 2  -CH     Putting on and taking off regular upper body clothing 3  -CH     Taking care of personal grooming (such as brushing teeth) 4  -CH     Eating meals 4  -CH     AM-PAC 6 Clicks Score (OT) 17  -CH       Row Name 02/23/25 0820          Functional Assessment    Outcome Measure Options AM-PAC 6 Clicks Daily Activity (OT)  -               User Key  (r) = Recorded By, (t) = Taken By, (c) = Cosigned By      Initials Name Provider Type    Santa Lebron OTR/L Occupational Therapist                    Occupational Therapy Education       Title: PT OT SLP Therapies (In Progress)       Topic: Occupational Therapy (In Progress)       Point: ADL training (Done)       Description:   Instruct learner(s) on proper safety adaptation and remediation techniques during self care or transfers.   Instruct in proper use of assistive devices.                  Learning Progress Summary            Patient Acceptance, E, VU,NR by  at 2/23/2025 0911                      Point: Home exercise program (Not Started)       Description:   Instruct learner(s) on appropriate technique for monitoring, assisting and/or progressing therapeutic exercises/activities.                  Learner Progress:  Not documented in this visit.              Point: Precautions (Done)       Description:   Instruct learner(s) on prescribed precautions during self-care and functional transfers.                  Learning Progress Summary            Patient Acceptance, E, VU,NR by  at 2/23/2025 0911                      Point: Body mechanics (Done)       Description:   Instruct learner(s) on proper  "positioning and spine alignment during self-care, functional mobility activities and/or exercises.                  Learning Progress Summary            Patient Acceptance, E, VU,NR by  at 2/23/2025 0911                                      User Key       Initials Effective Dates Name Provider Type Discipline     07/11/23 -  Santa Billy, OTR/L Occupational Therapist OT                  OT Recommendation and Plan  Planned Therapy Interventions (OT): activity tolerance training, adaptive equipment training, BADL retraining, edema control/reduction, functional balance retraining, IADL retraining, occupation/activity based interventions, patient/caregiver education/training, ROM/therapeutic exercise, transfer/mobility retraining  Therapy Frequency (OT): 5 times/wk  Plan of Care Review  Plan of Care Reviewed With: patient, daughter  Progress: no change  Outcome Evaluation: OT eval complete.  RN reports pt requesting to get into chair.  Upon entering room pt on Vapotherm 40L/60% SATs 90.  Ms. Gibbs expresses frustration with her ICU stay and states, \"I am going home.  I can just wean myself off this oxygen when I get back on my machine at home.\"  OTR provided edu regarding purpose & benefits of OT tx & OOB with safe guided O2 titration vs. self titration.  Pt able to come to EOB at S & t/f to chair CGA x 2 to assist with lines.  DeSAT 80s.  Increased to 40L/80% after coming to sit to chair.  Pt tolerated sitting for 5 min and then OTR assisted her back to bed.  Pt then reported to APRN that RN staff \"made me get in the chair.\"  OTR offered recount of events to ensure accuracy.  The pt would benefit from skilled OT tx to improve her knowledge, safety, & indep with ADLs.  She is a fall risk, deSAT risk, & demo's limited knowledge/acceptance of her current condition. Recommendation regarding DC is progress pending.     Time Calculation:         Time Calculation- OT       Row Name 02/23/25 0820             Time " Calculation- OT    OT Start Time 0820  -CH      OT Stop Time 0909  -CH      OT Time Calculation (min) 49 min  -CH      OT Received On 02/23/25  -CH      OT Goal Re-Cert Due Date 03/05/25  -         Untimed Charges    OT Eval/Re-eval Minutes 49  -CH         Total Minutes    Untimed Charges Total Minutes 49  -CH       Total Minutes 49  -CH                User Key  (r) = Recorded By, (t) = Taken By, (c) = Cosigned By      Initials Name Provider Type     Santa Billy OTR/L Occupational Therapist                  Therapy Charges for Today       Code Description Service Date Service Provider Modifiers Qty    31739711066 HC OT EVAL HIGH COMPLEXITY 3 2/23/2025 Santa Billy OTR/L GO 1                 BAUDILIO Nguyen/MARK  2/23/2025

## 2025-02-23 NOTE — PLAN OF CARE
Goal Outcome Evaluation:            Pt A&Ox4 and afebrile. ST. 2L NC. Voiding well per alicia. Safety maintained. Call light within reach.

## 2025-02-23 NOTE — PROGRESS NOTES
[facility] Intensivist Services  INPATIENT PROGRESS NOTE    Patient Name: Ashley Gibbs  Date of Admission: 2025  Today's Date: 25  Length of Stay:  LOS: 1 day   Primary Care Physician: Padmaja Bermudez APRN  Next of Kin: Primary Emergency Contact: SELAM SANCHEZ Home Phone: [phone]      Subjective   Chief Complaint: Narcotic overdose  Drug Overdose     69-year-old female with a past medical history of COPD on 2 L O2 at home at baseline, anxiety, arthritis, and restless leg syndrome admitted on 2025 after a narcotic overdose.  She was recently diagnosed in 2024 by fine-needle aspiration of small cell lung cancer involving multiple areas of the lung and mediastinum.  She has extensive small cell cancer by definition.  She also has chronic back pain, and family thinks she took too many of her oxycodone because she forgot that she had already taken them.  Patient began struggling to breathe at home.  911 was called, and Narcan was given.  She was then admitted by the intensivist team after being worked up in the emergency department.    Interval history:  2025: Patient requesting to go home this morning.  However, oxygen requirements have increased, and patient is now requiring noninvasive ventilation.  She is also requesting to be put back on her home dose of oxycodone as she is fearful of taking the fentanyl patch.  She states that she has no to people who have  while using fentanyl patches.    Review of Systems   All pertinent negatives and positives are as above. All other systems have been reviewed and are negative unless otherwise stated.     Past Medical and Past Surgical History   Active and Resolved Problems  Active Hospital Problems    Diagnosis  POA    **Narcotic overdose [T40.601A]  Yes    Altered mental status [R41.82]  Yes    Altered mental status, unspecified altered mental status type [R41.82]  Yes      Resolved Hospital  Problems   No resolved problems to display.       Past Medical History:   Past Medical History:   Diagnosis Date    Anxiety     Arthritis     Asthma     Back pain     COPD (chronic obstructive pulmonary disease)     Dependence on supplemental oxygen     Depression     Disease of thyroid gland     Fibromyalgia, primary     Headache     History of degenerative disc disease     Hypertension     Hypothyroidism     Low back pain     Restless leg     Scoliosis      Past Surgical History:   Past Surgical History:   Procedure Laterality Date    APPENDECTOMY      BREAST BIOPSY Left     BRONCHOSCOPY Right 12/23/2024    Procedure: BRONCHOSCOPY WITH ENDOBRONCHIAL ULTRASOUND;  Surgeon: Peterson Morales MD;  Location: [facility];  Service: Pulmonary;  Laterality: Right;  pre: lung mass  post: lung mass    CHOLECYSTECTOMY      TUBAL ABDOMINAL LIGATION       Social and Family History   Family History:  family history includes Arthritis in her mother; COPD in her father; Diabetes in her father; Heart disease in her father; Hypertension in her mother.    Tobacco/Social History:  reports that she quit smoking about 16 years ago. Her smoking use included cigarettes. She started smoking about 56 years ago. She has a 40 pack-year smoking history. She has been exposed to tobacco smoke. She has never used smokeless tobacco. She reports that she does not drink alcohol and does not use drugs.    Allergies   Allergies:   She is allergic to codeine.    Objective    Temp:  [97.4 °F (36.3 °C)-98.8 °F (37.1 °C)] 98.4 °F (36.9 °C)  Heart Rate:  [103-128] 124  Resp:  [17-37] 17  BP: ()/(63-87) 115/72  Physical Exam  Vitals reviewed. Exam conducted with a chaperone present.   Constitutional:       Appearance: She is ill-appearing. She is not toxic-appearing.   HENT:      Head: Normocephalic and atraumatic.      Nose: Nose normal.   Cardiovascular:      Rate and Rhythm: Regular rhythm. Tachycardia present.      Pulses: Normal pulses.    Pulmonary:      Breath sounds: No stridor. No rhonchi.      Comments: Crackles noted at bilateral bases. Otherwise, decreased air movement also noted.   Abdominal:      General: Bowel sounds are normal.      Palpations: Abdomen is soft.      Tenderness: There is no abdominal tenderness.   Musculoskeletal:         General: Normal range of motion.   Skin:     General: Skin is warm.      Capillary Refill: Capillary refill takes less than 2 seconds.   Neurological:      General: No focal deficit present.      Mental Status: She is alert and oriented to person, place, and time. Mental status is at baseline.         Inpatient Medications   Medications: Scheduled Meds:azithromycin, 500 mg, Intravenous, Q24H  budesonide, 0.5 mg, Nebulization, BID - RT  Chlorhexidine Gluconate Cloth, 1 Application, Topical, Q24H  citalopram, 40 mg, Oral, Daily  enoxaparin, 40 mg, Subcutaneous, Q24H  gabapentin, 600 mg, Oral, Q12H  ipratropium-albuterol, 3 mL, Nebulization, 4x Daily - RT  lactulose, 30 g, Oral, Once  levothyroxine, 112 mcg, Oral, Daily  methylPREDNISolone sodium succinate, 60 mg, Intravenous, Q6H   Followed by  [START ON 2/24/2025] methylPREDNISolone sodium succinate, 40 mg, Intravenous, Q6H   Followed by  [START ON 2/25/2025] methylPREDNISolone sodium succinate, 20 mg, Intravenous, Q6H  mirtazapine, 15 mg, Oral, Nightly  mupirocin, 1 Application, Each Nare, BID  rOPINIRole, 0.25 mg, Oral, Nightly  senna-docusate sodium, 2 tablet, Oral, BID  sodium chloride, 10 mL, Intravenous, Q12H  tobramycin, 2 drop, Both Eyes, Q4H While Awake      Continuous Infusions:   PRN Meds:.  acetaminophen **OR** acetaminophen    senna-docusate sodium **AND** polyethylene glycol **AND** bisacodyl **AND** bisacodyl    Calcium Replacement - Follow Nurse / BPA Driven Protocol    diazePAM    ipratropium-albuterol    Magnesium Standard Dose Replacement - Follow Nurse / BPA Driven Protocol    ondansetron    ondansetron ODT    oxyCODONE    Phosphorus  Replacement - Follow Nurse / BPA Driven Protocol    Potassium Replacement - Follow Nurse / BPA Driven Protocol    [COMPLETED] Insert Peripheral IV **AND** sodium chloride    sodium chloride    sodium chloride    tiZANidine    I have reviewed the patient's current medications.   Outpatient Medications     Current Outpatient Medications   Medication Instructions    acetaminophen (TYLENOL 8 HOUR) 650 mg, Oral, Every 8 Hours PRN    cholecalciferol (VITAMIN D3) 1,000 Units, Daily    citalopram (CELEXA) 40 mg, Oral, Daily    diazePAM (VALIUM) 5 mg, Oral, Every 12 Hours PRN    gabapentin (NEURONTIN) 600 mg, Oral, Every 12 Hours Scheduled    ipratropium-albuterol (DUO-NEB) 0.5-2.5 mg/3 ml nebulizer 3 mL, Nebulization, Every 4 Hours PRN    levothyroxine (SYNTHROID, LEVOTHROID) 112 mcg, Oral, Daily    mirtazapine (REMERON) 15 mg, Oral, Every Night at Bedtime    ondansetron (ZOFRAN) 4 mg, Oral, Every 8 Hours PRN    potassium chloride 10 MEQ CR tablet 10 mEq, Oral, 2 Times Daily    rizatriptan (MAXALT) 10 MG tablet Take 1 tablet by mouth 1 (One) Time As Needed for Migraine.    rOPINIRole (REQUIP) 0.25 mg, Oral, Every Night at Bedtime    tiZANidine (ZANAFLEX) 2 mg, 2 Times Daily PRN    tobramycin 0.3 % solution ophthalmic solution 2 drops, Both Eyes, Every 4 Hours While Awake    Ventolin  (90 Base) MCG/ACT inhaler 2 puffs, Inhalation, Every 4 Hours PRN    vitamin D (ERGOCALCIFEROL) 50,000 Units, Oral, Weekly       Current Antibiotics   azithromycin 500 mg IVPB in 250 mL NS (VTB)    Results:   CBC:      Lab 02/23/25  0418 02/21/25  2303 02/21/25  2303 02/17/25  1303   WBC 11.60*   < > 14.82* 10.39   HEMOGLOBIN 11.6*   < > 11.3* 11.3*   HEMATOCRIT 37.1   < > 36.4 34.9*   PLATELETS 514*  --  480* 330   NEUTROS ABS 10.72*  --  12.32* 7.97*   IMMATURE GRANS (ABS) 0.10*  --  0.18*  --    LYMPHS ABS 0.64*  --  1.17 1.42   MONOS ABS 0.11  --  1.03* 0.70   EOS ABS 0.00  --  0.01 0.00   MCV 93.2  --  94.5 92.8    < > = values in  this interval not displayed.     LIVER FUNCTION TESTS:      Lab 02/21/25 2303 02/17/25  1303   TOTAL PROTEIN 7.4 6.7   ALBUMIN 3.5 3.5   GLOBULIN 3.9  --    ALT (SGPT) 17 17   AST (SGOT) 18 17   BILIRUBIN 0.3 0.3   ALK PHOS 141* 119*   LIPASE 14  --      ABG:      Lab 02/23/25 0418 02/22/25 2019 02/22/25 0249 02/22/25 0158 02/22/25 0028 02/21/25 2303 02/17/25  1303   PH, ARTERIAL  --  7.463*  --  7.372 7.378  --   --    PO2 ART  --  47.6*  --  87.6 79.9*  --   --    PCO2, ARTERIAL  --  52.4*  --  66.5* 68.8*  --   --    HCO3 ART  --  37.5*  --  38.6* 40.5*  --   --    ANION GAP 11.0  --  17.0*  --   --  7.0 9     BMP/MG/PHOS:      Lab 02/23/25 0418 02/22/25 0249 02/22/25 0158 02/22/25 0028 02/21/25 2303 02/17/25  1303   SODIUM 140 138  --   --  140 144   SODIUM, ARTERIAL  --   --  139 139  --   --    POTASSIUM 5.0 5.3*  --   --  6.0* 3.1*   CHLORIDE 94* 93*  --   --  97* 93*   BUN 13 8  --   --  9 8   CREATININE 0.53* 0.65  --   --  0.71 0.5   CALCIUM 9.4 9.3  --   --  9.3 9.3   MAGNESIUM 2.1  --   --   --  2.0  --      DIABETIC:    IMAGING STUDIES:  CT Angiogram Chest    Result Date: 2/23/2025  1. No evidence of pulmonary thromboembolic disease. The right upper lobe pulmonary artery is attenuated in caliber which is felt to represent sequela from the previous right hilar and suprahilar mass with conglomerate lymphadenopathy which encased the right upper lobe bronchus and right upper lobe pulmonary artery branch. There is enlargement of the pulmonary artery suggesting pulmonary arterial hypertension. 2. The thoracic aorta and great vessels are normal in caliber. No rate limiting luminal stenosis, aneurysm or dissection present. 3. Emphysematous changes of the lungs. Previously noted right hilar and suprahilar mass with conglomerate lymphadenopathy has shown marked interval improvement posttreatment. Small residual level 2R and 4R right paratracheal nodes are present. There has been resolution of the  previously noted compromise of the right upper lobe bronchus and bronchus intermedius. Some residual right hilar nodes are also present which are enlarged by CT criteria. 4. Previously noted suspected lung to lung metastasis within the right upper lobe have resolved. There are tree-in-bud opacities within both lungs, particularly within the lower lobes with several areas of more confluent nodularity in the lung bases which I would favor to represent an infectious/inflammatory process-perhaps a superimposed bronchopneumonia. A more confluent nodule the periphery of the right lower lobe would warrant follow-up on subsequent imaging and measures up to 10 mm in long axis. No effusion is present.  This report was signed and finalized on 2/23/2025 11:33 AM by Dr. Srikanth Galo MD.      XR Chest 1 View    Result Date: 2/23/2025  1. Hyperinflation of the lung parenchyma with prominent bronchovascular markings and bronchial wall thickening. This may represent sequela of chronic bronchitis. No lobar pneumonia or effusion.  This report was signed and finalized on 2/23/2025 11:07 AM by Dr. Srikanth Galo MD.      XR Chest 1 View    Result Date: 2/22/2025  1. Diffuse reticulonodular disease of the lungs demonstrate stability from the previous exam either representing a  infectious/inflammatory process or potentially metastatic disease. The patient has a known right hilar mass.  This report was signed and finalized on 2/22/2025 10:38 AM by Dr. Srikanth Galo MD.      CT Head Without Contrast    Result Date: 2/22/2025  1.. Limited interpretation secondary to extensive motion artifact. 2. Atrophy with small vessel disease. No definite acute posttraumatic injury to the brain.  This report was signed and finalized on 2/22/2025 9:40 AM by Dr. Srikanth Galo MD.      CT Cervical Spine Without Contrast    Result Date: 2/22/2025  1. Limited exam secondary to extensive motion artifact. The acquisition was obtained twice. 2. No  gross evidence of fracture or malalignment. Foraminal narrowing is noted at several levels related to facet arthropathy and uncinate spurring.    This report was signed and finalized on 2/22/2025 9:35 AM by Dr. Srikanth Galo MD.      CT Head Without Contrast    Result Date: 2/22/2025   1. Mild cerebral and cerebellar atrophy with chronic microvascular disease but no evidence of acute intracranial process. The study is degraded by motion.    This report was signed and finalized on 2/22/2025 9:33 AM by Dr. Srikanth Galo MD.       CULTURE DATA:   Blood Culture   Date Value Ref Range Status   02/22/2025 No growth at 24 hours  Preliminary   02/22/2025 No growth at 24 hours  Preliminary        Assessment/Plan   69-year-old female with a past medical history of small cell lung cancer and chronic back pain admitted for narcotic overdose.    Acute hypoxemic respiratory failure  -Multifactorial-patient has COPD diagnosis without PFTs and obstructive sleep apnea and uses CPAP   -We suspect chronic bronchitis could contribute to chronic respiratory distress. She might benefit from long-term azithromycin therapy on discharge  -Currently on NIV at 60% to maintain adequate oxygenation of >88%  -Wean O2 as able  -Continue DuoNebs, steroids, and budesonide as scheduled    Extensive small cell cancer  -Defer to oncology.  -According to the [employer] (N[employer]), the average survival rate for extensive SCLC without treatment is 2-4 months after diagnosis. With treatment, extensive SCLC typically has a median survival rate of 7-11 months. Patient is currently at 3 months.   -Given patient is likely to live less than another 8 months, we have consulted palliative care.  We appreciate their recommendations and assistance and help with this patient.    Obstructive sleep apnea  -Complicated by hypersomnia currently on modafinil at home.   -Continue CPAP    Chronic pain  -Palliative care consulted due to extensive small cell  lung cancer.  -Patient did not like fentanyl patch as she knows a couple of people who  while on fentanyl patch  -Fentanyl patch has been discontinued.   -Patient was taking Percocet 10/325 mg q6h. We have started oxycodone 5 mg q6h given recent accidental narcotic overdose.   -Continue to monitor patient closely in ICU    Depression  -Continue home regimen    Hypothyroidism  -Continue synthroid at home dose      CODE STATUS: DNR/DNI  VTE prophylaxis: Lovenox   Nutrition: NPO  Bowel: Teena-Colace, MiraLAX, and Dulcolax available as needed  GI prophylaxis: N/A  Antibiotics: Azithromycin     Disposition: Critical care management    Total critical care time: 35 minutes    Due to a high probability of clinically significant, life threatening deterioration, the patient required my highest level of preparedness to intervene emergently and I personally spent this critical care time directly and personally managing the patient.     This critical care time included obtaining a history; examining the patient; pulse oximetry; ordering and review of studies; arranging urgent treatment with development of a management plan; evaluation of patient's response to treatment; frequent reassessment; and, discussions with other providers.    This critical care time was performed to assess and manage the high probability of imminent, life-threatening deterioration that could result in multi-organ failure. It was exclusive of separately billable procedures and treating other patients and teaching time.    Please see MDM section and the rest of the note for further information on patient assessment and treatment.    Part of this note may be an electronic transcription/translation of spoken language to printed text using the Dragon Dictation System    Electronically signed by Francisco Goddard PA-C on 2025 at 13:14 CST

## 2025-02-23 NOTE — PLAN OF CARE
Problem: Noninvasive Ventilation Acute  Goal: Effective Unassisted Ventilation and Oxygenation  Outcome: Progressing     Problem: Adult Inpatient Plan of Care  Goal: Plan of Care Review  Outcome: Progressing  Flowsheets (Taken 2/23/2025 1754)  Progress: no change  Outcome Evaluation: On and off bipap today. Currently on vapotherm 30L 70% and satting 91%. Adequate UOP. Family here to visit this shift  Goal: Patient-Specific Goal (Individualized)  Outcome: Progressing  Goal: Absence of Hospital-Acquired Illness or Injury  Outcome: Progressing  Intervention: Identify and Manage Fall Risk  Description: Perform standard risk assessment on admission using a validated tool or comprehensive approach appropriate to the patient; reassess fall risk frequently, with change in status or transfer to another level of care.  Communicate risk to interprofessional healthcare team; ensure fall risk visible cue.  Determine need for increased observation, equipment and environmental modification, as well as use of supportive, nonskid footwear.  Adjust safety measures to individual needs and identified risk factors.  Reinforce the importance of active participation with fall risk prevention, safety, and physical activity with the patient and family.  Perform regular intentional rounding to assess need for position change, pain assessment and personal needs, including assistance with toileting.  Recent Flowsheet Documentation  Taken 2/23/2025 1700 by Anita Fleming, RN  Safety Promotion/Fall Prevention: safety round/check completed  Taken 2/23/2025 1600 by Anita Fleming, RN  Safety Promotion/Fall Prevention: safety round/check completed  Taken 2/23/2025 1500 by Anita Fleming, RN  Safety Promotion/Fall Prevention: safety round/check completed  Intervention: Prevent Skin Injury  Description: Perform a screening for skin injury risk, such as pressure or moisture-associated skin damage on admission and at regular intervals  throughout hospital stay.  Keep all areas of skin (especially folds) clean and dry.  Maintain adequate skin hydration.  Relieve and redistribute pressure and protect bony prominences and skin at risk for injury; implement measures based on patient-specific risk factors.  Match turning and repositioning schedule to clinical condition.  Encourage weight shift frequently; assist with reposition if unable to complete independently.  Float heels off bed; avoid pressure on the Achilles tendon.  Keep skin free from extended contact with medical devices.  Optimize nutrition and hydration.  Encourage functional activity and mobility, as early as tolerated.  Use aids (e.g., slide boards, mechanical lift) during transfer.  Recent Flowsheet Documentation  Taken 2/23/2025 1700 by Anita Fleming, RN  Body Position:   position changed independently   supine  Taken 2/23/2025 1600 by Anita Fleming, RN  Skin Protection: incontinence pads utilized  Taken 2/23/2025 1500 by Anita Fleming, RN  Body Position:   position maintained   tilted   left  Intervention: Prevent and Manage VTE (Venous Thromboembolism) Risk  Description: Assess for VTE (venous thromboembolism) risk.  Promote early mobilization; encourage both active and passive leg exercises, if unable to ambulate.  Initiate and maintain compression or other therapy, as indicated, based on identified risk in accordance with organizational protocol and provider order.  Recognize the patient's individual risk for bleeding before initiating pharmacologic thromboprophylaxis.  Recent Flowsheet Documentation  Taken 2/23/2025 1600 by Anita Fleming, RN  VTE Prevention/Management: (See MAR) other (see comments)  Goal: Optimal Comfort and Wellbeing  Outcome: Progressing  Goal: Readiness for Transition of Care  Outcome: Progressing     Problem: Violence Risk or Actual  Goal: Anger and Impulse Control  Outcome: Progressing  Intervention: Minimize Safety Risk  Description: Listen  actively, observing verbal and nonverbal cues (e.g., irritability, confusion, lack of cooperation, demanding behavior, body posture, expression); take threats seriously.  Maintain a therapeutic presence; utilize calm, empathetic tone of voice, nonjudgmental attitude and nonthreatening body language; listen carefully.  Assess and provide for unmet needs, including nutrition, comfort, hydration, hygiene, companionship and appropriate rest.  Utilize empathetic but firm and concise communication; set limits, offer choices and propose alternatives.  Remove stimuli and objects that may lead to harming self or others.  Maintain clear path to room exit; keep door open during care.  Ask directly about homicidal and suicidal intent; provide additional safety measures based on level of risk (e.g., one-on-one observation, duty to warn).  Implement least restrictive measures if attempts to de-escalate violent or injurious behaviors are unsuccessful.  Recent Flowsheet Documentation  Taken 2/23/2025 1600 by Anita Fleming, RN  Enhanced Safety Measures: room near unit station     Problem: Fall Injury Risk  Goal: Absence of Fall and Fall-Related Injury  Outcome: Progressing  Intervention: Promote Injury-Free Environment  Description: Provide a safe, barrier-free environment that encourages independent activity.  Keep care area uncluttered and well-lighted.  Determine need for increased observation or monitoring.  Avoid use of devices that minimize mobility, such as restraints or indwelling urinary catheter.  Recent Flowsheet Documentation  Taken 2/23/2025 1700 by Anita Fleming, RN  Safety Promotion/Fall Prevention: safety round/check completed  Taken 2/23/2025 1600 by Anita Fleming, RN  Safety Promotion/Fall Prevention: safety round/check completed  Taken 2/23/2025 1500 by Anita Fleming, RN  Safety Promotion/Fall Prevention: safety round/check completed     Problem: Skin Injury Risk Increased  Goal: Skin Health and  Integrity  Outcome: Progressing  Intervention: Optimize Skin Protection  Description: Perform a full pressure injury risk assessment, as indicated by screening, upon admission to care unit.  Reassess skin (full inspection and injury risk, including skin temperature, consistency and color) frequently (e.g., scheduled interval, with change in condition) to provide optimal early detection and prevention.  Maintain adequate tissue perfusion (e.g., encourage fluid balance; avoid crossing legs, constrictive clothing or devices) to promote tissue oxygenation.  Maintain head of bed at lowest degree of elevation tolerated, considering medical condition and other restrictions. Use positioning supports to prevent sliding and friction. Consider low friction textiles.  Avoid positioning onto an area that remains reddened or on bony prominences.  Minimize incontinence and moisture (e.g., toileting schedule; moisture-wicking pad, diaper or incontinence collection device; skin moisture barrier).  Cleanse skin promptly and gently, when soiled, utilizing a pH-balanced cleanser.  Relieve and redistribute pressure (e.g., scheduled position changes, weight shifts, use of support surface, medical device repositioning, protective dressing application, use of positioning device, microclimate control, use of pressure-injury-monitor  Encourage increased activity, such as sitting in a chair at the bedside or early mobilization, when able to tolerate. Avoid prolonged sitting.  Recent Flowsheet Documentation  Taken 2/23/2025 1700 by Anita Fleming, RN  Head of Bed (HOB) Positioning: HOB elevated  Taken 2/23/2025 1600 by Anita Fleming, RN  Activity Management: activity encouraged  Pressure Reduction Techniques: weight shift assistance provided  Pressure Reduction Devices: specialty bed utilized  Skin Protection: incontinence pads utilized  Taken 2/23/2025 1500 by Anita Fleming, RN  Activity Management: activity encouraged     Problem:  Sepsis/Septic Shock  Goal: Optimal Coping  Outcome: Progressing  Goal: Absence of Bleeding  Outcome: Progressing  Goal: Blood Glucose Level Within Target Range  Outcome: Progressing  Goal: Absence of Infection Signs and Symptoms  Outcome: Progressing  Intervention: Promote Recovery  Description: Encourage pulmonary hygiene, such as cough-enhancement and airway-clearance techniques, that may include use of incentive spirometry, deep breathing and cough.  Encourage early rehabilitation and physical activity to optimize functional ability and activity tolerance, as well as minimize delirium.  Promote energy conservation; minimize oxygen demand and consumption by adjusting environment, decreasing stimulation, maintaining normothermia and treating pain.  Optimize fluid balance, nutrition intake, sleep and glycemic control to maintain tissue perfusion and enhance immune response.  Recent Flowsheet Documentation  Taken 2/23/2025 1600 by Anita Fleming, RN  Activity Management: activity encouraged  Taken 2/23/2025 1500 by Anita Fleming, RN  Activity Management: activity encouraged  Goal: Optimal Nutrition Delivery  Outcome: Progressing   Goal Outcome Evaluation:           Progress: no change  Outcome Evaluation: On and off bipap today. Currently on vapotherm 30L 70% and satting 91%. Adequate UOP. Family here to visit this shift

## 2025-02-23 NOTE — PLAN OF CARE
Goal Outcome Evaluation:  Plan of Care Reviewed With: patient        Progress: improving  Outcome Evaluation: Pt A/O x 3. Disoriented to situation. Pt on vapotherm 40 L 60 % O2. ABGs obtained. Sinus tach 110's. BP adequate. WBC 11.6.

## 2025-02-24 ENCOUNTER — PREP FOR PROCEDURE (OUTPATIENT)
Dept: VASCULAR SURGERY | Age: 70
End: 2025-02-24

## 2025-02-24 ENCOUNTER — TELEPHONE (OUTPATIENT)
Dept: VASCULAR SURGERY | Age: 70
End: 2025-02-24

## 2025-02-24 LAB
ANION GAP SERPL CALCULATED.3IONS-SCNC: 8 MMOL/L (ref 5–15)
BASOPHILS # BLD AUTO: 0.02 10*3/MM3 (ref 0–0.2)
BASOPHILS NFR BLD AUTO: 0.1 % (ref 0–1.5)
BUN SERPL-MCNC: 20 MG/DL (ref 8–23)
BUN/CREAT SERPL: 27.4 (ref 7–25)
CALCIUM SPEC-SCNC: 8.5 MG/DL (ref 8.6–10.5)
CHLORIDE SERPL-SCNC: 97 MMOL/L (ref 98–107)
CO2 SERPL-SCNC: 35 MMOL/L (ref 22–29)
CREAT SERPL-MCNC: 0.73 MG/DL (ref 0.57–1)
DEPRECATED RDW RBC AUTO: 48.5 FL (ref 37–54)
EGFRCR SERPLBLD CKD-EPI 2021: 90.3 ML/MIN/1.73
EOSINOPHIL # BLD AUTO: 0 10*3/MM3 (ref 0–0.4)
EOSINOPHIL NFR BLD AUTO: 0 % (ref 0.3–6.2)
ERYTHROCYTE [DISTWIDTH] IN BLOOD BY AUTOMATED COUNT: 15.6 % (ref 12.3–15.4)
GLUCOSE SERPL-MCNC: 136 MG/DL (ref 65–99)
HCT VFR BLD AUTO: 32.4 % (ref 34–46.6)
HGB BLD-MCNC: 10.1 G/DL (ref 12–15.9)
IMM GRANULOCYTES # BLD AUTO: 0.14 10*3/MM3 (ref 0–0.05)
IMM GRANULOCYTES NFR BLD AUTO: 0.8 % (ref 0–0.5)
LYMPHOCYTES # BLD AUTO: 0.68 10*3/MM3 (ref 0.7–3.1)
LYMPHOCYTES NFR BLD AUTO: 3.8 % (ref 19.6–45.3)
MAGNESIUM SERPL-MCNC: 2.3 MG/DL (ref 1.6–2.4)
MCH RBC QN AUTO: 28.9 PG (ref 26.6–33)
MCHC RBC AUTO-ENTMCNC: 31.2 G/DL (ref 31.5–35.7)
MCV RBC AUTO: 92.6 FL (ref 79–97)
MONOCYTES # BLD AUTO: 0.6 10*3/MM3 (ref 0.1–0.9)
MONOCYTES NFR BLD AUTO: 3.3 % (ref 5–12)
NEUTROPHILS NFR BLD AUTO: 16.6 10*3/MM3 (ref 1.7–7)
NEUTROPHILS NFR BLD AUTO: 92 % (ref 42.7–76)
NRBC BLD AUTO-RTO: 0 /100 WBC (ref 0–0.2)
PLATELET # BLD AUTO: 486 10*3/MM3 (ref 140–450)
PMV BLD AUTO: 9.6 FL (ref 6–12)
POTASSIUM SERPL-SCNC: 5.1 MMOL/L (ref 3.5–5.2)
QT INTERVAL: 332 MS
QTC INTERVAL: 465 MS
RBC # BLD AUTO: 3.5 10*6/MM3 (ref 3.77–5.28)
SODIUM SERPL-SCNC: 140 MMOL/L (ref 136–145)
WBC NRBC COR # BLD AUTO: 18.04 10*3/MM3 (ref 3.4–10.8)

## 2025-02-24 PROCEDURE — 94761 N-INVAS EAR/PLS OXIMETRY MLT: CPT

## 2025-02-24 PROCEDURE — 25010000002 AZITHROMYCIN PER 500 MG: Performed by: INTERNAL MEDICINE

## 2025-02-24 PROCEDURE — 99497 ADVNCD CARE PLAN 30 MIN: CPT | Performed by: CLINICAL NURSE SPECIALIST

## 2025-02-24 PROCEDURE — 25010000002 METHYLPREDNISOLONE PER 40 MG: Performed by: INTERNAL MEDICINE

## 2025-02-24 PROCEDURE — 94660 CPAP INITIATION&MGMT: CPT

## 2025-02-24 PROCEDURE — 97110 THERAPEUTIC EXERCISES: CPT | Performed by: OCCUPATIONAL THERAPIST

## 2025-02-24 PROCEDURE — 80048 BASIC METABOLIC PNL TOTAL CA: CPT | Performed by: PHYSICIAN ASSISTANT

## 2025-02-24 PROCEDURE — 25810000003 SODIUM CHLORIDE 0.9 % SOLUTION 250 ML FLEX CONT: Performed by: INTERNAL MEDICINE

## 2025-02-24 PROCEDURE — 94664 DEMO&/EVAL PT USE INHALER: CPT

## 2025-02-24 PROCEDURE — 25010000002 ENOXAPARIN PER 10 MG: Performed by: INTERNAL MEDICINE

## 2025-02-24 PROCEDURE — 94799 UNLISTED PULMONARY SVC/PX: CPT

## 2025-02-24 PROCEDURE — 97535 SELF CARE MNGMENT TRAINING: CPT | Performed by: OCCUPATIONAL THERAPIST

## 2025-02-24 PROCEDURE — 97162 PT EVAL MOD COMPLEX 30 MIN: CPT

## 2025-02-24 PROCEDURE — 99222 1ST HOSP IP/OBS MODERATE 55: CPT | Performed by: CLINICAL NURSE SPECIALIST

## 2025-02-24 PROCEDURE — 85025 COMPLETE CBC W/AUTO DIFF WBC: CPT | Performed by: PHYSICIAN ASSISTANT

## 2025-02-24 PROCEDURE — 83735 ASSAY OF MAGNESIUM: CPT | Performed by: PHYSICIAN ASSISTANT

## 2025-02-24 RX ORDER — ASPIRIN 81 MG/1
81 TABLET ORAL ONCE
Status: CANCELLED | OUTPATIENT
Start: 2025-02-24 | End: 2025-02-24

## 2025-02-24 RX ORDER — OXYCODONE HYDROCHLORIDE 5 MG/1
5 TABLET ORAL ONCE
Status: COMPLETED | OUTPATIENT
Start: 2025-02-24 | End: 2025-02-24

## 2025-02-24 RX ORDER — SODIUM CHLORIDE 0.9 % (FLUSH) 0.9 %
5-40 SYRINGE (ML) INJECTION EVERY 12 HOURS SCHEDULED
Status: CANCELLED | OUTPATIENT
Start: 2025-02-24

## 2025-02-24 RX ORDER — SODIUM CHLORIDE 9 MG/ML
INJECTION, SOLUTION INTRAVENOUS PRN
Status: CANCELLED | OUTPATIENT
Start: 2025-02-24

## 2025-02-24 RX ORDER — SODIUM CHLORIDE 0.9 % (FLUSH) 0.9 %
5-40 SYRINGE (ML) INJECTION PRN
Status: CANCELLED | OUTPATIENT
Start: 2025-02-24

## 2025-02-24 RX ADMIN — METHYLPREDNISOLONE SODIUM SUCCINATE 40 MG: 40 INJECTION, POWDER, FOR SOLUTION INTRAMUSCULAR; INTRAVENOUS at 01:55

## 2025-02-24 RX ADMIN — GABAPENTIN 600 MG: 300 CAPSULE ORAL at 09:34

## 2025-02-24 RX ADMIN — Medication 1 APPLICATION: at 17:47

## 2025-02-24 RX ADMIN — TOBRAMYCIN OPHTHALMIC SOLUTION 2 DROP: 3 SOLUTION/ DROPS OPHTHALMIC at 17:47

## 2025-02-24 RX ADMIN — ROPINIROLE HYDROCHLORIDE 0.25 MG: 0.25 TABLET, FILM COATED ORAL at 20:06

## 2025-02-24 RX ADMIN — IPRATROPIUM BROMIDE AND ALBUTEROL SULFATE 3 ML: .5; 3 SOLUTION RESPIRATORY (INHALATION) at 19:19

## 2025-02-24 RX ADMIN — OXYCODONE HYDROCHLORIDE 5 MG: 5 TABLET ORAL at 05:39

## 2025-02-24 RX ADMIN — METHYLPREDNISOLONE SODIUM SUCCINATE 40 MG: 40 INJECTION, POWDER, FOR SOLUTION INTRAMUSCULAR; INTRAVENOUS at 07:43

## 2025-02-24 RX ADMIN — TOBRAMYCIN OPHTHALMIC SOLUTION 2 DROP: 3 SOLUTION/ DROPS OPHTHALMIC at 13:47

## 2025-02-24 RX ADMIN — METHYLPREDNISOLONE SODIUM SUCCINATE 40 MG: 40 INJECTION, POWDER, FOR SOLUTION INTRAMUSCULAR; INTRAVENOUS at 20:06

## 2025-02-24 RX ADMIN — BUDESONIDE 0.5 MG: 0.5 INHALANT RESPIRATORY (INHALATION) at 06:29

## 2025-02-24 RX ADMIN — TOBRAMYCIN OPHTHALMIC SOLUTION 2 DROP: 3 SOLUTION/ DROPS OPHTHALMIC at 21:03

## 2025-02-24 RX ADMIN — CHLORHEXIDINE GLUCONATE 1 APPLICATION: 500 CLOTH TOPICAL at 04:33

## 2025-02-24 RX ADMIN — CITALOPRAM HYDROBROMIDE 40 MG: 20 TABLET ORAL at 09:33

## 2025-02-24 RX ADMIN — TOBRAMYCIN OPHTHALMIC SOLUTION 2 DROP: 3 SOLUTION/ DROPS OPHTHALMIC at 06:13

## 2025-02-24 RX ADMIN — TIZANIDINE 2 MG: 4 TABLET ORAL at 21:03

## 2025-02-24 RX ADMIN — OXYCODONE HYDROCHLORIDE 5 MG: 5 TABLET ORAL at 12:00

## 2025-02-24 RX ADMIN — IPRATROPIUM BROMIDE AND ALBUTEROL SULFATE 3 ML: .5; 3 SOLUTION RESPIRATORY (INHALATION) at 06:29

## 2025-02-24 RX ADMIN — Medication 10 ML: at 20:07

## 2025-02-24 RX ADMIN — Medication 1 APPLICATION: at 06:13

## 2025-02-24 RX ADMIN — MIRTAZAPINE 15 MG: 15 TABLET, FILM COATED ORAL at 20:06

## 2025-02-24 RX ADMIN — GABAPENTIN 600 MG: 300 CAPSULE ORAL at 20:06

## 2025-02-24 RX ADMIN — BUDESONIDE 0.5 MG: 0.5 INHALANT RESPIRATORY (INHALATION) at 19:19

## 2025-02-24 RX ADMIN — SENNOSIDES, DOCUSATE SODIUM 2 TABLET: 50; 8.6 TABLET, FILM COATED ORAL at 09:33

## 2025-02-24 RX ADMIN — Medication 10 ML: at 09:36

## 2025-02-24 RX ADMIN — METHYLPREDNISOLONE SODIUM SUCCINATE 40 MG: 40 INJECTION, POWDER, FOR SOLUTION INTRAMUSCULAR; INTRAVENOUS at 13:46

## 2025-02-24 RX ADMIN — ENOXAPARIN SODIUM 40 MG: 100 INJECTION SUBCUTANEOUS at 13:46

## 2025-02-24 RX ADMIN — IPRATROPIUM BROMIDE AND ALBUTEROL SULFATE 3 ML: .5; 3 SOLUTION RESPIRATORY (INHALATION) at 14:53

## 2025-02-24 RX ADMIN — AZITHROMYCIN DIHYDRATE 500 MG: 500 INJECTION, POWDER, LYOPHILIZED, FOR SOLUTION INTRAVENOUS at 13:46

## 2025-02-24 RX ADMIN — IPRATROPIUM BROMIDE AND ALBUTEROL SULFATE 3 ML: .5; 3 SOLUTION RESPIRATORY (INHALATION) at 10:35

## 2025-02-24 RX ADMIN — LEVOTHYROXINE SODIUM 112 MCG: 112 TABLET ORAL at 09:34

## 2025-02-24 RX ADMIN — OXYCODONE HYDROCHLORIDE 5 MG: 5 TABLET ORAL at 21:03

## 2025-02-24 RX ADMIN — TOBRAMYCIN OPHTHALMIC SOLUTION 2 DROP: 3 SOLUTION/ DROPS OPHTHALMIC at 09:36

## 2025-02-24 RX ADMIN — OXYCODONE HYDROCHLORIDE 5 MG: 5 TABLET ORAL at 18:17

## 2025-02-24 NOTE — THERAPY EVALUATION
Patient Name: Ashley Gibbs  : 1955    MRN: [id number]                              Today's Date: 2025       Admit Date: 2025    Visit Dx:     ICD-10-CM ICD-9-CM   1. Altered mental status, unspecified altered mental status type  R41.82 780.97   2. Elevated troponin  R79.89 790.6   3. Hyperkalemia  E87.5 276.7   4. Hyperammonemia  E72.20 270.6   5. Dysphagia, unspecified type  R13.10 787.20     Patient Active Problem List   Diagnosis    Acute respiratory failure with hypoxemia    Age-related osteoporosis without current pathological fracture    Anxiety    Stage 3 severe COPD by GOLD classification    Chronic pain disorder    Depression    Excessive daytime sleepiness    Hemoptysis    Family history of renal cell carcinoma    Hyperlipidemia    Hypothyroidism    Migraines    Mild episode of recurrent major depressive disorder    Neuropathy    Obstructive sleep apnea    Primary insomnia    Renal mass    Restless legs syndrome    On supplemental oxygen therapy    Family history of CHF (congestive heart failure)    Family history of diabetes mellitus    6 cm exophytic left upper pole renal cyst, likely hemorrhagic    Acute bilateral pyelonephritis    Hypokalemia    E. coli UTI, present on admission (urinary tract infection)    Paroxysmal supraventricular tachycardia    Sepsis due to Escherichia coli UTI, bilateral pyelonephritis, present on admission    Vitamin D deficiency    Lung mass    Former smoker    History of radiation therapy    Panic attacks    Small cell lung cancer    Chronic bronchitis    Acute respiratory failure    Influenza A    Leukopenia due to chemotherapy    Thrombocytopenia    Altered mental status    Altered mental status, unspecified altered mental status type    Narcotic overdose     Past Medical History:   Diagnosis Date    Anxiety     Arthritis     Asthma     Back pain     COPD (chronic obstructive pulmonary disease)     Dependence on supplemental oxygen     Depression      Disease of thyroid gland     Fibromyalgia, primary     Headache     History of degenerative disc disease     Hypertension     Hypothyroidism     Low back pain     Restless leg     Scoliosis      Past Surgical History:   Procedure Laterality Date    APPENDECTOMY      BREAST BIOPSY Left     BRONCHOSCOPY Right 12/23/2024    Procedure: BRONCHOSCOPY WITH ENDOBRONCHIAL ULTRASOUND;  Surgeon: Peterson Morales MD;  Location: [facility];  Service: Pulmonary;  Laterality: Right;  pre: lung mass  post: lung mass    CHOLECYSTECTOMY      TUBAL ABDOMINAL LIGATION        General Information       Row Name 02/24/25 0830          Physical Therapy Time and Intention    Document Type evaluation  Pt admitted to ED by EMS for AMS secondary to drug overdose, pt called 911 and Narcan administered. Dx: narcotic oversode. Hx: chronic LBP, COPD, HTN, former smoker, diagnosed 12/2024 small cell cancer involving multiple areas of the lung/mediastinum.  -TONNY (r) EW (t) TONNY (c)     Mode of Treatment physical therapy  -TONNY (r) EW (t) TONNY (c)       Row Name 02/24/25 0830          General Information    Patient Profile Reviewed yes  -TONNY (r) EW (t) TONNY (c)     Prior Level of Function independent:;all household mobility;community mobility;ADL's;max assist:;home management;cooking;cleaning;driving;shopping  -TONNY (r) EW (t) TONNY (c)     Existing Precautions/Restrictions fall;oxygen therapy device and L/min   -TONNY (r) EW (t) TONNY (c)     Barriers to Rehab medically complex  -TONNY (r) EW (t) TONNY (c)       Row Name 02/24/25 0830          Living Environment    People in Home grandchild(melany)  -TONNY (r) EW (t) TONNY (c)       Row Name 02/24/25 0830          Home Main Entrance    Number of Stairs, Main Entrance two  -TONNY (r) EW (t) TONNY (c)     Stair Railings, Main Entrance railing on left side (ascending)  -TONNY (r) EW (t) TONNY (c)       Row Name 02/24/25 0830          Stairs Within Home, Primary    Number of Stairs, Within Home, Primary twelve  -TONNY (r) EW (t) TONNY (c)     Stair  Railings, Within Home, Primary railings on both sides of stairs  -TONNY (r) EW (t) TONNY (c)       Row Name 02/24/25 0830          Cognition    Orientation Status (Cognition) oriented x 4  -TONNY (r) EW (t) TONNY (c)       Row Name 02/24/25 0830          Safety Issues/Impairments Affecting Functional Mobility    Safety Issues Affecting Function (Mobility) awareness of need for assistance;insight into deficits/self-awareness;safety precaution awareness;safety precautions follow-through/compliance  -TONNY (r) EW (t) TONNY (c)     Impairments Affecting Function (Mobility) endurance/activity tolerance;shortness of breath;strength  -TONNY (r) EW (t) TONNY (c)               User Key  (r) = Recorded By, (t) = Taken By, (c) = Cosigned By      Initials Name Provider Type    Mendel Velasco, PT DPT Physical Therapist    [facility], Luz Marina PT Student PT Student                   Mobility       Row Name 02/24/25 0830          Bed Mobility    Bed Mobility supine-sit  -TONNY (r) EW (t) TONNY (c)     Supine-Sit [place] (Bed Mobility) supervision  -TONNY (r) EW (t) TONNY (c)     Assistive Device (Bed Mobility) head of bed elevated;bed rails  -TONNY (r) EW (t) TONNY (c)       Row Name 02/24/25 0830          Bed-Chair Transfer    Bed-Chair [place] (Transfers) contact guard;verbal cues;nonverbal cues (demo/gesture)  -TONNY (r) EW (t) TONNY (c)       Row Name 02/24/25 0830          Sit-Stand Transfer    Sit-Stand [place] (Transfers) contact guard  -TONNY (r) EW (t) TONNY (c)       Row Name 02/24/25 0830          Gait/Stairs (Locomotion)    Distance in Feet (Gait) 5  -TONNY (r) EW (t) TONNY (c)     Deviations/Abnormal Patterns (Gait) stride length decreased  -TONNY (r) EW (t) TONNY (c)               User Key  (r) = Recorded By, (t) = Taken By, (c) = Cosigned By      Initials Name Provider Type    Mendel Velasco, PT DPT Physical Therapist    [facility], Luz Marina PT Student PT Student                   Obj/Interventions       Row Name 02/24/25 0830          Range of Motion  Comprehensive    General Range of Motion bilateral lower extremity ROM WFL  -TONNY (r) EW (t) TONNY (c)       Row Name 02/24/25 0830          Strength Comprehensive (MMT)    Comment, General Manual Muscle Testing (MMT) Assessment BLE strength 4/5  -TONNY (r) EW (t) TONNY (c)       Row Name 02/24/25 0830          Balance    Balance Assessment sitting static balance;sitting dynamic balance;standing static balance;standing dynamic balance  -TONNY (r) EW (t) TONNY (c)     Static Sitting Balance supervision  -TONNY (r) EW (t) TONNY (c)     Dynamic Sitting Balance supervision  -TONNY (r) EW (t) TONNY (c)     Position, Sitting Balance unsupported;sitting edge of bed  -TONNY (r) EW (t) TONNY (c)     Static Standing Balance contact guard;verbal cues  -TONNY (r) EW (t) TONNY (c)     Dynamic Standing Balance contact guard;verbal cues  -TONNY (r) EW (t) TONNY (c)     Position/Device Used, Standing Balance supported;other (see comments)  HHA  -TONNY (r) EW (t) TONNY (c)       Row Name 02/24/25 0830          Sensory Assessment (Somatosensory)    Sensory Assessment (Somatosensory) LE sensation intact  -TONNY (r) EW (t) TONNY (c)               User Key  (r) = Recorded By, (t) = Taken By, (c) = Cosigned By      Initials Name Provider Type    Mendel Velasco, PT DPT Physical Therapist    Luz Marina Boss, PT Student PT Student                   Goals/Plan       Row Name 02/24/25 0830          Transfer Goal 1 (PT)    Activity/Assistive Device (Transfer Goal 1, PT) sit-to-stand/stand-to-sit;bed-to-chair/chair-to-bed  -TONNY (r) EW (t) TONNY (c)     [place] Level/Cues Needed (Transfer Goal 1, PT) standby assist  -TONNY (r) EW (t) TONNY (c)     Time Frame (Transfer Goal 1, PT) long term goal (LTG);10 days  -TONNY (r) EW (t) TONNY (c)     Progress/Outcome (Transfer Goal 1, PT) new goal  -TONNY (r) EW (t) TONNY (c)       Row Name 02/24/25 0830          Gait Training Goal 1 (PT)    Activity/Assistive Device (Gait Training Goal 1, PT) gait (walking locomotion);decrease fall risk;improve balance and speed;increase  endurance/gait distance;increase energy conservation  -TONNY (r) EW (t) TONNY (c)     [place] Level (Gait Training Goal 1, PT) contact guard required  -TONNY (r) EW (t) TONNY (c)     Distance (Gait Training Goal 1, PT) 50ft while maintaining SpO2 in 90s  -TONNY (r) EW (t) TONNY (c)     Time Frame (Gait Training Goal 1, PT) long term goal (LTG);10 days  -TONNY (r) EW (t) TONNY (c)     Progress/Outcome (Gait Training Goal 1, PT) new goal  -TONNY (r) EW (t) TONNY (c)       Row Name 02/24/25 0830          Stairs Goal 1 (PT)    Activity/Assistive Device (Stairs Goal 1, PT) ascending stairs;descending stairs  -TONNY (r) EW (t) TONNY (c)     [place] Level/Cues Needed (Stairs Goal 1, PT) contact guard required  -TONNY (r) EW (t) TONNY (c)     Number of Stairs (Stairs Goal 1, PT) 12  -TONNY (r) EW (t) TONNY (c)     Time Frame (Stairs Goal 1, PT) long term goal (LTG);10 days  -TONNY (r) EW (t) TONNY (c)     Progress/Outcome (Stairs Goal 1, PT) new goal  -TONNY (r) EW (t) TONNY (c)       Row Name 02/24/25 0830          Therapy Assessment/Plan (PT)    Planned Therapy Interventions (PT) gait training;home exercise program;patient/family education;ROM (range of motion);stair training;strengthening;transfer training;bed mobility training;balance training  -TONNY               User Key  (r) = Recorded By, (t) = Taken By, (c) = Cosigned By      Initials Name Provider Type    Mendel Velasco, PT DPT Physical Therapist    Luz Marina Boss, PT Student PT Student                   Clinical Impression       Row Name 02/24/25 0830          Pain    Pretreatment Pain Rating 6/10  -TONNY (r) EW (t) TONNY (c)     Posttreatment Pain Rating 6/10  -TONNY (r) EW (t) TONNY (c)     Pain Location back  -TONNY (r) EW (t) TONNY (c)     Pain Side/Orientation lower  -TONNY (r) EW (t) TONNY (c)     Pain Management Interventions exercise or physical activity utilized;positioning techniques utilized;nursing notified  -TONNY (r) [place]AP[place] (t) TONNY ([place])       Bertin Name 02/24/25 1934          Plan of Care Review    Plan of Care Reviewed With  patient;family  -TONNY (r) EW (t) TONNY (c)     Outcome Evaluation PT eval completed. Pt in fowlers position with family at bedside upon therapy arrival. VSS on VapoTherm 30L 80% O2. SpO2 monitored throughout session and documented below. Pt c/o moderated low back pain but denies any other symptoms at this time. A&Ox4, pleasant and agreeable to therapy. Ms. Gibbs reports at baseline she is independent with ADLs and mobility. She denies the use of an AD with household or community ambulation. She is currently living with family and reports her bedroom is on the second floor of the home. She expressed some frustration during session and reports she just wants to go home. Explained the O2 requirements she is currently on and educated on how PT can help while she is here. Pt able to t/f supine>sit Mod I with use of bedrails and HOB elevated. Pt demos good static and dynamic balance sitting EOB. BLE ROM WFL and strength grossly 4/5. LE sensation intact with no reports of numbness or tingling in legs/feet. Pt stood with CGA/HHA and t/f to chair at bedside. O2 desatted to low 80s during ambulation. Pt left in recliner with with call light in reach, family at side, and MD in room. Pt educated on diaphragmatic breathing exercises to improve O2 levels and activity tolerance. Ms. Gibbs will continue to benefit from skilled physical therapy to address identified deficits in functional strength, mobility, and activity tolerance to prevent further decline and return to PLOF. Anticipate d/c home and recommend  for continued therapy services.  -TONNY (r) EW (t) TONNY (c)       Row Name 02/24/25 9324          Therapy Assessment/Plan (PT)    Patient/Family Therapy Goals Statement (PT) return home  -TONNY (r) EW (t) TONNY (c)     Rehab Potential (PT) fair  -TONNY (r) EW (t) TONNY (c)     Criteria for Skilled Interventions Met (PT) yes;meets criteria  -TONNY (r) EW (t) TONNY (c)     Therapy Frequency (PT) daily  -TONNY (r) EW (t) TONNY (c)     Predicted Duration of  Therapy Intervention (PT) until d/c  -TONNY (r) EW (t) TONNY (c)       Row Name 02/24/25 0830          Vital Signs    Pre Systolic BP Rehab 126  -TONNY (r) EW (t) TONNY (c)     Pre Treatment Diastolic BP 74  -TONNY (r) EW (t) TONNY (c)     Post Systolic BP Rehab 128  -TONNY (r) EW (t) TONNY (c)     Post Treatment Diastolic BP 72  -TONNY (r) EW (t) TONNY (c)     Pretreatment Heart Rate (beats/min) 115  -TONNY (r) EW (t) TONNY (c)     Intratreatment Heart Rate (beats/min) 131  -TONNY (r) EW (t) TONNY (c)     Posttreatment Heart Rate (beats/min) 114  -TONNY (r) EW (t) TONNY (c)     Pre SpO2 (%) 93  -TONNY (r) EW (t) TONNY (c)     O2 Delivery Pre Treatment supplemental O2  VapoTherm 30L 80% O2  -TONNY (r) EW (t) TONNY (c)     Intra SpO2 (%) 81  -TONNY (r) EW (t) TONNY (c)     O2 Delivery Intra Treatment supplemental O2  VapoTherm 30L 80% O2  -TONNY (r) EW (t) TONNY (c)     Post SpO2 (%) 94  -TONNY (r) EW (t) TONNY (c)     O2 Delivery Post Treatment supplemental O2  VapoTherm 30L 80% O2  -TONNY (r) EW (t) TONNY (c)     Pre Patient Position Supine  -TONNY (r) EW (t) TONNY (c)     Intra Patient Position Standing  -TONNY (r) EW (t) TONNY (c)     Post Patient Position Sitting  -TONNY (r) EW (t) OTNNY (c)       Row Name 02/24/25 0830          Positioning and Restraints    Pre-Treatment Position in bed  -TONNY (r) EW (t) TONNY (c)     Post Treatment Position chair  -TONNY (r) EW (t) TONNY (c)     In Chair notified nsg;reclined;call light within reach;encouraged to call for assist;with family/caregiver;with other staff;patient within staff view  -TONNY (r) EW (t) TONNY (c)               User Key  (r) = Recorded By, (t) = Taken By, (c) = Cosigned By      Initials Name Provider Type    Mendel Velasco, PT DPT Physical Therapist    EW Leonardo, Luz Marina, PT Student PT Student                   Outcome Measures       Row Name 02/24/25 0830 02/24/25 0800       How much help from another person do you currently need...    Turning from your back to your side while in flat bed without using bedrails? 4  -TONNY (r) EW (t) TONNY (c) 3  -HT    Moving from lying on  back to sitting on the side of a flat bed without bedrails? 4  -TONNY (r) EW (t) TONNY (c) 3  -HT    Moving to and from a bed to a chair (including a wheelchair)? 3  -TONNY (r) EW (t) TONNY (c) 3  -HT    Standing up from a chair using your arms (e.g., wheelchair, bedside chair)? 3  -TONNY (r) EW (t) TONNY (c) 2  -HT    Climbing 3-5 steps with a railing? 2  -TONNY (r) EW (t) TONNY (c) 2  -HT    To walk in hospital room? 3  -TONNY (r) EW (t) TONNY (c) 2  -HT    AM-PAC 6 Clicks Score (PT) 19  -TONNY (r) EW (t) 15  -HT    Highest Level of Mobility Goal 6 --> Walk 10 steps or more  -TONNY (r) EW (t) 4 --> Transfer to chair/commode  -HT      Row Name 02/24/25 0830 02/24/25 0829       Functional Assessment    Outcome Measure Options AM-PAC 6 Clicks Basic Mobility (PT)  -TONNY (r) EW (t) TONNY (c) AM-PAC 6 Clicks Daily Activity (OT)  -              User Key  (r) = Recorded By, (t) = Taken By, (c) = Cosigned By      Initials Name Provider Type    Mendel Velasco, PT DPT Physical Therapist     Anita Fleming, RN Registered Nurse    Geovanna Magana, OTR/L, CSRS Occupational Therapist     Luz Marina Leonardo, PT Student PT Student                                 Physical Therapy Education       Title: PT OT SLP Therapies (In Progress)       Topic: Physical Therapy (In Progress)       Point: Mobility training (Done)       Learning Progress Summary            Patient Acceptance, E, VU by  at 2/24/2025 0830    Comment: PT care plan and progressions. Educated on diaphragmatic breathing techniques.                      Point: Home exercise program (Not Started)       Learner Progress:  Not documented in this visit.              Point: Body mechanics (Not Started)       Learner Progress:  Not documented in this visit.              Point: Precautions (Not Started)       Learner Progress:  Not documented in this visit.                              User Key       Initials Effective Dates Name Provider Type Discipline     12/16/24 -  Luz Marina Leonardo, PT Student PT  Student PT                  PT Recommendation and Plan     Outcome Evaluation: PT heidy completed. Pt in fowlers position with family at bedside upon therapy arrival. VSS on VapoTherm 30L 80% O2. SpO2 monitored throughout session and documented below. Pt c/o moderated low back pain but denies any other symptoms at this time. A&Ox4, pleasant and agreeable to therapy. Ms. Gibbs reports at baseline she is independent with ADLs and mobility. She denies the use of an AD with household or community ambulation. She is currently living with family and reports her bedroom is on the second floor of the home. She expressed some frustration during session and reports she just wants to go home. Explained the O2 requirements she is currently on and educated on how PT can help while she is here. Pt able to t/f supine>sit Mod I with use of bedrails and HOB elevated. Pt demos good static and dynamic balance sitting EOB. BLE ROM WFL and strength grossly 4/5. LE sensation intact with no reports of numbness or tingling in legs/feet. Pt stood with CGA/HHA and t/f to chair at bedside. O2 desatted to low 80s during ambulation. Pt left in recliner with with call light in reach, family at side, and MD in room. Pt educated on diaphragmatic breathing exercises to improve O2 levels and activity tolerance. Ms. Gibbs will continue to benefit from skilled physical therapy to address identified deficits in functional strength, mobility, and activity tolerance to prevent further decline and return to PLOF. Anticipate d/c home and recommend  for continued therapy services.     Time Calculation:         PT Charges       Row Name 02/24/25 0830             Time Calculation    Start Time 0830  -TONNY (r) EW (t) TONNY (c)      Stop Time 0910  -TONNY (r) EW (t) TONNY (c)      Time Calculation (min) 40 min  -TONNY (r) EW (t)      PT Received On 02/24/25  -TONNY (r) EW (t) TONNY (c)      PT Goal Re-Cert Due Date 03/06/25  -TONNY (r) EW (t) TONNY (c)                User Key  (r) =  Recorded By, (t) = Taken By, (c) = Cosigned By      Initials Name Provider Type    Mendel Velasco, PT DPT Physical Therapist    Luz Marina Boss, PT Student PT Student                      PT G-Codes  Outcome Measure Options: AM-PAC 6 Clicks Basic Mobility (PT)  AM-PAC 6 Clicks Score (PT): 19  AM-PAC 6 Clicks Score (OT): 20  PT Discharge Summary  Anticipated Discharge Disposition (PT): home with home health    Luz Marina Leonardo, PT Student  2/24/2025

## 2025-02-24 NOTE — H&P
Patient Care Team:  Celine Gomez as PCP - General  Florencia Bates MD as Consulting Physician (Oncology)        Fozia Briceno 69 y.o. female with a history of lung cancer.  She has poor venous access.  She needs chemo and needs a mediport  Patient Active Problem List   Diagnosis    Hypothyroidism    Anxiety    Depression    Chronic obstructive pulmonary disease (HCC)    Hyperlipidemia    Restless legs syndrome    Migraines    Smoking addiction    Vitamin D deficiency    Renal mass    Obstructive sleep apnea    CPAP (continuous positive airway pressure) dependence    Primary insomnia    Sepsis (HCC)    Transaminitis    Acute hypoxemic respiratory failure (HCC)    Hemoptysis    Age-related osteoporosis without current pathological fracture    Neuropathy    Family history of renal cell carcinoma    Excessive daytime sleepiness    Chronic pain syndrome    Mild episode of recurrent major depressive disorder    Neuroendocrine carcinoma of lung (HCC)      See attached meds  Allergies:  Codeine  Past Medical History:   Diagnosis Date    Anxiety     Asthma     Cancer (HCC)     Neuroendocrine carcinoma of right lung    COPD (chronic obstructive pulmonary disease) (HCC)     CPAP (continuous positive airway pressure) dependence     11cm    Depression     Hyperlipidemia     Hypothyroidism     Migraines     Obstructive sleep apnea     AHI:  13.1    Restless legs syndrome     Sarcoidosis     Scoliosis     Unspecified sleep apnea       Past Surgical History:   Procedure Laterality Date    APPENDECTOMY  age 20    Patient thinks this was removed with GB    BREAST SURGERY  2006    bilateral breast tumor removal-[facility]    BRONCHOSCOPY N/A 7/29/2019    BRONCHOSCOPY BIOPSY BRONCHUS performed by Donnie Degroot MD at [facility]    CHOLECYSTECTOMY  age 20      Family History   Problem Relation Age of Onset    Heart Disease Mother     Diabetes Father     Heart Disease Father     Diabetes Sister     Heart

## 2025-02-24 NOTE — PROGRESS NOTES
[facility] Intensivist Services  INPATIENT PROGRESS NOTE    Patient Name: Ashley Gibbs  Date of Admission: 2025  Today's Date: 25  Length of Stay:  LOS: 2 days   Primary Care Physician: Padmaja Bermudez APRN  Next of Kin: Primary Emergency Contact: SELAM SANCHEZ Home Phone: [phone]      Subjective   Chief Complaint: Narcotic overdose  Drug Overdose     69-year-old female with a past medical history of COPD on 2 L O2 at home at baseline, anxiety, arthritis, and restless leg syndrome admitted on 2025 after a narcotic overdose.  She was recently diagnosed in 2024 by fine-needle aspiration of small cell lung cancer involving multiple areas of the lung and mediastinum.  She has extensive small cell cancer by definition.  She also has chronic back pain, and family thinks she took too many of her oxycodone because she forgot that she had already taken them.  Patient began struggling to breathe at home.  911 was called, and Narcan was given.  She was then admitted by the intensivist team after being worked up in the emergency department.    Interval history:  2025: Patient requesting to go home this morning.  However, oxygen requirements have increased, and patient is now requiring noninvasive ventilation.  She is also requesting to be put back on her home dose of oxycodone as she is fearful of taking the fentanyl patch.  She states that she has no to people who have  while using fentanyl patches.    2025: Patient was still like to go home.  She is very concerned about when she will receive her next radiation treatment and when she will have her port inserted.  We tried to explain to her that it is unsafe for her to receive chemo or radiation at this time as she is still requiring a lot of oxygen.    Review of Systems   All pertinent negatives and positives are as above. All other systems have been reviewed and are negative unless otherwise  stated.     Past Medical and Past Surgical History   Active and Resolved Problems  Active Hospital Problems    Diagnosis  POA    **Narcotic overdose [T40.601A]  Yes    Altered mental status [R41.82]  Yes    Altered mental status, unspecified altered mental status type [R41.82]  Yes      Resolved Hospital Problems   No resolved problems to display.       Past Medical History:   Past Medical History:   Diagnosis Date    Anxiety     Arthritis     Asthma     Back pain     COPD (chronic obstructive pulmonary disease)     Dependence on supplemental oxygen     Depression     Disease of thyroid gland     Fibromyalgia, primary     Headache     History of degenerative disc disease     Hypertension     Hypothyroidism     Low back pain     Restless leg     Scoliosis      Past Surgical History:   Past Surgical History:   Procedure Laterality Date    APPENDECTOMY      BREAST BIOPSY Left     BRONCHOSCOPY Right 12/23/2024    Procedure: BRONCHOSCOPY WITH ENDOBRONCHIAL ULTRASOUND;  Surgeon: Peterson Morales MD;  Location: [facility] OR;  Service: Pulmonary;  Laterality: Right;  pre: lung mass  post: lung mass    CHOLECYSTECTOMY      TUBAL ABDOMINAL LIGATION       Social and Family History   Family History:  family history includes Arthritis in her mother; COPD in her father; Diabetes in her father; Heart disease in her father; Hypertension in her mother.    Tobacco/Social History:  reports that she quit smoking about 16 years ago. Her smoking use included cigarettes. She started smoking about 56 years ago. She has a 40 pack-year smoking history. She has been exposed to tobacco smoke. She has never used smokeless tobacco. She reports that she does not drink alcohol and does not use drugs.    Allergies   Allergies:   She is allergic to codeine.    Objective    Temp:  [96.9 °F (36.1 °C)-98.2 °F (36.8 °C)] 97 °F (36.1 °C)  Heart Rate:  [] 110  Resp:  [15-23] 21  BP: (105-149)/(60-96) 149/89  Physical Exam  Vitals reviewed. Exam  conducted with a chaperone present.   Constitutional:       Appearance: She is ill-appearing. She is not toxic-appearing.   HENT:      Head: Normocephalic and atraumatic.      Nose: Nose normal.   Cardiovascular:      Rate and Rhythm: Regular rhythm. Tachycardia present.      Pulses: Normal pulses.   Pulmonary:      Breath sounds: No stridor. Wheezing present. No rhonchi.      Comments: Crackles noted at bilateral bases. Scattered wheezing in both lungs. Otherwise, decreased air movement also noted.   Abdominal:      General: Bowel sounds are normal.      Palpations: Abdomen is soft.      Tenderness: There is no abdominal tenderness.   Musculoskeletal:         General: Normal range of motion.   Skin:     General: Skin is warm.      Capillary Refill: Capillary refill takes less than 2 seconds.   Neurological:      General: No focal deficit present.      Mental Status: She is alert and oriented to person, place, and time. Mental status is at baseline.         Inpatient Medications   Medications: Scheduled Meds:azithromycin, 500 mg, Intravenous, Q24H  budesonide, 0.5 mg, Nebulization, BID - RT  Chlorhexidine Gluconate Cloth, 1 Application, Topical, Q24H  citalopram, 40 mg, Oral, Daily  enoxaparin, 40 mg, Subcutaneous, Q24H  gabapentin, 600 mg, Oral, Q12H  ipratropium-albuterol, 3 mL, Nebulization, 4x Daily - RT  lactulose, 30 g, Oral, Once  levothyroxine, 112 mcg, Oral, Daily  methylPREDNISolone sodium succinate, 40 mg, Intravenous, Q6H   Followed by  [START ON 2/25/2025] methylPREDNISolone sodium succinate, 20 mg, Intravenous, Q6H  mirtazapine, 15 mg, Oral, Nightly  mupirocin, 1 Application, Each Nare, BID  rOPINIRole, 0.25 mg, Oral, Nightly  senna-docusate sodium, 2 tablet, Oral, BID  sodium chloride, 10 mL, Intravenous, Q12H  tobramycin, 2 drop, Both Eyes, Q4H While Awake      Continuous Infusions:   PRN Meds:.  acetaminophen **OR** acetaminophen    senna-docusate sodium **AND** polyethylene glycol **AND** bisacodyl  **AND** bisacodyl    Calcium Replacement - Follow Nurse / BPA Driven Protocol    diazePAM    ipratropium-albuterol    Magnesium Standard Dose Replacement - Follow Nurse / BPA Driven Protocol    ondansetron    ondansetron ODT    oxyCODONE    Phosphorus Replacement - Follow Nurse / BPA Driven Protocol    Potassium Replacement - Follow Nurse / BPA Driven Protocol    [COMPLETED] Insert Peripheral IV **AND** sodium chloride    sodium chloride    sodium chloride    tiZANidine    I have reviewed the patient's current medications.   Outpatient Medications     Current Outpatient Medications   Medication Instructions    citalopram (CELEXA) 40 mg, Oral, Daily    diazePAM (VALIUM) 5 mg, Oral, Every 12 Hours PRN    Fluticasone-Umeclidin-Vilant (TRELEGY) 100-62.5-25 MCG/ACT inhaler 1 puff, Daily - RT    gabapentin (NEURONTIN) 600 mg, Oral, Every 12 Hours Scheduled    ipratropium-albuterol (DUO-NEB) 0.5-2.5 mg/3 ml nebulizer 3 mL, Nebulization, Every 4 Hours PRN    levothyroxine (SYNTHROID, LEVOTHROID) 112 mcg, Oral, Daily    mirtazapine (REMERON) 15 mg, Oral, Every Night at Bedtime    ondansetron (ZOFRAN) 4 mg, Oral, Every 8 Hours PRN    oxyCODONE-acetaminophen (PERCOCET)  MG per tablet 1 tablet, Oral, Every 6 Hours PRN    potassium chloride (MICRO-K) 10 MEQ CR capsule 20 mEq, 2 Times Daily    rizatriptan (MAXALT) 10 MG tablet Take 1 tablet by mouth 1 (One) Time As Needed for Migraine.    rOPINIRole (REQUIP) 0.25 mg, Oral, Every Night at Bedtime    tiZANidine (ZANAFLEX) 2 mg, 2 Times Daily PRN    Ventolin  (90 Base) MCG/ACT inhaler 2 puffs, Inhalation, Every 4 Hours PRN       Current Antibiotics   azithromycin 500 mg IVPB in 250 mL NS (VTB)    Results:   CBC:      Lab 02/24/25  0210 02/23/25  0418 02/21/25  2303 02/21/25  2303 02/17/25  1303   WBC 18.04* 11.60*   < > 14.82* 10.39   HEMOGLOBIN 10.1* 11.6*   < > 11.3* 11.3*   HEMATOCRIT 32.4* 37.1   < > 36.4 34.9*   PLATELETS 486* 514*  --  480* 330   NEUTROS ABS  16.60* 10.72*  --  12.32* 7.97*   IMMATURE GRANS (ABS) 0.14* 0.10*  --  0.18*  --    LYMPHS ABS 0.68* 0.64*  --  1.17 1.42   MONOS ABS 0.60 0.11  --  1.03* 0.70   EOS ABS 0.00 0.00  --  0.01 0.00   MCV 92.6 93.2  --  94.5 92.8    < > = values in this interval not displayed.     LIVER FUNCTION TESTS:      Lab 02/21/25 2303 02/17/25  1303   TOTAL PROTEIN 7.4 6.7   ALBUMIN 3.5 3.5   GLOBULIN 3.9  --    ALT (SGPT) 17 17   AST (SGOT) 18 17   BILIRUBIN 0.3 0.3   ALK PHOS 141* 119*   LIPASE 14  --      ABG:      Lab 02/24/25 0210 02/23/25 0418 02/22/25 2019 02/22/25 0249 02/22/25 0158 02/22/25 0028 02/21/25 2303 02/17/25  1303   PH, ARTERIAL  --   --  7.463*  --  7.372 7.378  --   --    PO2 ART  --   --  47.6*  --  87.6 79.9*  --   --    PCO2, ARTERIAL  --   --  52.4*  --  66.5* 68.8*  --   --    HCO3 ART  --   --  37.5*  --  38.6* 40.5*  --   --    ANION GAP 8.0 11.0  --  17.0*  --   --  7.0 9     BMP/MG/PHOS:      Lab 02/24/25  0210 02/23/25 0418 02/22/25 0249 02/22/25 0158 02/22/25 0028 02/21/25 2303 02/17/25  1303   SODIUM 140 140 138  --   --  140 144   SODIUM, ARTERIAL  --   --   --  139 139  --   --    POTASSIUM 5.1 5.0 5.3*  --   --  6.0* 3.1*   CHLORIDE 97* 94* 93*  --   --  97* 93*   BUN 20 13 8  --   --  9 8   CREATININE 0.73 0.53* 0.65  --   --  0.71 0.5   CALCIUM 8.5* 9.4 9.3  --   --  9.3 9.3   MAGNESIUM 2.3 2.1  --   --   --  2.0  --      DIABETIC:    IMAGING STUDIES:  CT Angiogram Chest    Result Date: 2/23/2025  1. No evidence of pulmonary thromboembolic disease. The right upper lobe pulmonary artery is attenuated in caliber which is felt to represent sequela from the previous right hilar and suprahilar mass with conglomerate lymphadenopathy which encased the right upper lobe bronchus and right upper lobe pulmonary artery branch. There is enlargement of the pulmonary artery suggesting pulmonary arterial hypertension. 2. The thoracic aorta and great vessels are normal in caliber. No rate  limiting luminal stenosis, aneurysm or dissection present. 3. Emphysematous changes of the lungs. Previously noted right hilar and suprahilar mass with conglomerate lymphadenopathy has shown marked interval improvement posttreatment. Small residual level 2R and 4R right paratracheal nodes are present. There has been resolution of the previously noted compromise of the right upper lobe bronchus and bronchus intermedius. Some residual right hilar nodes are also present which are enlarged by CT criteria. 4. Previously noted suspected lung to lung metastasis within the right upper lobe have resolved. There are tree-in-bud opacities within both lungs, particularly within the lower lobes with several areas of more confluent nodularity in the lung bases which I would favor to represent an infectious/inflammatory process-perhaps a superimposed bronchopneumonia. A more confluent nodule the periphery of the right lower lobe would warrant follow-up on subsequent imaging and measures up to 10 mm in long axis. No effusion is present.  This report was signed and finalized on 2/23/2025 11:33 AM by Dr. Srikanth Galo MD.      XR Chest 1 View    Result Date: 2/23/2025  1. Hyperinflation of the lung parenchyma with prominent bronchovascular markings and bronchial wall thickening. This may represent sequela of chronic bronchitis. No lobar pneumonia or effusion.  This report was signed and finalized on 2/23/2025 11:07 AM by Dr. Srikanth Galo MD.       CULTURE DATA:   Blood Culture   Date Value Ref Range Status   02/22/2025 No growth at 24 hours  Preliminary   02/22/2025 No growth at 24 hours  Preliminary        Assessment/Plan   69-year-old female with a past medical history of small cell lung cancer and chronic back pain admitted for narcotic overdose.    Acute hypoxemic respiratory failure  -Multifactorial-patient has COPD diagnosis without PFTs and obstructive sleep apnea and uses CPAP   -We suspect chronic bronchitis could  contribute to chronic respiratory distress. She might benefit from long-term azithromycin therapy on discharge  -Currently on Vapotherm at 30 L/80% to maintain adequate oxygenation of >88%  -Wean O2 as able  -Continue DuoNebs, steroids, and budesonide as scheduled    Extensive small cell cancer  -Defer to oncology.  -According to the National [employer] Burton (NCI), the average survival rate for extensive SCLC without treatment is 2-4 months after diagnosis. With treatment, extensive SCLC typically has a median survival rate of 7-11 months. Patient is currently at 3 months.   -Given patient is likely to live less than another 8 months, we have consulted palliative care.  We appreciate their recommendations and assistance and help with this patient.    Obstructive sleep apnea  -Complicated by hypersomnia currently on modafinil at home.   -Continue CPAP    Chronic pain  -Palliative care consulted due to extensive small cell lung cancer.  -Patient did not like fentanyl patch as she knows a couple of people who  while on fentanyl patch  -Fentanyl patch has been discontinued.   -Patient was taking Percocet 10/325 mg q6h. We have started oxycodone 5 mg q6h given recent accidental narcotic overdose.   -Continue to monitor patient closely in ICU    Depression  -Continue home regimen    Hypothyroidism  -Continue synthroid at home dose      CODE STATUS: DNR/DNI  VTE prophylaxis: Lovenox   Nutrition: NPO  Bowel: Teena-Colace, MiraLAX, and Dulcolax available as needed  GI prophylaxis: N/A  Antibiotics: Azithromycin     Disposition: Critical care management    Total critical care time: 32 minutes    Due to a high probability of clinically significant, life threatening deterioration, the patient required my highest level of preparedness to intervene emergently and I personally spent this critical care time directly and personally managing the patient.     This critical care time included obtaining a history; examining the  patient; pulse oximetry; ordering and review of studies; arranging urgent treatment with development of a management plan; evaluation of patient's response to treatment; frequent reassessment; and, discussions with other providers.    This critical care time was performed to assess and manage the high probability of imminent, life-threatening deterioration that could result in multi-organ failure. It was exclusive of separately billable procedures and treating other patients and teaching time.    Please see MDM section and the rest of the note for further information on patient assessment and treatment.    Part of this note may be an electronic transcription/translation of spoken language to printed text using the Dragon Dictation System    Electronically signed by Francisco Goddard PA-C on 2/24/2025 at 12:42 CST

## 2025-02-24 NOTE — PLAN OF CARE
Goal Outcome Evaluation:  Plan of Care Reviewed With: patient, family           Outcome Evaluation: PT gonzalezal completed. Pt in fowlers position with family at bedside upon therapy arrival. VSS on VapoTherm 30L 80% O2. SpO2 monitored throughout session and documented below. Pt c/o moderated low back pain but denies any other symptoms at this time. A&Ox4, pleasant and agreeable to therapy. Ms. Gibbs reports at baseline she is independent with ADLs and mobility. She denies the use of an AD with household or community ambulation. She is currently living with family and reports her bedroom is on the second floor of the home. She expressed some frustration during session and reports she just wants to go home. Explained the O2 requirements she is currently on and educated on how PT can help while she is here. Pt able to t/f supine>sit Mod I with use of bedrails and HOB elevated. Pt demos good static and dynamic balance sitting EOB. BLE ROM WFL and strength grossly 4/5. LE sensation intact with no reports of numbness or tingling in legs/feet. Pt stood with CGA/HHA and t/f to chair at bedside. O2 desatted to low 80s during ambulation. Pt left in recliner with with call light in reach, family at side, and MD in room. Pt educated on diaphragmatic breathing exercises to improve O2 levels and activity tolerance. Ms. Gibbs will continue to benefit from skilled physical therapy to address identified deficits in functional strength, mobility, and activity tolerance to prevent further decline and return to PLOF. Anticipate d/c home and recommend HH for continued therapy services.    Anticipated Discharge Disposition (PT): home with home health

## 2025-02-24 NOTE — CASE MANAGEMENT/SOCIAL WORK
Discharge Planning Assessment  [facility]     Patient Name: Ashley Gibbs  MRN: [id number]  Today's Date: 2/24/2025    Admit Date: 2/21/2025        Discharge Needs Assessment       Row Name 02/24/25 1146       Living Environment    People in Home other (see comments)    Unique Family Situation Patient resides with Antonia Lopez/caregiver.  There is no blood relation.    Current Living Arrangements home    Primary Care Provided by self;friend    Provides Primary Care For no one, unable/limited ability to care for self    Family Caregiver if Needed friend(s)    Family Caregiver Names Antonia Lopez    Quality of Family Relationships supportive;helpful;involved    Able to Return to Prior Arrangements yes       Resource/Environmental Concerns    Transportation Concerns none       Transition Planning    Patient/Family Anticipates Transition to home with help/services;home with family    Patient/Family Anticipated Services at Transition home health care    Transportation Anticipated family or friend will provide       Discharge Needs Assessment    Readmission Within the Last 30 Days other (see comments)    Equipment Currently Used at Home oxygen;wheelchair;cpap;commode    Concerns to be Addressed discharge planning    Do you want help finding or keeping work or a job? I do not need or want help    Do you want help with school or training? For example, starting or completing job training or getting a high school diploma, GED or equivalent No    Anticipated Changes Related to Illness inability to care for self    Outpatient/Agency/Support Group Needs skilled nursing facility;homecare agency    Discharge Facility/Level of Care Needs home with home health;nursing facility, skilled    Current Discharge Risk chronically ill;non-alliance    Discharge Coordination/Progress Patient currently resides at home with Antonia Krauseson.  Hope is not blood related to patient and has advised patient raised her and she considers her like a  grandmother.  Patient was recently hospitalized here from  1/31/25 - 2/10/25 and left AMA on 2/10/25.  Patient has a PCP and RX coverage.  Patient has needed DME and home O2 supplied by [facility].  IASMAR martinez.    Antonia John has informed palliative care nurse she is patient's biological granddaughter.                     Discharge Plan    No documentation.                 Continued Care and Services - Admitted Since 2/21/2025    No active coordination exists for this encounter.       Selected Continued Care - Episodes Includes continued care and service providers with selected services from the active episodes listed below      High Risk Care Management Episode start date: 2/24/2025   There are no active outsourced providers for this episode.                    Demographic Summary    No documentation.                  Functional Status    No documentation.                  Psychosocial    No documentation.                  Abuse/Neglect    No documentation.                  Legal    No documentation.                  Substance Abuse    No documentation.                  Patient Forms    No documentation.                     ZARIA Gonzalez

## 2025-02-24 NOTE — THERAPY TREATMENT NOTE
Patient Name: Ashley Gibbs  : 1955    MRN: [id number]                              Today's Date: 2025       Admit Date: 2025    Visit Dx:     ICD-10-CM ICD-9-CM   1. Altered mental status, unspecified altered mental status type  R41.82 780.97   2. Elevated troponin  R79.89 790.6   3. Hyperkalemia  E87.5 276.7   4. Hyperammonemia  E72.20 270.6   5. Dysphagia, unspecified type  R13.10 787.20     Patient Active Problem List   Diagnosis    Acute respiratory failure with hypoxemia    Age-related osteoporosis without current pathological fracture    Anxiety    Stage 3 severe COPD by GOLD classification    Chronic pain disorder    Depression    Excessive daytime sleepiness    Hemoptysis    Family history of renal cell carcinoma    Hyperlipidemia    Hypothyroidism    Migraines    Mild episode of recurrent major depressive disorder    Neuropathy    Obstructive sleep apnea    Primary insomnia    Renal mass    Restless legs syndrome    On supplemental oxygen therapy    Family history of CHF (congestive heart failure)    Family history of diabetes mellitus    6 cm exophytic left upper pole renal cyst, likely hemorrhagic    Acute bilateral pyelonephritis    Hypokalemia    E. coli UTI, present on admission (urinary tract infection)    Paroxysmal supraventricular tachycardia    Sepsis due to Escherichia coli UTI, bilateral pyelonephritis, present on admission    Vitamin D deficiency    Lung mass    Former smoker    History of radiation therapy    Panic attacks    Small cell lung cancer    Chronic bronchitis    Acute respiratory failure    Influenza A    Leukopenia due to chemotherapy    Thrombocytopenia    Altered mental status    Altered mental status, unspecified altered mental status type    Narcotic overdose     Past Medical History:   Diagnosis Date    Anxiety     Arthritis     Asthma     Back pain     COPD (chronic obstructive pulmonary disease)     Dependence on supplemental oxygen     Depression      Disease of thyroid gland     Fibromyalgia, primary     Headache     History of degenerative disc disease     Hypertension     Hypothyroidism     Low back pain     Restless leg     Scoliosis      Past Surgical History:   Procedure Laterality Date    APPENDECTOMY      BREAST BIOPSY Left     BRONCHOSCOPY Right 12/23/2024    Procedure: BRONCHOSCOPY WITH ENDOBRONCHIAL ULTRASOUND;  Surgeon: Peterson Morales MD;  Location: [facility] OR;  Service: Pulmonary;  Laterality: Right;  pre: lung mass  post: lung mass    CHOLECYSTECTOMY      TUBAL ABDOMINAL LIGATION        General Information       Row Name 02/24/25 0832          OT Time and Intention    Document Type therapy note (daily note)  -     Mode of Treatment occupational therapy  -     Patient Effort good  -       Row Name 02/24/25 0832          General Information    Patient Profile Reviewed yes  -     Existing Precautions/Restrictions fall;oxygen therapy device and L/min  -     Barriers to Rehab medically complex  -       Row Name 02/24/25 0832          Cognition    Orientation Status (Cognition) oriented x 4  -       Row Name 02/24/25 0832          Safety Issues/Impairments Affecting Functional Mobility    Impairments Affecting Function (Mobility) endurance/activity tolerance;shortness of breath  -               User Key  (r) = Recorded By, (t) = Taken By, (c) = Cosigned By      Initials Name Provider Type     Geovanna Ritchie, OTR/L, CSRS Occupational Therapist                     Mobility/ADL's       Row Name 02/24/25 0832          Bed Mobility    Bed Mobility supine-sit  -     Supine-Sit [place] (Bed Mobility) supervision  -     Assistive Device (Bed Mobility) head of bed elevated;bed rails  -       Row Name 02/24/25 0832          Transfers    Transfers sit-stand transfer;stand-sit transfer;bed-chair transfer  -       Row Name 02/24/25 0832          Bed-Chair Transfer    Bed-Chair [place] (Transfers) contact guard;verbal  cues;nonverbal cues (demo/gesture)  -[facility] Name 02/24/25 08          Sit-Stand Transfer    Sit-Stand [place] (Transfers) contact guard  -       Row Name 02/24/25 08          Stand-Sit Transfer    Stand-Sit [place] (Transfers) contact guard  -JW       Row Name 02/24/25 08          Activities of Daily Living    BADL Assessment/Intervention lower body dressing  -JW       Row Name 02/24/25 08          Lower Body Dressing Assessment/Training    [place] Level (Lower Body Dressing) don;socks;standby assist  -     Position (Lower Body Dressing) edge of bed sitting  -               User Key  (r) = Recorded By, (t) = Taken By, (c) = Cosigned By      Initials Name Provider Type     Geovanna Ritchie OTR/L, PAUL Occupational Therapist                   Obj/Interventions       [facility] Name 02/24/25 08          Range of Motion Comprehensive    Comment, General Range of Motion Pt provided with HEP on diaphragmatic breathing exercises as well as UE strengthening exercises with red theraband 10 reps x 1 set.  -               User Key  (r) = Recorded By, (t) = Taken By, (c) = Cosigned By      Initials Name Provider Type     Geovanna Ritchie OTR/L, PAUL Occupational Therapist                   Goals/Plan    No documentation.                  Clinical Impression       [facility] Name 02/24/25 08          Pain Assessment    Pain Side/Orientation generalized  -     Additional Documentation Pain Scale: FACES Pre/Post-Treatment (Group)  -JW       Row Name 02/24/25 08          Pain Scale: FACES Pre/Post-Treatment    Pain: FACES Scale, Pretreatment 2-->hurts little bit  -     Posttreatment Pain Rating 2-->hurts little bit  -JW       Row Name 02/24/25 08          Plan of Care Review    Plan of Care Reviewed With patient;daughter  -     Outcome Evaluation OT tx completed. Pt presents alert and oriented x4, sitting up in bed on vapotherm 30L/80%. Throughout session O2 sats maintained in low  90s to mid 80s with all activity. She was able to bring self to sitting at EOB with SBA. Donned socks with set-up only. Completed all sit <> stand t/fs and short distance mobility with CGA. Her O2 requirements and desaturations with activity are her biggest limiting factor at this time. Once seated in the chair, she was provided with and completed HEP on diaphragmatic breathing exercises and B UE strengthening exercises with red theraband. Continue OT POC.  -       Row Name 02/24/25 0832          Therapy Plan Review/Discharge Plan (OT)    Anticipated Discharge Disposition (OT) home with 24/7 care  -ISHAN       Row Name 02/24/25 0832          Positioning and Restraints    Pre-Treatment Position in bed  -JW     Post Treatment Position chair  -JW     In Chair notified nsg;reclined;call light within reach;encouraged to call for assist;patient within staff view  -JW               User Key  (r) = Recorded By, (t) = Taken By, (c) = Cosigned By      Initials Name Provider Type    Geovanna Magana, OTR/L, CSRS Occupational Therapist                   Outcome Measures       Row Name 02/24/25 0829          How much help from another is currently needed...    Putting on and taking off regular lower body clothing? 3  -JW     Bathing (including washing, rinsing, and drying) 3  -JW     Toileting (which includes using toilet bed pan or urinal) 3  -JW     Putting on and taking off regular upper body clothing 3  -JW     Taking care of personal grooming (such as brushing teeth) 4  -JW     Eating meals 4  -JW     AM-PAC 6 Clicks Score (OT) 20  -JW       Row Name 02/24/25 0800          How much help from another person do you currently need...    Turning from your back to your side while in flat bed without using bedrails? 3  -HT     Moving from lying on back to sitting on the side of a flat bed without bedrails? 3  -HT     Moving to and from a bed to a chair (including a wheelchair)? 3  -HT     Standing up from a chair using your  arms (e.g., wheelchair, bedside chair)? 2  -HT     Climbing 3-5 steps with a railing? 2  -HT     To walk in hospital room? 2  -HT     AM-PAC 6 Clicks Score (PT) 15  -HT       Row Name 02/24/25 0829          Functional Assessment    Outcome Measure Options AM-PAC 6 Clicks Daily Activity (OT)  -ISHAN               User Key  (r) = Recorded By, (t) = Taken By, (c) = Cosigned By      Initials Name Provider Type     Anita Fleming, RN Registered Nurse    Geovanna Magana, OTR/L, CSRS Occupational Therapist                    Occupational Therapy Education       Title: PT OT SLP Therapies (In Progress)       Topic: Occupational Therapy (In Progress)       Point: ADL training (Done)       Description:   Instruct learner(s) on proper safety adaptation and remediation techniques during self care or transfers.   Instruct in proper use of assistive devices.                  Learning Progress Summary            Patient Acceptance, E, VU by ISHAN at 2/24/2025 0925    Acceptance, E, VU,NR by  at 2/23/2025 0911                      Point: Home exercise program (Not Started)       Description:   Instruct learner(s) on appropriate technique for monitoring, assisting and/or progressing therapeutic exercises/activities.                  Learner Progress:  Not documented in this visit.              Point: Precautions (Done)       Description:   Instruct learner(s) on prescribed precautions during self-care and functional transfers.                  Learning Progress Summary            Patient Acceptance, E, VU by ISHAN at 2/24/2025 0925    Acceptance, E, VU,NR by  at 2/23/2025 0911                      Point: Body mechanics (Done)       Description:   Instruct learner(s) on proper positioning and spine alignment during self-care, functional mobility activities and/or exercises.                  Learning Progress Summary            Patient Acceptance, E, VU by ISHAN at 2/24/2025 0925    Acceptance, E, VU,NR by  at 2/23/2025 0911                                       User Key       Initials Effective Dates Name Provider Type Discipline     07/11/23 -  [place] OTR/L Occupational Therapist OT     11/15/24 -  Geovanna Ritchie OTR/L, CSRS Occupational Therapist OT                  OT Recommendation and Plan     Plan of Care Review  Plan of Care Reviewed With: patient, daughter  Outcome Evaluation: OT tx completed. Pt presents alert and oriented x4, sitting up in bed on vapotherm 30L/80%. Throughout session O2 sats maintained in low 90s to mid 80s with all activity. She was able to bring self to sitting at EOB with SBA. Donned socks with set-up only. Completed all sit <> stand t/fs and short distance mobility with CGA. Her O2 requirements and desaturations with activity are her biggest limiting factor at this time. Once seated in the chair, she was provided with and completed HEP on diaphragmatic breathing exercises and B UE strengthening exercises with red theraband. Continue OT POC.     Time Calculation:         Time Calculation- OT       Row Name 02/24/25 0832             Time Calculation-     OT Start Time 0832  -      OT Stop Time 0914  -      OT Time Calculation (min) 42 min  -      Total Timed Code Minutes- OT 42 minute(s)  -      OT Received On 02/24/25  -         Timed Charges    60070 - OT Therapeutic Exercise Minutes 25  -      92712 - OT Self Care/Mgmt Minutes 17  -         Total Minutes    Timed Charges Total Minutes 42  -       Total Minutes 42  -                User Key  (r) = Recorded By, (t) = Taken By, (c) = Cosigned By      Initials Name Provider Type     Geovanna Ritchie OTR/L, CSRS Occupational Therapist                  Therapy Charges for Today       Code Description Service Date Service Provider Modifiers Qty    36022027769 HC OT SELF CARE/MGMT/TRAIN EA 15 MIN 2/24/2025 Geovanna Ritchie OTR/L, CSRS GO 1    31169759451 HC OT THER PROC EA 15 MIN 2/24/2025 Geovanna Ritchie  OTR/L, CSRS GO 2                 Geovanna Ritchie, OTR/L, CSRS  2/24/2025

## 2025-02-24 NOTE — PLAN OF CARE
Goal Outcome Evaluation:  Plan of Care Reviewed With: patient, daughter           Outcome Evaluation: OT tx completed. Pt presents alert and oriented x4, sitting up in bed on vapotherm 30L/80%. Throughout session O2 sats maintained in low 90s to mid 80s with all activity. She was able to bring self to sitting at EOB with SBA. Donned socks with set-up only. Completed all sit <> stand t/fs and short distance mobility with CGA. Her O2 requirements and desaturations with activity are her biggest limiting factor at this time. Once seated in the chair, she was provided with and completed HEP on diaphragmatic breathing exercises and B UE strengthening exercises with red theraband. Continue OT POC.    Anticipated Discharge Disposition (OT): home with 24/7 care

## 2025-02-24 NOTE — PLAN OF CARE
Goal Outcome Evaluation:  Plan of Care Reviewed With: patient        Progress: no change  Outcome Evaluation: Pt A/O x 4. Sinus tachycardia. Pt currently on vapotherm 30 L 80 % O2. Prn oxy given x 2. WBC 18. Adequate urine output.

## 2025-02-24 NOTE — CONSULTS
[facility]    Palliative Care Initial Consult   Attending Physician: Bernardo Rivera MD  Referring Provider: Bernardo Rivera MD    Reason for Referral: assistance with clarification of goals of care  Family/Support: Granddaughter    Code Status and Medical Interventions: No CPR (Do Not Attempt to Resuscitate); Limited Support; No intubation (DNI); Full medical management, no CPR or intubation.   Ordered at: 02/22/25 0839     Medical Intervention Limits:    No intubation (DNI)     Level Of Support Discussed With:    Patient     Code Status (Patient has no pulse and is not breathing):    No CPR (Do Not Attempt to Resuscitate)     Medical Interventions (Patient has pulse or is breathing):    Limited Support     Comments:    Full medical management, no CPR or intubation.     Goals of Care: TBD.    HPI:   69 y.o. 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  PET scan 12/24 shows large irregular hypermetabolic mass in the right upper lobe suprahilar region with probable invasion of the middle mediastinum and right hilum and/or conglomerate hypermetabolic lymphadenopathy.  This is worrisome for small cell carcinoma.  Right upper lobe solid nodules also hypermetabolic and consistent with neoplasm likely pulmonary metastases.  Received emergent palliative radiation x 5 fractions to right lung 12/13/2024-12/19/2024.  Evaluated by Dr. Vanegas 1/20/2025 with plans for radiation x 30/33 treatments. 1/27/2025 initiated cycle 1 chemotherapy carboplatin D1, etoposide D1-3 plan every 21 days x 4 cycles.  Seen by Dr. Isaac, oncology 2/17 and given weakness unable to receive cycle 2 chemo treatment due to poor performance status and recent hospitalization for influenza A pneumonia requiring intubation/mechanical ventilation.  MRI brain 1/17 shows no evidence of  "intracranial metastatic disease.  Moderate chronic microvascular changes.  Intracranialadditional health history positive for anxiety, Arthritis, Asthma, COPD, oxygen dependence-2 L, Depression, Fibromyalgia, degenerative disc disease, Hypertension, Hypothyroidism, Restless leg, former tobacco use-quit 2006, history of vaping, obstructive sleep apnea-CPAP and modafinil due to hypersomnia, and Scoliosis.  Hospitalization 1/31-2/10 due to leukopenia due to chemotherapy, thrombocytopenia, acute respiratory failure with hypoxemia requiring mechanical ventilation, and influenza A, left AMA.  Patient presented to [facility] on 2/21/2025 related to narcotic overdose treated with Narcan by EMS.  ER workup shows hyperkalemia, elevated troponin, elevated alk phos, leukocytosis, anemia.  Blood cultures pending.  UDS positive for benzodiazepines and oxycodone.  CT head limited due to extensive motion.  Atrophy with small vessel disease.  Required supplemental oxygen with Vapotherm, DuoNebs, steroids, budesonide, and Zithromax.  Fentanyl transdermal patch initiated on admission but removed 2/23 (within 24 hours) due to concerns of increased oxygen needs and placed on BiPAP.  Evaluated by speech therapy and started on mechanical soft diet with thin liquids.  Evaluated by therapy who recommends home with 24/7 care.  Laying in bed without apparent distress.  Conversational.  Remains on Vapotherm 30 L, FiO2 80%.  No family at bedside. Palliative Care Spoke With: patient reports significant decline in quality of life and functional status since receiving cycle 1 chemotherapy 1/27 followed by hospitalization as above.  Patient states she left the hospital prematurely and has continued to struggle with her breathing issues since this period of time.  States she was recently prescribed benzodiazepine and unaware of stacking effects of multiple medications and increased lethargy \"it was a mistake\".  Due to the Palliative Care " Topics Discussed: palliative care, goals of care, care options, resuscitation status, and [facility] we will establish an advance care plan.   Advance Care Planning   Advance Care Planning Discussion: Spoke with patient in room later followed by granddaughter, carrington via telephone.  Explored events leading to current hospitalization, challenges with continuing palliative chemotherapy and radiation efforts secondary to poor performance status, hospitalizations, and acute respiratory failure.  Both seem to be cognizant of terminal nature of patient's cancer diagnosis and poor long-term prognosis.  At this juncture wishes to push forward as tolerated with palliative chemotherapy and radiation treatments.  She asks about the possibility of radiation treatments during hospitalizations, we discussed limitations given high oxygen therapy. Discussed risk over benefit of continued treatments in the scenario of decline and rehospitalization's, and potential need for ventilator support.  We explored medical priorities including CODE STATUS and medical interventions.  Patient is elected no CPR/full support interventions.  States she would wish to be placed on ventilator support in the scenario of respiratory failure but would not wish to remain on life support measures long-term.  Her family is well aware of her wishes and I have confirmed this with her granddaughter, Carrington.  Patient verbalizes that she has completed POA documentation naming her granddaughter, Carrington and son-Fred Duckworth secondary [facility].  We discussed best supportive care directed by hospice, patient hopeful for continued aggressive treatments at this juncture.  Granddaughter verbalized her concerns with this plan but remains supportive of patient's wishes.  We discussed risk versus benefit at length.     Review of Systems   Constitutional: Positive for malaise/fatigue.   Respiratory:  Positive for shortness of breath.    Musculoskeletal:  Positive for back  pain.   Genitourinary:  Negative for dysuria.   Neurological:  Positive for weakness.     1- Pain Assessment  CPOT Facial Expression: 0-->relaxed, neutral  CPOT Body Movements: 0-->absence of movements  CPOT Muscle Tension: 0-->relaxed  Ventilator Compliance/Vocalization: 0-->talking in normal tone or no sound  CPOT Score: 0  Pain Location: back  Pain Description: intermittent    Past Medical History:   Diagnosis Date    Anxiety     Arthritis     Asthma     Back pain     COPD (chronic obstructive pulmonary disease)     Dependence on supplemental oxygen     Depression     Disease of thyroid gland     Fibromyalgia, primary     Headache     History of degenerative disc disease     Hypertension     Hypothyroidism     Low back pain     Restless leg     Scoliosis      Past Surgical History:   Procedure Laterality Date    APPENDECTOMY      BREAST BIOPSY Left     BRONCHOSCOPY Right 2024    Procedure: BRONCHOSCOPY WITH ENDOBRONCHIAL ULTRASOUND;  Surgeon: Peterson Morales MD;  Location: [facility] OR;  Service: Pulmonary;  Laterality: Right;  pre: lung mass  post: lung mass    CHOLECYSTECTOMY      TUBAL ABDOMINAL LIGATION       Social History     Socioeconomic History    Marital status:    Tobacco Use    Smoking status: Former     Current packs/day: 0.00     Average packs/day: 1 pack/day for 40.0 years (40.0 ttl pk-yrs)     Types: Cigarettes     Start date: 1968     Quit date: 2008     Years since quittin.8     Passive exposure: Past    Smokeless tobacco: Never   Vaping Use    Vaping status: Every Day    Substances: Nicotine    Devices: Pre-filled pod    Passive vaping exposure: Yes   Substance and Sexual Activity    Alcohol use: Never    Drug use: Never    Sexual activity: Not Currently     Partners: Male         Current Facility-Administered Medications:     acetaminophen (TYLENOL) tablet 650 mg, 650 mg, Oral, Q4H PRN **OR** acetaminophen (TYLENOL) suppository 650 mg, 650 mg, Rectal, Q4H PRN,  Mohsen Garcia PA-C    azithromycin (ZITHROMAX) 500 mg in sodium chloride 0.9 % 250 mL IVPB-VTB, 500 mg, Intravenous, Q24H, Bernardo Rivera MD, 500 mg at 02/23/25 1252    sennosides-docusate (PERICOLACE) 8.6-50 MG per tablet 2 tablet, 2 tablet, Oral, BID, 2 tablet at 02/24/25 0933 **AND** polyethylene glycol (MIRALAX) packet 17 g, 17 g, Oral, Daily PRN **AND** bisacodyl (DULCOLAX) EC tablet 5 mg, 5 mg, Oral, Daily PRN **AND** bisacodyl (DULCOLAX) suppository 10 mg, 10 mg, Rectal, Daily PRN, Mohsen Garcia PA-C    budesonide (PULMICORT) nebulizer solution 0.5 mg, 0.5 mg, Nebulization, BID - RT, Bernardo Rivera MD, 0.5 mg at 02/24/25 0629    Calcium Replacement - Follow Nurse / BPA Driven Protocol, , Not Applicable, PRN, Mohsen Garcia PA-C    Chlorhexidine Gluconate Cloth 2 % pads 1 Application, 1 Application, Topical, Q24H, Mohsen Garcia PA-C, 1 Application at 02/24/25 0433    citalopram (CeleXA) tablet 40 mg, 40 mg, Oral, Daily, Bernardo Rivera MD, 40 mg at 02/24/25 0933    diazePAM (VALIUM) tablet 2.5 mg, 2.5 mg, Oral, Q12H PRN, Bernardo Rivera MD    Enoxaparin Sodium (LOVENOX) syringe 40 mg, 40 mg, Subcutaneous, Q24H, Bernardo Rivera MD, 40 mg at 02/23/25 1252    gabapentin (NEURONTIN) capsule 600 mg, 600 mg, Oral, Q12H, Bernardo Rivera MD, 600 mg at 02/24/25 0934    ipratropium-albuterol (DUO-NEB) nebulizer solution 3 mL, 3 mL, Nebulization, 4x Daily - RT, Bernardo Rivera MD, 3 mL at 02/24/25 1035    ipratropium-albuterol (DUO-NEB) nebulizer solution 3 mL, 3 mL, Nebulization, Q4H PRN, Da Brennan APRN    lactulose solution 30 g, 30 g, Oral, Once, Tara Flores MD    levothyroxine (SYNTHROID, LEVOTHROID) tablet 112 mcg, 112 mcg, Oral, Daily, Bernardo Rivera MD, 112 mcg at 02/24/25 0934    Magnesium Standard Dose Replacement - Follow Nurse / BPA Driven Protocol, , Not Applicable, PRN, Mohsen Garcia PA-C    [COMPLETED] methylPREDNISolone sodium succinate (SOLU-Medrol)  injection 60 mg, 60 mg, Intravenous, Q6H, 60 mg at 02/23/25 1958 **FOLLOWED BY** methylPREDNISolone sodium succinate (SOLU-Medrol) injection 40 mg, 40 mg, Intravenous, Q6H, 40 mg at 02/24/25 0743 **FOLLOWED BY** [START ON 2/25/2025] methylPREDNISolone sodium succinate (SOLU-Medrol) injection 20 mg, 20 mg, Intravenous, Q6H, Bernardo Rivera MD    mirtazapine (REMERON) tablet 15 mg, 15 mg, Oral, Nightly, Bernardo Rviera MD, 15 mg at 02/23/25 2000    mupirocin (BACTROBAN) 2 % nasal ointment 1 Application, 1 Application, Each Nare, BID, Mohsen Garcia PA-C, 1 Application at 02/24/25 0613    ondansetron (ZOFRAN) injection 4 mg, 4 mg, Intravenous, Q6H PRN, Mohsen Garcia PA-C    ondansetron ODT (ZOFRAN-ODT) disintegrating tablet 4 mg, 4 mg, Translingual, Q8H PRN, Bernardo Rivera MD    oxyCODONE (ROXICODONE) immediate release tablet 5 mg, 5 mg, Oral, Q6H PRN, Bernardo Rivera MD, 5 mg at 02/24/25 1200    Phosphorus Replacement - Follow Nurse / BPA Driven Protocol, , Not Applicable, PRN, Mohsen Garcia PA-C    Potassium Replacement - Follow Nurse / BPA Driven Protocol, , Not Applicable, PRN, Mohsen Garcia PA-C    rOPINIRole (REQUIP) tablet 0.25 mg, 0.25 mg, Oral, Nightly, Bernardo Rivera MD, 0.25 mg at 02/23/25 2000    [COMPLETED] Insert Peripheral IV, , , Once **AND** sodium chloride 0.9 % flush 10 mL, 10 mL, Intravenous, PRN, Chaka Chan,     sodium chloride 0.9 % flush 10 mL, 10 mL, Intravenous, Q12H, Mohsen Garcia PA-C, 10 mL at 02/24/25 0936    sodium chloride 0.9 % flush 10 mL, 10 mL, Intravenous, PRN, Mohsen Garcia PA-C    sodium chloride 0.9 % infusion 40 mL, 40 mL, Intravenous, PRN, Mohsen Garcia PA-C    tiZANidine (ZANAFLEX) tablet 2 mg, 2 mg, Oral, BID PRN, Bernardo Rivera MD    tobramycin 0.3 % ophthalmic solution 2 drop, 2 drop, Both Eyes, Q4H While Awake, Bernardo Rivera MD, 2 drop at 02/24/25 0907    Allergies   Allergen Reactions    Codeine  "Shortness Of Breath     I have utilized all available immediate resources to obtain, update, or review the patient's current medications (including all prescriptions, over-the-counter products, herbals, cannabis/cannabidiol products, and vitamin/mineral/dietary (nutritional) supplements) for name, route of administration, type, dose and frequency.      Intake/Output Summary (Last 24 hours) at 2/24/2025 1236  Last data filed at 2/24/2025 0644  Gross per 24 hour   Intake 840 ml   Output 1000 ml   Net -160 ml       Physical Exam:    Diagnostics: Reviewed  /89 (Patient Position: Lying)   Pulse 110   Temp 97 °F (36.1 °C) (Axillary)   Resp 21   Ht 170.2 cm (67\")   Wt 66.7 kg (147 lb 0.8 oz)   SpO2 93%   BMI 23.03 kg/m²     Vitals and nursing note reviewed.   Constitutional:       Appearance: Not in distress. Ill-appearing and chronically ill-appearing.      Interventions: Nasal cannula in place.      Comments: Vapotherm 30 L, FiO2 80%   Eyes:      General: Lids are normal.      Pupils: Pupils are equal, round, and reactive to light.   HENT:      Head: Normocephalic.   Pulmonary:      Effort: Pulmonary effort is normal.   Cardiovascular:      Tachycardia present.   Musculoskeletal:      Cervical back: Neck supple. Skin:     General: Skin is warm.   Genitourinary:     Comments: No reported dysuria  Neurological:      Mental Status: Alert, oriented to person, place, and time and oriented to person, place and time.   Psychiatric:      Comments: Tearful at times when discussing her concerns of inability to continue chemotherapy and radiation therapy.     Patient status: Disease state: Controlled with current treatments.  Current Functional status: Palliative Performance Scale Score: Performance 40% based on the following measures: Ambulation: Mainly in bed, Activity and Evidence of Disease: Unable to do any work, extensive evidence of disease, Self-Care: Mainly assistance required,  Intake: Normal or reduced, LOC: " Full, drowsy or confusion   Baseline Functional status: Palliative Performance Scale Score: Performance 50% based on the following measures: Ambulation: Mainly sit or lie down, Activity and Evidence of Disease: Unable to do any work, extensive evidence of disease, Self-Care: Considerable assistance required,  Intake: Normal or reduced, LOC: Full or confusion   Baseline ECOG Status(3) Capable of limited self-care, confined to bed or chair > 50% of waking hours.  Nutritional status: Albumin 3.5 Body mass index is 23.03 kg/m².         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 plan: Goals of care include CODE STATUS no CPR/full support interventions.    Family support: The patient receives support from her  granddaughter ..  Advance Directives: Advance Directive Status: Patient does not have advance directive   POA/Healthcare surrogate-POA documentation requested, patient states granddaughter-Hope primary HCS and son-Fred Duckworth secondary HCS.    2.  Palliative care encounter  - Prognosis is poor long-term secondary to metastatic small cell carcinoma, narcotic overdose, cerebral microvascular disease, oxygen dependence, multiple comorbidities.  -Patient and family appears to  have good prognostic awareness.  Aware of terminal condition but hoping for more time with palliative chemotherapy and radiation.    12/24-PET scan shows large irregular hypermetabolic mass in the right upper lobe suprahilar region with probable invasion of the middle mediastinum and right hilum and/or conglomerate hypermetabolic lymphadenopathy.  This is worrisome for small cell carcinoma.  Right upper lobe solid nodules also hypermetabolic and consistent with neoplasm likely pulmonary metastases.    -Received emergent palliative radiation x 5 fractions to right lung 12/13/2024-12/19/2024.      1/17-MRI brain shows no evidence of intracranial metastatic disease.      1/20-Evaluated by Dr. Vanegas with plans for radiation x 30/33 treatments.     1/27 initiated cycle 1 chemotherapy carboplatin D1, etoposide D1-3 plan every 21 days x 4 cycles.      -Hospitalization 1/31-2/10 due to leukopenia due to chemotherapy, thrombocytopenia, acute respiratory failure with hypoxemia requiring mechanical ventilation, and influenza A, left AMA.    2/17-Seen by Dr. Isaac, oncology and given weakness unable to receive cycle 2 chemo treatment.      -treated with Narcan by EMS.  Blood cultures pending.  UDS positive for benzodiazepines and oxycodone.  Required supplemental oxygen with Vapotherm, DuoNebs, steroids, budesonide, and Zithromax.  Fentanyl transdermal patch initiated on admission but removed 2/23 (within 24 hours) due to concerns of increased oxygen needs and placed on BiPAP.    -Evaluated by speech therapy and started on mechanical soft diet with thin liquids.    -Evaluated by therapy who recommends home with 24/7 care with home health versus SNF.       2/24-clarified CODE STATUS is no CPR/full support interventions.  States she would wish to be placed on ventilator support in the scenario of respiratory failure but would not wish to remain on life support measures long-term.  Her family is well aware of her wishes.  I  have confirmed this with her granddaughter, Hope as well and this has been their discussion as well.  -High risk for rehospitalization's and decline  -Poor performance status limiting efforts at palliative chemotherapy and palliative radiation.  Wishes to push forward as tolerated.  -Explored best supportive care directed by hospice.  Not quite ready for transition at this juncture.  -Will plan to complete a MOST document prior to discharge.      Thank you for this consult and allowing us to participate in patient's plan of care. Palliative Care Team will continue to follow patient.     25 minutes spent on advance care planning-discussing with patient and/or family, answering questions, providing some guidance about a plan and documentation of care, and coordinating care face to face.    Margy Garcia, APRN  2/24/2025  12:36 CST

## 2025-02-24 NOTE — TELEPHONE ENCOUNTER
Called the patient's granddaughter to change the time of her specials procedure tomorrow.  The granddaughter stated that she was in the ICU @ [facility] and needed to cancel the procedure for the port placement.  She stated she would call back to reschedule if she needed to.  I am removing her from the schedule.

## 2025-02-24 NOTE — PROGRESS NOTES
RT EQUIPMENT DEVICE RELATED - SKIN ASSESSMENT    Estiven Score:  Estiven Score: 17     RT Medical Equipment/Device:     NIV Mask:  Under-the-nose   size:  A    Skin Assessment:      Nose:  Intact    Device Skin Pressure Protection:  Skin-to-device areas padded:  None Required    Nurse Notification:  Leah Wilkinson, RRT

## 2025-02-25 ENCOUNTER — READMISSION MANAGEMENT (OUTPATIENT)
Dept: CALL CENTER | Facility: HOSPITAL | Age: 70
End: 2025-02-25
Payer: MEDICARE

## 2025-02-25 ENCOUNTER — APPOINTMENT (OUTPATIENT)
Dept: GENERAL RADIOLOGY | Facility: HOSPITAL | Age: 70
End: 2025-02-25
Payer: MEDICARE

## 2025-02-25 ENCOUNTER — TELEPHONE (OUTPATIENT)
Dept: FAMILY MEDICINE CLINIC | Facility: CLINIC | Age: 70
End: 2025-02-25
Payer: MEDICARE

## 2025-02-25 VITALS
OXYGEN SATURATION: 90 % | HEART RATE: 105 BPM | RESPIRATION RATE: 22 BRPM | TEMPERATURE: 98.3 F | BODY MASS INDEX: 23.7 KG/M2 | WEIGHT: 151.01 LBS | HEIGHT: 67 IN | SYSTOLIC BLOOD PRESSURE: 122 MMHG | DIASTOLIC BLOOD PRESSURE: 74 MMHG

## 2025-02-25 PROBLEM — R41.82 ALTERED MENTAL STATUS: Status: RESOLVED | Noted: 2025-02-22 | Resolved: 2025-02-25

## 2025-02-25 PROBLEM — R41.82 ALTERED MENTAL STATUS, UNSPECIFIED ALTERED MENTAL STATUS TYPE: Status: RESOLVED | Noted: 2025-02-22 | Resolved: 2025-02-25

## 2025-02-25 LAB
ANION GAP SERPL CALCULATED.3IONS-SCNC: 9 MMOL/L (ref 5–15)
BASOPHILS # BLD AUTO: 0.03 10*3/MM3 (ref 0–0.2)
BASOPHILS NFR BLD AUTO: 0.2 % (ref 0–1.5)
BUN SERPL-MCNC: 18 MG/DL (ref 8–23)
BUN/CREAT SERPL: 28.6 (ref 7–25)
CALCIUM SPEC-SCNC: 8.6 MG/DL (ref 8.6–10.5)
CHLORIDE SERPL-SCNC: 98 MMOL/L (ref 98–107)
CO2 SERPL-SCNC: 32 MMOL/L (ref 22–29)
CREAT SERPL-MCNC: 0.63 MG/DL (ref 0.57–1)
DEPRECATED RDW RBC AUTO: 50.2 FL (ref 37–54)
EGFRCR SERPLBLD CKD-EPI 2021: 97.4 ML/MIN/1.73
EOSINOPHIL # BLD AUTO: 0 10*3/MM3 (ref 0–0.4)
EOSINOPHIL NFR BLD AUTO: 0 % (ref 0.3–6.2)
ERYTHROCYTE [DISTWIDTH] IN BLOOD BY AUTOMATED COUNT: 15.9 % (ref 12.3–15.4)
GLUCOSE SERPL-MCNC: 140 MG/DL (ref 65–99)
HCT VFR BLD AUTO: 33 % (ref 34–46.6)
HGB BLD-MCNC: 10.1 G/DL (ref 12–15.9)
IMM GRANULOCYTES # BLD AUTO: 0.27 10*3/MM3 (ref 0–0.05)
IMM GRANULOCYTES NFR BLD AUTO: 1.5 % (ref 0–0.5)
LYMPHOCYTES # BLD AUTO: 0.61 10*3/MM3 (ref 0.7–3.1)
LYMPHOCYTES NFR BLD AUTO: 3.5 % (ref 19.6–45.3)
MAGNESIUM SERPL-MCNC: 2.2 MG/DL (ref 1.6–2.4)
MCH RBC QN AUTO: 28.9 PG (ref 26.6–33)
MCHC RBC AUTO-ENTMCNC: 30.6 G/DL (ref 31.5–35.7)
MCV RBC AUTO: 94.6 FL (ref 79–97)
MONOCYTES # BLD AUTO: 0.4 10*3/MM3 (ref 0.1–0.9)
MONOCYTES NFR BLD AUTO: 2.3 % (ref 5–12)
NEUTROPHILS NFR BLD AUTO: 16.17 10*3/MM3 (ref 1.7–7)
NEUTROPHILS NFR BLD AUTO: 92.5 % (ref 42.7–76)
NRBC BLD AUTO-RTO: 0 /100 WBC (ref 0–0.2)
PLATELET # BLD AUTO: 444 10*3/MM3 (ref 140–450)
PMV BLD AUTO: 9.6 FL (ref 6–12)
POTASSIUM SERPL-SCNC: 4.5 MMOL/L (ref 3.5–5.2)
RBC # BLD AUTO: 3.49 10*6/MM3 (ref 3.77–5.28)
SODIUM SERPL-SCNC: 139 MMOL/L (ref 136–145)
WBC NRBC COR # BLD AUTO: 17.48 10*3/MM3 (ref 3.4–10.8)

## 2025-02-25 PROCEDURE — 94799 UNLISTED PULMONARY SVC/PX: CPT

## 2025-02-25 PROCEDURE — 83735 ASSAY OF MAGNESIUM: CPT | Performed by: PHYSICIAN ASSISTANT

## 2025-02-25 PROCEDURE — 94660 CPAP INITIATION&MGMT: CPT

## 2025-02-25 PROCEDURE — 94664 DEMO&/EVAL PT USE INHALER: CPT

## 2025-02-25 PROCEDURE — 94761 N-INVAS EAR/PLS OXIMETRY MLT: CPT

## 2025-02-25 PROCEDURE — 71045 X-RAY EXAM CHEST 1 VIEW: CPT

## 2025-02-25 PROCEDURE — 36415 COLL VENOUS BLD VENIPUNCTURE: CPT | Performed by: PHYSICIAN ASSISTANT

## 2025-02-25 PROCEDURE — 99232 SBSQ HOSP IP/OBS MODERATE 35: CPT | Performed by: CLINICAL NURSE SPECIALIST

## 2025-02-25 PROCEDURE — 80048 BASIC METABOLIC PNL TOTAL CA: CPT | Performed by: PHYSICIAN ASSISTANT

## 2025-02-25 PROCEDURE — 85025 COMPLETE CBC W/AUTO DIFF WBC: CPT | Performed by: PHYSICIAN ASSISTANT

## 2025-02-25 PROCEDURE — 25010000002 METHYLPREDNISOLONE PER 40 MG: Performed by: INTERNAL MEDICINE

## 2025-02-25 RX ORDER — BUDESONIDE 0.5 MG/2ML
1 INHALANT ORAL
Status: DISCONTINUED | OUTPATIENT
Start: 2025-02-25 | End: 2025-02-25 | Stop reason: HOSPADM

## 2025-02-25 RX ORDER — HYDROCODONE BITARTRATE AND ACETAMINOPHEN 5; 325 MG/1; MG/1
1 TABLET ORAL EVERY 6 HOURS PRN
Status: DISCONTINUED | OUTPATIENT
Start: 2025-02-25 | End: 2025-02-25

## 2025-02-25 RX ORDER — METHYLPREDNISOLONE 4 MG/1
TABLET ORAL
Qty: 21 TABLET | Refills: 0 | Status: SHIPPED | OUTPATIENT
Start: 2025-02-25 | End: 2025-03-03

## 2025-02-25 RX ORDER — DIAZEPAM 5 MG/1
5 TABLET ORAL EVERY 12 HOURS PRN
Status: DISCONTINUED | OUTPATIENT
Start: 2025-02-25 | End: 2025-02-25 | Stop reason: HOSPADM

## 2025-02-25 RX ORDER — AZITHROMYCIN 250 MG/1
250 TABLET, FILM COATED ORAL DAILY
Qty: 30 TABLET | Refills: 0 | Status: SHIPPED | OUTPATIENT
Start: 2025-02-25 | End: 2025-03-03

## 2025-02-25 RX ORDER — TOBRAMYCIN 3 MG/ML
2 SOLUTION/ DROPS OPHTHALMIC
Qty: 5 ML | Refills: 0 | Status: SHIPPED | OUTPATIENT
Start: 2025-02-25

## 2025-02-25 RX ORDER — OXYCODONE AND ACETAMINOPHEN 10; 325 MG/1; MG/1
1 TABLET ORAL EVERY 6 HOURS PRN
Status: DISCONTINUED | OUTPATIENT
Start: 2025-02-25 | End: 2025-02-25 | Stop reason: HOSPADM

## 2025-02-25 RX ADMIN — GABAPENTIN 600 MG: 300 CAPSULE ORAL at 08:42

## 2025-02-25 RX ADMIN — Medication 10 ML: at 08:49

## 2025-02-25 RX ADMIN — TOBRAMYCIN OPHTHALMIC SOLUTION 2 DROP: 3 SOLUTION/ DROPS OPHTHALMIC at 10:55

## 2025-02-25 RX ADMIN — METHYLPREDNISOLONE SODIUM SUCCINATE 40 MG: 40 INJECTION, POWDER, FOR SOLUTION INTRAMUSCULAR; INTRAVENOUS at 00:30

## 2025-02-25 RX ADMIN — TOBRAMYCIN OPHTHALMIC SOLUTION 2 DROP: 3 SOLUTION/ DROPS OPHTHALMIC at 06:38

## 2025-02-25 RX ADMIN — METHYLPREDNISOLONE SODIUM SUCCINATE 40 MG: 40 INJECTION, POWDER, FOR SOLUTION INTRAMUSCULAR; INTRAVENOUS at 06:38

## 2025-02-25 RX ADMIN — Medication 1 APPLICATION: at 06:38

## 2025-02-25 RX ADMIN — IPRATROPIUM BROMIDE AND ALBUTEROL SULFATE 3 ML: .5; 3 SOLUTION RESPIRATORY (INHALATION) at 10:57

## 2025-02-25 RX ADMIN — CITALOPRAM HYDROBROMIDE 40 MG: 20 TABLET ORAL at 08:42

## 2025-02-25 RX ADMIN — OXYCODONE AND ACETAMINOPHEN 1 TABLET: 325; 10 TABLET ORAL at 09:21

## 2025-02-25 RX ADMIN — BUDESONIDE 0.5 MG: 0.5 INHALANT RESPIRATORY (INHALATION) at 07:03

## 2025-02-25 RX ADMIN — CHLORHEXIDINE GLUCONATE 1 APPLICATION: 500 CLOTH TOPICAL at 04:11

## 2025-02-25 RX ADMIN — OXYCODONE HYDROCHLORIDE 5 MG: 5 TABLET ORAL at 04:11

## 2025-02-25 RX ADMIN — LEVOTHYROXINE SODIUM 112 MCG: 112 TABLET ORAL at 08:42

## 2025-02-25 RX ADMIN — IPRATROPIUM BROMIDE AND ALBUTEROL SULFATE 3 ML: .5; 3 SOLUTION RESPIRATORY (INHALATION) at 07:03

## 2025-02-25 NOTE — TELEPHONE ENCOUNTER
[facility] with [facility] called regarding this pt. He states that she was discharged from the hospital today. In the notes the hospital put that pt requires a non-invasive vent. Pt is currently on a CPAP but it is not effective. He was unsure if pt would need an appointment or if we could addend an office note and fax a benefit statement. His fax number is [phone].

## 2025-02-25 NOTE — PLAN OF CARE
Goal Outcome Evaluation:  Plan of Care Reviewed With: patient        Progress: improving  Outcome Evaluation: A/O. VSS. NSR/ST 90-100s. UOP adequate. Pain pill x2, zanaflex x1. Safety maintained.         Problem: Noninvasive Ventilation Acute  Goal: Effective Unassisted Ventilation and Oxygenation  Outcome: Progressing     Problem: Adult Inpatient Plan of Care  Goal: Plan of Care Review  Outcome: Progressing  Flowsheets (Taken 2/25/2025 0540)  Progress: improving  Outcome Evaluation: A/O. VSS. NSR/ST 90-100s. UOP adequate. Pain pill x2, zanaflex x1. Safety maintained.  Plan of Care Reviewed With: patient  Goal: Patient-Specific Goal (Individualized)  Outcome: Progressing  Goal: Absence of Hospital-Acquired Illness or Injury  Outcome: Progressing  Intervention: Identify and Manage Fall Risk  Recent Flowsheet Documentation  Taken 2/25/2025 0500 by Kimberly Gonzales RN  Safety Promotion/Fall Prevention: safety round/check completed  Taken 2/25/2025 0400 by Kimberly Gonzales RN  Safety Promotion/Fall Prevention: safety round/check completed  Taken 2/25/2025 0300 by Kimberly Gonzales RN  Safety Promotion/Fall Prevention: safety round/check completed  Taken 2/25/2025 0200 by Kimberly Gonzales RN  Safety Promotion/Fall Prevention: safety round/check completed  Taken 2/25/2025 0100 by Kimberly Gonzales RN  Safety Promotion/Fall Prevention: safety round/check completed  Taken 2/25/2025 0000 by Kimberly Gonzales RN  Safety Promotion/Fall Prevention: safety round/check completed  Taken 2/24/2025 2300 by Kimberly Gonzales RN  Safety Promotion/Fall Prevention: safety round/check completed  Taken 2/24/2025 2200 by Kimberly Gonzales RN  Safety Promotion/Fall Prevention: safety round/check completed  Taken 2/24/2025 2100 by Kimberly Gonzales RN  Safety Promotion/Fall Prevention: safety round/check completed  Taken 2/24/2025 2000 by Kimberly Gonzales RN  Safety Promotion/Fall Prevention: safety round/check completed  Taken 2/24/2025  1900 by Carnine, Kimberly, RN  Safety Promotion/Fall Prevention: safety round/check completed  Intervention: Prevent Skin Injury  Recent Flowsheet Documentation  Taken 2/25/2025 0500 by Kimberly Gonzales RN  Body Position:   turned   supine  Taken 2/25/2025 0400 by Kimberly Gonzales RN  Skin Protection:   silicone foam dressing in place   incontinence pads utilized  Taken 2/25/2025 0300 by Kimberly Gonzales RN  Body Position:   turned   right  Taken 2/25/2025 0100 by Kimberly Gonzales RN  Body Position:   turned   left  Taken 2/25/2025 0000 by Kimberly Gonzales RN  Skin Protection:   incontinence pads utilized   silicone foam dressing in place  Taken 2/24/2025 2300 by Kimberly Gonzales RN  Body Position:   turned   supine  Taken 2/24/2025 2100 by Kimberly Gonzales RN  Body Position:   turned   right  Taken 2/24/2025 2000 by Kimberly Gonzales RN  Skin Protection:   incontinence pads utilized   silicone foam dressing in place  Taken 2/24/2025 1900 by Kimberly Gonzales RN  Body Position:   turned   left  Intervention: Prevent and Manage VTE (Venous Thromboembolism) Risk  Recent Flowsheet Documentation  Taken 2/25/2025 0400 by Kimberly Gonzales RN  VTE Prevention/Management: (see MAR) other (see comments)  Taken 2/24/2025 2000 by Kimberly Gonzales RN  VTE Prevention/Management: (see MAR) other (see comments)  Intervention: Prevent Infection  Recent Flowsheet Documentation  Taken 2/25/2025 0400 by Kimberly Gonzales RN  Infection Prevention: hand hygiene promoted  Taken 2/25/2025 0000 by Kimberly Gonzales RN  Infection Prevention: hand hygiene promoted  Taken 2/24/2025 2000 by Kimberly Gonzales RN  Infection Prevention: hand hygiene promoted  Goal: Optimal Comfort and Wellbeing  Outcome: Progressing  Intervention: Monitor Pain and Promote Comfort  Recent Flowsheet Documentation  Taken 2/25/2025 0411 by Kimberly Gonzales RN  Pain Management Interventions: pain medication given  Taken 2/24/2025 2103 by Kimberly Gonzales  RN  Pain Management Interventions: pain medication given  Taken 2/24/2025 2000 by Kimberly Gonzales RN  Pain Management Interventions:   position adjusted   prayer utilized  Taken 2/24/2025 1900 by Kimberly Gonzales RN  Pain Management Interventions:   pillow support provided   position adjusted  Intervention: Provide Person-Centered Care  Recent Flowsheet Documentation  Taken 2/25/2025 0400 by Kimberly Gonzales, RN  Trust Relationship/Rapport: care explained  Taken 2/25/2025 0000 by Kimberly Gonzales RN  Trust Relationship/Rapport: care explained  Taken 2/24/2025 2000 by Kimberly Gonzales RN  Trust Relationship/Rapport: care explained  Goal: Readiness for Transition of Care  Outcome: Progressing

## 2025-02-25 NOTE — THERAPY DISCHARGE NOTE
Acute Care - Physical Therapy Discharge Summary  [facility]       Patient Name: Ashley Gibbs  : 1955  MRN: [id number]    Today's Date: 2025                 Admit Date: 2025      PT Recommendation and Plan    Visit Dx:    ICD-10-CM ICD-9-CM   1. Altered mental status, unspecified altered mental status type  R41.82 780.97   2. Elevated troponin  R79.89 790.6   3. Hyperkalemia  E87.5 276.7   4. Hyperammonemia  E72.20 270.6   5. Dysphagia, unspecified type  R13.10 787.20   6. Impaired mobility [Z74.09]  Z74.09 799.89                PT Rehab Goals       Row Name 25 1538             Transfer Goal 1 (PT)    Activity/Assistive Device (Transfer Goal 1, PT) sit-to-stand/stand-to-sit;bed-to-chair/chair-to-bed  -NW      [place] Level/Cues Needed (Transfer Goal 1, PT) standby assist  -NW      Time Frame (Transfer Goal 1, PT) long term goal (LTG);10 days  -NW      Progress/Outcome (Transfer Goal 1, PT) goal not met  -NW         Gait Training Goal 1 (PT)    Activity/Assistive Device (Gait Training Goal 1, PT) gait (walking locomotion);decrease fall risk;improve balance and speed;increase endurance/gait distance;increase energy conservation  -NW      [place] Level (Gait Training Goal 1, PT) contact guard required  -NW      Distance (Gait Training Goal 1, PT) 50ft while maintaining SpO2 in 90s  -NW      Time Frame (Gait Training Goal 1, PT) long term goal (LTG);10 days  -NW      Progress/Outcome (Gait Training Goal 1, PT) goal not met  -NW         Stairs Goal 1 (PT)    Activity/Assistive Device (Stairs Goal 1, PT) ascending stairs;descending stairs  -NW      [place] Level/Cues Needed (Stairs Goal 1, PT) contact guard required  -NW      Number of Stairs (Stairs Goal 1, PT) 12  -NW      Time Frame (Stairs Goal 1, PT) long term goal (LTG);10 days  -NW      Progress/Outcome (Stairs Goal 1, PT) goal not met  -NW                User Key  (r) = Recorded By, (t) = Taken By, (c) = Cosigned By       Initials Name Provider Type Discipline    NW Opal Jimenez PTA Physical Therapist Assistant PT                        PT Discharge Summary  Anticipated Discharge Disposition (PT): home  Reason for Discharge: Discharge from facility  Outcomes Achieved: Refer to plan of care for updates on goals achieved  Discharge Destination: Home      Opal Jimenez PTA   2/25/2025

## 2025-02-25 NOTE — THERAPY DISCHARGE NOTE
Acute Care - Occupational Therapy Discharge Summary  [facility]     Patient Name: Ashley Gibbs  : 1955  MRN: [id number]    Today's Date: 2025                 Admit Date: 2025        OT Recommendation and Plan    Visit Dx:    ICD-10-CM ICD-9-CM   1. Altered mental status, unspecified altered mental status type  R41.82 780.97   2. Elevated troponin  R79.89 790.6   3. Hyperkalemia  E87.5 276.7   4. Hyperammonemia  E72.20 270.6   5. Dysphagia, unspecified type  R13.10 787.20   6. Impaired mobility [Z74.09]  Z74.09 799.89                OT Rehab Goals       Row Name 25 1300             Transfer Goal 1 (OT)    Activity/Assistive Device (Transfer Goal 1, OT) sit-to-stand/stand-to-sit;bed-to-chair/chair-to-bed;toilet;shower chair  -      [place] Level/Cues Needed (Transfer Goal 1, OT) supervision required  -      Time Frame (Transfer Goal 1, OT) long term goal (LTG);by discharge  -      Strategies/Barriers (Transfers Goal 1, OT) with [facility]  -      Progress/Outcome (Transfer Goal 1, OT) goal not met;discharged from facility  -JW         Dressing Goal 1 (OT)    Activity/Device (Dressing Goal 1, OT) lower body dressing  -JW      [place]/Cues Needed (Dressing Goal 1, OT) minimum assist (75% or more patient effort)  -      Time Frame (Dressing Goal 1, OT) long term goal (LTG);10 days;by discharge  -      Strategies/Barriers (Dressing Goal 1, OT) AE/DME PRN with [facility]  -      Progress/Outcome (Dressing Goal 1, OT) goal not met;discharged from facility  -JW         Toileting Goal 1 (OT)    Activity/Device (Toileting Goal 1, OT) toileting skills, all;adjust/manage clothing;perform perineal hygiene;commode, 3-in-1  -JW      [place] Level/Cues Needed (Toileting Goal 1, OT) moderate assist (50-74% patient effort)  -      Time Frame (Toileting Goal 1, OT) by discharge  -      Progress/Outcome (Toileting Goal 1, OT) goal not met;discharged from facility  -JW         Problem  Specific Goal 1 (OT)    Problem Specific Goal 1 (OT) Pt will report 1 safety strategy to reduce risk of falls regarding O2 tubing to improve fxl independence  -      Time Frame (Problem Specific Goal 1, OT) long term goal (LTG)  -      Progress/Outcome (Problem Specific Goal 1, OT) goal not met;discharged from facility  -                User Key  (r) = Recorded By, (t) = Taken By, (c) = Cosigned By      Initials Name Provider Type Discipline     Geovanna Ritchie OTR/L, KINAS Occupational Therapist OT                        Timed Therapy Charges  Total Units: 3      Suggested Charges  Total Units: 3      Procedure Name Documented Minutes Units Code    HC OT THER PROC EA 15 MIN 25 2   04753 (CPT®)      HC OT SELF CARE/MGMT/TRAIN EA 15 MIN 17 1   36123 (CPT®)                 Documented Minutes  Total Minutes: 42      Therapy Provided Minutes    04025 - OT Therapeutic Exercise Minutes 25    68286 - OT Self Care/Mgmt Minutes 17                    Therapy Charges for Today       Code Description Service Date Service Provider Modifiers Qty    96173214893 HC OT SELF CARE/MGMT/TRAIN EA 15 MIN 2/24/2025 Geovanna Ritchie OTR/L, PAUL GO 1    08478255882 HC OT THER PROC EA 15 MIN 2/24/2025 Geovanna Ritchie OTR/L, PAUL GO 2            OT Discharge Summary  Anticipated Discharge Disposition (OT): home with 24/7 care  Reason for Discharge: Discharge from facility  Outcomes Achieved: Refer to plan of care for updates on goals achieved  Discharge Destination: Home with home health      Geovanna Sayner, OTR/L, CSRS  2/25/2025

## 2025-02-25 NOTE — PROGRESS NOTES
[facility] Intensivist Services  INPATIENT PROGRESS NOTE    Patient Name: Ashley Gibbs  Date of Admission: 2025  Today's Date: 25  Length of Stay:  LOS: 3 days   Primary Care Physician: Padmaja Bermudez APRN  Next of Kin: Primary Emergency Contact: SELAM SANCHEZ Home Phone: [phone]      Subjective   Chief Complaint: Narcotic overdose  Drug Overdose  =   69-year-old female with a past medical history of COPD on 2 L O2 at home at baseline, anxiety, arthritis, and restless leg syndrome admitted on 2025 after a narcotic overdose.  She was recently diagnosed in 2024 by fine-needle aspiration of small cell lung cancer involving multiple areas of the lung and mediastinum.  She has extensive small cell cancer by definition.  She also has chronic back pain, and family thinks she took too many of her oxycodone because she forgot that she had already taken them.  Patient began struggling to breathe at home.  911 was called, and Narcan was given.  She was then admitted by the intensivist team after being worked up in the emergency department.    Interval history:  2025: Patient requesting to go home this morning.  However, oxygen requirements have increased, and patient is now requiring noninvasive ventilation.  She is also requesting to be put back on her home dose of oxycodone as she is fearful of taking the fentanyl patch.  She states that she has no to people who have  while using fentanyl patches.    2025: Patient would still like to go home.  She is very concerned about when she will receive her next radiation treatment and when she will have her port inserted.  We tried to explain to her that it is unsafe for her to receive chemo or radiation at this time as she is still requiring a lot of oxygen.    2025: Patient was upset that her pain medication had been cut back.  I tried to explain to her that we did this due to her narcotic  "overdose she recently had.  Additionally, the combination of benzodiazepines and opiates is more likely to lead to respiratory depression than either alone.  This is especially true in someone who has underlying lung disease that she does.  I tried to explain all of this to the patient, but she continued to insist that she \"needed her pain meds\".  She remains on a significant amount of oxygen at this time on Vapotherm.    Review of Systems   All pertinent negatives and positives are as above. All other systems have been reviewed and are negative unless otherwise stated.     Past Medical and Past Surgical History   Active and Resolved Problems  Active Hospital Problems    Diagnosis  POA    **Narcotic overdose [T40.601A]  Yes    Altered mental status [R41.82]  Yes    Altered mental status, unspecified altered mental status type [R41.82]  Yes      Resolved Hospital Problems   No resolved problems to display.       Past Medical History:   Past Medical History:   Diagnosis Date    Anxiety     Arthritis     Asthma     Back pain     COPD (chronic obstructive pulmonary disease)     Dependence on supplemental oxygen     Depression     Disease of thyroid gland     Fibromyalgia, primary     Headache     History of degenerative disc disease     Hypertension     Hypothyroidism     Low back pain     Restless leg     Scoliosis      Past Surgical History:   Past Surgical History:   Procedure Laterality Date    APPENDECTOMY      BREAST BIOPSY Left     BRONCHOSCOPY Right 12/23/2024    Procedure: BRONCHOSCOPY WITH ENDOBRONCHIAL ULTRASOUND;  Surgeon: Peterson Morales MD;  Location: [facility] OR;  Service: Pulmonary;  Laterality: Right;  pre: lung mass  post: lung mass    CHOLECYSTECTOMY      TUBAL ABDOMINAL LIGATION       Social and Family History   Family History:  family history includes Arthritis in her mother; COPD in her father; Diabetes in her father; Heart disease in her father; Hypertension in her mother.    Tobacco/Social " History:  reports that she quit smoking about 16 years ago. Her smoking use included cigarettes. She started smoking about 56 years ago. She has a 40 pack-year smoking history. She has been exposed to tobacco smoke. She has never used smokeless tobacco. She reports that she does not drink alcohol and does not use drugs.    Allergies   Allergies:   She is allergic to codeine.    Objective    Temp:  [97.8 °F (36.6 °C)-98.2 °F (36.8 °C)] 97.8 °F (36.6 °C)  Heart Rate:  [] 94  Resp:  [16-21] 16  BP: (101-149)/(63-89) 130/76  Physical Exam  Vitals reviewed. Exam conducted with a chaperone present.   Constitutional:       Appearance: She is ill-appearing. She is not toxic-appearing.   HENT:      Head: Normocephalic and atraumatic.      Nose: Nose normal.   Cardiovascular:      Rate and Rhythm: Regular rhythm. Tachycardia present.      Pulses: Normal pulses.   Pulmonary:      Breath sounds: No stridor. Wheezing, rhonchi and rales present.      Comments:    Abdominal:      General: Bowel sounds are normal.      Palpations: Abdomen is soft.      Tenderness: There is no abdominal tenderness.   Musculoskeletal:         General: Normal range of motion.   Skin:     General: Skin is warm.      Capillary Refill: Capillary refill takes less than 2 seconds.   Neurological:      General: No focal deficit present.      Mental Status: She is alert and oriented to person, place, and time. Mental status is at baseline.         Inpatient Medications   Medications: Scheduled Meds:budesonide, 0.5 mg, Nebulization, BID - RT  Chlorhexidine Gluconate Cloth, 1 Application, Topical, Q24H  citalopram, 40 mg, Oral, Daily  enoxaparin, 40 mg, Subcutaneous, Q24H  gabapentin, 600 mg, Oral, Q12H  ipratropium-albuterol, 3 mL, Nebulization, 4x Daily - RT  lactulose, 30 g, Oral, Once  levothyroxine, 112 mcg, Oral, Daily  methylPREDNISolone sodium succinate, 20 mg, Intravenous, Q6H  mirtazapine, 15 mg, Oral, Nightly  mupirocin, 1 Application, Each  Nare, BID  rOPINIRole, 0.25 mg, Oral, Nightly  senna-docusate sodium, 2 tablet, Oral, BID  sodium chloride, 10 mL, Intravenous, Q12H  tobramycin, 2 drop, Both Eyes, Q4H While Awake      Continuous Infusions:   PRN Meds:.  acetaminophen **OR** acetaminophen    senna-docusate sodium **AND** polyethylene glycol **AND** bisacodyl **AND** bisacodyl    Calcium Replacement - Follow Nurse / BPA Driven Protocol    diazePAM    HYDROcodone-acetaminophen    ipratropium-albuterol    Magnesium Standard Dose Replacement - Follow Nurse / BPA Driven Protocol    ondansetron    ondansetron ODT    Phosphorus Replacement - Follow Nurse / BPA Driven Protocol    Potassium Replacement - Follow Nurse / BPA Driven Protocol    [COMPLETED] Insert Peripheral IV **AND** sodium chloride    sodium chloride    sodium chloride    tiZANidine    I have reviewed the patient's current medications.   Outpatient Medications     Current Outpatient Medications   Medication Instructions    citalopram (CELEXA) 40 mg, Oral, Daily    diazePAM (VALIUM) 5 mg, Oral, Every 12 Hours PRN    Fluticasone-Umeclidin-Vilant (TRELEGY) 100-62.5-25 MCG/ACT inhaler 1 puff, Daily - RT    gabapentin (NEURONTIN) 600 mg, Oral, Every 12 Hours Scheduled    ipratropium-albuterol (DUO-NEB) 0.5-2.5 mg/3 ml nebulizer 3 mL, Nebulization, Every 4 Hours PRN    levothyroxine (SYNTHROID, LEVOTHROID) 112 mcg, Oral, Daily    mirtazapine (REMERON) 15 mg, Oral, Every Night at Bedtime    ondansetron (ZOFRAN) 4 mg, Oral, Every 8 Hours PRN    oxyCODONE-acetaminophen (PERCOCET)  MG per tablet 1 tablet, Oral, Every 6 Hours PRN    potassium chloride (MICRO-K) 10 MEQ CR capsule 20 mEq, 2 Times Daily    rizatriptan (MAXALT) 10 MG tablet Take 1 tablet by mouth 1 (One) Time As Needed for Migraine.    rOPINIRole (REQUIP) 0.25 mg, Oral, Every Night at Bedtime    tiZANidine (ZANAFLEX) 2 mg, 2 Times Daily PRN    Ventolin  (90 Base) MCG/ACT inhaler 2 puffs, Inhalation, Every 4 Hours PRN        Current Antibiotics   This patient does not have an active medication from one of the medication groupers.    Results:   CBC:      Lab 02/25/25 0448 02/24/25 0210 02/23/25 0418 02/21/25 2303   WBC 17.48* 18.04* 11.60* 14.82*   HEMOGLOBIN 10.1* 10.1* 11.6* 11.3*   HEMATOCRIT 33.0* 32.4* 37.1 36.4   PLATELETS 444 486* 514* 480*   NEUTROS ABS 16.17* 16.60* 10.72* 12.32*   IMMATURE GRANS (ABS) 0.27* 0.14* 0.10* 0.18*   LYMPHS ABS 0.61* 0.68* 0.64* 1.17   MONOS ABS 0.40 0.60 0.11 1.03*   EOS ABS 0.00 0.00 0.00 0.01   MCV 94.6 92.6 93.2 94.5     LIVER FUNCTION TESTS:      Lab 02/21/25 2303   TOTAL PROTEIN 7.4   ALBUMIN 3.5   GLOBULIN 3.9   ALT (SGPT) 17   AST (SGOT) 18   BILIRUBIN 0.3   ALK PHOS 141*   LIPASE 14     ABG:      Lab 02/25/25 0448 02/24/25 0210 02/23/25 0418 02/22/25 2019 02/22/25 0249 02/22/25 0158 02/22/25 0028 02/21/25 2303   PH, ARTERIAL  --   --   --  7.463*  --  7.372 7.378  --    PO2 ART  --   --   --  47.6*  --  87.6 79.9*  --    PCO2, ARTERIAL  --   --   --  52.4*  --  66.5* 68.8*  --    HCO3 ART  --   --   --  37.5*  --  38.6* 40.5*  --    ANION GAP 9.0 8.0 11.0  --  17.0*  --   --  7.0     BMP/MG/PHOS:      Lab 02/25/25 0448 02/24/25 0210 02/23/25 0418 02/22/25 0249 02/22/25 0158 02/22/25 0028 02/21/25 2303   SODIUM 139 140 140 138  --   --  140   SODIUM, ARTERIAL  --   --   --   --  139   < >  --    POTASSIUM 4.5 5.1 5.0 5.3*  --   --  6.0*   CHLORIDE 98 97* 94* 93*  --   --  97*   BUN 18 20 13 8  --   --  9   CREATININE 0.63 0.73 0.53* 0.65  --   --  0.71   CALCIUM 8.6 8.5* 9.4 9.3  --   --  9.3   MAGNESIUM 2.2 2.3 2.1  --   --   --  2.0    < > = values in this interval not displayed.     IMAGING STUDIES:  XR Chest 1 View    Result Date: 2/25/2025  1. Interstitial prominence of the RIGHT lower lung, favored to correlate with infectious/inflammatory changes on recent CT chest 2/23/2025.  This report was signed and finalized on 2/25/2025 6:47 AM by Dr Cammy Lin MD.       CT Angiogram Chest    Result Date: 2/23/2025  1. No evidence of pulmonary thromboembolic disease. The right upper lobe pulmonary artery is attenuated in caliber which is felt to represent sequela from the previous right hilar and suprahilar mass with conglomerate lymphadenopathy which encased the right upper lobe bronchus and right upper lobe pulmonary artery branch. There is enlargement of the pulmonary artery suggesting pulmonary arterial hypertension. 2. The thoracic aorta and great vessels are normal in caliber. No rate limiting luminal stenosis, aneurysm or dissection present. 3. Emphysematous changes of the lungs. Previously noted right hilar and suprahilar mass with conglomerate lymphadenopathy has shown marked interval improvement posttreatment. Small residual level 2R and 4R right paratracheal nodes are present. There has been resolution of the previously noted compromise of the right upper lobe bronchus and bronchus intermedius. Some residual right hilar nodes are also present which are enlarged by CT criteria. 4. Previously noted suspected lung to lung metastasis within the right upper lobe have resolved. There are tree-in-bud opacities within both lungs, particularly within the lower lobes with several areas of more confluent nodularity in the lung bases which I would favor to represent an infectious/inflammatory process-perhaps a superimposed bronchopneumonia. A more confluent nodule the periphery of the right lower lobe would warrant follow-up on subsequent imaging and measures up to 10 mm in long axis. No effusion is present.  This report was signed and finalized on 2/23/2025 11:33 AM by Dr. Srikanth Galo MD.      XR Chest 1 View    Result Date: 2/23/2025  1. Hyperinflation of the lung parenchyma with prominent bronchovascular markings and bronchial wall thickening. This may represent sequela of chronic bronchitis. No lobar pneumonia or effusion.  This report was signed and finalized on  2025 11:07 AM by Dr. Srikanth Galo MD.       CULTURE DATA:   Blood Culture   Date Value Ref Range Status   2025 No growth at 24 hours  Preliminary   2025 No growth at 24 hours  Preliminary        Assessment/Plan   69-year-old female with a past medical history of small cell lung cancer and chronic back pain admitted for narcotic overdose.    Acute hypoxemic respiratory failure  -Multifactorial-patient has COPD diagnosis without PFTs and obstructive sleep apnea and uses CPAP   -We suspect chronic bronchitis could contribute to chronic respiratory distress. She might benefit from long-term azithromycin therapy on discharge  -Currently on Vapotherm at 30 L/70% to maintain adequate oxygenation of >88%  -Wean O2 as able  -Continue DuoNebs, steroids, and budesonide as scheduled    Extensive small cell cancer  -Defer to oncology.  -According to the [employer] [place] (NCI), the average survival rate for extensive SCLC without treatment is 2-4 months after diagnosis. With treatment, extensive SCLC typically has a median survival rate of 7-11 months. Patient is currently at 3 months.   -Given patient is likely to live less than another 8 months, we have consulted palliative care.  We appreciate their recommendations and assistance and help with this patient.    Obstructive sleep apnea  -Complicated by hypersomnia currently on modafinil at home.   -Continue CPAP    Chronic pain  -Palliative care consulted due to extensive small cell lung cancer.  -Patient did not like fentanyl patch as she knows a couple of people who  while on fentanyl patch  -Fentanyl patch has been discontinued.   -Patient was taking Percocet 10/325 mg q6h. We have started oxycodone 5 mg q6h given recent accidental narcotic overdose.  However, patient now insistent that we increase her pain medication or switch her back to hydrocodone.  I have changed her to hydrocodone 5 mg every 6 hours as needed.  -Continue to monitor  patient closely in ICU    Depression  -Continue home regimen    Hypothyroidism  -Continue synthroid at home dose      CODE STATUS: DNR/DNI  VTE prophylaxis: Lovenox   Nutrition: NPO  Bowel: Teena-Colace, MiraLAX, and Dulcolax available as needed  GI prophylaxis: N/A  Antibiotics: Finished azithromycin courseDisposition: Critical care management    Total critical care time: 30 minutes    Due to a high probability of clinically significant, life threatening deterioration, the patient required my highest level of preparedness to intervene emergently and I personally spent this critical care time directly and personally managing the patient.     This critical care time included obtaining a history; examining the patient; pulse oximetry; ordering and review of studies; arranging urgent treatment with development of a management plan; evaluation of patient's response to treatment; frequent reassessment; and, discussions with other providers.    This critical care time was performed to assess and manage the high probability of imminent, life-threatening deterioration that could result in multi-organ failure. It was exclusive of separately billable procedures and treating other patients and teaching time.    Please see MDM section and the rest of the note for further information on patient assessment and treatment.    Part of this note may be an electronic transcription/translation of spoken language to printed text using the Dragon Dictation System    Electronically signed by Francisco Goddard PA-C on 2/25/2025 at 08:37 CST

## 2025-02-25 NOTE — PROGRESS NOTES
RT EQUIPMENT DEVICE RELATED - SKIN ASSESSMENT    Estiven Score:  Estiven Score: 17     RT Medical Equipment/Device:     NIV Mask:  Under-the-nose   size:  A    Skin Assessment:      Cheek:  Intact  Chin:  Intact  Nares:  Intact  Neck:  Intact    Device Skin Pressure Protection:  Pressure points protected    Nurse Notification:  No Elizabeth D Severs, RRT

## 2025-02-25 NOTE — PROGRESS NOTES
Placed patient on home CPAP unit with 10lpm O2 bled in.  Patient SPO2 dropped to low to mid 80's.  Placed patient on our CPAP unit, SPO2 increased to 93-94%

## 2025-02-25 NOTE — OUTREACH NOTE
Prep Survey      Flowsheet Row Responses   [facility] patient discharged from? [facility]   Is LACE score < 7 ? No   Eligibility [facility]   Date of Admission 02/21/25   Date of Discharge 02/25/25   Discharge diagnosis Narcotic overdose   Does the patient have one of the following disease processes/diagnoses(primary or secondary)? Other   Is there a DME ordered? Yes   What DME was ordered? O2 from [facility].   Prep survey completed? Yes            Carmen BARTON - Registered Nurse

## 2025-02-25 NOTE — DISCHARGE INSTRUCTIONS
Patient is being discharged home.  Continue regular diet.  Resume normal ADLs as tolerated.  Take all medications as prescribed, including any new prescriptions sent to your pharmacy.  Follow-up with primary care provider in the next 1 to 2 weeks and have repeat CBC done at that time.  Follow-up closely with oncology as scheduled.  Return to ER for any new or worsening symptoms.

## 2025-02-25 NOTE — PROGRESS NOTES
[facility] Palliative Care Services    Palliative Care Daily Progress Note   Chief complaint-follow up support for patient/family    Code Status:   Code Status and Medical Interventions: No CPR (Do Not Attempt to Resuscitate); Full Support; Per patient and granddaughter   Ordered at: 02/24/25 8110     Level Of Support Discussed With:    Patient    Next of Kin (If No Surrogate)     Code Status (Patient has no pulse and is not breathing):    No CPR (Do Not Attempt to Resuscitate)     Medical Interventions (Patient has pulse or is breathing):    Full Support     Comments:    Per patient and granddaughter      Advanced Directives: Advance Directive Status: Patient does not have advance directive   Goals of Care: Ongoing.     S: Medical record reviewed. Events noted.  Intensivist does not recommend chemotherapy or radiation at this time given significant hypoxemia.  Per chart review patient frustrated over decrease in pain medication after narcotic overdose.  Oxycodone rotated to Percocet, home dosing at patient's request.  Chest imaging shows interstitial prominence of the right lower lung favored to correlate with infectious/inflammatory changes on recent CT chest 2/23/25.  Slight improvement in leukocytosis inpatient on steroids.  Steroid weaning.  Anemia stable.  Successful weaning of oxygen currently on 6 L via nasal cannula. States she feels much better.  Nursing reports potential for discharge home today.  Currently laying in bed without apparent distress.  Without complaint.  Her ex daughter-in-law is currently at bedside.     Review of Systems   Constitutional: Positive for malaise/fatigue.   Respiratory:  Positive for shortness of breath.    Musculoskeletal:  Positive for back pain.   Genitourinary:  Negative for dysuria.   Neurological:  Positive for weakness.     Pain Assessment  Preferred Pain Scale: number (Numeric Rating Pain Scale)  CPOT Facial Expression: 0-->relaxed,  neutral  CPOT Body Movements: 0-->absence of movements  CPOT Muscle Tension: 0-->relaxed  Ventilator Compliance/Vocalization: 0-->talking in normal tone or no sound  CPOT Score: 0  Pain Location: back  Pain Description: intermittent    O:     Intake/Output Summary (Last 24 hours) at 2/25/2025 0900  Last data filed at 2/25/2025 0800  Gross per 24 hour   Intake 960 ml   Output 1450 ml   Net -490 ml       Diagnostics and current medications: Reviewed.      Current Facility-Administered Medications:     acetaminophen (TYLENOL) tablet 650 mg, 650 mg, Oral, Q4H PRN **OR** acetaminophen (TYLENOL) suppository 650 mg, 650 mg, Rectal, Q4H PRN, Mohsen Garcia PA-C    sennosides-docusate (PERICOLACE) 8.6-50 MG per tablet 2 tablet, 2 tablet, Oral, BID, 2 tablet at 02/24/25 0933 **AND** polyethylene glycol (MIRALAX) packet 17 g, 17 g, Oral, Daily PRN **AND** bisacodyl (DULCOLAX) EC tablet 5 mg, 5 mg, Oral, Daily PRN **AND** bisacodyl (DULCOLAX) suppository 10 mg, 10 mg, Rectal, Daily PRN, Mohsen Garcia PA-C    budesonide (PULMICORT) nebulizer solution 0.5 mg, 0.5 mg, Nebulization, BID - RT, Bernardo Rivera MD, 0.5 mg at 02/25/25 0703    Calcium Replacement - Follow Nurse / BPA Driven Protocol, , Not Applicable, PRN, Mohsen Garcia PA-C    Chlorhexidine Gluconate Cloth 2 % pads 1 Application, 1 Application, Topical, Q24H, Mohsen Garcia PA-C, 1 Application at 02/25/25 0411    citalopram (CeleXA) tablet 40 mg, 40 mg, Oral, Daily, Bernardo Rivera MD, 40 mg at 02/25/25 0842    diazePAM (VALIUM) tablet 2.5 mg, 2.5 mg, Oral, Q12H PRN, Bernardo Rivera MD    Enoxaparin Sodium (LOVENOX) syringe 40 mg, 40 mg, Subcutaneous, Q24H, Bernardo Rivera MD, 40 mg at 02/24/25 1346    gabapentin (NEURONTIN) capsule 600 mg, 600 mg, Oral, Q12H, Bernardo Rivera MD, 600 mg at 02/25/25 0842    HYDROcodone-acetaminophen (NORCO) 5-325 MG per tablet 1 tablet, 1 tablet, Oral, Q6H PRN, Bernardo Rivera MD    ipratropium-albuterol  (DUO-NEB) nebulizer solution 3 mL, 3 mL, Nebulization, 4x Daily - RT, Bernardo Rivera MD, 3 mL at 02/25/25 0703    ipratropium-albuterol (DUO-NEB) nebulizer solution 3 mL, 3 mL, Nebulization, Q4H PRN, Da Brennan APRN    lactulose solution 30 g, 30 g, Oral, Once, Tara Flores MD    levothyroxine (SYNTHROID, LEVOTHROID) tablet 112 mcg, 112 mcg, Oral, Daily, Bernardo Rivera MD, 112 mcg at 02/25/25 0842    Magnesium Standard Dose Replacement - Follow Nurse / BPA Driven Protocol, , Not Applicable, PRN, Mohsen Garcia PA-C    [COMPLETED] methylPREDNISolone sodium succinate (SOLU-Medrol) injection 60 mg, 60 mg, Intravenous, Q6H, 60 mg at 02/23/25 1958 **FOLLOWED BY** [COMPLETED] methylPREDNISolone sodium succinate (SOLU-Medrol) injection 40 mg, 40 mg, Intravenous, Q6H, 40 mg at 02/25/25 0638 **FOLLOWED BY** methylPREDNISolone sodium succinate (SOLU-Medrol) injection 20 mg, 20 mg, Intravenous, Q6H, Bernardo Rivera MD    mirtazapine (REMERON) tablet 15 mg, 15 mg, Oral, Nightly, Bernardo Rivera MD, 15 mg at 02/24/25 2006    mupirocin (BACTROBAN) 2 % nasal ointment 1 Application, 1 Application, Each Nare, BID, Mohsen Garcia PA-C, 1 Application at 02/25/25 0638    ondansetron (ZOFRAN) injection 4 mg, 4 mg, Intravenous, Q6H PRN, Mohsen Garcia PA-C    ondansetron ODT (ZOFRAN-ODT) disintegrating tablet 4 mg, 4 mg, Translingual, Q8H PRN, Bernardo Rivera MD    Phosphorus Replacement - Follow Nurse / BPA Driven Protocol, , Not Applicable, PRN, Mohsen Garcia PA-C    Potassium Replacement - Follow Nurse / BPA Driven Protocol, , Not Applicable, PRN, Mohsen Garcia PA-C    rOPINIRole (REQUIP) tablet 0.25 mg, 0.25 mg, Oral, Nightly, Bernardo Rivera MD, 0.25 mg at 02/24/25 2006    [COMPLETED] Insert Peripheral IV, , , Once **AND** sodium chloride 0.9 % flush 10 mL, 10 mL, Intravenous, PRN, Chaka Chan S,     sodium chloride 0.9 % flush 10 mL, 10 mL, Intravenous, Q12H, Jose  "Mohsen RUVALCABA PA-C, 10 mL at 02/25/25 0849    sodium chloride 0.9 % flush 10 mL, 10 mL, Intravenous, PRN, Mohsen Garcia PA-C    sodium chloride 0.9 % infusion 40 mL, 40 mL, Intravenous, PRN, Mohsen Garcia PA-C    tiZANidine (ZANAFLEX) tablet 2 mg, 2 mg, Oral, BID PRN, Bernardo Rivera MD, 2 mg at 02/24/25 2100    tobramycin 0.3 % ophthalmic solution 2 drop, 2 drop, Both Eyes, Q4H While Awake, Bernardo Rivera MD, 2 drop at 02/25/25 0638    Allergies   Allergen Reactions    Codeine Shortness Of Breath     I have utilized all available immediate resources to obtain, update, or review the patient's current medications (including all prescriptions, over-the-counter products, herbals, cannabis/cannabidiol products, and vitamin/mineral/dietary (nutritional) supplements) for name, route of administration, type, dose and frequency.    A:    /76   Pulse 99   Temp 98.4 °F (36.9 °C)   Resp 16   Ht 170.2 cm (67\")   Wt 68.5 kg (151 lb 0.2 oz)   SpO2 91%   BMI 23.65 kg/m²     Vitals and nursing note reviewed.   Constitutional:       Appearance: Not in distress. Ill-appearing and chronically ill-appearing.      Interventions: Nasal cannula in place.      Comments: 6L nasal cannula  Eyes:      General: Lids are normal.      Pupils: Pupils are equal, round, and reactive to light.   HENT:      Head: Normocephalic.   Pulmonary:      Effort: Pulmonary effort is normal.   Cardiovascular:      Normal rate present.   Musculoskeletal:      Cervical back: Neck supple.   Skin:     General: Skin is warm.   Genitourinary:     Comments: No reported dysuria  Neurological:      Mental Status: Alert, oriented to person, place, and time      Patient status: Disease state: Controlled with current treatments.  Current Functional status: Palliative Performance Scale Score: Performance 40% based on the following measures: Ambulation: Mainly in bed, Activity and Evidence of Disease: Unable to do any work, extensive evidence of " disease, Self-Care: Mainly assistance required,  Intake: Normal or reduced, LOC: Full, drowsy or confusion   Baseline Functional status: Palliative Performance Scale Score: Performance 50% based on the following measures: Ambulation: Mainly sit or lie down, Activity and Evidence of Disease: Unable to do any work, extensive evidence of disease, Self-Care: Considerable assistance required,  Intake: Normal or reduced, LOC: Full or confusion   Baseline ECOG Status(3) Capable of limited self-care, confined to bed or chair > 50% of waking hours.  Nutritional status: Albumin 3.5 Body mass index is 23.03 kg/m².      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 plan: Goals of care include CODE STATUS no CPR/full support interventions.     Family support: The patient receives support from her  granddaughter ..  Advance Directives: Advance Directive Status: Patient does not have advance directive   POA/Healthcare surrogate-granddaughter-Hope POA      2.  Palliative care encounter  - Prognosis is poor long-term secondary to metastatic small cell carcinoma, narcotic overdose, cerebral microvascular disease, oxygen dependence, multiple comorbidities.  -Patient and family appears to have good  prognostic awareness.  Aware of terminal condition but hoping for more time with palliative chemotherapy and radiation.     12/24-PET scan shows large irregular hypermetabolic mass in the right upper lobe suprahilar region with probable invasion of the middle mediastinum and right hilum and/or conglomerate hypermetabolic lymphadenopathy.  This is worrisome for small cell carcinoma.  Right upper lobe solid nodules also hypermetabolic and consistent with neoplasm likely pulmonary metastases.     -Received emergent palliative radiation x 5 fractions to right lung 12/13/2024-12/19/2024.       1/17-MRI brain shows no evidence of intracranial metastatic disease.       1/20-Evaluated by Dr. Vanegas with plans for radiation x 30/33 treatments.      1/27 initiated cycle 1 chemotherapy carboplatin D1, etoposide D1-3 plan every 21 days x 4 cycles.       -Hospitalization 1/31-2/10 due to leukopenia due to chemotherapy, thrombocytopenia, acute respiratory failure with hypoxemia requiring mechanical ventilation, and influenza A, left AMA.     2/17-Seen by Dr. Isaac, oncology and given weakness unable to receive cycle 2 chemo treatment.       -treated with Narcan by EMS.  Blood cultures pending.  UDS positive for benzodiazepines and oxycodone.  Required supplemental oxygen with Vapotherm, DuoNebs, steroids, budesonide, and Zithromax.  Fentanyl transdermal patch initiated on admission but removed 2/23 (within 24 hours) due to concerns of increased oxygen needs and placed on BiPAP.    -Evaluated by speech therapy and started on mechanical soft diet with thin liquids.    -Evaluated by therapy who recommends home with 24/7 care with home health versus SNF.        2/24-clarified CODE STATUS is no CPR/full support interventions.  States she would wish to be placed on ventilator support in the scenario of respiratory failure but would not wish to remain on life support measures long-term.  Her family is well aware of her wishes.  I  "have confirmed this with her granddaughter, Hope as well and this has been their discussion as well.    2/25-continue supportive measures  -High risk for rehospitalization's and decline  -Poor performance status limiting efforts at palliative chemotherapy and palliative radiation.  Wishes to push forward as tolerated, anticipate follow up with Dr. Isaac.  -Explored best supportive care directed by hospice 2/24.  Not quite ready for transition at this juncture.  -Will plan to complete a MOST document prior to discharge.  -copy of POA documentation provided 2/25, scanned to EMR.    ADDENDUM at 1240 follow-up with patient with regard to CODE STATUS as I was notified by palliative  that extended family and friends have been speaking with patient and encouraging her to change CODE STATUS  back to full aggressive care interventions.  Spoke with patient by herself in the room, no family present.  We again explored her multiple comorbid factors including metastatic small cell cancer and advanced COPD.  She is feeling optimistic with improvement findings on most recent CT.  A MOST decision aid has been provided for patient and family to further consider options.  States \"I am leaving it up to her (granddaughter/POA)\".  Encouraged ongoing consideration and states she is okay with leaving current CODE STATUS no CPR/full support interventions elections in place until further discussion with her family at this juncture.  Anticipating discharge home today.        Thank you for allowing us to participate in patient's plan of care. Palliative Care Team will continue to follow patient.     Margy Garcia, APRN  2/25/2025  09:00 CST   "

## 2025-02-25 NOTE — DISCHARGE SUMMARY
[facility]  DISCHARGE SUMMARY       Date of Admission: 2/21/2025  Date of Discharge:  2/25/2025  Primary Care Physician: Padmaja Bermudez APRN    Presenting Problem/History of Present Illness:  Accidental narcotic overdose    Final Discharge Diagnoses:  Active Hospital Problems    Diagnosis     **Narcotic overdose        Consults: Palliative care    Procedures Performed: N/A    Pertinent Test Results:   Results for orders placed during the hospital encounter of 05/03/23    Adult Transthoracic Echo Complete W/ Cont if Necessary Per Protocol    Interpretation Summary    Left ventricular systolic function is normal. Left ventricular ejection fraction appears to be 66 - 70%.    Estimated right ventricular systolic pressure from tricuspid regurgitation is mildly elevated (35-45 mmHg).    The right ventricular cavity is mild to moderately dilated, with normal systolic function..    The right atrial cavity is dilated.    No significant valvular pathology.    No prior studies available for comparison.      Imaging Results (All)       Procedure Component Value Units Date/Time    XR Chest 1 View [[id number]] Collected: 02/25/25 0644     Updated: 02/25/25 0650    Narrative:      EXAM: XR CHEST 1 VW-      DATE: 2/25/2025 4:06 AM     HISTORY: respiratory failure; R41.82-Altered mental status, unspecified;  R79.89-Other specified abnormal findings of blood chemistry;  E87.5-Hyperkalemia; E72.20-Disorder of urea cycle metabolism,  unspecified; R13.10-Dysphagia, unspecified; Z74.09-Other reduced  mobility       COMPARISON: 2/23/2025.     TECHNIQUE:  Single view. Frontal view of the chest. 1 images.       FINDINGS:    Hyperinflated lungs. No pneumothorax or pleural effusion. No focal  consolidation. Interstitial prominence RIGHT lower lung, which  correlates with recent CT chest 2/23/2025, favor  infectious/inflammatory.     Calcified aortic atherosclerosis. Cardiac and  mediastinal silhouette  appear within normal limits. No acute bony finding.          Impression:      1. Interstitial prominence of the RIGHT lower lung, favored to correlate  with infectious/inflammatory changes on recent CT chest 2/23/2025.     This report was signed and finalized on 2/25/2025 6:47 AM by Dr Cammy Lin MD.       CT Angiogram Chest [[id number]] Collected: 02/23/25 1115     Updated: 02/23/25 1136    Narrative:      Exam: CT angiogram of the of the chest with intravenous contrast.  2/23/2025     CLINICAL HISTORY: Evaluate for pulmonary embolus. Shortness of breath.     DOSE:  162.42 mGy.cm . All CT scans are performed using dose  optimization techniques as appropriate to the performed exam and  including at least one of the following: Automated exposure control,  adjustment of the mA and/or kV according to size, and the use of the  iterative reconstruction technique.     TECHNIQUE: Contiguous axial images were obtained through the thorax  following intravenous contrast administration with reformatted images  obtained in the sagittal and coronal projections from the original data  set. Three-dimensional reconstructions are also obtained.     COMPARISON: 12/19/2024     FINDINGS:     Pulmonary arteries: No evidence of pulmonary thromboembolic disease.  There is noted to be attenuation of the right upper lobe pulmonary  artery branch in its proximal segment felt to be related to the  patient's recent large hilar and right paratracheal conglomerate mary  mass. This appears to have shown significant response to treatment. The  pulmonary arteries are enlarged suggesting pulmonary arterial  hypertension.     Aorta:  The thoracic aorta and proximal great vessels are normal in  appearance. There is no evidence of aortic dissection or aneurysm.     LUNGS: There are emphysematous changes of the lungs. Mild diffuse  bronchial wall thickening is present. There is evidence of remote  granulomatous disease.  There are tree-in-bud opacities within both lower  lobes with some areas of more confluent nodularity including a more  well-defined peripheral nodule in the lateral right lower lobe measuring  10 mm in size. This may represent superimposed bronchopneumonia in the  lower lobes. No effusion is present. Previously noted consolidation  within the right middle lobe has resolved. Previously noted large right  perihilar mass with right paratracheal conglomerate lymphadenopathy has  nearly completely resolved post treatment with marked improvement in the  previous narrowing of the right upper lobe bronchus and bronchus  intermedius. There is some persistent level 2R right paratracheal  adenopathy but showing marked interval improvement now measuring 11 mm  in short axis previously measured at 2.1 cm in short axis. A right hilar  node measures 1.2 cm in short axis. Previously noted nodules within the  right upper lobe felt to represent lung to lung metastasis have also  resolved.     Pleural spaces: Unremarkable. No evidence of pleural effusion or  pneumothorax.     HEART: No evidence of cardiac enlargement or right ventricular  dysfunction. No pericardial effusion present. Mild coronary  calcifications are present.     Bones: No acute osseous abnormalities are demonstrated.     CHEST WALL: No chest wall abnormalities are demonstrated.     Lymph nodes: Interval improvement in the patient's level 2R and level 4R  conglomerate right mary metastases. No new or enlarging mediastinal or  axillary adenopathy present.     Upper abdomen: Limited images of the upper abdomen are unremarkable.       Impression:      1. No evidence of pulmonary thromboembolic disease. The right upper lobe  pulmonary artery is attenuated in caliber which is felt to represent  sequela from the previous right hilar and suprahilar mass with  conglomerate lymphadenopathy which encased the right upper lobe bronchus  and right upper lobe pulmonary artery  branch. There is enlargement of  the pulmonary artery suggesting pulmonary arterial hypertension.  2. The thoracic aorta and great vessels are normal in caliber. No rate  limiting luminal stenosis, aneurysm or dissection present.  3. Emphysematous changes of the lungs. Previously noted right hilar and  suprahilar mass with conglomerate lymphadenopathy has shown marked  interval improvement posttreatment. Small residual level 2R and 4R right  paratracheal nodes are present. There has been resolution of the  previously noted compromise of the right upper lobe bronchus and  bronchus intermedius. Some residual right hilar nodes are also present  which are enlarged by CT criteria.  4. Previously noted suspected lung to lung metastasis within the right  upper lobe have resolved. There are tree-in-bud opacities within both  lungs, particularly within the lower lobes with several areas of more  confluent nodularity in the lung bases which I would favor to represent  an infectious/inflammatory process-perhaps a superimposed  bronchopneumonia. A more confluent nodule the periphery of the right  lower lobe would warrant follow-up on subsequent imaging and measures up  to 10 mm in long axis. No effusion is present.     This report was signed and finalized on 2/23/2025 11:33 AM by Dr. Srikanth Galo MD.       XR Chest 1 View [[id number]] Collected: 02/23/25 1105     Updated: 02/23/25 1110    Narrative:      EXAMINATION: XR CHEST 1 VW-  2/23/2025 11:06 AM     HISTORY: Worsening hypoxia.     FINDINGS: Today's exam is compared to previous study of 1 day earlier.  The lungs are hyperinflated and there are increased interstitial  markings the lungs which are chronic in nature. There is bronchial wall  thickening present. Overall stable appearance from yesterday's exam. No  acute consolidative pneumonia or effusion.       Impression:      1. Hyperinflation of the lung parenchyma with prominent bronchovascular  markings and bronchial  wall thickening. This may represent sequela of  chronic bronchitis. No lobar pneumonia or effusion.     This report was signed and finalized on 2/23/2025 11:07 AM by Dr. Srikanth Galo MD.       XR Chest 1 View [[id number]] Collected: 02/22/25 1034     Updated: 02/22/25 1041    Narrative:      EXAMINATION: XR CHEST 1 VW-  2/22/2025 10:34 AM     HISTORY: Altered mental status.     FINDINGS: Today's exam is compared to previous study of 2/5/2025.  Upright frontal projection of the chest demonstrates a reticulonodular  pattern of both lungs suggesting either a inflammatory/infectious  process or potentially metastatic disease. There is fullness of the  right hilum with previously documented right hilar mass.       Impression:      1. Diffuse reticulonodular disease of the lungs demonstrate stability  from the previous exam either representing a  infectious/inflammatory  process or potentially metastatic disease. The patient has a known right  hilar mass.     This report was signed and finalized on 2/22/2025 10:38 AM by Dr. Srikanth Galo MD.       CT Head Without Contrast [[id number]] Collected: 02/22/25 0938     Updated: 02/22/25 0943    Narrative:      EXAMINATION: CT HEAD WO CONTRAST-  2/22/2025 9:38 AM     HISTORY: Altered mental status.     DOSE: 1634 mGycm. All CT scans are performed using dose optimization  techniques as appropriate to the performed exam and including at least  one of the following: Automated exposure control, adjustment of the mA  and/or kV according to size, and the use of the iterative reconstruction  technique..     FINDINGS: Multiple contiguous axial images were obtained from the skull  base to the vertex per protocol without intravenous contrast enhancement  with reformatted images obtained in the sagittal and coronal projections  from the original data set.     There is extensive motion artifact. The study was attempted twice with  no improvement. No gross evidence of mass effect or  shift of the  midline. There does appear to be mild atrophy and small vessel disease.  No acute or suspicious bony abnormalities present. The visualized  paranasal sinuses and mastoid air cells are normally aerated.       Impression:      1.. Limited interpretation secondary to extensive motion artifact.  2. Atrophy with small vessel disease. No definite acute posttraumatic  injury to the brain.     This report was signed and finalized on 2/22/2025 9:40 AM by Dr. Srikanth Galo MD.       CT Cervical Spine Without Contrast [[id number]] Collected: 02/22/25 0933     Updated: 02/22/25 0939    Narrative:      CT CERVICAL SPINE WO CONTRAST- 2/22/2025 1:50 AM     HISTORY: AMS/question of c1 fracture on CT head     COMPARISON: None      DOSE LENGTH PRODUCT: 294.3 mGy centimeters. All CT scans are performed  using dose optimization techniques as appropriate to the performed exam  and including at least one of the following: Automated exposure control,  adjustment of the mA and/or kV according to size, and the use of the  iterative reconstruction technique.     TECHNIQUE: Serial helical tomographic images of the cervical spine were  obtained without the use of intravenous contrast. Additionally, sagittal  and coronal reformatted images were also provided for review.     FINDINGS:  Alignment: Normal.     Bones: No convincing evidence of acute fracture. Irregularity and  sclerotic margins along the anterior arch of C1 represents a chronic  finding. No acute fracture lines are appreciated. Vertebral body heights  are maintained.     Disc spaces: Maintained.     Canal and neuroforamina: Foraminal narrowing is noted at multiple levels  related to facet arthropathy and uncinate spurring.     Soft tissues: Unremarkable.     Lung apices: Clear. No pneumothorax.       Impression:      1. Limited exam secondary to extensive motion artifact. The acquisition  was obtained twice.  2. No gross evidence of fracture or malalignment.  Foraminal narrowing is  noted at several levels related to facet arthropathy and uncinate  spurring.           This report was signed and finalized on 2/22/2025 9:35 AM by Dr. Srikanth Galo MD.       CT Head Without Contrast [[id number]] Collected: 02/22/25 0930     Updated: 02/22/25 0936    Narrative:      CT HEAD WO CONTRAST- 2/22/2025 1:50 AM     HISTORY: altered mental status - initial study degraded d/t motion     COMPARISON: 2/22/2025     DLP: 580.5 mGy centimeters. All CT scans are performed using dose  optimization techniques as appropriate to the performed exam and  including at least one of the following: Automated exposure control,  adjustment of the mA and/or kV according to size, and the use of the  iterative reconstruction technique.     TECHNIQUE: Serial axial tomographic images of the brain were obtained  without the use of intravenous contrast.     FINDINGS:  The midline structures are nondisplaced. There is mild cerebral and  cerebellar atrophy, with an associated increase in the prominence of the  ventricles and sulci. The basilar cisterns are normal in size and  configuration. There is no evidence of intracranial hemorrhage or  mass-effect. There is low attenuation in the periventricular white  matter, consistent with chronic ischemic change. There are no abnormal  extra-axial fluid collections. There is no evidence of tonsillar  herniation.     The included orbits and their contents are unremarkable. The visualized  paranasal sinuses, mastoid air cells and middle ear cavities are clear.  The visualized osseous structures and overlying soft tissues of the  skull and face are intact.       Impression:         1. Mild cerebral and cerebellar atrophy with chronic microvascular  disease but no evidence of acute intracranial process. The study is  degraded by motion.           This report was signed and finalized on 2/22/2025 9:33 AM by Dr. Srikanth Galo MD.               Result Review     Result Review:  I have personally reviewed the results from the time of this admission to 2/25/2025 13:08 CST and agree with these findings:  [x]  Laboratory list / accordion  [x]  Microbiology  [x]  Radiology  [x]  EKG/Telemetry   []  Cardiology/Vascular   []  Pathology  []  Old records  []  Other:  Most notable findings include: WBC 17.48, hemoglobin 10.1, blood cultures showing no growth to date.  Respiratory panel negative.      Hospital Course: 69-year-old female with past medical history of COPD on 2 to 3 L at home at baseline, anxiety, arthritis, and restless leg syndrome admitted on 2/22/2025 after a narcotic overdose.  She states that sometimes she cranks up her oxygen more at home if needed.  She was recently diagnosed in December 2024 by fine-needle aspiration of small cell lung cancer involving multiple areas of the lung and mediastinum.  She has extensive small cell lung cancer by definition.  She was requiring a lot of oxygen during her stay, and having to alternate between noninvasive ventilator and Vapotherm.  However, on February 25, 2025, the patient was able to be weaned down to 5 L nasal cannula.  She is feeling significantly better.  She is worried about receiving her radiation and chemo.  She tells me she was post to have a port placed today, February 25.  She is now near her baseline oxygen requirements.  Recent CT scan showed no evidence of PE and some improvement in her suspected lung metastasis.  We have demonstrated techniques to try to help improve her baseline neuromuscular function in regards to helping her lungs with diaphragmatic exercises.  As mentioned previously, the patient is feeling much better and clinically appears much better than when she presented to our hospital.  We did consult palliative care during her stay as she does have extensive small cell lung cancer.  However, at this time, the patient wishes to continue to pursue aggressive care.    Patient is being discharged  "home today.  She may resume her normal activities as tolerated.  Continue all medications, including any new prescriptions into your pharmacy.  We recommend patient follow-up with her primary care provider in the next 1 to 2 weeks and follow-up with oncology closely as scheduled.    Physical Exam on Discharge:  /74   Pulse 105   Temp 98.3 °F (36.8 °C) (Oral)   Resp 22   Ht 170.2 cm (67\")   Wt 68.5 kg (151 lb 0.2 oz)   SpO2 90%   BMI 23.65 kg/m²   Physical Exam  Constitutional:       General: She is not in acute distress.     Appearance: She is not toxic-appearing or diaphoretic.   HENT:      Head: Normocephalic and atraumatic.      Nose: Nose normal.      Mouth/Throat:      Mouth: Mucous membranes are moist.   Eyes:      Pupils: Pupils are equal, round, and reactive to light.   Cardiovascular:      Rate and Rhythm: Regular rhythm. Tachycardia present.      Pulses: Normal pulses.   Pulmonary:      Effort: Pulmonary effort is normal. No respiratory distress.      Breath sounds: No stridor. Wheezing present. No rhonchi.   Abdominal:      General: Abdomen is flat. Bowel sounds are normal. There is no distension.      Tenderness: There is no abdominal tenderness.   Musculoskeletal:         General: Normal range of motion.   Skin:     General: Skin is warm.      Capillary Refill: Capillary refill takes less than 2 seconds.   Neurological:      General: No focal deficit present.      Mental Status: She is alert and oriented to person, place, and time.       Condition on Discharge: Fair, though her long term prognosis is poor    Discharge Disposition:  Home or Self Care    Discharge Medications:     Discharge Medications        New Medications        Instructions Start Date   azithromycin 250 MG tablet  Commonly known as: Zithromax   250 mg, Oral, Daily      methylPREDNISolone 4 MG dose pack  Commonly known as: MEDROL   Take as directed on package instructions.             Continue These Medications        " Instructions Start Date   citalopram 40 MG tablet  Commonly known as: CeleXA   40 mg, Oral, Daily      diazePAM 5 MG tablet  Commonly known as: Valium   5 mg, Oral, Every 12 Hours PRN      Fluticasone-Umeclidin-Vilant 100-62.5-25 MCG/ACT inhaler  Commonly known as: TRELEGY   1 puff, Daily - RT      gabapentin 600 MG tablet  Commonly known as: NEURONTIN   600 mg, Oral, Every 12 Hours Scheduled      ipratropium-albuterol 0.5-2.5 mg/3 ml nebulizer  Commonly known as: DUO-NEB   3 mL, Nebulization, Every 4 Hours PRN      levothyroxine 112 MCG tablet  Commonly known as: SYNTHROID, LEVOTHROID   112 mcg, Oral, Daily      mirtazapine 15 MG tablet  Commonly known as: REMERON   15 mg, Oral, Every Night at Bedtime      ondansetron 4 MG tablet  Commonly known as: Zofran   4 mg, Oral, Every 8 Hours PRN      oxyCODONE-acetaminophen  MG per tablet  Commonly known as: PERCOCET   1 tablet, Oral, Every 6 Hours PRN      potassium chloride 10 MEQ CR capsule  Commonly known as: MICRO-K   20 mEq, 2 Times Daily      rizatriptan 10 MG tablet  Commonly known as: MAXALT   Take 1 tablet by mouth 1 (One) Time As Needed for Migraine.      rOPINIRole 0.25 MG tablet  Commonly known as: REQUIP   0.25 mg, Oral, Every Night at Bedtime      tiZANidine 2 MG tablet  Commonly known as: ZANAFLEX   2 mg, 2 Times Daily PRN      Ventolin  (90 Base) MCG/ACT inhaler  Generic drug: albuterol sulfate HFA   2 puffs, Inhalation, Every 4 Hours PRN               Discharge Diet: Regular diet    Activity at Discharge: As tolerated    Follow-up Appointments: Follow up with primary care provider in 1 to 2 weeks and have repeat CBC done at that time.  Follow-up with oncology as scheduled.  Future Appointments   Date Time Provider Department Center   12/31/2025  3:00 PM Padmaja Bermudez APRN [facility]       Test Results Pending at Discharge: N/A    Electronically signed by Francisco Goddard PA-C on 2/25/2025 at 13:08 CST    Time: 32 minutes.

## 2025-02-25 NOTE — CASE MANAGEMENT/SOCIAL WORK
Continued Stay Note   [facility]     Patient Name: Ashley Gibbs  MRN: [id number]  Today's Date: 2/25/2025    Admit Date: 2/21/2025    Plan: Home   Discharge Plan       Row Name 02/25/25 1129       Plan    Plan Home    Plan Comments Order received for 10L concentrator at home upon discharge.  Patient receives her O2 from [facility].  SW contacted [facility] to inform of order (spoke with Marielena) and faxed order and documentation.                   Discharge Codes    No documentation.                       ZARIA Gonzalez

## 2025-02-25 NOTE — PLAN OF CARE
"  Problem: Noninvasive Ventilation Acute  Goal: Effective Unassisted Ventilation and Oxygenation  Outcome: Progressing     Problem: Adult Inpatient Plan of Care  Goal: Plan of Care Review  Outcome: Progressing  Flowsheets (Taken 2/24/2025 1829)  Progress: no change  Outcome Evaluation: VSS. Continues on vapotherm 30L 80%. Adequate UOP. Lg. BM today. Palliative nurse, Margy, here today to discuss palliative status with pt. Pt. rescinded DNI status and is now agreeable to intubation if necessary. Pt. remains No CPR. Continues to c/o pain and states frustration r/t \"my pain med was cut in half.\" Instructed her to discuss pain med with MD. No new issues today.  Plan of Care Reviewed With: patient  Goal: Patient-Specific Goal (Individualized)  Outcome: Progressing  Goal: Absence of Hospital-Acquired Illness or Injury  Outcome: Progressing  Intervention: Identify and Manage Fall Risk  Description: Perform standard risk assessment on admission using a validated tool or comprehensive approach appropriate to the patient; reassess fall risk frequently, with change in status or transfer to another level of care.  Communicate risk to interprofessional healthcare team; ensure fall risk visible cue.  Determine need for increased observation, equipment and environmental modification, as well as use of supportive, nonskid footwear.  Adjust safety measures to individual needs and identified risk factors.  Reinforce the importance of active participation with fall risk prevention, safety, and physical activity with the patient and family.  Perform regular intentional rounding to assess need for position change, pain assessment and personal needs, including assistance with toileting.  Recent Flowsheet Documentation  Taken 2/24/2025 1800 by Anita Fleming, RN  Safety Promotion/Fall Prevention: safety round/check completed  Taken 2/24/2025 1700 by Anita Fleming, RN  Safety Promotion/Fall Prevention: safety round/check " completed  Taken 2/24/2025 1600 by Anita Fleming RN  Safety Promotion/Fall Prevention: safety round/check completed  Taken 2/24/2025 1500 by Anita Fleming RN  Safety Promotion/Fall Prevention: safety round/check completed  Taken 2/24/2025 1400 by Anita Fleming RN  Safety Promotion/Fall Prevention: safety round/check completed  Taken 2/24/2025 1300 by Anita Fleming RN  Safety Promotion/Fall Prevention: safety round/check completed  Taken 2/24/2025 1200 by Anita Fleming RN  Safety Promotion/Fall Prevention: safety round/check completed  Taken 2/24/2025 1100 by Anita Fleming RN  Safety Promotion/Fall Prevention: safety round/check completed  Taken 2/24/2025 1000 by Anita Fleming RN  Safety Promotion/Fall Prevention: safety round/check completed  Taken 2/24/2025 0900 by Anita Fleming RN  Safety Promotion/Fall Prevention: safety round/check completed  Taken 2/24/2025 0800 by Anita Fleming RN  Safety Promotion/Fall Prevention: safety round/check completed  Taken 2/24/2025 0700 by Anita Fleming RN  Safety Promotion/Fall Prevention: safety round/check completed  Intervention: Prevent Skin Injury  Description: Perform a screening for skin injury risk, such as pressure or moisture-associated skin damage on admission and at regular intervals throughout hospital stay.  Keep all areas of skin (especially folds) clean and dry.  Maintain adequate skin hydration.  Relieve and redistribute pressure and protect bony prominences and skin at risk for injury; implement measures based on patient-specific risk factors.  Match turning and repositioning schedule to clinical condition.  Encourage weight shift frequently; assist with reposition if unable to complete independently.  Float heels off bed; avoid pressure on the Achilles tendon.  Keep skin free from extended contact with medical devices.  Optimize nutrition and hydration.  Encourage functional activity and mobility, as early as  tolerated.  Use aids (e.g., slide boards, mechanical lift) during transfer.  Recent Flowsheet Documentation  Taken 2/24/2025 1600 by Anita Fleming RN  Body Position:   position maintained   supine  Taken 2/24/2025 1500 by Anita Fleming RN  Body Position:   supine   turned   weight shifting  Taken 2/24/2025 1400 by Anita Fleming RN  Body Position:   position maintained   tilted   left  Taken 2/24/2025 1300 by Anita Fleming RN  Body Position: (Up in chair)   tilted   left   other (see comments)  Taken 2/24/2025 1200 by Anita Fleming RN  Body Position:   position maintained   supine  Skin Protection: incontinence pads utilized  Taken 2/24/2025 1100 by Anita Fleming RN  Body Position: (Up in chair)   position changed independently   supine   other (see comments)  Taken 2/24/2025 1000 by Anita Fleming RN  Body Position:   position changed independently   position maintained   tilted   left  Taken 2/24/2025 0900 by Anita Fleming RN  Body Position:   position changed independently   tilted   left  Taken 2/24/2025 0800 by Anita Fleming RN  Body Position:   position changed independently   position maintained   sitting up in bed  Skin Protection: incontinence pads utilized  Taken 2/24/2025 0700 by Anita Fleming RN  Body Position:   position changed independently   sitting up in bed  Intervention: Prevent and Manage VTE (Venous Thromboembolism) Risk  Description: Assess for VTE (venous thromboembolism) risk.  Promote early mobilization; encourage both active and passive leg exercises, if unable to ambulate.  Initiate and maintain compression or other therapy, as indicated, based on identified risk in accordance with organizational protocol and provider order.  Recognize the patient's individual risk for bleeding before initiating pharmacologic thromboprophylaxis.  Recent Flowsheet Documentation  Taken 2/24/2025 1200 by Anita Fleming RN  VTE Prevention/Management: (See  MAR) other (see comments)  Taken 2/24/2025 0800 by Anita Fleming RN  VTE Prevention/Management: (See MAR) other (see comments)  Goal: Optimal Comfort and Wellbeing  Outcome: Progressing  Intervention: Monitor Pain and Promote Comfort  Description: Assess pain level, treatment efficacy and patient response at regular intervals using a consistent pain scale.  Consider the presence and impact of preexisting chronic pain.  Encourage patient and caregiver involvement in pain assessment, interventions and safety measures.  Promote activity; balance with sleep and rest to enhance healing.  Recent Flowsheet Documentation  Taken 2/24/2025 1817 by Anita Fleming, RN  Pain Management Interventions: pain medication given  Taken 2/24/2025 1800 by Anita Fleming RN  Pain Management Interventions: quiet environment facilitated  Taken 2/24/2025 1700 by Anita Fleming RN  Pain Management Interventions: quiet environment facilitated  Taken 2/24/2025 1600 by Anita Fleming RN  Pain Management Interventions: quiet environment facilitated  Taken 2/24/2025 0900 by Anita Fleming RN  Pain Management Interventions: quiet environment facilitated  Goal: Readiness for Transition of Care  Outcome: Progressing     Problem: Violence Risk or Actual  Goal: Anger and Impulse Control  Outcome: Progressing  Intervention: Minimize Safety Risk  Description: Listen actively, observing verbal and nonverbal cues (e.g., irritability, confusion, lack of cooperation, demanding behavior, body posture, expression); take threats seriously.  Maintain a therapeutic presence; utilize calm, empathetic tone of voice, nonjudgmental attitude and nonthreatening body language; listen carefully.  Assess and provide for unmet needs, including nutrition, comfort, hydration, hygiene, companionship and appropriate rest.  Utilize empathetic but firm and concise communication; set limits, offer choices and propose alternatives.  Remove stimuli and  objects that may lead to harming self or others.  Maintain clear path to room exit; keep door open during care.  Ask directly about homicidal and suicidal intent; provide additional safety measures based on level of risk (e.g., one-on-one observation, duty to warn).  Implement least restrictive measures if attempts to de-escalate violent or injurious behaviors are unsuccessful.  Recent Flowsheet Documentation  Taken 2/24/2025 1600 by Anita Fleming, RN  Enhanced Safety Measures: room near unit station  Taken 2/24/2025 1200 by Anita Fleming, RN  Enhanced Safety Measures: room near unit station  Taken 2/24/2025 0800 by Anita Fleming, RN  Enhanced Safety Measures: room near unit station  Intervention: Promote Self-Control  Description: Explore perception of the situation; acknowledge feelings; provide information and reassurance.  Maintain a calm and reassuring environment; minimize noise; respect personal space.  Identify and reduce causes of stress, triggers or precipitating factors of unsafe behavior.  Involve support system and family members, if helpful.  Advocate for maintaining previously established self-management plan, including medication regimen for the treatment of an underlying disorder.  Discuss and implement methods to recognize and manage emotions (e.g., verbalization, calming techniques, physical activity).  Assess and monitor for signs and symptoms of behavioral health concerns (e.g., substance use, psychosis).  Establish or reconnect linkage with social supports and community-based services.  Recent Flowsheet Documentation  Taken 2/24/2025 1600 by Anita Fleming, RN  Supportive Measures: active listening utilized  Taken 2/24/2025 0800 by Anita Fleming, RN  Supportive Measures: active listening utilized     Problem: Fall Injury Risk  Goal: Absence of Fall and Fall-Related Injury  Outcome: Progressing  Intervention: Promote Injury-Free Environment  Description: Provide a safe,  barrier-free environment that encourages independent activity.  Keep care area uncluttered and well-lighted.  Determine need for increased observation or monitoring.  Avoid use of devices that minimize mobility, such as restraints or indwelling urinary catheter.  Recent Flowsheet Documentation  Taken 2/24/2025 1800 by Anita Fleming RN  Safety Promotion/Fall Prevention: safety round/check completed  Taken 2/24/2025 1700 by Anita Fleming RN  Safety Promotion/Fall Prevention: safety round/check completed  Taken 2/24/2025 1600 by Anita Fleming RN  Safety Promotion/Fall Prevention: safety round/check completed  Taken 2/24/2025 1500 by Anita Fleming RN  Safety Promotion/Fall Prevention: safety round/check completed  Taken 2/24/2025 1400 by Anita Fleming RN  Safety Promotion/Fall Prevention: safety round/check completed  Taken 2/24/2025 1300 by Anita Fleming RN  Safety Promotion/Fall Prevention: safety round/check completed  Taken 2/24/2025 1200 by Anita Fleming RN  Safety Promotion/Fall Prevention: safety round/check completed  Taken 2/24/2025 1100 by Anita Fleming RN  Safety Promotion/Fall Prevention: safety round/check completed  Taken 2/24/2025 1000 by Anita Fleming RN  Safety Promotion/Fall Prevention: safety round/check completed  Taken 2/24/2025 0900 by Anita Fleming RN  Safety Promotion/Fall Prevention: safety round/check completed  Taken 2/24/2025 0800 by Anita Fleming RN  Safety Promotion/Fall Prevention: safety round/check completed  Taken 2/24/2025 0700 by Anita Fleming RN  Safety Promotion/Fall Prevention: safety round/check completed     Problem: Skin Injury Risk Increased  Goal: Skin Health and Integrity  Outcome: Progressing  Intervention: Optimize Skin Protection  Description: Perform a full pressure injury risk assessment, as indicated by screening, upon admission to care unit.  Reassess skin (full inspection and injury risk, including skin  temperature, consistency and color) frequently (e.g., scheduled interval, with change in condition) to provide optimal early detection and prevention.  Maintain adequate tissue perfusion (e.g., encourage fluid balance; avoid crossing legs, constrictive clothing or devices) to promote tissue oxygenation.  Maintain head of bed at lowest degree of elevation tolerated, considering medical condition and other restrictions. Use positioning supports to prevent sliding and friction. Consider low friction textiles.  Avoid positioning onto an area that remains reddened or on bony prominences.  Minimize incontinence and moisture (e.g., toileting schedule; moisture-wicking pad, diaper or incontinence collection device; skin moisture barrier).  Cleanse skin promptly and gently, when soiled, utilizing a pH-balanced cleanser.  Relieve and redistribute pressure (e.g., scheduled position changes, weight shifts, use of support surface, medical device repositioning, protective dressing application, use of positioning device, microclimate control, use of pressure-injury-monitor  Encourage increased activity, such as sitting in a chair at the bedside or early mobilization, when able to tolerate. Avoid prolonged sitting.  Recent Flowsheet Documentation  Taken 2/24/2025 1800 by Anita Fleming, RN  Activity Management: up in chair  Taken 2/24/2025 1700 by Anita Fleming, RN  Activity Management: up in chair  Taken 2/24/2025 1600 by Anita Fleming, RN  Activity Management: up in chair  Head of Bed (HOB) Positioning: HOB at 30-45 degrees  Taken 2/24/2025 1500 by Anita Fleming, RN  Activity Management: up in chair  Taken 2/24/2025 1400 by Anita Fleming, RN  Activity Management: up in chair  Taken 2/24/2025 1300 by Anita Fleming, RN  Activity Management: up in chair  Taken 2/24/2025 1200 by Anita Fleming, RN  Activity Management: up in chair  Pressure Reduction Techniques: weight shift assistance provided  Pressure  Reduction Devices: pressure-redistributing mattress utilized  Skin Protection: incontinence pads utilized  Taken 2/24/2025 1100 by Anita Fleming, RN  Activity Management:   activity encouraged   up in chair  Taken 2/24/2025 1000 by Anita Fleming, RN  Activity Management:   activity encouraged   up in chair  Taken 2/24/2025 0900 by Anita Fleming, RN  Activity Management: activity encouraged  Taken 2/24/2025 0800 by Anita Fleming, RN  Activity Management: activity encouraged  Pressure Reduction Techniques: weight shift assistance provided  Head of Bed (HOB) Positioning: HOB at 30-45 degrees  Pressure Reduction Devices: specialty bed utilized  Skin Protection: incontinence pads utilized  Taken 2/24/2025 0700 by Anita Fleming, RN  Activity Management: activity encouraged  Head of Bed (HOB) Positioning: HOB at 30-45 degrees     Problem: Sepsis/Septic Shock  Goal: Optimal Coping  Outcome: Progressing  Intervention: Support Patient and Family Response  Description: Acknowledge, normalize, validate intensity and complexity of patient and support system response to situation.  Provide opportunity for expression of thoughts, feelings and concerns; respond with compassion and reassurance.  Decrease stress and anxiety by providing information about patient's status and treatment.  Facilitate support system presence and participation in care; consider providing a diary in intensive care situation.  Support coping by recognizing current coping strategies; provide aid in developing new strategies.  Assess and monitor for signs and symptoms of psychologic distress, anxiety and depression.  Utilize complementary therapy, such as music, massage or guided imagery.  Engage in proactive and ongoing discussion about goals of care and facilitate shared decision-making.  Connect with community resources for ongoing support, such as counseling and group support.  Recent Flowsheet Documentation  Taken 2/24/2025 1600 by  Anita Fleming, RN  Supportive Measures: active listening utilized  Taken 2/24/2025 0800 by Anita Fleming RN  Supportive Measures: active listening utilized  Goal: Absence of Bleeding  Outcome: Progressing  Goal: Blood Glucose Level Within Target Range  Outcome: Progressing  Goal: Absence of Infection Signs and Symptoms  Outcome: Progressing  Intervention: Initiate Sepsis Management  Description: Provide fluid therapy, such as crystalloid or albumin, to increase intravascular volume, organ perfusion and oxygen delivery.  Provide respiratory support, such as oxygen therapy, noninvasive or invasive positive pressure ventilation, to achieve oxygenation and ventilation goal; avoid hyperoxemia.  Obtain cultures prior to initiating antimicrobial therapy when possible. Do not delay treatment for laboratory results in the presence of high suspicion or clinical indicators.  Administer intravenous broad-spectrum antimicrobial therapy promptly.  Implement hemodynamic monitoring to guide intravascular support based on individual targeted parameters.  Determine and address underlying source of infection aggressively; implement transmission-based precautions and isolation, as indicated.  Recent Flowsheet Documentation  Taken 2/24/2025 1200 by Anita Fleming, RN  Infection Management: aseptic technique maintained  Taken 2/24/2025 0800 by Anita Fleming RN  Infection Management: aseptic technique maintained  Intervention: Promote Stabilization  Description: Monitor for signs of fluid responsiveness and overload; consider fluid adjustment and diuretic therapy.  Anticipate use of vasoactive agent to support microperfusion and oxygen delivery; titrate to response.  Monitor laboratory value, diagnostic test and clinical status trends for signs of infection progression and multiple organ failure.  Assess effectiveness of, and potential for, de-escalation of the antimicrobial regimen daily; promote antimicrobial  stewardship.  Provide fever-reduction and comfort measures.  Monitor and manage electrolyte imbalance, such as hypocalcemia.  Use lung protective ventilation measures, such as low volume, inspiratory pressure, optimal positive end-expiratory pressure, to minimize the risk of ventilator-induced lung injury; ensure minute volume demands.  Prepare for supportive therapy, such as prone positioning, corticosteroid therapy, coagulopathy management, CRRT (continuous renal replacement therapy), hemofiltration and cardiac-assist device.  Recent Flowsheet Documentation  Taken 2/24/2025 0800 by Anita Fleming, RN  Fluid/Electrolyte Management: fluids provided  Intervention: Promote Recovery  Description: Encourage pulmonary hygiene, such as cough-enhancement and airway-clearance techniques, that may include use of incentive spirometry, deep breathing and cough.  Encourage early rehabilitation and physical activity to optimize functional ability and activity tolerance, as well as minimize delirium.  Promote energy conservation; minimize oxygen demand and consumption by adjusting environment, decreasing stimulation, maintaining normothermia and treating pain.  Optimize fluid balance, nutrition intake, sleep and glycemic control to maintain tissue perfusion and enhance immune response.  Recent Flowsheet Documentation  Taken 2/24/2025 1800 by Anita Fleming, RN  Activity Management: up in chair  Taken 2/24/2025 1700 by Anita Fleming, RN  Activity Management: up in chair  Taken 2/24/2025 1600 by Anita Fleming, RN  Activity Management: up in chair  Taken 2/24/2025 1500 by Anita Fleming, RN  Activity Management: up in chair  Taken 2/24/2025 1400 by Anita Fleming, RN  Activity Management: up in chair  Taken 2/24/2025 1300 by Anita Fleming, RN  Activity Management: up in chair  Taken 2/24/2025 1200 by Anita Fleming RN  Activity Management: up in chair  Taken 2/24/2025 1100 by Anita Fleming,  "RN  Activity Management:   activity encouraged   up in chair  Taken 2/24/2025 1000 by Anita Fleming, RN  Activity Management:   activity encouraged   up in chair  Taken 2/24/2025 0900 by Anita Fleming, RN  Activity Management: activity encouraged  Taken 2/24/2025 0800 by Anita Fleming, RN  Activity Management: activity encouraged  Taken 2/24/2025 0700 by Anita Fleming, RN  Activity Management: activity encouraged  Goal: Optimal Nutrition Delivery  Outcome: Progressing   Goal Outcome Evaluation:  Plan of Care Reviewed With: patient        Progress: no change  Outcome Evaluation: VSS. Continues on vapotherm 30L 80%. Adequate UOP. Lg. BM today. Palliative nurse, Margy, here today to discuss palliative status with pt. Pt. rescinded DNI status and is now agreeable to intubation if necessary. Pt. remains No CPR. Continues to c/o pain and states frustration r/t \"my pain med was cut in half.\" Instructed her to discuss pain med with MD. No new issues today.                             "

## 2025-02-25 NOTE — DISCHARGE PLACEMENT REQUEST
"To: Jessica    From: Nannette JUAN [phone]        Ashley Felton (69 y.o. Female)       Date of Birth   1955    Social Security Number       Address   [address] [address]    Home Phone   [phone]    MRN   [id number]       Jewish   Judaism    Marital Status                               Admission Date   2/21/25    Admission Type   Emergency    Admitting Provider   Bernardo Rivera MD    Attending Provider   Bernardo Rivera MD    Department, Room/Bed   [facility] INTENSIVE CARE, I011/1       Discharge Date       Discharge Disposition       Discharge Destination                                 Attending Provider: Bernardo Rivera MD    Allergies: Codeine    Isolation: None   Infection: None   Code Status: No CPR    Ht: 170.2 cm (67\")   Wt: 68.5 kg (151 lb 0.2 oz)    Admission Cmt: None   Principal Problem: Narcotic overdose [T40.601A]                   Active Insurance as of 2/21/2025       Primary Coverage       Payor Plan Insurance Group Employer/Plan Group    MEDICARE MEDICARE A & B        Payor Plan Address Payor Plan Phone Number Payor Plan Fax Number Effective Dates    [address] [phone]  6/1/2008 - None Entered    [facility] [id number]         Subscriber Name Subscriber Birth Date Member ID       ASHLEY FELTON 1955 [id number]               Secondary Coverage       Payor Plan Insurance Group Employer/Plan Group    [id number]        Payor Plan Address Payor Plan Phone Number Payor Plan Fax Number Effective Dates    [address]   7/9/2024 - None Entered    [place] [address]         Subscriber Name Subscriber Birth Date Member ID       ASHLEY FELTON 1955 [id number]                     Emergency Contacts        (Rel.) Home Phone Work Phone Mobile Phone    DANIELSELAM (Grandchild) [phone] -- --    EMILY HAIDER (Friend) [phone] -- --    YENY HUFFMAN (Mother) [phone] -- --    " "GucciFred \"Christian\" (Son) -- -- [phone]                 Physician Progress Notes (most recent note)        Francisco Goddard PA-C at 25 0837              [facility] Intensivist Services  INPATIENT PROGRESS NOTE    Patient Name: Ashley Gibbs  Date of Admission: 2025  Today's Date: 25  Length of Stay:  LOS: 3 days   Primary Care Physician: Padmaja Bermudez APRN  Next of Kin: Primary Emergency Contact: SELAM SANCHEZ, Darnell Phone: [phone]      Subjective   Chief Complaint: Narcotic overdose  Drug Overdose  =   69-year-old female with a past medical history of COPD on 2 L O2 at home at baseline, anxiety, arthritis, and restless leg syndrome admitted on 2025 after a narcotic overdose.  She was recently diagnosed in 2024 by fine-needle aspiration of small cell lung cancer involving multiple areas of the lung and mediastinum.  She has extensive small cell cancer by definition.  She also has chronic back pain, and family thinks she took too many of her oxycodone because she forgot that she had already taken them.  Patient began struggling to breathe at home.  911 was called, and Narcan was given.  She was then admitted by the intensivist team after being worked up in the emergency department.    Interval history:  2025: Patient requesting to go home this morning.  However, oxygen requirements have increased, and patient is now requiring noninvasive ventilation.  She is also requesting to be put back on her home dose of oxycodone as she is fearful of taking the fentanyl patch.  She states that she has no to people who have  while using fentanyl patches.    2025: Patient would still like to go home.  She is very concerned about when she will receive her next radiation treatment and when she will have her port inserted.  We tried to explain to her that it is unsafe for her to receive chemo or radiation at this time as she is still " "requiring a lot of oxygen.    2/25/2025: Patient was upset that her pain medication had been cut back.  I tried to explain to her that we did this due to her narcotic overdose she recently had.  Additionally, the combination of benzodiazepines and opiates is more likely to lead to respiratory depression than either alone.  This is especially true in someone who has underlying lung disease that she does.  I tried to explain all of this to the patient, but she continued to insist that she \"needed her pain meds\".  She remains on a significant amount of oxygen at this time on Vapotherm.    Review of Systems   All pertinent negatives and positives are as above. All other systems have been reviewed and are negative unless otherwise stated.     Past Medical and Past Surgical History   Active and Resolved Problems  Active Hospital Problems    Diagnosis  POA    **Narcotic overdose [T40.601A]  Yes    Altered mental status [R41.82]  Yes    Altered mental status, unspecified altered mental status type [R41.82]  Yes      Resolved Hospital Problems   No resolved problems to display.       Past Medical History:   Past Medical History:   Diagnosis Date    Anxiety     Arthritis     Asthma     Back pain     COPD (chronic obstructive pulmonary disease)     Dependence on supplemental oxygen     Depression     Disease of thyroid gland     Fibromyalgia, primary     Headache     History of degenerative disc disease     Hypertension     Hypothyroidism     Low back pain     Restless leg     Scoliosis      Past Surgical History:   Past Surgical History:   Procedure Laterality Date    APPENDECTOMY      BREAST BIOPSY Left     BRONCHOSCOPY Right 12/23/2024    Procedure: BRONCHOSCOPY WITH ENDOBRONCHIAL ULTRASOUND;  Surgeon: Peterson Morales MD;  Location: [facility];  Service: Pulmonary;  Laterality: Right;  pre: lung mass  post: lung mass    CHOLECYSTECTOMY      TUBAL ABDOMINAL LIGATION       Social and Family History   Family " History:  family history includes Arthritis in her mother; COPD in her father; Diabetes in her father; Heart disease in her father; Hypertension in her mother.    Tobacco/Social History:  reports that she quit smoking about 16 years ago. Her smoking use included cigarettes. She started smoking about 56 years ago. She has a 40 pack-year smoking history. She has been exposed to tobacco smoke. She has never used smokeless tobacco. She reports that she does not drink alcohol and does not use drugs.    Allergies   Allergies:   She is allergic to codeine.    Objective    Temp:  [97.8 °F (36.6 °C)-98.2 °F (36.8 °C)] 97.8 °F (36.6 °C)  Heart Rate:  [] 94  Resp:  [16-21] 16  BP: (101-149)/(63-89) 130/76  Physical Exam  Vitals reviewed. Exam conducted with a chaperone present.   Constitutional:       Appearance: She is ill-appearing. She is not toxic-appearing.   HENT:      Head: Normocephalic and atraumatic.      Nose: Nose normal.   Cardiovascular:      Rate and Rhythm: Regular rhythm. Tachycardia present.      Pulses: Normal pulses.   Pulmonary:      Breath sounds: No stridor. Wheezing, rhonchi and rales present.      Comments:    Abdominal:      General: Bowel sounds are normal.      Palpations: Abdomen is soft.      Tenderness: There is no abdominal tenderness.   Musculoskeletal:         General: Normal range of motion.   Skin:     General: Skin is warm.      Capillary Refill: Capillary refill takes less than 2 seconds.   Neurological:      General: No focal deficit present.      Mental Status: She is alert and oriented to person, place, and time. Mental status is at baseline.         Inpatient Medications   Medications: Scheduled Meds:budesonide, 0.5 mg, Nebulization, BID - RT  Chlorhexidine Gluconate Cloth, 1 Application, Topical, Q24H  citalopram, 40 mg, Oral, Daily  enoxaparin, 40 mg, Subcutaneous, Q24H  gabapentin, 600 mg, Oral, Q12H  ipratropium-albuterol, 3 mL, Nebulization, 4x Daily - RT  lactulose, 30 g,  Oral, Once  levothyroxine, 112 mcg, Oral, Daily  methylPREDNISolone sodium succinate, 20 mg, Intravenous, Q6H  mirtazapine, 15 mg, Oral, Nightly  mupirocin, 1 Application, Each Nare, BID  rOPINIRole, 0.25 mg, Oral, Nightly  senna-docusate sodium, 2 tablet, Oral, BID  sodium chloride, 10 mL, Intravenous, Q12H  tobramycin, 2 drop, Both Eyes, Q4H While Awake      Continuous Infusions:   PRN Meds:.  acetaminophen **OR** acetaminophen    senna-docusate sodium **AND** polyethylene glycol **AND** bisacodyl **AND** bisacodyl    Calcium Replacement - Follow Nurse / BPA Driven Protocol    diazePAM    HYDROcodone-acetaminophen    ipratropium-albuterol    Magnesium Standard Dose Replacement - Follow Nurse / BPA Driven Protocol    ondansetron    ondansetron ODT    Phosphorus Replacement - Follow Nurse / BPA Driven Protocol    Potassium Replacement - Follow Nurse / BPA Driven Protocol    [COMPLETED] Insert Peripheral IV **AND** sodium chloride    sodium chloride    sodium chloride    tiZANidine    I have reviewed the patient's current medications.   Outpatient Medications     Current Outpatient Medications   Medication Instructions    citalopram (CELEXA) 40 mg, Oral, Daily    diazePAM (VALIUM) 5 mg, Oral, Every 12 Hours PRN    Fluticasone-Umeclidin-Vilant (TRELEGY) 100-62.5-25 MCG/ACT inhaler 1 puff, Daily - RT    gabapentin (NEURONTIN) 600 mg, Oral, Every 12 Hours Scheduled    ipratropium-albuterol (DUO-NEB) 0.5-2.5 mg/3 ml nebulizer 3 mL, Nebulization, Every 4 Hours PRN    levothyroxine (SYNTHROID, LEVOTHROID) 112 mcg, Oral, Daily    mirtazapine (REMERON) 15 mg, Oral, Every Night at Bedtime    ondansetron (ZOFRAN) 4 mg, Oral, Every 8 Hours PRN    oxyCODONE-acetaminophen (PERCOCET)  MG per tablet 1 tablet, Oral, Every 6 Hours PRN    potassium chloride (MICRO-K) 10 MEQ CR capsule 20 mEq, 2 Times Daily    rizatriptan (MAXALT) 10 MG tablet Take 1 tablet by mouth 1 (One) Time As Needed for Migraine.    rOPINIRole (REQUIP) 0.25  mg, Oral, Every Night at Bedtime    tiZANidine (ZANAFLEX) 2 mg, 2 Times Daily PRN    Ventolin  (90 Base) MCG/ACT inhaler 2 puffs, Inhalation, Every 4 Hours PRN       Current Antibiotics   This patient does not have an active medication from one of the medication groupers.    Results:   CBC:      Lab 02/25/25 0448 02/24/25 0210 02/23/25 0418 02/21/25  2303   WBC 17.48* 18.04* 11.60* 14.82*   HEMOGLOBIN 10.1* 10.1* 11.6* 11.3*   HEMATOCRIT 33.0* 32.4* 37.1 36.4   PLATELETS 444 486* 514* 480*   NEUTROS ABS 16.17* 16.60* 10.72* 12.32*   IMMATURE GRANS (ABS) 0.27* 0.14* 0.10* 0.18*   LYMPHS ABS 0.61* 0.68* 0.64* 1.17   MONOS ABS 0.40 0.60 0.11 1.03*   EOS ABS 0.00 0.00 0.00 0.01   MCV 94.6 92.6 93.2 94.5     LIVER FUNCTION TESTS:      Lab 02/21/25  2303   TOTAL PROTEIN 7.4   ALBUMIN 3.5   GLOBULIN 3.9   ALT (SGPT) 17   AST (SGOT) 18   BILIRUBIN 0.3   ALK PHOS 141*   LIPASE 14     ABG:      Lab 02/25/25 0448 02/24/25 0210 02/23/25 0418 02/22/25 2019 02/22/25  0249 02/22/25  0158 02/22/25  0028 02/21/25  2303   PH, ARTERIAL  --   --   --  7.463*  --  7.372 7.378  --    PO2 ART  --   --   --  47.6*  --  87.6 79.9*  --    PCO2, ARTERIAL  --   --   --  52.4*  --  66.5* 68.8*  --    HCO3 ART  --   --   --  37.5*  --  38.6* 40.5*  --    ANION GAP 9.0 8.0 11.0  --  17.0*  --   --  7.0     BMP/MG/PHOS:      Lab 02/25/25 0448 02/24/25 0210 02/23/25  0418 02/22/25  0249 02/22/25  0158 02/22/25  0028 02/21/25  2303   SODIUM 139 140 140 138  --   --  140   SODIUM, ARTERIAL  --   --   --   --  139   < >  --    POTASSIUM 4.5 5.1 5.0 5.3*  --   --  6.0*   CHLORIDE 98 97* 94* 93*  --   --  97*   BUN 18 20 13 8  --   --  9   CREATININE 0.63 0.73 0.53* 0.65  --   --  0.71   CALCIUM 8.6 8.5* 9.4 9.3  --   --  9.3   MAGNESIUM 2.2 2.3 2.1  --   --   --  2.0    < > = values in this interval not displayed.     IMAGING STUDIES:  XR Chest 1 View    Result Date: 2/25/2025  1. Interstitial prominence of the RIGHT lower lung, favored  to correlate with infectious/inflammatory changes on recent CT chest 2/23/2025.  This report was signed and finalized on 2/25/2025 6:47 AM by Dr Cammy Lin MD.      CT Angiogram Chest    Result Date: 2/23/2025  1. No evidence of pulmonary thromboembolic disease. The right upper lobe pulmonary artery is attenuated in caliber which is felt to represent sequela from the previous right hilar and suprahilar mass with conglomerate lymphadenopathy which encased the right upper lobe bronchus and right upper lobe pulmonary artery branch. There is enlargement of the pulmonary artery suggesting pulmonary arterial hypertension. 2. The thoracic aorta and great vessels are normal in caliber. No rate limiting luminal stenosis, aneurysm or dissection present. 3. Emphysematous changes of the lungs. Previously noted right hilar and suprahilar mass with conglomerate lymphadenopathy has shown marked interval improvement posttreatment. Small residual level 2R and 4R right paratracheal nodes are present. There has been resolution of the previously noted compromise of the right upper lobe bronchus and bronchus intermedius. Some residual right hilar nodes are also present which are enlarged by CT criteria. 4. Previously noted suspected lung to lung metastasis within the right upper lobe have resolved. There are tree-in-bud opacities within both lungs, particularly within the lower lobes with several areas of more confluent nodularity in the lung bases which I would favor to represent an infectious/inflammatory process-perhaps a superimposed bronchopneumonia. A more confluent nodule the periphery of the right lower lobe would warrant follow-up on subsequent imaging and measures up to 10 mm in long axis. No effusion is present.  This report was signed and finalized on 2/23/2025 11:33 AM by Dr. Srikanth Galo MD.      XR Chest 1 View    Result Date: 2/23/2025  1. Hyperinflation of the lung parenchyma with prominent bronchovascular  markings and bronchial wall thickening. This may represent sequela of chronic bronchitis. No lobar pneumonia or effusion.  This report was signed and finalized on 2025 11:07 AM by Dr. Srikanth Galo MD.       CULTURE DATA:   Blood Culture   Date Value Ref Range Status   2025 No growth at 24 hours  Preliminary   2025 No growth at 24 hours  Preliminary        Assessment/Plan   69-year-old female with a past medical history of small cell lung cancer and chronic back pain admitted for narcotic overdose.    Acute hypoxemic respiratory failure  -Multifactorial-patient has COPD diagnosis without PFTs and obstructive sleep apnea and uses CPAP   -We suspect chronic bronchitis could contribute to chronic respiratory distress. She might benefit from long-term azithromycin therapy on discharge  -Currently on Vapotherm at 30 L/70% to maintain adequate oxygenation of >88%  -Wean O2 as able  -Continue DuoNebs, steroids, and budesonide as scheduled    Extensive small cell cancer  -Defer to oncology.  -According to the [employer] [place] (NCI), the average survival rate for extensive SCLC without treatment is 2-4 months after diagnosis. With treatment, extensive SCLC typically has a median survival rate of 7-11 months. Patient is currently at 3 months.   -Given patient is likely to live less than another 8 months, we have consulted palliative care.  We appreciate their recommendations and assistance and help with this patient.    Obstructive sleep apnea  -Complicated by hypersomnia currently on modafinil at home.   -Continue CPAP    Chronic pain  -Palliative care consulted due to extensive small cell lung cancer.  -Patient did not like fentanyl patch as she knows a couple of people who  while on fentanyl patch  -Fentanyl patch has been discontinued.   -Patient was taking Percocet 10/325 mg q6h. We have started oxycodone 5 mg q6h given recent accidental narcotic overdose.  However, patient now insistent  that we increase her pain medication or switch her back to hydrocodone.  I have changed her to hydrocodone 5 mg every 6 hours as needed.  -Continue to monitor patient closely in ICU    Depression  -Continue home regimen    Hypothyroidism  -Continue synthroid at home dose      CODE STATUS: DNR/DNI  VTE prophylaxis: Lovenox   Nutrition: NPO  Bowel: Teena-Colace, MiraLAX, and Dulcolax available as needed  GI prophylaxis: N/A  Antibiotics: Finished azithromycin courseDisposition: Critical care management    Total critical care time: 30 minutes    Due to a high probability of clinically significant, life threatening deterioration, the patient required my highest level of preparedness to intervene emergently and I personally spent this critical care time directly and personally managing the patient.     This critical care time included obtaining a history; examining the patient; pulse oximetry; ordering and review of studies; arranging urgent treatment with development of a management plan; evaluation of patient's response to treatment; frequent reassessment; and, discussions with other providers.    This critical care time was performed to assess and manage the high probability of imminent, life-threatening deterioration that could result in multi-organ failure. It was exclusive of separately billable procedures and treating other patients and teaching time.    Please see MDM section and the rest of the note for further information on patient assessment and treatment.    Part of this note may be an electronic transcription/translation of spoken language to printed text using the Dragon Dictation System    Electronically signed by Francisco Goddard PA-C on 2/25/2025 at 08:37 CST    Electronically signed by Francisco Goddard PA-C at 02/25/25 0842         Case Management  Consult [[id number]] (Order [id number])  Order  Date: 2/25/2025 Department: [facility] INTENSIVE CARE Ordering/Authorizing: Rupesh  Francisco HERNANDEZ PA-C     Standing Order Information    Remaining Occurrences Interval Last Released     0/1 Once 2/25/2025              Order History  Inpatient  Date/Time Action Taken User Additional Information   02/25/25 1024 Sign Francisco Goddard PA-C    02/25/25 1024 Release Instance Francisco Goddard PA-C (auto-released) Released Order:[id number]   02/25/25 1034 Acknowledge Alisson Smith, RN New Order     Order Details    Frequency Duration Priority Order Class   Once 1  occurrence Routine Hospital Performed         Order Questions    Question Answer   Reason for consult? Other (see comments)   Comments Needs 10 L/min home oxygen concentrator                Source Order Set / Preference List    Preference List    PAD IP FACILITY CONSULTS [[id number]]             Cosign Order Info    Action Created on Responsible Provider Signed by Signed on   Ordering 02/25/25 1024 Bernardo Rivera MD           Consult Order Info    ID Description Priority Start Date Start Time   [id number] Case Management  Consult Routine 02/25/2025 10:25 AM   Provider Specialty Referred to   ______________________________________ _____________________________________                           Acknowledgement Info    For At Acknowledged By Acknowledged On   Placing Order 02/25/25 1024 Alisson Smith, JADIEL 02/25/25 1034             Reprint Order Requisition    Case Management  Consult (Order #[id number]) on 2/25/25       Encounter    View Encounter                  Order Provider Info        Office phone Pager E-mail   Ordering User  Francisco Goddard PA-C  [phone] -- --   Authorizing Provider  Francisco Goddard PA-C  [phone] -- --   Attending Provider  Bernardo Rivera MD  [phone] -- --   Ordering Provider  Francisco Goddard PA-C  [phone] -- --   Cosign Message Sent to  Bernardo Rivera MD  [phone] -- --     Tracking Reports    Cosign Tracking Order Transmittal Tracking

## 2025-02-25 NOTE — DISCHARGE PLACEMENT REQUEST
"To: Jessica    From: Nannette JUAN [phone]        Ashley Felton (69 y.o. Female)       Date of Birth   1955    Social Security Number       Address   [address] [address]    Home Phone   [phone]    MRN   [id number]       Catholic   Jew    Marital Status                               Admission Date   2/21/25    Admission Type   Emergency    Admitting Provider   Bernardo Rivera MD    Attending Provider       Department, Room/Bed   [facility] INTENSIVE CARE, I011/1       Discharge Date   2/25/2025    Discharge Disposition   Home or Self Care    Discharge Destination                                 Attending Provider: (none)   Allergies: Codeine    Isolation: None   Infection: None   Code Status: Prior    Ht: 170.2 cm (67\")   Wt: 68.5 kg (151 lb 0.2 oz)    Admission Cmt: None   Principal Problem: Narcotic overdose [T40.601A]                   Active Insurance as of 2/21/2025       Primary Coverage       Payor Plan Insurance Group Employer/Plan Group    MEDICARE MEDICARE A & B        Payor Plan Address Payor Plan Phone Number Payor Plan Fax Number Effective Dates    [address] [phone]  6/1/2008 - None Entered    [facility]ly [facility] [address]         Subscriber Name Subscriber Birth Date Member ID       ASHLEY FELTON 1955 [id number]               Secondary Coverage       Payor Plan Insurance Group Employer/Plan Group    [place]        Payor Plan Address Payor Plan Phone Number Payor Plan Fax Number Effective Dates    [address]   7/9/2024 - None Entered    [place] [address]         Subscriber Name Subscriber Birth Date Member ID       ASHLEY FELTON 1955 [id number]                     Emergency Contacts        (Rel.) Home Phone Work Phone Mobile Phone    SELAM SANCHEZ (Grandchild) [phone] -- --    EMILY HAIDER (Friend) [phone] -- --    YENY HUFFMAN (Mother) [phone] -- --    Fred Duckworth " "\"Christian\" (Nabeel) -- -- [phone]              Oxygen Therapy (last day) before discharge       Date/Time SpO2 Device (Oxygen Therapy) Flow (L/min) (Oxygen Therapy) Oxygen Concentration (%) ETCO2 (mmHg)    02/25/25 1200 90 humidified;nasal cannula 5 -- --    02/25/25 1100 92 -- 5 -- --    02/25/25 1057 91 humidified;nasal cannula 6 -- --    02/25/25 1036 89 humidified;nasal cannula 6 -- --    02/25/25 1000 95 -- 20 50 --    02/25/25 0900 89 -- 20 60 --    02/25/25 0800 91 heated;humidified;high-flow nasal cannula 30 70 --    02/25/25 0703 96 heated;high-flow nasal cannula;humidified 30 80 --    02/25/25 0700 96 -- -- -- --    02/25/25 0600 95 -- -- -- --    02/25/25 0500 93 -- -- -- --    02/25/25 0400 100 -- -- -- --    02/25/25 0330 100 NPPV/NIV -- 100 --    02/25/25 0200 99 -- -- -- --    02/25/25 0100 100 -- -- -- --    02/25/25 0000 99 NPPV/NIV -- 100 --    02/24/25 2300 99 -- -- -- --    02/24/25 2245 96 NPPV/NIV -- 100 --    02/24/25 2200 96 -- -- -- --    02/24/25 2100 94 -- -- -- --    02/24/25 2000 92 heated;high-flow nasal cannula 30 80 --    02/24/25 1927 98 heated;humidified;high-flow nasal cannula 30 80 --    02/24/25 1919 93 heated;humidified;high-flow nasal cannula 30 80 --    02/24/25 1900 96 -- -- -- --    02/24/25 1800 93 -- -- -- --    02/24/25 1700 90 -- -- -- --    02/24/25 1630 96 -- -- -- --    02/24/25 1600 97 heated;humidified;high-flow nasal cannula 30 80 --    02/24/25 1500 98 -- -- -- --    02/24/25 1453 97 heated;high-flow nasal cannula;humidified 30 80 --    02/24/25 1400 92 -- -- -- --    02/24/25 1300 93 -- -- -- --    02/24/25 1200 93 heated;humidified;high-flow nasal cannula 30 80 --    02/24/25 1100 92 -- -- -- --    02/24/25 1035 94 heated;humidified;high-flow nasal cannula 30 80 --    02/24/25 1000 94 -- -- -- --    02/24/25 0900 92 -- -- -- --    02/24/25 0800 94 heated;humidified;high-flow nasal cannula 30 80 --    02/24/25 0700 94 heated;humidified;high-flow nasal cannula 30 " 80 --    02/24/25 0629 91 heated;high-flow nasal cannula;humidified 30 80 --    02/24/25 0600 93 -- -- -- --    02/24/25 0509 91 -- -- -- --    02/24/25 0507 82 heated;humidified;high-flow nasal cannula 30 80 --    02/24/25 0503 85 heated;humidified;high-flow nasal cannula 30 70 --    02/24/25 0500 90 -- -- -- --    02/24/25 0455 89 heated;humidified;high-flow nasal cannula 30 60 --    02/24/25 0450 91 -- -- -- --    02/24/25 0444 85 heated;humidified;high-flow nasal cannula 30 50 --    02/24/25 0438 90 nasal cannula 4 -- --    02/24/25 0427 -- -- -- 60 --    02/24/25 0400 92 -- -- -- --    02/24/25 0300 97 -- -- -- --    02/24/25 0200 99 -- -- -- --    02/24/25 0100 98 -- -- -- --    02/24/25 0000 94 NPPV/NIV -- 60 --          Oxygen Therapy [EQ60] (Order [id number])  Order  Date: 2/25/2025 Department: [facility] INTENSIVE CARE Ordering/Authorizing: Francisco Goddard PA-C     Order History  Outpatient  Date/Time Action Taken User Additional Information   02/25/25 1625 Ngozi Pereira RN Ordering Mode: Verbal with readback     Order Details    Frequency Duration Priority Order Class   None None Routine External     Start Date/Time    Start Date   02/25/25     Order Information    Order Date Service Start Date Start Time   02/25/25 Intensivist 02/25/25      Reference Links    Associated Reports   View Encounter     Order Questions    Question Answer   Oxygen Requirement Continuous (Awake & Asleep)   Qualification for Continuous Home Oxygen Oxygen Saturation <=88% at Rest (Awake) on Room Air   Delivery Modality Nasal Cannula   Oxygen Flow Rate (LPM) 5   Duration: Continuous   Equipment  Oxygen Concentrator &  &  Portable Gaseous Oxygen System & Portable Oxygen Contents Gaseous &  Conserving Regulator   Provider Review Status: Provider has Reviewed Oxygen Saturation and Testing   Length of Need 99 Months = Lifetime            Verbal / Cosign Order Info    Action Created on Order Mode  Entered by Responsible Provider Signed by Signed on   Ordering 02/25/25 1625 Verbal with readback Ngozi Mahan, Francisco Phelps PA-C       Source Order Set / Preference List    Preference List   AMB SUPPLIES [[id number]]             Clinical Indications     ICD-10-CM ICD-9-CM   Narcotic overdose, accidental or unintentional, initial encounter    T40.601A 967.9  E852.9   Acute respiratory failure with hypoxia    J96.01 518.81                             Reprint Order Requisition    Oxygen Therapy (Order #[id number]) on 2/25/25         Encounter    View Encounter                Order Provider Info        Office phone Pager E-mail   Ordering User  Ngozi Mahan RN  -- -- --   Authorizing Provider  Francisco Goddard PA-C  [phone] -- --   Entered By  Ngozi Maahn RN  -- -- --   Ordering Provider  Francisco Goddard PA-C  [phone] -- --     Tracking Reports    Cosign Tracking Order Transmittal Tracking     Authorized by:  Francisco Goddard PA-C  (NPI: [id number])                Lab Component SmartPhrase Guide    Oxygen Therapy (Order #[id number]) on 2/25/25

## 2025-02-26 ENCOUNTER — TRANSITIONAL CARE MANAGEMENT TELEPHONE ENCOUNTER (OUTPATIENT)
Dept: CALL CENTER | Facility: HOSPITAL | Age: 70
End: 2025-02-26
Payer: MEDICARE

## 2025-02-26 ENCOUNTER — PATIENT OUTREACH (OUTPATIENT)
Dept: CASE MANAGEMENT | Facility: OTHER | Age: 70
End: 2025-02-26
Payer: MEDICARE

## 2025-02-26 DIAGNOSIS — J44.9 STAGE 3 SEVERE COPD BY GOLD CLASSIFICATION: Primary | ICD-10-CM

## 2025-02-26 PROBLEM — C34.90 SMALL CELL LUNG CANCER: Chronic | Status: ACTIVE | Noted: 2025-01-30

## 2025-02-26 PROBLEM — J96.12 CHRONIC RESPIRATORY FAILURE WITH HYPOXIA AND HYPERCAPNIA: Chronic | Status: ACTIVE | Noted: 2025-02-26

## 2025-02-26 PROBLEM — J96.11 CHRONIC RESPIRATORY FAILURE WITH HYPOXIA AND HYPERCAPNIA: Chronic | Status: ACTIVE | Noted: 2025-02-26

## 2025-02-26 PROBLEM — J96.00 ACUTE RESPIRATORY FAILURE: Status: RESOLVED | Noted: 2025-01-31 | Resolved: 2025-02-26

## 2025-02-26 PROBLEM — J96.12 CHRONIC RESPIRATORY FAILURE WITH HYPOXIA AND HYPERCAPNIA: Status: ACTIVE | Noted: 2025-02-26

## 2025-02-26 PROBLEM — R04.2 HEMOPTYSIS: Status: RESOLVED | Noted: 2019-07-26 | Resolved: 2025-02-26

## 2025-02-26 PROBLEM — J96.01 ACUTE RESPIRATORY FAILURE WITH HYPOXEMIA: Status: RESOLVED | Noted: 2019-07-25 | Resolved: 2025-02-26

## 2025-02-26 PROBLEM — J96.11 CHRONIC RESPIRATORY FAILURE WITH HYPOXIA AND HYPERCAPNIA: Status: ACTIVE | Noted: 2025-02-26

## 2025-02-26 PROBLEM — J42 CHRONIC BRONCHITIS: Status: RESOLVED | Noted: 2025-01-30 | Resolved: 2025-02-26

## 2025-02-26 NOTE — PROGRESS NOTES
Chief Complaint  Lung Mass    Subjective    History of Present Illness {CC  Problem List  Visit Diagnosis   Encounters  Notes  Medications  Labs  Result Review Imaging  Media: 23}    Ashley Gibbs presents to [facility]ICINE for:    History of Present Illness  Management of lung mass, COPD, asthma and chronic respiratory failure.  Last PFTs in 2019 showing very severe obstructive disease.     She quit smoking in 2008.  1 pack/day for 40 years.       She has an abnormal CAT scan underwent lung biopsy confirming small cell lung cancer with metastatic disease.  She did start chemotherapy and radiation.  Most recent CT scan did show improvement in her lung mass.    She has been hospitalized twice since I saw her last.  The first time for complications related to influenza A.  The second time for unintentional overdose.  She is on Neurontin, Valium and oxycodone.     At baseline she is on Trelegy 100 routinely.  She is needing refills on this.      I previously had her on 2 L of oxygen continuously.  Due to her history of sleep apnea I also restarted auto titrating CPAP.      After her hospital stay with influenza she needed 4 L of oxygen.    After her hospital stay for the overdose she was sent home on 5 L of oxygen with the recommendation for noninvasive ventilation.  During her hospital stay she was on CPAP with 5 L of oxygen.  Her pCO2 was 52 or greater during that stay.      She is back today and says she is still doing the Trelegy.  She is not doing the Mucinex.  She is taking her breathing treatments 4 times a day.  She needs refills on this as well as Mucinex.  She is back down to 2 L/day on the oxygen.  She is using her CPAP.  She would like to qualify for home noninvasive ventilation.  She did overnight testing last night on 2 L.  They have not picked it up yet.  She is completing steroids given her by her PCP.  She is better but not back to baseline.          Current Outpatient Medications:     citalopram (CeleXA) 40 MG tablet, Take 1 tablet by mouth Daily., Disp: 90 tablet, Rfl: 1    diazePAM (Valium) 5 MG tablet, Take 1 tablet by mouth Every 12 (Twelve) Hours As Needed for Anxiety., Disp: 60 tablet, Rfl: 2    Fluticasone-Umeclidin-Vilant (TRELEGY) 100-62.5-25 MCG/ACT inhaler, Inhale 1 puff Daily., Disp: , Rfl:     gabapentin (NEURONTIN) 600 MG tablet, Take 1 tablet by mouth Every 12 (Twelve) Hours., Disp: 60 tablet, Rfl: 2    guaiFENesin (Mucinex) 600 MG 12 hr tablet, Take 2 tablets by mouth 2 (Two) Times a Day., Disp: 60 tablet, Rfl: 5    ipratropium-albuterol (DUO-NEB) 0.5-2.5 mg/3 ml nebulizer, Take 3 mL by nebulization Every 4 (Four) Hours As Needed for Wheezing or Shortness of Air., Disp: 90 mL, Rfl: 0    levothyroxine (SYNTHROID, LEVOTHROID) 112 MCG tablet, Take 1 tablet by mouth Daily., Disp: 90 tablet, Rfl: 1    mirtazapine (REMERON) 15 MG tablet, Take 1 tablet by mouth every night at bedtime., Disp: 90 tablet, Rfl: 1    ondansetron (Zofran) 4 MG tablet, Take 1 tablet by mouth Every 8 (Eight) Hours As Needed for Nausea or Vomiting., Disp: 30 tablet, Rfl: 0    oxyCODONE-acetaminophen (PERCOCET)  MG per tablet, Take 1 tablet by mouth Every 6 (Six) Hours As Needed for Moderate Pain., Disp: , Rfl:     potassium chloride (MICRO-K) 10 MEQ CR capsule, Take 2 capsules by mouth 2 (Two) Times a Day., Disp: , Rfl:     rizatriptan (MAXALT) 10 MG tablet, Take 1 tablet by mouth 1 (One) Time As Needed for Migraine., Disp: , Rfl:     rOPINIRole (REQUIP) 0.25 MG tablet, Take 1 tablet by mouth every night at bedtime., Disp: 90 tablet, Rfl: 1    tiZANidine (ZANAFLEX) 2 MG tablet, Take 1 tablet by mouth 2 (Two) Times a Day As Needed for Muscle Spasms., Disp: , Rfl:     tobramycin 0.3 % solution ophthalmic solution, Administer 2 drops to both eyes Every 4 (Four) Hours While Awake., Disp: 5 mL, Rfl: 0    Ventolin  (90 Base) MCG/ACT inhaler, Inhale 2 puffs Every 4  "(Four) Hours As Needed for Wheezing or Shortness of Air., Disp: 18 g, Rfl: 2    albuterol (PROVENTIL) (2.5 MG/3ML) 0.083% nebulizer solution, Take 2.5 mg by nebulization 4 (Four) Times a Day As Needed for Wheezing for up to 30 days., Disp: 120 mL, Rfl: 11    Fluticasone-Umeclidin-Vilant (Trelegy Ellipta) 100-62.5-25 MCG/ACT inhaler, Inhale 1 puff Daily for 14 days., Disp: 1 each, Rfl: 0    guaiFENesin (Mucinex) 600 MG 12 hr tablet, Take 2 tablets by mouth 2 (Two) Times a Day for 30 days., Disp: 120 tablet, Rfl: 5    Social History     Socioeconomic History    Marital status:    Tobacco Use    Smoking status: Former     Current packs/day: 0.00     Average packs/day: 1 pack/day for 40.0 years (40.0 ttl pk-yrs)     Types: Cigarettes     Start date: 1968     Quit date: 2008     Years since quittin.8     Passive exposure: Past    Smokeless tobacco: Never   Vaping Use    Vaping status: Every Day    Substances: Nicotine    Devices: Pre-filled pod    Passive vaping exposure: Yes   Substance and Sexual Activity    Alcohol use: Never    Drug use: Never    Sexual activity: Not Currently     Partners: Male       Objective   Vital Signs:   /78   Pulse 98   Ht 170.2 cm (67\")   Wt 65.3 kg (144 lb)   SpO2 96% Comment: 3LPD  BMI 22.55 kg/m²     Physical Exam  Constitutional:       Interventions: Nasal cannula in place.   Cardiovascular:      Rate and Rhythm: Normal rate and regular rhythm.      Heart sounds: No murmur heard.  Pulmonary:      Effort: Pulmonary effort is normal.      Breath sounds: Normal breath sounds.   Musculoskeletal:      Right lower leg: No edema.      Left lower leg: No edema.   Neurological:      Mental Status: She is alert and oriented to person, place, and time.        Result Review :      My interpretation of the PFT : none    No results found for this or any previous visit.    Rest/Exercise Pulse Ox Values          2025    11:00   Rest/Exercise Pulse Ox Results   Rest " room air SAT % 88   Exercise room air SAT % 91   Exercise on O2 @ Liters 2   Exercise on O2 SAT % 96     CT Angiogram Chest (02/23/2025 10:53)     My interpretation of imaging: Right upper lobe lung mass, significantly improved, emphysema, improvement in right upper lobe pneumonia    My interpretation of labs: none      Assessment and Plan {CC Problem List  Visit Diagnosis  ROS  Review (Popup)  Health Maintenance  Quality  BestPractice  Medications  SmartSets  SnapShot Encounters  Media : 23}    Diagnoses and all orders for this visit:    1. Chronic respiratory failure with hypoxia and hypercapnia (Primary)  Comments:  Continue O2. Overnight oximetry on oxygen+CPAP showing desaturation for greater than 5 minutes.  pCO2 greater than 52.  Will order noninvasive ventilation    2. Influenza A  Comments:  She is improving.  She is completing a steroid taper from her PCP.  She we will start full dose Mucinex and continue the breathing treatment    3. Former smoker  Comments:  She will continue to avoid smoking    4. Obstructive sleep apnea  Comments:  Not benefiting from auto titrating CPAP plus oxygen    5. Stage 4 very severe COPD by GOLD classification  Comments:  Continue Trelegy 100.  Refill sent to pharmacy.  Start full dose Mucinex.  Continue breathing treatments  Orders:  -     Fluticasone-Umeclidin-Vilant (Trelegy Ellipta) 100-62.5-25 MCG/ACT inhaler; Inhale 1 puff Daily for 14 days.  Dispense: 1 each; Refill: 0  -     albuterol (PROVENTIL) (2.5 MG/3ML) 0.083% nebulizer solution; Take 2.5 mg by nebulization 4 (Four) Times a Day As Needed for Wheezing for up to 30 days.  Dispense: 120 mL; Refill: 11  -     guaiFENesin (Mucinex) 600 MG 12 hr tablet; Take 2 tablets by mouth 2 (Two) Times a Day for 30 days.  Dispense: 120 tablet; Refill: 5    6. 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  Keep follow-up with oncology      Patient requires frequent use of NIMV.  Intermittent support will not be sufficient. I am ordering nocturnal and daytime volume ventilation to reduce the risk of exacerbation.   The individual cannot maintain spontaneous ventilation for four or more consecutive hours without the assistance of a device. Bi-level (or inferior choice) was considered however I feel that this would be insufficient given the severity of disease, lack of backup battery and continuous alarms that would be required, which are not provided by a bi-level device.  Patient's condition is of severity where back-up battery and continuous alarms are required, which are not provided by bi-level devices.  Interruption of failure to provide NIMV would quickly lead to exacerbation of patient's condition, hospital readmission, and likely harm the patient.  Primary cause of hypercapnia is very severe COPD, with chronic respiratory failure.  Continued use is preferred.      Body mass index is 22.55 kg/m².    Ani Singer, APRN  3/5/2025  11:42 CST    Follow Up   Return in about 2 months (around 5/5/2025).    Patient was given instructions and counseling regarding her condition or for health maintenance advice. Please see specific information pulled into the AVS if appropriate.

## 2025-02-26 NOTE — OUTREACH NOTE
AMBULATORY CASE MANAGEMENT NOTE    Names and Relationships of Patient/Support Persons: Contact: PCP; Relationship: Provider -     Care Coordination    Received message from PCP with questions regarding ordering the NIPPV. Patient will need to see pulmonology for an outpatient sleep study.         Justine ASCENCIO  Ambulatory Case Management    2/26/2025, 11:40 CST

## 2025-02-26 NOTE — OUTREACH NOTE
Call Center TCM Note      Flowsheet Row Responses   [facility] patient discharged from? [place]   Does the patient have one of the following disease processes/diagnoses(primary or secondary)? Other   TCM attempt successful? Yes   Call start time 1520   Call end time 1423   Discharge diagnosis Narcotic overdose   Person spoke with today (if not patient) and relationship Antonia Murrieta-- Granddaughter and POA.   Medication alerts for this patient Zithromax, Medrol   Meds reviewed with patient/caregiver? Yes   Is the patient having any side effects they believe may be caused by any medication additions or changes? No   Does the patient have all medications ordered at discharge? Yes   Prescription comments No questions or concerns with medications.   Is the patient taking all medications as directed (includes completed medication regime)? Yes   Medication comments Hope--Granddaughter states she bought a timed pill case to avoid patient taking too much medication again.   Comments PCP HOSPITAL f/u appt on 3/3/25 at 10:00 AM with CARRIE Medina.   Does the patient have an appointment with their PCP within 7-14 days of discharge? Yes   Has home health visited the patient within 72 hours of discharge? N/A   What DME was ordered? O2 from [facility].   Psychosocial issues? No   Did the patient receive a copy of their discharge instructions? Yes   Nursing interventions Reviewed instructions with patient   What is the patient's perception of their health status since discharge? Improving   Is the patient/caregiver able to teach back signs and symptoms related to disease process for when to call PCP? Yes   Is the patient/caregiver able to teach back signs and symptoms related to disease process for when to call 911? Yes   Is the patient/caregiver able to teach back the hierarchy of who to call/visit for symptoms/problems? PCP, Specialist, Home health nurse, Urgent Care, ED, 911 Yes   If the patient is a current  smoker, are they able to teach back resources for cessation? Not a smoker   TCM call completed? Yes   Wrap up additional comments PCP HOSPITAL f/u appt on 3/3/25 at 10:00 AM with CARRIE Medina.   Call end time 6733   Would this patient benefit from a Referral to [facility] Social Work? No   Is the patient interested in additional calls from an ambulatory ? No            Shayla Montgomery RN    2/26/2025, 14:23 CST            Shayla FRANZ - Registered Nurse

## 2025-02-27 ENCOUNTER — TELEPHONE (OUTPATIENT)
Dept: FAMILY MEDICINE CLINIC | Facility: CLINIC | Age: 70
End: 2025-02-27
Payer: MEDICARE

## 2025-02-27 LAB
BACTERIA SPEC AEROBE CULT: NORMAL
BACTERIA SPEC AEROBE CULT: NORMAL

## 2025-02-27 NOTE — TELEPHONE ENCOUNTER
Hub staff attempted to follow warm transfer process and was unsuccessful     Caller: KRAEY MORROW    Relationship to patient: Other    Best call back number: [phone]    Patient is needing:JESSICA IS CALLING IN FOLLOWING UP ON A CALL HE MADE ON 02.25.2025 IN REGARD TO PATIENT NEEDING A NON-INVASIVE VENT. HE STATED IF THIS IS SOMETHING HER PCP IS WANTING TO DO HER INSURANCE WILL COVER %.     ABG SHOWS CO2 OVER 54    PATIENT WOULD BENEFIT FROM NON INVASIVE VENT DUE TO COPD. CPAP NOT SUFFICIENT TO INCREASE CO2. ALSO, WILL HELP TO PREVENT FREQUENT HOSPITALIZATION.    PLEASE CALL TO DISCUSS

## 2025-02-28 ENCOUNTER — PATIENT OUTREACH (OUTPATIENT)
Dept: CASE MANAGEMENT | Facility: OTHER | Age: 70
End: 2025-02-28
Payer: MEDICARE

## 2025-03-03 ENCOUNTER — OFFICE VISIT (OUTPATIENT)
Dept: FAMILY MEDICINE CLINIC | Facility: CLINIC | Age: 70
End: 2025-03-03
Payer: MEDICARE

## 2025-03-03 ENCOUNTER — TELEPHONE (OUTPATIENT)
Dept: VASCULAR SURGERY | Age: 70
End: 2025-03-03

## 2025-03-03 VITALS
HEIGHT: 67 IN | TEMPERATURE: 98.2 F | DIASTOLIC BLOOD PRESSURE: 77 MMHG | HEART RATE: 86 BPM | WEIGHT: 145 LBS | RESPIRATION RATE: 20 BRPM | BODY MASS INDEX: 22.76 KG/M2 | SYSTOLIC BLOOD PRESSURE: 122 MMHG | OXYGEN SATURATION: 96 %

## 2025-03-03 DIAGNOSIS — C34.11 SMALL CELL CARCINOMA OF UPPER LOBE OF RIGHT LUNG: Chronic | ICD-10-CM

## 2025-03-03 DIAGNOSIS — R53.1 WEAKNESS: ICD-10-CM

## 2025-03-03 DIAGNOSIS — Z86.39 HISTORY OF HYPOKALEMIA: ICD-10-CM

## 2025-03-03 DIAGNOSIS — J44.9 STAGE 4 VERY SEVERE COPD BY GOLD CLASSIFICATION: Chronic | ICD-10-CM

## 2025-03-03 DIAGNOSIS — J96.12 CHRONIC RESPIRATORY FAILURE WITH HYPOXIA AND HYPERCAPNIA: Chronic | ICD-10-CM

## 2025-03-03 DIAGNOSIS — J18.1 CONSOLIDATION OF RIGHT LOWER LOBE OF LUNG: ICD-10-CM

## 2025-03-03 DIAGNOSIS — J96.11 CHRONIC RESPIRATORY FAILURE WITH HYPOXIA AND HYPERCAPNIA: Chronic | ICD-10-CM

## 2025-03-03 DIAGNOSIS — Z99.81 ON SUPPLEMENTAL OXYGEN THERAPY: Chronic | ICD-10-CM

## 2025-03-03 DIAGNOSIS — D64.9 ANEMIA, UNSPECIFIED TYPE: Primary | ICD-10-CM

## 2025-03-03 DIAGNOSIS — J44.9 CHRONIC OBSTRUCTIVE PULMONARY DISEASE, UNSPECIFIED COPD TYPE: ICD-10-CM

## 2025-03-03 PROCEDURE — 1126F AMNT PAIN NOTED NONE PRSNT: CPT | Performed by: NURSE PRACTITIONER

## 2025-03-03 PROCEDURE — 1111F DSCHRG MED/CURRENT MED MERGE: CPT | Performed by: NURSE PRACTITIONER

## 2025-03-03 PROCEDURE — 99495 TRANSJ CARE MGMT MOD F2F 14D: CPT | Performed by: NURSE PRACTITIONER

## 2025-03-03 RX ORDER — GUAIFENESIN 600 MG/1
1200 TABLET, EXTENDED RELEASE ORAL 2 TIMES DAILY
Qty: 60 TABLET | Refills: 5 | Status: SHIPPED | OUTPATIENT
Start: 2025-03-03

## 2025-03-03 NOTE — PROGRESS NOTES
"Chief Complaint  Hospital Follow Up Visit    Subjective    {Problem List  Visit Diagnosis   Encounters  Notes  Medications  Labs  Result Review Imaging  Media :23}    Ashley Gibbs presents to [facility]  History of Present Illness      Objective   Vital Signs:  /77 (BP Location: Left arm, Patient Position: Sitting, Cuff Size: Adult)   Pulse 86   Temp 98.2 °F (36.8 °C) (Infrared)   Resp 20   Ht 170.2 cm (67.01\")   Wt 65.8 kg (145 lb)   SpO2 96% Comment: 2 liters  BMI 22.70 kg/m²   Estimated body mass index is 22.7 kg/m² as calculated from the following:    Height as of this encounter: 170.2 cm (67.01\").    Weight as of this encounter: 65.8 kg (145 lb).       BMI is within normal parameters. No other follow-up for BMI required.      Physical Exam   Physical Exam      Result Review :{Labs  Result Review  Imaging  Med Tab  Media  Procedures :23}    {The following data was reviewed by (Optional):74046}  {Ambulatory Labs (Optional):83919}  {Data reviewed (Optional):36379:::1}  Results             Assessment and Plan {CC Problem List  Visit Diagnosis   ROS  Review (Popup)  Health Maintenance  Quality  BestPractice  Medications  SmartSets  SnapShot Encounters  Media :23}    Diagnoses and all orders for this visit:    1. Anemia, unspecified type (Primary)  -     Comprehensive metabolic panel  -     CBC & Differential    2. Consolidation of right lower lobe of lung  -     XR Chest 2 View    Other orders  -     guaiFENesin (Mucinex) 600 MG 12 hr tablet; Take 2 tablets by mouth 2 (Two) Times a Day.  Dispense: 60 tablet; Refill: 5      Assessment & Plan       {Time Spent (Optional):37067}  Follow Up {Instructions Charge Capture  Follow-up Communications :23}    No follow-ups on file.  Patient was given instructions and counseling regarding her condition or for health maintenance advice. Please see specific information pulled into the AVS if appropriate. "       {SANTOS [employer] Provider Statement:24211}

## 2025-03-03 NOTE — PROGRESS NOTES
"Transitional Care Follow Up Visit  Subjective     Ashley Gibbs is a 69 y.o. female who presents for a transitional care management visit.    Within 48 business hours after discharge our office contacted her via telephone to coordinate her care and needs.      I reviewed and discussed the details of that call along with the discharge summary, hospital problems, inpatient lab results, inpatient diagnostic studies, and consultation reports with Ashley.     Current outpatient and discharge medications have been reconciled for the patient.  Reviewed by: CARRIE Medina          2/25/2025     4:09 PM   Date of TCM Phone Call   [facility]   Date of Admission 2/21/2025   Date of Discharge 2/25/2025     Risk for Readmission (LACE) Score: 12 (2/25/2025  5:00 AM)      History of Present Illness   Course During Hospital Stay:    Copied from hospital h&p:  \"Date of Admission: 2/21/2025  Date of Note: 02/22/25  Primary Care Physician: Padmaja Bermudez APRN   LOS: 0 days      History   Next of Kin:  Primary Emergency Contact: SELAM SANCHEZ, Home Phone: [phone]   Code Status: Full medical management, no CPR, no intubation.  Patient would like to do palliative chemo but would consider doing hospice.  Her granddaughter had another family member die on hospice and it was very quick so hospice has a negative connotation.  Palliative care or comfort care terminology is much more readily received.     She is a 69 y.o. 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  Extensive small cell cancer by definition.  She has chronic back pain.  Family thinks she took too many of her oxycodone because she forgot that she had taken them " already.       She is on long-term oxygen therapy 2 L.       Her granddaughter is the power of .     She also has a past medical history of Anxiety, Arthritis, Asthma, Back pain, COPD (chronic obstructive pulmonary disease), Dependence on supplemental oxygen, Depression, Disease of thyroid gland, Fibromyalgia, primary, Headache, History of degenerative disc disease, Hypertension, Hypothyroidism, Low back pain, Restless leg, and Scoliosis.     Patient began struggling to breathe.  9 1 1 was called.  Narcan was given.  Patient improved.  In the ER hospitalist team recommended ICU evaluation.      The ICU team was asked to evaluate the patient for altered mental status secondary to narcotic overdose in the setting of diffuse cancer versus other, she apparently responded to Narcan in the ambulance: Hospitalist service requested patient be admitted to the ICU due to concern about degradation.     Medication review shows the patient takes Ventolin, Breztri was recently discontinued in November and she was started on Trelegy recently.        For depression she takes citalopram 40 mg daily, mirtazapine 15 mg daily, has a prescription for diazepam 5 mg.     For chronic pain patient has gabapentin 600 mg twice daily and she has switched from hydrocodone 10 mg 4 times a day sometime in January over 2 oxycodone which she just filled 120 tablets on 2/17/2025.  She has also been prescribed tizanidine 2 mg twice daily but she last filled that December 28 for only 30 days.     Patient has obstructive sleep apnea and has been prescribed modafinil 100 mg.  She uses her CPAP machine.  She also appears to have restless leg syndrome and is on ropinirole 0.25 mg daily.     She is hypothyroid on 112 mcg of levothyroxine.     PET SCAN 12/2024:   1. Large irregular hypermetabolic mass in the right upper lobe suprahilar region with probable invasion of the middle mediastinum and  right hilum and/or conglomerate hypermetabolic  lymphadenopathy. This is  worrisome for small cell carcinoma.  2. Right upper lobe solid nodule is also hypermetabolic and consistent  with neoplasm likely pulmonary metastasis.     2/22: Patient is wide-awake this morning.  Answering questions appropriately.  Has a cough which she says has been going on for 2 to 3 days.     Past Medical History      Active and Resolved Problems        Active Hospital Problems     Diagnosis   POA   • **Altered mental status [R41.82]   Yes   • Altered mental status, unspecified altered mental status type [R41.82]   Yes       Resolved Hospital Problems   No resolved problems to display.         Past Medical History:   Medical History        Past Medical History:   Diagnosis Date   • Anxiety     • Arthritis     • Asthma     • Back pain     • COPD (chronic obstructive pulmonary disease)     • Dependence on supplemental oxygen     • Depression     • Disease of thyroid gland     • Fibromyalgia, primary     • Headache     • History of degenerative disc disease     • Hypertension     • Hypothyroidism     • Low back pain     • Restless leg     • Scoliosis              Prior Surgeries: She  has a past surgical history that includes Cholecystectomy; Tubal ligation; Appendectomy; Breast biopsy (Left); and Bronchoscopy (Right, 12/23/2024).     Past Surgical History:   Surgical History         Past Surgical History:   Procedure Laterality Date   • APPENDECTOMY       • BREAST BIOPSY Left     • BRONCHOSCOPY Right 12/23/2024     Procedure: BRONCHOSCOPY WITH ENDOBRONCHIAL ULTRASOUND;  Surgeon: Peterson Morales MD;  Location: [facility];  Service: Pulmonary;  Laterality: Right;  pre: lung mass  post: lung mass   • CHOLECYSTECTOMY       • TUBAL ABDOMINAL LIGATION                Social and Family History      Family History:  family history includes Arthritis in her mother; COPD in her father; Diabetes in her father; Heart disease in her father; Hypertension in her mother.     Tobacco/Social History:   reports that she quit smoking about 16 years ago. Her smoking use included cigarettes. She started smoking about 56 years ago. She has a 40 pack-year smoking history. She has been exposed to tobacco smoke. She has never used smokeless tobacco. She reports that she does not drink alcohol and does not use drugs.     Allergies      Allergies:   She is allergic to codeine.     Labs   Basic Labs:  CBC:           Lab 02/21/25 2303 02/17/25  1303   WBC 14.82* 10.39   HEMOGLOBIN 11.3* 11.3*   HEMATOCRIT 36.4 34.9*   PLATELETS 480* 330   NEUTROS ABS 12.32* 7.97*   IMMATURE GRANS (ABS) 0.18*  --    LYMPHS ABS 1.17 1.42   MONOS ABS 1.03* 0.70   EOS ABS 0.01 0.00   MCV 94.5 92.8         LIVER FUNCTION AND COAG TESTS:           Lab 02/21/25 2303 02/17/25  1303   TOTAL PROTEIN 7.4 6.7   ALBUMIN 3.5 3.5   GLOBULIN 3.9  --    ALT (SGPT) 17 17   AST (SGOT) 18 17   BILIRUBIN 0.3 0.3   ALK PHOS 141* 119*   LIPASE 14  --    PROTIME 13.1  --    APTT 38.6*  --    INR 0.95  --          ABG:              Lab 02/22/25 0249 02/22/25 0158 02/22/25 0028 02/21/25 2303 02/17/25  1303   PH, ARTERIAL  --  7.372 7.378  --   --    PO2 ART  --  87.6 79.9*  --   --    PCO2, ARTERIAL  --  66.5* 68.8*  --   --    HCO3 ART  --  38.6* 40.5*  --   --    ANION GAP 17.0*  --   --  7.0 9         LACTATE:          Lab 02/21/25 2303   LACTATE 1.1         CMP:              Lab 02/22/25 0249 02/22/25 0158 02/22/25 0028 02/21/25 2303 02/17/25  1303   SODIUM 138  --   --  140 144   SODIUM, ARTERIAL  --  139 139  --   --    POTASSIUM 5.3*  --   --  6.0* 3.1*   CHLORIDE 93*  --   --  97* 93*   BUN 8  --   --  9 8   CREATININE 0.65  --   --  0.71 0.5   CALCIUM 9.3  --   --  9.3 9.3   MAGNESIUM  --   --   --  2.0  --    GLUCOSE 68  --   --  126* 119*   EGFR 96.6  --   --  93.3  --          Diabetic:  Last 3 A1C Results:            Inpatient Medications   Scheduled Meds:  Scheduled Medication   lactulose, 30 g, Oral, Once         Continuous  "Infusions:  Infusion Medications   naloxone infusion, 0.4 mg/hr         PRN Meds:.  PRN Medication   •  [COMPLETED] Insert Peripheral IV **AND** sodium chloride        I have reviewed the patient's current medications.   Outpatient Medications           Current Outpatient Medications   Medication Instructions   • acetaminophen (TYLENOL 8 HOUR) 650 mg, Oral, Every 8 Hours PRN   • cholecalciferol (VITAMIN D3) 1,000 Units, Daily   • citalopram (CELEXA) 40 mg, Oral, Daily   • diazePAM (VALIUM) 5 mg, Oral, Every 12 Hours PRN   • gabapentin (NEURONTIN) 600 mg, Oral, Every 12 Hours Scheduled   • ipratropium-albuterol (DUO-NEB) 0.5-2.5 mg/3 ml nebulizer 3 mL, Nebulization, Every 4 Hours PRN   • levothyroxine (SYNTHROID, LEVOTHROID) 112 mcg, Oral, Daily   • mirtazapine (REMERON) 15 mg, Oral, Every Night at Bedtime   • ondansetron (ZOFRAN) 4 mg, Oral, Every 8 Hours PRN   • potassium chloride 10 MEQ CR tablet 10 mEq, Oral, 2 Times Daily   • rizatriptan (MAXALT) 10 MG tablet Take 1 tablet by mouth 1 (One) Time As Needed for Migraine.   • rOPINIRole (REQUIP) 0.25 mg, Oral, Every Night at Bedtime   • tiZANidine (ZANAFLEX) 2 mg, 2 Times Daily PRN   • tobramycin 0.3 % solution ophthalmic solution 2 drops, Both Eyes, Every 4 Hours While Awake   • Ventolin  (90 Base) MCG/ACT inhaler 2 puffs, Inhalation, Every 4 Hours PRN   • vitamin D (ERGOCALCIFEROL) 50,000 Units, Oral, Weekly         Current Antibiotics   This patient does not have an active medication from one of the medication groupers.     Exam   Vent settings for last 24 hours:     Vital signs for last 24 hours:  Temp:  [99.1 °F (37.3 °C)] 99.1 °F (37.3 °C)  Heart Rate:  [110-142] 110  Resp:  [25-33] 26  BP: ()/(52-96) 103/62     Vitals: Her  height is 165.1 cm (65\") and weight is 69.9 kg (154 lb). Her oral temperature is 99.1 °F (37.3 °C). Her blood pressure is 103/62 and her pulse is 110. Her respiration is 26 and oxygen saturation is 91%.      PHYSICAL FINDINGS: " SEE VITALS ABOVE  GENERAL APPEARANCE: ° Awake. ° Alert. ° Oriented to time, place, and person. ° Well developed. ° Well nourished. ° In no acute distress. ° Not ill-appearing. ° No jaundice.  HEAD: Injuries: No evidence of a head injury. ° Appearance: Head normocephalic.  NECK: No masses ° Thyroid: ° Not diffusely enlarged.  EYES: General/bilateral: Extraocular Movements: ° Normal. Pupils: ° PERRLA. Sclera: ° Showed no icterus.  THROAT: No stridor auscultated/heard.  EARS: Hearing: ° No hearing loss noted.  NOSE: General/bilateral: External Deformities: ° No external nose deformities.  LYMPH NODES: No cervical or generalized adenopathy.  CHEST: ° Visual inspection revealed no abnormalities.  LUNGS: ° CRACKLES--cough productive of sputum.  CARDIOVASCULAR: JVD not increased. Heart Rate And Rhythm: Normal. ° Heart Sounds: No S3/ S4 heard. ° Murmurs: No murmurs were heard. ° Edema: No edema noted.  BACK: No obvious deformity noted.  ABDOMEN: Visual Inspection: Abdomen was not distended. Auscultation: ° Abdominal auscultation revealed no abnormalities. ° Bowel sounds were normal. ° Palpation: ° Abdomen was soft. ° No abdominal tenderness. ° No mass was palpated in the abdomen. ° Soft. °Liver: Not visibly enlarged. Spleen: Not visibly enlarged.  MUSCULOSKELETAL SYSTEM : General/bilateral: ° Normal movement of all extremities.  FEET/EXTREMITIES: General/bilateral: ° No visible swelling of the feet.  NEUROLOGICAL: Nonfocal. °  SKIN: ° General appearance was normal. ° No new rashes noted.        Intake/Output   Intake/Output this shift:  I/O this shift:  In: 1050 [IV Piggyback:1050]  Out: 1000 [Urine:1000]     Intake/Output last 3 shifts:  No intake/output data recorded.     Results and Cultures Review      Result Review:  I have personally reviewed the results from the time of this admission to 2/22/2025 06:03 CST and agree with these findings:  [x]  Laboratory list / accordion  [x]  Microbiology  [x]  Radiology  [x]   EKG/Telemetry   [x]  Cardiology/Vascular   [x]  Pathology  [x]  Old records  []  Other:  Most notable findings include:   Viral panel negative.  Imaging reviewed.          Assessment/Plan         Acute hypoxemic respiratory failure: Likely multifactorial, patient has COPD diagnosis without PFTs and obstructive sleep apnea and uses CPAP or patient.  She does have imaging in 2013 consistent with bronchitis and I suspect chronic bronchitis could contribute to her chronic respiratory distress.  She might benefit from long-term azithromycin therapy on discharge.  Has a productive sputum which has gotten worse last 2 to 3 days.  Treat for acute exacerbation of COPD. COPD: DuoNebs and budesonide ordered.  Holding patient's Trelegy.  Start steroids.     Extensive small cell cancer: Defer to oncology. According to the [employer] (NCI), the average survival rate for extensive SCLC without treatment is 2-4 months after diagnosis. With treatment, extensive SCLC typically has a median survival rate of 7-11 months.  Patient is currently at 3 months.  Given that she is likely to live less than 8 months even with aggressive treatment I am going to involve palliative care.       Obstructive sleep apnea: Complicated by hypersomnia currently on modafinil at home.  She uses CPAP.  Continue same.     Chronic pain: Patient needs palliative care consultation due to extensive small cell cancer.  Started fentanyl patch today.  Cording to the granddaughter the patient probably takes too many of her pills because she forgets that she took them.  Patient also has pain that comes and goes waxes and wanes.  That confuses the patient.  Hopefully fentanyl will provide her with more appropriate chronic pain care in the setting of terminal cancer.     Depression: Restart patient's home medications.  Hypothyroidism: Restart home medications.  VTE prophylaxis: Lovenox.  CODE STATUS: No CPR, no intubation but full medical management.   "Noninvasive ventilation okay.  Palliative care consult.  Extensive small cell cancer dx December.  Family thinks \"she is going to be okay if she does treatment\" for the patient but per the granddaughter they understand this is terminal.  Patient does not want intubation, does not want CPR due to location of tumor.  Wants to continue with palliative chemo and radiation because she wants to make it to a graduation in May.  Granddaughter is supposedly next of kin, we need the power of  paperwork.  Granddaughter was accidentally listed as the daughter in the chart.  We spoke this morning.     Total critical care time: 110 minutes     Due to a high probability of clinically significant, life threatening deterioration, the patient required my highest level of preparedness to intervene emergently and I personally spent this critical care time directly and personally managing the patient.      This critical care time included obtaining a history, examining the patient, pulse oximetry and vital sign review, ordering and review of studies, arranging urgent treatment with development of a management plan with the multidisciplinary team, evaluation of patient's response to treatment, frequent reassessment, and discussions with other providers.     This critical care time was performed to assess and manage the high probability of imminent, life-threatening deterioration that could result in multi-organ failure. It was exclusive of separately billable procedures and treating other patients and teaching time.     Please see the rest of the note for further information on patient assessment and treatment.     Part of this note may be an electronic transcription/translation of spoken language to printed text using the Dragon Dictation System     Bernardo Rivera MD  2/22/2025 at 06:03 CST  Pulmonary, Critical Care  [facility]  Please call with any questions  (Cell [phone])   " "\"  ==========================================================================================================  Since dc:  Feels like she is getting stronger  Able to walk short distance without walker  Wearing oxygen supplementation at 2L  Working with pulmonology for NIPPV   Using boost and doing well with nutrition  Reports breathing is ok but concerned mucous is getting thicker. I see she was discharged with azithormycin and medrol course on 2/25/25 but pt says she was not aware and has not filled this   She has not been using mucinex   She is hopeful to start back radiation this week and chemo in a few weeks for her lung cancer         The following portions of the patient's history were reviewed and updated as appropriate: allergies, current medications, past family history, past medical history, past social history, past surgical history, and problem list.    Review of Systems    Objective   /77 (BP Location: Left arm, Patient Position: Sitting, Cuff Size: Adult)   Pulse 86   Temp 98.2 °F (36.8 °C) (Infrared)   Resp 20   Ht 170.2 cm (67.01\")   Wt 65.8 kg (145 lb)   SpO2 96% Comment: 2 liters  BMI 22.70 kg/m²   Physical Exam  Vitals and nursing note reviewed.   Constitutional:       General: She is not in acute distress.     Appearance: She is well-developed.   HENT:      Right Ear: Tympanic membrane and ear canal normal.      Left Ear: Tympanic membrane and ear canal normal.      Nose: Nose normal.      Right Sinus: No maxillary sinus tenderness or frontal sinus tenderness.      Left Sinus: No maxillary sinus tenderness or frontal sinus tenderness.      Mouth/Throat:      Mouth: Mucous membranes are moist.      Pharynx: Oropharynx is clear. Uvula midline. No uvula swelling.   Eyes:      Conjunctiva/sclera: Conjunctivae normal.   Neck:      Thyroid: No thyromegaly.      Trachea: No tracheal deviation.   Cardiovascular:      Rate and Rhythm: Normal rate and regular rhythm.      Heart sounds: Normal " heart sounds.   Pulmonary:      Effort: Pulmonary effort is normal.      Breath sounds: Decreased air movement present. Examination of the right-upper field reveals rhonchi. Examination of the left-upper field reveals rhonchi. Decreased breath sounds present.      Comments: NC 2L  Musculoskeletal:      Cervical back: Neck supple.   Lymphadenopathy:      Cervical: No cervical adenopathy.   Skin:     General: Skin is warm and dry.   Neurological:      Mental Status: She is alert.   Psychiatric:         Mood and Affect: Mood normal.         Behavior: Behavior normal.         Thought Content: Thought content normal.         Judgment: Judgment normal.     Assessment & Plan   Diagnoses and all orders for this visit:    1. Anemia, unspecified type (Primary)  -     Comprehensive metabolic panel  -     CBC & Differential    2. Consolidation of right lower lobe of lung  -     XR Chest 2 View    3. Chronic obstructive pulmonary disease, unspecified COPD type    4. Small cell carcinoma of upper lobe of right lung    5. Weakness    6. Stage 4 very severe COPD by GOLD classification    7. On supplemental oxygen therapy    8. Chronic respiratory failure with hypoxia and hypercapnia    9. History of hypokalemia    Other orders  -     guaiFENesin (Mucinex) 600 MG 12 hr tablet; Take 2 tablets by mouth 2 (Two) Times a Day.  Dispense: 60 tablet; Refill: 5      Plan:   azithromycin and medrol dose pack  Let me know if unable to fill  Repeat cxr today   Repeat labs today follow up on anemia, hypokalemia   Mucinex 1200 mg bid while thicker mucous   Continue follow up with specialists as scheduled

## 2025-03-03 NOTE — TELEPHONE ENCOUNTER
The patient called into the office today to schedule her port placement that was cancelled a few weeks ago.  We have the patient rescheduled for Thursday, 03/15/2025.We discussed the following instructions and the patient voiced understanding.    Fozia Briceno     Port Placement Directions     Report to the [facility] [place] [facility] at [facility] (go in the         front door and to the left) on Thursday, 03/13/2025 @ 8:00 AM.   Nothing to eat or drink after midnight the night before the procedure.  Please take all medications as normally scheduled to take unless listed below with a sip of water.  If you have sleep apnea and require C-PAP, please bring this with you to the hospital.  Bring a list of all of your allergies and medications with you to the hospital.  Please let our nurse know if you have had an allergy to iodine, shellfish, or x-ray dye.  Let the nurse know if you take any of the following:  Over the counter herbal supplements  Diclofenec, indomethacin, ketoprofen, Caridopa/levadopa, naproxen, sulindac, piroxicam, glucosamine, Chondrotin, cocchine, or methotrexate.  Plan to stay at the hospital for 4 - 6 hours before being released  by the physician. You will need someone to drive you home after the procedure.  9. Other Directions: For any questions or concerns please contact our office @        [phone] and ask to speak to Saniya.   10.  You will need a  to take you home.        Unless instructed otherwise by your physician, cleanse incision/puncture site twice daily with soap and water.  Apply dry gauze. Do not get in tub.  Okay to shower.  Do not apply any salve, cream, peroxide or alcohol to the incision.  Call with any increasing redness or drainage

## 2025-03-04 ENCOUNTER — PATIENT OUTREACH (OUTPATIENT)
Dept: CASE MANAGEMENT | Facility: OTHER | Age: 70
End: 2025-03-04
Payer: MEDICARE

## 2025-03-04 LAB
ALBUMIN SERPL-MCNC: 3.4 G/DL (ref 3.5–5.2)
ALBUMIN/GLOB SERPL: 1.1 G/DL
ALP SERPL-CCNC: 128 U/L (ref 39–117)
ALT SERPL-CCNC: 30 U/L (ref 1–33)
AST SERPL-CCNC: 24 U/L (ref 1–32)
BASOPHILS # BLD AUTO: 0.03 10*3/MM3 (ref 0–0.2)
BASOPHILS NFR BLD AUTO: 0.4 % (ref 0–1.5)
BILIRUB SERPL-MCNC: 0.3 MG/DL (ref 0–1.2)
BUN SERPL-MCNC: 11 MG/DL (ref 8–23)
BUN/CREAT SERPL: 18.3 (ref 7–25)
CALCIUM SERPL-MCNC: 9.3 MG/DL (ref 8.6–10.5)
CHLORIDE SERPL-SCNC: 97 MMOL/L (ref 98–107)
CO2 SERPL-SCNC: 34.6 MMOL/L (ref 22–29)
CREAT SERPL-MCNC: 0.6 MG/DL (ref 0.57–1)
EGFRCR SERPLBLD CKD-EPI 2021: 98.5 ML/MIN/1.73
EOSINOPHIL # BLD AUTO: 0.25 10*3/MM3 (ref 0–0.4)
EOSINOPHIL NFR BLD AUTO: 3.1 % (ref 0.3–6.2)
ERYTHROCYTE [DISTWIDTH] IN BLOOD BY AUTOMATED COUNT: 15.3 % (ref 12.3–15.4)
GLOBULIN SER CALC-MCNC: 3 GM/DL
GLUCOSE SERPL-MCNC: 87 MG/DL (ref 65–99)
HCT VFR BLD AUTO: 34.8 % (ref 34–46.6)
HGB BLD-MCNC: 11.1 G/DL (ref 12–15.9)
IMM GRANULOCYTES # BLD AUTO: 0.28 10*3/MM3 (ref 0–0.05)
IMM GRANULOCYTES NFR BLD AUTO: 3.4 % (ref 0–0.5)
LYMPHOCYTES # BLD AUTO: 1.1 10*3/MM3 (ref 0.7–3.1)
LYMPHOCYTES NFR BLD AUTO: 13.5 % (ref 19.6–45.3)
MCH RBC QN AUTO: 30 PG (ref 26.6–33)
MCHC RBC AUTO-ENTMCNC: 31.9 G/DL (ref 31.5–35.7)
MCV RBC AUTO: 94.1 FL (ref 79–97)
MONOCYTES # BLD AUTO: 1.08 10*3/MM3 (ref 0.1–0.9)
MONOCYTES NFR BLD AUTO: 13.3 % (ref 5–12)
NEUTROPHILS # BLD AUTO: 5.4 10*3/MM3 (ref 1.7–7)
NEUTROPHILS NFR BLD AUTO: 66.3 % (ref 42.7–76)
NRBC BLD AUTO-RTO: 0.2 /100 WBC (ref 0–0.2)
PLATELET # BLD AUTO: 361 10*3/MM3 (ref 140–450)
POTASSIUM SERPL-SCNC: 4.9 MMOL/L (ref 3.5–5.2)
PROT SERPL-MCNC: 6.4 G/DL (ref 6–8.5)
RBC # BLD AUTO: 3.7 10*6/MM3 (ref 3.77–5.28)
SODIUM SERPL-SCNC: 138 MMOL/L (ref 136–145)
WBC # BLD AUTO: 8.14 10*3/MM3 (ref 3.4–10.8)

## 2025-03-04 NOTE — OUTREACH NOTE
"AMBULATORY CASE MANAGEMENT NOTE    Names and Relationships of Patient/Support Persons: Contact: SELAM SANCHEZ; Relationship: Emergency Contact -     Patient Outreach    HRCM follow up with [facility] patient DC from [facility] 2.25.25 for narcotic OD. Spoke with patient's POASelam. Selam stated patient is \"getting by\". Selam will discuss NIPPV with pulmonary provider tomorrow and denied needs at this time.         Justine ASCENCIO  Ambulatory Case Management    3/4/2025, 14:12 CST  "

## 2025-03-04 NOTE — TELEPHONE ENCOUNTER
I called and notified [facility] that pulmonology is taking care of this for the pt, voiced an understanding.

## 2025-03-05 ENCOUNTER — TELEPHONE (OUTPATIENT)
Dept: FAMILY MEDICINE CLINIC | Facility: CLINIC | Age: 70
End: 2025-03-05
Payer: MEDICARE

## 2025-03-05 ENCOUNTER — OFFICE VISIT (OUTPATIENT)
Dept: PULMONOLOGY | Facility: CLINIC | Age: 70
End: 2025-03-05
Payer: MEDICARE

## 2025-03-05 VITALS
OXYGEN SATURATION: 96 % | BODY MASS INDEX: 22.6 KG/M2 | DIASTOLIC BLOOD PRESSURE: 78 MMHG | SYSTOLIC BLOOD PRESSURE: 130 MMHG | WEIGHT: 144 LBS | HEIGHT: 67 IN | HEART RATE: 98 BPM

## 2025-03-05 DIAGNOSIS — G47.33 OBSTRUCTIVE SLEEP APNEA: Chronic | ICD-10-CM

## 2025-03-05 DIAGNOSIS — J96.12 CHRONIC RESPIRATORY FAILURE WITH HYPOXIA AND HYPERCAPNIA: Primary | Chronic | ICD-10-CM

## 2025-03-05 DIAGNOSIS — J10.1 INFLUENZA A: ICD-10-CM

## 2025-03-05 DIAGNOSIS — J96.10 CHRONIC RESPIRATORY FAILURE: ICD-10-CM

## 2025-03-05 DIAGNOSIS — Z87.891 FORMER SMOKER: Chronic | ICD-10-CM

## 2025-03-05 DIAGNOSIS — J96.11 CHRONIC RESPIRATORY FAILURE WITH HYPOXIA AND HYPERCAPNIA: Primary | Chronic | ICD-10-CM

## 2025-03-05 DIAGNOSIS — C34.11 SMALL CELL CARCINOMA OF UPPER LOBE OF RIGHT LUNG: Chronic | ICD-10-CM

## 2025-03-05 DIAGNOSIS — J44.9 STAGE 4 VERY SEVERE COPD BY GOLD CLASSIFICATION: ICD-10-CM

## 2025-03-05 RX ORDER — FLUTICASONE FUROATE, UMECLIDINIUM BROMIDE AND VILANTEROL TRIFENATATE 100; 62.5; 25 UG/1; UG/1; UG/1
1 POWDER RESPIRATORY (INHALATION)
Qty: 1 EACH | Refills: 0 | Status: SHIPPED | OUTPATIENT
Start: 2025-03-05 | End: 2025-03-19

## 2025-03-05 RX ORDER — GUAIFENESIN 600 MG/1
1200 TABLET, EXTENDED RELEASE ORAL 2 TIMES DAILY
Qty: 120 TABLET | Refills: 5 | Status: SHIPPED | OUTPATIENT
Start: 2025-03-05 | End: 2025-04-04

## 2025-03-05 RX ORDER — ALBUTEROL SULFATE 0.83 MG/ML
2.5 SOLUTION RESPIRATORY (INHALATION) 4 TIMES DAILY PRN
Qty: 120 ML | Refills: 11 | Status: SHIPPED | OUTPATIENT
Start: 2025-03-05 | End: 2025-04-04

## 2025-03-05 NOTE — TELEPHONE ENCOUNTER
Eddie Pena with [facility] said patient is doing fantastic. He doesn't feel she needs physical therapy home health right now. She has good caregivers.  She is also walking around. He is not going to admit her at this time, but if she needs anything in the future let him know. Eddie's phone number is [phone] if you have any questions.

## 2025-03-06 ENCOUNTER — HOSPITAL ENCOUNTER (OUTPATIENT)
Dept: RADIATION ONCOLOGY | Facility: HOSPITAL | Age: 70
Setting detail: RADIATION/ONCOLOGY SERIES
Discharge: HOME | End: 2025-03-06
Payer: MEDICARE

## 2025-03-06 ENCOUNTER — DOCUMENTATION (OUTPATIENT)
Dept: RADIATION ONCOLOGY | Facility: HOSPITAL | Age: 70
End: 2025-03-06
Payer: MEDICARE

## 2025-03-06 ENCOUNTER — HOSPITAL ENCOUNTER (OUTPATIENT)
Dept: RADIATION ONCOLOGY | Facility: HOSPITAL | Age: 70
Setting detail: RADIATION/ONCOLOGY SERIES
End: 2025-03-06
Payer: MEDICARE

## 2025-03-06 PROCEDURE — 77386: CPT | Performed by: RADIOLOGY

## 2025-03-06 PROCEDURE — 77014 CHG CT GUIDANCE RADIATION THERAPY FLDS PLACEMENT: CPT | Performed by: RADIOLOGY

## 2025-03-06 PROCEDURE — 77427 RADIATION TX MANAGEMENT X5: CPT | Performed by: RADIOLOGY

## 2025-03-06 NOTE — PROGRESS NOTES
ISAMAR met with Ms. Gibbs who is here to start radiation treatment. She is 69 years old, and her granddaughter lives with her. ISAMAR discussed gas and utility assistance. [facility]. ISAMAR will remain available.

## 2025-03-07 ENCOUNTER — HOSPITAL ENCOUNTER (OUTPATIENT)
Dept: RADIATION ONCOLOGY | Facility: HOSPITAL | Age: 70
Discharge: HOME | End: 2025-03-07

## 2025-03-07 LAB
RAD ONC ARIA COURSE ID: NORMAL
RAD ONC ARIA COURSE INTENT: NORMAL
RAD ONC ARIA COURSE LAST TREATMENT DATE: NORMAL
RAD ONC ARIA COURSE START DATE: NORMAL
RAD ONC ARIA COURSE TREATMENT ELAPSED DAYS: 1
RAD ONC ARIA FIRST TREATMENT DATE: NORMAL
RAD ONC ARIA PLAN FRACTIONS TREATED TO DATE: 2
RAD ONC ARIA PLAN ID: NORMAL
RAD ONC ARIA PLAN PRESCRIBED DOSE PER FRACTION: 1.8 GY
RAD ONC ARIA PLAN PRIMARY REFERENCE POINT: NORMAL
RAD ONC ARIA PLAN TOTAL FRACTIONS PRESCRIBED: 28
RAD ONC ARIA PLAN TOTAL PRESCRIBED DOSE: 5040 CGY
RAD ONC ARIA REFERENCE POINT DOSAGE GIVEN TO DATE: 3.6 GY
RAD ONC ARIA REFERENCE POINT ID: NORMAL
RAD ONC ARIA REFERENCE POINT SESSION DOSAGE GIVEN: 1.8 GY

## 2025-03-07 PROCEDURE — 77386: CPT | Performed by: RADIOLOGY

## 2025-03-07 PROCEDURE — 77014 CHG CT GUIDANCE RADIATION THERAPY FLDS PLACEMENT: CPT | Performed by: RADIOLOGY

## 2025-03-10 ENCOUNTER — HOSPITAL ENCOUNTER (OUTPATIENT)
Dept: RADIATION ONCOLOGY | Facility: HOSPITAL | Age: 70
Discharge: HOME | End: 2025-03-10
Payer: MEDICARE

## 2025-03-10 ENCOUNTER — READMISSION MANAGEMENT (OUTPATIENT)
Dept: CALL CENTER | Facility: HOSPITAL | Age: 70
End: 2025-03-10
Payer: MEDICARE

## 2025-03-10 LAB
RAD ONC ARIA COURSE ID: NORMAL
RAD ONC ARIA COURSE INTENT: NORMAL
RAD ONC ARIA COURSE LAST TREATMENT DATE: NORMAL
RAD ONC ARIA COURSE START DATE: NORMAL
RAD ONC ARIA COURSE TREATMENT ELAPSED DAYS: 4
RAD ONC ARIA FIRST TREATMENT DATE: NORMAL
RAD ONC ARIA PLAN FRACTIONS TREATED TO DATE: 3
RAD ONC ARIA PLAN ID: NORMAL
RAD ONC ARIA PLAN PRESCRIBED DOSE PER FRACTION: 1.8 GY
RAD ONC ARIA PLAN PRIMARY REFERENCE POINT: NORMAL
RAD ONC ARIA PLAN TOTAL FRACTIONS PRESCRIBED: 28
RAD ONC ARIA PLAN TOTAL PRESCRIBED DOSE: 5040 CGY
RAD ONC ARIA REFERENCE POINT DOSAGE GIVEN TO DATE: 5.4 GY
RAD ONC ARIA REFERENCE POINT ID: NORMAL
RAD ONC ARIA REFERENCE POINT SESSION DOSAGE GIVEN: 1.8 GY

## 2025-03-10 PROCEDURE — 77386: CPT | Performed by: RADIOLOGY

## 2025-03-10 PROCEDURE — 77014 CHG CT GUIDANCE RADIATION THERAPY FLDS PLACEMENT: CPT | Performed by: RADIOLOGY

## 2025-03-10 NOTE — OUTREACH NOTE
Medical Week 2 Survey      Flowsheet Row Responses   [facility] patient discharged from? [facility]   Does the patient have one of the following disease processes/diagnoses(primary or secondary)? Other   Week 2 attempt successful? No   Unsuccessful attempts Attempt 1   Revoke Other transitional program            Jeniffer Oakley Registered Nurse

## 2025-03-12 ENCOUNTER — APPOINTMENT (OUTPATIENT)
Dept: RADIATION ONCOLOGY | Facility: HOSPITAL | Age: 70
End: 2025-03-12
Payer: MEDICARE

## 2025-03-12 ENCOUNTER — HOSPITAL ENCOUNTER (OUTPATIENT)
Dept: RADIATION ONCOLOGY | Facility: HOSPITAL | Age: 70
Discharge: HOME | End: 2025-03-12

## 2025-03-12 LAB
RAD ONC ARIA COURSE ID: NORMAL
RAD ONC ARIA COURSE INTENT: NORMAL
RAD ONC ARIA COURSE LAST TREATMENT DATE: NORMAL
RAD ONC ARIA COURSE START DATE: NORMAL
RAD ONC ARIA COURSE TREATMENT ELAPSED DAYS: 6
RAD ONC ARIA FIRST TREATMENT DATE: NORMAL
RAD ONC ARIA PLAN FRACTIONS TREATED TO DATE: 4
RAD ONC ARIA PLAN ID: NORMAL
RAD ONC ARIA PLAN PRESCRIBED DOSE PER FRACTION: 1.8 GY
RAD ONC ARIA PLAN PRIMARY REFERENCE POINT: NORMAL
RAD ONC ARIA PLAN TOTAL FRACTIONS PRESCRIBED: 28
RAD ONC ARIA PLAN TOTAL PRESCRIBED DOSE: 5040 CGY
RAD ONC ARIA REFERENCE POINT DOSAGE GIVEN TO DATE: 7.2 GY
RAD ONC ARIA REFERENCE POINT ID: NORMAL
RAD ONC ARIA REFERENCE POINT SESSION DOSAGE GIVEN: 1.8 GY

## 2025-03-12 PROCEDURE — 77386: CPT | Performed by: RADIOLOGY

## 2025-03-12 PROCEDURE — 77014 CHG CT GUIDANCE RADIATION THERAPY FLDS PLACEMENT: CPT | Performed by: RADIOLOGY

## 2025-03-13 ENCOUNTER — HOSPITAL ENCOUNTER (OUTPATIENT)
Dept: RADIATION ONCOLOGY | Facility: HOSPITAL | Age: 70
Discharge: HOME | End: 2025-03-13

## 2025-03-13 ENCOUNTER — HOSPITAL ENCOUNTER (OUTPATIENT)
Dept: INTERVENTIONAL RADIOLOGY/VASCULAR | Age: 70
Discharge: HOME | End: 2025-03-13
Payer: MEDICARE

## 2025-03-13 VITALS
SYSTOLIC BLOOD PRESSURE: 101 MMHG | RESPIRATION RATE: 17 BRPM | DIASTOLIC BLOOD PRESSURE: 50 MMHG | HEART RATE: 101 BPM | HEIGHT: 67 IN | WEIGHT: 144 LBS | TEMPERATURE: 97.1 F | BODY MASS INDEX: 22.6 KG/M2 | OXYGEN SATURATION: 96 %

## 2025-03-13 DIAGNOSIS — I87.8 POOR VENOUS ACCESS: ICD-10-CM

## 2025-03-13 LAB
RAD ONC ARIA COURSE ID: NORMAL
RAD ONC ARIA COURSE INTENT: NORMAL
RAD ONC ARIA COURSE LAST TREATMENT DATE: NORMAL
RAD ONC ARIA COURSE START DATE: NORMAL
RAD ONC ARIA COURSE TREATMENT ELAPSED DAYS: 7
RAD ONC ARIA FIRST TREATMENT DATE: NORMAL
RAD ONC ARIA PLAN FRACTIONS TREATED TO DATE: 5
RAD ONC ARIA PLAN ID: NORMAL
RAD ONC ARIA PLAN PRESCRIBED DOSE PER FRACTION: 1.8 GY
RAD ONC ARIA PLAN PRIMARY REFERENCE POINT: NORMAL
RAD ONC ARIA PLAN TOTAL FRACTIONS PRESCRIBED: 28
RAD ONC ARIA PLAN TOTAL PRESCRIBED DOSE: 5040 CGY
RAD ONC ARIA REFERENCE POINT DOSAGE GIVEN TO DATE: 9 GY
RAD ONC ARIA REFERENCE POINT ID: NORMAL
RAD ONC ARIA REFERENCE POINT SESSION DOSAGE GIVEN: 1.8 GY

## 2025-03-13 PROCEDURE — 77336 RADIATION PHYSICS CONSULT: CPT | Performed by: RADIOLOGY

## 2025-03-13 PROCEDURE — 2500000003 HC RX 250 WO HCPCS: Performed by: NURSE PRACTITIONER

## 2025-03-13 PROCEDURE — 6360000002 HC RX W HCPCS: Performed by: SURGERY

## 2025-03-13 PROCEDURE — 99153 MOD SED SAME PHYS/QHP EA: CPT

## 2025-03-13 PROCEDURE — 77386: CPT | Performed by: RADIOLOGY

## 2025-03-13 PROCEDURE — 6370000000 HC RX 637 (ALT 250 FOR IP): Performed by: NURSE PRACTITIONER

## 2025-03-13 PROCEDURE — 77014 CHG CT GUIDANCE RADIATION THERAPY FLDS PLACEMENT: CPT | Performed by: RADIOLOGY

## 2025-03-13 PROCEDURE — 6360000002 HC RX W HCPCS: Performed by: NURSE PRACTITIONER

## 2025-03-13 PROCEDURE — 36561 INSERT TUNNELED CV CATH: CPT

## 2025-03-13 PROCEDURE — 2580000003 HC RX 258: Performed by: NURSE PRACTITIONER

## 2025-03-13 PROCEDURE — 76937 US GUIDE VASCULAR ACCESS: CPT

## 2025-03-13 PROCEDURE — 99152 MOD SED SAME PHYS/QHP 5/>YRS: CPT

## 2025-03-13 PROCEDURE — 77001 FLUOROGUIDE FOR VEIN DEVICE: CPT

## 2025-03-13 PROCEDURE — C1769 GUIDE WIRE: HCPCS

## 2025-03-13 RX ORDER — SODIUM CHLORIDE 0.9 % (FLUSH) 0.9 %
5-40 SYRINGE (ML) INJECTION PRN
Status: DISCONTINUED | OUTPATIENT
Start: 2025-03-13 | End: 2025-03-15 | Stop reason: HOSPADM

## 2025-03-13 RX ORDER — SODIUM CHLORIDE 9 MG/ML
INJECTION, SOLUTION INTRAVENOUS PRN
Status: DISCONTINUED | OUTPATIENT
Start: 2025-03-13 | End: 2025-03-15 | Stop reason: HOSPADM

## 2025-03-13 RX ORDER — FENTANYL CITRATE 50 UG/ML
INJECTION, SOLUTION INTRAMUSCULAR; INTRAVENOUS PRN
Status: COMPLETED | OUTPATIENT
Start: 2025-03-13 | End: 2025-03-13

## 2025-03-13 RX ORDER — ASPIRIN 81 MG/1
81 TABLET ORAL ONCE
Status: COMPLETED | OUTPATIENT
Start: 2025-03-13 | End: 2025-03-13

## 2025-03-13 RX ORDER — MIDAZOLAM HYDROCHLORIDE 1 MG/ML
INJECTION, SOLUTION INTRAMUSCULAR; INTRAVENOUS PRN
Status: COMPLETED | OUTPATIENT
Start: 2025-03-13 | End: 2025-03-13

## 2025-03-13 RX ORDER — SODIUM CHLORIDE 0.9 % (FLUSH) 0.9 %
5-40 SYRINGE (ML) INJECTION EVERY 12 HOURS SCHEDULED
Status: DISCONTINUED | OUTPATIENT
Start: 2025-03-13 | End: 2025-03-15 | Stop reason: HOSPADM

## 2025-03-13 RX ORDER — HEPARIN 100 UNIT/ML
5 SYRINGE INTRAVENOUS PRN
Status: DISCONTINUED | OUTPATIENT
Start: 2025-03-13 | End: 2025-03-15 | Stop reason: HOSPADM

## 2025-03-13 RX ORDER — HEPARIN SODIUM 5000 [USP'U]/ML
INJECTION, SOLUTION INTRAVENOUS; SUBCUTANEOUS PRN
Status: COMPLETED | OUTPATIENT
Start: 2025-03-13 | End: 2025-03-13

## 2025-03-13 RX ORDER — LIDOCAINE HYDROCHLORIDE AND EPINEPHRINE 10; 10 MG/ML; UG/ML
INJECTION, SOLUTION INFILTRATION; PERINEURAL PRN
Status: COMPLETED | OUTPATIENT
Start: 2025-03-13 | End: 2025-03-13

## 2025-03-13 RX ADMIN — LIDOCAINE HYDROCHLORIDE AND EPINEPHRINE 20 ML: 10; 10 INJECTION, SOLUTION INFILTRATION; PERINEURAL at 10:56

## 2025-03-13 RX ADMIN — HEPARIN SODIUM 5000 UNITS: 5000 INJECTION INTRAVENOUS; SUBCUTANEOUS at 10:57

## 2025-03-13 RX ADMIN — FENTANYL CITRATE 25 MCG: 50 INJECTION, SOLUTION INTRAMUSCULAR; INTRAVENOUS at 11:16

## 2025-03-13 RX ADMIN — SODIUM CHLORIDE: 9 INJECTION, SOLUTION INTRAVENOUS at 08:59

## 2025-03-13 RX ADMIN — ASPIRIN 81 MG: 81 TABLET, COATED ORAL at 09:07

## 2025-03-13 RX ADMIN — WATER 2000 MG: 1 INJECTION INTRAMUSCULAR; INTRAVENOUS; SUBCUTANEOUS at 10:07

## 2025-03-13 RX ADMIN — MIDAZOLAM 0.5 MG: 1 INJECTION INTRAMUSCULAR; INTRAVENOUS at 10:56

## 2025-03-13 RX ADMIN — FENTANYL CITRATE 25 MCG: 50 INJECTION, SOLUTION INTRAMUSCULAR; INTRAVENOUS at 10:56

## 2025-03-13 RX ADMIN — MIDAZOLAM 1 MG: 1 INJECTION INTRAMUSCULAR; INTRAVENOUS at 11:12

## 2025-03-13 RX ADMIN — MIDAZOLAM 0.5 MG: 1 INJECTION INTRAMUSCULAR; INTRAVENOUS at 11:03

## 2025-03-13 RX ADMIN — FENTANYL CITRATE 25 MCG: 50 INJECTION, SOLUTION INTRAMUSCULAR; INTRAVENOUS at 11:06

## 2025-03-13 NOTE — PROGRESS NOTES
Discharge instructions reviewed with patient and patient states understanding. Right port site c/d/I. Patient discharged from cath holding with all belongings and AVS.  Electronically signed by Edith Gill RN on 3/13/2025 at 1:16 PM

## 2025-03-13 NOTE — H&P
Patient Care Team:  Celine Gomez as PCP - General  Florencia Bates MD as Consulting Physician (Oncology)        Fozia Briceno 69 y.o. female with a history of lung cancer.  She has poor venous access.  She needs chemo and needs a mediport  Patient Active Problem List   Diagnosis    Hypothyroidism    Anxiety    Depression    Chronic obstructive pulmonary disease (HCC)    Hyperlipidemia    Restless legs syndrome    Migraines    Smoking addiction    Vitamin D deficiency    Renal mass    Obstructive sleep apnea    CPAP (continuous positive airway pressure) dependence    Primary insomnia    Sepsis (HCC)    Transaminitis    Acute hypoxemic respiratory failure (HCC)    Hemoptysis    Age-related osteoporosis without current pathological fracture    Neuropathy    Family history of renal cell carcinoma    Excessive daytime sleepiness    Chronic pain syndrome    Mild episode of recurrent major depressive disorder    Neuroendocrine carcinoma of lung (HCC)      See attached meds  Allergies:  Codeine  Past Medical History:   Diagnosis Date    Anxiety     Asthma     Cancer (HCC)     Neuroendocrine carcinoma of right lung    COPD (chronic obstructive pulmonary disease) (HCC)     CPAP (continuous positive airway pressure) dependence     11cm    Depression     Hyperlipidemia     Hypothyroidism     Migraines     Obstructive sleep apnea     AHI:  13.1    Restless legs syndrome     Sarcoidosis     Scoliosis     Unspecified sleep apnea       Past Surgical History:   Procedure Laterality Date    APPENDECTOMY  age 20    Patient thinks this was removed with GB    BREAST SURGERY  2006    bilateral breast tumor removal-[facility]    BRONCHOSCOPY N/A 7/29/2019    BRONCHOSCOPY BIOPSY BRONCHUS performed by Donnie Degroot MD at [facility]    CHOLECYSTECTOMY  age 20      Family History   Problem Relation Age of Onset    Heart Disease Mother     Diabetes Father     Heart Disease Father     Diabetes Sister     Heart

## 2025-03-13 NOTE — OP NOTE
Operative Note      Patient: Fozia Briceno  YOB: 1955  MRN: [id number]      3/13/2025    Preoperative Diagnosis:  1.  Poor venous access    Postoperative Diagnosis:  Same    Operative Procedure:  1.  Ultrasound guided cannulation right internal jugular vein  2.  right internal jugular vein Single Chamber lumen subcutaneous vascular access device placement (Bard PowerPort)    Surgeon:  Sabina Bateman DO    Anesthesia: Local and Moderate Sedation  Moderate sedation ASA class III  Timing start: 10:55  End time:11:44  Given   2 Versed IV, in divided doses  75 Fentanyl IV, in divided doses  I was personally responsible for the administration of moderate sedation services during the procedure performed and I confirm requirements described in CPT section on moderate sedation were followed. Pulse oximetry, heart rate, blood pressure were continuously monitored by an independent trained observer who had no other duties during the procedure.     Estimated Blood Loss:  Less than 50 ml    Findings:  1.  The right internal jugular vein is patent.  2.  The catheter tip is in the right atrium/superior vena cava junction and ready for use when needed.    Procedure in Detail:    After the patient was consented and given IV antibiotics, she was brought to the operating room and placed on the operating room table in the supine position.  Local and Moderate Sedation was administered, and the patient's bilateral chest and neck were prepped and draped in the usual sterile fashion.  Ioban dressing was placed  The internal jugular vein was cannulated with a micropuncture needle under ultrasound guidance. The ultrasound image was saved to PACS. An 0.018 wire was placed through the needle and then the needle was replaced with a micropuncture catheter.  The 0.018 wire was exchanged for an 0.035 wire, and this wire was placed into the inferior vena cava utilizing fluoroscopy. The micropuncture catheter was removed and a small

## 2025-03-14 DIAGNOSIS — C34.01 SMALL CELL CARCINOMA OF HILUM OF RIGHT LUNG (HCC): ICD-10-CM

## 2025-03-14 DIAGNOSIS — G89.3 CANCER ASSOCIATED PAIN: ICD-10-CM

## 2025-03-14 RX ORDER — OXYCODONE AND ACETAMINOPHEN 10; 325 MG/1; MG/1
1 TABLET ORAL EVERY 6 HOURS PRN
Qty: 120 TABLET | Refills: 0 | Status: SHIPPED | OUTPATIENT
Start: 2025-03-14 | End: 2025-04-13

## 2025-03-17 ENCOUNTER — HOSPITAL ENCOUNTER (OUTPATIENT)
Dept: RADIATION ONCOLOGY | Facility: HOSPITAL | Age: 70
Discharge: HOME | End: 2025-03-17
Payer: MEDICARE

## 2025-03-17 LAB
RAD ONC ARIA COURSE ID: NORMAL
RAD ONC ARIA COURSE INTENT: NORMAL
RAD ONC ARIA COURSE LAST TREATMENT DATE: NORMAL
RAD ONC ARIA COURSE START DATE: NORMAL
RAD ONC ARIA COURSE TREATMENT ELAPSED DAYS: 11
RAD ONC ARIA FIRST TREATMENT DATE: NORMAL
RAD ONC ARIA PLAN FRACTIONS TREATED TO DATE: 6
RAD ONC ARIA PLAN ID: NORMAL
RAD ONC ARIA PLAN PRESCRIBED DOSE PER FRACTION: 1.8 GY
RAD ONC ARIA PLAN PRIMARY REFERENCE POINT: NORMAL
RAD ONC ARIA PLAN TOTAL FRACTIONS PRESCRIBED: 28
RAD ONC ARIA PLAN TOTAL PRESCRIBED DOSE: 5040 CGY
RAD ONC ARIA REFERENCE POINT DOSAGE GIVEN TO DATE: 10.8 GY
RAD ONC ARIA REFERENCE POINT ID: NORMAL
RAD ONC ARIA REFERENCE POINT SESSION DOSAGE GIVEN: 1.8 GY

## 2025-03-17 PROCEDURE — 77386: CPT | Performed by: RADIOLOGY

## 2025-03-17 PROCEDURE — 77427 RADIATION TX MANAGEMENT X5: CPT | Performed by: RADIOLOGY

## 2025-03-17 PROCEDURE — 77014 CHG CT GUIDANCE RADIATION THERAPY FLDS PLACEMENT: CPT | Performed by: RADIOLOGY

## 2025-03-18 ENCOUNTER — TELEPHONE (OUTPATIENT)
Dept: HEMATOLOGY | Age: 70
End: 2025-03-18

## 2025-03-18 ENCOUNTER — HOSPITAL ENCOUNTER (OUTPATIENT)
Dept: RADIATION ONCOLOGY | Facility: HOSPITAL | Age: 70
Discharge: HOME | End: 2025-03-18

## 2025-03-18 LAB
RAD ONC ARIA COURSE ID: NORMAL
RAD ONC ARIA COURSE INTENT: NORMAL
RAD ONC ARIA COURSE LAST TREATMENT DATE: NORMAL
RAD ONC ARIA COURSE START DATE: NORMAL
RAD ONC ARIA COURSE TREATMENT ELAPSED DAYS: 12
RAD ONC ARIA FIRST TREATMENT DATE: NORMAL
RAD ONC ARIA PLAN FRACTIONS TREATED TO DATE: 7
RAD ONC ARIA PLAN ID: NORMAL
RAD ONC ARIA PLAN PRESCRIBED DOSE PER FRACTION: 1.8 GY
RAD ONC ARIA PLAN PRIMARY REFERENCE POINT: NORMAL
RAD ONC ARIA PLAN TOTAL FRACTIONS PRESCRIBED: 28
RAD ONC ARIA PLAN TOTAL PRESCRIBED DOSE: 5040 CGY
RAD ONC ARIA REFERENCE POINT DOSAGE GIVEN TO DATE: 12.6 GY
RAD ONC ARIA REFERENCE POINT ID: NORMAL
RAD ONC ARIA REFERENCE POINT SESSION DOSAGE GIVEN: 1.8 GY

## 2025-03-18 PROCEDURE — 77386: CPT | Performed by: RADIOLOGY

## 2025-03-18 PROCEDURE — 77014 CHG CT GUIDANCE RADIATION THERAPY FLDS PLACEMENT: CPT | Performed by: RADIOLOGY

## 2025-03-18 NOTE — PROGRESS NOTES
Radiation Oncology for radiation therapy      Follow Up:   Return in about 3 weeks (around 4/9/2025) for Treatment & see  in [place] Carbo .  Carbo  x2 days this week  Carbo  x3 days in 3 weeks    Shamika JUNG am pre-charting as a registered nurse for Florencia Bates MD. Electronically signed by Shamika Combs RN on 3/19/2025 at 1:57 PM CDT.     I have seen, examined and reviewed this patient medication list, appropriate labs and imaging studies. I reviewed relevant medical records and others physician’s notes. I discussed the plans of care with the patient. I answered all the questions to the patient’s satisfaction. I have also reviewed the chief complaint (CC) and part of the history (History of Present Illness (HPI), Past Family Social History (PFSH), or Review of Systems (ROS) and made changes when appropriated.       (Please note that portions of this note were completed with a voice recognition program. Efforts were made to edit the dictations but occasionally words are mis-transcribed.)    Electronically signed by Florencia Bates MD on 3/19/2025 at 1:22 PM

## 2025-03-18 NOTE — TELEPHONE ENCOUNTER
She had port placement on 3/13/25 with vascular. Electronically signed by Shamika Combs RN on 3/18/2025 at 3:53 PM

## 2025-03-19 ENCOUNTER — HOSPITAL ENCOUNTER (OUTPATIENT)
Dept: RADIATION ONCOLOGY | Facility: HOSPITAL | Age: 70
Discharge: HOME | End: 2025-03-19

## 2025-03-19 ENCOUNTER — OFFICE VISIT (OUTPATIENT)
Dept: HEMATOLOGY | Age: 70
End: 2025-03-19
Payer: MEDICARE

## 2025-03-19 ENCOUNTER — HOSPITAL ENCOUNTER (OUTPATIENT)
Dept: INFUSION THERAPY | Age: 70
Discharge: HOME | End: 2025-03-19
Payer: MEDICARE

## 2025-03-19 VITALS
DIASTOLIC BLOOD PRESSURE: 62 MMHG | SYSTOLIC BLOOD PRESSURE: 123 MMHG | HEIGHT: 67 IN | WEIGHT: 145.5 LBS | BODY MASS INDEX: 22.84 KG/M2 | HEART RATE: 106 BPM | RESPIRATION RATE: 18 BRPM | OXYGEN SATURATION: 94 % | TEMPERATURE: 97.4 F

## 2025-03-19 DIAGNOSIS — Z51.11 CHEMOTHERAPY MANAGEMENT, ENCOUNTER FOR: ICD-10-CM

## 2025-03-19 DIAGNOSIS — Z99.81 OXYGEN DEPENDENT: ICD-10-CM

## 2025-03-19 DIAGNOSIS — G89.3 CANCER ASSOCIATED PAIN: ICD-10-CM

## 2025-03-19 DIAGNOSIS — C7A.8 NEUROENDOCRINE CARCINOMA OF LUNG: Primary | ICD-10-CM

## 2025-03-19 DIAGNOSIS — C34.01 SMALL CELL CARCINOMA OF HILUM OF RIGHT LUNG: Primary | ICD-10-CM

## 2025-03-19 DIAGNOSIS — C34.01 SMALL CELL CARCINOMA OF HILUM OF RIGHT LUNG: ICD-10-CM

## 2025-03-19 DIAGNOSIS — T45.1X5D ADVERSE EFFECT OF CHEMOTHERAPY, SUBSEQUENT ENCOUNTER: ICD-10-CM

## 2025-03-19 DIAGNOSIS — J96.11 CHRONIC HYPOXIC RESPIRATORY FAILURE: ICD-10-CM

## 2025-03-19 DIAGNOSIS — Z71.89 CARE PLAN DISCUSSED WITH PATIENT: ICD-10-CM

## 2025-03-19 DIAGNOSIS — D64.9 NORMOCYTIC ANEMIA: ICD-10-CM

## 2025-03-19 DIAGNOSIS — C7A.8 NEUROENDOCRINE CARCINOMA OF LUNG: ICD-10-CM

## 2025-03-19 LAB
ALBUMIN SERPL-MCNC: 3.6 G/DL (ref 3.5–5.2)
ALP SERPL-CCNC: 101 U/L (ref 35–104)
ALT SERPL-CCNC: 20 U/L (ref 5–33)
ANION GAP SERPL CALCULATED.3IONS-SCNC: 12 MMOL/L (ref 7–19)
AST SERPL-CCNC: 23 U/L (ref 5–32)
BASOPHILS # BLD: 0.02 K/UL (ref 0–0.2)
BASOPHILS NFR BLD: 0.5 % (ref 0–1)
BILIRUB SERPL-MCNC: 0.4 MG/DL (ref 0–1.2)
BUN SERPL-MCNC: 11 MG/DL (ref 8–23)
CALCIUM SERPL-MCNC: 8.7 MG/DL (ref 8.8–10.2)
CHLORIDE SERPL-SCNC: 100 MMOL/L (ref 98–107)
CO2 SERPL-SCNC: 29 MMOL/L (ref 22–29)
CREAT SERPL-MCNC: 0.7 MG/DL (ref 0.5–0.9)
EOSINOPHIL # BLD: 0.29 K/UL (ref 0–0.6)
EOSINOPHIL NFR BLD: 6.6 % (ref 0–5)
ERYTHROCYTE [DISTWIDTH] IN BLOOD BY AUTOMATED COUNT: 17 % (ref 11.5–14.5)
GLUCOSE SERPL-MCNC: 119 MG/DL (ref 70–99)
HCT VFR BLD AUTO: 31.7 % (ref 37–47)
HGB BLD-MCNC: 10.4 G/DL (ref 12–16)
LYMPHOCYTES # BLD: 0.85 K/UL (ref 1.1–4.5)
LYMPHOCYTES NFR BLD: 19.2 % (ref 20–40)
MCH RBC QN AUTO: 31 PG (ref 27–31)
MCHC RBC AUTO-ENTMCNC: 32.8 G/DL (ref 33–37)
MCV RBC AUTO: 94.3 FL (ref 81–99)
MONOCYTES # BLD: 0.59 K/UL (ref 0–0.9)
MONOCYTES NFR BLD: 13.3 % (ref 1–10)
NEUTROPHILS # BLD: 2.65 K/UL (ref 1.5–7.5)
NEUTS SEG NFR BLD: 59.9 % (ref 50–65)
PLATELET # BLD AUTO: 191 K/UL (ref 130–400)
PMV BLD AUTO: 9.3 FL (ref 9.4–12.3)
POTASSIUM SERPL-SCNC: 3.8 MMOL/L (ref 3.5–5.1)
PROT SERPL-MCNC: 6.3 G/DL (ref 6.4–8.3)
RAD ONC ARIA COURSE ID: NORMAL
RAD ONC ARIA COURSE INTENT: NORMAL
RAD ONC ARIA COURSE LAST TREATMENT DATE: NORMAL
RAD ONC ARIA COURSE START DATE: NORMAL
RAD ONC ARIA COURSE TREATMENT ELAPSED DAYS: 13
RAD ONC ARIA FIRST TREATMENT DATE: NORMAL
RAD ONC ARIA PLAN FRACTIONS TREATED TO DATE: 8
RAD ONC ARIA PLAN ID: NORMAL
RAD ONC ARIA PLAN PRESCRIBED DOSE PER FRACTION: 1.8 GY
RAD ONC ARIA PLAN PRIMARY REFERENCE POINT: NORMAL
RAD ONC ARIA PLAN TOTAL FRACTIONS PRESCRIBED: 28
RAD ONC ARIA PLAN TOTAL PRESCRIBED DOSE: 5040 CGY
RAD ONC ARIA REFERENCE POINT DOSAGE GIVEN TO DATE: 14.4 GY
RAD ONC ARIA REFERENCE POINT ID: NORMAL
RAD ONC ARIA REFERENCE POINT SESSION DOSAGE GIVEN: 1.8 GY
RBC # BLD AUTO: 3.36 M/UL (ref 4.2–5.4)
SODIUM SERPL-SCNC: 141 MMOL/L (ref 136–145)
WBC # BLD AUTO: 4.42 K/UL (ref 4.8–10.8)

## 2025-03-19 PROCEDURE — 36415 COLL VENOUS BLD VENIPUNCTURE: CPT

## 2025-03-19 PROCEDURE — 85025 COMPLETE CBC W/AUTO DIFF WBC: CPT

## 2025-03-19 PROCEDURE — 96367 TX/PROPH/DG ADDL SEQ IV INF: CPT

## 2025-03-19 PROCEDURE — 96413 CHEMO IV INFUSION 1 HR: CPT

## 2025-03-19 PROCEDURE — 96417 CHEMO IV INFUS EACH ADDL SEQ: CPT

## 2025-03-19 PROCEDURE — 80053 COMPREHEN METABOLIC PANEL: CPT

## 2025-03-19 PROCEDURE — 2500000003 HC RX 250 WO HCPCS: Performed by: INTERNAL MEDICINE

## 2025-03-19 PROCEDURE — 96375 TX/PRO/DX INJ NEW DRUG ADDON: CPT

## 2025-03-19 PROCEDURE — 77386: CPT | Performed by: RADIOLOGY

## 2025-03-19 PROCEDURE — 6360000002 HC RX W HCPCS: Performed by: INTERNAL MEDICINE

## 2025-03-19 PROCEDURE — 77014 CHG CT GUIDANCE RADIATION THERAPY FLDS PLACEMENT: CPT | Performed by: RADIOLOGY

## 2025-03-19 PROCEDURE — 2580000003 HC RX 258: Performed by: INTERNAL MEDICINE

## 2025-03-19 RX ORDER — FAMOTIDINE 10 MG/ML
20 INJECTION, SOLUTION INTRAVENOUS
Status: CANCELLED | OUTPATIENT
Start: 2025-03-19

## 2025-03-19 RX ORDER — DIPHENHYDRAMINE HYDROCHLORIDE 50 MG/ML
50 INJECTION, SOLUTION INTRAMUSCULAR; INTRAVENOUS
Status: CANCELLED | OUTPATIENT
Start: 2025-03-21

## 2025-03-19 RX ORDER — SODIUM CHLORIDE 9 MG/ML
5-250 INJECTION, SOLUTION INTRAVENOUS PRN
Status: CANCELLED | OUTPATIENT
Start: 2025-03-19

## 2025-03-19 RX ORDER — SODIUM CHLORIDE 9 MG/ML
5-250 INJECTION, SOLUTION INTRAVENOUS PRN
Status: CANCELLED | OUTPATIENT
Start: 2025-03-21

## 2025-03-19 RX ORDER — EPINEPHRINE 1 MG/ML
0.3 INJECTION, SOLUTION, CONCENTRATE INTRAVENOUS PRN
Status: CANCELLED | OUTPATIENT
Start: 2025-03-20

## 2025-03-19 RX ORDER — ACETAMINOPHEN 325 MG/1
650 TABLET ORAL
Status: CANCELLED | OUTPATIENT
Start: 2025-03-21

## 2025-03-19 RX ORDER — PALONOSETRON 0.05 MG/ML
0.25 INJECTION, SOLUTION INTRAVENOUS ONCE
Status: COMPLETED | OUTPATIENT
Start: 2025-03-19 | End: 2025-03-19

## 2025-03-19 RX ORDER — EPINEPHRINE 1 MG/ML
0.3 INJECTION, SOLUTION, CONCENTRATE INTRAVENOUS PRN
Status: CANCELLED | OUTPATIENT
Start: 2025-03-21

## 2025-03-19 RX ORDER — ONDANSETRON 2 MG/ML
8 INJECTION INTRAMUSCULAR; INTRAVENOUS
Status: CANCELLED | OUTPATIENT
Start: 2025-03-20

## 2025-03-19 RX ORDER — MEPERIDINE HYDROCHLORIDE 50 MG/ML
12.5 INJECTION INTRAMUSCULAR; INTRAVENOUS; SUBCUTANEOUS PRN
Status: CANCELLED | OUTPATIENT
Start: 2025-03-19

## 2025-03-19 RX ORDER — HEPARIN SODIUM (PORCINE) LOCK FLUSH IV SOLN 100 UNIT/ML 100 UNIT/ML
500 SOLUTION INTRAVENOUS PRN
Status: CANCELLED | OUTPATIENT
Start: 2025-03-19

## 2025-03-19 RX ORDER — SODIUM CHLORIDE 9 MG/ML
5-250 INJECTION, SOLUTION INTRAVENOUS PRN
Status: CANCELLED | OUTPATIENT
Start: 2025-03-20

## 2025-03-19 RX ORDER — SODIUM CHLORIDE 9 MG/ML
INJECTION, SOLUTION INTRAVENOUS CONTINUOUS
Status: CANCELLED | OUTPATIENT
Start: 2025-03-19

## 2025-03-19 RX ORDER — DIPHENHYDRAMINE HYDROCHLORIDE 50 MG/ML
50 INJECTION, SOLUTION INTRAMUSCULAR; INTRAVENOUS
Status: CANCELLED | OUTPATIENT
Start: 2025-03-19

## 2025-03-19 RX ORDER — HYDROCORTISONE SODIUM SUCCINATE 100 MG/2ML
100 INJECTION INTRAMUSCULAR; INTRAVENOUS
Status: CANCELLED | OUTPATIENT
Start: 2025-03-19

## 2025-03-19 RX ORDER — HEPARIN 100 UNIT/ML
500 SYRINGE INTRAVENOUS PRN
Status: DISCONTINUED | OUTPATIENT
Start: 2025-03-19 | End: 2025-03-20 | Stop reason: HOSPADM

## 2025-03-19 RX ORDER — HYDROCORTISONE SODIUM SUCCINATE 100 MG/2ML
100 INJECTION INTRAMUSCULAR; INTRAVENOUS
Status: CANCELLED | OUTPATIENT
Start: 2025-03-20

## 2025-03-19 RX ORDER — HYDROCORTISONE SODIUM SUCCINATE 100 MG/2ML
100 INJECTION INTRAMUSCULAR; INTRAVENOUS
Status: CANCELLED | OUTPATIENT
Start: 2025-03-21

## 2025-03-19 RX ORDER — HEPARIN SODIUM (PORCINE) LOCK FLUSH IV SOLN 100 UNIT/ML 100 UNIT/ML
500 SOLUTION INTRAVENOUS PRN
Status: CANCELLED | OUTPATIENT
Start: 2025-03-21

## 2025-03-19 RX ORDER — PROCHLORPERAZINE EDISYLATE 5 MG/ML
5 INJECTION INTRAMUSCULAR; INTRAVENOUS
Status: CANCELLED | OUTPATIENT
Start: 2025-03-19

## 2025-03-19 RX ORDER — ALBUTEROL SULFATE 90 UG/1
4 INHALANT RESPIRATORY (INHALATION) PRN
Status: CANCELLED | OUTPATIENT
Start: 2025-03-21

## 2025-03-19 RX ORDER — FAMOTIDINE 10 MG/ML
20 INJECTION, SOLUTION INTRAVENOUS
Status: CANCELLED | OUTPATIENT
Start: 2025-03-21

## 2025-03-19 RX ORDER — ACETAMINOPHEN 325 MG/1
650 TABLET ORAL
Status: CANCELLED | OUTPATIENT
Start: 2025-03-20

## 2025-03-19 RX ORDER — PALONOSETRON 0.05 MG/ML
0.25 INJECTION, SOLUTION INTRAVENOUS ONCE
Status: CANCELLED | OUTPATIENT
Start: 2025-03-19 | End: 2025-03-19

## 2025-03-19 RX ORDER — ALBUTEROL SULFATE 90 UG/1
4 INHALANT RESPIRATORY (INHALATION) PRN
Status: CANCELLED | OUTPATIENT
Start: 2025-03-19

## 2025-03-19 RX ORDER — DEXAMETHASONE SODIUM PHOSPHATE 10 MG/ML
10 INJECTION, SOLUTION INTRAMUSCULAR; INTRAVENOUS ONCE
Status: COMPLETED | OUTPATIENT
Start: 2025-03-19 | End: 2025-03-19

## 2025-03-19 RX ORDER — HEPARIN SODIUM (PORCINE) LOCK FLUSH IV SOLN 100 UNIT/ML 100 UNIT/ML
500 SOLUTION INTRAVENOUS PRN
Status: CANCELLED | OUTPATIENT
Start: 2025-03-20

## 2025-03-19 RX ORDER — DIPHENHYDRAMINE HYDROCHLORIDE 50 MG/ML
50 INJECTION, SOLUTION INTRAMUSCULAR; INTRAVENOUS
Status: CANCELLED | OUTPATIENT
Start: 2025-03-20

## 2025-03-19 RX ORDER — SODIUM CHLORIDE 0.9 % (FLUSH) 0.9 %
5-40 SYRINGE (ML) INJECTION PRN
Status: CANCELLED | OUTPATIENT
Start: 2025-03-21

## 2025-03-19 RX ORDER — SODIUM CHLORIDE 0.9 % (FLUSH) 0.9 %
5-40 SYRINGE (ML) INJECTION PRN
Status: DISCONTINUED | OUTPATIENT
Start: 2025-03-19 | End: 2025-03-20 | Stop reason: HOSPADM

## 2025-03-19 RX ORDER — SODIUM CHLORIDE 9 MG/ML
INJECTION, SOLUTION INTRAVENOUS CONTINUOUS
Status: CANCELLED | OUTPATIENT
Start: 2025-03-20

## 2025-03-19 RX ORDER — SODIUM CHLORIDE 0.9 % (FLUSH) 0.9 %
5-40 SYRINGE (ML) INJECTION PRN
Status: CANCELLED | OUTPATIENT
Start: 2025-03-19

## 2025-03-19 RX ORDER — EPINEPHRINE 1 MG/ML
0.3 INJECTION, SOLUTION, CONCENTRATE INTRAVENOUS PRN
Status: CANCELLED | OUTPATIENT
Start: 2025-03-19

## 2025-03-19 RX ORDER — FAMOTIDINE 10 MG/ML
20 INJECTION, SOLUTION INTRAVENOUS
Status: CANCELLED | OUTPATIENT
Start: 2025-03-20

## 2025-03-19 RX ORDER — SODIUM CHLORIDE 0.9 % (FLUSH) 0.9 %
5-40 SYRINGE (ML) INJECTION PRN
Status: CANCELLED | OUTPATIENT
Start: 2025-03-20

## 2025-03-19 RX ORDER — ALBUTEROL SULFATE 90 UG/1
4 INHALANT RESPIRATORY (INHALATION) PRN
Status: CANCELLED | OUTPATIENT
Start: 2025-03-20

## 2025-03-19 RX ORDER — ACETAMINOPHEN 325 MG/1
650 TABLET ORAL
Status: CANCELLED | OUTPATIENT
Start: 2025-03-19

## 2025-03-19 RX ORDER — ONDANSETRON 2 MG/ML
8 INJECTION INTRAMUSCULAR; INTRAVENOUS
Status: CANCELLED | OUTPATIENT
Start: 2025-03-21

## 2025-03-19 RX ORDER — ONDANSETRON 2 MG/ML
8 INJECTION INTRAMUSCULAR; INTRAVENOUS
Status: CANCELLED | OUTPATIENT
Start: 2025-03-19

## 2025-03-19 RX ORDER — MEPERIDINE HYDROCHLORIDE 50 MG/ML
12.5 INJECTION INTRAMUSCULAR; INTRAVENOUS; SUBCUTANEOUS PRN
Status: CANCELLED | OUTPATIENT
Start: 2025-03-20

## 2025-03-19 RX ORDER — SODIUM CHLORIDE 9 MG/ML
INJECTION, SOLUTION INTRAVENOUS CONTINUOUS
Status: CANCELLED | OUTPATIENT
Start: 2025-03-21

## 2025-03-19 RX ORDER — MEPERIDINE HYDROCHLORIDE 50 MG/ML
12.5 INJECTION INTRAMUSCULAR; INTRAVENOUS; SUBCUTANEOUS PRN
Status: CANCELLED | OUTPATIENT
Start: 2025-03-21

## 2025-03-19 RX ADMIN — SODIUM CHLORIDE, PRESERVATIVE FREE 10 ML: 5 INJECTION INTRAVENOUS at 15:27

## 2025-03-19 RX ADMIN — SODIUM CHLORIDE 150 MG: 9 INJECTION, SOLUTION INTRAVENOUS at 13:24

## 2025-03-19 RX ADMIN — HEPARIN 500 UNITS: 100 SYRINGE at 15:27

## 2025-03-19 RX ADMIN — SODIUM CHLORIDE 140 MG: 0.9 INJECTION, SOLUTION INTRAVENOUS at 14:24

## 2025-03-19 RX ADMIN — DEXAMETHASONE SODIUM PHOSPHATE 10 MG: 10 INJECTION, SOLUTION INTRAMUSCULAR; INTRAVENOUS at 13:16

## 2025-03-19 RX ADMIN — CARBOPLATIN 425 MG: 10 INJECTION, SOLUTION INTRAVENOUS at 13:50

## 2025-03-19 RX ADMIN — PALONOSETRON 0.25 MG: 0.05 INJECTION, SOLUTION INTRAVENOUS at 13:18

## 2025-03-20 ENCOUNTER — HOSPITAL ENCOUNTER (OUTPATIENT)
Dept: INFUSION THERAPY | Age: 70
Discharge: HOME | End: 2025-03-20
Payer: MEDICARE

## 2025-03-20 ENCOUNTER — HOSPITAL ENCOUNTER (OUTPATIENT)
Dept: RADIATION ONCOLOGY | Facility: HOSPITAL | Age: 70
Setting detail: RADIATION/ONCOLOGY SERIES
Discharge: HOME | End: 2025-03-20
Payer: MEDICARE

## 2025-03-20 VITALS
OXYGEN SATURATION: 95 % | HEART RATE: 111 BPM | DIASTOLIC BLOOD PRESSURE: 67 MMHG | SYSTOLIC BLOOD PRESSURE: 131 MMHG | RESPIRATION RATE: 18 BRPM | TEMPERATURE: 98.2 F

## 2025-03-20 DIAGNOSIS — C7A.8 NEUROENDOCRINE CARCINOMA OF LUNG: Primary | ICD-10-CM

## 2025-03-20 LAB
RAD ONC ARIA COURSE ID: NORMAL
RAD ONC ARIA COURSE INTENT: NORMAL
RAD ONC ARIA COURSE LAST TREATMENT DATE: NORMAL
RAD ONC ARIA COURSE START DATE: NORMAL
RAD ONC ARIA COURSE TREATMENT ELAPSED DAYS: 14
RAD ONC ARIA FIRST TREATMENT DATE: NORMAL
RAD ONC ARIA PLAN FRACTIONS TREATED TO DATE: 9
RAD ONC ARIA PLAN ID: NORMAL
RAD ONC ARIA PLAN PRESCRIBED DOSE PER FRACTION: 1.8 GY
RAD ONC ARIA PLAN PRIMARY REFERENCE POINT: NORMAL
RAD ONC ARIA PLAN TOTAL FRACTIONS PRESCRIBED: 28
RAD ONC ARIA PLAN TOTAL PRESCRIBED DOSE: 5040 CGY
RAD ONC ARIA REFERENCE POINT DOSAGE GIVEN TO DATE: 16.2 GY
RAD ONC ARIA REFERENCE POINT ID: NORMAL
RAD ONC ARIA REFERENCE POINT SESSION DOSAGE GIVEN: 1.8 GY

## 2025-03-20 PROCEDURE — 2580000003 HC RX 258: Performed by: INTERNAL MEDICINE

## 2025-03-20 PROCEDURE — 77014 CHG CT GUIDANCE RADIATION THERAPY FLDS PLACEMENT: CPT | Performed by: RADIOLOGY

## 2025-03-20 PROCEDURE — 2500000003 HC RX 250 WO HCPCS: Performed by: INTERNAL MEDICINE

## 2025-03-20 PROCEDURE — 6360000002 HC RX W HCPCS: Performed by: INTERNAL MEDICINE

## 2025-03-20 PROCEDURE — 96413 CHEMO IV INFUSION 1 HR: CPT

## 2025-03-20 PROCEDURE — 77386: CPT | Performed by: RADIOLOGY

## 2025-03-20 PROCEDURE — 96367 TX/PROPH/DG ADDL SEQ IV INF: CPT

## 2025-03-20 RX ORDER — SODIUM CHLORIDE 0.9 % (FLUSH) 0.9 %
5-40 SYRINGE (ML) INJECTION PRN
Status: DISCONTINUED | OUTPATIENT
Start: 2025-03-20 | End: 2025-03-21 | Stop reason: HOSPADM

## 2025-03-20 RX ORDER — HEPARIN 100 UNIT/ML
500 SYRINGE INTRAVENOUS PRN
Status: DISCONTINUED | OUTPATIENT
Start: 2025-03-20 | End: 2025-03-21 | Stop reason: HOSPADM

## 2025-03-20 RX ADMIN — DEXAMETHASONE SODIUM PHOSPHATE 16 MG: 10 INJECTION, SOLUTION INTRAMUSCULAR; INTRAVENOUS at 12:43

## 2025-03-20 RX ADMIN — SODIUM CHLORIDE, PRESERVATIVE FREE 10 ML: 5 INJECTION INTRAVENOUS at 14:21

## 2025-03-20 RX ADMIN — HEPARIN 500 UNITS: 100 SYRINGE at 14:21

## 2025-03-20 RX ADMIN — SODIUM CHLORIDE 140 MG: 0.9 INJECTION, SOLUTION INTRAVENOUS at 13:16

## 2025-03-21 ENCOUNTER — HOSPITAL ENCOUNTER (OUTPATIENT)
Dept: INFUSION THERAPY | Age: 70
Discharge: HOME | End: 2025-03-21
Payer: MEDICARE

## 2025-03-21 VITALS
HEART RATE: 87 BPM | DIASTOLIC BLOOD PRESSURE: 69 MMHG | RESPIRATION RATE: 18 BRPM | SYSTOLIC BLOOD PRESSURE: 114 MMHG | OXYGEN SATURATION: 97 % | TEMPERATURE: 98.1 F

## 2025-03-21 DIAGNOSIS — C7A.8 NEUROENDOCRINE CARCINOMA OF LUNG: Primary | ICD-10-CM

## 2025-03-21 PROCEDURE — 2500000003 HC RX 250 WO HCPCS: Performed by: INTERNAL MEDICINE

## 2025-03-21 PROCEDURE — 96413 CHEMO IV INFUSION 1 HR: CPT

## 2025-03-21 PROCEDURE — 96367 TX/PROPH/DG ADDL SEQ IV INF: CPT

## 2025-03-21 PROCEDURE — 2580000003 HC RX 258: Performed by: INTERNAL MEDICINE

## 2025-03-21 PROCEDURE — 6360000002 HC RX W HCPCS: Performed by: INTERNAL MEDICINE

## 2025-03-21 RX ORDER — HEPARIN 100 UNIT/ML
500 SYRINGE INTRAVENOUS PRN
Status: DISCONTINUED | OUTPATIENT
Start: 2025-03-21 | End: 2025-03-22 | Stop reason: HOSPADM

## 2025-03-21 RX ORDER — SODIUM CHLORIDE 0.9 % (FLUSH) 0.9 %
5-40 SYRINGE (ML) INJECTION PRN
Status: DISCONTINUED | OUTPATIENT
Start: 2025-03-21 | End: 2025-03-22 | Stop reason: HOSPADM

## 2025-03-21 RX ADMIN — SODIUM CHLORIDE 140 MG: 0.9 INJECTION, SOLUTION INTRAVENOUS at 13:16

## 2025-03-21 RX ADMIN — HEPARIN 500 UNITS: 100 SYRINGE at 14:21

## 2025-03-21 RX ADMIN — DEXAMETHASONE SODIUM PHOSPHATE 16 MG: 10 INJECTION, SOLUTION INTRAMUSCULAR; INTRAVENOUS at 12:42

## 2025-03-21 RX ADMIN — SODIUM CHLORIDE, PRESERVATIVE FREE 10 ML: 5 INJECTION INTRAVENOUS at 14:21

## 2025-03-24 RX ORDER — POTASSIUM CHLORIDE 750 MG/1
20 CAPSULE, EXTENDED RELEASE ORAL 2 TIMES DAILY
Qty: 60 CAPSULE | Refills: 1 | Status: SHIPPED | OUTPATIENT
Start: 2025-03-24

## 2025-03-26 ENCOUNTER — HOSPITAL ENCOUNTER (OUTPATIENT)
Dept: RADIATION ONCOLOGY | Facility: HOSPITAL | Age: 70
Setting detail: RADIATION/ONCOLOGY SERIES
Discharge: HOME | End: 2025-03-26
Payer: MEDICARE

## 2025-03-26 ENCOUNTER — OFFICE VISIT (OUTPATIENT)
Dept: FAMILY MEDICINE CLINIC | Facility: CLINIC | Age: 70
End: 2025-03-26
Payer: MEDICARE

## 2025-03-26 VITALS
WEIGHT: 144 LBS | DIASTOLIC BLOOD PRESSURE: 79 MMHG | HEART RATE: 114 BPM | RESPIRATION RATE: 20 BRPM | HEIGHT: 67 IN | SYSTOLIC BLOOD PRESSURE: 120 MMHG | BODY MASS INDEX: 22.6 KG/M2 | TEMPERATURE: 98.6 F | OXYGEN SATURATION: 97 %

## 2025-03-26 DIAGNOSIS — J02.9 PHARYNGITIS, UNSPECIFIED ETIOLOGY: Primary | ICD-10-CM

## 2025-03-26 LAB
RAD ONC ARIA COURSE ID: NORMAL
RAD ONC ARIA COURSE INTENT: NORMAL
RAD ONC ARIA COURSE LAST TREATMENT DATE: NORMAL
RAD ONC ARIA COURSE START DATE: NORMAL
RAD ONC ARIA COURSE TREATMENT ELAPSED DAYS: 20
RAD ONC ARIA FIRST TREATMENT DATE: NORMAL
RAD ONC ARIA PLAN FRACTIONS TREATED TO DATE: 10
RAD ONC ARIA PLAN ID: NORMAL
RAD ONC ARIA PLAN PRESCRIBED DOSE PER FRACTION: 1.8 GY
RAD ONC ARIA PLAN PRIMARY REFERENCE POINT: NORMAL
RAD ONC ARIA PLAN TOTAL FRACTIONS PRESCRIBED: 28
RAD ONC ARIA PLAN TOTAL PRESCRIBED DOSE: 5040 CGY
RAD ONC ARIA REFERENCE POINT DOSAGE GIVEN TO DATE: 18 GY
RAD ONC ARIA REFERENCE POINT ID: NORMAL
RAD ONC ARIA REFERENCE POINT SESSION DOSAGE GIVEN: 1.8 GY

## 2025-03-26 PROCEDURE — 77386: CPT | Performed by: RADIOLOGY

## 2025-03-26 PROCEDURE — 77014 CHG CT GUIDANCE RADIATION THERAPY FLDS PLACEMENT: CPT | Performed by: RADIOLOGY

## 2025-03-26 PROCEDURE — 99213 OFFICE O/P EST LOW 20 MIN: CPT | Performed by: FAMILY MEDICINE

## 2025-03-26 PROCEDURE — 77336 RADIATION PHYSICS CONSULT: CPT | Performed by: RADIOLOGY

## 2025-03-26 PROCEDURE — 1125F AMNT PAIN NOTED PAIN PRSNT: CPT | Performed by: FAMILY MEDICINE

## 2025-03-26 RX ORDER — AMOXICILLIN 500 MG/1
500 CAPSULE ORAL 2 TIMES DAILY
Qty: 20 CAPSULE | Refills: 0 | Status: SHIPPED | OUTPATIENT
Start: 2025-03-26 | End: 2025-04-05

## 2025-03-26 NOTE — PROGRESS NOTES
"Chief Complaint  Sore Throat (Sore throat x 2 days)    Subjective        Ashley Gibbs presents to [facility]  History of Present Illness  Mrs. Gibbs presents today for a sore throat.  She has had a sore throat for 2 days.  She denies fever.      Objective   Vital Signs:  /79   Pulse 114   Temp 98.6 °F (37 °C) (Infrared)   Resp 20   Ht 170.2 cm (67\")   Wt 65.3 kg (144 lb)   SpO2 97%   BMI 22.55 kg/m²   Estimated body mass index is 22.55 kg/m² as calculated from the following:    Height as of this encounter: 170.2 cm (67\").    Weight as of this encounter: 65.3 kg (144 lb).    BMI is within normal parameters. No other follow-up for BMI required.      Physical Exam  Vitals reviewed.   Constitutional:       General: She is not in acute distress.     Appearance: Normal appearance. She is normal weight. She is not ill-appearing, toxic-appearing or diaphoretic.   HENT:      Head: Normocephalic and atraumatic.      Right Ear: External ear normal.      Left Ear: External ear normal.      Nose: Nose normal.      Mouth/Throat:      Pharynx: Pharyngeal swelling and posterior oropharyngeal erythema present.      Tonsils: Tonsillar exudate present.   Eyes:      Extraocular Movements: Extraocular movements intact.   Cardiovascular:      Rate and Rhythm: Normal rate and regular rhythm.   Pulmonary:      Effort: Pulmonary effort is normal. No respiratory distress.      Breath sounds: Normal breath sounds.   Skin:     General: Skin is warm and dry.      Coloration: Skin is not jaundiced or pale.   Neurological:      Mental Status: She is alert and oriented to person, place, and time.   Psychiatric:         Mood and Affect: Mood normal.         Behavior: Behavior normal.         Thought Content: Thought content normal.         Judgment: Judgment normal.            Result Review :                Assessment and Plan   Diagnoses and all orders for this visit:    1. Pharyngitis, unspecified " etiology (Primary)  -     amoxicillin (AMOXIL) 500 MG capsule; Take 1 capsule by mouth 2 (Two) Times a Day for 10 days.  Dispense: 20 capsule; Refill: 0    Follow up if worsening/failing to improve          Follow Up   No follow-ups on file.  Patient was given instructions and counseling regarding her condition or for health maintenance advice. Please see specific information pulled into the AVS if appropriate.

## 2025-03-31 ENCOUNTER — HOSPITAL ENCOUNTER (OUTPATIENT)
Dept: RADIATION ONCOLOGY | Facility: HOSPITAL | Age: 70
Setting detail: RADIATION/ONCOLOGY SERIES
Discharge: HOME | End: 2025-03-31
Payer: MEDICARE

## 2025-03-31 LAB
RAD ONC ARIA COURSE ID: NORMAL
RAD ONC ARIA COURSE INTENT: NORMAL
RAD ONC ARIA COURSE LAST TREATMENT DATE: NORMAL
RAD ONC ARIA COURSE START DATE: NORMAL
RAD ONC ARIA COURSE TREATMENT ELAPSED DAYS: 25
RAD ONC ARIA FIRST TREATMENT DATE: NORMAL
RAD ONC ARIA PLAN FRACTIONS TREATED TO DATE: 11
RAD ONC ARIA PLAN ID: NORMAL
RAD ONC ARIA PLAN PRESCRIBED DOSE PER FRACTION: 1.8 GY
RAD ONC ARIA PLAN PRIMARY REFERENCE POINT: NORMAL
RAD ONC ARIA PLAN TOTAL FRACTIONS PRESCRIBED: 28
RAD ONC ARIA PLAN TOTAL PRESCRIBED DOSE: 5040 CGY
RAD ONC ARIA REFERENCE POINT DOSAGE GIVEN TO DATE: 19.8 GY
RAD ONC ARIA REFERENCE POINT ID: NORMAL
RAD ONC ARIA REFERENCE POINT SESSION DOSAGE GIVEN: 1.8 GY

## 2025-03-31 PROCEDURE — 77386: CPT | Performed by: RADIOLOGY

## 2025-04-01 ENCOUNTER — HOSPITAL ENCOUNTER (OUTPATIENT)
Dept: RADIATION ONCOLOGY | Facility: HOSPITAL | Age: 70
Setting detail: RADIATION/ONCOLOGY SERIES
End: 2025-04-01
Payer: MEDICARE

## 2025-04-01 ENCOUNTER — HOSPITAL ENCOUNTER (OUTPATIENT)
Dept: RADIATION ONCOLOGY | Facility: HOSPITAL | Age: 70
Discharge: HOME | End: 2025-04-01

## 2025-04-01 LAB
RAD ONC ARIA COURSE ID: NORMAL
RAD ONC ARIA COURSE INTENT: NORMAL
RAD ONC ARIA COURSE LAST TREATMENT DATE: NORMAL
RAD ONC ARIA COURSE START DATE: NORMAL
RAD ONC ARIA COURSE TREATMENT ELAPSED DAYS: 26
RAD ONC ARIA FIRST TREATMENT DATE: NORMAL
RAD ONC ARIA PLAN FRACTIONS TREATED TO DATE: 12
RAD ONC ARIA PLAN ID: NORMAL
RAD ONC ARIA PLAN PRESCRIBED DOSE PER FRACTION: 1.8 GY
RAD ONC ARIA PLAN PRIMARY REFERENCE POINT: NORMAL
RAD ONC ARIA PLAN TOTAL FRACTIONS PRESCRIBED: 28
RAD ONC ARIA PLAN TOTAL PRESCRIBED DOSE: 5040 CGY
RAD ONC ARIA REFERENCE POINT DOSAGE GIVEN TO DATE: 21.6 GY
RAD ONC ARIA REFERENCE POINT ID: NORMAL
RAD ONC ARIA REFERENCE POINT SESSION DOSAGE GIVEN: 1.8 GY

## 2025-04-01 PROCEDURE — 77014 CHG CT GUIDANCE RADIATION THERAPY FLDS PLACEMENT: CPT | Performed by: RADIOLOGY

## 2025-04-01 PROCEDURE — 77386: CPT | Performed by: RADIOLOGY

## 2025-04-02 ENCOUNTER — TELEPHONE (OUTPATIENT)
Dept: FAMILY MEDICINE CLINIC | Facility: CLINIC | Age: 70
End: 2025-04-02

## 2025-04-02 NOTE — TELEPHONE ENCOUNTER
Patient granddaughter called office to let us know she is very concerned about patient as patient is running a 104 fever, despite being on antibiotic for the last week. Patient is currently receiving chemotherapy and radiation for lung cancer. I advised granddaughter and patient that patient needs to go to er for evaluation asap. She wants to come into the office. Advised I will likely call ems to transport patient to er as this is critical for a patient on chemotherapy. Patient reports that she will just lay there and die then. Patient states she is ready to give up. She is tired of being sick. I advised patient this is potentially life-threatening and needs evaluation asap. Patient granddaughter reports she will call ambulance if patient worsens.

## 2025-04-06 ENCOUNTER — HOSPITAL ENCOUNTER (INPATIENT)
Age: 70
LOS: 1 days | Discharge: HOME | DRG: 191 | End: 2025-04-08
Attending: PEDIATRICS | Admitting: STUDENT IN AN ORGANIZED HEALTH CARE EDUCATION/TRAINING PROGRAM
Payer: MEDICARE

## 2025-04-06 ENCOUNTER — APPOINTMENT (OUTPATIENT)
Dept: GENERAL RADIOLOGY | Age: 70
DRG: 191 | End: 2025-04-06
Payer: MEDICARE

## 2025-04-06 DIAGNOSIS — L03.90 CELLULITIS, UNSPECIFIED CELLULITIS SITE: Primary | ICD-10-CM

## 2025-04-06 DIAGNOSIS — J40 BRONCHITIS: ICD-10-CM

## 2025-04-06 DIAGNOSIS — C80.1 SMALL CELL CARCINOMA: ICD-10-CM

## 2025-04-06 LAB
ALBUMIN SERPL-MCNC: 3.4 G/DL (ref 3.5–5.2)
ALP SERPL-CCNC: 127 U/L (ref 35–104)
ALT SERPL-CCNC: 10 U/L (ref 10–35)
ANION GAP SERPL CALCULATED.3IONS-SCNC: 7 MMOL/L (ref 8–16)
AST SERPL-CCNC: 13 U/L (ref 10–35)
B PARAP IS1001 DNA NPH QL NAA+NON-PROBE: NOT DETECTED
B PERT.PT PRMT NPH QL NAA+NON-PROBE: NOT DETECTED
BASOPHILS # BLD: 0 K/UL (ref 0–0.2)
BASOPHILS NFR BLD: 0 % (ref 0–1)
BILIRUB SERPL-MCNC: 0.2 MG/DL (ref 0.2–1.2)
BILIRUB UR QL STRIP: NEGATIVE
BUN SERPL-MCNC: 8 MG/DL (ref 8–23)
C PNEUM DNA NPH QL NAA+NON-PROBE: NOT DETECTED
CALCIUM SERPL-MCNC: 9.2 MG/DL (ref 8.8–10.2)
CHLORIDE SERPL-SCNC: 99 MMOL/L (ref 98–107)
CLARITY UR: CLEAR
CO2 SERPL-SCNC: 32 MMOL/L (ref 22–29)
COLOR UR: YELLOW
CREAT SERPL-MCNC: 0.6 MG/DL (ref 0.5–0.9)
EOSINOPHIL # BLD: 0.05 K/UL (ref 0–0.6)
EOSINOPHIL NFR BLD: 1 % (ref 0–5)
ERYTHROCYTE [DISTWIDTH] IN BLOOD BY AUTOMATED COUNT: 15.6 % (ref 11.5–14.5)
FLUAV RNA NPH QL NAA+NON-PROBE: NOT DETECTED
FLUBV RNA NPH QL NAA+NON-PROBE: NOT DETECTED
GLUCOSE SERPL-MCNC: 102 MG/DL (ref 70–99)
GLUCOSE UR STRIP.AUTO-MCNC: NEGATIVE MG/DL
HADV DNA NPH QL NAA+NON-PROBE: NOT DETECTED
HCOV 229E RNA NPH QL NAA+NON-PROBE: NOT DETECTED
HCOV HKU1 RNA NPH QL NAA+NON-PROBE: NOT DETECTED
HCOV NL63 RNA NPH QL NAA+NON-PROBE: NOT DETECTED
HCOV OC43 RNA NPH QL NAA+NON-PROBE: NOT DETECTED
HCT VFR BLD AUTO: 27.5 % (ref 37–47)
HGB BLD-MCNC: 8.7 G/DL (ref 12–16)
HGB UR STRIP.AUTO-MCNC: NEGATIVE MG/L
HMPV RNA NPH QL NAA+NON-PROBE: NOT DETECTED
HPIV1 RNA NPH QL NAA+NON-PROBE: NOT DETECTED
HPIV2 RNA NPH QL NAA+NON-PROBE: NOT DETECTED
HPIV3 RNA NPH QL NAA+NON-PROBE: NOT DETECTED
HPIV4 RNA NPH QL NAA+NON-PROBE: NOT DETECTED
HYPOCHROMIA BLD QL SMEAR: ABNORMAL
IMM GRANULOCYTES # BLD: 0.1 K/UL
KETONES UR STRIP.AUTO-MCNC: ABNORMAL MG/DL
LACTATE BLDV-SCNC: 0.8 MG/DL (ref 0.5–1.9)
LEUKOCYTE ESTERASE UR QL STRIP.AUTO: NEGATIVE
LYMPHOCYTES # BLD: 0.9 K/UL (ref 1.1–4.5)
LYMPHOCYTES NFR BLD: 16 % (ref 20–40)
M PNEUMO DNA NPH QL NAA+NON-PROBE: NOT DETECTED
MACROCYTES BLD QL SMEAR: ABNORMAL
MCH RBC QN AUTO: 30.3 PG (ref 27–31)
MCHC RBC AUTO-ENTMCNC: 31.6 G/DL (ref 33–37)
MCV RBC AUTO: 95.8 FL (ref 81–99)
MONOCYTES # BLD: 0.5 K/UL (ref 0–0.9)
MONOCYTES NFR BLD: 10 % (ref 0–10)
NEUTROPHILS # BLD: 3.4 K/UL (ref 1.5–7.5)
NEUTS BAND NFR BLD MANUAL: 3 % (ref 0–5)
NEUTS SEG NFR BLD: 68 % (ref 50–65)
NITRITE UR QL STRIP.AUTO: NEGATIVE
PH UR STRIP.AUTO: 7.5 [PH] (ref 5–8)
PLATELET # BLD AUTO: 309 K/UL (ref 130–400)
PLATELET SLIDE REVIEW: ADEQUATE
PMV BLD AUTO: 9.7 FL (ref 9.4–12.3)
POLYCHROMASIA BLD QL SMEAR: ABNORMAL
POTASSIUM SERPL-SCNC: 4.1 MMOL/L (ref 3.5–5.1)
PROT SERPL-MCNC: 6.7 G/DL (ref 6.4–8.3)
PROT UR STRIP.AUTO-MCNC: ABNORMAL MG/DL
RBC # BLD AUTO: 2.87 M/UL (ref 4.2–5.4)
RSV RNA NPH QL NAA+NON-PROBE: NOT DETECTED
RV+EV RNA NPH QL NAA+NON-PROBE: NOT DETECTED
S PYO AG THROAT QL: NEGATIVE
SARS-COV-2 RNA NPH QL NAA+NON-PROBE: NOT DETECTED
SODIUM SERPL-SCNC: 138 MMOL/L (ref 136–145)
SP GR UR STRIP.AUTO: 1.02 (ref 1–1.03)
UROBILINOGEN UR STRIP.AUTO-MCNC: 1 E.U./DL
VARIANT LYMPHS NFR BLD: 2 % (ref 0–8)
WBC # BLD AUTO: 4.8 K/UL (ref 4.8–10.8)

## 2025-04-06 PROCEDURE — 96367 TX/PROPH/DG ADDL SEQ IV INF: CPT

## 2025-04-06 PROCEDURE — 87040 BLOOD CULTURE FOR BACTERIA: CPT

## 2025-04-06 PROCEDURE — 83605 ASSAY OF LACTIC ACID: CPT

## 2025-04-06 PROCEDURE — 71045 X-RAY EXAM CHEST 1 VIEW: CPT

## 2025-04-06 PROCEDURE — 36415 COLL VENOUS BLD VENIPUNCTURE: CPT

## 2025-04-06 PROCEDURE — 0202U NFCT DS 22 TRGT SARS-COV-2: CPT

## 2025-04-06 PROCEDURE — 2580000003 HC RX 258: Performed by: PEDIATRICS

## 2025-04-06 PROCEDURE — 85025 COMPLETE CBC W/AUTO DIFF WBC: CPT

## 2025-04-06 PROCEDURE — 87081 CULTURE SCREEN ONLY: CPT

## 2025-04-06 PROCEDURE — 99285 EMERGENCY DEPT VISIT HI MDM: CPT

## 2025-04-06 PROCEDURE — 80053 COMPREHEN METABOLIC PANEL: CPT

## 2025-04-06 PROCEDURE — 96365 THER/PROPH/DIAG IV INF INIT: CPT

## 2025-04-06 PROCEDURE — 6360000002 HC RX W HCPCS: Performed by: PEDIATRICS

## 2025-04-06 PROCEDURE — 87880 STREP A ASSAY W/OPTIC: CPT

## 2025-04-06 RX ADMIN — CEFEPIME 2000 MG: 2 INJECTION, POWDER, FOR SOLUTION INTRAVENOUS at 22:40

## 2025-04-06 RX ADMIN — VANCOMYCIN HYDROCHLORIDE 1750 MG: 10 INJECTION, POWDER, LYOPHILIZED, FOR SOLUTION INTRAVENOUS at 23:28

## 2025-04-07 PROBLEM — J44.1 COPD EXACERBATION (HCC): Status: ACTIVE | Noted: 2025-04-07

## 2025-04-07 LAB
ANION GAP SERPL CALCULATED.3IONS-SCNC: 8 MMOL/L (ref 8–16)
BASOPHILS # BLD: 0 K/UL (ref 0–0.2)
BASOPHILS NFR BLD: 0.2 % (ref 0–1)
BUN SERPL-MCNC: 8 MG/DL (ref 8–23)
CALCIUM SERPL-MCNC: 8.9 MG/DL (ref 8.8–10.2)
CHLORIDE SERPL-SCNC: 101 MMOL/L (ref 98–107)
CO2 SERPL-SCNC: 30 MMOL/L (ref 22–29)
CREAT SERPL-MCNC: 0.6 MG/DL (ref 0.5–0.9)
EOSINOPHIL # BLD: 0 K/UL (ref 0–0.6)
EOSINOPHIL NFR BLD: 1 % (ref 0–5)
ERYTHROCYTE [DISTWIDTH] IN BLOOD BY AUTOMATED COUNT: 15.5 % (ref 11.5–14.5)
GLUCOSE SERPL-MCNC: 97 MG/DL (ref 70–99)
HCT VFR BLD AUTO: 27.5 % (ref 37–47)
HGB BLD-MCNC: 8.5 G/DL (ref 12–16)
IMM GRANULOCYTES # BLD: 0.2 K/UL
LACTATE BLDV-SCNC: 0.5 MG/DL (ref 0.5–1.9)
LYMPHOCYTES # BLD: 0.9 K/UL (ref 1.1–4.5)
LYMPHOCYTES NFR BLD: 21.4 % (ref 20–40)
MCH RBC QN AUTO: 30.1 PG (ref 27–31)
MCHC RBC AUTO-ENTMCNC: 30.9 G/DL (ref 33–37)
MCV RBC AUTO: 97.5 FL (ref 81–99)
MONOCYTES # BLD: 0.6 K/UL (ref 0–0.9)
MONOCYTES NFR BLD: 15.2 % (ref 0–10)
NEUTROPHILS # BLD: 2.4 K/UL (ref 1.5–7.5)
NEUTS SEG NFR BLD: 57.9 % (ref 50–65)
PLATELET # BLD AUTO: 269 K/UL (ref 130–400)
PMV BLD AUTO: 9.6 FL (ref 9.4–12.3)
POTASSIUM SERPL-SCNC: 4 MMOL/L (ref 3.5–5)
RBC # BLD AUTO: 2.82 M/UL (ref 4.2–5.4)
SODIUM SERPL-SCNC: 139 MMOL/L (ref 136–145)
WBC # BLD AUTO: 4.2 K/UL (ref 4.8–10.8)

## 2025-04-07 PROCEDURE — 2580000003 HC RX 258: Performed by: STUDENT IN AN ORGANIZED HEALTH CARE EDUCATION/TRAINING PROGRAM

## 2025-04-07 PROCEDURE — 6360000002 HC RX W HCPCS: Performed by: STUDENT IN AN ORGANIZED HEALTH CARE EDUCATION/TRAINING PROGRAM

## 2025-04-07 PROCEDURE — 94640 AIRWAY INHALATION TREATMENT: CPT

## 2025-04-07 PROCEDURE — 6370000000 HC RX 637 (ALT 250 FOR IP): Performed by: PEDIATRICS

## 2025-04-07 PROCEDURE — 6370000000 HC RX 637 (ALT 250 FOR IP): Performed by: STUDENT IN AN ORGANIZED HEALTH CARE EDUCATION/TRAINING PROGRAM

## 2025-04-07 PROCEDURE — 99222 1ST HOSP IP/OBS MODERATE 55: CPT | Performed by: INTERNAL MEDICINE

## 2025-04-07 PROCEDURE — 87040 BLOOD CULTURE FOR BACTERIA: CPT

## 2025-04-07 PROCEDURE — 2700000000 HC OXYGEN THERAPY PER DAY

## 2025-04-07 PROCEDURE — 6360000002 HC RX W HCPCS: Performed by: PEDIATRICS

## 2025-04-07 PROCEDURE — 2580000003 HC RX 258: Performed by: PEDIATRICS

## 2025-04-07 PROCEDURE — 80048 BASIC METABOLIC PNL TOTAL CA: CPT

## 2025-04-07 PROCEDURE — 85025 COMPLETE CBC W/AUTO DIFF WBC: CPT

## 2025-04-07 PROCEDURE — 83605 ASSAY OF LACTIC ACID: CPT

## 2025-04-07 PROCEDURE — 36415 COLL VENOUS BLD VENIPUNCTURE: CPT

## 2025-04-07 PROCEDURE — 94760 N-INVAS EAR/PLS OXIMETRY 1: CPT

## 2025-04-07 PROCEDURE — 96366 THER/PROPH/DIAG IV INF ADDON: CPT

## 2025-04-07 PROCEDURE — 81003 URINALYSIS AUTO W/O SCOPE: CPT

## 2025-04-07 PROCEDURE — 1200000000 HC SEMI PRIVATE

## 2025-04-07 RX ORDER — SODIUM CHLORIDE 9 MG/ML
INJECTION, SOLUTION INTRAVENOUS CONTINUOUS
Status: DISCONTINUED | OUTPATIENT
Start: 2025-04-07 | End: 2025-04-08 | Stop reason: HOSPADM

## 2025-04-07 RX ORDER — ACETAMINOPHEN 325 MG/1
650 TABLET ORAL EVERY 6 HOURS PRN
Status: DISCONTINUED | OUTPATIENT
Start: 2025-04-07 | End: 2025-04-08 | Stop reason: HOSPADM

## 2025-04-07 RX ORDER — CITALOPRAM HYDROBROMIDE 20 MG/1
40 TABLET ORAL DAILY
Status: DISCONTINUED | OUTPATIENT
Start: 2025-04-07 | End: 2025-04-08 | Stop reason: HOSPADM

## 2025-04-07 RX ORDER — MIRTAZAPINE 15 MG/1
15 TABLET, FILM COATED ORAL NIGHTLY
Status: DISCONTINUED | OUTPATIENT
Start: 2025-04-07 | End: 2025-04-07

## 2025-04-07 RX ORDER — SODIUM CHLORIDE 9 MG/ML
INJECTION, SOLUTION INTRAVENOUS PRN
Status: DISCONTINUED | OUTPATIENT
Start: 2025-04-07 | End: 2025-04-08 | Stop reason: HOSPADM

## 2025-04-07 RX ORDER — ROPINIROLE 0.5 MG/1
0.25 TABLET, FILM COATED ORAL NIGHTLY
Status: DISCONTINUED | OUTPATIENT
Start: 2025-04-07 | End: 2025-04-08 | Stop reason: HOSPADM

## 2025-04-07 RX ORDER — MIRTAZAPINE 15 MG/1
15 TABLET, FILM COATED ORAL NIGHTLY
Status: DISCONTINUED | OUTPATIENT
Start: 2025-04-07 | End: 2025-04-08 | Stop reason: HOSPADM

## 2025-04-07 RX ORDER — BENZONATATE 100 MG/1
100 CAPSULE ORAL 3 TIMES DAILY PRN
Status: DISCONTINUED | OUTPATIENT
Start: 2025-04-07 | End: 2025-04-08 | Stop reason: HOSPADM

## 2025-04-07 RX ORDER — CITALOPRAM HYDROBROMIDE 20 MG/1
40 TABLET ORAL DAILY
Status: DISCONTINUED | OUTPATIENT
Start: 2025-04-07 | End: 2025-04-07

## 2025-04-07 RX ORDER — ROPINIROLE 0.25 MG/1
0.25 TABLET, FILM COATED ORAL NIGHTLY
Status: DISCONTINUED | OUTPATIENT
Start: 2025-04-07 | End: 2025-04-07

## 2025-04-07 RX ORDER — OXYCODONE AND ACETAMINOPHEN 10; 325 MG/1; MG/1
1 TABLET ORAL EVERY 6 HOURS PRN
Status: DISCONTINUED | OUTPATIENT
Start: 2025-04-07 | End: 2025-04-08 | Stop reason: HOSPADM

## 2025-04-07 RX ORDER — SODIUM CHLORIDE 0.9 % (FLUSH) 0.9 %
5-40 SYRINGE (ML) INJECTION EVERY 12 HOURS SCHEDULED
Status: DISCONTINUED | OUTPATIENT
Start: 2025-04-07 | End: 2025-04-08 | Stop reason: HOSPADM

## 2025-04-07 RX ORDER — LEVOTHYROXINE SODIUM 112 UG/1
112 TABLET ORAL DAILY
Status: DISCONTINUED | OUTPATIENT
Start: 2025-04-07 | End: 2025-04-08 | Stop reason: HOSPADM

## 2025-04-07 RX ORDER — ENOXAPARIN SODIUM 100 MG/ML
40 INJECTION SUBCUTANEOUS DAILY
Status: DISCONTINUED | OUTPATIENT
Start: 2025-04-07 | End: 2025-04-08 | Stop reason: HOSPADM

## 2025-04-07 RX ORDER — SODIUM CHLORIDE 0.9 % (FLUSH) 0.9 %
5-40 SYRINGE (ML) INJECTION PRN
Status: DISCONTINUED | OUTPATIENT
Start: 2025-04-07 | End: 2025-04-08 | Stop reason: HOSPADM

## 2025-04-07 RX ORDER — IPRATROPIUM BROMIDE AND ALBUTEROL SULFATE 2.5; .5 MG/3ML; MG/3ML
1 SOLUTION RESPIRATORY (INHALATION)
Status: DISCONTINUED | OUTPATIENT
Start: 2025-04-07 | End: 2025-04-08 | Stop reason: HOSPADM

## 2025-04-07 RX ORDER — PREDNISONE 10 MG/1
40 TABLET ORAL DAILY
Status: DISCONTINUED | OUTPATIENT
Start: 2025-04-07 | End: 2025-04-08 | Stop reason: HOSPADM

## 2025-04-07 RX ORDER — ONDANSETRON 2 MG/ML
4 INJECTION INTRAMUSCULAR; INTRAVENOUS EVERY 6 HOURS PRN
Status: DISCONTINUED | OUTPATIENT
Start: 2025-04-07 | End: 2025-04-08 | Stop reason: HOSPADM

## 2025-04-07 RX ORDER — GABAPENTIN 300 MG/1
600 CAPSULE ORAL 2 TIMES DAILY
Status: DISCONTINUED | OUTPATIENT
Start: 2025-04-07 | End: 2025-04-08 | Stop reason: HOSPADM

## 2025-04-07 RX ORDER — IPRATROPIUM BROMIDE AND ALBUTEROL SULFATE 2.5; .5 MG/3ML; MG/3ML
1 SOLUTION RESPIRATORY (INHALATION) ONCE
Status: COMPLETED | OUTPATIENT
Start: 2025-04-07 | End: 2025-04-07

## 2025-04-07 RX ORDER — POLYETHYLENE GLYCOL 3350 17 G/17G
17 POWDER, FOR SOLUTION ORAL DAILY PRN
Status: DISCONTINUED | OUTPATIENT
Start: 2025-04-07 | End: 2025-04-08 | Stop reason: HOSPADM

## 2025-04-07 RX ORDER — ONDANSETRON 4 MG/1
4 TABLET, ORALLY DISINTEGRATING ORAL EVERY 8 HOURS PRN
Status: DISCONTINUED | OUTPATIENT
Start: 2025-04-07 | End: 2025-04-08 | Stop reason: HOSPADM

## 2025-04-07 RX ORDER — ACETAMINOPHEN 650 MG/1
650 SUPPOSITORY RECTAL EVERY 6 HOURS PRN
Status: DISCONTINUED | OUTPATIENT
Start: 2025-04-07 | End: 2025-04-08 | Stop reason: HOSPADM

## 2025-04-07 RX ADMIN — CEFEPIME 2000 MG: 2 INJECTION, POWDER, FOR SOLUTION INTRAVENOUS at 06:48

## 2025-04-07 RX ADMIN — IPRATROPIUM BROMIDE AND ALBUTEROL SULFATE 1 DOSE: 2.5; .5 SOLUTION RESPIRATORY (INHALATION) at 14:22

## 2025-04-07 RX ADMIN — CEFEPIME 2000 MG: 2 INJECTION, POWDER, FOR SOLUTION INTRAVENOUS at 15:31

## 2025-04-07 RX ADMIN — TIZANIDINE 4 MG: 4 TABLET ORAL at 03:09

## 2025-04-07 RX ADMIN — IPRATROPIUM BROMIDE AND ALBUTEROL SULFATE 1 DOSE: 2.5; .5 SOLUTION RESPIRATORY (INHALATION) at 07:01

## 2025-04-07 RX ADMIN — OXYCODONE AND ACETAMINOPHEN 1 TABLET: 10; 325 TABLET ORAL at 17:28

## 2025-04-07 RX ADMIN — GABAPENTIN 600 MG: 300 CAPSULE ORAL at 03:09

## 2025-04-07 RX ADMIN — GABAPENTIN 600 MG: 300 CAPSULE ORAL at 20:15

## 2025-04-07 RX ADMIN — IPRATROPIUM BROMIDE AND ALBUTEROL SULFATE 1 DOSE: 2.5; .5 SOLUTION RESPIRATORY (INHALATION) at 01:40

## 2025-04-07 RX ADMIN — PREDNISONE 40 MG: 10 TABLET ORAL at 08:40

## 2025-04-07 RX ADMIN — SODIUM CHLORIDE: 0.9 INJECTION, SOLUTION INTRAVENOUS at 04:48

## 2025-04-07 RX ADMIN — IPRATROPIUM BROMIDE AND ALBUTEROL SULFATE 1 DOSE: 2.5; .5 SOLUTION RESPIRATORY (INHALATION) at 19:34

## 2025-04-07 RX ADMIN — GABAPENTIN 600 MG: 300 CAPSULE ORAL at 08:40

## 2025-04-07 RX ADMIN — ROPINIROLE HYDROCHLORIDE 0.25 MG: 0.25 TABLET, FILM COATED ORAL at 03:09

## 2025-04-07 RX ADMIN — VANCOMYCIN HYDROCHLORIDE 750 MG: 750 INJECTION, POWDER, LYOPHILIZED, FOR SOLUTION INTRAVENOUS at 22:32

## 2025-04-07 RX ADMIN — VANCOMYCIN HYDROCHLORIDE 750 MG: 750 INJECTION, POWDER, LYOPHILIZED, FOR SOLUTION INTRAVENOUS at 11:14

## 2025-04-07 RX ADMIN — OXYCODONE AND ACETAMINOPHEN 1 TABLET: 10; 325 TABLET ORAL at 03:09

## 2025-04-07 RX ADMIN — TIZANIDINE 4 MG: 4 TABLET ORAL at 08:40

## 2025-04-07 RX ADMIN — TIZANIDINE 4 MG: 4 TABLET ORAL at 20:15

## 2025-04-07 RX ADMIN — SODIUM CHLORIDE: 0.9 INJECTION, SOLUTION INTRAVENOUS at 06:47

## 2025-04-07 RX ADMIN — ACETAMINOPHEN 650 MG: 325 TABLET ORAL at 15:39

## 2025-04-07 RX ADMIN — OXYCODONE AND ACETAMINOPHEN 1 TABLET: 10; 325 TABLET ORAL at 11:14

## 2025-04-07 RX ADMIN — MIRTAZAPINE 15 MG: 15 TABLET, FILM COATED ORAL at 03:08

## 2025-04-07 RX ADMIN — CITALOPRAM HYDROBROMIDE 40 MG: 20 TABLET ORAL at 03:09

## 2025-04-07 RX ADMIN — LEVOTHYROXINE SODIUM 112 MCG: 0.11 TABLET ORAL at 06:48

## 2025-04-07 RX ADMIN — IPRATROPIUM BROMIDE AND ALBUTEROL SULFATE 1 DOSE: 2.5; .5 SOLUTION RESPIRATORY (INHALATION) at 10:58

## 2025-04-07 ASSESSMENT — ENCOUNTER SYMPTOMS
SORE THROAT: 1
COLOR CHANGE: 0
COUGH: 1
VOMITING: 0
BACK PAIN: 0
DIARRHEA: 0
NAUSEA: 0
ABDOMINAL PAIN: 0
SHORTNESS OF BREATH: 0
RHINORRHEA: 1

## 2025-04-07 ASSESSMENT — PAIN DESCRIPTION - DESCRIPTORS
DESCRIPTORS: ACHING;DISCOMFORT
DESCRIPTORS: ACHING;DISCOMFORT

## 2025-04-07 ASSESSMENT — PAIN DESCRIPTION - LOCATION
LOCATION: BACK

## 2025-04-07 ASSESSMENT — PAIN SCALES - GENERAL
PAINLEVEL_OUTOF10: 10
PAINLEVEL_OUTOF10: 8
PAINLEVEL_OUTOF10: 4

## 2025-04-07 NOTE — PROGRESS NOTES
[facility]   Pharmacy Pharmacokinetic Monitoring Service - Vancomycin     Fozia Briceno is a 69 y.o. female starting on vancomycin therapy for SSTI. Pharmacy consulted by Dr. Cheng for monitoring and adjustment.    Target Concentration: Goal AUC/INGRID 400-600 mg*hr/L    Additional Antimicrobials: Cefepime    Pertinent Laboratory Values:   Wt Readings from Last 1 Encounters:   04/06/25 65.8 kg (145 lb)     Temp Readings from Last 1 Encounters:   04/06/25 99 °F (37.2 °C) (Oral)     Estimated Creatinine Clearance: 76 mL/min (based on SCr of 0.7 mg/dL).  Recent Labs     04/06/25  2103   BUN 8   WBC 4.8     Procalcitonin: None    Pertinent Cultures:  Culture Date Source Results   4/6/25 Blood x2 Sent   4/6/25 Respiratory panel Sent   MRSA Nasal Swab: N/A. Non-respiratory infection.    Plan:  Dosing recommendations based on Bayesian software  Start vancomycin 1750 mg IV once, followed by 750 mg IV q 12 hrs  Anticipated AUC of 414 and trough concentration of 11.4 at steady state  Renal labs as indicated   Vancomycin concentration ordered for 4/8 @ 1000   Pharmacy will continue to monitor patient and adjust therapy as indicated    Thank you for the consult,  BREA CONNOR RPH  4/6/2025 10:19 PM

## 2025-04-07 NOTE — PROGRESS NOTES
4 Eyes Skin Assessment     NAME:  Fozia Briceno  YOB: 1955  MEDICAL RECORD NUMBER:  [id number]    The patient is being assessed for  Admission    I agree that at least one RN has performed a thorough Head to Toe Skin Assessment on the patient. ALL assessment sites listed below have been assessed.      Areas assessed by both nurses:    Head, Face, Ears, Shoulders, Back, Chest, Arms, Elbows, Hands, Sacrum. Buttock, Coccyx, Ischium, Legs. Feet and Heels, and Under Medical Devices         Does the Patient have a Wound? No noted wound(s)       Carlos Alberto Prevention initiated by RN: Yes  Wound Care Orders initiated by RN: No    Pressure Injury (Stage 3,4, Unstageable, DTI, NWPT, and Complex wounds) if present, place Wound referral order by RN under : No    New Ostomies, if present place, Ostomy referral order under : No     Nurse 1 eSignature: Electronically signed by Brenda Benitez RN on 4/7/25 at 6:21 AM CDT    **SHARE this note so that the co-signing nurse can place an eSignature**    Nurse 2 eSignature: Electronically signed by Marcie Ribeiro LPN on 4/7/25 at 6:21 AM CDT

## 2025-04-07 NOTE — ED PROVIDER NOTES
[facility] 3 DOROTHY/VAS/MED  eMERGENCY dEPARTMENT eNCOUnter      Pt Name: Fozia Briceno  MRN: [id number]  Birthdate 1955  Date of evaluation: 4/6/2025  Provider: Didi Cheng MD    CHIEF COMPLAINT       Chief Complaint   Patient presents with    Fever     X2 days     Cough         HISTORY OF PRESENT ILLNESS   (Location/Symptom, Timing/Onset,Context/Setting, Quality, Duration, Modifying Factors, Severity)  Note limiting factors.   Fozia Briceno is a 69 y.o. female with history of small cell carcinoma of the lung who presents to the emergency department with fever.  Patient states that she has been having low-grade fever for the past 2 days.  Patient states that \"Dr. Bates told me that if it got up above 100.4 then I should come to the emergency department.\"  Patient states that she had a Tmax of 100.5 earlier today.  Patient has also had a cough productive of yellow thick sputum.  Associated symptoms include congestion, runny nose, sore throat, and chills.  Patient states her last chemotherapy was 3 weeks ago.  She is scheduled for her next round on 4/9/2025.  Patient undergoes radiation therapy 5 days a week (Monday through Friday).  Patient had a port placed 4 weeks ago and it is reddened over the port.  Patient states that she is uncertain if it is become more red in color.  Patient denies burning with urination, difficulty urinating, chest pain, or shortness of breath.    HPI    NursingNotes were reviewed.    REVIEW OF SYSTEMS    (2-9 systems for level 4, 10 or more for level 5)     Review of Systems   Constitutional:  Positive for chills and fever.   HENT:  Positive for congestion, rhinorrhea and sore throat.    Respiratory:  Positive for cough. Negative for shortness of breath.    Cardiovascular:  Negative for chest pain and leg swelling.   Gastrointestinal:  Negative for abdominal pain, diarrhea, nausea and vomiting.   Genitourinary:  Negative for difficulty urinating and dysuria.   Musculoskeletal:  Negative

## 2025-04-07 NOTE — PROGRESS NOTES
Pharmacy Renal Adjustment    Fozia Briceno is a 69 y.o. female. Pharmacy has renally adjusted medications per protocol.    Recent Labs     04/06/25  2103   BUN 8       Recent Labs     04/06/25 2103   CREATININE 0.6       Estimated Creatinine Clearance: 88 mL/min (based on SCr of 0.6 mg/dL).    Height:   Ht Readings from Last 1 Encounters:   04/06/25 1.702 m (5' 7\")     Weight:  Wt Readings from Last 1 Encounters:   04/06/25 65.8 kg (145 lb)       Plan: Adjust the following medications based on renal function:           Cefepime to 2000 mg IV every 8 hours extended infusion over 240 minutes     Electronically signed by BREA CONNOR RPH on 4/7/2025 at 1:57 AM

## 2025-04-07 NOTE — H&P
infiltrative process or effusion.      ______________________________________ Electronically signed by: SUMMER SCHAEFER M.D. Date:     04/06/2025 Time:    22:54       Assessment/Plan:     Principal Problem:    COPD exacerbation (HCC)  Resolved Problems:    * No resolved hospital problems. *  COPD exacerbation VS Bronchitis  Fever  Admit inpatient with telemetry  DUoneb NEb  IV Antibiotics  PO prednisone  Supplemental Oxygen via NC PRN  CBC/BMP in AM  Blood culture  DVT Prophylaxis     Sepsis : meets Sepsis criteria:  See plan above  Cellulitis : see plan above    Hx of Squamous Cell Lung carcinoma :  Oncology consulted    Chronic medical conditions will be managed with home meds when reconciled    Anemia: Monitor for now  Transfuse if Hg<7    DVT prophylaxis- SCDs    Roland Allen MD  4/7/2025 4:35 AM     Detail Level: Detailed Size Of Lesion: 5-6 mm Morphology Per Location (Optional): Oval brown nevus Size Of Lesion: 2mm Morphology Per Location (Optional): Brown Size Of Lesion: 3mm

## 2025-04-07 NOTE — CONSULTS
Family Social History (PFSH), or Review of Systems (ROS) and made changes when appropriated.       (Please note that portions of this note were completed with a voice recognition program. Efforts were made to edit the dictations but occasionally words are mis-transcribed.)        Florencia Bates MD    04/07/25  4:56 AM

## 2025-04-07 NOTE — PROGRESS NOTES
Fozia Briceno arrived to room # 323.   Presented with: COPD exacerbation  Mental Status: Patient is oriented, alert, coherent, logical, thought processes intact, and able to concentrate and follow conversation.   Vitals:    04/07/25 0545   BP: (!) 91/58   Pulse: 72   Resp: 18   Temp: 97.8 °F (36.6 °C)   SpO2: 96%     Patient safety contract and falls prevention contract reviewed with patient Yes.  Oriented Patient to room.  Call light within reach. Yes.  Needs, issues or concerns expressed at this time: no.      Electronically signed by Marcie Ribeiro LPN on 4/7/2025 at 5:48 AM

## 2025-04-07 NOTE — ED NOTES
Pt requesting her night medications. Secure message sent to hospitalist for night medications.   
Migraines     Obstructive sleep apnea     AHI:  13.1    Restless legs syndrome     Sarcoidosis     Scoliosis     Unspecified sleep apnea        Assessment  Vitals: Level of Consciousness: Alert (0)   Vitals:    04/07/25 0300 04/07/25 0330 04/07/25 0430 04/07/25 0500   BP: 119/78 110/70 (!) 77/51 (!) 93/57   Pulse: (!) 104 99 77 74   Resp: 16 20 23 20   Temp:       TempSrc:       SpO2: 95% 95% 96% 98%   Weight:       Height:         Predictive Model Details          37 (Caution)  Factor Value    Calculated 4/7/2025 05:03 29% Supplemental oxygen Nasal cannula    Deterioration Index Model 29% Age 69 years old     16% Systolic 93     14% Respiratory rate 20     8% Hematocrit abnormal (27.5 %)     1% Sodium 139 mmol/L     1% WBC count abnormal (4.2 K/uL)     0% Temperature 99 °F (37.2 °C)     0% Pulse oximetry 98 %     0% Potassium 4.0 mmol/L     0% Pulse 74       NPO? No  O2 Flow Rate: O2 Device: Nasal cannula O2 Flow Rate (L/min): 2 L/min  Cardiac Rhythm: Sinus  NIH Score: NIH     Active LDA's:   Peripheral IV 04/06/25 Right;Ventral Forearm (Active)     Pertinent or High Risk Medications/Drips: no   If Yes, please provide details:   Blood Product Administration: no  If Yes, please provide details:   Sepsis Risk Score      Admitted with Sepsis? No    Recommendation  Incomplete orders:   Patient Belongings:   Additional Comments:   If any further questions, please call Sending RN at 2150    Electronically signed by: Electronically signed by Abeba Tom RN on 4/7/2025 at 5:03 AM

## 2025-04-08 ENCOUNTER — READMISSION MANAGEMENT (OUTPATIENT)
Dept: CALL CENTER | Facility: HOSPITAL | Age: 70
End: 2025-04-08
Payer: MEDICARE

## 2025-04-08 VITALS
HEIGHT: 67 IN | RESPIRATION RATE: 16 BRPM | DIASTOLIC BLOOD PRESSURE: 80 MMHG | OXYGEN SATURATION: 97 % | SYSTOLIC BLOOD PRESSURE: 134 MMHG | WEIGHT: 145 LBS | TEMPERATURE: 97.5 F | BODY MASS INDEX: 22.76 KG/M2 | HEART RATE: 94 BPM

## 2025-04-08 LAB
ANION GAP SERPL CALCULATED.3IONS-SCNC: 9 MMOL/L (ref 8–16)
BASOPHILS # BLD: 0 K/UL (ref 0–0.2)
BASOPHILS NFR BLD: 0.2 % (ref 0–1)
BUN SERPL-MCNC: 10 MG/DL (ref 8–23)
CALCIUM SERPL-MCNC: 8.8 MG/DL (ref 8.8–10.2)
CHLORIDE SERPL-SCNC: 104 MMOL/L (ref 98–107)
CO2 SERPL-SCNC: 28 MMOL/L (ref 22–29)
CREAT SERPL-MCNC: 0.6 MG/DL (ref 0.5–0.9)
EOSINOPHIL # BLD: 0 K/UL (ref 0–0.6)
EOSINOPHIL NFR BLD: 0 % (ref 0–5)
ERYTHROCYTE [DISTWIDTH] IN BLOOD BY AUTOMATED COUNT: 15.5 % (ref 11.5–14.5)
GLUCOSE SERPL-MCNC: 123 MG/DL (ref 70–99)
HCT VFR BLD AUTO: 26.1 % (ref 37–47)
HGB BLD-MCNC: 8.2 G/DL (ref 12–16)
IMM GRANULOCYTES # BLD: 0.2 K/UL
LYMPHOCYTES # BLD: 0.5 K/UL (ref 1.1–4.5)
LYMPHOCYTES NFR BLD: 12.2 % (ref 20–40)
MCH RBC QN AUTO: 30.4 PG (ref 27–31)
MCHC RBC AUTO-ENTMCNC: 31.4 G/DL (ref 33–37)
MCV RBC AUTO: 96.7 FL (ref 81–99)
MONOCYTES # BLD: 0.6 K/UL (ref 0–0.9)
MONOCYTES NFR BLD: 12.9 % (ref 0–10)
NEUTROPHILS # BLD: 3.1 K/UL (ref 1.5–7.5)
NEUTS SEG NFR BLD: 70.2 % (ref 50–65)
PLATELET # BLD AUTO: 316 K/UL (ref 130–400)
PMV BLD AUTO: 9.6 FL (ref 9.4–12.3)
POTASSIUM SERPL-SCNC: 4.1 MMOL/L (ref 3.5–5)
RBC # BLD AUTO: 2.7 M/UL (ref 4.2–5.4)
S PYO THROAT QL CULT: NORMAL
SODIUM SERPL-SCNC: 141 MMOL/L (ref 136–145)
VANCOMYCIN TROUGH SERPL-MCNC: 7.7 UG/ML (ref 10–20)
WBC # BLD AUTO: 4.4 K/UL (ref 4.8–10.8)

## 2025-04-08 PROCEDURE — 2580000003 HC RX 258: Performed by: STUDENT IN AN ORGANIZED HEALTH CARE EDUCATION/TRAINING PROGRAM

## 2025-04-08 PROCEDURE — 36415 COLL VENOUS BLD VENIPUNCTURE: CPT

## 2025-04-08 PROCEDURE — 80048 BASIC METABOLIC PNL TOTAL CA: CPT

## 2025-04-08 PROCEDURE — 6370000000 HC RX 637 (ALT 250 FOR IP): Performed by: STUDENT IN AN ORGANIZED HEALTH CARE EDUCATION/TRAINING PROGRAM

## 2025-04-08 PROCEDURE — 6360000002 HC RX W HCPCS: Performed by: STUDENT IN AN ORGANIZED HEALTH CARE EDUCATION/TRAINING PROGRAM

## 2025-04-08 PROCEDURE — 2700000000 HC OXYGEN THERAPY PER DAY

## 2025-04-08 PROCEDURE — 99231 SBSQ HOSP IP/OBS SF/LOW 25: CPT | Performed by: INTERNAL MEDICINE

## 2025-04-08 PROCEDURE — 80202 ASSAY OF VANCOMYCIN: CPT

## 2025-04-08 PROCEDURE — 94760 N-INVAS EAR/PLS OXIMETRY 1: CPT

## 2025-04-08 PROCEDURE — 94640 AIRWAY INHALATION TREATMENT: CPT

## 2025-04-08 PROCEDURE — 85025 COMPLETE CBC W/AUTO DIFF WBC: CPT

## 2025-04-08 RX ORDER — PREDNISONE 20 MG/1
40 TABLET ORAL DAILY
Qty: 6 TABLET | Refills: 0 | Status: SHIPPED | OUTPATIENT
Start: 2025-04-09 | End: 2025-04-12

## 2025-04-08 RX ORDER — LEVOFLOXACIN 500 MG/1
500 TABLET, FILM COATED ORAL DAILY
Qty: 5 TABLET | Refills: 0 | Status: SHIPPED | OUTPATIENT
Start: 2025-04-08 | End: 2025-04-13

## 2025-04-08 RX ADMIN — CITALOPRAM HYDROBROMIDE 40 MG: 20 TABLET ORAL at 09:08

## 2025-04-08 RX ADMIN — CEFEPIME 2000 MG: 2 INJECTION, POWDER, FOR SOLUTION INTRAVENOUS at 00:12

## 2025-04-08 RX ADMIN — CEFEPIME 2000 MG: 2 INJECTION, POWDER, FOR SOLUTION INTRAVENOUS at 06:13

## 2025-04-08 RX ADMIN — IPRATROPIUM BROMIDE AND ALBUTEROL SULFATE 1 DOSE: 2.5; .5 SOLUTION RESPIRATORY (INHALATION) at 07:12

## 2025-04-08 RX ADMIN — GABAPENTIN 600 MG: 300 CAPSULE ORAL at 09:08

## 2025-04-08 RX ADMIN — PREDNISONE 40 MG: 10 TABLET ORAL at 09:09

## 2025-04-08 RX ADMIN — OXYCODONE AND ACETAMINOPHEN 1 TABLET: 10; 325 TABLET ORAL at 09:08

## 2025-04-08 RX ADMIN — LEVOTHYROXINE SODIUM 112 MCG: 0.11 TABLET ORAL at 06:10

## 2025-04-08 RX ADMIN — TIZANIDINE 4 MG: 4 TABLET ORAL at 09:08

## 2025-04-08 RX ADMIN — OXYCODONE AND ACETAMINOPHEN 1 TABLET: 10; 325 TABLET ORAL at 02:37

## 2025-04-08 SDOH — ECONOMIC STABILITY: INCOME INSECURITY: HOW HARD IS IT FOR YOU TO PAY FOR THE VERY BASICS LIKE FOOD, HOUSING, MEDICAL CARE, AND HEATING?: NOT HARD AT ALL

## 2025-04-08 SDOH — ECONOMIC STABILITY: FOOD INSECURITY: WITHIN THE PAST 12 MONTHS, YOU WORRIED THAT YOUR FOOD WOULD RUN OUT BEFORE YOU GOT MONEY TO BUY MORE.: NEVER TRUE

## 2025-04-08 SDOH — ECONOMIC STABILITY: INCOME INSECURITY: IN THE PAST 12 MONTHS, HAS THE ELECTRIC, GAS, OIL, OR WATER COMPANY THREATENED TO SHUT OFF SERVICE IN YOUR HOME?: NO

## 2025-04-08 ASSESSMENT — PATIENT HEALTH QUESTIONNAIRE - PHQ9
SUM OF ALL RESPONSES TO PHQ QUESTIONS 1-9: 0
2. FEELING DOWN, DEPRESSED OR HOPELESS: NOT AT ALL
SUM OF ALL RESPONSES TO PHQ QUESTIONS 1-9: 0
1. LITTLE INTEREST OR PLEASURE IN DOING THINGS: NOT AT ALL
SUM OF ALL RESPONSES TO PHQ QUESTIONS 1-9: 0
SUM OF ALL RESPONSES TO PHQ QUESTIONS 1-9: 0

## 2025-04-08 ASSESSMENT — PAIN DESCRIPTION - ORIENTATION: ORIENTATION: LOWER;MID

## 2025-04-08 ASSESSMENT — PAIN SCALES - GENERAL
PAINLEVEL_OUTOF10: 8
PAINLEVEL_OUTOF10: 8

## 2025-04-08 ASSESSMENT — PAIN DESCRIPTION - LOCATION: LOCATION: BACK

## 2025-04-08 NOTE — OUTREACH NOTE
Prep Survey      Flowsheet Row Responses   [facility] facility patient discharged from? Non-BH   Is LACE score < 7 ? Non- Discharge   Eligibility [facility]   Date of Admission 04/06/25   Date of Discharge 04/08/25   Discharge Disposition Home or Self Care   Discharge diagnosis Cellulitis, unspecified cellulitis site (Primary Dx),  Bronchitis,  Small cell carcinoma   Does the patient have one of the following disease processes/diagnoses(primary or secondary)? Other   Prep survey completed? Yes            Noemi ASCENCIO - Registered Nurse

## 2025-04-08 NOTE — PROGRESS NOTES
CLINICAL PHARMACY NOTE: MEDS TO BEDS    Total # of Prescriptions Filled: 2   The following medications were delivered to the patient:  Current Discharge Medication List        START taking these medications    Details   predniSONE (DELTASONE) 20 MG tablet Take 2 tablets by mouth daily for 3 doses  Qty: 6 tablet, Refills: 0      levoFLOXacin (LEVAQUIN) 500 MG tablet Take 1 tablet by mouth daily for 5 days  Qty: 5 tablet, Refills: 0               Additional Documentation:  Delivered to patients family bedside. Paid with cash.

## 2025-04-08 NOTE — DISCHARGE SUMMARY
[phone] - F [phone]  [address] [place] [address] Drive [address], [facility] [address]      Phone: [phone]   levoFLOXacin 500 MG tablet  predniSONE 20 MG tablet         Electronically signed by Lilian Espinal MD on 4/8/25 at 11:32 AM CDT

## 2025-04-08 NOTE — PROGRESS NOTES
MEDICAL ONCOLOGY PROGRESS NOTE     Pt Name: Fozia Briceno  MRN: [id number]  YOB: 1955  Date of evaluation: 4/8/2025    Subjective-remains afebrile here in the hospital.  Her BP has improved.  No acute overnight events.  She denies any new complaints.  He has a    HISTORY OF PRESENT ILLNESS:   Fozia Briceno is well-known to my clinic.  She has a new diagnosis of limited stage small cell lung cancer currently receiving concurrent chemo XRT.  She received second cycle of chemotherapy with carboplatin etoposide on 3/19/2025.  She is also receiving XRT at [facility].  Patient presented to the ER department with complaints of shortness of breath over her baseline with chronic respiratory failure.  Patient experienced a fever at home.  Patient has chronic cough.  Patient was tachycardic at arrival.  Laboratory studies at arrival showed WBC 4.2, hemoglobin 8.5, platelet count 269,000.  Chemistry was unremarkable with normal creatinine, normal LFTs.  Chest x-ray showed pulmonary large on the right, no organized infiltrate process or effusion.  Patient was started on broad-spectrum antibiotics with cefepime and vancomycin.  I was consulted for complete of care.        ONCOLOGIC HISTORY  Small cell lung cancer, right lung, Dec 2024  Stage  [id number], limited stage     Treatment Summary  12/13/24 - 12/19/24 Emergent palliative radiation therapy 1250cGy in 5 fractions to right lung  1/27/25 Initiated chemotherapy with Carboplatin D1, Etoposide D1-3 every 21 days x4 cycles  3/6/25 Initiated radiation to right lung 5040 cGy over 28 fraction with Dr. Perez   3/13/25 Port placement right side with Dr. Bateman     Cancer History  Ms. 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 She has complaints today of fatigue. She states she has stabbing chest pain and mid back pain. She is currently being seen at pain management for pain medication.

## 2025-04-08 NOTE — PLAN OF CARE
Problem: Discharge Planning  Goal: Discharge to home or other facility with appropriate resources  Outcome: Progressing     Problem: ABCDS Injury Assessment  Goal: Absence of physical injury  Outcome: Progressing  Flowsheets (Taken 4/7/2025 2144)  Absence of Physical Injury: Implement safety measures based on patient assessment     Problem: Safety - Adult  Goal: Free from fall injury  Outcome: Progressing  Flowsheets (Taken 4/7/2025 2144)  Free From Fall Injury: Instruct family/caregiver on patient safety

## 2025-04-09 ENCOUNTER — TRANSITIONAL CARE MANAGEMENT TELEPHONE ENCOUNTER (OUTPATIENT)
Dept: CALL CENTER | Facility: HOSPITAL | Age: 70
End: 2025-04-09
Payer: MEDICARE

## 2025-04-09 ENCOUNTER — HOSPITAL ENCOUNTER (OUTPATIENT)
Dept: INFUSION THERAPY | Age: 70
End: 2025-04-09
Payer: MEDICARE

## 2025-04-09 NOTE — OUTREACH NOTE
Call Center TCM Note      Flowsheet Row Responses   [facility] patient discharged from? Non-  [[facility]   Does the patient have one of the following disease processes/diagnoses(primary or secondary)? Other   TCM attempt successful? Yes  [vr for Antonia, granddaughter]   Call start time 1447   Call end time 1451   Discharge diagnosis Cellulitis, unspecified cellulitis site (Primary Dx),  Bronchitis,  Small cell carcinoma   Meds reviewed with patient/caregiver? Yes   Is the patient having any side effects they believe may be caused by any medication additions or changes? Yes   Does the patient have all medications ordered at discharge? Yes   Is the patient taking all medications as directed (includes completed medication regime)? Yes   Medication comments pt taking steroids and levaquin   Comments PCP HOSPITAL f/u appt on 4/10/25@130pm   Does the patient have an appointment with their PCP within 7-14 days of discharge? Yes   Nursing Interventions Confirmed date/time of appointment   Has home health visited the patient within 72 hours of discharge? N/A   DME comments Pt using O2 prn at home and has home ventilator at . Noted at d/c orders indicate to stop O2 use, pt reports not possible as uses on prn basis and was a misunderstanding.  She will discuss with PCP at f/u appt   Did the patient receive a copy of their discharge instructions? Yes   Nursing interventions Reviewed instructions with patient   What is the patient's perception of their health status since discharge? Same  [Pt reports no new concerns.  Aware to monitor for any worsening resp type s/s, reviewed with her.  Has O2, nebs for use as well as new meds.]   Is the patient/caregiver able to teach back signs and symptoms related to disease process for when to call PCP? Yes   Is the patient/caregiver able to teach back signs and symptoms related to disease process for when to call 911? Yes   TCM call completed? Yes   Call end  time [id number]            JEANINE MORENO - Registered Nurse    4/9/2025, 14:53 CDT

## 2025-04-10 ENCOUNTER — APPOINTMENT (OUTPATIENT)
Dept: INFUSION THERAPY | Age: 70
End: 2025-04-10
Payer: MEDICARE

## 2025-04-11 ENCOUNTER — APPOINTMENT (OUTPATIENT)
Dept: INFUSION THERAPY | Age: 70
End: 2025-04-11
Payer: MEDICARE

## 2025-04-12 LAB
BACTERIA BLD CULT ORG #2: NORMAL
BACTERIA BLD CULT: NORMAL
BACTERIA BLD CULT: NORMAL

## 2025-04-14 ENCOUNTER — OFFICE VISIT (OUTPATIENT)
Dept: FAMILY MEDICINE CLINIC | Facility: CLINIC | Age: 70
End: 2025-04-14
Payer: MEDICARE

## 2025-04-14 ENCOUNTER — HOSPITAL ENCOUNTER (OUTPATIENT)
Dept: RADIATION ONCOLOGY | Facility: HOSPITAL | Age: 70
Setting detail: RADIATION/ONCOLOGY SERIES
Discharge: HOME | End: 2025-04-14
Payer: MEDICARE

## 2025-04-14 VITALS
OXYGEN SATURATION: 98 % | TEMPERATURE: 98.2 F | DIASTOLIC BLOOD PRESSURE: 80 MMHG | RESPIRATION RATE: 20 BRPM | SYSTOLIC BLOOD PRESSURE: 160 MMHG | HEART RATE: 112 BPM | BODY MASS INDEX: 22.76 KG/M2 | WEIGHT: 145 LBS | HEIGHT: 67 IN

## 2025-04-14 DIAGNOSIS — Z79.69 ON ANTINEOPLASTIC CHEMOTHERAPY: ICD-10-CM

## 2025-04-14 DIAGNOSIS — J44.9 CHRONIC OBSTRUCTIVE PULMONARY DISEASE, UNSPECIFIED COPD TYPE: Primary | ICD-10-CM

## 2025-04-14 DIAGNOSIS — L03.313 CELLULITIS OF CHEST WALL: ICD-10-CM

## 2025-04-14 DIAGNOSIS — Z92.3 HISTORY OF RADIATION THERAPY: ICD-10-CM

## 2025-04-14 DIAGNOSIS — C34.11 SMALL CELL CARCINOMA OF UPPER LOBE OF RIGHT LUNG: ICD-10-CM

## 2025-04-14 DIAGNOSIS — R73.01 ELEVATED FASTING BLOOD SUGAR: ICD-10-CM

## 2025-04-14 LAB
EXPIRATION DATE: NORMAL
HBA1C MFR BLD: 5.3 % (ref 4.5–5.7)
Lab: NORMAL
RAD ONC ARIA COURSE ID: NORMAL
RAD ONC ARIA COURSE INTENT: NORMAL
RAD ONC ARIA COURSE LAST TREATMENT DATE: NORMAL
RAD ONC ARIA COURSE START DATE: NORMAL
RAD ONC ARIA COURSE TREATMENT ELAPSED DAYS: 39
RAD ONC ARIA FIRST TREATMENT DATE: NORMAL
RAD ONC ARIA PLAN FRACTIONS TREATED TO DATE: 13
RAD ONC ARIA PLAN ID: NORMAL
RAD ONC ARIA PLAN PRESCRIBED DOSE PER FRACTION: 1.8 GY
RAD ONC ARIA PLAN PRIMARY REFERENCE POINT: NORMAL
RAD ONC ARIA PLAN TOTAL FRACTIONS PRESCRIBED: 28
RAD ONC ARIA PLAN TOTAL PRESCRIBED DOSE: 5040 CGY
RAD ONC ARIA REFERENCE POINT DOSAGE GIVEN TO DATE: 23.4 GY
RAD ONC ARIA REFERENCE POINT ID: NORMAL
RAD ONC ARIA REFERENCE POINT SESSION DOSAGE GIVEN: 1.8 GY

## 2025-04-14 PROCEDURE — 77014 CHG CT GUIDANCE RADIATION THERAPY FLDS PLACEMENT: CPT | Performed by: RADIOLOGY

## 2025-04-14 PROCEDURE — 77386: CPT | Performed by: RADIOLOGY

## 2025-04-14 RX ORDER — MUPIROCIN 20 MG/G
1 OINTMENT TOPICAL 2 TIMES DAILY
Qty: 22 G | Refills: 0 | Status: SHIPPED | OUTPATIENT
Start: 2025-04-14

## 2025-04-15 ENCOUNTER — TELEPHONE (OUTPATIENT)
Dept: FAMILY MEDICINE CLINIC | Facility: CLINIC | Age: 70
End: 2025-04-15
Payer: MEDICARE

## 2025-04-15 ENCOUNTER — HOSPITAL ENCOUNTER (OUTPATIENT)
Dept: RADIATION ONCOLOGY | Facility: HOSPITAL | Age: 70
Discharge: HOME | End: 2025-04-15

## 2025-04-15 LAB
RAD ONC ARIA COURSE ID: NORMAL
RAD ONC ARIA COURSE INTENT: NORMAL
RAD ONC ARIA COURSE LAST TREATMENT DATE: NORMAL
RAD ONC ARIA COURSE START DATE: NORMAL
RAD ONC ARIA COURSE TREATMENT ELAPSED DAYS: 40
RAD ONC ARIA FIRST TREATMENT DATE: NORMAL
RAD ONC ARIA PLAN FRACTIONS TREATED TO DATE: 14
RAD ONC ARIA PLAN ID: NORMAL
RAD ONC ARIA PLAN PRESCRIBED DOSE PER FRACTION: 1.8 GY
RAD ONC ARIA PLAN PRIMARY REFERENCE POINT: NORMAL
RAD ONC ARIA PLAN TOTAL FRACTIONS PRESCRIBED: 28
RAD ONC ARIA PLAN TOTAL PRESCRIBED DOSE: 5040 CGY
RAD ONC ARIA REFERENCE POINT DOSAGE GIVEN TO DATE: 25.2 GY
RAD ONC ARIA REFERENCE POINT ID: NORMAL
RAD ONC ARIA REFERENCE POINT SESSION DOSAGE GIVEN: 1.8 GY

## 2025-04-15 PROCEDURE — 77014 CHG CT GUIDANCE RADIATION THERAPY FLDS PLACEMENT: CPT | Performed by: RADIOLOGY

## 2025-04-15 PROCEDURE — 77386: CPT | Performed by: RADIOLOGY

## 2025-04-15 NOTE — TELEPHONE ENCOUNTER
Mariluz Lopez wants to know if we got the CMN form for this patient. Please call her at [phone] if you didn't.

## 2025-04-16 ENCOUNTER — OFFICE VISIT (OUTPATIENT)
Dept: HEMATOLOGY | Age: 70
End: 2025-04-16
Payer: MEDICARE

## 2025-04-16 ENCOUNTER — HOSPITAL ENCOUNTER (OUTPATIENT)
Dept: INFUSION THERAPY | Age: 70
Discharge: HOME | End: 2025-04-16
Payer: MEDICARE

## 2025-04-16 ENCOUNTER — HOSPITAL ENCOUNTER (OUTPATIENT)
Dept: RADIATION ONCOLOGY | Facility: HOSPITAL | Age: 70
Setting detail: RADIATION/ONCOLOGY SERIES
End: 2025-04-16
Payer: MEDICARE

## 2025-04-16 ENCOUNTER — TELEPHONE (OUTPATIENT)
Dept: FAMILY MEDICINE CLINIC | Facility: CLINIC | Age: 70
End: 2025-04-16
Payer: MEDICARE

## 2025-04-16 VITALS
SYSTOLIC BLOOD PRESSURE: 125 MMHG | RESPIRATION RATE: 18 BRPM | TEMPERATURE: 97.7 F | BODY MASS INDEX: 22.26 KG/M2 | HEIGHT: 67 IN | DIASTOLIC BLOOD PRESSURE: 70 MMHG | WEIGHT: 141.8 LBS | HEART RATE: 111 BPM | OXYGEN SATURATION: 95 %

## 2025-04-16 DIAGNOSIS — R53.83 CHEMOTHERAPY-INDUCED FATIGUE: ICD-10-CM

## 2025-04-16 DIAGNOSIS — C34.01 SMALL CELL CARCINOMA OF HILUM OF RIGHT LUNG: ICD-10-CM

## 2025-04-16 DIAGNOSIS — Z99.81 OXYGEN DEPENDENT: ICD-10-CM

## 2025-04-16 DIAGNOSIS — Z71.89 CARE PLAN DISCUSSED WITH PATIENT: ICD-10-CM

## 2025-04-16 DIAGNOSIS — T45.1X5A ANEMIA ASSOCIATED WITH CHEMOTHERAPY: ICD-10-CM

## 2025-04-16 DIAGNOSIS — G89.3 CANCER ASSOCIATED PAIN: ICD-10-CM

## 2025-04-16 DIAGNOSIS — D75.838 REACTIVE THROMBOCYTOSIS: ICD-10-CM

## 2025-04-16 DIAGNOSIS — C7A.8 NEUROENDOCRINE CARCINOMA OF LUNG (HCC): Primary | ICD-10-CM

## 2025-04-16 DIAGNOSIS — C34.01 SMALL CELL CARCINOMA OF HILUM OF RIGHT LUNG: Primary | ICD-10-CM

## 2025-04-16 DIAGNOSIS — C34.01 SMALL CELL CARCINOMA OF HILUM OF RIGHT LUNG (HCC): ICD-10-CM

## 2025-04-16 DIAGNOSIS — T45.1X5A CHEMOTHERAPY-INDUCED FATIGUE: ICD-10-CM

## 2025-04-16 DIAGNOSIS — D64.81 ANEMIA ASSOCIATED WITH CHEMOTHERAPY: ICD-10-CM

## 2025-04-16 DIAGNOSIS — C80.1 CANCER (HCC): Primary | ICD-10-CM

## 2025-04-16 DIAGNOSIS — T45.1X5D ADVERSE EFFECT OF CHEMOTHERAPY, SUBSEQUENT ENCOUNTER: ICD-10-CM

## 2025-04-16 DIAGNOSIS — Z51.11 CHEMOTHERAPY MANAGEMENT, ENCOUNTER FOR: ICD-10-CM

## 2025-04-16 DIAGNOSIS — C7A.8 NEUROENDOCRINE CARCINOMA OF LUNG: Primary | ICD-10-CM

## 2025-04-16 PROBLEM — J10.1 INFLUENZA A: Status: RESOLVED | Noted: 2025-01-31 | Resolved: 2025-04-16

## 2025-04-16 PROBLEM — A41.51 SEPSIS DUE TO ESCHERICHIA COLI: Status: RESOLVED | Noted: 2023-05-06 | Resolved: 2025-04-16

## 2025-04-16 PROBLEM — G47.19 EXCESSIVE DAYTIME SLEEPINESS: Status: RESOLVED | Noted: 2020-01-30 | Resolved: 2025-04-16

## 2025-04-16 LAB
ALBUMIN SERPL-MCNC: 4 G/DL (ref 3.5–5.2)
ALP SERPL-CCNC: 116 U/L (ref 35–104)
ALT SERPL-CCNC: 21 U/L (ref 5–33)
ANION GAP SERPL CALCULATED.3IONS-SCNC: 10 MMOL/L (ref 7–19)
AST SERPL-CCNC: 21 U/L (ref 5–32)
BASOPHILS # BLD: 0.02 K/UL (ref 0–0.2)
BASOPHILS NFR BLD: 0.3 % (ref 0–1)
BILIRUB SERPL-MCNC: 0.3 MG/DL (ref 0–1.2)
BUN SERPL-MCNC: 10 MG/DL (ref 8–23)
CALCIUM SERPL-MCNC: 8.9 MG/DL (ref 8.8–10.2)
CHLORIDE SERPL-SCNC: 99 MMOL/L (ref 98–107)
CO2 SERPL-SCNC: 29 MMOL/L (ref 22–29)
CREAT SERPL-MCNC: 0.6 MG/DL (ref 0.5–0.9)
EOSINOPHIL # BLD: 0.05 K/UL (ref 0–0.6)
EOSINOPHIL NFR BLD: 0.7 % (ref 0–5)
ERYTHROCYTE [DISTWIDTH] IN BLOOD BY AUTOMATED COUNT: 17.2 % (ref 11.5–14.5)
GLUCOSE SERPL-MCNC: 115 MG/DL (ref 70–99)
HCT VFR BLD AUTO: 35 % (ref 37–47)
HGB BLD-MCNC: 11.4 G/DL (ref 12–16)
LYMPHOCYTES # BLD: 1 K/UL (ref 1.1–4.5)
LYMPHOCYTES NFR BLD: 14.2 % (ref 20–40)
MAGNESIUM SERPL-MCNC: 2.1 MG/DL (ref 1.6–2.4)
MCH RBC QN AUTO: 31.3 PG (ref 27–31)
MCHC RBC AUTO-ENTMCNC: 32.6 G/DL (ref 33–37)
MCV RBC AUTO: 96.2 FL (ref 81–99)
MONOCYTES # BLD: 1 K/UL (ref 0–0.9)
MONOCYTES NFR BLD: 14.2 % (ref 1–10)
NEUTROPHILS # BLD: 4.84 K/UL (ref 1.5–7.5)
NEUTS SEG NFR BLD: 68.8 % (ref 50–65)
PHOSPHATE SERPL-MCNC: 3.9 MG/DL (ref 2.5–4.5)
PLATELET # BLD AUTO: 480 K/UL (ref 130–400)
PMV BLD AUTO: 9 FL (ref 9.4–12.3)
POTASSIUM SERPL-SCNC: 4.4 MMOL/L (ref 3.5–5.1)
PROT SERPL-MCNC: 7 G/DL (ref 6.4–8.3)
RBC # BLD AUTO: 3.64 M/UL (ref 4.2–5.4)
SODIUM SERPL-SCNC: 138 MMOL/L (ref 136–145)
WBC # BLD AUTO: 7.04 K/UL (ref 4.8–10.8)

## 2025-04-16 PROCEDURE — 1111F DSCHRG MED/CURRENT MED MERGE: CPT | Performed by: INTERNAL MEDICINE

## 2025-04-16 PROCEDURE — 96367 TX/PROPH/DG ADDL SEQ IV INF: CPT

## 2025-04-16 PROCEDURE — 2580000003 HC RX 258: Performed by: INTERNAL MEDICINE

## 2025-04-16 PROCEDURE — 96413 CHEMO IV INFUSION 1 HR: CPT

## 2025-04-16 PROCEDURE — 96417 CHEMO IV INFUS EACH ADDL SEQ: CPT

## 2025-04-16 PROCEDURE — 3017F COLORECTAL CA SCREEN DOC REV: CPT | Performed by: INTERNAL MEDICINE

## 2025-04-16 PROCEDURE — 83735 ASSAY OF MAGNESIUM: CPT

## 2025-04-16 PROCEDURE — 2500000003 HC RX 250 WO HCPCS: Performed by: INTERNAL MEDICINE

## 2025-04-16 PROCEDURE — NBSRV NON-BILLABLE SERVICE: Performed by: INTERNAL MEDICINE

## 2025-04-16 PROCEDURE — 99214 OFFICE O/P EST MOD 30 MIN: CPT | Performed by: INTERNAL MEDICINE

## 2025-04-16 PROCEDURE — 6360000002 HC RX W HCPCS: Performed by: INTERNAL MEDICINE

## 2025-04-16 PROCEDURE — G2211 COMPLEX E/M VISIT ADD ON: HCPCS | Performed by: INTERNAL MEDICINE

## 2025-04-16 PROCEDURE — 1090F PRES/ABSN URINE INCON ASSESS: CPT | Performed by: INTERNAL MEDICINE

## 2025-04-16 PROCEDURE — 1159F MED LIST DOCD IN RCRD: CPT | Performed by: INTERNAL MEDICINE

## 2025-04-16 PROCEDURE — 36415 COLL VENOUS BLD VENIPUNCTURE: CPT

## 2025-04-16 PROCEDURE — 85025 COMPLETE CBC W/AUTO DIFF WBC: CPT

## 2025-04-16 PROCEDURE — 1123F ACP DISCUSS/DSCN MKR DOCD: CPT | Performed by: INTERNAL MEDICINE

## 2025-04-16 PROCEDURE — G8427 DOCREV CUR MEDS BY ELIG CLIN: HCPCS | Performed by: INTERNAL MEDICINE

## 2025-04-16 PROCEDURE — 84100 ASSAY OF PHOSPHORUS: CPT

## 2025-04-16 PROCEDURE — 96375 TX/PRO/DX INJ NEW DRUG ADDON: CPT

## 2025-04-16 PROCEDURE — G8399 PT W/DXA RESULTS DOCUMENT: HCPCS | Performed by: INTERNAL MEDICINE

## 2025-04-16 PROCEDURE — 1036F TOBACCO NON-USER: CPT | Performed by: INTERNAL MEDICINE

## 2025-04-16 PROCEDURE — 80053 COMPREHEN METABOLIC PANEL: CPT

## 2025-04-16 PROCEDURE — G8420 CALC BMI NORM PARAMETERS: HCPCS | Performed by: INTERNAL MEDICINE

## 2025-04-16 RX ORDER — HYDROCORTISONE SODIUM SUCCINATE 100 MG/2ML
100 INJECTION INTRAMUSCULAR; INTRAVENOUS
Status: CANCELLED | OUTPATIENT
Start: 2025-04-16

## 2025-04-16 RX ORDER — ALBUTEROL SULFATE 90 UG/1
4 INHALANT RESPIRATORY (INHALATION) PRN
Status: CANCELLED | OUTPATIENT
Start: 2025-04-16

## 2025-04-16 RX ORDER — EPINEPHRINE 1 MG/ML
0.3 INJECTION, SOLUTION, CONCENTRATE INTRAVENOUS PRN
Status: CANCELLED | OUTPATIENT
Start: 2025-04-16

## 2025-04-16 RX ORDER — ACETAMINOPHEN 325 MG/1
650 TABLET ORAL
Status: CANCELLED | OUTPATIENT
Start: 2025-04-16

## 2025-04-16 RX ORDER — SODIUM CHLORIDE 9 MG/ML
5-250 INJECTION, SOLUTION INTRAVENOUS PRN
Status: DISCONTINUED | OUTPATIENT
Start: 2025-04-16 | End: 2025-04-17 | Stop reason: HOSPADM

## 2025-04-16 RX ORDER — MEPERIDINE HYDROCHLORIDE 50 MG/ML
12.5 INJECTION INTRAMUSCULAR; INTRAVENOUS; SUBCUTANEOUS PRN
Status: CANCELLED | OUTPATIENT
Start: 2025-04-16

## 2025-04-16 RX ORDER — SODIUM CHLORIDE 9 MG/ML
INJECTION, SOLUTION INTRAVENOUS CONTINUOUS
Status: CANCELLED | OUTPATIENT
Start: 2025-04-16

## 2025-04-16 RX ORDER — ONDANSETRON 2 MG/ML
8 INJECTION INTRAMUSCULAR; INTRAVENOUS
Status: CANCELLED | OUTPATIENT
Start: 2025-04-16

## 2025-04-16 RX ORDER — OXYCODONE AND ACETAMINOPHEN 10; 325 MG/1; MG/1
1 TABLET ORAL EVERY 6 HOURS PRN
Qty: 120 TABLET | Refills: 0 | Status: SHIPPED | OUTPATIENT
Start: 2025-04-16 | End: 2025-05-16

## 2025-04-16 RX ORDER — DEXAMETHASONE SODIUM PHOSPHATE 10 MG/ML
10 INJECTION, SOLUTION INTRAMUSCULAR; INTRAVENOUS ONCE
Status: COMPLETED | OUTPATIENT
Start: 2025-04-16 | End: 2025-04-16

## 2025-04-16 RX ORDER — SODIUM CHLORIDE 0.9 % (FLUSH) 0.9 %
5-40 SYRINGE (ML) INJECTION PRN
Status: DISCONTINUED | OUTPATIENT
Start: 2025-04-16 | End: 2025-04-17 | Stop reason: HOSPADM

## 2025-04-16 RX ORDER — SODIUM CHLORIDE 9 MG/ML
5-250 INJECTION, SOLUTION INTRAVENOUS PRN
Status: CANCELLED | OUTPATIENT
Start: 2025-04-16

## 2025-04-16 RX ORDER — PROCHLORPERAZINE EDISYLATE 5 MG/ML
5 INJECTION INTRAMUSCULAR; INTRAVENOUS
Status: CANCELLED | OUTPATIENT
Start: 2025-04-16

## 2025-04-16 RX ORDER — DIPHENHYDRAMINE HYDROCHLORIDE 50 MG/ML
50 INJECTION, SOLUTION INTRAMUSCULAR; INTRAVENOUS
Status: CANCELLED | OUTPATIENT
Start: 2025-04-16

## 2025-04-16 RX ORDER — HEPARIN 100 UNIT/ML
500 SYRINGE INTRAVENOUS PRN
Status: DISCONTINUED | OUTPATIENT
Start: 2025-04-16 | End: 2025-04-17 | Stop reason: HOSPADM

## 2025-04-16 RX ORDER — PALONOSETRON 0.05 MG/ML
0.25 INJECTION, SOLUTION INTRAVENOUS ONCE
Status: COMPLETED | OUTPATIENT
Start: 2025-04-16 | End: 2025-04-16

## 2025-04-16 RX ORDER — FAMOTIDINE 10 MG/ML
20 INJECTION, SOLUTION INTRAVENOUS
Status: CANCELLED | OUTPATIENT
Start: 2025-04-16

## 2025-04-16 RX ADMIN — PALONOSETRON 0.25 MG: 0.05 INJECTION, SOLUTION INTRAVENOUS at 10:54

## 2025-04-16 RX ADMIN — SODIUM CHLORIDE, PRESERVATIVE FREE 10 ML: 5 INJECTION INTRAVENOUS at 13:03

## 2025-04-16 RX ADMIN — SODIUM CHLORIDE 100 ML/HR: 9 INJECTION, SOLUTION INTRAVENOUS at 10:54

## 2025-04-16 RX ADMIN — SODIUM CHLORIDE 150 MG: 0.9 INJECTION, SOLUTION INTRAVENOUS at 11:03

## 2025-04-16 RX ADMIN — SODIUM CHLORIDE 140 MG: 0.9 INJECTION, SOLUTION INTRAVENOUS at 12:01

## 2025-04-16 RX ADMIN — HEPARIN 500 UNITS: 100 SYRINGE at 13:03

## 2025-04-16 RX ADMIN — CARBOPLATIN 415 MG: 10 INJECTION, SOLUTION INTRAVENOUS at 11:27

## 2025-04-16 RX ADMIN — DEXAMETHASONE SODIUM PHOSPHATE 10 MG: 10 INJECTION, SOLUTION INTRAMUSCULAR; INTRAVENOUS at 10:54

## 2025-04-16 NOTE — PROGRESS NOTES
MEDICAL ONCOLOGY PROGRESS NOTE    Pt Name: Fozia Briceno  MRN: [id number]  YOB: 1955  Date of evaluation: 4/16/2025    HISTORY OF PRESENT ILLNESS:  History of Present Illness  The patient presents for evaluation of small cell lung cancer, limited stage, clinical T4N1M1. She is accompanied by her daughter.    Currently undergoing her third cycle of chemotherapy, she reports an improvement in her overall condition. Her daughter corroborates this, noting that she appears healthier and exhibits increased stamina. Respiratory function has also shown signs of improvement. No adverse effects from the chemotherapy such as nausea, vomiting, or diarrhea are reported. Appetite has improved, and feelings of hunger are experienced.    Radiation therapy is being received 5 days a week under the supervision of Dr. Perez, with a total of 35 sessions planned. Twelve sessions remain, and completion is anticipated before the fourth chemotherapy session. No difficulties associated with the radiation therapy, including swallowing issues, are reported. Oxygen is used at home as needed and during sleep, with efforts made to limit its use to avoid dependency.    Diagnosis  Small cell lung cancer, right lung, Dec 2024  Stage  [id number], limited stage    Treatment Summary  12/13/24 - 12/19/24 Emergent palliative radiation therapy 1250cGy in 5 fractions to right lung  1/27/25 Initiated chemotherapy with Carboplatin D1, Etoposide D1-3 every 21 days x4 cycles  3/6/25 Initiated radiation to right lung 5040 cGy over 28 fraction with Dr. Perez   3/13/25 Port placement right side with Dr. Bateman  3/19/25 Dose reduction cycle #2 to carboplatin AUC 4 and etoposide 80 mg/m² days 1-3 q. 21 days    Cancer History  Ms. 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 She has complaints today of fatigue. She states she has stabbing chest pain and mid back

## 2025-04-16 NOTE — TELEPHONE ENCOUNTER
Caller: CRISTHIAN (BIJAN)    Relationship to patient: Other    Best call back number: [phone]     Patient is needing: BIJAN IS CALLING TO ASK IF WE HAVE RECEIVED CMN FOR A NEBULIZER AND ALBUTEROL. PLEASE CALL TO ADVISE.

## 2025-04-16 NOTE — PROGRESS NOTES
[facility]  [address] [place] [address] [place] [address]  Phone: [phone]  Fax: [phone]          [place] MAMIE Janak 69 y.o. White (non-) L[employer]-day     Diagnosis     (Copied from Dr. Bates's visit note.)   Small cell lung cancer, right lung, Dec 2024  Stage  [id number], limited stage     Treatment Summary     (Copied from Dr. Bates's visit note.)   12/13/24 - 12/19/24 Emergent palliative radiation therapy 1250cGy in 5 fractions to right lung  1/27/25 Initiated chemotherapy with Carboplatin D1, Etoposide D1-3 every 21 days x4 cycles  3/6/25 Initiated radiation to right lung 5040 cGy over 28 fraction with Dr. Perez   3/13/25 Port placement right side with Dr. Bateman  3/19/25 Dose reduction cycle #2 to carboplatin AUC 4 and etoposide 80 mg/m² days 1-3 q. 21 days  Current Outpatient Medications   Medication Sig Dispense Refill    oxyCODONE-acetaminophen (PERCOCET)  MG per tablet Take 1 tablet by mouth every 6 hours as needed for Pain for up to 30 days. Intended supply: 30 days Max Daily Amount: 4 tablets 120 tablet 0    gabapentin (NEURONTIN) 600 MG tablet Take 1 tablet by mouth 2 times daily.      diazePAM (VALIUM) 5 MG tablet Take 1 tablet by mouth daily as needed.      ondansetron (ZOFRAN) 4 MG tablet Take 1 tablet by mouth every 6 hours as needed for Nausea or Vomiting 30 tablet 5    promethazine (PHENERGAN) 25 MG tablet Take 0.5 tablets by mouth every 6 hours as needed for Nausea 30 tablet 5    albuterol sulfate HFA (PROVENTIL;VENTOLIN;PROAIR) 108 (90 Base) MCG/ACT inhaler INHALE 2 PUFFS DAILY AS NEEDED FOR WHEEZING Patient will need appointment for further refills 18 g 0    mirtazapine (REMERON) 15 MG tablet TAKE 1 TABLET BY MOUTH AT BEDTIME 30 tablet 5    citalopram (CELEXA) 40 MG tablet TAKE 1 TABLET BY MOUTH ONCE DAILY 30 tablet 5    levothyroxine (SYNTHROID) 112 MCG tablet TAKE 1 TABLET BY MOUTH ONCE DAILY. 30

## 2025-04-17 ENCOUNTER — HOSPITAL ENCOUNTER (OUTPATIENT)
Dept: RADIATION ONCOLOGY | Facility: HOSPITAL | Age: 70
Setting detail: RADIATION/ONCOLOGY SERIES
Discharge: HOME | End: 2025-04-17
Payer: MEDICARE

## 2025-04-17 ENCOUNTER — HOSPITAL ENCOUNTER (OUTPATIENT)
Dept: INFUSION THERAPY | Age: 70
Discharge: HOME | End: 2025-04-17
Payer: MEDICARE

## 2025-04-17 VITALS
TEMPERATURE: 98 F | DIASTOLIC BLOOD PRESSURE: 72 MMHG | SYSTOLIC BLOOD PRESSURE: 117 MMHG | OXYGEN SATURATION: 96 % | HEART RATE: 102 BPM | RESPIRATION RATE: 18 BRPM

## 2025-04-17 DIAGNOSIS — C7A.8 NEUROENDOCRINE CARCINOMA OF LUNG (HCC): Primary | ICD-10-CM

## 2025-04-17 DIAGNOSIS — C34.01 SMALL CELL CARCINOMA OF HILUM OF RIGHT LUNG: Primary | ICD-10-CM

## 2025-04-17 LAB
RAD ONC ARIA COURSE ID: NORMAL
RAD ONC ARIA COURSE INTENT: NORMAL
RAD ONC ARIA COURSE LAST TREATMENT DATE: NORMAL
RAD ONC ARIA COURSE START DATE: NORMAL
RAD ONC ARIA COURSE TREATMENT ELAPSED DAYS: 42
RAD ONC ARIA FIRST TREATMENT DATE: NORMAL
RAD ONC ARIA PLAN FRACTIONS TREATED TO DATE: 15
RAD ONC ARIA PLAN ID: NORMAL
RAD ONC ARIA PLAN PRESCRIBED DOSE PER FRACTION: 1.8 GY
RAD ONC ARIA PLAN PRIMARY REFERENCE POINT: NORMAL
RAD ONC ARIA PLAN TOTAL FRACTIONS PRESCRIBED: 28
RAD ONC ARIA PLAN TOTAL PRESCRIBED DOSE: 5040 CGY
RAD ONC ARIA REFERENCE POINT DOSAGE GIVEN TO DATE: 27 GY
RAD ONC ARIA REFERENCE POINT ID: NORMAL
RAD ONC ARIA REFERENCE POINT SESSION DOSAGE GIVEN: 1.8 GY

## 2025-04-17 PROCEDURE — 77336 RADIATION PHYSICS CONSULT: CPT | Performed by: RADIOLOGY

## 2025-04-17 PROCEDURE — 77014 CHG CT GUIDANCE RADIATION THERAPY FLDS PLACEMENT: CPT | Performed by: RADIOLOGY

## 2025-04-17 PROCEDURE — 2580000003 HC RX 258: Performed by: INTERNAL MEDICINE

## 2025-04-17 PROCEDURE — 6360000002 HC RX W HCPCS: Performed by: INTERNAL MEDICINE

## 2025-04-17 PROCEDURE — 96367 TX/PROPH/DG ADDL SEQ IV INF: CPT

## 2025-04-17 PROCEDURE — 2500000003 HC RX 250 WO HCPCS: Performed by: INTERNAL MEDICINE

## 2025-04-17 PROCEDURE — 77386: CPT | Performed by: RADIOLOGY

## 2025-04-17 PROCEDURE — 96413 CHEMO IV INFUSION 1 HR: CPT

## 2025-04-17 RX ORDER — HEPARIN 100 UNIT/ML
500 SYRINGE INTRAVENOUS PRN
Status: CANCELLED | OUTPATIENT
Start: 2025-04-18

## 2025-04-17 RX ORDER — SODIUM CHLORIDE 0.9 % (FLUSH) 0.9 %
5-40 SYRINGE (ML) INJECTION PRN
Status: DISCONTINUED | OUTPATIENT
Start: 2025-04-17 | End: 2025-04-18 | Stop reason: HOSPADM

## 2025-04-17 RX ORDER — SODIUM CHLORIDE 0.9 % (FLUSH) 0.9 %
5-40 SYRINGE (ML) INJECTION PRN
Status: CANCELLED | OUTPATIENT
Start: 2025-04-18

## 2025-04-17 RX ORDER — ALBUTEROL SULFATE 90 UG/1
4 INHALANT RESPIRATORY (INHALATION) PRN
Status: CANCELLED | OUTPATIENT
Start: 2025-04-17

## 2025-04-17 RX ORDER — ONDANSETRON 2 MG/ML
8 INJECTION INTRAMUSCULAR; INTRAVENOUS
Status: CANCELLED | OUTPATIENT
Start: 2025-04-17

## 2025-04-17 RX ORDER — EPINEPHRINE 1 MG/ML
0.3 INJECTION, SOLUTION, CONCENTRATE INTRAVENOUS PRN
Status: CANCELLED | OUTPATIENT
Start: 2025-04-18

## 2025-04-17 RX ORDER — HYDROCORTISONE SODIUM SUCCINATE 100 MG/2ML
100 INJECTION INTRAMUSCULAR; INTRAVENOUS
Status: CANCELLED | OUTPATIENT
Start: 2025-04-18

## 2025-04-17 RX ORDER — ACETAMINOPHEN 325 MG/1
650 TABLET ORAL
Status: CANCELLED | OUTPATIENT
Start: 2025-04-17

## 2025-04-17 RX ORDER — HYDROCORTISONE SODIUM SUCCINATE 100 MG/2ML
100 INJECTION INTRAMUSCULAR; INTRAVENOUS
Status: CANCELLED | OUTPATIENT
Start: 2025-04-17

## 2025-04-17 RX ORDER — ACETAMINOPHEN 325 MG/1
650 TABLET ORAL
Status: CANCELLED | OUTPATIENT
Start: 2025-04-18

## 2025-04-17 RX ORDER — SODIUM CHLORIDE 9 MG/ML
5-250 INJECTION, SOLUTION INTRAVENOUS PRN
Status: CANCELLED | OUTPATIENT
Start: 2025-04-18

## 2025-04-17 RX ORDER — EPINEPHRINE 1 MG/ML
0.3 INJECTION, SOLUTION, CONCENTRATE INTRAVENOUS PRN
Status: CANCELLED | OUTPATIENT
Start: 2025-04-17

## 2025-04-17 RX ORDER — FAMOTIDINE 10 MG/ML
20 INJECTION, SOLUTION INTRAVENOUS
Status: CANCELLED | OUTPATIENT
Start: 2025-04-17

## 2025-04-17 RX ORDER — ALBUTEROL SULFATE 90 UG/1
4 INHALANT RESPIRATORY (INHALATION) PRN
Status: CANCELLED | OUTPATIENT
Start: 2025-04-18

## 2025-04-17 RX ORDER — MEPERIDINE HYDROCHLORIDE 25 MG/ML
12.5 INJECTION INTRAMUSCULAR; INTRAVENOUS; SUBCUTANEOUS PRN
Status: CANCELLED | OUTPATIENT
Start: 2025-04-17

## 2025-04-17 RX ORDER — SODIUM CHLORIDE 9 MG/ML
5-250 INJECTION, SOLUTION INTRAVENOUS PRN
Status: CANCELLED | OUTPATIENT
Start: 2025-04-17

## 2025-04-17 RX ORDER — MEPERIDINE HYDROCHLORIDE 25 MG/ML
12.5 INJECTION INTRAMUSCULAR; INTRAVENOUS; SUBCUTANEOUS PRN
Status: CANCELLED | OUTPATIENT
Start: 2025-04-18

## 2025-04-17 RX ORDER — HEPARIN 100 UNIT/ML
500 SYRINGE INTRAVENOUS PRN
Status: DISCONTINUED | OUTPATIENT
Start: 2025-04-17 | End: 2025-04-18 | Stop reason: HOSPADM

## 2025-04-17 RX ORDER — DIPHENHYDRAMINE HYDROCHLORIDE 50 MG/ML
50 INJECTION, SOLUTION INTRAMUSCULAR; INTRAVENOUS
Status: CANCELLED | OUTPATIENT
Start: 2025-04-17

## 2025-04-17 RX ORDER — SODIUM CHLORIDE 9 MG/ML
INJECTION, SOLUTION INTRAVENOUS CONTINUOUS
Status: CANCELLED | OUTPATIENT
Start: 2025-04-18

## 2025-04-17 RX ORDER — FAMOTIDINE 10 MG/ML
20 INJECTION, SOLUTION INTRAVENOUS
Status: CANCELLED | OUTPATIENT
Start: 2025-04-18

## 2025-04-17 RX ORDER — ONDANSETRON 2 MG/ML
8 INJECTION INTRAMUSCULAR; INTRAVENOUS
Status: CANCELLED | OUTPATIENT
Start: 2025-04-18

## 2025-04-17 RX ORDER — DIPHENHYDRAMINE HYDROCHLORIDE 50 MG/ML
50 INJECTION, SOLUTION INTRAMUSCULAR; INTRAVENOUS
Status: CANCELLED | OUTPATIENT
Start: 2025-04-18

## 2025-04-17 RX ORDER — SODIUM CHLORIDE 9 MG/ML
INJECTION, SOLUTION INTRAVENOUS CONTINUOUS
Status: CANCELLED | OUTPATIENT
Start: 2025-04-17

## 2025-04-17 RX ADMIN — SODIUM CHLORIDE, PRESERVATIVE FREE 10 ML: 5 INJECTION INTRAVENOUS at 15:27

## 2025-04-17 RX ADMIN — DEXAMETHASONE SODIUM PHOSPHATE 16 MG: 10 INJECTION, SOLUTION INTRAMUSCULAR; INTRAVENOUS at 13:47

## 2025-04-17 RX ADMIN — SODIUM CHLORIDE 140 MG: 0.9 INJECTION, SOLUTION INTRAVENOUS at 14:22

## 2025-04-17 RX ADMIN — HEPARIN 500 UNITS: 100 SYRINGE at 15:27

## 2025-04-18 ENCOUNTER — HOSPITAL ENCOUNTER (OUTPATIENT)
Dept: INFUSION THERAPY | Age: 70
Discharge: HOME | End: 2025-04-18
Payer: MEDICARE

## 2025-04-18 ENCOUNTER — HOSPITAL ENCOUNTER (OUTPATIENT)
Dept: RADIATION ONCOLOGY | Facility: HOSPITAL | Age: 70
Setting detail: RADIATION/ONCOLOGY SERIES
Discharge: HOME | End: 2025-04-18
Payer: MEDICARE

## 2025-04-18 VITALS
HEART RATE: 102 BPM | DIASTOLIC BLOOD PRESSURE: 68 MMHG | TEMPERATURE: 98.4 F | SYSTOLIC BLOOD PRESSURE: 117 MMHG | OXYGEN SATURATION: 96 % | RESPIRATION RATE: 18 BRPM

## 2025-04-18 DIAGNOSIS — C7A.8 NEUROENDOCRINE CARCINOMA OF LUNG (HCC): Primary | ICD-10-CM

## 2025-04-18 LAB
RAD ONC ARIA COURSE ID: NORMAL
RAD ONC ARIA COURSE INTENT: NORMAL
RAD ONC ARIA COURSE LAST TREATMENT DATE: NORMAL
RAD ONC ARIA COURSE START DATE: NORMAL
RAD ONC ARIA COURSE TREATMENT ELAPSED DAYS: 43
RAD ONC ARIA FIRST TREATMENT DATE: NORMAL
RAD ONC ARIA PLAN FRACTIONS TREATED TO DATE: 16
RAD ONC ARIA PLAN ID: NORMAL
RAD ONC ARIA PLAN PRESCRIBED DOSE PER FRACTION: 1.8 GY
RAD ONC ARIA PLAN PRIMARY REFERENCE POINT: NORMAL
RAD ONC ARIA PLAN TOTAL FRACTIONS PRESCRIBED: 28
RAD ONC ARIA PLAN TOTAL PRESCRIBED DOSE: 5040 CGY
RAD ONC ARIA REFERENCE POINT DOSAGE GIVEN TO DATE: 28.8 GY
RAD ONC ARIA REFERENCE POINT ID: NORMAL
RAD ONC ARIA REFERENCE POINT SESSION DOSAGE GIVEN: 1.8 GY

## 2025-04-18 PROCEDURE — 96413 CHEMO IV INFUSION 1 HR: CPT

## 2025-04-18 PROCEDURE — 6360000002 HC RX W HCPCS: Performed by: INTERNAL MEDICINE

## 2025-04-18 PROCEDURE — 2500000003 HC RX 250 WO HCPCS: Performed by: INTERNAL MEDICINE

## 2025-04-18 PROCEDURE — 96367 TX/PROPH/DG ADDL SEQ IV INF: CPT

## 2025-04-18 PROCEDURE — 77014 CHG CT GUIDANCE RADIATION THERAPY FLDS PLACEMENT: CPT | Performed by: RADIOLOGY

## 2025-04-18 PROCEDURE — 77386: CPT | Performed by: RADIOLOGY

## 2025-04-18 PROCEDURE — 2580000003 HC RX 258: Performed by: INTERNAL MEDICINE

## 2025-04-18 PROCEDURE — 96409 CHEMO IV PUSH SNGL DRUG: CPT

## 2025-04-18 RX ORDER — HEPARIN 100 UNIT/ML
500 SYRINGE INTRAVENOUS PRN
Status: DISCONTINUED | OUTPATIENT
Start: 2025-04-18 | End: 2025-04-19 | Stop reason: HOSPADM

## 2025-04-18 RX ORDER — SODIUM CHLORIDE 0.9 % (FLUSH) 0.9 %
5-40 SYRINGE (ML) INJECTION PRN
Status: DISCONTINUED | OUTPATIENT
Start: 2025-04-18 | End: 2025-04-19 | Stop reason: HOSPADM

## 2025-04-18 RX ADMIN — SODIUM CHLORIDE, PRESERVATIVE FREE 10 ML: 5 INJECTION INTRAVENOUS at 13:15

## 2025-04-18 RX ADMIN — DEXAMETHASONE SODIUM PHOSPHATE 16 MG: 10 INJECTION, SOLUTION INTRAMUSCULAR; INTRAVENOUS at 11:30

## 2025-04-18 RX ADMIN — HEPARIN 500 UNITS: 100 SYRINGE at 13:15

## 2025-04-18 RX ADMIN — SODIUM CHLORIDE 140 MG: 0.9 INJECTION, SOLUTION INTRAVENOUS at 12:09

## 2025-04-21 ENCOUNTER — APPOINTMENT (OUTPATIENT)
Dept: RADIATION ONCOLOGY | Facility: HOSPITAL | Age: 70
End: 2025-04-21
Payer: MEDICARE

## 2025-04-21 ENCOUNTER — HOSPITAL ENCOUNTER (OUTPATIENT)
Dept: RADIATION ONCOLOGY | Facility: HOSPITAL | Age: 70
Discharge: HOME | End: 2025-04-21
Payer: MEDICARE

## 2025-04-21 LAB
RAD ONC ARIA COURSE ID: NORMAL
RAD ONC ARIA COURSE INTENT: NORMAL
RAD ONC ARIA COURSE LAST TREATMENT DATE: NORMAL
RAD ONC ARIA COURSE START DATE: NORMAL
RAD ONC ARIA COURSE TREATMENT ELAPSED DAYS: 46
RAD ONC ARIA FIRST TREATMENT DATE: NORMAL
RAD ONC ARIA PLAN FRACTIONS TREATED TO DATE: 17
RAD ONC ARIA PLAN ID: NORMAL
RAD ONC ARIA PLAN PRESCRIBED DOSE PER FRACTION: 1.8 GY
RAD ONC ARIA PLAN PRIMARY REFERENCE POINT: NORMAL
RAD ONC ARIA PLAN TOTAL FRACTIONS PRESCRIBED: 28
RAD ONC ARIA PLAN TOTAL PRESCRIBED DOSE: 5040 CGY
RAD ONC ARIA REFERENCE POINT DOSAGE GIVEN TO DATE: 30.6 GY
RAD ONC ARIA REFERENCE POINT ID: NORMAL
RAD ONC ARIA REFERENCE POINT SESSION DOSAGE GIVEN: 1.8 GY

## 2025-04-21 PROCEDURE — 77386: CPT | Performed by: RADIOLOGY

## 2025-04-21 PROCEDURE — 77014 CHG CT GUIDANCE RADIATION THERAPY FLDS PLACEMENT: CPT | Performed by: RADIOLOGY

## 2025-04-22 ENCOUNTER — HOSPITAL ENCOUNTER (OUTPATIENT)
Dept: RADIATION ONCOLOGY | Facility: HOSPITAL | Age: 70
Discharge: HOME | End: 2025-04-22

## 2025-04-22 LAB
RAD ONC ARIA COURSE ID: NORMAL
RAD ONC ARIA COURSE INTENT: NORMAL
RAD ONC ARIA COURSE LAST TREATMENT DATE: NORMAL
RAD ONC ARIA COURSE START DATE: NORMAL
RAD ONC ARIA COURSE TREATMENT ELAPSED DAYS: 47
RAD ONC ARIA FIRST TREATMENT DATE: NORMAL
RAD ONC ARIA PLAN FRACTIONS TREATED TO DATE: 18
RAD ONC ARIA PLAN ID: NORMAL
RAD ONC ARIA PLAN PRESCRIBED DOSE PER FRACTION: 1.8 GY
RAD ONC ARIA PLAN PRIMARY REFERENCE POINT: NORMAL
RAD ONC ARIA PLAN TOTAL FRACTIONS PRESCRIBED: 28
RAD ONC ARIA PLAN TOTAL PRESCRIBED DOSE: 5040 CGY
RAD ONC ARIA REFERENCE POINT DOSAGE GIVEN TO DATE: 32.4 GY
RAD ONC ARIA REFERENCE POINT ID: NORMAL
RAD ONC ARIA REFERENCE POINT SESSION DOSAGE GIVEN: 1.8 GY

## 2025-04-22 PROCEDURE — 77014 CHG CT GUIDANCE RADIATION THERAPY FLDS PLACEMENT: CPT | Performed by: RADIOLOGY

## 2025-04-22 PROCEDURE — 77386: CPT | Performed by: RADIOLOGY

## 2025-04-23 DIAGNOSIS — J44.9 CHRONIC OBSTRUCTIVE PULMONARY DISEASE, UNSPECIFIED COPD TYPE: ICD-10-CM

## 2025-04-23 RX ORDER — ALBUTEROL SULFATE 90 UG/1
2 AEROSOL, METERED RESPIRATORY (INHALATION) EVERY 4 HOURS PRN
Qty: 18 G | Refills: 2 | Status: SHIPPED | OUTPATIENT
Start: 2025-04-23

## 2025-04-24 ENCOUNTER — HOSPITAL ENCOUNTER (OUTPATIENT)
Dept: RADIATION ONCOLOGY | Facility: HOSPITAL | Age: 70
Discharge: HOME | End: 2025-04-24

## 2025-04-24 LAB
RAD ONC ARIA COURSE ID: NORMAL
RAD ONC ARIA COURSE INTENT: NORMAL
RAD ONC ARIA COURSE LAST TREATMENT DATE: NORMAL
RAD ONC ARIA COURSE START DATE: NORMAL
RAD ONC ARIA COURSE TREATMENT ELAPSED DAYS: 49
RAD ONC ARIA FIRST TREATMENT DATE: NORMAL
RAD ONC ARIA PLAN FRACTIONS TREATED TO DATE: 19
RAD ONC ARIA PLAN ID: NORMAL
RAD ONC ARIA PLAN PRESCRIBED DOSE PER FRACTION: 1.8 GY
RAD ONC ARIA PLAN PRIMARY REFERENCE POINT: NORMAL
RAD ONC ARIA PLAN TOTAL FRACTIONS PRESCRIBED: 28
RAD ONC ARIA PLAN TOTAL PRESCRIBED DOSE: 5040 CGY
RAD ONC ARIA REFERENCE POINT DOSAGE GIVEN TO DATE: 34.2 GY
RAD ONC ARIA REFERENCE POINT ID: NORMAL
RAD ONC ARIA REFERENCE POINT SESSION DOSAGE GIVEN: 1.8 GY

## 2025-04-24 PROCEDURE — 77386: CPT | Performed by: RADIOLOGY

## 2025-04-24 PROCEDURE — 77014 CHG CT GUIDANCE RADIATION THERAPY FLDS PLACEMENT: CPT | Performed by: RADIOLOGY

## 2025-04-25 ENCOUNTER — HOSPITAL ENCOUNTER (OUTPATIENT)
Dept: RADIATION ONCOLOGY | Facility: HOSPITAL | Age: 70
Discharge: HOME | End: 2025-04-25

## 2025-04-25 LAB
RAD ONC ARIA COURSE ID: NORMAL
RAD ONC ARIA COURSE INTENT: NORMAL
RAD ONC ARIA COURSE LAST TREATMENT DATE: NORMAL
RAD ONC ARIA COURSE START DATE: NORMAL
RAD ONC ARIA COURSE TREATMENT ELAPSED DAYS: 50
RAD ONC ARIA FIRST TREATMENT DATE: NORMAL
RAD ONC ARIA PLAN FRACTIONS TREATED TO DATE: 20
RAD ONC ARIA PLAN ID: NORMAL
RAD ONC ARIA PLAN PRESCRIBED DOSE PER FRACTION: 1.8 GY
RAD ONC ARIA PLAN PRIMARY REFERENCE POINT: NORMAL
RAD ONC ARIA PLAN TOTAL FRACTIONS PRESCRIBED: 28
RAD ONC ARIA PLAN TOTAL PRESCRIBED DOSE: 5040 CGY
RAD ONC ARIA REFERENCE POINT DOSAGE GIVEN TO DATE: 36 GY
RAD ONC ARIA REFERENCE POINT ID: NORMAL
RAD ONC ARIA REFERENCE POINT SESSION DOSAGE GIVEN: 1.8 GY

## 2025-04-28 ENCOUNTER — HOSPITAL ENCOUNTER (OUTPATIENT)
Dept: RADIATION ONCOLOGY | Facility: HOSPITAL | Age: 70
Discharge: HOME | End: 2025-04-28
Payer: MEDICARE

## 2025-04-28 LAB
RAD ONC ARIA COURSE ID: NORMAL
RAD ONC ARIA COURSE INTENT: NORMAL
RAD ONC ARIA COURSE LAST TREATMENT DATE: NORMAL
RAD ONC ARIA COURSE START DATE: NORMAL
RAD ONC ARIA COURSE TREATMENT ELAPSED DAYS: 53
RAD ONC ARIA FIRST TREATMENT DATE: NORMAL
RAD ONC ARIA PLAN FRACTIONS TREATED TO DATE: 21
RAD ONC ARIA PLAN ID: NORMAL
RAD ONC ARIA PLAN PRESCRIBED DOSE PER FRACTION: 1.8 GY
RAD ONC ARIA PLAN PRIMARY REFERENCE POINT: NORMAL
RAD ONC ARIA PLAN TOTAL FRACTIONS PRESCRIBED: 28
RAD ONC ARIA PLAN TOTAL PRESCRIBED DOSE: 5040 CGY
RAD ONC ARIA REFERENCE POINT DOSAGE GIVEN TO DATE: 37.8 GY
RAD ONC ARIA REFERENCE POINT ID: NORMAL
RAD ONC ARIA REFERENCE POINT SESSION DOSAGE GIVEN: 1.8 GY

## 2025-04-28 NOTE — PROGRESS NOTES
"Chief Complaint  Hospital Follow Up Visit    Subjective    {Problem List  Visit Diagnosis   Encounters  Notes  Medications  Labs  Result Review Imaging  Media :23}    Ashley Gibbs presents to [facility]  History of Present Illness  The patient is a 69-year-old female who presents today for a hospital follow-up.    She was recently hospitalized due to respiratory distress and cellulitis, which was suspected to be related to her port. Despite the medical team's assessment that the port appeared normal, she continues to experience redness and believes further antibiotic treatment may be necessary. She reports no pain associated with the port. During her hospitalization, she experienced a fever and exacerbation of her COPD symptoms, leading to significant breathing difficulties. However, she did not require ventilator support. Her hospital stay was uneventful, and she has been managing well since discharge. She continues to receive chemotherapy on Wednesdays, Thursdays, and Fridays, along with daily radiation therapy. She also continues her breathing treatments. She was discharged with prescriptions for Levaquin and prednisone.    She has observed elevated blood sugar levels on her lab results but does not monitor her blood sugar at home. She has no prior history of diabetes.    FAMILY HISTORY  The patient reports a family history of diabetes.    MEDICATIONS  Current: Prednisone, vancomycin, Levaquin    Objective   Vital Signs:  /80 (BP Location: Left arm, Patient Position: Sitting, Cuff Size: Adult)   Pulse 112   Temp 98.2 °F (36.8 °C) (Infrared)   Resp 20   Ht 170.2 cm (67\")   Wt 65.8 kg (145 lb)   SpO2 98%   BMI 22.71 kg/m²   Estimated body mass index is 22.71 kg/m² as calculated from the following:    Height as of this encounter: 170.2 cm (67\").    Weight as of this encounter: 65.8 kg (145 lb).       BMI is within normal parameters. No other follow-up for BMI " required.      Physical Exam   Physical Exam  Lungs were auscultated.    Result Review :{Labs  Result Review  Imaging  Med Tab  Media  Procedures :23}    {The following data was reviewed by (Optional):15070}  {Ambulatory Labs (Optional):22901}  {Data reviewed (Optional):59222:::1}  Results  Laboratory Studies  Blood sugar levels have been high.           Assessment and Plan {CC Problem List  Visit Diagnosis   ROS  Review (Popup)  [facility] Maintenance  Quality  BestPractice  Medications  SmartSets  SnapShot Encounters  Media :23}    Diagnoses and all orders for this visit:    1. Chronic obstructive pulmonary disease, unspecified COPD type (Primary)    2. On antineoplastic chemotherapy    3. History of radiation therapy    4. Small cell carcinoma of upper lobe of right lung    5. Elevated fasting blood sugar  -     POC Glycosylated Hemoglobin (Hb A1C)    6. Cellulitis of chest wall    Other orders  -     mupirocin (BACTROBAN) 2 % ointment; Apply 1 Application topically to the appropriate area as directed 2 (Two) Times a Day.  Dispense: 22 g; Refill: 0      Assessment & Plan  1. Chronic Obstructive Pulmonary Disease (COPD).  She was recently hospitalized due to a COPD exacerbation and was treated with steroids. She continues to use oxygen as needed at home. She will continue her current COPD management plan, including breathing treatments. An antibiotic will be prescribed to cover any potential infections.    2. Cellulitis.  She reports redness around her port site but no pain. An antibiotic cream will be prescribed for topical application. If the redness does not improve, she should notify the clinic.    3. Hyperglycemia.  Her blood sugar levels have been elevated since starting treatment. A fingerstick glucose test will be conducted today to rule out diabetes. She is advised to monitor her blood sugar levels closely.     {Time Spent (Optional):69992}  Follow Up {Instructions Charge Capture  Follow-up  Communications :23}    Return in about 3 months (around 7/14/2025) for Recheck.  Patient was given instructions and counseling regarding her condition or for health maintenance advice. Please see specific information pulled into the AVS if appropriate.       {[facility] [employer] Provider Statement:05143}

## 2025-04-28 NOTE — PROGRESS NOTES
"Transitional Care Follow Up Visit  Subjective     Ashley Gibbs is a 69 y.o. female who presents for a transitional care management visit.    Within 48 business hours after discharge our office contacted her via telephone to coordinate her care and needs.      I reviewed and discussed the details of that call along with the discharge summary, hospital problems, inpatient lab results, inpatient diagnostic studies, and consultation reports with Ashley.     Current outpatient and discharge medications have been reconciled for the patient.  Reviewed by: CARRIE Medina          4/8/2025     1:37 PM   Date of TCM Phone Call   [facility]   Date of Admission 4/6/2025   Date of Discharge 4/8/2025   Discharge Disposition Home or Self Care     Risk for Readmission (LACE) No data recorded    History of Present Illness   Course During Hospital Stay:    Copied from [place] summary:  \"Hospital Stay   Narrative of Hospital Course:     69-year-old lady with a history of anxiety and depression, small cell lung cancer on chemoradiation, chronic respiratory failure 2 L of oxygen at baseline, COPD, depression, hyperlipidemia, hypothyroidism, restless syndrome, presented to the hospital 4/7/25 with concerns of cough and fever. Was placed on broad-spectrum antibiotics and admitted for further evaluation. Chest x-ray obtained in the emergency room with no evidence of infiltrative process or effusion. Patient was treated for COPD exacerbation, remained stable in house, requiring baseline 2 L of oxygen. Was seen by oncology in-house and to follow-up outpatient. Patient's Mediport with no sign of infection, patient ambulating independently in room, requesting to go home today. Encouraged to follow-up outpatient with PCP for continued management of chronic medical problems as well as oncology outpatient. \"    =================================================================================================  Since " "dc :  The patient is a 69-year-old female who presents today for a hospital follow-up.    Despite the medical team's assessment that the port appeared normal, she continues to experience redness and believes further antibiotic treatment may be necessary. She reports no pain associated with the port. During her hospitalization, she experienced a fever and exacerbation of her COPD symptoms, leading to significant breathing difficulties. However, she did not require ventilator support. Her hospital stay was uneventful, and she has been managing well since discharge. She continues to receive chemotherapy on Wednesdays, Thursdays, and Fridays, along with daily radiation therapy. She also continues her breathing treatments. She was discharged with prescriptions for Levaquin and prednisone.    She has observed elevated blood sugar levels on her lab results but does not monitor her blood sugar at home. She has no prior history of diabetes.    FAMILY HISTORY  The patient reports a family history of diabetes.    MEDICATIONS  Current: Prednisone, vancomycin, Levaquin         The following portions of the patient's history were reviewed and updated as appropriate: allergies, current medications, past family history, past medical history, past social history, past surgical history, and problem list.    Review of Systems    Objective   /80 (BP Location: Left arm, Patient Position: Sitting, Cuff Size: Adult)   Pulse 112   Temp 98.2 °F (36.8 °C) (Infrared)   Resp 20   Ht 170.2 cm (67\")   Wt 65.8 kg (145 lb)   SpO2 98%   BMI 22.71 kg/m²   Physical Exam  Vitals and nursing note reviewed.   Constitutional:       Appearance: She is well-developed.   HENT:      Head: Normocephalic and atraumatic.   Eyes:      Conjunctiva/sclera: Conjunctivae normal.   Cardiovascular:      Rate and Rhythm: Normal rate and regular rhythm.   Pulmonary:      Effort: Pulmonary effort is normal.   Musculoskeletal:      Cervical back: Normal range " of motion.   Skin:     Findings: Rash (around port, right chest wall) present.   Neurological:      General: No focal deficit present.      Mental Status: She is alert and oriented to person, place, and time.   Psychiatric:         Mood and Affect: Mood normal.         Behavior: Behavior normal.         Assessment & Plan   Diagnoses and all orders for this visit:    1. Chronic obstructive pulmonary disease, unspecified COPD type (Primary)    2. On antineoplastic chemotherapy    3. History of radiation therapy    4. Small cell carcinoma of upper lobe of right lung    5. Elevated fasting blood sugar  -     POC Glycosylated Hemoglobin (Hb A1C)    6. Cellulitis of chest wall    Other orders  -     mupirocin (BACTROBAN) 2 % ointment; Apply 1 Application topically to the appropriate area as directed 2 (Two) Times a Day.  Dispense: 22 g; Refill: 0        Plan:  Poct due to recent elevated fasting glucoses noted on labs- glucose normal   Bactroban ointment for chest wall   Continue all other current treatment             1.76

## 2025-04-30 ENCOUNTER — APPOINTMENT (OUTPATIENT)
Dept: RADIATION ONCOLOGY | Facility: HOSPITAL | Age: 70
End: 2025-04-30
Payer: MEDICARE

## 2025-05-01 ENCOUNTER — HOSPITAL ENCOUNTER (OUTPATIENT)
Dept: RADIATION ONCOLOGY | Facility: HOSPITAL | Age: 70
Discharge: HOME | End: 2025-05-01

## 2025-05-01 ENCOUNTER — HOSPITAL ENCOUNTER (OUTPATIENT)
Dept: GENERAL RADIOLOGY | Facility: HOSPITAL | Age: 70
Discharge: HOME | End: 2025-05-01
Admitting: RADIOLOGY
Payer: MEDICARE

## 2025-05-01 ENCOUNTER — HOSPITAL ENCOUNTER (OUTPATIENT)
Dept: RADIATION ONCOLOGY | Facility: HOSPITAL | Age: 70
Setting detail: RADIATION/ONCOLOGY SERIES
End: 2025-05-01
Payer: MEDICARE

## 2025-05-01 ENCOUNTER — OFFICE VISIT (OUTPATIENT)
Dept: PALLATIVE CARE | Age: 70
End: 2025-05-01
Payer: MEDICARE

## 2025-05-01 DIAGNOSIS — C7A.8 NEUROENDOCRINE CARCINOMA OF LUNG (HCC): ICD-10-CM

## 2025-05-01 DIAGNOSIS — R91.8 LUNG MASS: Primary | ICD-10-CM

## 2025-05-01 DIAGNOSIS — J44.9 CHRONIC OBSTRUCTIVE PULMONARY DISEASE, UNSPECIFIED COPD TYPE (HCC): ICD-10-CM

## 2025-05-01 DIAGNOSIS — R50.9 FEVER, UNSPECIFIED FEVER CAUSE: Primary | ICD-10-CM

## 2025-05-01 DIAGNOSIS — Z92.3 HISTORY OF RADIATION THERAPY: ICD-10-CM

## 2025-05-01 DIAGNOSIS — Z51.5 ENCOUNTER FOR PALLIATIVE CARE: ICD-10-CM

## 2025-05-01 DIAGNOSIS — R91.8 LUNG MASS: ICD-10-CM

## 2025-05-01 LAB
RAD ONC ARIA COURSE ID: NORMAL
RAD ONC ARIA COURSE INTENT: NORMAL
RAD ONC ARIA COURSE LAST TREATMENT DATE: NORMAL
RAD ONC ARIA COURSE START DATE: NORMAL
RAD ONC ARIA COURSE TREATMENT ELAPSED DAYS: 56
RAD ONC ARIA FIRST TREATMENT DATE: NORMAL
RAD ONC ARIA PLAN FRACTIONS TREATED TO DATE: 22
RAD ONC ARIA PLAN ID: NORMAL
RAD ONC ARIA PLAN PRESCRIBED DOSE PER FRACTION: 1.8 GY
RAD ONC ARIA PLAN PRIMARY REFERENCE POINT: NORMAL
RAD ONC ARIA PLAN TOTAL FRACTIONS PRESCRIBED: 28
RAD ONC ARIA PLAN TOTAL PRESCRIBED DOSE: 5040 CGY
RAD ONC ARIA REFERENCE POINT DOSAGE GIVEN TO DATE: 39.6 GY
RAD ONC ARIA REFERENCE POINT ID: NORMAL
RAD ONC ARIA REFERENCE POINT SESSION DOSAGE GIVEN: 1.8 GY

## 2025-05-01 PROCEDURE — 77386: CPT | Performed by: RADIOLOGY

## 2025-05-01 PROCEDURE — G8420 CALC BMI NORM PARAMETERS: HCPCS | Performed by: NURSE PRACTITIONER

## 2025-05-01 PROCEDURE — 99350 HOME/RES VST EST HIGH MDM 60: CPT | Performed by: NURSE PRACTITIONER

## 2025-05-01 PROCEDURE — 3017F COLORECTAL CA SCREEN DOC REV: CPT | Performed by: NURSE PRACTITIONER

## 2025-05-01 PROCEDURE — 73060 X-RAY EXAM OF HUMERUS: CPT

## 2025-05-01 PROCEDURE — 1123F ACP DISCUSS/DSCN MKR DOCD: CPT | Performed by: NURSE PRACTITIONER

## 2025-05-01 PROCEDURE — 77014 CHG CT GUIDANCE RADIATION THERAPY FLDS PLACEMENT: CPT | Performed by: RADIOLOGY

## 2025-05-01 PROCEDURE — 1090F PRES/ABSN URINE INCON ASSESS: CPT | Performed by: NURSE PRACTITIONER

## 2025-05-01 PROCEDURE — 1036F TOBACCO NON-USER: CPT | Performed by: NURSE PRACTITIONER

## 2025-05-03 ENCOUNTER — APPOINTMENT (OUTPATIENT)
Dept: GENERAL RADIOLOGY | Age: 70
DRG: 194 | End: 2025-05-03
Payer: MEDICARE

## 2025-05-03 ENCOUNTER — HOSPITAL ENCOUNTER (INPATIENT)
Age: 70
LOS: 4 days | Discharge: HOME | DRG: 194 | End: 2025-05-07
Attending: EMERGENCY MEDICINE | Admitting: HOSPITALIST
Payer: MEDICARE

## 2025-05-03 DIAGNOSIS — J18.9 COMMUNITY ACQUIRED PNEUMONIA OF LEFT LOWER LOBE OF LUNG: ICD-10-CM

## 2025-05-03 DIAGNOSIS — J18.9 PNEUMONIA OF LEFT LOWER LOBE DUE TO INFECTIOUS ORGANISM: Primary | ICD-10-CM

## 2025-05-03 PROBLEM — C34.90 SMALL CELL LUNG CANCER (HCC): Status: ACTIVE | Noted: 2025-05-03

## 2025-05-03 LAB
ALBUMIN SERPL-MCNC: 3.4 G/DL (ref 3.5–5.2)
ALP SERPL-CCNC: 154 U/L (ref 35–104)
ALT SERPL-CCNC: 13 U/L (ref 10–35)
ANION GAP SERPL CALCULATED.3IONS-SCNC: 9 MMOL/L (ref 8–16)
ANISOCYTOSIS BLD QL SMEAR: ABNORMAL
AST SERPL-CCNC: 18 U/L (ref 10–35)
B PARAP IS1001 DNA NPH QL NAA+NON-PROBE: NOT DETECTED
B PERT.PT PRMT NPH QL NAA+NON-PROBE: NOT DETECTED
BASOPHILS # BLD: 0 K/UL (ref 0–0.2)
BASOPHILS NFR BLD: 0 % (ref 0–1)
BILIRUB SERPL-MCNC: 0.3 MG/DL (ref 0.2–1.2)
BILIRUB UR QL STRIP: NEGATIVE
BUN SERPL-MCNC: 11 MG/DL (ref 8–23)
C PNEUM DNA NPH QL NAA+NON-PROBE: NOT DETECTED
CALCIUM SERPL-MCNC: 9 MG/DL (ref 8.8–10.2)
CHLORIDE SERPL-SCNC: 97 MMOL/L (ref 98–107)
CLARITY UR: CLEAR
CO2 SERPL-SCNC: 30 MMOL/L (ref 22–29)
COLOR UR: YELLOW
CREAT SERPL-MCNC: 0.6 MG/DL (ref 0.5–0.9)
EOSINOPHIL # BLD: 0 K/UL (ref 0–0.6)
EOSINOPHIL NFR BLD: 0 % (ref 0–5)
ERYTHROCYTE [DISTWIDTH] IN BLOOD BY AUTOMATED COUNT: 15.9 % (ref 11.5–14.5)
FLUAV RNA NPH QL NAA+NON-PROBE: NOT DETECTED
FLUBV RNA NPH QL NAA+NON-PROBE: NOT DETECTED
GLUCOSE SERPL-MCNC: 141 MG/DL (ref 70–99)
GLUCOSE UR STRIP.AUTO-MCNC: NEGATIVE MG/DL
HADV DNA NPH QL NAA+NON-PROBE: NOT DETECTED
HCOV 229E RNA NPH QL NAA+NON-PROBE: NOT DETECTED
HCOV HKU1 RNA NPH QL NAA+NON-PROBE: NOT DETECTED
HCOV NL63 RNA NPH QL NAA+NON-PROBE: NOT DETECTED
HCOV OC43 RNA NPH QL NAA+NON-PROBE: NOT DETECTED
HCT VFR BLD AUTO: 26.4 % (ref 37–47)
HGB BLD-MCNC: 8.4 G/DL (ref 12–16)
HGB UR STRIP.AUTO-MCNC: NEGATIVE MG/L
HMPV RNA NPH QL NAA+NON-PROBE: NOT DETECTED
HPIV1 RNA NPH QL NAA+NON-PROBE: NOT DETECTED
HPIV2 RNA NPH QL NAA+NON-PROBE: NOT DETECTED
HPIV3 RNA NPH QL NAA+NON-PROBE: NOT DETECTED
HPIV4 RNA NPH QL NAA+NON-PROBE: NOT DETECTED
HYPOCHROMIA BLD QL SMEAR: ABNORMAL
IMM GRANULOCYTES # BLD: 0 K/UL
KETONES UR STRIP.AUTO-MCNC: NEGATIVE MG/DL
LACTATE BLDV-SCNC: 1 MG/DL (ref 0.5–1.9)
LEGIONELLA AG UR QL: NORMAL
LEUKOCYTE ESTERASE UR QL STRIP.AUTO: NEGATIVE
LYMPHOCYTES # BLD: 0.6 K/UL (ref 1.1–4.5)
LYMPHOCYTES NFR BLD: 28 % (ref 20–40)
M PNEUMO DNA NPH QL NAA+NON-PROBE: NOT DETECTED
MACROCYTES BLD QL SMEAR: ABNORMAL
MCH RBC QN AUTO: 30.3 PG (ref 27–31)
MCHC RBC AUTO-ENTMCNC: 31.8 G/DL (ref 33–37)
MCV RBC AUTO: 95.3 FL (ref 81–99)
MONOCYTES # BLD: 0.1 K/UL (ref 0–0.9)
MONOCYTES NFR BLD: 4 % (ref 0–10)
MRSA DNA SPEC QL NAA+PROBE: NOT DETECTED
NEUTROPHILS # BLD: 1.4 K/UL (ref 1.5–7.5)
NEUTS BAND NFR BLD MANUAL: 20 % (ref 0–5)
NEUTS SEG NFR BLD: 48 % (ref 50–65)
NITRITE UR QL STRIP.AUTO: NEGATIVE
OVALOCYTES BLD QL SMEAR: ABNORMAL
PH UR STRIP.AUTO: 6 [PH] (ref 5–8)
PLATELET # BLD AUTO: 174 K/UL (ref 130–400)
PLATELET SLIDE REVIEW: ADEQUATE
PMV BLD AUTO: 10.3 FL (ref 9.4–12.3)
POLYCHROMASIA BLD QL SMEAR: ABNORMAL
POTASSIUM SERPL-SCNC: 3.4 MMOL/L (ref 3.5–5.1)
PROCALCITONIN: 0.55 NG/ML (ref 0–0.09)
PROT SERPL-MCNC: 6.2 G/DL (ref 6.4–8.3)
PROT UR STRIP.AUTO-MCNC: NEGATIVE MG/DL
RBC # BLD AUTO: 2.77 M/UL (ref 4.2–5.4)
RSV RNA NPH QL NAA+NON-PROBE: NOT DETECTED
RV+EV RNA NPH QL NAA+NON-PROBE: NOT DETECTED
S PNEUM AG SPEC QL: NORMAL
SARS-COV-2 RNA NPH QL NAA+NON-PROBE: NOT DETECTED
SODIUM SERPL-SCNC: 136 MMOL/L (ref 136–145)
SP GR UR STRIP.AUTO: 1.01 (ref 1–1.03)
UROBILINOGEN UR STRIP.AUTO-MCNC: 0.2 E.U./DL
WBC # BLD AUTO: 2 K/UL (ref 4.8–10.8)

## 2025-05-03 PROCEDURE — 2700000000 HC OXYGEN THERAPY PER DAY

## 2025-05-03 PROCEDURE — 2580000003 HC RX 258: Performed by: NURSE PRACTITIONER

## 2025-05-03 PROCEDURE — 1200000000 HC SEMI PRIVATE

## 2025-05-03 PROCEDURE — 2500000003 HC RX 250 WO HCPCS: Performed by: EMERGENCY MEDICINE

## 2025-05-03 PROCEDURE — 84145 PROCALCITONIN (PCT): CPT

## 2025-05-03 PROCEDURE — 85025 COMPLETE CBC W/AUTO DIFF WBC: CPT

## 2025-05-03 PROCEDURE — 80053 COMPREHEN METABOLIC PANEL: CPT

## 2025-05-03 PROCEDURE — 6360000002 HC RX W HCPCS: Performed by: NURSE PRACTITIONER

## 2025-05-03 PROCEDURE — 96375 TX/PRO/DX INJ NEW DRUG ADDON: CPT

## 2025-05-03 PROCEDURE — 83605 ASSAY OF LACTIC ACID: CPT

## 2025-05-03 PROCEDURE — 36415 COLL VENOUS BLD VENIPUNCTURE: CPT

## 2025-05-03 PROCEDURE — 96365 THER/PROPH/DIAG IV INF INIT: CPT

## 2025-05-03 PROCEDURE — 99285 EMERGENCY DEPT VISIT HI MDM: CPT

## 2025-05-03 PROCEDURE — 81003 URINALYSIS AUTO W/O SCOPE: CPT

## 2025-05-03 PROCEDURE — 6360000002 HC RX W HCPCS: Performed by: EMERGENCY MEDICINE

## 2025-05-03 PROCEDURE — 87641 MR-STAPH DNA AMP PROBE: CPT

## 2025-05-03 PROCEDURE — 93005 ELECTROCARDIOGRAM TRACING: CPT

## 2025-05-03 PROCEDURE — 87449 NOS EACH ORGANISM AG IA: CPT

## 2025-05-03 PROCEDURE — 96367 TX/PROPH/DG ADDL SEQ IV INF: CPT

## 2025-05-03 PROCEDURE — 2500000003 HC RX 250 WO HCPCS: Performed by: NURSE PRACTITIONER

## 2025-05-03 PROCEDURE — 94150 VITAL CAPACITY TEST: CPT

## 2025-05-03 PROCEDURE — 6370000000 HC RX 637 (ALT 250 FOR IP): Performed by: EMERGENCY MEDICINE

## 2025-05-03 PROCEDURE — 0202U NFCT DS 22 TRGT SARS-COV-2: CPT

## 2025-05-03 PROCEDURE — 71045 X-RAY EXAM CHEST 1 VIEW: CPT

## 2025-05-03 PROCEDURE — 6370000000 HC RX 637 (ALT 250 FOR IP): Performed by: NURSE PRACTITIONER

## 2025-05-03 PROCEDURE — 94640 AIRWAY INHALATION TREATMENT: CPT

## 2025-05-03 PROCEDURE — 2580000003 HC RX 258: Performed by: EMERGENCY MEDICINE

## 2025-05-03 PROCEDURE — 87040 BLOOD CULTURE FOR BACTERIA: CPT

## 2025-05-03 RX ORDER — SODIUM CHLORIDE 9 MG/ML
INJECTION, SOLUTION INTRAVENOUS PRN
Status: DISCONTINUED | OUTPATIENT
Start: 2025-05-03 | End: 2025-05-07 | Stop reason: HOSPADM

## 2025-05-03 RX ORDER — ONDANSETRON 2 MG/ML
4 INJECTION INTRAMUSCULAR; INTRAVENOUS EVERY 6 HOURS PRN
Status: DISCONTINUED | OUTPATIENT
Start: 2025-05-03 | End: 2025-05-07 | Stop reason: HOSPADM

## 2025-05-03 RX ORDER — CITALOPRAM HYDROBROMIDE 10 MG/1
40 TABLET ORAL DAILY
Status: DISCONTINUED | OUTPATIENT
Start: 2025-05-03 | End: 2025-05-07 | Stop reason: HOSPADM

## 2025-05-03 RX ORDER — ONDANSETRON 4 MG/1
4 TABLET, ORALLY DISINTEGRATING ORAL EVERY 8 HOURS PRN
Status: DISCONTINUED | OUTPATIENT
Start: 2025-05-03 | End: 2025-05-07 | Stop reason: HOSPADM

## 2025-05-03 RX ORDER — ACETAMINOPHEN 325 MG/1
650 TABLET ORAL EVERY 6 HOURS PRN
Status: DISCONTINUED | OUTPATIENT
Start: 2025-05-03 | End: 2025-05-07 | Stop reason: HOSPADM

## 2025-05-03 RX ORDER — LEVALBUTEROL INHALATION SOLUTION 0.63 MG/3ML
0.63 SOLUTION RESPIRATORY (INHALATION) EVERY 8 HOURS PRN
Status: DISCONTINUED | OUTPATIENT
Start: 2025-05-03 | End: 2025-05-07 | Stop reason: HOSPADM

## 2025-05-03 RX ORDER — LEVOTHYROXINE SODIUM 112 UG/1
112 TABLET ORAL DAILY
Status: DISCONTINUED | OUTPATIENT
Start: 2025-05-03 | End: 2025-05-07 | Stop reason: HOSPADM

## 2025-05-03 RX ORDER — GUAIFENESIN 600 MG/1
600 TABLET, EXTENDED RELEASE ORAL 2 TIMES DAILY
Status: DISCONTINUED | OUTPATIENT
Start: 2025-05-03 | End: 2025-05-05

## 2025-05-03 RX ORDER — PANTOPRAZOLE SODIUM 40 MG/1
40 TABLET, DELAYED RELEASE ORAL
Status: DISCONTINUED | OUTPATIENT
Start: 2025-05-04 | End: 2025-05-07 | Stop reason: HOSPADM

## 2025-05-03 RX ORDER — POLYETHYLENE GLYCOL 3350 17 G/17G
17 POWDER, FOR SOLUTION ORAL DAILY PRN
Status: DISCONTINUED | OUTPATIENT
Start: 2025-05-03 | End: 2025-05-07 | Stop reason: HOSPADM

## 2025-05-03 RX ORDER — SODIUM CHLORIDE 0.9 % (FLUSH) 0.9 %
5-40 SYRINGE (ML) INJECTION PRN
Status: DISCONTINUED | OUTPATIENT
Start: 2025-05-03 | End: 2025-05-07 | Stop reason: HOSPADM

## 2025-05-03 RX ORDER — PREDNISONE 20 MG/1
40 TABLET ORAL DAILY
Status: DISCONTINUED | OUTPATIENT
Start: 2025-05-06 | End: 2025-05-07 | Stop reason: HOSPADM

## 2025-05-03 RX ORDER — SODIUM CHLORIDE 9 MG/ML
INJECTION, SOLUTION INTRAVENOUS CONTINUOUS
Status: ACTIVE | OUTPATIENT
Start: 2025-05-03 | End: 2025-05-04

## 2025-05-03 RX ORDER — MIRTAZAPINE 7.5 MG/1
15 TABLET, FILM COATED ORAL NIGHTLY
Status: DISCONTINUED | OUTPATIENT
Start: 2025-05-03 | End: 2025-05-07 | Stop reason: HOSPADM

## 2025-05-03 RX ORDER — DIAZEPAM 5 MG/1
5 TABLET ORAL DAILY PRN
Status: DISCONTINUED | OUTPATIENT
Start: 2025-05-03 | End: 2025-05-07 | Stop reason: HOSPADM

## 2025-05-03 RX ORDER — IPRATROPIUM BROMIDE AND ALBUTEROL SULFATE 2.5; .5 MG/3ML; MG/3ML
1 SOLUTION RESPIRATORY (INHALATION) ONCE
Status: COMPLETED | OUTPATIENT
Start: 2025-05-03 | End: 2025-05-03

## 2025-05-03 RX ORDER — SODIUM CHLORIDE 0.9 % (FLUSH) 0.9 %
5-40 SYRINGE (ML) INJECTION EVERY 12 HOURS SCHEDULED
Status: DISCONTINUED | OUTPATIENT
Start: 2025-05-03 | End: 2025-05-07 | Stop reason: HOSPADM

## 2025-05-03 RX ORDER — GABAPENTIN 600 MG/1
600 TABLET ORAL 2 TIMES DAILY
Status: DISCONTINUED | OUTPATIENT
Start: 2025-05-03 | End: 2025-05-07 | Stop reason: HOSPADM

## 2025-05-03 RX ORDER — 0.9 % SODIUM CHLORIDE 0.9 %
500 INTRAVENOUS SOLUTION INTRAVENOUS ONCE
Status: COMPLETED | OUTPATIENT
Start: 2025-05-03 | End: 2025-05-03

## 2025-05-03 RX ORDER — SODIUM CHLORIDE, SODIUM LACTATE, POTASSIUM CHLORIDE, AND CALCIUM CHLORIDE .6; .31; .03; .02 G/100ML; G/100ML; G/100ML; G/100ML
30 INJECTION, SOLUTION INTRAVENOUS ONCE
Status: COMPLETED | OUTPATIENT
Start: 2025-05-03 | End: 2025-05-03

## 2025-05-03 RX ORDER — OXYCODONE AND ACETAMINOPHEN 10; 325 MG/1; MG/1
1 TABLET ORAL EVERY 6 HOURS PRN
Status: DISCONTINUED | OUTPATIENT
Start: 2025-05-03 | End: 2025-05-07 | Stop reason: HOSPADM

## 2025-05-03 RX ORDER — ACETAMINOPHEN 325 MG/1
650 TABLET ORAL ONCE
Status: COMPLETED | OUTPATIENT
Start: 2025-05-03 | End: 2025-05-03

## 2025-05-03 RX ORDER — ROPINIROLE 0.25 MG/1
0.25 TABLET, FILM COATED ORAL NIGHTLY
Status: DISCONTINUED | OUTPATIENT
Start: 2025-05-03 | End: 2025-05-07 | Stop reason: HOSPADM

## 2025-05-03 RX ORDER — ENOXAPARIN SODIUM 100 MG/ML
40 INJECTION SUBCUTANEOUS DAILY
Status: DISCONTINUED | OUTPATIENT
Start: 2025-05-03 | End: 2025-05-07 | Stop reason: HOSPADM

## 2025-05-03 RX ADMIN — SODIUM CHLORIDE: 0.9 INJECTION, SOLUTION INTRAVENOUS at 13:52

## 2025-05-03 RX ADMIN — TIZANIDINE 4 MG: 4 TABLET ORAL at 13:50

## 2025-05-03 RX ADMIN — ACETAMINOPHEN 650 MG: 325 TABLET ORAL at 09:43

## 2025-05-03 RX ADMIN — GUAIFENESIN 600 MG: 600 TABLET ORAL at 13:50

## 2025-05-03 RX ADMIN — TIZANIDINE 4 MG: 4 TABLET ORAL at 20:47

## 2025-05-03 RX ADMIN — IPRATROPIUM BROMIDE AND ALBUTEROL SULFATE 1 DOSE: 2.5; .5 SOLUTION RESPIRATORY (INHALATION) at 09:34

## 2025-05-03 RX ADMIN — OXYCODONE AND ACETAMINOPHEN 1 TABLET: 10; 325 TABLET ORAL at 13:54

## 2025-05-03 RX ADMIN — POTASSIUM BICARBONATE 40 MEQ: 782 TABLET, EFFERVESCENT ORAL at 10:45

## 2025-05-03 RX ADMIN — Medication 1500 MG: at 10:45

## 2025-05-03 RX ADMIN — IPRATROPIUM BROMIDE 0.5 MG: 0.5 SOLUTION RESPIRATORY (INHALATION) at 14:41

## 2025-05-03 RX ADMIN — ENOXAPARIN SODIUM 40 MG: 100 INJECTION SUBCUTANEOUS at 13:50

## 2025-05-03 RX ADMIN — GUAIFENESIN 600 MG: 600 TABLET ORAL at 20:47

## 2025-05-03 RX ADMIN — GABAPENTIN 600 MG: 600 TABLET, FILM COATED ORAL at 13:50

## 2025-05-03 RX ADMIN — VANCOMYCIN HYDROCHLORIDE 1000 MG: 1 INJECTION, POWDER, LYOPHILIZED, FOR SOLUTION INTRAVENOUS at 22:43

## 2025-05-03 RX ADMIN — CEFEPIME 2000 MG: 2 INJECTION, POWDER, FOR SOLUTION INTRAVENOUS at 16:57

## 2025-05-03 RX ADMIN — LEVOTHYROXINE SODIUM 112 MCG: 0.11 TABLET ORAL at 13:51

## 2025-05-03 RX ADMIN — WATER 40 MG: 1 INJECTION INTRAMUSCULAR; INTRAVENOUS; SUBCUTANEOUS at 16:57

## 2025-05-03 RX ADMIN — WATER 40 MG: 1 INJECTION INTRAMUSCULAR; INTRAVENOUS; SUBCUTANEOUS at 22:40

## 2025-05-03 RX ADMIN — IPRATROPIUM BROMIDE 0.5 MG: 0.5 SOLUTION RESPIRATORY (INHALATION) at 18:21

## 2025-05-03 RX ADMIN — METHYLPREDNISOLONE SODIUM SUCCINATE 125 MG: 125 INJECTION INTRAMUSCULAR; INTRAVENOUS at 09:45

## 2025-05-03 RX ADMIN — SODIUM CHLORIDE, SODIUM LACTATE, POTASSIUM CHLORIDE, AND CALCIUM CHLORIDE 2040 ML: .6; .31; .03; .02 INJECTION, SOLUTION INTRAVENOUS at 09:46

## 2025-05-03 RX ADMIN — MIRTAZAPINE 15 MG: 7.5 TABLET, FILM COATED ORAL at 20:47

## 2025-05-03 RX ADMIN — GABAPENTIN 600 MG: 600 TABLET, FILM COATED ORAL at 20:47

## 2025-05-03 RX ADMIN — CITALOPRAM HYDROBROMIDE 40 MG: 10 TABLET ORAL at 13:50

## 2025-05-03 RX ADMIN — OXYCODONE AND ACETAMINOPHEN 1 TABLET: 10; 325 TABLET ORAL at 20:47

## 2025-05-03 RX ADMIN — CEFEPIME 2000 MG: 2 INJECTION, POWDER, FOR SOLUTION INTRAVENOUS at 09:48

## 2025-05-03 RX ADMIN — ROPINIROLE HYDROCHLORIDE 0.25 MG: 0.25 TABLET, FILM COATED ORAL at 20:47

## 2025-05-03 RX ADMIN — SODIUM CHLORIDE 500 ML: 0.9 INJECTION, SOLUTION INTRAVENOUS at 16:43

## 2025-05-03 SDOH — ECONOMIC STABILITY: FOOD INSECURITY: WITHIN THE PAST 12 MONTHS, YOU WORRIED THAT YOUR FOOD WOULD RUN OUT BEFORE YOU GOT MONEY TO BUY MORE.: NEVER TRUE

## 2025-05-03 SDOH — ECONOMIC STABILITY: INCOME INSECURITY: IN THE LAST 12 MONTHS, WAS THERE A TIME WHEN YOU WERE NOT ABLE TO PAY THE MORTGAGE OR RENT ON TIME?: NO

## 2025-05-03 SDOH — HEALTH STABILITY: PHYSICAL HEALTH: ON AVERAGE, HOW MANY DAYS PER WEEK DO YOU ENGAGE IN MODERATE TO STRENUOUS EXERCISE (LIKE A BRISK WALK)?: 0 DAYS

## 2025-05-03 SDOH — HEALTH STABILITY: PHYSICAL HEALTH: ON AVERAGE, HOW MANY MINUTES DO YOU ENGAGE IN EXERCISE AT THIS LEVEL?: 0 MIN

## 2025-05-03 SDOH — ECONOMIC STABILITY: INCOME INSECURITY: HOW HARD IS IT FOR YOU TO PAY FOR THE VERY BASICS LIKE FOOD, HOUSING, MEDICAL CARE, AND HEATING?: NOT HARD AT ALL

## 2025-05-03 SDOH — ECONOMIC STABILITY: INCOME INSECURITY: IN THE PAST 12 MONTHS, HAS THE ELECTRIC, GAS, OIL, OR WATER COMPANY THREATENED TO SHUT OFF SERVICE IN YOUR HOME?: NO

## 2025-05-03 ASSESSMENT — PATIENT HEALTH QUESTIONNAIRE - PHQ9
2. FEELING DOWN, DEPRESSED OR HOPELESS: NOT AT ALL
SUM OF ALL RESPONSES TO PHQ QUESTIONS 1-9: 0
1. LITTLE INTEREST OR PLEASURE IN DOING THINGS: NOT AT ALL

## 2025-05-03 ASSESSMENT — PAIN DESCRIPTION - DESCRIPTORS: DESCRIPTORS: POUNDING

## 2025-05-03 ASSESSMENT — LIFESTYLE VARIABLES
HOW MANY STANDARD DRINKS CONTAINING ALCOHOL DO YOU HAVE ON A TYPICAL DAY: PATIENT DOES NOT DRINK
HOW OFTEN DO YOU HAVE A DRINK CONTAINING ALCOHOL: NEVER

## 2025-05-03 ASSESSMENT — PAIN SCALES - GENERAL
PAINLEVEL_OUTOF10: 9
PAINLEVEL_OUTOF10: 8
PAINLEVEL_OUTOF10: 9

## 2025-05-03 ASSESSMENT — PAIN DESCRIPTION - LOCATION
LOCATION: BACK

## 2025-05-03 ASSESSMENT — PAIN - FUNCTIONAL ASSESSMENT: PAIN_FUNCTIONAL_ASSESSMENT: 0-10

## 2025-05-03 NOTE — ED NOTES
ED TO INPATIENT SBAR HANDOFF    Patient Name: Fozia Briceno   : 1955  69 y.o.   Family/Caregiver Present: No  Code Status Order: Prior    C-SSRS: Risk of Suicide: No Risk  Sitter No  Restraints:         Situation  Chief Complaint:   Chief Complaint   Patient presents with    Fever     Fever at home for 3 days pt active chemo for lung ca     Patient Diagnosis: Community acquired pneumonia of left lower lobe of lung [J18.9]     Brief Description of Patient's Condition:   Pt presented to ER with onset of a fever 3 days ago and cough. Took motrin prior to coming to ER, fever was still elevated given Tylenol, and fever has resolved. Pt tachycardic and blood pressure 90's systolic upon arrival, after fluid bolus and IV antibiotics heart rate is trending down and blood pressure has stabilized. Pt is awake, alert and oriented. Pt wears oxygen at times at home. Pt is undergoing chemo/radiation for lung cancer.   Fozia Briceno is a 69 y.o. female who presents to the emergency department for extreme fever and cough.  Patient has known small cell carcinoma of the lung followed by Dr. Bates last chemo 21 days ago due to restart soon.  Has had fever the last 3 days admits to intermittently productive cough for the past week.  Denies any vomiting or diarrhea or abdominal pain no flank pain or UTI symptoms.  No rashes or wounds.  Wears 2 L of oxygen as needed.  Took Motrin earlier this morning prior to arrival.  Arrives here hypotensive tachycardic and febrile.   Mental Status: oriented, alert, coherent, logical, thought processes intact, and able to concentrate and follow conversation  Arrived from: home    Imaging:   XR CHEST PORTABLE   Final Result       Opacities at the left lung base concerning for pneumonia.                   ______________________________________    Electronically signed by: SOCORRO MORENO M.D.   Date:     2025   Time:    10:35         COVID-19 Results:   Internal Administration   First Dose

## 2025-05-03 NOTE — ED PROVIDER NOTES
[facility] EMERGENCY DEPARTMENT  eMERGENCY dEPARTMENT eNCOUnter      Pt Name: Fozia Briceno  MRN: [id number]  Birthdate 1955  Date of evaluation: 5/3/2025  Provider: YOAV ZARCO MD    CHIEF COMPLAINT       Chief Complaint   Patient presents with    Fever     Fever at home for 3 days pt active chemo for lung ca         HISTORY OF PRESENT ILLNESS   (Location/Symptom, Timing/Onset,Context/Setting, Quality, Duration, Modifying Factors, Severity)  Note limiting factors.   Fozia Briceno is a 69 y.o. female who presents to the emergency department for extreme fever and cough.  Patient has known small cell carcinoma of the lung followed by Dr. Bates last chemo 21 days ago due to restart soon.  Has had fever the last 3 days admits to intermittently productive cough for the past week.  Denies any vomiting or diarrhea or abdominal pain no flank pain or UTI symptoms.  No rashes or wounds.  Wears 2 L of oxygen as needed.  Took Motrin earlier this morning prior to arrival.  Arrives here hypotensive tachycardic and febrile.    HPI    NursingNotes were reviewed.    REVIEW OF SYSTEMS    (2-9 systems for level 4, 10 or more for level 5)     Review of Systems   Constitutional:  Positive for chills and fever.   HENT:  Negative for rhinorrhea and sore throat.    Respiratory:  Positive for cough. Negative for shortness of breath.    Cardiovascular:  Negative for chest pain and leg swelling.   Gastrointestinal:  Negative for abdominal pain, diarrhea, nausea and vomiting.   Genitourinary:  Negative for dysuria and frequency.   Musculoskeletal:  Negative for back pain and neck pain.   Skin:  Negative for rash and wound.   Neurological:  Negative for dizziness and headaches.            PAST MEDICALHISTORY     Past Medical History:   Diagnosis Date    Anxiety     Asthma     Cancer (HCC)     Neuroendocrine carcinoma of right lung    COPD (chronic obstructive pulmonary disease) (HCC)     CPAP (continuous positive airway pressure)  Max Daily Amount: 4 tablets    OXYGEN    Inhale into the lungs daily as needed    PROMETHAZINE (PHENERGAN) 25 MG TABLET    Take 0.5 tablets by mouth every 6 hours as needed for Nausea    RIZATRIPTAN (MAXALT) 10 MG TABLET    TAKE 1 TABLET BY MOUTH ONCE AS NEEDED FOR MIGRAINE MAY REPEAT IN 2 HOURS IF NEEDED     ROPINIROLE (REQUIP) 0.25 MG TABLET    Take 1 tablet by mouth    TIZANIDINE (ZANAFLEX) 4 MG TABLET    Take 1 tablet by mouth 2 times daily       ALLERGIES     Codeine    FAMILY HISTORY       Family History   Problem Relation Age of Onset    Heart Disease Mother     Diabetes Father     Heart Disease Father     Diabetes Sister     Heart Disease Sister     Diabetes Sister     Heart Disease Sister     Heart Disease Sister     Diabetes Sister     Alzheimer's Disease Sister     Diabetes Brother     Heart Disease Brother     Diabetes Brother     Kidney Disease Brother     Cancer Maternal Aunt         breast    Cancer Maternal Aunt         lung    Cancer Maternal Uncle         Colon    Cancer Son         Renal    Cancer Other         Nephew with Renal cell carcinoma           SOCIAL HISTORY       Social History     Socioeconomic History    Marital status:      Spouse name: None    Number of children: None    Years of education: None    Highest education level: None   Tobacco Use    Smoking status: Former     Current packs/day: 0.00     Types: Cigarettes     Quit date: 2/3/1977     Years since quittin.2    Smokeless tobacco: Never   Vaping Use    Vaping status: Never Used   Substance and Sexual Activity    Alcohol use: No    Drug use: No    Sexual activity: Yes     Partners: Male     Social Drivers of Health     Financial Resource Strain: Low Risk  (2025)    Overall Financial Resource Strain (CARDIA)     Difficulty of Paying Living Expenses: Not hard at all   Food Insecurity: No Food Insecurity (2025)    Hunger Vital Sign     Worried About Running Out of Food in the Last Year: Never true     Ran Out  distress.     Appearance: She is well-developed. She is ill-appearing.   HENT:      Head: Normocephalic and atraumatic.      Right Ear: External ear normal.      Left Ear: External ear normal.      Nose: Nose normal.   Eyes:      Conjunctiva/sclera: Conjunctivae normal.   Neck:      Trachea: No tracheal deviation.   Cardiovascular:      Rate and Rhythm: Regular rhythm. Tachycardia present.      Heart sounds: Normal heart sounds. No murmur heard.  Pulmonary:      Effort: No respiratory distress.      Breath sounds: Wheezing and rhonchi present. No rales.      Comments: Scattered rhonchi and wheezes  Abdominal:      Palpations: Abdomen is soft.      Tenderness: There is no abdominal tenderness.   Musculoskeletal:         General: Normal range of motion.      Cervical back: Normal range of motion.   Skin:     General: Skin is warm and dry.   Neurological:      Mental Status: She is alert and oriented to person, place, and time.      GCS: GCS eye subscore is 4. GCS verbal subscore is 5. GCS motor subscore is 6.           DIAGNOSTIC RESULTS     EKG: All EKG's areinterpreted by the Emergency Department Physician who either signs or Co-signs this chart in the absence of a cardiologist.    133 sinus tachycardia with occasional PVC no obvious ST elevation or depression QTc 478 nondiagnostic EKG    RADIOLOGY:  Non-plain film images such as CT, Ultrasound and MRI are read by the radiologist. Plain radiographic images are visualized and preliminarily interpreted bythe emergency physician with the below findings:      XR CHEST PORTABLE   Final Result       Opacities at the left lung base concerning for pneumonia.                   ______________________________________    Electronically signed by: SOCORRO MORENO M.D.   Date:     05/03/2025   Time:    10:35               LABS:  Labs Reviewed   CBC WITH AUTO DIFFERENTIAL - Abnormal; Notable for the following components:       Result Value    WBC 2.0 (*)     RBC 2.77 (*)

## 2025-05-03 NOTE — PROGRESS NOTES
[facility]   Pharmacy Pharmacokinetic Monitoring Service - Vancomycin     Fozia Briceno is a 69 y.o. female starting on vancomycin therapy for sepsis. Pharmacy consulted by Dr. Alberto for monitoring and adjustment.    Target Concentration: Goal AUC/INGRID 400-600 mg*hr/L    Additional Antimicrobials: Cefepime    Pertinent Laboratory Values:   Wt Readings from Last 1 Encounters:   05/03/25 68 kg (150 lb)     Temp Readings from Last 1 Encounters:   05/03/25 100.4 °F (38 °C) (Oral)     Estimated Creatinine Clearance: 88 mL/min (based on SCr of 0.6 mg/dL).  Recent Labs     05/03/25  0911   CREATININE 0.6   BUN 11   WBC 2.0*     Procalcitonin: 0.55    Pertinent Cultures:  Culture Date Source Results   05/03/25 Blood x 2 Sent   05/03/25 Resp panel Sent   MRSA Nasal Swab: N/A. Non-respiratory infection.    Plan:  Dosing recommendations based on Bayesian software  Start vancomycin 1500 mg loading dose then 1000 mg q12h  Anticipated AUC of 522 and trough concentration of 16.2 at steady state  Renal labs as indicated   Vancomycin concentration ordered for 5/3 @ 1000   Pharmacy will continue to monitor patient and adjust therapy as indicated    Thank you for the consult,  Cynthia Sumner RP, BCPS  5/3/2025 10:16 AM

## 2025-05-03 NOTE — PROGRESS NOTES
Pharmacy Adjustment per [facility] protocol    Fozia Briceno is a 69 y.o. female. Pharmacy has adjusted medications per [facility] protocol.    Recent Labs     05/03/25  0911   BUN 11       Recent Labs     05/03/25  0911   CREATININE 0.6       Estimated Creatinine Clearance: 88 mL/min (based on SCr of 0.6 mg/dL).    Height:   Ht Readings from Last 1 Encounters:   05/03/25 1.702 m (5' 7.01\")     Weight:  Wt Readings from Last 1 Encounters:   05/03/25 68 kg (150 lb)    BMI:  BMI Readings from Last 1 Encounters:   05/03/25 23.49 kg/m²         Plan: Adjust the following medications based on [facility] protocol:           Cefepime to 2000 mg IV every 8 hours extended infusion over 240 minutes     Electronically signed by Jovany Schrader RPH on 5/3/2025 at 1:26 PM

## 2025-05-03 NOTE — PROGRESS NOTES
4 Eyes Skin Assessment     NAME:  Fozia Briceno  YOB: 1955  MEDICAL RECORD NUMBER:  [id number]    The patient is being assessed for  Admission    I agree that at least one RN has performed a thorough Head to Toe Skin Assessment on the patient. ALL assessment sites listed below have been assessed.      Areas assessed by both nurses:    Head, Face, Ears, Shoulders, Back, Chest, Arms, Elbows, Hands, Sacrum. Buttock, Coccyx, Ischium, Legs. Feet and Heels, and Under Medical Devices         Does the Patient have a Wound? No noted wound(s)       Carlos Alberto Prevention initiated by RN: No  Wound Care Orders initiated by RN: No    Pressure Injury (Stage 3,4, Unstageable, DTI, NWPT, and Complex wounds) if present, place Wound referral order by RN under : No    New Ostomies, if present place, Ostomy referral order under : No     Nurse 1 eSignature: Electronically signed by Kirsty Redding RN on 5/3/25 at 3:38 PM CDT    **SHARE this note so that the co-signing nurse can place an eSignature**    Nurse 2 eSignature: {Esignature:[id number]}

## 2025-05-03 NOTE — H&P
[facility]      Hospitalist - History & Physical      PCP: Celine Gomez    Date of Admission: 5/3/2025    Date of Service: 5/3/2025    Chief Complaint:  fever     History Of Present Illness:   The patient is a 69 y.o. female with past medical history of small cell lung cancer and chemoradiation followed by Dr. Bates, chronic respiratory failure on 2 L O2 at baseline, COPD, anxiety, depression, hyperlipidemia, hypothyroidism, and restless leg syndrome who presented to [facility] ED with complaints of fever and cough. Reported having intermittent fever for past couple of days, stated has had fever as high as 104 F 2 days ago. Reported intermittent productive cough for the past week. Reported shortness of breath a times. Denied chest pain. Reported fatigue and generalized weakness. Denied nausea, vomiting or diarrhea. Reports last chemotherapy approximately three weeks ago, next chemo due on this coming Wednesday. Workup in ED revealed K+ 3.4, CO2 30, Procalcitonin 0.55, WBC 2.0, Hgb 8.4, respiratory panel negative, urinalysis unremarkable, chest x-ray indicated opacities at the left lung base concerning for pneumonia. Patient was admitted to hospital medicine for further evaluation with oncology consultation.         Past Medical History:        Diagnosis Date    Anxiety     Asthma     Cancer (HCC)     Neuroendocrine carcinoma of right lung    COPD (chronic obstructive pulmonary disease) (HCC)     CPAP (continuous positive airway pressure) dependence     11cm    Depression     Hyperlipidemia     Hypothyroidism     Migraines     Obstructive sleep apnea     AHI:  13.1    Restless legs syndrome     Sarcoidosis     Scoliosis     Unspecified sleep apnea        Past Surgical History:        Procedure Laterality Date    APPENDECTOMY  age 20    Patient thinks this was removed with GB    BREAST SURGERY  2006    bilateral breast tumor removal-[facility] in [place]    BRONCHOSCOPY N/A 07/29/2019     M.D. Date:     05/03/2025 Time:    10:35       Assessment/Plan:   Principal Problem:    Community acquired pneumonia of left lower lobe of lung  Active Problems:    COPD exacerbation (HCC)    Small cell lung cancer (HCC)  Resolved Problems:    * No resolved hospital problems. *       Principal Problem:   Fever/ Community acquired pneumonia of left lower lobe of lung/ COPD exacerbation (HCC)-   - IV Vancomycin and Cefepime               - Bronchodilators              - IV steroids followed by PO   - Protonix              - Encourage deep breathing and cough              - Incentive spirometry              - Supplemental oxygen as needed   - Wean oxygen as tolerated with spO2 goal greater than 88%               - Incentive spirometry               - Encourage deep breathing and cough    - Monitor fever curve   - Follow blood cultures   - Follow respiratory cultures         Active Problems:    Small cell lung cancer (HCC)-    - On Chemoradiation currently    - Follows with Dr. Bates    - Oncology consulted from ED      Hypothyroidism-     - Resume home Synthroid         Antibiotic: Vancomycin and Cefepime      DVT Prophylaxis: Lovenox      GI prophylaxis:  Protonix      Discharge planning:  TBD      Advance Directive: Full Code    Diet: ADULT DIET; Regular     Consults Made:   IP CONSULT TO PHARMACY  IP CONSULT TO ONCOLOGY    Further Orders per Clinical course/attending.     Signed:  Electronically signed by ERIN Robles CNP on 5/3/25 at 3:09 PM CDT     EMR Dragon/Transcription disclaimer:   Much of this encounter note is an electronic transcription/translation of spoken language to printed text. The electronic translation of spoken language may permit erroneous, or at times, nonsensical words or phrases to be inadvertently transcribed; although attempts have made to review the note for such errors, some may still exist.

## 2025-05-04 ENCOUNTER — APPOINTMENT (OUTPATIENT)
Dept: GENERAL RADIOLOGY | Age: 70
DRG: 194 | End: 2025-05-04
Payer: MEDICARE

## 2025-05-04 LAB
ANION GAP SERPL CALCULATED.3IONS-SCNC: 9 MMOL/L (ref 8–16)
ANISOCYTOSIS BLD QL SMEAR: ABNORMAL
BASOPHILS # BLD: 0 K/UL (ref 0–0.2)
BASOPHILS NFR BLD: 0 % (ref 0–1)
BUN SERPL-MCNC: 13 MG/DL (ref 8–23)
CALCIUM SERPL-MCNC: 8.2 MG/DL (ref 8.8–10.2)
CHLORIDE SERPL-SCNC: 103 MMOL/L (ref 98–107)
CO2 SERPL-SCNC: 28 MMOL/L (ref 22–29)
CREAT SERPL-MCNC: 0.5 MG/DL (ref 0.5–0.9)
EOSINOPHIL # BLD: 0 K/UL (ref 0–0.6)
EOSINOPHIL NFR BLD: 0 % (ref 0–5)
ERYTHROCYTE [DISTWIDTH] IN BLOOD BY AUTOMATED COUNT: 15.8 % (ref 11.5–14.5)
FERRITIN SERPL-MCNC: 648 NG/ML (ref 13–150)
FOLATE SERPL-MCNC: 10.4 NG/ML (ref 4.8–37.3)
GLUCOSE SERPL-MCNC: 148 MG/DL (ref 70–99)
HCT VFR BLD AUTO: 22.7 % (ref 37–47)
HCT VFR BLD AUTO: 24.1 % (ref 37–47)
HGB BLD-MCNC: 7.8 G/DL (ref 12–16)
IMM GRANULOCYTES # BLD: 0 K/UL
IRON SATN MFR SERPL: 29 % (ref 15–50)
IRON SERPL-MCNC: 60 UG/DL (ref 37–145)
LYMPHOCYTES # BLD: 0.1 K/UL (ref 1.1–4.5)
LYMPHOCYTES NFR BLD: 8 % (ref 20–40)
MCH RBC QN AUTO: 30.4 PG (ref 27–31)
MCHC RBC AUTO-ENTMCNC: 32.4 G/DL (ref 33–37)
MCV RBC AUTO: 93.8 FL (ref 81–99)
MONOCYTES # BLD: 0.2 K/UL (ref 0–0.9)
MONOCYTES NFR BLD: 14 % (ref 0–10)
NEUTROPHILS # BLD: 1.2 K/UL (ref 1.5–7.5)
NEUTS BAND NFR BLD MANUAL: 30 % (ref 0–5)
NEUTS SEG NFR BLD: 48 % (ref 50–65)
PLATELET # BLD AUTO: 191 K/UL (ref 130–400)
PLATELET SLIDE REVIEW: ADEQUATE
PMV BLD AUTO: 11 FL (ref 9.4–12.3)
POTASSIUM SERPL-SCNC: 4.3 MMOL/L (ref 3.5–5)
RBC # BLD AUTO: 2.57 M/UL (ref 4.2–5.4)
RETICS # AUTO: 0.05 M/UL (ref 0.03–0.12)
RETICS/RBC NFR: 2.11 % (ref 0.5–1.5)
SODIUM SERPL-SCNC: 140 MMOL/L (ref 136–145)
TIBC SERPL-MCNC: 207 UG/DL (ref 250–400)
VIT B12 SERPL-MCNC: 549 PG/ML (ref 232–1245)
WBC # BLD AUTO: 1.5 K/UL (ref 4.8–10.8)

## 2025-05-04 PROCEDURE — 82728 ASSAY OF FERRITIN: CPT

## 2025-05-04 PROCEDURE — 82746 ASSAY OF FOLIC ACID SERUM: CPT

## 2025-05-04 PROCEDURE — 6360000002 HC RX W HCPCS: Performed by: EMERGENCY MEDICINE

## 2025-05-04 PROCEDURE — 6370000000 HC RX 637 (ALT 250 FOR IP): Performed by: NURSE PRACTITIONER

## 2025-05-04 PROCEDURE — 83540 ASSAY OF IRON: CPT

## 2025-05-04 PROCEDURE — 1200000000 HC SEMI PRIVATE

## 2025-05-04 PROCEDURE — 6360000002 HC RX W HCPCS: Performed by: NURSE PRACTITIONER

## 2025-05-04 PROCEDURE — 83550 IRON BINDING TEST: CPT

## 2025-05-04 PROCEDURE — 80048 BASIC METABOLIC PNL TOTAL CA: CPT

## 2025-05-04 PROCEDURE — 85025 COMPLETE CBC W/AUTO DIFF WBC: CPT

## 2025-05-04 PROCEDURE — 2500000003 HC RX 250 WO HCPCS: Performed by: NURSE PRACTITIONER

## 2025-05-04 PROCEDURE — 99223 1ST HOSP IP/OBS HIGH 75: CPT | Performed by: INTERNAL MEDICINE

## 2025-05-04 PROCEDURE — 71045 X-RAY EXAM CHEST 1 VIEW: CPT

## 2025-05-04 PROCEDURE — 94640 AIRWAY INHALATION TREATMENT: CPT

## 2025-05-04 PROCEDURE — 2580000003 HC RX 258: Performed by: EMERGENCY MEDICINE

## 2025-05-04 PROCEDURE — 94150 VITAL CAPACITY TEST: CPT

## 2025-05-04 PROCEDURE — 2700000000 HC OXYGEN THERAPY PER DAY

## 2025-05-04 PROCEDURE — 94760 N-INVAS EAR/PLS OXIMETRY 1: CPT

## 2025-05-04 PROCEDURE — 82607 VITAMIN B-12: CPT

## 2025-05-04 PROCEDURE — 85045 AUTOMATED RETICULOCYTE COUNT: CPT

## 2025-05-04 PROCEDURE — 36415 COLL VENOUS BLD VENIPUNCTURE: CPT

## 2025-05-04 RX ORDER — LIDOCAINE 4 G/G
1 PATCH TOPICAL DAILY
Status: DISCONTINUED | OUTPATIENT
Start: 2025-05-04 | End: 2025-05-07 | Stop reason: HOSPADM

## 2025-05-04 RX ADMIN — DIAZEPAM 5 MG: 5 TABLET ORAL at 22:25

## 2025-05-04 RX ADMIN — GABAPENTIN 600 MG: 600 TABLET, FILM COATED ORAL at 19:43

## 2025-05-04 RX ADMIN — ENOXAPARIN SODIUM 40 MG: 100 INJECTION SUBCUTANEOUS at 13:35

## 2025-05-04 RX ADMIN — MIRTAZAPINE 15 MG: 7.5 TABLET, FILM COATED ORAL at 19:43

## 2025-05-04 RX ADMIN — WATER 40 MG: 1 INJECTION INTRAMUSCULAR; INTRAVENOUS; SUBCUTANEOUS at 08:18

## 2025-05-04 RX ADMIN — TIZANIDINE 4 MG: 4 TABLET ORAL at 19:43

## 2025-05-04 RX ADMIN — GUAIFENESIN 600 MG: 600 TABLET ORAL at 19:43

## 2025-05-04 RX ADMIN — PANTOPRAZOLE SODIUM 40 MG: 40 TABLET, DELAYED RELEASE ORAL at 06:15

## 2025-05-04 RX ADMIN — WATER 40 MG: 1 INJECTION INTRAMUSCULAR; INTRAVENOUS; SUBCUTANEOUS at 16:09

## 2025-05-04 RX ADMIN — GUAIFENESIN 600 MG: 600 TABLET ORAL at 08:18

## 2025-05-04 RX ADMIN — SODIUM CHLORIDE, PRESERVATIVE FREE 10 ML: 5 INJECTION INTRAVENOUS at 08:18

## 2025-05-04 RX ADMIN — CEFEPIME 2000 MG: 2 INJECTION, POWDER, FOR SOLUTION INTRAVENOUS at 08:16

## 2025-05-04 RX ADMIN — WATER 40 MG: 1 INJECTION INTRAMUSCULAR; INTRAVENOUS; SUBCUTANEOUS at 06:15

## 2025-05-04 RX ADMIN — ROPINIROLE HYDROCHLORIDE 0.25 MG: 0.25 TABLET, FILM COATED ORAL at 19:43

## 2025-05-04 RX ADMIN — IPRATROPIUM BROMIDE 0.5 MG: 0.5 SOLUTION RESPIRATORY (INHALATION) at 09:45

## 2025-05-04 RX ADMIN — CITALOPRAM HYDROBROMIDE 40 MG: 10 TABLET ORAL at 08:18

## 2025-05-04 RX ADMIN — OXYCODONE AND ACETAMINOPHEN 1 TABLET: 10; 325 TABLET ORAL at 06:17

## 2025-05-04 RX ADMIN — WATER 40 MG: 1 INJECTION INTRAMUSCULAR; INTRAVENOUS; SUBCUTANEOUS at 22:21

## 2025-05-04 RX ADMIN — TIZANIDINE 4 MG: 4 TABLET ORAL at 08:18

## 2025-05-04 RX ADMIN — IPRATROPIUM BROMIDE 0.5 MG: 0.5 SOLUTION RESPIRATORY (INHALATION) at 18:50

## 2025-05-04 RX ADMIN — CEFEPIME 2000 MG: 2 INJECTION, POWDER, FOR SOLUTION INTRAVENOUS at 16:15

## 2025-05-04 RX ADMIN — OXYCODONE AND ACETAMINOPHEN 1 TABLET: 10; 325 TABLET ORAL at 13:34

## 2025-05-04 RX ADMIN — GABAPENTIN 600 MG: 600 TABLET, FILM COATED ORAL at 08:18

## 2025-05-04 RX ADMIN — IPRATROPIUM BROMIDE 0.5 MG: 0.5 SOLUTION RESPIRATORY (INHALATION) at 14:09

## 2025-05-04 RX ADMIN — CEFEPIME 2000 MG: 2 INJECTION, POWDER, FOR SOLUTION INTRAVENOUS at 02:32

## 2025-05-04 RX ADMIN — LEVOTHYROXINE SODIUM 112 MCG: 0.11 TABLET ORAL at 06:15

## 2025-05-04 RX ADMIN — IPRATROPIUM BROMIDE 0.5 MG: 0.5 SOLUTION RESPIRATORY (INHALATION) at 06:32

## 2025-05-04 RX ADMIN — OXYCODONE AND ACETAMINOPHEN 1 TABLET: 10; 325 TABLET ORAL at 19:43

## 2025-05-04 ASSESSMENT — PAIN SCALES - GENERAL
PAINLEVEL_OUTOF10: 7
PAINLEVEL_OUTOF10: 8
PAINLEVEL_OUTOF10: 10

## 2025-05-04 ASSESSMENT — PAIN DESCRIPTION - DESCRIPTORS: DESCRIPTORS: ACHING

## 2025-05-04 ASSESSMENT — PAIN DESCRIPTION - LOCATION
LOCATION: BACK
LOCATION: CHEST

## 2025-05-04 ASSESSMENT — PAIN DESCRIPTION - ORIENTATION: ORIENTATION: RIGHT;LEFT

## 2025-05-04 NOTE — CONSULTS
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 Bates, last course #3 initiated 4/16/2025.    Fozia presented to the ED at [facility] on 5/3/2025 with increasing cough and temperature elevation to 104 degrees.  Respiratory panel by PCR was negative.    Chest x-ray 5/3/2025 reported the following:  Opacities at the left lung base concerning for pneumonia.     Blood cultures were obtained and she was admitted with LLL pneumonia for broad-spectrum antibiotics with cefepime and vancomycin.    Medical oncology consultation requested in continuity of care      DETAILED ONCOLOGY HISTORY COPIED FROM DR. BATES WAS 4/16/2025 CLINIC NOTE:    The patient presents for evaluation of small cell lung cancer, limited stage, clinical T4N1M1. She is accompanied by her daughter.    Currently undergoing her third cycle of chemotherapy, she reports an improvement in her overall condition. Her daughter corroborates this, noting that she appears healthier and exhibits increased stamina. Respiratory function has also shown signs of improvement. No adverse effects from the chemotherapy such as nausea, vomiting, or diarrhea are reported. Appetite has improved, and feelings of  likely mild infectious process.   Additional small pulmonary nodules in the right upper and right middle lobe.   11/8/24 CT abd/pelvis ([facility]): Near complete resolution of the exophytic left upper pole renal lesion of concern likely related to interval rupture/involution. Numerous small bilateral renal cysts are again present.  Mild diffuse wall thickening of the rectum and the distal transverse colon and proximal descending colon. Correlate for proctocolitis.   12/13/24 PET/CT scan ([facility]): There is a hypermetabolic right upper lobe nodule measures 1.4 cm and has a maximum SUV of 5.5. Right upper lobe suprahilar mass measures at least 8 cm in greatest axial dimension is hypermetabolic with a maximum SUV of 11.4. Mass either invades the middle mediastinum or there is conglomerate hypermetabolic lymphadenopathy. No additional hypermetabolic pulmonary nodule or mass is identified. There is no increased skeletal uptake to suggest osseous metastasis.   12/13/245 CT chest ([facility]): 2.2 cm bilobed right upper lobe pulmonary nodule, likely representing a primary lung neoplasm. 8.9 cm right hilar lung mass with diffuse mediastinal infiltration, likely representing navid metastasis. This mass encases the right mainstem bronchus and encases the right main pulmonary artery, with occlusion of the right upper lobe pulmonary artery. This mass compresses the SVC, which is slitlike. Patient is at risk for SVC syndrome.  12/13/24 Initial evaluation by Dr Alli Perez/[facility] Radiation Oncology for superior vena cava syndrome who recommended emergent palliative radiation therapy 1250cGy in 5 fractions.  12/13/24 - 12/19/24 Emergent SBRT 1250cGy in 5 fractions to right lung  12/19/24 CT chest w/o ([facility]): Stable large right hilar mass with mediastinal invasion and confluent lymphadenopathy as detailed above. Findings are suspicious for small cell carcinoma. There is increased groundglass opacities and interlobular septal thickening within the  40 mg at 25 0818    diazePAM (VALIUM) tablet 5 mg  5 mg Oral Daily PRN Amilcar Foley APRN - CNP        gabapentin (NEURONTIN) tablet 600 mg  600 mg Oral BID Amilcar Foley APRN - CNP   600 mg at 25 0818    levothyroxine (SYNTHROID) tablet 112 mcg  112 mcg Oral Daily Amilcar Foley APRN - CNP   112 mcg at 25 0615    mirtazapine (REMERON) tablet 15 mg  15 mg Oral Nightly Amilcar Foley APRN - CNP   15 mg at 25    oxyCODONE-acetaminophen (PERCOCET)  MG per tablet 1 tablet  1 tablet Oral Q6H PRN Amilcar Foley APRN - CNP   1 tablet at 25    rOPINIRole (REQUIP) tablet 0.25 mg  0.25 mg Oral Nightly Amilcar Foley APRN - CNP   0.25 mg at 25    tiZANidine (ZANAFLEX) tablet 4 mg  4 mg Oral BID Amilcar Foley APRN - CNP   4 mg at 25    ceFEPIme (MAXIPIME) 2,000 mg in sodium chloride 0.9 % 100 mL IVPB ([id number])  2,000 mg IntraVENous Q8H Enrico Alberto MD 25 mL/hr at 25 0816 2,000 mg at 25 0816    methylPREDNISolone sodium succ (SOLU-MEDROL) 40 mg in sterile water 1 mL injection  40 mg IntraVENous Q6H Amilcar Foley APRN - CNP   40 mg at 25 0818    [START ON 2025] predniSONE (DELTASONE) tablet 40 mg  40 mg Oral Daily Amilcar Foley APRN - DAVID        pantoprazole (PROTONIX) tablet 40 mg  40 mg Oral QAM AC Amilcar Foley APRN - CNP   40 mg at 25 0615         Allergies:   Allergies   Allergen Reactions    Codeine Shortness Of Breath         Social History:    Social History     Socioeconomic History    Marital status:      Spouse name: None    Number of children: None    Years of education: None    Highest education level: None   Tobacco Use    Smoking status: Former     Current packs/day: 0.00     Types: Cigarettes     Quit date: 2/3/1977     Years since quittin.2    Smokeless tobacco: Never   Vaping Use    Vaping status: Never Used   Substance and Sexual Activity    Alcohol use: No

## 2025-05-04 NOTE — PLAN OF CARE
Problem: Pain  Goal: Verbalizes/displays adequate comfort level or baseline comfort level  Outcome: Progressing     Problem: Safety - Adult  Goal: Free from fall injury  Outcome: Progressing     Problem: ABCDS Injury Assessment  Goal: Absence of physical injury  Outcome: Progressing  Flowsheets (Taken 5/3/2025 1315 by Kirsty Redding RN)  Absence of Physical Injury: Implement safety measures based on patient assessment

## 2025-05-04 NOTE — PROGRESS NOTES
[facility]      Progress Note    Patient:  Fozia Briceno  YOB: 1955  Date of Service: 5/4/2025  MRN: [id number]   Acct: [id number]   Primary Care Physician: Celine Gomez  Advance Directive: Full Code  Admit Date: 5/3/2025       Hospital Day: 1    Portions of this note have been copied forward, however, updated to reflect the most current clinical status of this patient.     CHIEF COMPLAINT Fever     SUBJECTIVE:  Ms. Briceno was resting in bed this morning. Reports feeling better this morning. Denies fever or chills. Denies nausea or vomiting. Denies SOB or chest pain. Reports right anterior chest wall pain under her right breast. Does get radiation in that area.       CUMULATIVE HOSPITAL COURSE:   The patient is a 69 y.o. female with past medical history of small cell lung cancer and chemoradiation followed by Dr. Bates, chronic respiratory failure on 2 L O2 at baseline, COPD, anxiety, depression, hyperlipidemia, hypothyroidism, and restless leg syndrome who presented to [facility] ED with complaints of fever and cough. Reported having intermittent fever for past couple of days prior to admission, stated she has had fever as high as 104 F 2 days ago. Reported intermittent productive cough for the past week. Reported shortness of breath a times. Denied chest pain. Reported fatigue and generalized weakness. Denied nausea, vomiting or diarrhea. Reports last chemotherapy approximately three weeks ago, next chemo due this coming Wednesday. Workup in ED revealed K+ 3.4, CO2 30, Procalcitonin 0.55, WBC 2.0, Hgb 8.4, respiratory panel negative, urinalysis unremarkable, chest x-ray indicated opacities at the left lung base concerning for pneumonia. Patient was admitted to hospital medicine for further evaluation with oncology consultation. Empiric antibiotics, IV fluids, IV steroids, and bronchodilators were initiated. MRSA negative. IV vancomycin discontinued. Blood cultures NGTD. Legionella antigen

## 2025-05-04 NOTE — PLAN OF CARE
Patient continues to work to meet care plan goals while hospitalized, education provided.      Problem: Pain  Goal: Verbalizes/displays adequate comfort level or baseline comfort level  Outcome: Progressing     Problem: Safety - Adult  Goal: Free from fall injury  Outcome: Progressing     Problem: ABCDS Injury Assessment  Goal: Absence of physical injury  Outcome: Progressing

## 2025-05-05 PROBLEM — Z51.5 PALLIATIVE CARE PATIENT: Status: ACTIVE | Noted: 2025-05-05

## 2025-05-05 LAB
ANION GAP SERPL CALCULATED.3IONS-SCNC: 9 MMOL/L (ref 8–16)
ANISOCYTOSIS BLD QL SMEAR: ABNORMAL
BASOPHILS # BLD: 0 K/UL (ref 0–0.2)
BASOPHILS NFR BLD: 0 % (ref 0–1)
BUN SERPL-MCNC: 17 MG/DL (ref 8–23)
CALCIUM SERPL-MCNC: 8.8 MG/DL (ref 8.8–10.2)
CHLORIDE SERPL-SCNC: 104 MMOL/L (ref 98–107)
CO2 SERPL-SCNC: 28 MMOL/L (ref 22–29)
CREAT SERPL-MCNC: 0.6 MG/DL (ref 0.5–0.9)
EKG P AXIS: 70 DEGREES
EKG P-R INTERVAL: 136 MS
EKG Q-T INTERVAL: 320 MS
EKG QRS DURATION: 86 MS
EKG QTC CALCULATION (BAZETT): 444 MS
EKG T AXIS: 71 DEGREES
EOSINOPHIL # BLD: 0 K/UL (ref 0–0.6)
EOSINOPHIL NFR BLD: 0 % (ref 0–5)
ERYTHROCYTE [DISTWIDTH] IN BLOOD BY AUTOMATED COUNT: 16.1 % (ref 11.5–14.5)
GLUCOSE SERPL-MCNC: 139 MG/DL (ref 70–99)
HCT VFR BLD AUTO: 25.1 % (ref 37–47)
HGB BLD-MCNC: 7.9 G/DL (ref 12–16)
HYPOCHROMIA BLD QL SMEAR: ABNORMAL
IMM GRANULOCYTES # BLD: 0.2 K/UL
LYMPHOCYTES # BLD: 0.3 K/UL (ref 1.1–4.5)
LYMPHOCYTES NFR BLD: 6 % (ref 20–40)
MACROCYTES BLD QL SMEAR: ABNORMAL
MCH RBC QN AUTO: 30.2 PG (ref 27–31)
MCHC RBC AUTO-ENTMCNC: 31.5 G/DL (ref 33–37)
MCV RBC AUTO: 95.8 FL (ref 81–99)
MONOCYTES # BLD: 0.3 K/UL (ref 0–0.9)
MONOCYTES NFR BLD: 6 % (ref 0–10)
NEUTROPHILS # BLD: 3.9 K/UL (ref 1.5–7.5)
NEUTS BAND NFR BLD MANUAL: 10 % (ref 0–5)
NEUTS SEG NFR BLD: 78 % (ref 50–65)
PLATELET # BLD AUTO: 260 K/UL (ref 130–400)
PLATELET SLIDE REVIEW: ADEQUATE
PMV BLD AUTO: 10.6 FL (ref 9.4–12.3)
POTASSIUM SERPL-SCNC: 4 MMOL/L (ref 3.5–5)
RBC # BLD AUTO: 2.62 M/UL (ref 4.2–5.4)
SODIUM SERPL-SCNC: 141 MMOL/L (ref 136–145)
WBC # BLD AUTO: 4.4 K/UL (ref 4.8–10.8)

## 2025-05-05 PROCEDURE — 80048 BASIC METABOLIC PNL TOTAL CA: CPT

## 2025-05-05 PROCEDURE — 99232 SBSQ HOSP IP/OBS MODERATE 35: CPT | Performed by: INTERNAL MEDICINE

## 2025-05-05 PROCEDURE — 97535 SELF CARE MNGMENT TRAINING: CPT

## 2025-05-05 PROCEDURE — 6360000002 HC RX W HCPCS: Performed by: NURSE PRACTITIONER

## 2025-05-05 PROCEDURE — 97161 PT EVAL LOW COMPLEX 20 MIN: CPT

## 2025-05-05 PROCEDURE — 97116 GAIT TRAINING THERAPY: CPT

## 2025-05-05 PROCEDURE — 6370000000 HC RX 637 (ALT 250 FOR IP)

## 2025-05-05 PROCEDURE — 94150 VITAL CAPACITY TEST: CPT

## 2025-05-05 PROCEDURE — 97165 OT EVAL LOW COMPLEX 30 MIN: CPT

## 2025-05-05 PROCEDURE — 6360000002 HC RX W HCPCS: Performed by: EMERGENCY MEDICINE

## 2025-05-05 PROCEDURE — 6370000000 HC RX 637 (ALT 250 FOR IP): Performed by: NURSE PRACTITIONER

## 2025-05-05 PROCEDURE — 94640 AIRWAY INHALATION TREATMENT: CPT

## 2025-05-05 PROCEDURE — 2580000003 HC RX 258: Performed by: EMERGENCY MEDICINE

## 2025-05-05 PROCEDURE — 36415 COLL VENOUS BLD VENIPUNCTURE: CPT

## 2025-05-05 PROCEDURE — 2700000000 HC OXYGEN THERAPY PER DAY

## 2025-05-05 PROCEDURE — 2500000003 HC RX 250 WO HCPCS: Performed by: NURSE PRACTITIONER

## 2025-05-05 PROCEDURE — 1200000000 HC SEMI PRIVATE

## 2025-05-05 PROCEDURE — 85025 COMPLETE CBC W/AUTO DIFF WBC: CPT

## 2025-05-05 RX ORDER — BENZONATATE 100 MG/1
100 CAPSULE ORAL 3 TIMES DAILY PRN
Status: DISCONTINUED | OUTPATIENT
Start: 2025-05-05 | End: 2025-05-07 | Stop reason: HOSPADM

## 2025-05-05 RX ADMIN — SODIUM CHLORIDE, PRESERVATIVE FREE 10 ML: 5 INJECTION INTRAVENOUS at 21:19

## 2025-05-05 RX ADMIN — SODIUM CHLORIDE, PRESERVATIVE FREE 10 ML: 5 INJECTION INTRAVENOUS at 08:22

## 2025-05-05 RX ADMIN — CEFEPIME 2000 MG: 2 INJECTION, POWDER, FOR SOLUTION INTRAVENOUS at 17:53

## 2025-05-05 RX ADMIN — MIRTAZAPINE 15 MG: 7.5 TABLET, FILM COATED ORAL at 21:20

## 2025-05-05 RX ADMIN — TIZANIDINE 4 MG: 4 TABLET ORAL at 08:20

## 2025-05-05 RX ADMIN — BENZONATATE 100 MG: 100 CAPSULE ORAL at 18:00

## 2025-05-05 RX ADMIN — GUAIFENESIN 600 MG: 600 TABLET ORAL at 08:20

## 2025-05-05 RX ADMIN — LEVOTHYROXINE SODIUM 112 MCG: 0.11 TABLET ORAL at 05:25

## 2025-05-05 RX ADMIN — TIZANIDINE 4 MG: 4 TABLET ORAL at 21:20

## 2025-05-05 RX ADMIN — CEFEPIME 2000 MG: 2 INJECTION, POWDER, FOR SOLUTION INTRAVENOUS at 08:59

## 2025-05-05 RX ADMIN — BENZONATATE 100 MG: 100 CAPSULE ORAL at 10:37

## 2025-05-05 RX ADMIN — IPRATROPIUM BROMIDE 0.5 MG: 0.5 SOLUTION RESPIRATORY (INHALATION) at 14:37

## 2025-05-05 RX ADMIN — IPRATROPIUM BROMIDE 0.5 MG: 0.5 SOLUTION RESPIRATORY (INHALATION) at 11:26

## 2025-05-05 RX ADMIN — WATER 40 MG: 1 INJECTION INTRAMUSCULAR; INTRAVENOUS; SUBCUTANEOUS at 08:57

## 2025-05-05 RX ADMIN — OXYCODONE AND ACETAMINOPHEN 1 TABLET: 10; 325 TABLET ORAL at 02:57

## 2025-05-05 RX ADMIN — WATER 40 MG: 1 INJECTION INTRAMUSCULAR; INTRAVENOUS; SUBCUTANEOUS at 04:05

## 2025-05-05 RX ADMIN — IPRATROPIUM BROMIDE 0.5 MG: 0.5 SOLUTION RESPIRATORY (INHALATION) at 18:29

## 2025-05-05 RX ADMIN — CITALOPRAM HYDROBROMIDE 40 MG: 10 TABLET ORAL at 08:20

## 2025-05-05 RX ADMIN — CEFEPIME 2000 MG: 2 INJECTION, POWDER, FOR SOLUTION INTRAVENOUS at 02:55

## 2025-05-05 RX ADMIN — ROPINIROLE HYDROCHLORIDE 0.25 MG: 0.25 TABLET, FILM COATED ORAL at 21:20

## 2025-05-05 RX ADMIN — PANTOPRAZOLE SODIUM 40 MG: 40 TABLET, DELAYED RELEASE ORAL at 05:25

## 2025-05-05 RX ADMIN — OXYCODONE AND ACETAMINOPHEN 1 TABLET: 10; 325 TABLET ORAL at 15:10

## 2025-05-05 RX ADMIN — GABAPENTIN 600 MG: 600 TABLET, FILM COATED ORAL at 08:20

## 2025-05-05 RX ADMIN — OXYCODONE AND ACETAMINOPHEN 1 TABLET: 10; 325 TABLET ORAL at 08:56

## 2025-05-05 RX ADMIN — IPRATROPIUM BROMIDE 0.5 MG: 0.5 SOLUTION RESPIRATORY (INHALATION) at 07:27

## 2025-05-05 RX ADMIN — GABAPENTIN 600 MG: 600 TABLET, FILM COATED ORAL at 21:19

## 2025-05-05 RX ADMIN — OXYCODONE AND ACETAMINOPHEN 1 TABLET: 10; 325 TABLET ORAL at 21:19

## 2025-05-05 ASSESSMENT — PAIN DESCRIPTION - LOCATION
LOCATION: ABDOMEN;BACK
LOCATION: BACK
LOCATION: CHEST

## 2025-05-05 ASSESSMENT — PAIN SCALES - GENERAL
PAINLEVEL_OUTOF10: 9
PAINLEVEL_OUTOF10: 7
PAINLEVEL_OUTOF10: 8
PAINLEVEL_OUTOF10: 8
PAINLEVEL_OUTOF10: 6

## 2025-05-05 ASSESSMENT — PAIN DESCRIPTION - ORIENTATION: ORIENTATION: RIGHT;LEFT

## 2025-05-05 ASSESSMENT — PAIN DESCRIPTION - DESCRIPTORS: DESCRIPTORS: ACHING

## 2025-05-05 NOTE — PLAN OF CARE
Problem: Pain  Goal: Verbalizes/displays adequate comfort level or baseline comfort level  5/4/2025 2146 by Wendi Staley RN  Outcome: Progressing  5/4/2025 1516 by Niya Johnson RN  Outcome: Progressing     Problem: Safety - Adult  Goal: Free from fall injury  5/4/2025 2146 by Wendi Staley RN  Outcome: Progressing  5/4/2025 1516 by Niya Johnson RN  Outcome: Progressing     Problem: ABCDS Injury Assessment  Goal: Absence of physical injury  5/4/2025 2146 by Wendi Staley RN  Outcome: Progressing  5/4/2025 1516 by Niya Johnson RN  Outcome: Progressing

## 2025-05-05 NOTE — PROGRESS NOTES
[facility]      Patient:  Fozia Briceno  YOB: 1955  Date of Service: 5/5/2025  MRN: [id number]   Acct: [id number]   Primary Care Physician: Celine Gomez  Advance Directive: Full Code  Admit Date: 5/3/2025       Hospital Day: 2  Portions of this note have been copied forward, however, changed to reflect the most current clinical status of this patient.  CHIEF COMPLAINT  fever     SUBJECTIVE: No issues overnight currently resting up in chair    Cumulative hospital course   69-year-old female with limited small cell carcinoma of the lung currently on chemo-XRT with Carboplatin/-16 with last treatment (4/16/25), chronic respiratory failure on 2L NC continuously, COPD, hyperlipidemia, hypothyroidism, restless leg syndrome, anxiety, depression, with complaints of fever.  Patient states that she has had intermittent fever ongoing for the past few days max 104.  States that she has had frequent dry cough ongoing for the past week and has been dyspneic on exertion.  Workup in ER CXR left lung base opacity, respiratory panel negative, procalcitonin 0.55, WBC 2, K3.4, Hgb 8.4.  Patient admitted to hospitalist service with consultation to oncology.  Initiated on IV cefepime and vancomycin, IVF and IV steroids.  MRSA negative IV vancomycin discontinued.  Blood cultures no growth to date.  Has had ongoing right anterior chest wall pain due to frequent cough repeat CXR ordered no definite rib fracture identified.  Pain medication and lidocaine patch ordered.  Has had frequent dry cough will initiate Tessalon Perles.  Instructed on aggressive ambulation and I-S use.      Objective:   VITALS:  /63   Pulse 75   Temp 97 °F (36.1 °C) (Temporal)   Resp 20   Ht 1.702 m (5' 7.01\")   Wt 76 kg (167 lb 8.8 oz)   SpO2 98%   BMI 26.24 kg/m²   24HR INTAKE/OUTPUT:  No intake or output data in the 24 hours ending 05/05/25 1237    Physical Exam  Vitals and nursing note reviewed.   Constitutional:        IntraVENous 2 times per day    enoxaparin  40 mg SubCUTAneous Daily    ipratropium  0.5 mg Nebulization Q4H WA RT    citalopram  40 mg Oral Daily    gabapentin  600 mg Oral BID    levothyroxine  112 mcg Oral Daily    mirtazapine  15 mg Oral Nightly    rOPINIRole  0.25 mg Oral Nightly    tiZANidine  4 mg Oral BID    cefepime  2,000 mg IntraVENous Q8H    [START ON 5/6/2025] predniSONE  40 mg Oral Daily    pantoprazole  40 mg Oral QAM AC     benzonatate, sodium chloride flush, sodium chloride, ondansetron **OR** ondansetron, polyethylene glycol, acetaminophen **OR** acetaminophen, levalbuterol, diazePAM, oxyCODONE-acetaminophen  ADULT DIET; Regular     Lab and other Data:     Recent Labs     05/03/25  0911 05/04/25  0124 05/05/25 0227   WBC 2.0* 1.5* 4.4*   HGB 8.4* 7.8* 7.9*    191 260     Recent Labs     05/03/25  0911 05/04/25  0124 05/05/25  0227    140 141   K 3.4* 4.3 4.0   CL 97* 103 104   CO2 30* 28 28   BUN 11 13 17   CREATININE 0.6 0.5 0.6   GLUCOSE 141* 148* 139*     Recent Labs     05/03/25  0911   AST 18   ALT 13   BILITOT 0.3   ALKPHOS 154*     Troponin T: No results for input(s): \"TROPONINI\" in the last 72 hours.  Pro-BNP: No results for input(s): \"BNP\" in the last 72 hours.  INR: No results for input(s): \"INR\" in the last 72 hours.  UA:  Recent Labs     05/03/25  1215   COLORU YELLOW   PHUR 6.0   CLARITYU Clear   LEUKOCYTESUR Negative   UROBILINOGEN 0.2   BILIRUBINUR Negative   BLOODU Negative   GLUCOSEU Negative     A1C: No results for input(s): \"LABA1C\" in the last 72 hours.  ABG:No results for input(s): \"PHART\", \"WWG2GAH\", \"PO2ART\", \"JYM1LZV\", \"BEART\", \"HGBAE\", \"C9YDYJQO\", \"CARBOXHGBART\" in the last 72 hours.    RAD:   XR CHEST PORTABLE  Result Date: 5/4/2025   1. Atelectasis versus infiltrate along the left heart border. 2. Interstitial changes which are likely chronic. 3. No obvious rib fracture.  If there is concern for rib fracture based on history recommend detailed rib series.

## 2025-05-05 NOTE — ACP (ADVANCE CARE PLANNING)
Advance Care Planning     Palliative Team Advance Care Planning (ACP) Conversation    Date of Conversation: 05/05/25    Individuals present for the conversation: Patient with decision making capacity     ACP documents on file prior to discussion:  -Power of  for Healthcare  -Living Will    Previously completed document/s not on file:    document was completed previously but it is not available for review. This writer requested a copy of the document for patient's chart.     Healthcare Decision Maker:  Pt states her Granddaughter Tessie Echevarria is her decision maker until Karyna Echevarria turns 18 in a few months.       Conversation Summary:  SN provided education of  Full Code VS DNR. Pt elects to remain Full Code.     Resuscitation Status:   Code Status: Full Code     Documentation Completed:  -No new documents completed.    I spent 15 minutes with the patient and/or surrogate decision maker discussing the patient's wishes and goals.      Miri Lemus RN

## 2025-05-05 NOTE — PROGRESS NOTES
ONCOLOGY PROGRESS NOTE     Patient:  Fozia Briceno  YOB: 1955  Date of Service: 5/5/2025  MRN: [id number]   Primary Care Physician: Celine Gomez  Advance Directive: Full Code    Subjective-slightly better today.  Afebrile yesterday.  Cough is improving.      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 Bates, last course #3 initiated 4/16/2025.    Fozia presented to the ED at [facility] on 5/3/2025 with increasing cough and temperature elevation to 104 degrees.  Respiratory panel by PCR was negative.    Chest x-ray 5/3/2025 reported the following:  Opacities at the left lung base concerning for pneumonia.     Blood cultures were obtained and she was admitted with L pneumonia for broad-spectrum antibiotics with cefepime and vancomycin.    Medical oncology consultation requested in continuity of care      DETAILED ONCOLOGY HISTORY COPIED FROM DR. BATES WAS 4/16/2025 CLINIC NOTE:    The patient presents for evaluation of small cell lung cancer, limited stage, clinical T4N1M1. She is accompanied by her daughter.    Currently undergoing her third cycle of chemotherapy, she reports an improvement in her overall condition. Her daughter corroborates this, noting that she appears healthier and exhibits increased stamina. Respiratory function has also shown signs of improvement. No adverse effects from the chemotherapy such as nausea, vomiting, or diarrhea are reported. Appetite has improved, and feelings of hunger are experienced.    Radiation therapy is being received 5 days a week under the supervision of Dr. Perez, with a total of 35 sessions planned. Twelve sessions remain, and completion is anticipated  Brother     Diabetes Brother     Kidney Disease Brother     Cancer Maternal Aunt         breast    Cancer Maternal Aunt         lung    Cancer Maternal Uncle         Colon    Cancer Son         Renal    Cancer Other         Nephew with Renal cell carcinoma          Physical Exam   Constitutional: Oriented to person, place, and time. No acute distress.    Cardiovascular: Normal rate, regular rhythm, normal heart sounds  Pulmonary/Chest: Scattered rales diffusely with mild wheezing.   Abdominal: Soft. Bowel sounds are normal. No organomegally or masses. No tenderness. There is no rebound and no guarding.   Musculoskeletal: Normal range of motion. No edema or tenderness.   Neurological: Alert and oriented to person, place, and time. Cranial nerves are intact. Neurological exam is nonfocal  Skin: Skin is warm and dry. No rash noted. No erythema. No pallor.       DATA:    Labs:  General Labs:  CBC:   Lab Results   Component Value Date    WBC 4.4 (L) 05/05/2025    HGB 7.9 (L) 05/05/2025    HCT 25.1 (L) 05/05/2025    MCV 95.8 05/05/2025     05/05/2025       CMP:    Lab Results   Component Value Date     05/05/2025    K 4.0 05/05/2025     05/05/2025    CO2 28 05/05/2025    BUN 17 05/05/2025    CREATININE 0.6 05/05/2025    GLUCOSE 139 (H) 05/05/2025    CALCIUM 8.8 05/05/2025    BILITOT 0.3 05/03/2025    ALKPHOS 154 (H) 05/03/2025    AST 18 05/03/2025    ALT 13 05/03/2025    LABGLOM >90 05/05/2025    GFRAA >59 06/05/2022    GLOB 2.5 01/17/2017       IMPRESSION/RECOMMENDATIONS:      #Limited small cell carcinoma of the lung  Concurrent chemo-XRT with carboplatin/-16 chemotherapy, last course #3 initiated 4/16/2025.    #LLL pneumonia  Fozia presented to the ED at [facility] on 5/3/2025 with increasing cough and temperature elevation to 104 degrees.  Respiratory panel by PCR was negative.  Chest x-ray 5/3/2025 reported the following:  Opacities at the left lung base concerning for pneumonia.   Currently on

## 2025-05-05 NOTE — PROGRESS NOTES
Occupational Therapy Initial Assessment  Date: 2025   Patient Name: Fozia Briceno  MRN: [id number]     : 1955    Date of Service: 2025    Discharge Recommendations:  Home with assist PRN       Assessment   Assessment: OT evaluation completed. Pt does not display any deficits that would warrant further OT services in this setting. Pt does have some lingering decreased activity tolerance/deconditioned state d/t prolonged condition. Pt can increase stamina with progression of general activity according to pt's tolerance. OT does not anticipate any environmental barriers to D/C home once medically cleared if  assist is available for safety.  No Skilled OT: At baseline function;Safe to return home  REQUIRES OT FOLLOW-UP: No  Activity Tolerance  Activity Tolerance: Patient Tolerated treatment well              Patient Diagnosis(es): The encounter diagnosis was Pneumonia of left lower lobe due to infectious organism.    Past Medical History:   Past Medical History:   Diagnosis Date    Anxiety     Asthma     Cancer (HCC)     Neuroendocrine carcinoma of right lung    COPD (chronic obstructive pulmonary disease) (HCC)     CPAP (continuous positive airway pressure) dependence     11cm    Depression     Hyperlipidemia     Hypothyroidism     Migraines     Obstructive sleep apnea     AHI:  13.1    Palliative care patient     Restless legs syndrome     Sarcoidosis     Scoliosis     Unspecified sleep apnea         Past Surgical History:   Past Surgical History:   Procedure Laterality Date    APPENDECTOMY  age 20    Patient thinks this was removed with GB    BREAST SURGERY      bilateral breast tumor removal-[facility]    BRONCHOSCOPY N/A 2019    BRONCHOSCOPY BIOPSY BRONCHUS performed by Donnie Degroot MD at [facility]    CHOLECYSTECTOMY  age 20    VASCULAR SURGERY  2025    Ultrasound guided cannulation right internal jugular vein 2.  right internal jugular vein Single Chamber

## 2025-05-05 NOTE — PROGRESS NOTES
Physical Therapy  Facility/Department: [facility] ONCOLOGY UNIT  Physical Therapy Initial Assessment    Name: Fozia Briceno  : 1955  MRN: [id number]  Date of Service: 2025    Discharge Recommendations:  Home with assist PRN (Pt PLANS TO DC HOME WITH SUPPORT OF FAMILY)          Patient Diagnosis(es): The encounter diagnosis was Pneumonia of left lower lobe due to infectious organism.  Past Medical History:  has a past medical history of Anxiety, Asthma, Cancer (HCC), COPD (chronic obstructive pulmonary disease) (HCC), CPAP (continuous positive airway pressure) dependence, Depression, Hyperlipidemia, Hypothyroidism, Migraines, Obstructive sleep apnea, Palliative care patient, Restless legs syndrome, Sarcoidosis, Scoliosis, and Unspecified sleep apnea.  Past Surgical History:  has a past surgical history that includes Breast surgery (); Cholecystectomy (age 20); Appendectomy (age 20); bronchoscopy (N/A, 2019); and vascular surgery (2025).    Assessment  Body Structures, Functions, Activity Limitations Requiring Skilled Therapeutic Intervention: Decreased functional mobility ;Decreased ROM;Decreased strength;Decreased endurance;Increased pain  Assessment: NO NEEDS FOR SKILLED PT IN THIS SETTING. Pt'S MAIN ISSUE IS BECOMING SOA WITH ACTIVITY. Pt REPORTS SHE WILL WORK MORE ON SITTING UP IN RECLINER AND BEING OOB  Therapy Prognosis: Good  Decision Making: Low Complexity  Requires PT Follow-Up: No  Activity Tolerance  Activity Tolerance: Patient tolerated treatment well    Plan  Physical Therapy Plan  General Plan: Discharge with evaluation only  Safety Devices  Type of Devices: Call light within reach, Left in chair    Restrictions  Restrictions/Precautions  Restrictions/Precautions: Fall Risk  Required Braces or Orthoses?: No     Subjective  General  Patient assessed for rehabilitation services?: Yes  Diagnosis: LUNG CA, PNA  Subjective  Subjective: pT STATES  SHE IS READY TO AMB WITH PT. REPORTS SHE

## 2025-05-05 NOTE — CARE COORDINATION
05/05/25 0850   Readmission Assessment   Number of Days since last admission? 8-30 days   Previous Disposition Home Alone   Who is being Interviewed   (from chart)   What was the patient's/caregiver's perception as to why they think they needed to return back to the hospital? Other (Comment)  (fever, cough - active chemotherapy)   Did you visit your Primary Care Physician after you left the hospital, before you returned this time? No   Why weren't you able to visit your PCP? Did not have an appointment   Did you see a specialist, such as Cardiac, Pulmonary, Orthopedic Physician, etc. after you left the hospital? Yes   Who advised the patient to return to the hospital? Self-referral   Does the patient report anything that got in the way of taking their medications? No   In our efforts to provide the best possible care to you and others like you, can you think of anything that we could have done to help you after you left the hospital the first time, so that you might not have needed to return so soon? Other (Comment)  (not noted in chart)     Electronically signed by Sole Venegas on 5/5/2025 at 8:51 AM

## 2025-05-05 NOTE — CONSULTS
Palliative Care: Pt is a 69 year old woman who arrived to the ED for fever and cough. Pt is currently receiving treatments for small cell carcinoma. Pt was admitted for Community acquired pneumonia of left lower lobe of lung. Pt is sitting up in recliner. Pt is alert, talkative and pleasant.                Past Medical History:        Past Medical History:   Diagnosis Date    Anxiety     Asthma     Cancer (HCC)     Neuroendocrine carcinoma of right lung    COPD (chronic obstructive pulmonary disease) (HCC)     CPAP (continuous positive airway pressure) dependence     11cm    Depression     Hyperlipidemia     Hypothyroidism     Migraines     Obstructive sleep apnea     AHI:  13.1    Palliative care patient     Restless legs syndrome     Sarcoidosis     Scoliosis     Unspecified sleep apnea        Advance Directives:   FULL CODE.             Pain/Other Symptoms:  Pt reports pain throughout abdomen and back. Pt states it more of a soreness from coughing. Fatigue and SOA              How are symptoms affecting QOL: Pt states she is able to complete most ADLS. Pt states her biggest restriction is not tolerating trips to the grocery.     Psychological/Spiritual:  Pt reports good family and Anglican support.                          Plan:   Continue medical management.          Patient/family discussion r/t goals:   Pt hopes to restart Chemo treatments as lian as she recovers from this hospital stay. Pt expressed much excitement over the how few treatments she has left. Pt plans to return home. Pt lives with her Grandaughter Karyna Echevarria and her grandaughter Tessie Echevarria lives next door.                 Palliative Performance Scale:  50%          Palliative Care team will continue to follow and support, with ongoing review of pt's goals of care.                Electronically signed by Miri Lemus RN on 5/5/2025 at 11:19 AM

## 2025-05-05 NOTE — PROGRESS NOTES
This  visited with pt to provide spiritual care. Pt says she is [employer]ian a [employer] and her cydney is very important to her. This  provided spiritual care with sustaining presence, support and prayer. Pt expressed gratitude for spiritual care and asked for further visits.         Spiritual Health History and Assessment/Progress Note  [facility]    Initial Encounter, Spiritual/Emotional Needs,  ,  ,      Name: Fozia Briceno MRN: [id number]    Age: 69 y.o.     Sex: female   Language: English   Voodoo: Presybeterian   Community acquired pneumonia of left lower lobe of lung     Date: 5/5/2025            Total Time Calculated: 12 min              Spiritual Assessment began in [facility] ONCOLOGY UNIT        Referral/Consult From: Palliative Care   Encounter Overview/Reason: Initial Encounter, Spiritual/Emotional Needs  Service Provided For: Patient    Cydney, Belief, Meaning:   Patient identifies as spiritual and is connected with a cydney tradition or spiritual practice  Family/Friends No family/friends present      Importance and Influence:  Patient has spiritual/personal beliefs that influence decisions regarding their health  Family/Friends No family/friends present    Community:  Patient is connected with a spiritual community  Family/Friends No family/friends present    Assessment and Plan of Care:     Patient Interventions include: Affirmed coping skills/support systems and Provided sacramental/Scientology ritual  Family/Friends Interventions include: No family/friends present    Patient Plan of Care: Spiritual Care available upon further referral  Family/Friends Plan of Care: No family/friends present    Electronically signed by Mary Behrens, Chaplain on 5/5/2025 at 3:19 PM

## 2025-05-05 NOTE — CARE COORDINATION
Case Management Assessment  Initial Evaluation    Date/Time of Evaluation: 5/5/2025 9:17 AM  Assessment Completed by: REY Arnett    If patient is discharged prior to next notation, then this note serves as note for discharge by case management.    Patient Name: Fozia Briceno                   YOB: 1955  Diagnosis: Pneumonia of left lower lobe due to infectious organism [J18.9]  Community acquired pneumonia of left lower lobe of lung [J18.9]                   Date / Time: 5/3/2025  9:14 AM    Patient Admission Status: Inpatient   Readmission Risk (Low < 19, Mod (19-27), High > 27): Readmission Risk Score: 17    Current PCP: Celine Gomez  PCP verified by CM? Yes    Chart Reviewed: Yes      History Provided by: Patient  Patient Orientation: Alert and Oriented    Patient Cognition: Alert    Hospitalization in the last 30 days (Readmission):  No    If yes, Readmission Assessment in CM Navigator will be completed.    Advance Directives:      Code Status: Full Code   Patient's Primary Decision Maker is: Legal Next of Kin      Discharge Planning:    Patient lives with: Family Members Type of Home: House  Primary Care Giver: Self  Patient Support Systems include: Family Members   Current Financial resources: Medicare, Medicaid  Current community resources: None  Current services prior to admission: Oxygen Therapy, Home Bipap, Durable Medical Equipment            Current DME: Bedside Commode, Oxygen Therapy (Comment), Walker, Wheelchair, Bipap (O2 supplied through [facility])            Type of Home Care services:  None    ADLS  Prior functional level: Independent in ADLs/IADLs  Current functional level: Independent in ADLs/IADLs    PT AM-PAC:   /24  OT AM-PAC:   /24    Family can provide assistance at DC: Yes  Would you like Case Management to discuss the discharge plan with any other family members/significant others, and if so, who? Yes (grandchildren)  Plans to Return to Present Housing: Yes  Other

## 2025-05-06 LAB
ANION GAP SERPL CALCULATED.3IONS-SCNC: 7 MMOL/L (ref 8–16)
BASOPHILS # BLD: 0 K/UL (ref 0–0.2)
BASOPHILS NFR BLD: 0.3 % (ref 0–1)
BUN SERPL-MCNC: 16 MG/DL (ref 8–23)
CALCIUM SERPL-MCNC: 8.5 MG/DL (ref 8.8–10.2)
CHLORIDE SERPL-SCNC: 105 MMOL/L (ref 98–107)
CO2 SERPL-SCNC: 29 MMOL/L (ref 22–29)
CREAT SERPL-MCNC: 0.7 MG/DL (ref 0.5–0.9)
EOSINOPHIL # BLD: 0 K/UL (ref 0–0.6)
EOSINOPHIL NFR BLD: 0 % (ref 0–5)
ERYTHROCYTE [DISTWIDTH] IN BLOOD BY AUTOMATED COUNT: 16.4 % (ref 11.5–14.5)
GLUCOSE SERPL-MCNC: 101 MG/DL (ref 70–99)
HCT VFR BLD AUTO: 23.8 % (ref 37–47)
HGB BLD-MCNC: 7.4 G/DL (ref 12–16)
IMM GRANULOCYTES # BLD: 0.7 K/UL
LYMPHOCYTES # BLD: 0.6 K/UL (ref 1.1–4.5)
LYMPHOCYTES NFR BLD: 5.8 % (ref 20–40)
MAGNESIUM SERPL-MCNC: 2.1 MG/DL (ref 1.6–2.4)
MCH RBC QN AUTO: 30.5 PG (ref 27–31)
MCHC RBC AUTO-ENTMCNC: 31.1 G/DL (ref 33–37)
MCV RBC AUTO: 97.9 FL (ref 81–99)
MONOCYTES # BLD: 1.1 K/UL (ref 0–0.9)
MONOCYTES NFR BLD: 11.4 % (ref 0–10)
NEUTROPHILS # BLD: 7.5 K/UL (ref 1.5–7.5)
NEUTS SEG NFR BLD: 75.2 % (ref 50–65)
PLATELET # BLD AUTO: 297 K/UL (ref 130–400)
PMV BLD AUTO: 10.5 FL (ref 9.4–12.3)
POTASSIUM SERPL-SCNC: 3.3 MMOL/L (ref 3.5–5)
RBC # BLD AUTO: 2.43 M/UL (ref 4.2–5.4)
SODIUM SERPL-SCNC: 141 MMOL/L (ref 136–145)
WBC # BLD AUTO: 9.9 K/UL (ref 4.8–10.8)

## 2025-05-06 PROCEDURE — 94640 AIRWAY INHALATION TREATMENT: CPT

## 2025-05-06 PROCEDURE — 2700000000 HC OXYGEN THERAPY PER DAY

## 2025-05-06 PROCEDURE — 6370000000 HC RX 637 (ALT 250 FOR IP)

## 2025-05-06 PROCEDURE — 6360000002 HC RX W HCPCS: Performed by: NURSE PRACTITIONER

## 2025-05-06 PROCEDURE — 6360000002 HC RX W HCPCS: Performed by: EMERGENCY MEDICINE

## 2025-05-06 PROCEDURE — 94760 N-INVAS EAR/PLS OXIMETRY 1: CPT

## 2025-05-06 PROCEDURE — 2500000003 HC RX 250 WO HCPCS: Performed by: NURSE PRACTITIONER

## 2025-05-06 PROCEDURE — 85025 COMPLETE CBC W/AUTO DIFF WBC: CPT

## 2025-05-06 PROCEDURE — 83735 ASSAY OF MAGNESIUM: CPT

## 2025-05-06 PROCEDURE — 2580000003 HC RX 258: Performed by: EMERGENCY MEDICINE

## 2025-05-06 PROCEDURE — 6370000000 HC RX 637 (ALT 250 FOR IP): Performed by: INTERNAL MEDICINE

## 2025-05-06 PROCEDURE — 94150 VITAL CAPACITY TEST: CPT

## 2025-05-06 PROCEDURE — 6370000000 HC RX 637 (ALT 250 FOR IP): Performed by: NURSE PRACTITIONER

## 2025-05-06 PROCEDURE — 36415 COLL VENOUS BLD VENIPUNCTURE: CPT

## 2025-05-06 PROCEDURE — 6370000000 HC RX 637 (ALT 250 FOR IP): Performed by: STUDENT IN AN ORGANIZED HEALTH CARE EDUCATION/TRAINING PROGRAM

## 2025-05-06 PROCEDURE — 1200000000 HC SEMI PRIVATE

## 2025-05-06 PROCEDURE — 99232 SBSQ HOSP IP/OBS MODERATE 35: CPT | Performed by: INTERNAL MEDICINE

## 2025-05-06 PROCEDURE — 80048 BASIC METABOLIC PNL TOTAL CA: CPT

## 2025-05-06 RX ORDER — POTASSIUM CHLORIDE 1500 MG/1
40 TABLET, EXTENDED RELEASE ORAL PRN
Status: DISCONTINUED | OUTPATIENT
Start: 2025-05-06 | End: 2025-05-07 | Stop reason: HOSPADM

## 2025-05-06 RX ORDER — CODEINE PHOSPHATE AND GUAIFENESIN 10; 100 MG/5ML; MG/5ML
5 SOLUTION ORAL EVERY 4 HOURS PRN
Status: DISCONTINUED | OUTPATIENT
Start: 2025-05-06 | End: 2025-05-07 | Stop reason: HOSPADM

## 2025-05-06 RX ORDER — POTASSIUM CHLORIDE 7.45 MG/ML
10 INJECTION INTRAVENOUS PRN
Status: DISCONTINUED | OUTPATIENT
Start: 2025-05-06 | End: 2025-05-07 | Stop reason: HOSPADM

## 2025-05-06 RX ADMIN — IPRATROPIUM BROMIDE 0.5 MG: 0.5 SOLUTION RESPIRATORY (INHALATION) at 06:15

## 2025-05-06 RX ADMIN — OXYCODONE AND ACETAMINOPHEN 1 TABLET: 10; 325 TABLET ORAL at 14:33

## 2025-05-06 RX ADMIN — IPRATROPIUM BROMIDE 0.5 MG: 0.5 SOLUTION RESPIRATORY (INHALATION) at 14:28

## 2025-05-06 RX ADMIN — CITALOPRAM HYDROBROMIDE 40 MG: 10 TABLET ORAL at 08:10

## 2025-05-06 RX ADMIN — OXYCODONE AND ACETAMINOPHEN 1 TABLET: 10; 325 TABLET ORAL at 02:45

## 2025-05-06 RX ADMIN — IPRATROPIUM BROMIDE 0.5 MG: 0.5 SOLUTION RESPIRATORY (INHALATION) at 10:22

## 2025-05-06 RX ADMIN — GABAPENTIN 600 MG: 600 TABLET, FILM COATED ORAL at 20:24

## 2025-05-06 RX ADMIN — GUAIFENESIN AND CODEINE PHOSPHATE 5 ML: 100; 10 SOLUTION ORAL at 17:22

## 2025-05-06 RX ADMIN — GABAPENTIN 600 MG: 600 TABLET, FILM COATED ORAL at 08:10

## 2025-05-06 RX ADMIN — GUAIFENESIN AND CODEINE PHOSPHATE 5 ML: 100; 10 SOLUTION ORAL at 08:17

## 2025-05-06 RX ADMIN — SODIUM CHLORIDE, PRESERVATIVE FREE 10 ML: 5 INJECTION INTRAVENOUS at 08:10

## 2025-05-06 RX ADMIN — TIZANIDINE 4 MG: 4 TABLET ORAL at 20:25

## 2025-05-06 RX ADMIN — CEFEPIME 2000 MG: 2 INJECTION, POWDER, FOR SOLUTION INTRAVENOUS at 17:21

## 2025-05-06 RX ADMIN — MIRTAZAPINE 15 MG: 7.5 TABLET, FILM COATED ORAL at 20:25

## 2025-05-06 RX ADMIN — TIZANIDINE 4 MG: 4 TABLET ORAL at 08:10

## 2025-05-06 RX ADMIN — IPRATROPIUM BROMIDE 0.5 MG: 0.5 SOLUTION RESPIRATORY (INHALATION) at 18:21

## 2025-05-06 RX ADMIN — CEFEPIME 2000 MG: 2 INJECTION, POWDER, FOR SOLUTION INTRAVENOUS at 02:47

## 2025-05-06 RX ADMIN — ENOXAPARIN SODIUM 40 MG: 100 INJECTION SUBCUTANEOUS at 14:31

## 2025-05-06 RX ADMIN — PREDNISONE 40 MG: 20 TABLET ORAL at 08:10

## 2025-05-06 RX ADMIN — POTASSIUM BICARBONATE 40 MEQ: 782 TABLET, EFFERVESCENT ORAL at 06:08

## 2025-05-06 RX ADMIN — CEFEPIME 2000 MG: 2 INJECTION, POWDER, FOR SOLUTION INTRAVENOUS at 10:06

## 2025-05-06 RX ADMIN — ROPINIROLE HYDROCHLORIDE 0.25 MG: 0.25 TABLET, FILM COATED ORAL at 20:25

## 2025-05-06 RX ADMIN — BENZONATATE 100 MG: 100 CAPSULE ORAL at 02:45

## 2025-05-06 RX ADMIN — LEVOTHYROXINE SODIUM 112 MCG: 0.11 TABLET ORAL at 06:08

## 2025-05-06 RX ADMIN — OXYCODONE AND ACETAMINOPHEN 1 TABLET: 10; 325 TABLET ORAL at 08:13

## 2025-05-06 RX ADMIN — OXYCODONE AND ACETAMINOPHEN 1 TABLET: 10; 325 TABLET ORAL at 21:25

## 2025-05-06 RX ADMIN — PANTOPRAZOLE SODIUM 40 MG: 40 TABLET, DELAYED RELEASE ORAL at 06:08

## 2025-05-06 ASSESSMENT — PAIN SCALES - WONG BAKER: WONGBAKER_NUMERICALRESPONSE: HURTS LITTLE MORE

## 2025-05-06 ASSESSMENT — PAIN - FUNCTIONAL ASSESSMENT
PAIN_FUNCTIONAL_ASSESSMENT: PREVENTS OR INTERFERES SOME ACTIVE ACTIVITIES AND ADLS
PAIN_FUNCTIONAL_ASSESSMENT: PREVENTS OR INTERFERES SOME ACTIVE ACTIVITIES AND ADLS

## 2025-05-06 ASSESSMENT — PAIN SCALES - GENERAL
PAINLEVEL_OUTOF10: 7
PAINLEVEL_OUTOF10: 3
PAINLEVEL_OUTOF10: 6
PAINLEVEL_OUTOF10: 5
PAINLEVEL_OUTOF10: 6

## 2025-05-06 ASSESSMENT — PAIN DESCRIPTION - ORIENTATION
ORIENTATION: MID
ORIENTATION: MID

## 2025-05-06 ASSESSMENT — PAIN DESCRIPTION - LOCATION
LOCATION: BACK
LOCATION: GENERALIZED

## 2025-05-06 ASSESSMENT — PAIN DESCRIPTION - DESCRIPTORS
DESCRIPTORS: ACHING
DESCRIPTORS: ACHING

## 2025-05-06 NOTE — PROGRESS NOTES
[facility] Hospitalists      Patient:  Fozia Briceno  YOB: 1955  Date of Service: 5/6/2025  MRN: [id number]   Acct: [id number]   Primary Care Physician: Celine Gomez  Advance Directive: Full Code  Admit Date: 5/3/2025       Hospital Day: 3  Portions of this note have been copied forward, however, changed to reflect the most current clinical status of this patient.  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  Patient states that she has had intermittent fever ongoing for the past few days max 104.  States that she has had frequent dry cough ongoing for the past week and has been dyspneic on exertion.  Workup in ER CXR left lung base opacity, respiratory panel negative, procalcitonin 0.55, WBC 2, K3.4, Hgb 8.4.  Patient admitted to hospitalist service with consultation to oncology.  Initiated on IV cefepime and vancomycin, IVF and IV steroids.  MRSA negative IV vancomycin discontinued.  Blood cultures no growth to date.  Has had ongoing right anterior chest wall pain due to frequent cough repeat CXR ordered no definite rib fracture identified.  Pain medication and lidocaine patch ordered.  Has had frequent dry cough will initiate Tessalon Perles stated this has helped. Noted generalized weakness, fatigue uneasy about going home today. Discussed with her plan for home in am. Instructed on progressive ambulation and I-S use.    Objective:   VITALS:  BP (!) 140/83   Pulse (!) 103   Temp 97.5 °F (36.4 °C) (Temporal)   Resp 17   Ht 1.702 m (5' 7.01\")   Wt 76 kg (167 lb 8.8 oz)   SpO2 98%   BMI 26.24 kg/m²   24HR

## 2025-05-06 NOTE — PROGRESS NOTES
ONCOLOGY PROGRESS     Patient:  Fozia Briceno  YOB: 1955  Date of Service: 5/6/2025  MRN: [id number]   Primary Care Physician: Celine Gomez  Advance Directive: Full Code    Subjective-afebrile.  She had persistent dry cough overnight.  She has been on benzonatate without improvement.    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 Bates, last course #3 initiated 4/16/2025.    Fozia presented to the ED at [facility] on 5/3/2025 with increasing cough and temperature elevation to 104 degrees.  Respiratory panel by PCR was negative.    Chest x-ray 5/3/2025 reported the following:  Opacities at the left lung base concerning for pneumonia.     Blood cultures were obtained and she was admitted with LLL pneumonia for broad-spectrum antibiotics with cefepime and vancomycin.    Medical oncology consultation requested in continuity of care      DETAILED ONCOLOGY HISTORY COPIED FROM DR. BATES WAS 4/16/2025 CLINIC NOTE:    The patient presents for evaluation of small cell lung cancer, limited stage, clinical T4N1M1. She is accompanied by her daughter.    Currently undergoing her third cycle of chemotherapy, she reports an improvement in her overall condition. Her daughter corroborates this, noting that she appears healthier and exhibits increased stamina. Respiratory function has also shown signs of improvement. No adverse effects from the chemotherapy such as nausea, vomiting, or diarrhea are reported. Appetite has improved, and feelings of hunger are experienced.    Radiation therapy is being received 5 days a week under the supervision of Dr. Perez, with a total of 35 sessions planned. Twelve sessions remain, and  changes with decreased patchy infiltrate in the RIGHT lower lobe.   3/6/25 Initiated radiation to right lung 5040 cGy over 28 fraction with Dr. Perez  3/19/2025-dose reduction cycle #2 to carboplatin AUC 4, etoposide 80 mg/m² days 1-3 q. 21 days.  Dose reduction was recommended due to recent hospitalization and significant deconditioning.            Past Medical History:    Past Medical History:   Diagnosis Date    Anxiety     Asthma     Cancer (HCC)     Neuroendocrine carcinoma of right lung    COPD (chronic obstructive pulmonary disease) (HCC)     CPAP (continuous positive airway pressure) dependence     11cm    Depression     Hyperlipidemia     Hypothyroidism     Migraines     Obstructive sleep apnea     AHI:  13.1    Palliative care patient     Restless legs syndrome     Sarcoidosis     Scoliosis     Unspecified sleep apnea          Past Surgical History:    Past Surgical History:   Procedure Laterality Date    APPENDECTOMY  age 20    Patient thinks this was removed with GB    BREAST SURGERY  2006    bilateral breast tumor removal-[facility] [place]    BRONCHOSCOPY N/A 07/29/2019    BRONCHOSCOPY BIOPSY BRONCHUS performed by Donnie Degroot MD at [facility]    CHOLECYSTECTOMY  age 20    VASCULAR SURGERY  03/13/2025    Ultrasound guided cannulation right internal jugular vein 2.  right internal jugular vein Single Chamber lumen subcutaneous vascular access device placement ([employer] PowerPort)         Immunizations:    Immunization History   Administered Date(s) Administered    COVID-19, PFIZER PURPLE top, DILUTE for use, (age 12 y+), 30mcg/0.3mL 09/22/2021, 10/13/2021    Influenza Vaccine, unspecified formulation 10/22/2018    Influenza Virus Vaccine 12/01/2014, 01/11/2016    Influenza, FLUARIX, FLULAVAL, FLUZONE (age 6 mo+) and AFLURIA, (age 3 y+), Quadv PF, 0.5mL 02/21/2018, 01/28/2020, 11/24/2020    PPD Test 10/08/2018    Pneumococcal, PPSV23, PNEUMOVAX 23, (age 2y+), SC/IM, 0.5mL 12/01/2014

## 2025-05-06 NOTE — PLAN OF CARE
Problem: Pain  Goal: Verbalizes/displays adequate comfort level or baseline comfort level  5/6/2025 1124 by Kirsty Redding RN  Outcome: Progressing  5/6/2025 0319 by Carlie Neville RN  Outcome: Progressing     Problem: Safety - Adult  Goal: Free from fall injury  5/6/2025 1124 by Kirsty Redding RN  Outcome: Progressing  5/6/2025 0319 by Carlie Neville RN  Outcome: Progressing     Problem: ABCDS Injury Assessment  Goal: Absence of physical injury  5/6/2025 1124 by Kirsty Redding RN  Outcome: Progressing  5/6/2025 0319 by Carlie Neville RN  Outcome: Progressing

## 2025-05-07 ENCOUNTER — APPOINTMENT (OUTPATIENT)
Dept: INFUSION THERAPY | Age: 70
End: 2025-05-07
Payer: MEDICARE

## 2025-05-07 ENCOUNTER — READMISSION MANAGEMENT (OUTPATIENT)
Dept: CALL CENTER | Facility: HOSPITAL | Age: 70
End: 2025-05-07
Payer: MEDICARE

## 2025-05-07 ENCOUNTER — TELEPHONE (OUTPATIENT)
Dept: FAMILY MEDICINE CLINIC | Facility: CLINIC | Age: 70
End: 2025-05-07

## 2025-05-07 VITALS
OXYGEN SATURATION: 92 % | TEMPERATURE: 98.2 F | SYSTOLIC BLOOD PRESSURE: 138 MMHG | HEART RATE: 99 BPM | WEIGHT: 167.55 LBS | DIASTOLIC BLOOD PRESSURE: 83 MMHG | HEIGHT: 67 IN | BODY MASS INDEX: 26.3 KG/M2 | RESPIRATION RATE: 16 BRPM

## 2025-05-07 LAB
ANION GAP SERPL CALCULATED.3IONS-SCNC: 8 MMOL/L (ref 8–16)
BASOPHILS # BLD: 0 K/UL (ref 0–0.2)
BASOPHILS NFR BLD: 0.3 % (ref 0–1)
BUN SERPL-MCNC: 13 MG/DL (ref 8–23)
CALCIUM SERPL-MCNC: 8.6 MG/DL (ref 8.8–10.2)
CHLORIDE SERPL-SCNC: 102 MMOL/L (ref 98–107)
CO2 SERPL-SCNC: 32 MMOL/L (ref 22–29)
CREAT SERPL-MCNC: 0.7 MG/DL (ref 0.5–0.9)
EOSINOPHIL # BLD: 0 K/UL (ref 0–0.6)
EOSINOPHIL NFR BLD: 0 % (ref 0–5)
ERYTHROCYTE [DISTWIDTH] IN BLOOD BY AUTOMATED COUNT: 16.4 % (ref 11.5–14.5)
GLUCOSE SERPL-MCNC: 95 MG/DL (ref 70–99)
HCT VFR BLD AUTO: 24.6 % (ref 37–47)
HGB BLD-MCNC: 7.6 G/DL (ref 12–16)
IMM GRANULOCYTES # BLD: 0.8 K/UL
LYMPHOCYTES # BLD: 0.5 K/UL (ref 1.1–4.5)
LYMPHOCYTES NFR BLD: 4.9 % (ref 20–40)
MCH RBC QN AUTO: 30.2 PG (ref 27–31)
MCHC RBC AUTO-ENTMCNC: 30.9 G/DL (ref 33–37)
MCV RBC AUTO: 97.6 FL (ref 81–99)
MONOCYTES # BLD: 1 K/UL (ref 0–0.9)
MONOCYTES NFR BLD: 9.6 % (ref 0–10)
NEUTROPHILS # BLD: 7.6 K/UL (ref 1.5–7.5)
NEUTS SEG NFR BLD: 76.9 % (ref 50–65)
PLATELET # BLD AUTO: 318 K/UL (ref 130–400)
PMV BLD AUTO: 10.2 FL (ref 9.4–12.3)
POTASSIUM SERPL-SCNC: 3.6 MMOL/L (ref 3.5–5)
RBC # BLD AUTO: 2.52 M/UL (ref 4.2–5.4)
SODIUM SERPL-SCNC: 142 MMOL/L (ref 136–145)
WBC # BLD AUTO: 9.9 K/UL (ref 4.8–10.8)

## 2025-05-07 PROCEDURE — 2580000003 HC RX 258: Performed by: EMERGENCY MEDICINE

## 2025-05-07 PROCEDURE — 36415 COLL VENOUS BLD VENIPUNCTURE: CPT

## 2025-05-07 PROCEDURE — 80048 BASIC METABOLIC PNL TOTAL CA: CPT

## 2025-05-07 PROCEDURE — 2700000000 HC OXYGEN THERAPY PER DAY

## 2025-05-07 PROCEDURE — 6360000002 HC RX W HCPCS: Performed by: NURSE PRACTITIONER

## 2025-05-07 PROCEDURE — 6370000000 HC RX 637 (ALT 250 FOR IP): Performed by: NURSE PRACTITIONER

## 2025-05-07 PROCEDURE — 6360000002 HC RX W HCPCS: Performed by: EMERGENCY MEDICINE

## 2025-05-07 PROCEDURE — 6370000000 HC RX 637 (ALT 250 FOR IP): Performed by: INTERNAL MEDICINE

## 2025-05-07 PROCEDURE — 94150 VITAL CAPACITY TEST: CPT

## 2025-05-07 PROCEDURE — 85025 COMPLETE CBC W/AUTO DIFF WBC: CPT

## 2025-05-07 PROCEDURE — 2500000003 HC RX 250 WO HCPCS: Performed by: NURSE PRACTITIONER

## 2025-05-07 PROCEDURE — 94640 AIRWAY INHALATION TREATMENT: CPT

## 2025-05-07 PROCEDURE — 94760 N-INVAS EAR/PLS OXIMETRY 1: CPT

## 2025-05-07 PROCEDURE — 99232 SBSQ HOSP IP/OBS MODERATE 35: CPT | Performed by: INTERNAL MEDICINE

## 2025-05-07 RX ORDER — LIDOCAINE 4 G/G
1 PATCH TOPICAL DAILY
Qty: 30 EACH | Refills: 0 | Status: SHIPPED | OUTPATIENT
Start: 2025-05-07

## 2025-05-07 RX ORDER — PANTOPRAZOLE SODIUM 40 MG/1
40 TABLET, DELAYED RELEASE ORAL
Qty: 30 TABLET | Refills: 3 | Status: SHIPPED | OUTPATIENT
Start: 2025-05-08

## 2025-05-07 RX ORDER — LEVOFLOXACIN 750 MG/1
750 TABLET, FILM COATED ORAL DAILY
Qty: 6 TABLET | Refills: 0 | Status: SHIPPED | OUTPATIENT
Start: 2025-05-07 | End: 2025-05-13

## 2025-05-07 RX ORDER — CODEINE PHOSPHATE AND GUAIFENESIN 10; 100 MG/5ML; MG/5ML
5 SOLUTION ORAL EVERY 4 HOURS PRN
Qty: 90 ML | Refills: 0 | Status: SHIPPED | OUTPATIENT
Start: 2025-05-07 | End: 2025-05-10

## 2025-05-07 RX ORDER — LEVOFLOXACIN 750 MG/1
750 TABLET, FILM COATED ORAL DAILY
Qty: 3 TABLET | Refills: 0 | Status: SHIPPED | OUTPATIENT
Start: 2025-05-07 | End: 2025-05-07

## 2025-05-07 RX ORDER — PREDNISONE 20 MG/1
40 TABLET ORAL DAILY
Qty: 2 TABLET | Refills: 0 | Status: SHIPPED | OUTPATIENT
Start: 2025-05-08 | End: 2025-05-09

## 2025-05-07 RX ADMIN — CEFEPIME 2000 MG: 2 INJECTION, POWDER, FOR SOLUTION INTRAVENOUS at 02:02

## 2025-05-07 RX ADMIN — SODIUM CHLORIDE, PRESERVATIVE FREE 10 ML: 5 INJECTION INTRAVENOUS at 07:49

## 2025-05-07 RX ADMIN — GUAIFENESIN AND CODEINE PHOSPHATE 5 ML: 100; 10 SOLUTION ORAL at 09:02

## 2025-05-07 RX ADMIN — OXYCODONE AND ACETAMINOPHEN 1 TABLET: 10; 325 TABLET ORAL at 04:42

## 2025-05-07 RX ADMIN — CITALOPRAM HYDROBROMIDE 40 MG: 10 TABLET ORAL at 07:48

## 2025-05-07 RX ADMIN — LEVOTHYROXINE SODIUM 112 MCG: 0.11 TABLET ORAL at 05:30

## 2025-05-07 RX ADMIN — TIZANIDINE 4 MG: 4 TABLET ORAL at 07:48

## 2025-05-07 RX ADMIN — GABAPENTIN 600 MG: 600 TABLET, FILM COATED ORAL at 07:48

## 2025-05-07 RX ADMIN — PREDNISONE 40 MG: 20 TABLET ORAL at 07:47

## 2025-05-07 RX ADMIN — PANTOPRAZOLE SODIUM 40 MG: 40 TABLET, DELAYED RELEASE ORAL at 05:30

## 2025-05-07 RX ADMIN — IPRATROPIUM BROMIDE 0.5 MG: 0.5 SOLUTION RESPIRATORY (INHALATION) at 06:28

## 2025-05-07 RX ADMIN — OXYCODONE AND ACETAMINOPHEN 1 TABLET: 10; 325 TABLET ORAL at 10:47

## 2025-05-07 RX ADMIN — IPRATROPIUM BROMIDE 0.5 MG: 0.5 SOLUTION RESPIRATORY (INHALATION) at 10:15

## 2025-05-07 ASSESSMENT — PAIN SCALES - GENERAL
PAINLEVEL_OUTOF10: 4
PAINLEVEL_OUTOF10: 5

## 2025-05-07 ASSESSMENT — PAIN DESCRIPTION - DESCRIPTORS
DESCRIPTORS: ACHING;DISCOMFORT
DESCRIPTORS: ACHING

## 2025-05-07 ASSESSMENT — PAIN DESCRIPTION - LOCATION
LOCATION: GENERALIZED
LOCATION: BACK

## 2025-05-07 ASSESSMENT — PAIN SCALES - WONG BAKER: WONGBAKER_NUMERICALRESPONSE: HURTS LITTLE MORE

## 2025-05-07 NOTE — PROGRESS NOTES
This  visited with pt to follow up and provide spiritual care. Pt says she is going home today. She was also having a breathing treatment. Pt is a Pentecostal and her cydney is important to her. This  provided spiritual care with sustaining presence, support, and prayer. Pt expressed gratitude for spiritual care.         Spiritual Health History and Assessment/Progress Note  [facility]    Spiritual/Emotional Needs,  ,  ,      Name: Fozia Briceno MRN: [id number]    Age: 69 y.o.     Sex: female   Language: English   Nondenominational: Denominational   Community acquired pneumonia of left lower lobe of lung     Date: 5/7/2025            Total Time Calculated: 10 min              Spiritual Assessment continued in [facility] ONCOLOGY UNIT        Referral/Consult From: Palliative Care   Encounter Overview/Reason: Spiritual/Emotional Needs  Service Provided For: Patient    Cydney, Belief, Meaning:   Patient identifies as spiritual and is connected with a cydney tradition or spiritual practice  Family/Friends No family/friends present      Importance and Influence:  Patient has spiritual/personal beliefs that influence decisions regarding their health  Family/Friends No family/friends present    Community:  Patient is connected with a spiritual community  Family/Friends No family/friends present    Assessment and Plan of Care:     Patient Interventions include: Provided sacramental/Catholic ritual  Family/Friends Interventions include: No family/friends present    Patient Plan of Care: Spiritual Care available upon further referral  Family/Friends Plan of Care: Spiritual Care available upon further referral    Electronically signed by Mary Behrens, Chaplain on 5/7/2025 at 11:41 AM

## 2025-05-07 NOTE — PROGRESS NOTES
ONCOLOGY PROGRESS     Patient:  Fozia Briceno  YOB: 1955  Date of Service: 5/7/2025  MRN: [id number]   Primary Care Physician: Celine Gomez  Advance Directive: Full Code    Subjective-she feels slightly better this morning.  Guaifenesin/codeine helped significantly.    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 Bates, last course #3 initiated 4/16/2025.    Fozia presented to the ED at [facility] on 5/3/2025 with increasing cough and temperature elevation to 104 degrees.  Respiratory panel by PCR was negative.    Chest x-ray 5/3/2025 reported the following:  Opacities at the left lung base concerning for pneumonia.     Blood cultures were obtained and she was admitted with L pneumonia for broad-spectrum antibiotics with cefepime and vancomycin.    Medical oncology consultation requested in continuity of care      DETAILED ONCOLOGY HISTORY COPIED FROM DR. BATES WAS 4/16/2025 CLINIC NOTE:    The patient presents for evaluation of small cell lung cancer, limited stage, clinical T4N1M1. She is accompanied by her daughter.    Currently undergoing her third cycle of chemotherapy, she reports an improvement in her overall condition. Her daughter corroborates this, noting that she appears healthier and exhibits increased stamina. Respiratory function has also shown signs of improvement. No adverse effects from the chemotherapy such as nausea, vomiting, or diarrhea are reported. Appetite has improved, and feelings of hunger are experienced.    Radiation therapy is being received 5 days a week under the supervision of Dr. Perez, with a total of 35 sessions planned. Twelve sessions remain, and completion is  Year: No         Family History:   Family History   Problem Relation Age of Onset    Heart Disease Mother     Diabetes Father     Heart Disease Father     Diabetes Sister     Heart Disease Sister     Diabetes Sister     Heart Disease Sister     Heart Disease Sister     Diabetes Sister     Alzheimer's Disease Sister     Diabetes Brother     Heart Disease Brother     Diabetes Brother     Kidney Disease Brother     Cancer Maternal Aunt         breast    Cancer Maternal Aunt         lung    Cancer Maternal Uncle         Colon    Cancer Son         Renal    Cancer Other         Nephew with Renal cell carcinoma          Physical Exam   Constitutional: Oriented to person, place, and time. No acute distress.    Cardiovascular: Normal rate, regular rhythm, normal heart sounds  Pulmonary/Chest: Scattered rales diffusely with mild wheezing.   Abdominal: Soft. Bowel sounds are normal. No organomegally or masses. No tenderness. There is no rebound and no guarding.   Musculoskeletal: Normal range of motion. No edema or tenderness.   Neurological: Alert and oriented to person, place, and time. Cranial nerves are intact. Neurological exam is nonfocal  Skin: Skin is warm and dry. No rash noted. No erythema. No pallor.     DATA:    Labs:  General Labs:  CBC:   Lab Results   Component Value Date    WBC 9.9 05/07/2025    HGB 7.6 (L) 05/07/2025    HCT 24.6 (L) 05/07/2025    MCV 97.6 05/07/2025     05/07/2025       CMP:    Lab Results   Component Value Date     05/07/2025    K 3.6 05/07/2025     05/07/2025    CO2 32 (H) 05/07/2025    BUN 13 05/07/2025    CREATININE 0.7 05/07/2025    GLUCOSE 95 05/07/2025    CALCIUM 8.6 (L) 05/07/2025    BILITOT 0.3 05/03/2025    ALKPHOS 154 (H) 05/03/2025    AST 18 05/03/2025    ALT 13 05/03/2025    LABGLOM >90 05/07/2025    GFRAA >59 06/05/2022    GLOB 2.5 01/17/2017       IMPRESSION/RECOMMENDATIONS:      #Limited small cell carcinoma of the lung  Concurrent chemo-XRT with

## 2025-05-07 NOTE — OUTREACH NOTE
Prep Survey      Flowsheet Row Responses   [facility] facility patient discharged from? Non-[facility]   Is LACE score < 7 ? Non- Discharge   Eligibility [facility]   Date of Admission 05/03/25   Date of Discharge 05/07/25   Discharge Disposition Home or Self Care   Discharge diagnosis Pneumonia of left lower lobe due to infectious organism (Primary Dx),  Community acquired pneumonia of left lower lobe of lung   Does the patient have one of the following disease processes/diagnoses(primary or secondary)? Pneumonia   Prep survey completed? Yes            Alisia HOGAN - Registered Nurse

## 2025-05-07 NOTE — DISCHARGE SUMMARY
Fozia Briceno  :  1955  MRN:  [id number]    Admit date:  5/3/2025  Discharge date:  25    Discharging Physician:  DR Mark     Advance Directive: Full Code    Consults: IP CONSULT TO ONCOLOGY  PALLIATIVE CARE EVAL     Primary Care Physician:  Celine Gomez    Discharge Diagnoses:  Principal Problem:    Community acquired pneumonia of left lower lobe of lung  Active Problems:    COPD exacerbation (HCC)    Small cell lung cancer (HCC)  Resolved Problems:    * No resolved hospital problems. *      Portions of this note have been copied forward, however, changed to reflect the most current clinical status of this patient.  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  Patient states that she has had intermittent fever ongoing for the past few days max 104.  States that she has had frequent dry cough ongoing for the past week and has been dyspneic on exertion.  Workup in ER CXR left lung base opacity, respiratory panel negative, procalcitonin 0.55, WBC 2, K3.4, Hgb 8.4.  Patient admitted to hospitalist service with consultation to oncology.  Initiated on IV cefepime and vancomycin, IVF and IV steroids.  MRSA negative IV vancomycin discontinued.  Blood cultures no growth to date.  Has had ongoing right anterior chest wall pain due to frequent cough repeat CXR ordered no definite rib fracture identified.  Pain medication and lidocaine patch ordered.  Has had frequent dry cough will initiate Tessalon Perles and Guaifenesin/codeine stated this has helped. Instructed on progressive ambulation and I-S use. Transitioned to oral levaquin to complete 10 day course. Patient stable for discharge home.   Significant Diagnostic Studies:   XR CHEST

## 2025-05-07 NOTE — TELEPHONE ENCOUNTER
[facility]     Relationship to patient: NURSE     Manny call back number: [phone]     New or established patient?  [] New  [x] Established    Date of discharge: 05/07/25    Facility discharged from:  [facility]     Diagnosis/Symptoms: PNEUMONIA     Length of stay (If applicable): 4 DAYS     Specialty Only: Did you see a [facility] provider?    [x] Yes  [x] No  If so, who? DID NOT STATE     Additional Details:  NONE

## 2025-05-08 ENCOUNTER — TRANSITIONAL CARE MANAGEMENT TELEPHONE ENCOUNTER (OUTPATIENT)
Dept: CALL CENTER | Facility: HOSPITAL | Age: 70
End: 2025-05-08
Payer: MEDICARE

## 2025-05-08 ENCOUNTER — APPOINTMENT (OUTPATIENT)
Dept: INFUSION THERAPY | Age: 70
End: 2025-05-08
Payer: MEDICARE

## 2025-05-08 LAB
BACTERIA BLD CULT ORG #2: NORMAL
BACTERIA BLD CULT: NORMAL

## 2025-05-08 NOTE — OUTREACH NOTE
Call Center TCM Note      Flowsheet Row Responses   [facility] patient discharged from? Non-  [[facility]]   Does the patient have one of the following disease processes/diagnoses(primary or secondary)? Pneumonia   TCM attempt successful? Yes   Call start time 1331   Revoked Reason Other  [Referred to [facility]  for financial resources.]   Call end time 1337   Discharge diagnosis Pneumonia of left lower lobe due to infectious organism (Primary Dx),  Community acquired pneumonia of left lower lobe of lung   Person spoke with today (if not patient) and relationship Patient   Meds reviewed with patient/caregiver? Yes   Does the patient have all medications ordered at discharge? Yes   Is the patient taking all medications as directed (includes completed medication regime)? Yes   Medication comments Patient reports that she needs to talk to PCP about ordering cough medicine that her insurance will pay for and also if pain patches could be covered.   Comments PCP Padmaja BUTLER. Hospital follow up appt in place for tomorrow 5/9  915am.   Does the patient have an appointment with their PCP within 7-14 days of discharge? Yes   Has home health visited the patient within 72 hours of discharge? N/A   Psychosocial issues? No   Did the patient receive a copy of their discharge instructions? Yes   What is the patient's perception of their health status since discharge? Same   Is the patient/caregiver able to teach back signs and symptoms related to disease process for when to call PCP? Yes   Is the patient/caregiver able to teach back signs and symptoms related to disease process for when to call 911? Yes   Is the patient/caregiver able to teach back the hierarchy of who to call/visit for symptoms/problems? PCP, Specialist, Home health nurse, Urgent Care, ED, 911 Yes   If the patient is a current smoker, are they able to teach back resources for cessation? Not a smoker    TCM call completed? Yes   Call end time 1337   Would this patient benefit from a Referral to Amb Social Work? Yes   Reason for Social Work Referral Financial  [Patient reports financial hardship with bills, gas, electric bill. She verbalizes distress over losing everything. Would like to know of any resources for assistance.]   Is the patient interested in additional calls from an ambulatory ? No            ESTELA ALLEN - Registered Nurse    5/8/2025, 13:41 CDT

## 2025-05-09 ENCOUNTER — APPOINTMENT (OUTPATIENT)
Dept: INFUSION THERAPY | Age: 70
End: 2025-05-09
Payer: MEDICARE

## 2025-05-09 ENCOUNTER — OFFICE VISIT (OUTPATIENT)
Dept: FAMILY MEDICINE CLINIC | Facility: CLINIC | Age: 70
End: 2025-05-09
Payer: MEDICARE

## 2025-05-09 VITALS
WEIGHT: 153 LBS | HEART RATE: 111 BPM | RESPIRATION RATE: 20 BRPM | DIASTOLIC BLOOD PRESSURE: 76 MMHG | OXYGEN SATURATION: 97 % | SYSTOLIC BLOOD PRESSURE: 149 MMHG | BODY MASS INDEX: 24.01 KG/M2 | TEMPERATURE: 98 F | HEIGHT: 67 IN

## 2025-05-09 DIAGNOSIS — Z09 HOSPITAL DISCHARGE FOLLOW-UP: Primary | ICD-10-CM

## 2025-05-09 DIAGNOSIS — G25.81 RESTLESS LEGS SYNDROME: ICD-10-CM

## 2025-05-09 DIAGNOSIS — F41.0 PANIC ATTACKS: ICD-10-CM

## 2025-05-09 DIAGNOSIS — G89.4 CHRONIC PAIN DISORDER: ICD-10-CM

## 2025-05-09 DIAGNOSIS — E03.9 HYPOTHYROIDISM, UNSPECIFIED TYPE: ICD-10-CM

## 2025-05-09 DIAGNOSIS — F41.9 ANXIETY: ICD-10-CM

## 2025-05-09 RX ORDER — ROPINIROLE 0.25 MG/1
0.25 TABLET, FILM COATED ORAL
Qty: 90 TABLET | Refills: 1 | Status: SHIPPED | OUTPATIENT
Start: 2025-05-09

## 2025-05-09 RX ORDER — LIDOCAINE 50 MG/G
1 OINTMENT TOPICAL EVERY 4 HOURS PRN
Qty: 50 G | Refills: 0 | Status: SHIPPED | OUTPATIENT
Start: 2025-05-09

## 2025-05-09 RX ORDER — MIRTAZAPINE 15 MG/1
15 TABLET, FILM COATED ORAL
Qty: 90 TABLET | Refills: 1 | Status: SHIPPED | OUTPATIENT
Start: 2025-05-09

## 2025-05-09 RX ORDER — LEVOFLOXACIN 750 MG/1
750 TABLET, FILM COATED ORAL
COMMUNITY
Start: 2025-05-07 | End: 2025-05-14

## 2025-05-09 RX ORDER — DIAZEPAM 5 MG/1
5 TABLET ORAL EVERY 12 HOURS PRN
Qty: 60 TABLET | Refills: 2 | Status: SHIPPED | OUTPATIENT
Start: 2025-05-09

## 2025-05-09 RX ORDER — GABAPENTIN 600 MG/1
600 TABLET ORAL EVERY 12 HOURS SCHEDULED
Qty: 60 TABLET | Refills: 2 | Status: SHIPPED | OUTPATIENT
Start: 2025-05-09

## 2025-05-09 RX ORDER — TIZANIDINE 2 MG/1
2 TABLET ORAL 2 TIMES DAILY PRN
Qty: 60 TABLET | Refills: 5 | Status: SHIPPED | OUTPATIENT
Start: 2025-05-09

## 2025-05-09 RX ORDER — LEVOTHYROXINE SODIUM 112 UG/1
112 TABLET ORAL DAILY
Qty: 90 TABLET | Refills: 1 | Status: SHIPPED | OUTPATIENT
Start: 2025-05-09

## 2025-05-12 ENCOUNTER — PATIENT OUTREACH (OUTPATIENT)
Age: 70
End: 2025-05-12
Payer: MEDICARE

## 2025-05-12 ENCOUNTER — HOSPITAL ENCOUNTER (OUTPATIENT)
Dept: RADIATION ONCOLOGY | Facility: HOSPITAL | Age: 70
Discharge: HOME | End: 2025-05-12
Payer: MEDICARE

## 2025-05-12 LAB
RAD ONC ARIA COURSE ID: NORMAL
RAD ONC ARIA COURSE INTENT: NORMAL
RAD ONC ARIA COURSE LAST TREATMENT DATE: NORMAL
RAD ONC ARIA COURSE START DATE: NORMAL
RAD ONC ARIA COURSE TREATMENT ELAPSED DAYS: 67
RAD ONC ARIA FIRST TREATMENT DATE: NORMAL
RAD ONC ARIA PLAN FRACTIONS TREATED TO DATE: 23
RAD ONC ARIA PLAN ID: NORMAL
RAD ONC ARIA PLAN PRESCRIBED DOSE PER FRACTION: 1.8 GY
RAD ONC ARIA PLAN PRIMARY REFERENCE POINT: NORMAL
RAD ONC ARIA PLAN TOTAL FRACTIONS PRESCRIBED: 28
RAD ONC ARIA PLAN TOTAL PRESCRIBED DOSE: 5040 CGY
RAD ONC ARIA REFERENCE POINT DOSAGE GIVEN TO DATE: 41.4 GY
RAD ONC ARIA REFERENCE POINT ID: NORMAL
RAD ONC ARIA REFERENCE POINT SESSION DOSAGE GIVEN: 1.8 GY

## 2025-05-12 PROCEDURE — 77014 CHG CT GUIDANCE RADIATION THERAPY FLDS PLACEMENT: CPT | Performed by: RADIOLOGY

## 2025-05-12 PROCEDURE — 77386: CPT | Performed by: RADIOLOGY

## 2025-05-12 NOTE — OUTREACH NOTE
SW attempted contact with UTRx1. SW will attempt again in a couple of business days.     Carmela MOTTA -   Ambulatory Case Management    5/12/2025, 13:09 EDT

## 2025-05-13 ENCOUNTER — HOSPITAL ENCOUNTER (OUTPATIENT)
Dept: RADIATION ONCOLOGY | Facility: HOSPITAL | Age: 70
Discharge: HOME | End: 2025-05-13

## 2025-05-13 LAB
RAD ONC ARIA COURSE ID: NORMAL
RAD ONC ARIA COURSE INTENT: NORMAL
RAD ONC ARIA COURSE LAST TREATMENT DATE: NORMAL
RAD ONC ARIA COURSE START DATE: NORMAL
RAD ONC ARIA COURSE TREATMENT ELAPSED DAYS: 68
RAD ONC ARIA FIRST TREATMENT DATE: NORMAL
RAD ONC ARIA PLAN FRACTIONS TREATED TO DATE: 24
RAD ONC ARIA PLAN ID: NORMAL
RAD ONC ARIA PLAN PRESCRIBED DOSE PER FRACTION: 1.8 GY
RAD ONC ARIA PLAN PRIMARY REFERENCE POINT: NORMAL
RAD ONC ARIA PLAN TOTAL FRACTIONS PRESCRIBED: 28
RAD ONC ARIA PLAN TOTAL PRESCRIBED DOSE: 5040 CGY
RAD ONC ARIA REFERENCE POINT DOSAGE GIVEN TO DATE: 43.2 GY
RAD ONC ARIA REFERENCE POINT ID: NORMAL
RAD ONC ARIA REFERENCE POINT SESSION DOSAGE GIVEN: 1.8 GY

## 2025-05-13 PROCEDURE — 77014 CHG CT GUIDANCE RADIATION THERAPY FLDS PLACEMENT: CPT | Performed by: RADIOLOGY

## 2025-05-13 PROCEDURE — 77386: CPT | Performed by: RADIOLOGY

## 2025-05-13 NOTE — PROGRESS NOTES
WHEEZING Patient will need appointment for further refills 18 g 0    mirtazapine (REMERON) 15 MG tablet TAKE 1 TABLET BY MOUTH AT BEDTIME 30 tablet 5    citalopram (CELEXA) 40 MG tablet TAKE 1 TABLET BY MOUTH ONCE DAILY 30 tablet 5    levothyroxine (SYNTHROID) 112 MCG tablet TAKE 1 TABLET BY MOUTH ONCE DAILY. 30 tablet 5    OXYGEN Inhale into the lungs daily as needed      ipratropium-albuterol (DUONEB) 0.5-2.5 (3) MG/3ML SOLN nebulizer solution Inhale 3 mLs into the lungs every 4 hours as needed      rOPINIRole (REQUIP) 0.25 MG tablet Take 1 tablet by mouth      tiZANidine (ZANAFLEX) 4 MG tablet Take 1 tablet by mouth 2 times daily      rizatriptan (MAXALT) 10 MG tablet TAKE 1 TABLET BY MOUTH ONCE AS NEEDED FOR MIGRAINE MAY REPEAT IN 2 HOURS IF NEEDED  9 tablet 0    albuterol (PROVENTIL) (5 MG/ML) 0.5% nebulizer solution Take 1 mL by nebulization 4 times daily as needed for Wheezing 120 each 3     No current facility-administered medications for this visit.     Facility-Administered Medications Ordered in Other Visits   Medication Dose Route Frequency Provider Last Rate Last Admin    0.9 % sodium chloride infusion  5-250 mL/hr IntraVENous PRN Florencia Bates MD 50 mL/hr at 05/14/25 1231 50 mL/hr at 05/14/25 1231    fosaprepitant (EMEND) 150 mg in sodium chloride 0.9 % 250 mL IVPB  150 mg IntraVENous Once Florencia Bates MD        sodium chloride flush 0.9 % injection 5-40 mL  5-40 mL IntraVENous PRN Florencia Bates MD        heparin (PF) 100 UNIT/ML injection 500 Units  500 Units IntraCATHeter PRN Florencia Bates MD        CARBOplatin (PARAPLATIN) 425 mg in sodium chloride 0.9 % 250 mL chemo IVPB  425 mg IntraVENous Once Florencia Bates MD        etoposide (VEPESID) 140 mg in sodium chloride 0.9 % 500 mL chemo IVPB  80 mg/m2 (Treatment Plan Recorded) IntraVENous Once Florencia Bates MD         Allergies:   No Known Allergies    Subjective     REVIEW OF SYSTEMS:   CONSTITUTIONAL:

## 2025-05-14 ENCOUNTER — HOSPITAL ENCOUNTER (OUTPATIENT)
Dept: INFUSION THERAPY | Age: 70
Discharge: HOME | End: 2025-05-14
Payer: MEDICARE

## 2025-05-14 ENCOUNTER — OFFICE VISIT (OUTPATIENT)
Dept: HEMATOLOGY | Age: 70
End: 2025-05-14
Payer: MEDICARE

## 2025-05-14 ENCOUNTER — TRANSCRIBE ORDERS (OUTPATIENT)
Dept: ADMINISTRATIVE | Facility: HOSPITAL | Age: 70
End: 2025-05-14
Payer: MEDICARE

## 2025-05-14 ENCOUNTER — HOSPITAL ENCOUNTER (OUTPATIENT)
Dept: RADIATION ONCOLOGY | Facility: HOSPITAL | Age: 70
Discharge: HOME | End: 2025-05-14

## 2025-05-14 VITALS
RESPIRATION RATE: 16 BRPM | BODY MASS INDEX: 22.76 KG/M2 | SYSTOLIC BLOOD PRESSURE: 97 MMHG | OXYGEN SATURATION: 96 % | WEIGHT: 145 LBS | HEIGHT: 67 IN | HEART RATE: 99 BPM | TEMPERATURE: 97.3 F | DIASTOLIC BLOOD PRESSURE: 64 MMHG

## 2025-05-14 DIAGNOSIS — T45.1X5D ADVERSE EFFECT OF CHEMOTHERAPY, SUBSEQUENT ENCOUNTER: ICD-10-CM

## 2025-05-14 DIAGNOSIS — C34.01 SMALL CELL CARCINOMA OF HILUM OF RIGHT LUNG (HCC): ICD-10-CM

## 2025-05-14 DIAGNOSIS — R53.83 CHEMOTHERAPY-INDUCED FATIGUE: ICD-10-CM

## 2025-05-14 DIAGNOSIS — Z71.89 CARE PLAN DISCUSSED WITH PATIENT: ICD-10-CM

## 2025-05-14 DIAGNOSIS — T45.1X5A ANEMIA ASSOCIATED WITH CHEMOTHERAPY: ICD-10-CM

## 2025-05-14 DIAGNOSIS — D64.81 ANEMIA ASSOCIATED WITH CHEMOTHERAPY: ICD-10-CM

## 2025-05-14 DIAGNOSIS — Z51.11 CHEMOTHERAPY MANAGEMENT, ENCOUNTER FOR: ICD-10-CM

## 2025-05-14 DIAGNOSIS — G89.3 CANCER-RELATED PAIN: ICD-10-CM

## 2025-05-14 DIAGNOSIS — T45.1X5A CHEMOTHERAPY-INDUCED FATIGUE: ICD-10-CM

## 2025-05-14 DIAGNOSIS — C34.01 SMALL CELL CARCINOMA OF HILUM OF RIGHT LUNG (HCC): Primary | ICD-10-CM

## 2025-05-14 DIAGNOSIS — G89.3 CANCER ASSOCIATED PAIN: ICD-10-CM

## 2025-05-14 DIAGNOSIS — C7A.8 NEUROENDOCRINE CARCINOMA OF LUNG (HCC): Primary | ICD-10-CM

## 2025-05-14 DIAGNOSIS — C34.01 CARCINOMA OF HILUS OF LUNG, RIGHT: Primary | ICD-10-CM

## 2025-05-14 DIAGNOSIS — C80.1 CANCER (HCC): Primary | ICD-10-CM

## 2025-05-14 LAB
ALBUMIN SERPL-MCNC: 3.9 G/DL (ref 3.5–5.2)
ALP SERPL-CCNC: 109 U/L (ref 35–104)
ALT SERPL-CCNC: 8 U/L (ref 5–33)
ANION GAP SERPL CALCULATED.3IONS-SCNC: 9 MMOL/L (ref 7–19)
AST SERPL-CCNC: 14 U/L (ref 5–32)
BASOPHILS # BLD: 0.02 K/UL (ref 0–0.2)
BASOPHILS NFR BLD: 0.2 % (ref 0–1)
BILIRUB SERPL-MCNC: 0.3 MG/DL (ref 0–1.2)
BUN SERPL-MCNC: 12 MG/DL (ref 8–23)
CALCIUM SERPL-MCNC: 9 MG/DL (ref 8.8–10.2)
CHLORIDE SERPL-SCNC: 98 MMOL/L (ref 98–107)
CO2 SERPL-SCNC: 31 MMOL/L (ref 22–29)
CREAT SERPL-MCNC: 0.7 MG/DL (ref 0.5–0.9)
EOSINOPHIL # BLD: 0.04 K/UL (ref 0–0.6)
EOSINOPHIL NFR BLD: 0.4 % (ref 0–5)
ERYTHROCYTE [DISTWIDTH] IN BLOOD BY AUTOMATED COUNT: 17 % (ref 11.5–14.5)
GLUCOSE SERPL-MCNC: 104 MG/DL (ref 70–99)
HCT VFR BLD AUTO: 30.3 % (ref 37–47)
HGB BLD-MCNC: 9.4 G/DL (ref 12–16)
LYMPHOCYTES # BLD: 0.56 K/UL (ref 1.1–4.5)
LYMPHOCYTES NFR BLD: 5.7 % (ref 20–40)
MAGNESIUM SERPL-MCNC: 1.8 MG/DL (ref 1.6–2.4)
MCH RBC QN AUTO: 29.8 PG (ref 27–31)
MCHC RBC AUTO-ENTMCNC: 31 G/DL (ref 33–37)
MCV RBC AUTO: 96.2 FL (ref 81–99)
MONOCYTES # BLD: 1.38 K/UL (ref 0–0.9)
MONOCYTES NFR BLD: 14.2 % (ref 1–10)
NEUTROPHILS # BLD: 7.63 K/UL (ref 1.5–7.5)
NEUTS SEG NFR BLD: 78.3 % (ref 50–65)
PHOSPHATE SERPL-MCNC: 3.6 MG/DL (ref 2.5–4.5)
PLATELET # BLD AUTO: 351 K/UL (ref 130–400)
PMV BLD AUTO: 9.1 FL (ref 9.4–12.3)
POTASSIUM SERPL-SCNC: 4.3 MMOL/L (ref 3.5–5.1)
PROT SERPL-MCNC: 7 G/DL (ref 6.4–8.3)
RAD ONC ARIA COURSE ID: NORMAL
RAD ONC ARIA COURSE INTENT: NORMAL
RAD ONC ARIA COURSE LAST TREATMENT DATE: NORMAL
RAD ONC ARIA COURSE START DATE: NORMAL
RAD ONC ARIA COURSE TREATMENT ELAPSED DAYS: 69
RAD ONC ARIA FIRST TREATMENT DATE: NORMAL
RAD ONC ARIA PLAN FRACTIONS TREATED TO DATE: 25
RAD ONC ARIA PLAN ID: NORMAL
RAD ONC ARIA PLAN PRESCRIBED DOSE PER FRACTION: 1.8 GY
RAD ONC ARIA PLAN PRIMARY REFERENCE POINT: NORMAL
RAD ONC ARIA PLAN TOTAL FRACTIONS PRESCRIBED: 28
RAD ONC ARIA PLAN TOTAL PRESCRIBED DOSE: 5040 CGY
RAD ONC ARIA REFERENCE POINT DOSAGE GIVEN TO DATE: 45 GY
RAD ONC ARIA REFERENCE POINT ID: NORMAL
RAD ONC ARIA REFERENCE POINT SESSION DOSAGE GIVEN: 1.8 GY
RBC # BLD AUTO: 3.15 M/UL (ref 4.2–5.4)
SODIUM SERPL-SCNC: 138 MMOL/L (ref 136–145)
WBC # BLD AUTO: 9.75 K/UL (ref 4.8–10.8)

## 2025-05-14 PROCEDURE — 77386: CPT | Performed by: RADIOLOGY

## 2025-05-14 PROCEDURE — 36415 COLL VENOUS BLD VENIPUNCTURE: CPT

## 2025-05-14 PROCEDURE — 83735 ASSAY OF MAGNESIUM: CPT

## 2025-05-14 PROCEDURE — 6360000002 HC RX W HCPCS: Performed by: INTERNAL MEDICINE

## 2025-05-14 PROCEDURE — 85025 COMPLETE CBC W/AUTO DIFF WBC: CPT

## 2025-05-14 PROCEDURE — 2580000003 HC RX 258: Performed by: INTERNAL MEDICINE

## 2025-05-14 PROCEDURE — 96417 CHEMO IV INFUS EACH ADDL SEQ: CPT

## 2025-05-14 PROCEDURE — 84100 ASSAY OF PHOSPHORUS: CPT

## 2025-05-14 PROCEDURE — 2500000003 HC RX 250 WO HCPCS: Performed by: INTERNAL MEDICINE

## 2025-05-14 PROCEDURE — 96375 TX/PRO/DX INJ NEW DRUG ADDON: CPT

## 2025-05-14 PROCEDURE — 96413 CHEMO IV INFUSION 1 HR: CPT

## 2025-05-14 PROCEDURE — 77336 RADIATION PHYSICS CONSULT: CPT | Performed by: RADIOLOGY

## 2025-05-14 PROCEDURE — 77014 CHG CT GUIDANCE RADIATION THERAPY FLDS PLACEMENT: CPT | Performed by: RADIOLOGY

## 2025-05-14 PROCEDURE — 80053 COMPREHEN METABOLIC PANEL: CPT

## 2025-05-14 PROCEDURE — 96367 TX/PROPH/DG ADDL SEQ IV INF: CPT

## 2025-05-14 RX ORDER — SODIUM CHLORIDE 0.9 % (FLUSH) 0.9 %
5-40 SYRINGE (ML) INJECTION PRN
Status: CANCELLED | OUTPATIENT
Start: 2025-05-16

## 2025-05-14 RX ORDER — OXYCODONE AND ACETAMINOPHEN 10; 325 MG/1; MG/1
1 TABLET ORAL EVERY 6 HOURS PRN
Qty: 120 TABLET | Refills: 0 | Status: SHIPPED | OUTPATIENT
Start: 2025-05-14 | End: 2025-06-13

## 2025-05-14 RX ORDER — SODIUM CHLORIDE 9 MG/ML
5-250 INJECTION, SOLUTION INTRAVENOUS PRN
Status: CANCELLED | OUTPATIENT
Start: 2025-05-16

## 2025-05-14 RX ORDER — PALONOSETRON 0.05 MG/ML
0.25 INJECTION, SOLUTION INTRAVENOUS ONCE
Status: COMPLETED | OUTPATIENT
Start: 2025-05-14 | End: 2025-05-14

## 2025-05-14 RX ORDER — PROCHLORPERAZINE EDISYLATE 5 MG/ML
5 INJECTION INTRAMUSCULAR; INTRAVENOUS
Status: CANCELLED | OUTPATIENT
Start: 2025-05-14

## 2025-05-14 RX ORDER — HYDROCORTISONE SODIUM SUCCINATE 100 MG/2ML
100 INJECTION INTRAMUSCULAR; INTRAVENOUS
Status: CANCELLED | OUTPATIENT
Start: 2025-05-16

## 2025-05-14 RX ORDER — ALBUTEROL SULFATE 90 UG/1
4 INHALANT RESPIRATORY (INHALATION) PRN
Status: CANCELLED | OUTPATIENT
Start: 2025-05-14

## 2025-05-14 RX ORDER — HEPARIN SODIUM (PORCINE) LOCK FLUSH IV SOLN 100 UNIT/ML 100 UNIT/ML
500 SOLUTION INTRAVENOUS PRN
Status: CANCELLED | OUTPATIENT
Start: 2025-05-16

## 2025-05-14 RX ORDER — SODIUM CHLORIDE 9 MG/ML
INJECTION, SOLUTION INTRAVENOUS CONTINUOUS
Status: CANCELLED | OUTPATIENT
Start: 2025-05-16

## 2025-05-14 RX ORDER — ONDANSETRON 2 MG/ML
8 INJECTION INTRAMUSCULAR; INTRAVENOUS
Status: CANCELLED | OUTPATIENT
Start: 2025-05-15

## 2025-05-14 RX ORDER — DIPHENHYDRAMINE HYDROCHLORIDE 50 MG/ML
50 INJECTION, SOLUTION INTRAMUSCULAR; INTRAVENOUS
Status: CANCELLED | OUTPATIENT
Start: 2025-05-14

## 2025-05-14 RX ORDER — DIPHENHYDRAMINE HYDROCHLORIDE 50 MG/ML
50 INJECTION, SOLUTION INTRAMUSCULAR; INTRAVENOUS
Status: CANCELLED | OUTPATIENT
Start: 2025-05-16

## 2025-05-14 RX ORDER — HEPARIN SODIUM (PORCINE) LOCK FLUSH IV SOLN 100 UNIT/ML 100 UNIT/ML
500 SOLUTION INTRAVENOUS PRN
Status: CANCELLED | OUTPATIENT
Start: 2025-05-15

## 2025-05-14 RX ORDER — DIPHENHYDRAMINE HYDROCHLORIDE 50 MG/ML
50 INJECTION, SOLUTION INTRAMUSCULAR; INTRAVENOUS
Status: CANCELLED | OUTPATIENT
Start: 2025-05-15

## 2025-05-14 RX ORDER — EPINEPHRINE 1 MG/ML
0.3 INJECTION, SOLUTION, CONCENTRATE INTRAVENOUS PRN
Status: CANCELLED | OUTPATIENT
Start: 2025-05-14

## 2025-05-14 RX ORDER — ONDANSETRON 2 MG/ML
8 INJECTION INTRAMUSCULAR; INTRAVENOUS
Status: CANCELLED | OUTPATIENT
Start: 2025-05-16

## 2025-05-14 RX ORDER — ALBUTEROL SULFATE 90 UG/1
4 INHALANT RESPIRATORY (INHALATION) PRN
Status: CANCELLED | OUTPATIENT
Start: 2025-05-16

## 2025-05-14 RX ORDER — MEPERIDINE HYDROCHLORIDE 50 MG/ML
12.5 INJECTION INTRAMUSCULAR; INTRAVENOUS; SUBCUTANEOUS PRN
Status: CANCELLED | OUTPATIENT
Start: 2025-05-16

## 2025-05-14 RX ORDER — HYDROCORTISONE SODIUM SUCCINATE 100 MG/2ML
100 INJECTION INTRAMUSCULAR; INTRAVENOUS
Status: CANCELLED | OUTPATIENT
Start: 2025-05-14

## 2025-05-14 RX ORDER — EPINEPHRINE 1 MG/ML
0.3 INJECTION, SOLUTION, CONCENTRATE INTRAVENOUS PRN
Status: CANCELLED | OUTPATIENT
Start: 2025-05-15

## 2025-05-14 RX ORDER — MEPERIDINE HYDROCHLORIDE 50 MG/ML
12.5 INJECTION INTRAMUSCULAR; INTRAVENOUS; SUBCUTANEOUS PRN
Status: CANCELLED | OUTPATIENT
Start: 2025-05-15

## 2025-05-14 RX ORDER — FAMOTIDINE 10 MG/ML
20 INJECTION, SOLUTION INTRAVENOUS
Status: CANCELLED | OUTPATIENT
Start: 2025-05-16

## 2025-05-14 RX ORDER — ONDANSETRON 2 MG/ML
8 INJECTION INTRAMUSCULAR; INTRAVENOUS
Status: CANCELLED | OUTPATIENT
Start: 2025-05-14

## 2025-05-14 RX ORDER — SODIUM CHLORIDE 0.9 % (FLUSH) 0.9 %
5-40 SYRINGE (ML) INJECTION PRN
Status: CANCELLED | OUTPATIENT
Start: 2025-05-15

## 2025-05-14 RX ORDER — HEPARIN 100 UNIT/ML
500 SYRINGE INTRAVENOUS PRN
Status: DISCONTINUED | OUTPATIENT
Start: 2025-05-14 | End: 2025-05-15 | Stop reason: HOSPADM

## 2025-05-14 RX ORDER — HYDROCORTISONE SODIUM SUCCINATE 100 MG/2ML
100 INJECTION INTRAMUSCULAR; INTRAVENOUS
Status: CANCELLED | OUTPATIENT
Start: 2025-05-15

## 2025-05-14 RX ORDER — ACETAMINOPHEN 325 MG/1
650 TABLET ORAL
Status: CANCELLED | OUTPATIENT
Start: 2025-05-16

## 2025-05-14 RX ORDER — EPINEPHRINE 1 MG/ML
0.3 INJECTION, SOLUTION, CONCENTRATE INTRAVENOUS PRN
Status: CANCELLED | OUTPATIENT
Start: 2025-05-16

## 2025-05-14 RX ORDER — SODIUM CHLORIDE 9 MG/ML
5-250 INJECTION, SOLUTION INTRAVENOUS PRN
Status: CANCELLED | OUTPATIENT
Start: 2025-05-14

## 2025-05-14 RX ORDER — ACETAMINOPHEN 325 MG/1
650 TABLET ORAL
Status: CANCELLED | OUTPATIENT
Start: 2025-05-14

## 2025-05-14 RX ORDER — DEXAMETHASONE SODIUM PHOSPHATE 10 MG/ML
10 INJECTION, SOLUTION INTRAMUSCULAR; INTRAVENOUS ONCE
Status: COMPLETED | OUTPATIENT
Start: 2025-05-14 | End: 2025-05-14

## 2025-05-14 RX ORDER — SODIUM CHLORIDE 9 MG/ML
INJECTION, SOLUTION INTRAVENOUS CONTINUOUS
Status: CANCELLED | OUTPATIENT
Start: 2025-05-15

## 2025-05-14 RX ORDER — SODIUM CHLORIDE 9 MG/ML
5-250 INJECTION, SOLUTION INTRAVENOUS PRN
Status: CANCELLED | OUTPATIENT
Start: 2025-05-15

## 2025-05-14 RX ORDER — SODIUM CHLORIDE 9 MG/ML
INJECTION, SOLUTION INTRAVENOUS CONTINUOUS
Status: CANCELLED | OUTPATIENT
Start: 2025-05-14

## 2025-05-14 RX ORDER — ACETAMINOPHEN 325 MG/1
650 TABLET ORAL
Status: CANCELLED | OUTPATIENT
Start: 2025-05-15

## 2025-05-14 RX ORDER — FAMOTIDINE 10 MG/ML
20 INJECTION, SOLUTION INTRAVENOUS
Status: CANCELLED | OUTPATIENT
Start: 2025-05-15

## 2025-05-14 RX ORDER — MEPERIDINE HYDROCHLORIDE 50 MG/ML
12.5 INJECTION INTRAMUSCULAR; INTRAVENOUS; SUBCUTANEOUS PRN
Status: CANCELLED | OUTPATIENT
Start: 2025-05-14

## 2025-05-14 RX ORDER — SODIUM CHLORIDE 0.9 % (FLUSH) 0.9 %
5-40 SYRINGE (ML) INJECTION PRN
Status: DISCONTINUED | OUTPATIENT
Start: 2025-05-14 | End: 2025-05-15 | Stop reason: HOSPADM

## 2025-05-14 RX ORDER — SODIUM CHLORIDE 9 MG/ML
5-250 INJECTION, SOLUTION INTRAVENOUS PRN
Status: DISCONTINUED | OUTPATIENT
Start: 2025-05-14 | End: 2025-05-15 | Stop reason: HOSPADM

## 2025-05-14 RX ORDER — ALBUTEROL SULFATE 90 UG/1
4 INHALANT RESPIRATORY (INHALATION) PRN
Status: CANCELLED | OUTPATIENT
Start: 2025-05-15

## 2025-05-14 RX ORDER — FAMOTIDINE 10 MG/ML
20 INJECTION, SOLUTION INTRAVENOUS
Status: CANCELLED | OUTPATIENT
Start: 2025-05-14

## 2025-05-14 RX ADMIN — DEXAMETHASONE SODIUM PHOSPHATE 10 MG: 10 INJECTION, SOLUTION INTRAMUSCULAR; INTRAVENOUS at 12:32

## 2025-05-14 RX ADMIN — ETOPOSIDE 140 MG: 20 INJECTION, SOLUTION INTRAVENOUS at 13:48

## 2025-05-14 RX ADMIN — FOSAPREPITANT 150 MG: 150 INJECTION, POWDER, LYOPHILIZED, FOR SOLUTION INTRAVENOUS at 12:40

## 2025-05-14 RX ADMIN — HEPARIN 500 UNITS: 100 SYRINGE at 14:54

## 2025-05-14 RX ADMIN — CARBOPLATIN 425 MG: 10 INJECTION INTRAVENOUS at 13:11

## 2025-05-14 RX ADMIN — SODIUM CHLORIDE 50 ML/HR: 9 INJECTION, SOLUTION INTRAVENOUS at 12:31

## 2025-05-14 RX ADMIN — SODIUM CHLORIDE, PRESERVATIVE FREE 10 ML: 5 INJECTION INTRAVENOUS at 14:54

## 2025-05-14 RX ADMIN — PALONOSETRON 0.25 MG: 0.05 INJECTION, SOLUTION INTRAVENOUS at 12:31

## 2025-05-14 NOTE — PROGRESS NOTES
[facility]  [address] [place] [address] [place] [address]  Phone: [phone]  Fax: [phone]          Miracle Briceno 69 y.o. White (non-) Hinduism     Diagnosis     (Copied from Dr. Bates's visit note.)   Small cell lung cancer, right lung, Dec 2024  Stage  [id number], limited stage     Treatment Summary     (Copied from Dr. Bates's visit note.)   12/13/24 - 12/19/24 Emergent palliative radiation therapy 1250cGy in 5 fractions to right lung  1/27/25 Initiated chemotherapy with Carboplatin D1, Etoposide D1-3 every 21 days x4 cycles  3/6/25-5/1/25 Completed radiation to right lung 5040 cGy over 22 fraction with Dr. Perez   3/13/25 Port placement right side with Dr. Bateman  3/19/25 Dose reduction cycle #2 to carboplatin AUC 4 and etoposide 80 mg/m² days 1-3 q. 21 days  Anticipate maintenance Durvalumab 1500mg every 4 weeks x2 years. Treatment consent signed 5/14/25  Current Outpatient Medications   Medication Sig Dispense Refill    oxyCODONE-acetaminophen (PERCOCET)  MG per tablet Take 1 tablet by mouth every 6 hours as needed for Pain for up to 30 days. Intended supply: 30 days Max Daily Amount: 4 tablets 120 tablet 0    lidocaine 4 % external patch Place 1 patch onto the skin daily 30 each 0    pantoprazole (PROTONIX) 40 MG tablet Take 1 tablet by mouth every morning (before breakfast) 30 tablet 3    gabapentin (NEURONTIN) 600 MG tablet Take 1 tablet by mouth 2 times daily.      diazePAM (VALIUM) 5 MG tablet Take 1 tablet by mouth daily as needed.      ondansetron (ZOFRAN) 4 MG tablet Take 1 tablet by mouth every 6 hours as needed for Nausea or Vomiting 30 tablet 5    promethazine (PHENERGAN) 25 MG tablet Take 0.5 tablets by mouth every 6 hours as needed for Nausea 30 tablet 5    albuterol sulfate HFA (PROVENTIL;VENTOLIN;PROAIR) 108 (90 Base) MCG/ACT inhaler INHALE 2 PUFFS DAILY AS NEEDED FOR WHEEZING Patient will need

## 2025-05-15 ENCOUNTER — HOSPITAL ENCOUNTER (OUTPATIENT)
Dept: INFUSION THERAPY | Age: 70
Discharge: HOME | End: 2025-05-15
Payer: MEDICARE

## 2025-05-15 ENCOUNTER — HOSPITAL ENCOUNTER (OUTPATIENT)
Dept: RADIATION ONCOLOGY | Facility: HOSPITAL | Age: 70
Discharge: HOME | End: 2025-05-15

## 2025-05-15 VITALS
RESPIRATION RATE: 18 BRPM | TEMPERATURE: 98 F | DIASTOLIC BLOOD PRESSURE: 65 MMHG | OXYGEN SATURATION: 99 % | HEART RATE: 85 BPM | SYSTOLIC BLOOD PRESSURE: 113 MMHG

## 2025-05-15 DIAGNOSIS — C7A.8 NEUROENDOCRINE CARCINOMA OF LUNG (HCC): Primary | ICD-10-CM

## 2025-05-15 LAB
RAD ONC ARIA COURSE ID: NORMAL
RAD ONC ARIA COURSE INTENT: NORMAL
RAD ONC ARIA COURSE LAST TREATMENT DATE: NORMAL
RAD ONC ARIA COURSE START DATE: NORMAL
RAD ONC ARIA COURSE TREATMENT ELAPSED DAYS: 70
RAD ONC ARIA FIRST TREATMENT DATE: NORMAL
RAD ONC ARIA PLAN FRACTIONS TREATED TO DATE: 26
RAD ONC ARIA PLAN ID: NORMAL
RAD ONC ARIA PLAN PRESCRIBED DOSE PER FRACTION: 1.8 GY
RAD ONC ARIA PLAN PRIMARY REFERENCE POINT: NORMAL
RAD ONC ARIA PLAN TOTAL FRACTIONS PRESCRIBED: 28
RAD ONC ARIA PLAN TOTAL PRESCRIBED DOSE: 5040 CGY
RAD ONC ARIA REFERENCE POINT DOSAGE GIVEN TO DATE: 46.8 GY
RAD ONC ARIA REFERENCE POINT ID: NORMAL
RAD ONC ARIA REFERENCE POINT SESSION DOSAGE GIVEN: 1.8 GY

## 2025-05-15 PROCEDURE — 2580000003 HC RX 258: Performed by: INTERNAL MEDICINE

## 2025-05-15 PROCEDURE — 6360000002 HC RX W HCPCS: Performed by: INTERNAL MEDICINE

## 2025-05-15 PROCEDURE — 77014 CHG CT GUIDANCE RADIATION THERAPY FLDS PLACEMENT: CPT | Performed by: RADIOLOGY

## 2025-05-15 PROCEDURE — 96367 TX/PROPH/DG ADDL SEQ IV INF: CPT

## 2025-05-15 PROCEDURE — 2500000003 HC RX 250 WO HCPCS: Performed by: INTERNAL MEDICINE

## 2025-05-15 PROCEDURE — 77386: CPT | Performed by: RADIOLOGY

## 2025-05-15 PROCEDURE — 96413 CHEMO IV INFUSION 1 HR: CPT

## 2025-05-15 RX ORDER — HEPARIN 100 UNIT/ML
500 SYRINGE INTRAVENOUS PRN
Status: DISCONTINUED | OUTPATIENT
Start: 2025-05-15 | End: 2025-05-16 | Stop reason: HOSPADM

## 2025-05-15 RX ORDER — SODIUM CHLORIDE 0.9 % (FLUSH) 0.9 %
5-40 SYRINGE (ML) INJECTION PRN
Status: DISCONTINUED | OUTPATIENT
Start: 2025-05-15 | End: 2025-05-16 | Stop reason: HOSPADM

## 2025-05-15 RX ADMIN — HEPARIN 500 UNITS: 100 SYRINGE at 13:57

## 2025-05-15 RX ADMIN — SODIUM CHLORIDE, PRESERVATIVE FREE 10 ML: 5 INJECTION INTRAVENOUS at 13:57

## 2025-05-15 RX ADMIN — DEXAMETHASONE SODIUM PHOSPHATE 16 MG: 10 INJECTION, SOLUTION INTRAMUSCULAR; INTRAVENOUS at 12:17

## 2025-05-15 RX ADMIN — ETOPOSIDE 140 MG: 20 INJECTION, SOLUTION INTRAVENOUS at 12:50

## 2025-05-15 ASSESSMENT — PAIN DESCRIPTION - LOCATION: LOCATION: BACK

## 2025-05-15 ASSESSMENT — PAIN SCALES - GENERAL: PAINLEVEL_OUTOF10: 5

## 2025-05-16 ENCOUNTER — HOSPITAL ENCOUNTER (OUTPATIENT)
Dept: INFUSION THERAPY | Age: 70
Discharge: HOME | End: 2025-05-16
Payer: MEDICARE

## 2025-05-16 ENCOUNTER — HOSPITAL ENCOUNTER (OUTPATIENT)
Dept: RADIATION ONCOLOGY | Facility: HOSPITAL | Age: 70
Setting detail: RADIATION/ONCOLOGY SERIES
Discharge: HOME | End: 2025-05-16

## 2025-05-16 VITALS
DIASTOLIC BLOOD PRESSURE: 67 MMHG | OXYGEN SATURATION: 97 % | TEMPERATURE: 98 F | RESPIRATION RATE: 20 BRPM | SYSTOLIC BLOOD PRESSURE: 86 MMHG | HEART RATE: 96 BPM

## 2025-05-16 DIAGNOSIS — C7A.8 NEUROENDOCRINE CARCINOMA OF LUNG (HCC): Primary | ICD-10-CM

## 2025-05-16 LAB
RAD ONC ARIA COURSE ID: NORMAL
RAD ONC ARIA COURSE INTENT: NORMAL
RAD ONC ARIA COURSE LAST TREATMENT DATE: NORMAL
RAD ONC ARIA COURSE START DATE: NORMAL
RAD ONC ARIA COURSE TREATMENT ELAPSED DAYS: 71
RAD ONC ARIA FIRST TREATMENT DATE: NORMAL
RAD ONC ARIA PLAN FRACTIONS TREATED TO DATE: 27
RAD ONC ARIA PLAN ID: NORMAL
RAD ONC ARIA PLAN PRESCRIBED DOSE PER FRACTION: 1.8 GY
RAD ONC ARIA PLAN PRIMARY REFERENCE POINT: NORMAL
RAD ONC ARIA PLAN TOTAL FRACTIONS PRESCRIBED: 28
RAD ONC ARIA PLAN TOTAL PRESCRIBED DOSE: 5040 CGY
RAD ONC ARIA REFERENCE POINT DOSAGE GIVEN TO DATE: 48.6 GY
RAD ONC ARIA REFERENCE POINT ID: NORMAL
RAD ONC ARIA REFERENCE POINT SESSION DOSAGE GIVEN: 1.8 GY

## 2025-05-16 PROCEDURE — 77386: CPT | Performed by: RADIOLOGY

## 2025-05-16 PROCEDURE — 77014 CHG CT GUIDANCE RADIATION THERAPY FLDS PLACEMENT: CPT | Performed by: RADIOLOGY

## 2025-05-16 PROCEDURE — 96367 TX/PROPH/DG ADDL SEQ IV INF: CPT

## 2025-05-16 PROCEDURE — 2580000003 HC RX 258: Performed by: INTERNAL MEDICINE

## 2025-05-16 PROCEDURE — 2500000003 HC RX 250 WO HCPCS: Performed by: INTERNAL MEDICINE

## 2025-05-16 PROCEDURE — 96413 CHEMO IV INFUSION 1 HR: CPT

## 2025-05-16 PROCEDURE — 6360000002 HC RX W HCPCS: Performed by: INTERNAL MEDICINE

## 2025-05-16 RX ORDER — HEPARIN 100 UNIT/ML
500 SYRINGE INTRAVENOUS PRN
Status: DISCONTINUED | OUTPATIENT
Start: 2025-05-16 | End: 2025-05-17 | Stop reason: HOSPADM

## 2025-05-16 RX ORDER — SODIUM CHLORIDE 0.9 % (FLUSH) 0.9 %
5-40 SYRINGE (ML) INJECTION PRN
Status: DISCONTINUED | OUTPATIENT
Start: 2025-05-16 | End: 2025-05-17 | Stop reason: HOSPADM

## 2025-05-16 RX ADMIN — SODIUM CHLORIDE, PRESERVATIVE FREE 10 ML: 5 INJECTION INTRAVENOUS at 12:48

## 2025-05-16 RX ADMIN — HEPARIN 500 UNITS: 100 SYRINGE at 12:48

## 2025-05-16 RX ADMIN — ETOPOSIDE 140 MG: 20 INJECTION, SOLUTION INTRAVENOUS at 11:37

## 2025-05-16 RX ADMIN — DEXAMETHASONE SODIUM PHOSPHATE 16 MG: 10 INJECTION, SOLUTION INTRAMUSCULAR; INTRAVENOUS at 10:59

## 2025-05-19 ENCOUNTER — TELEPHONE (OUTPATIENT)
Dept: FAMILY MEDICINE CLINIC | Facility: CLINIC | Age: 70
End: 2025-05-19
Payer: MEDICARE

## 2025-05-19 ENCOUNTER — HOSPITAL ENCOUNTER (OUTPATIENT)
Dept: RADIATION ONCOLOGY | Facility: HOSPITAL | Age: 70
Setting detail: RADIATION/ONCOLOGY SERIES
Discharge: HOME | End: 2025-05-19
Payer: MEDICARE

## 2025-05-19 PROBLEM — I47.10 PAROXYSMAL SUPRAVENTRICULAR TACHYCARDIA: Chronic | Status: ACTIVE | Noted: 2023-05-06

## 2025-05-19 LAB
RAD ONC ARIA COURSE ID: NORMAL
RAD ONC ARIA COURSE INTENT: NORMAL
RAD ONC ARIA COURSE LAST TREATMENT DATE: NORMAL
RAD ONC ARIA COURSE START DATE: NORMAL
RAD ONC ARIA COURSE TREATMENT ELAPSED DAYS: 74
RAD ONC ARIA FIRST TREATMENT DATE: NORMAL
RAD ONC ARIA PLAN FRACTIONS TREATED TO DATE: 28
RAD ONC ARIA PLAN ID: NORMAL
RAD ONC ARIA PLAN PRESCRIBED DOSE PER FRACTION: 1.8 GY
RAD ONC ARIA PLAN PRIMARY REFERENCE POINT: NORMAL
RAD ONC ARIA PLAN TOTAL FRACTIONS PRESCRIBED: 28
RAD ONC ARIA PLAN TOTAL PRESCRIBED DOSE: 5040 CGY
RAD ONC ARIA REFERENCE POINT DOSAGE GIVEN TO DATE: 50.4 GY
RAD ONC ARIA REFERENCE POINT ID: NORMAL
RAD ONC ARIA REFERENCE POINT SESSION DOSAGE GIVEN: 1.8 GY

## 2025-05-19 PROCEDURE — 77014 CHG CT GUIDANCE RADIATION THERAPY FLDS PLACEMENT: CPT | Performed by: RADIOLOGY

## 2025-05-19 PROCEDURE — 77336 RADIATION PHYSICS CONSULT: CPT | Performed by: RADIOLOGY

## 2025-05-19 PROCEDURE — 77386: CPT | Performed by: RADIOLOGY

## 2025-05-20 NOTE — PROGRESS NOTES
Chief Complaint  Chronic respiratory failure with hypoxia and hypercapnia    Subjective    History of Present Illness {CC  Problem List  Visit Diagnosis   Encounters  Notes  Medications  Labs  Result Review Imaging  Media: 23}    Ashley Gibbs presents to [facility] for:    History of Present Illness  Management of lung mass, COPD, asthma and chronic respiratory failure.  Last PFTs in 2019 showing very severe obstructive disease.     She quit smoking in 2008.  1 pack/day for 40 years.        She has an abnormal CAT scan underwent lung biopsy confirming small cell lung cancer with metastatic disease.  She did start chemotherapy and radiation.      Unfortunately she was hospitalized a couple of times for influenza A, an unintentional overdose and a COPD exacerbation.  They did put her chemotherapy on hold.  She continued to receive radiation.  She was given Levaquin and prednisone for the exacerbation.  She did get better was able to restart her chemotherapy.  She did complete the full course.  She is scheduled for follow-up CT scan on June 9.  She is also scheduled to start immune therapy on June 11 if I will goes well.     She would like to see about going on Breztri.  She had a sample which was very beneficial.  Prior to this she had tried Trelegy and Advair.  Neither of these were helpful.  She has a nebulizer she can use when needed.     She is on 2 L of oxygen.  She is compliant with this and benefiting from it.      She is now on noninvasive ventilation after trying and failing an auto titrating PAP device.  She is compliant with this and benefiting from it.          Current Outpatient Medications:     citalopram (CeleXA) 40 MG tablet, Take 1 tablet by mouth Daily., Disp: 90 tablet, Rfl: 1    diazePAM (Valium) 5 MG tablet, Take 1 tablet by mouth Every 12 (Twelve) Hours As Needed for Anxiety., Disp: 60 tablet, Rfl: 2    gabapentin (NEURONTIN) 600 MG  tablet, Take 1 tablet by mouth Every 12 (Twelve) Hours., Disp: 60 tablet, Rfl: 2    guaiFENesin (Mucinex) 600 MG 12 hr tablet, Take 2 tablets by mouth 2 (Two) Times a Day., Disp: 60 tablet, Rfl: 5    ipratropium-albuterol (DUO-NEB) 0.5-2.5 mg/3 ml nebulizer, Take 3 mL by nebulization Every 4 (Four) Hours As Needed for Wheezing or Shortness of Air., Disp: 90 mL, Rfl: 0    levothyroxine (SYNTHROID, LEVOTHROID) 112 MCG tablet, Take 1 tablet by mouth Daily., Disp: 90 tablet, Rfl: 1    lidocaine (XYLOCAINE) 5 % ointment, Apply 1 Application topically to the appropriate area as directed Every 4 (Four) Hours As Needed for Moderate Pain., Disp: 50 g, Rfl: 0    mirtazapine (REMERON) 15 MG tablet, Take 1 tablet by mouth every night at bedtime., Disp: 90 tablet, Rfl: 1    mupirocin (BACTROBAN) 2 % ointment, Apply 1 Application topically to the appropriate area as directed 2 (Two) Times a Day., Disp: 22 g, Rfl: 0    ondansetron (Zofran) 4 MG tablet, Take 1 tablet by mouth Every 8 (Eight) Hours As Needed for Nausea or Vomiting., Disp: 30 tablet, Rfl: 0    oxyCODONE-acetaminophen (PERCOCET)  MG per tablet, Take 1 tablet by mouth Every 6 (Six) Hours As Needed for Moderate Pain., Disp: , Rfl:     potassium chloride (MICRO-K) 10 MEQ CR capsule, Take 2 capsules by mouth 2 (Two) Times a Day., Disp: , Rfl:     rizatriptan (MAXALT) 10 MG tablet, Take 1 tablet by mouth 1 (One) Time As Needed for Migraine., Disp: , Rfl:     rOPINIRole (REQUIP) 0.25 MG tablet, Take 1 tablet by mouth every night at bedtime., Disp: 90 tablet, Rfl: 1    tiZANidine (ZANAFLEX) 2 MG tablet, Take 1 tablet by mouth 2 (Two) Times a Day As Needed for Muscle Spasms., Disp: 60 tablet, Rfl: 5    tobramycin 0.3 % solution ophthalmic solution, Administer 2 drops to both eyes Every 4 (Four) Hours While Awake., Disp: 5 mL, Rfl: 0    Ventolin  (90 Base) MCG/ACT inhaler, Inhale 2 puffs Every 4 (Four) Hours As Needed for Wheezing or Shortness of Air., Disp: 18 g,  "Rfl: 2    Budeson-Glycopyrrol-Formoterol (Breztri Aerosphere) 160-9-4.8 MCG/ACT aerosol inhaler, Inhale 2 puffs 2 (Two) Times a Day for 30 days., Disp: 1 each, Rfl: 11    Social History     Socioeconomic History    Marital status:    Tobacco Use    Smoking status: Former     Current packs/day: 0.00     Average packs/day: 1 pack/day for 40.0 years (40.0 ttl pk-yrs)     Types: Cigarettes     Start date: 1968     Quit date: 2008     Years since quittin.0     Passive exposure: Past    Smokeless tobacco: Never   Vaping Use    Vaping status: Some Days    Devices: Pre-filled pod    Passive vaping exposure: Yes   Substance and Sexual Activity    Alcohol use: Never    Drug use: Never    Sexual activity: Not Currently     Partners: Male       Objective   Vital Signs:   /72   Pulse 71   Ht 170.2 cm (67\")   Wt 67.1 kg (148 lb)   SpO2 97% Comment: 2PD  BMI 23.18 kg/m²     Physical Exam  Constitutional:       Interventions: Nasal cannula in place.   Cardiovascular:      Rate and Rhythm: Normal rate and regular rhythm.      Heart sounds: No murmur heard.  Pulmonary:      Effort: Pulmonary effort is normal.      Breath sounds: Normal breath sounds.   Musculoskeletal:      Right lower leg: No edema.      Left lower leg: No edema.   Neurological:      Mental Status: She is alert and oriented to person, place, and time.        Result Review :      My interpretation of the PFT : none    No results found for this or any previous visit.    Rest/Exercise Pulse Ox Values          2025    11:00   Rest/Exercise Pulse Ox Results   Rest room air SAT % 88   Exercise room air SAT % 91   Exercise on O2 @ Liters 2   Exercise on O2 SAT % 96       My interpretation of imaging:  none    My interpretation of labs: none      Assessment and Plan {CC Problem List  Visit Diagnosis  ROS  Review (Popup)  Health Maintenance  Quality  BestPractice  Medications  SmartSets  SnapShot Encounters  Media : " 23}    Diagnoses and all orders for this visit:    1. Small cell carcinoma of upper lobe of right lung (Primary)  Comments:  Chemotherapy and radiation complete.  Follow-up CT scheduled for June 9.  Immune therapy plan for June 11    2. Chronic respiratory failure with hypoxia and hypercapnia  Comments:  Continue 2 L of oxygen.  She is compliant with this and benefiting from it.    3. Stage 4 very severe COPD by GOLD classification  Comments:  Prescription for Breztri.  She has tried and failed Trelegy and Advair.  Sample given.  PFTs with follow-up  Orders:  -     Budeson-Glycopyrrol-Formoterol (Breztri Aerosphere) 160-9-4.8 MCG/ACT aerosol inhaler; Inhale 2 puffs 2 (Two) Times a Day for 30 days.  Dispense: 1 each; Refill: 11    4. Obstructive sleep apnea  Comments:  Continue NIV with sleep.  She is compliant with this and benefiting from it    5. Former smoker  Comments:  Continue to avoid smoking      Ashley Gibbs  reports that she quit smoking about 17 years ago. Her smoking use included cigarettes. She started smoking about 57 years ago. She has a 40 pack-year smoking history. She has been exposed to tobacco smoke. She has never used smokeless tobacco.        Body mass index is 23.18 kg/m².    Ani Singer, APRN  5/28/2025  13:08 CDT    Follow Up   Return in about 3 months (around 8/28/2025) for FVL with diffusion.    Patient was given instructions and counseling regarding her condition or for health maintenance advice. Please see specific information pulled into the AVS if appropriate.

## 2025-05-21 LAB
RAD ONC ARIA COURSE END DATE: NORMAL
RAD ONC ARIA COURSE ID: NORMAL
RAD ONC ARIA COURSE INTENT: NORMAL
RAD ONC ARIA COURSE LAST TREATMENT DATE: NORMAL
RAD ONC ARIA COURSE START DATE: NORMAL
RAD ONC ARIA COURSE TREATMENT ELAPSED DAYS: 74
RAD ONC ARIA FIRST TREATMENT DATE: NORMAL
RAD ONC ARIA PLAN FRACTIONS TREATED TO DATE: 28
RAD ONC ARIA PLAN ID: NORMAL
RAD ONC ARIA PLAN NAME: NORMAL
RAD ONC ARIA PLAN PRESCRIBED DOSE PER FRACTION: 1.8 GY
RAD ONC ARIA PLAN PRIMARY REFERENCE POINT: NORMAL
RAD ONC ARIA PLAN TOTAL FRACTIONS PRESCRIBED: 28
RAD ONC ARIA PLAN TOTAL PRESCRIBED DOSE: 5040 CGY
RAD ONC ARIA REFERENCE POINT DOSAGE GIVEN TO DATE: 50.4 GY
RAD ONC ARIA REFERENCE POINT ID: NORMAL

## 2025-05-21 NOTE — TELEPHONE ENCOUNTER
Do we have samples? If so can we give pt sample. Yes submit appeal. Pt has upcoming appt with pulmonology, recommend for her to discuss this with them and what back up they want her to be on. I was just refilling it for what they had sent previously.

## 2025-05-24 NOTE — PROGRESS NOTES
"Chief Complaint  Hospital Follow Up Visit    Subjective        Ashley Gibbs presents to [facility]  History of Present Illness  Here for hospital follow up visit  Hospitalized for pneumonia  Reports she is feeling much better now   She is needing refill on gabapentin for chronic pain  She is also needing refill valium for anxiety/panic, doing well with this, stable   Needs breztri refilled, copd improved with this, has been out prior to hospitalization     Objective   Vital Signs:  /76 (BP Location: Left arm, Patient Position: Sitting, Cuff Size: Adult)   Pulse 111   Temp 98 °F (36.7 °C) (Infrared)   Resp 20   Ht 170.2 cm (67.01\")   Wt 69.4 kg (153 lb)   SpO2 97%   BMI 23.96 kg/m²   Estimated body mass index is 23.96 kg/m² as calculated from the following:    Height as of this encounter: 170.2 cm (67.01\").    Weight as of this encounter: 69.4 kg (153 lb).    BMI is within normal parameters. No other follow-up for BMI required.      Physical Exam  Vitals and nursing note reviewed.   Constitutional:       General: She is not in acute distress.     Appearance: She is well-developed.   HENT:      Right Ear: Tympanic membrane and ear canal normal.      Left Ear: Tympanic membrane and ear canal normal.      Nose: Nose normal.      Right Sinus: No maxillary sinus tenderness or frontal sinus tenderness.      Left Sinus: No maxillary sinus tenderness or frontal sinus tenderness.      Mouth/Throat:      Mouth: Mucous membranes are moist.      Pharynx: Oropharynx is clear. Uvula midline. No uvula swelling.   Eyes:      Conjunctiva/sclera: Conjunctivae normal.   Neck:      Thyroid: No thyromegaly.      Trachea: No tracheal deviation.   Cardiovascular:      Rate and Rhythm: Normal rate and regular rhythm.      Heart sounds: Normal heart sounds.   Pulmonary:      Effort: Pulmonary effort is normal.      Breath sounds: Normal breath sounds.      Comments: Supplemental " o2  Musculoskeletal:      Cervical back: Neck supple.   Lymphadenopathy:      Cervical: No cervical adenopathy.   Skin:     General: Skin is warm and dry.   Neurological:      Mental Status: She is alert.   Psychiatric:         Behavior: Behavior normal.        Result Review :                Assessment and Plan   Diagnoses and all orders for this visit:    1. Hospital discharge follow-up (Primary)    2. Chronic pain disorder  -     gabapentin (NEURONTIN) 600 MG tablet; Take 1 tablet by mouth Every 12 (Twelve) Hours.  Dispense: 60 tablet; Refill: 2    3. Anxiety  -     diazePAM (Valium) 5 MG tablet; Take 1 tablet by mouth Every 12 (Twelve) Hours As Needed for Anxiety.  Dispense: 60 tablet; Refill: 2    4. Panic attacks  -     diazePAM (Valium) 5 MG tablet; Take 1 tablet by mouth Every 12 (Twelve) Hours As Needed for Anxiety.  Dispense: 60 tablet; Refill: 2    5. Restless legs syndrome  -     mirtazapine (REMERON) 15 MG tablet; Take 1 tablet by mouth every night at bedtime.  Dispense: 90 tablet; Refill: 1    6. Hypothyroidism, unspecified type  -     levothyroxine (SYNTHROID, LEVOTHROID) 112 MCG tablet; Take 1 tablet by mouth Daily.  Dispense: 90 tablet; Refill: 1    Other orders  -     Budeson-Glycopyrrol-Formoterol (BREZTRI) 160-9-4.8 MCG/ACT aerosol inhaler; Inhale 2 puffs 2 (Two) Times a Day.  Dispense: 10.7 g; Refill: 11  -     lidocaine (XYLOCAINE) 5 % ointment; Apply 1 Application topically to the appropriate area as directed Every 4 (Four) Hours As Needed for Moderate Pain.  Dispense: 50 g; Refill: 0  -     rOPINIRole (REQUIP) 0.25 MG tablet; Take 1 tablet by mouth every night at bedtime.  Dispense: 90 tablet; Refill: 1  -     tiZANidine (ZANAFLEX) 2 MG tablet; Take 1 tablet by mouth 2 (Two) Times a Day As Needed for Muscle Spasms.  Dispense: 60 tablet; Refill: 5      [place], UDS, and controlled substance contract in emr. Advised patient of risks associated with controlled substances including physical and  mental dependence, abuse, and mental impairment. Advised patient to use least amount of possible and never overuse or share medications with other people. Patient states understanding of risks and instructions on proper use.          Follow Up   Return if symptoms worsen or fail to improve.  Patient was given instructions and counseling regarding her condition or for health maintenance advice. Please see specific information pulled into the AVS if appropriate.

## 2025-05-27 ENCOUNTER — PATIENT OUTREACH (OUTPATIENT)
Age: 70
End: 2025-05-27
Payer: MEDICARE

## 2025-05-27 NOTE — OUTREACH NOTE
SW received referral via RN Call Center re: financial concerns.. SW attempted to reach patient x3 with no success. SW to discharge as unable to reach patient. Please re consult SW if additional needs arise.     Lula ALLEN -   Ambulatory Case Management    5/27/2025, 09:06 EDT

## 2025-05-28 ENCOUNTER — OFFICE VISIT (OUTPATIENT)
Dept: PULMONOLOGY | Facility: CLINIC | Age: 70
End: 2025-05-28
Payer: MEDICARE

## 2025-05-28 VITALS
BODY MASS INDEX: 23.23 KG/M2 | HEIGHT: 67 IN | SYSTOLIC BLOOD PRESSURE: 122 MMHG | OXYGEN SATURATION: 97 % | HEART RATE: 71 BPM | WEIGHT: 148 LBS | DIASTOLIC BLOOD PRESSURE: 72 MMHG

## 2025-05-28 DIAGNOSIS — G47.33 OBSTRUCTIVE SLEEP APNEA: Chronic | ICD-10-CM

## 2025-05-28 DIAGNOSIS — C34.11 SMALL CELL CARCINOMA OF UPPER LOBE OF RIGHT LUNG: Primary | Chronic | ICD-10-CM

## 2025-05-28 DIAGNOSIS — J96.11 CHRONIC RESPIRATORY FAILURE WITH HYPOXIA AND HYPERCAPNIA: Chronic | ICD-10-CM

## 2025-05-28 DIAGNOSIS — J44.9 STAGE 4 VERY SEVERE COPD BY GOLD CLASSIFICATION: Chronic | ICD-10-CM

## 2025-05-28 DIAGNOSIS — J96.12 CHRONIC RESPIRATORY FAILURE WITH HYPOXIA AND HYPERCAPNIA: Chronic | ICD-10-CM

## 2025-05-28 DIAGNOSIS — Z87.891 FORMER SMOKER: Chronic | ICD-10-CM

## 2025-05-28 PROCEDURE — 99214 OFFICE O/P EST MOD 30 MIN: CPT | Performed by: NURSE PRACTITIONER

## 2025-05-28 PROCEDURE — 1160F RVW MEDS BY RX/DR IN RCRD: CPT | Performed by: NURSE PRACTITIONER

## 2025-05-28 PROCEDURE — 1159F MED LIST DOCD IN RCRD: CPT | Performed by: NURSE PRACTITIONER

## 2025-05-28 RX ORDER — BUDESONIDE, GLYCOPYRROLATE, AND FORMOTEROL FUMARATE 160; 9; 4.8 UG/1; UG/1; UG/1
2 AEROSOL, METERED RESPIRATORY (INHALATION) 2 TIMES DAILY
Qty: 1 EACH | Refills: 11 | Status: SHIPPED | OUTPATIENT
Start: 2025-05-28 | End: 2025-06-27

## 2025-05-30 ENCOUNTER — TELEPHONE (OUTPATIENT)
Dept: PULMONOLOGY | Facility: CLINIC | Age: 70
End: 2025-05-30
Payer: MEDICARE

## 2025-05-30 NOTE — TELEPHONE ENCOUNTER
I attempted to do the Breztri prior authorization on cover my meds.  I received this message:    [facility] was not able to process the request because the previous Prior Authorization Request was Denied, please submit an electronic Appeal Request. You can also contact the plan at [phone] or fax in request to [phone].    I am assuming that her PCP's office attempted a prior authorization prior to us.  Do you want to do an appeal letter for this, it is not giving me an option to do an appeal on cover my meds?

## 2025-06-06 ENCOUNTER — OFFICE VISIT (OUTPATIENT)
Dept: PALLATIVE CARE | Age: 70
End: 2025-06-06
Payer: MEDICARE

## 2025-06-06 DIAGNOSIS — C7A.8 NEUROENDOCRINE CARCINOMA OF LUNG (HCC): ICD-10-CM

## 2025-06-06 DIAGNOSIS — Z51.5 ENCOUNTER FOR PALLIATIVE CARE: ICD-10-CM

## 2025-06-06 DIAGNOSIS — J44.9 CHRONIC OBSTRUCTIVE PULMONARY DISEASE, UNSPECIFIED COPD TYPE (HCC): Primary | ICD-10-CM

## 2025-06-06 PROCEDURE — G8420 CALC BMI NORM PARAMETERS: HCPCS | Performed by: NURSE PRACTITIONER

## 2025-06-06 PROCEDURE — 1036F TOBACCO NON-USER: CPT | Performed by: NURSE PRACTITIONER

## 2025-06-06 PROCEDURE — 99349 HOME/RES VST EST MOD MDM 40: CPT | Performed by: NURSE PRACTITIONER

## 2025-06-06 PROCEDURE — 1090F PRES/ABSN URINE INCON ASSESS: CPT | Performed by: NURSE PRACTITIONER

## 2025-06-06 PROCEDURE — 3017F COLORECTAL CA SCREEN DOC REV: CPT | Performed by: NURSE PRACTITIONER

## 2025-06-06 PROCEDURE — 1123F ACP DISCUSS/DSCN MKR DOCD: CPT | Performed by: NURSE PRACTITIONER

## 2025-06-06 NOTE — PROGRESS NOTES
Supportive Care/Community Based Palliative Care  Follow Up Note        Patient Name:  Fozia Briceno  Medical Record Number:  [id number]  YOB: 1955    Date of Visit: 6/6/2025  Location of Visit:  Home    Patient Care Team:  Celine Gomez as PCP - General  Florencia Bates MD as Consulting Physician (Oncology)  Marisa Gregg APRN - CNP as Advanced Practice Nurse (Hospice and Palliative Medicine)    History obtained from:  patient, electronic medical record    PALLIATIVE DIAGNOSES AND ORDERS/RECOMMENDATIONS/PLAN:   1. Chronic obstructive pulmonary disease, unspecified COPD type (HCC)  Continue Breztri daily as well as albuterol as needed, continue use of supplemental oxygen    2. Neuroendocrine carcinoma of lung (HCC)  Patient has completed radiation and chemotherapy is she is scheduled to start immunotherapy soon, wishes to continue with palliative treatment    3. Encounter for palliative care  Current goals of care include: Continue treatment with palliative intent, Preserve independence/control/autonomy, Maintain or improve functional status, Maintain or improve quality of life, Focus on comfort and quality of life, Would not consider facility placement, Would want hospitalization if needed    Code status confirmed: Full Code  Will continue goals of care discussions based on clinical course  Follow up home visit in 3-4 weeks and as needed    CHIEF COMPLAINT:     Chief Complaint   Patient presents with    Follow-up     Treatment and symptoms follow up       CLINICAL SUMMARY:          Fozia Briceno is a 69 y.o. female with PMH of COPD, chronic respiratory failure with hypoxia, HARRIET, anxiety, depression, small cell lung cancer currently on chemo and radiation, RLS, hypothyroidism, sarcodosis, HLD, and other comorbidities.     HPI AND VISIT SUMMARY:   I saw Fozia Briceno in Her home. Upon my arrival patient was noted to be awake, alert, oriented, in no distress.  The patient reports that she is

## 2025-06-10 ENCOUNTER — HOSPITAL ENCOUNTER (OUTPATIENT)
Dept: CT IMAGING | Facility: HOSPITAL | Age: 70
Discharge: HOME | End: 2025-06-10
Admitting: INTERNAL MEDICINE
Payer: MEDICARE

## 2025-06-10 DIAGNOSIS — C34.01 CARCINOMA OF HILUS OF LUNG, RIGHT: ICD-10-CM

## 2025-06-10 PROCEDURE — 74177 CT ABD & PELVIS W/CONTRAST: CPT

## 2025-06-10 PROCEDURE — 25510000001 IOPAMIDOL 61 % SOLUTION: Performed by: INTERNAL MEDICINE

## 2025-06-10 PROCEDURE — 71260 CT THORAX DX C+: CPT

## 2025-06-10 RX ORDER — IOPAMIDOL 612 MG/ML
100 INJECTION, SOLUTION INTRAVASCULAR
Status: COMPLETED | OUTPATIENT
Start: 2025-06-10 | End: 2025-06-10

## 2025-06-10 RX ADMIN — IOPAMIDOL 100 ML: 612 INJECTION, SOLUTION INTRAVENOUS at 08:20

## 2025-06-11 ENCOUNTER — OFFICE VISIT (OUTPATIENT)
Dept: HEMATOLOGY | Age: 70
End: 2025-06-11
Payer: MEDICARE

## 2025-06-11 ENCOUNTER — HOSPITAL ENCOUNTER (OUTPATIENT)
Dept: INFUSION THERAPY | Age: 70
Discharge: HOME | End: 2025-06-11
Payer: MEDICARE

## 2025-06-11 VITALS
BODY MASS INDEX: 22.65 KG/M2 | WEIGHT: 144.3 LBS | OXYGEN SATURATION: 97 % | TEMPERATURE: 97.8 F | SYSTOLIC BLOOD PRESSURE: 107 MMHG | DIASTOLIC BLOOD PRESSURE: 66 MMHG | HEIGHT: 67 IN | RESPIRATION RATE: 20 BRPM | HEART RATE: 96 BPM

## 2025-06-11 VITALS
DIASTOLIC BLOOD PRESSURE: 64 MMHG | HEART RATE: 107 BPM | OXYGEN SATURATION: 95 % | TEMPERATURE: 97.8 F | RESPIRATION RATE: 20 BRPM | SYSTOLIC BLOOD PRESSURE: 120 MMHG

## 2025-06-11 DIAGNOSIS — C7A.8 NEUROENDOCRINE CARCINOMA OF LUNG (HCC): Primary | ICD-10-CM

## 2025-06-11 DIAGNOSIS — T45.1X5A ANTINEOPLASTIC CHEMOTHERAPY INDUCED ANEMIA: ICD-10-CM

## 2025-06-11 DIAGNOSIS — T45.1X5A ANEMIA ASSOCIATED WITH CHEMOTHERAPY: ICD-10-CM

## 2025-06-11 DIAGNOSIS — C34.01 SMALL CELL CARCINOMA OF HILUM OF RIGHT LUNG (HCC): Primary | ICD-10-CM

## 2025-06-11 DIAGNOSIS — T45.1X5A CHEMOTHERAPY-INDUCED FATIGUE: ICD-10-CM

## 2025-06-11 DIAGNOSIS — Z51.12 ENCOUNTER FOR ANTINEOPLASTIC IMMUNOTHERAPY: ICD-10-CM

## 2025-06-11 DIAGNOSIS — R53.83 FATIGUE DUE TO TREATMENT: ICD-10-CM

## 2025-06-11 DIAGNOSIS — G89.3 CANCER-RELATED PAIN: ICD-10-CM

## 2025-06-11 DIAGNOSIS — D64.81 ANEMIA ASSOCIATED WITH CHEMOTHERAPY: ICD-10-CM

## 2025-06-11 DIAGNOSIS — Z51.11 CHEMOTHERAPY MANAGEMENT, ENCOUNTER FOR: ICD-10-CM

## 2025-06-11 DIAGNOSIS — C34.01 SMALL CELL CARCINOMA OF HILUM OF RIGHT LUNG (HCC): ICD-10-CM

## 2025-06-11 DIAGNOSIS — G89.3 CANCER ASSOCIATED PAIN: ICD-10-CM

## 2025-06-11 DIAGNOSIS — Z71.89 CARE PLAN DISCUSSED WITH PATIENT: ICD-10-CM

## 2025-06-11 DIAGNOSIS — R53.83 CHEMOTHERAPY-INDUCED FATIGUE: ICD-10-CM

## 2025-06-11 DIAGNOSIS — D64.81 ANTINEOPLASTIC CHEMOTHERAPY INDUCED ANEMIA: ICD-10-CM

## 2025-06-11 LAB
ALBUMIN SERPL-MCNC: 3.6 G/DL (ref 3.5–5.2)
ALP SERPL-CCNC: 125 U/L (ref 35–104)
ALT SERPL-CCNC: 9 U/L (ref 5–33)
ANION GAP SERPL CALCULATED.3IONS-SCNC: 10 MMOL/L (ref 7–19)
AST SERPL-CCNC: 18 U/L (ref 5–32)
BASOPHILS # BLD: 0.04 K/UL (ref 0–0.2)
BASOPHILS NFR BLD: 0.8 % (ref 0–1)
BILIRUB SERPL-MCNC: <0.2 MG/DL (ref 0–1.2)
BUN SERPL-MCNC: 7 MG/DL (ref 8–23)
CALCIUM SERPL-MCNC: 8.2 MG/DL (ref 8.8–10.2)
CHLORIDE SERPL-SCNC: 103 MMOL/L (ref 98–107)
CO2 SERPL-SCNC: 28 MMOL/L (ref 22–29)
CORTIS AM PEAK SERPL-MCNC: 7.8 UG/DL (ref 4.8–19.5)
CREAT SERPL-MCNC: 0.7 MG/DL (ref 0.5–0.9)
EOSINOPHIL # BLD: 0.05 K/UL (ref 0–0.6)
EOSINOPHIL NFR BLD: 0.9 % (ref 0–5)
ERYTHROCYTE [DISTWIDTH] IN BLOOD BY AUTOMATED COUNT: 18.5 % (ref 11.5–14.5)
GLUCOSE SERPL-MCNC: 85 MG/DL (ref 70–99)
HCT VFR BLD AUTO: 27.1 % (ref 37–47)
HGB BLD-MCNC: 8.4 G/DL (ref 12–16)
LYMPHOCYTES # BLD: 0.65 K/UL (ref 1.1–4.5)
LYMPHOCYTES NFR BLD: 12.3 % (ref 20–40)
MAGNESIUM SERPL-MCNC: 2 MG/DL (ref 1.6–2.4)
MCH RBC QN AUTO: 30.2 PG (ref 27–31)
MCHC RBC AUTO-ENTMCNC: 31 G/DL (ref 33–37)
MCV RBC AUTO: 97.5 FL (ref 81–99)
MONOCYTES # BLD: 0.79 K/UL (ref 0–0.9)
MONOCYTES NFR BLD: 14.9 % (ref 1–10)
NEUTROPHILS # BLD: 3.73 K/UL (ref 1.5–7.5)
NEUTS SEG NFR BLD: 70.5 % (ref 50–65)
PLATELET # BLD AUTO: 354 K/UL (ref 130–400)
PMV BLD AUTO: 9.9 FL (ref 9.4–12.3)
POTASSIUM SERPL-SCNC: 4.2 MMOL/L (ref 3.5–5.1)
PROT SERPL-MCNC: 6.2 G/DL (ref 6.4–8.3)
RBC # BLD AUTO: 2.78 M/UL (ref 4.2–5.4)
SODIUM SERPL-SCNC: 141 MMOL/L (ref 136–145)
T4 FREE SERPL-MCNC: 1.3 NG/DL (ref 0.93–1.7)
TSH SERPL DL<=0.005 MIU/L-ACNC: 0.76 UIU/ML (ref 0.27–4.2)
WBC # BLD AUTO: 5.29 K/UL (ref 4.8–10.8)

## 2025-06-11 PROCEDURE — 36415 COLL VENOUS BLD VENIPUNCTURE: CPT

## 2025-06-11 PROCEDURE — G8399 PT W/DXA RESULTS DOCUMENT: HCPCS | Performed by: INTERNAL MEDICINE

## 2025-06-11 PROCEDURE — 82533 TOTAL CORTISOL: CPT

## 2025-06-11 PROCEDURE — 3017F COLORECTAL CA SCREEN DOC REV: CPT | Performed by: INTERNAL MEDICINE

## 2025-06-11 PROCEDURE — G8428 CUR MEDS NOT DOCUMENT: HCPCS | Performed by: INTERNAL MEDICINE

## 2025-06-11 PROCEDURE — G8420 CALC BMI NORM PARAMETERS: HCPCS | Performed by: INTERNAL MEDICINE

## 2025-06-11 PROCEDURE — 96413 CHEMO IV INFUSION 1 HR: CPT

## 2025-06-11 PROCEDURE — 1123F ACP DISCUSS/DSCN MKR DOCD: CPT | Performed by: INTERNAL MEDICINE

## 2025-06-11 PROCEDURE — 1090F PRES/ABSN URINE INCON ASSESS: CPT | Performed by: INTERNAL MEDICINE

## 2025-06-11 PROCEDURE — 99214 OFFICE O/P EST MOD 30 MIN: CPT | Performed by: INTERNAL MEDICINE

## 2025-06-11 PROCEDURE — 80053 COMPREHEN METABOLIC PANEL: CPT

## 2025-06-11 PROCEDURE — 84443 ASSAY THYROID STIM HORMONE: CPT

## 2025-06-11 PROCEDURE — 85025 COMPLETE CBC W/AUTO DIFF WBC: CPT

## 2025-06-11 PROCEDURE — 83735 ASSAY OF MAGNESIUM: CPT

## 2025-06-11 PROCEDURE — 1036F TOBACCO NON-USER: CPT | Performed by: INTERNAL MEDICINE

## 2025-06-11 PROCEDURE — 84439 ASSAY OF FREE THYROXINE: CPT

## 2025-06-11 PROCEDURE — G2211 COMPLEX E/M VISIT ADD ON: HCPCS | Performed by: INTERNAL MEDICINE

## 2025-06-11 PROCEDURE — 2500000003 HC RX 250 WO HCPCS: Performed by: INTERNAL MEDICINE

## 2025-06-11 PROCEDURE — 2580000003 HC RX 258: Performed by: INTERNAL MEDICINE

## 2025-06-11 PROCEDURE — 6360000002 HC RX W HCPCS: Performed by: INTERNAL MEDICINE

## 2025-06-11 RX ORDER — EPINEPHRINE 1 MG/ML
0.3 INJECTION, SOLUTION, CONCENTRATE INTRAVENOUS PRN
Status: CANCELLED | OUTPATIENT
Start: 2025-06-11

## 2025-06-11 RX ORDER — ONDANSETRON 2 MG/ML
8 INJECTION INTRAMUSCULAR; INTRAVENOUS
Status: CANCELLED | OUTPATIENT
Start: 2025-06-11

## 2025-06-11 RX ORDER — DIPHENHYDRAMINE HYDROCHLORIDE 50 MG/ML
50 INJECTION, SOLUTION INTRAMUSCULAR; INTRAVENOUS
Status: CANCELLED | OUTPATIENT
Start: 2025-06-11

## 2025-06-11 RX ORDER — MEPERIDINE HYDROCHLORIDE 25 MG/ML
12.5 INJECTION INTRAMUSCULAR; INTRAVENOUS; SUBCUTANEOUS PRN
Status: CANCELLED | OUTPATIENT
Start: 2025-06-11

## 2025-06-11 RX ORDER — SODIUM CHLORIDE 9 MG/ML
5-250 INJECTION, SOLUTION INTRAVENOUS PRN
Status: CANCELLED | OUTPATIENT
Start: 2025-06-11

## 2025-06-11 RX ORDER — HYDROCORTISONE SODIUM SUCCINATE 100 MG/2ML
100 INJECTION INTRAMUSCULAR; INTRAVENOUS
Status: CANCELLED | OUTPATIENT
Start: 2025-06-11

## 2025-06-11 RX ORDER — ACETAMINOPHEN 325 MG/1
650 TABLET ORAL
Status: CANCELLED | OUTPATIENT
Start: 2025-06-11

## 2025-06-11 RX ORDER — ALBUTEROL SULFATE 90 UG/1
4 INHALANT RESPIRATORY (INHALATION) PRN
Status: CANCELLED | OUTPATIENT
Start: 2025-06-11

## 2025-06-11 RX ORDER — HEPARIN 100 UNIT/ML
500 SYRINGE INTRAVENOUS PRN
Status: DISCONTINUED | OUTPATIENT
Start: 2025-06-11 | End: 2025-06-12 | Stop reason: HOSPADM

## 2025-06-11 RX ORDER — OXYCODONE AND ACETAMINOPHEN 10; 325 MG/1; MG/1
1 TABLET ORAL EVERY 6 HOURS PRN
Qty: 120 TABLET | Refills: 0 | Status: SHIPPED | OUTPATIENT
Start: 2025-06-11 | End: 2025-07-11

## 2025-06-11 RX ORDER — SODIUM CHLORIDE 0.9 % (FLUSH) 0.9 %
5-40 SYRINGE (ML) INJECTION PRN
Status: DISCONTINUED | OUTPATIENT
Start: 2025-06-11 | End: 2025-06-12 | Stop reason: HOSPADM

## 2025-06-11 RX ORDER — SODIUM CHLORIDE 9 MG/ML
INJECTION, SOLUTION INTRAVENOUS CONTINUOUS
Status: CANCELLED | OUTPATIENT
Start: 2025-06-11

## 2025-06-11 RX ORDER — FAMOTIDINE 10 MG/ML
20 INJECTION, SOLUTION INTRAVENOUS
Status: CANCELLED | OUTPATIENT
Start: 2025-06-11

## 2025-06-11 RX ADMIN — SODIUM CHLORIDE 1500 MG: 9 INJECTION, SOLUTION INTRAVENOUS at 13:21

## 2025-06-11 RX ADMIN — HEPARIN 500 UNITS: 100 SYRINGE at 14:22

## 2025-06-11 RX ADMIN — SODIUM CHLORIDE, PRESERVATIVE FREE 10 ML: 5 INJECTION INTRAVENOUS at 14:23

## 2025-06-13 NOTE — PROGRESS NOTES
Physician Progress Note      PATIENT:               JOSE RAUL RUDOLPH  CSN #:                  [id number]  :                       1955  ADMIT DATE:       5/3/2025 10:14 AM  DISCH DATE:        2025 12:13 PM  RESPONDING  PROVIDER #:        Catalina Mark MD          QUERY TEXT:    Pneumonia is documented in the medical record on the H&P at the time of   admission.  Please clarify any associated diagnoses:    The clinical indicators include:  - \"Community acquired pneumonia of left lower lobe of lung\" on the H&P.  - \" Based on workup currently does not seem that she is septic but given her   history did cover her with Vanco and cefepime as well as 30 cc/kg fluid.\" per   the ER provider notes on admission.  - \"starting on vancomycin therapy for sepsis\"  per the progress notes dated   25 by Pharmacy.  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  - Lab Results:  WBC: 2.0,  WBC: 1.5,  lactic: 1.0, Procal:   : 0.55  - Per the ER provider notes on arrival \"TEMP: 100.4 on arrival with HR; 137   Borderline Hypotensive in the Er\"  Options provided:  -- Sepsis, present on admission  -- Sepsis, present on admission, now resolved  -- Sepsis, developed following admission  -- Localized infection without sepsis  -- Other - I will add my own diagnosis  -- Disagree - Not applicable / Not valid  -- Disagree - Clinically unable to determine / Unknown  -- Refer to Clinical Documentation Reviewer    PROVIDER RESPONSE TEXT:    This patient has sepsis which was present on admission.    Query created by: Wendie Fairbanks on 2025 10:27 AM      Electronically signed by:  Catalina Mark MD 2025 8:27 AM

## 2025-06-17 ENCOUNTER — TELEPHONE (OUTPATIENT)
Dept: FAMILY MEDICINE CLINIC | Facility: CLINIC | Age: 70
End: 2025-06-17
Payer: MEDICARE

## 2025-06-18 ENCOUNTER — PATIENT MESSAGE (OUTPATIENT)
Dept: FAMILY MEDICINE CLINIC | Facility: CLINIC | Age: 70
End: 2025-06-18
Payer: MEDICARE

## 2025-06-18 NOTE — TELEPHONE ENCOUNTER
Christiano Rachel from [facility] will be sending over a fax to us. She needs to know  if the lidocaine is a topical anethesia, part of the compound, and if used as part of a compound are all the active ingredients FDA approved. Her phone number is [phone].

## 2025-06-19 NOTE — TELEPHONE ENCOUNTER
Called [facility] to let them know it is topical not a compound. PA still pending.

## 2025-06-23 ENCOUNTER — OFFICE VISIT (OUTPATIENT)
Dept: FAMILY MEDICINE CLINIC | Facility: CLINIC | Age: 70
End: 2025-06-23
Payer: MEDICARE

## 2025-06-23 VITALS
HEART RATE: 114 BPM | DIASTOLIC BLOOD PRESSURE: 79 MMHG | RESPIRATION RATE: 20 BRPM | WEIGHT: 143 LBS | SYSTOLIC BLOOD PRESSURE: 149 MMHG | TEMPERATURE: 99.8 F | OXYGEN SATURATION: 96 % | HEIGHT: 67 IN | BODY MASS INDEX: 22.44 KG/M2

## 2025-06-23 DIAGNOSIS — J32.9 RHINOSINUSITIS: ICD-10-CM

## 2025-06-23 DIAGNOSIS — J40 BRONCHITIS: Primary | ICD-10-CM

## 2025-06-23 PROCEDURE — 1159F MED LIST DOCD IN RCRD: CPT | Performed by: NURSE PRACTITIONER

## 2025-06-23 PROCEDURE — 99213 OFFICE O/P EST LOW 20 MIN: CPT | Performed by: NURSE PRACTITIONER

## 2025-06-23 PROCEDURE — 1126F AMNT PAIN NOTED NONE PRSNT: CPT | Performed by: NURSE PRACTITIONER

## 2025-06-23 PROCEDURE — 1160F RVW MEDS BY RX/DR IN RCRD: CPT | Performed by: NURSE PRACTITIONER

## 2025-06-23 PROCEDURE — 96372 THER/PROPH/DIAG INJ SC/IM: CPT | Performed by: NURSE PRACTITIONER

## 2025-06-23 RX ORDER — AZITHROMYCIN 250 MG/1
TABLET, FILM COATED ORAL
Qty: 6 TABLET | Refills: 0 | Status: SHIPPED | OUTPATIENT
Start: 2025-06-23

## 2025-06-23 RX ORDER — DEXAMETHASONE SODIUM PHOSPHATE 10 MG/ML
10 INJECTION, SOLUTION INTRA-ARTICULAR; INTRALESIONAL; INTRAMUSCULAR; INTRAVENOUS; SOFT TISSUE ONCE
Status: COMPLETED | OUTPATIENT
Start: 2025-06-23 | End: 2025-06-23

## 2025-06-23 RX ORDER — METHYLPREDNISOLONE 4 MG/1
TABLET ORAL
Qty: 21 TABLET | Refills: 0 | Status: SHIPPED | OUTPATIENT
Start: 2025-06-23

## 2025-06-23 RX ADMIN — DEXAMETHASONE SODIUM PHOSPHATE 10 MG: 10 INJECTION, SOLUTION INTRA-ARTICULAR; INTRALESIONAL; INTRAMUSCULAR; INTRAVENOUS; SOFT TISSUE at 16:13

## 2025-06-23 NOTE — TELEPHONE ENCOUNTER
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 I would not suggest starting back on stimulant.

## 2025-07-08 NOTE — PROGRESS NOTES
MEDICAL ONCOLOGY PROGRESS NOTE    Pt Name: Fozia Briceno  MRN: [id number]  YOB: 1955  Date of evaluation: 7/9/2025    HISTORY OF PRESENT ILLNESS:  History of Present Illness  Reason for MD visit-toxicity monitoring/disease management  The patient returns today for a scheduled follow-up visit to monitor side effects of treatment, laboratorial monitoring, disease management and further treatment recommendations.    The patient is a 70-year-old female with a history of small cell lung cancer of the right lung, diagnosed in 12/2024. She initially presented with limited stage disease and superior vena cava syndrome. She received radiation therapy and completed concurrent radiation and chemotherapy on 05/21/2025. She is currently on maintenance treatment and is here for a follow-up visit.    Her last immunotherapy session went well, except for the development of hives, which resolved after a few days with the use of over-the-counter Benadryl. She experienced mild fatigue but did not have any episodes of diarrhea. Her hair has started to regrow, and she has been more active recently, engaging in activities such as house cleaning. She has lost weight.    Diagnosis  Small cell lung cancer, right lung, Dec 2024  [id number], limited stage  Superior vena cava syndrome    Treatment Summary  12/13/24 - 12/19/24 Emergent palliative radiation therapy 1250cGy in 5 fractions to right lung  1/27/25 Initiated chemotherapy with Carboplatin D1, Etoposide D1-3 every 21 days x4 cycles  3/6/25-5/21/25 Completed radiation to right lung 5040 cGy over 28 fraction with Dr. Perez   3/13/25 Port placement right side with Dr. Bateman  3/19/25 Dose reduction cycle #2 to carboplatin AUC 4 and etoposide 80 mg/m² days 1-3 q. 21 days  6/11/25 Initiate adjuvant maintenance Durvalumab 1500mg every 4 weeks x2 years. Treatment consent signed 5/14/25    Cancer History  Ms. Fozia Briceno presents to clinic today for initial consultation for newly

## 2025-07-09 ENCOUNTER — HOSPITAL ENCOUNTER (OUTPATIENT)
Dept: INFUSION THERAPY | Age: 70
Discharge: HOME | End: 2025-07-09
Payer: MEDICARE

## 2025-07-09 ENCOUNTER — OFFICE VISIT (OUTPATIENT)
Dept: HEMATOLOGY | Age: 70
End: 2025-07-09
Payer: MEDICARE

## 2025-07-09 VITALS
TEMPERATURE: 97.6 F | HEIGHT: 67 IN | HEART RATE: 97 BPM | OXYGEN SATURATION: 98 % | RESPIRATION RATE: 18 BRPM | DIASTOLIC BLOOD PRESSURE: 72 MMHG | WEIGHT: 139.6 LBS | BODY MASS INDEX: 21.91 KG/M2 | SYSTOLIC BLOOD PRESSURE: 99 MMHG

## 2025-07-09 DIAGNOSIS — Z71.89 CARE PLAN DISCUSSED WITH PATIENT: ICD-10-CM

## 2025-07-09 DIAGNOSIS — C34.01 SMALL CELL CARCINOMA OF HILUM OF RIGHT LUNG (HCC): Primary | ICD-10-CM

## 2025-07-09 DIAGNOSIS — C7A.8 NEUROENDOCRINE CARCINOMA OF LUNG (HCC): Primary | ICD-10-CM

## 2025-07-09 DIAGNOSIS — G89.3 CANCER ASSOCIATED PAIN: ICD-10-CM

## 2025-07-09 DIAGNOSIS — R53.83 CHEMOTHERAPY-INDUCED FATIGUE: ICD-10-CM

## 2025-07-09 DIAGNOSIS — G89.3 CANCER-RELATED PAIN: ICD-10-CM

## 2025-07-09 DIAGNOSIS — Z51.11 CHEMOTHERAPY MANAGEMENT, ENCOUNTER FOR: ICD-10-CM

## 2025-07-09 DIAGNOSIS — Z51.12 ENCOUNTER FOR ANTINEOPLASTIC IMMUNOTHERAPY: ICD-10-CM

## 2025-07-09 DIAGNOSIS — R53.83 FATIGUE DUE TO TREATMENT: ICD-10-CM

## 2025-07-09 DIAGNOSIS — C34.01 SMALL CELL CARCINOMA OF HILUM OF RIGHT LUNG (HCC): ICD-10-CM

## 2025-07-09 DIAGNOSIS — T45.1X5A CHEMOTHERAPY-INDUCED FATIGUE: ICD-10-CM

## 2025-07-09 LAB
ALBUMIN SERPL-MCNC: 4.1 G/DL (ref 3.5–5.2)
ALP SERPL-CCNC: 111 U/L (ref 35–104)
ALT SERPL-CCNC: 16 U/L (ref 5–33)
ANION GAP SERPL CALCULATED.3IONS-SCNC: 11 MMOL/L (ref 7–19)
AST SERPL-CCNC: 21 U/L (ref 5–32)
BASOPHILS # BLD: 0.07 K/UL (ref 0–0.2)
BASOPHILS NFR BLD: 1 % (ref 0–1)
BILIRUB SERPL-MCNC: 0.4 MG/DL (ref 0–1.2)
BUN SERPL-MCNC: 10 MG/DL (ref 8–23)
CALCIUM SERPL-MCNC: 8.9 MG/DL (ref 8.8–10.2)
CHLORIDE SERPL-SCNC: 101 MMOL/L (ref 98–107)
CO2 SERPL-SCNC: 29 MMOL/L (ref 22–29)
CORTIS AM PEAK SERPL-MCNC: 6.8 UG/DL (ref 4.8–19.5)
CREAT SERPL-MCNC: 0.7 MG/DL (ref 0.5–0.9)
EOSINOPHIL # BLD: 0.88 K/UL (ref 0–0.6)
EOSINOPHIL NFR BLD: 12.9 % (ref 0–5)
ERYTHROCYTE [DISTWIDTH] IN BLOOD BY AUTOMATED COUNT: 15.4 % (ref 11.5–14.5)
GLUCOSE SERPL-MCNC: 99 MG/DL (ref 70–99)
HCT VFR BLD AUTO: 35.6 % (ref 37–47)
HGB BLD-MCNC: 11.1 G/DL (ref 12–16)
LYMPHOCYTES # BLD: 0.72 K/UL (ref 1.1–4.5)
LYMPHOCYTES NFR BLD: 10.6 % (ref 20–40)
MCH RBC QN AUTO: 29.7 PG (ref 27–31)
MCHC RBC AUTO-ENTMCNC: 31.2 G/DL (ref 33–37)
MCV RBC AUTO: 95.2 FL (ref 81–99)
MONOCYTES # BLD: 0.74 K/UL (ref 0–0.9)
MONOCYTES NFR BLD: 10.9 % (ref 1–10)
NEUTROPHILS # BLD: 4.37 K/UL (ref 1.5–7.5)
NEUTS SEG NFR BLD: 64.3 % (ref 50–65)
PLATELET # BLD AUTO: 214 K/UL (ref 130–400)
PMV BLD AUTO: 10.5 FL (ref 9.4–12.3)
POTASSIUM SERPL-SCNC: 4.1 MMOL/L (ref 3.5–5.1)
PROT SERPL-MCNC: 6.5 G/DL (ref 6.4–8.3)
RBC # BLD AUTO: 3.74 M/UL (ref 4.2–5.4)
SODIUM SERPL-SCNC: 141 MMOL/L (ref 136–145)
T4 FREE SERPL-MCNC: 1.39 NG/DL (ref 0.93–1.7)
TSH SERPL DL<=0.005 MIU/L-ACNC: 1.39 UIU/ML (ref 0.27–4.2)
WBC # BLD AUTO: 6.8 K/UL (ref 4.8–10.8)

## 2025-07-09 PROCEDURE — 99214 OFFICE O/P EST MOD 30 MIN: CPT | Performed by: INTERNAL MEDICINE

## 2025-07-09 PROCEDURE — 1123F ACP DISCUSS/DSCN MKR DOCD: CPT | Performed by: INTERNAL MEDICINE

## 2025-07-09 PROCEDURE — 82533 TOTAL CORTISOL: CPT

## 2025-07-09 PROCEDURE — 84439 ASSAY OF FREE THYROXINE: CPT

## 2025-07-09 PROCEDURE — G8399 PT W/DXA RESULTS DOCUMENT: HCPCS | Performed by: INTERNAL MEDICINE

## 2025-07-09 PROCEDURE — 2500000003 HC RX 250 WO HCPCS: Performed by: INTERNAL MEDICINE

## 2025-07-09 PROCEDURE — 85025 COMPLETE CBC W/AUTO DIFF WBC: CPT

## 2025-07-09 PROCEDURE — G8428 CUR MEDS NOT DOCUMENT: HCPCS | Performed by: INTERNAL MEDICINE

## 2025-07-09 PROCEDURE — 36415 COLL VENOUS BLD VENIPUNCTURE: CPT

## 2025-07-09 PROCEDURE — 1036F TOBACCO NON-USER: CPT | Performed by: INTERNAL MEDICINE

## 2025-07-09 PROCEDURE — 84443 ASSAY THYROID STIM HORMONE: CPT

## 2025-07-09 PROCEDURE — G2211 COMPLEX E/M VISIT ADD ON: HCPCS | Performed by: INTERNAL MEDICINE

## 2025-07-09 PROCEDURE — G8420 CALC BMI NORM PARAMETERS: HCPCS | Performed by: INTERNAL MEDICINE

## 2025-07-09 PROCEDURE — 2580000003 HC RX 258: Performed by: INTERNAL MEDICINE

## 2025-07-09 PROCEDURE — 1090F PRES/ABSN URINE INCON ASSESS: CPT | Performed by: INTERNAL MEDICINE

## 2025-07-09 PROCEDURE — 6360000002 HC RX W HCPCS: Performed by: INTERNAL MEDICINE

## 2025-07-09 PROCEDURE — 96413 CHEMO IV INFUSION 1 HR: CPT

## 2025-07-09 PROCEDURE — 3017F COLORECTAL CA SCREEN DOC REV: CPT | Performed by: INTERNAL MEDICINE

## 2025-07-09 PROCEDURE — 80053 COMPREHEN METABOLIC PANEL: CPT

## 2025-07-09 RX ORDER — SODIUM CHLORIDE 9 MG/ML
INJECTION, SOLUTION INTRAVENOUS CONTINUOUS
Status: CANCELLED | OUTPATIENT
Start: 2025-07-09

## 2025-07-09 RX ORDER — SODIUM CHLORIDE 9 MG/ML
5-250 INJECTION, SOLUTION INTRAVENOUS PRN
Status: CANCELLED | OUTPATIENT
Start: 2025-07-09

## 2025-07-09 RX ORDER — ACETAMINOPHEN 325 MG/1
650 TABLET ORAL
Status: CANCELLED | OUTPATIENT
Start: 2025-07-09

## 2025-07-09 RX ORDER — HEPARIN 100 UNIT/ML
500 SYRINGE INTRAVENOUS PRN
Status: DISCONTINUED | OUTPATIENT
Start: 2025-07-09 | End: 2025-07-10 | Stop reason: HOSPADM

## 2025-07-09 RX ORDER — MEPERIDINE HYDROCHLORIDE 50 MG/ML
12.5 INJECTION INTRAMUSCULAR; INTRAVENOUS; SUBCUTANEOUS PRN
Status: CANCELLED | OUTPATIENT
Start: 2025-07-09

## 2025-07-09 RX ORDER — OXYCODONE AND ACETAMINOPHEN 10; 325 MG/1; MG/1
1 TABLET ORAL EVERY 6 HOURS PRN
Qty: 120 TABLET | Refills: 0 | Status: SHIPPED | OUTPATIENT
Start: 2025-07-09 | End: 2025-08-08

## 2025-07-09 RX ORDER — SODIUM CHLORIDE 0.9 % (FLUSH) 0.9 %
5-40 SYRINGE (ML) INJECTION PRN
Status: DISCONTINUED | OUTPATIENT
Start: 2025-07-09 | End: 2025-07-10 | Stop reason: HOSPADM

## 2025-07-09 RX ORDER — ONDANSETRON 2 MG/ML
8 INJECTION INTRAMUSCULAR; INTRAVENOUS
Status: CANCELLED | OUTPATIENT
Start: 2025-07-09

## 2025-07-09 RX ORDER — ALBUTEROL SULFATE 90 UG/1
4 INHALANT RESPIRATORY (INHALATION) PRN
Status: CANCELLED | OUTPATIENT
Start: 2025-07-09

## 2025-07-09 RX ORDER — SODIUM CHLORIDE 0.9 % (FLUSH) 0.9 %
5-40 SYRINGE (ML) INJECTION PRN
Status: CANCELLED | OUTPATIENT
Start: 2025-07-09

## 2025-07-09 RX ORDER — HYDROCORTISONE SODIUM SUCCINATE 100 MG/2ML
100 INJECTION INTRAMUSCULAR; INTRAVENOUS
Status: CANCELLED | OUTPATIENT
Start: 2025-07-09

## 2025-07-09 RX ORDER — FAMOTIDINE 10 MG/ML
20 INJECTION, SOLUTION INTRAVENOUS
Status: CANCELLED | OUTPATIENT
Start: 2025-07-09

## 2025-07-09 RX ORDER — EPINEPHRINE 1 MG/ML
0.3 INJECTION, SOLUTION, CONCENTRATE INTRAVENOUS PRN
Status: CANCELLED | OUTPATIENT
Start: 2025-07-09

## 2025-07-09 RX ORDER — HEPARIN SODIUM (PORCINE) LOCK FLUSH IV SOLN 100 UNIT/ML 100 UNIT/ML
500 SOLUTION INTRAVENOUS PRN
Status: CANCELLED | OUTPATIENT
Start: 2025-07-09

## 2025-07-09 RX ORDER — DIPHENHYDRAMINE HYDROCHLORIDE 50 MG/ML
50 INJECTION, SOLUTION INTRAMUSCULAR; INTRAVENOUS
Status: CANCELLED | OUTPATIENT
Start: 2025-07-09

## 2025-07-09 RX ADMIN — SODIUM CHLORIDE 1500 MG: 9 INJECTION, SOLUTION INTRAVENOUS at 12:47

## 2025-07-09 RX ADMIN — SODIUM CHLORIDE, PRESERVATIVE FREE 10 ML: 5 INJECTION INTRAVENOUS at 13:47

## 2025-07-09 RX ADMIN — HEPARIN 500 UNITS: 100 SYRINGE at 13:47

## 2025-07-09 NOTE — PROGRESS NOTES
Lab Results   Component Value Date    WBC 6.80 07/09/2025    HGB 11.1 (L) 07/09/2025    HCT 35.6 (L) 07/09/2025    MCV 95.2 07/09/2025     07/09/2025     Lab Results   Component Value Date    NEUTROABS 4.37 07/09/2025     Lab Results   Component Value Date     07/09/2025    K 4.1 07/09/2025     07/09/2025    CO2 29 07/09/2025    BUN 10 07/09/2025    CREATININE 0.7 07/09/2025    GLUCOSE 99 07/09/2025    CALCIUM 8.9 07/09/2025    BILITOT 0.4 07/09/2025    ALKPHOS 111 (H) 07/09/2025    AST 21 07/09/2025    ALT 16 07/09/2025    LABGLOM >90 07/09/2025    GFRAA >59 06/05/2022    GLOB 2.5 01/17/2017        GENERAL: Awake, alert, NAD  HEENT: no rash along oral mucosa   LUNGS: CTAB, no wheezes or crackles   CARDIAC: RRR, no m/r/g  ABDOMEN: Soft, non tender, non distended, no rebound, no guarding  EXT: No edema, no calf tenderness, 2+ DP pulses bilaterally, no deformities.  NEURO: A&Ox3. Moving all extremities.  SKIN: Warm and dry. diffuse, blanchable  petechial rash present throughout entire body, sparing palms and soles, erythematous over triceps and upper chest   PSYCH: Normal affect.

## 2025-07-22 ENCOUNTER — TELEPHONE (OUTPATIENT)
Dept: PALLATIVE CARE | Age: 70
End: 2025-07-22

## 2025-07-22 NOTE — TELEPHONE ENCOUNTER
SN called to scheduled appointment with SCOP APRN. No answer. Vmail left with contact information

## 2025-07-25 ENCOUNTER — TELEMEDICINE (OUTPATIENT)
Dept: FAMILY MEDICINE CLINIC | Facility: CLINIC | Age: 70
End: 2025-07-25
Payer: MEDICARE

## 2025-07-25 DIAGNOSIS — J44.1 COPD WITH EXACERBATION: Primary | ICD-10-CM

## 2025-07-25 PROCEDURE — 99213 OFFICE O/P EST LOW 20 MIN: CPT | Performed by: NURSE PRACTITIONER

## 2025-07-25 PROCEDURE — 1126F AMNT PAIN NOTED NONE PRSNT: CPT | Performed by: NURSE PRACTITIONER

## 2025-07-25 PROCEDURE — 1159F MED LIST DOCD IN RCRD: CPT | Performed by: NURSE PRACTITIONER

## 2025-07-25 PROCEDURE — 1160F RVW MEDS BY RX/DR IN RCRD: CPT | Performed by: NURSE PRACTITIONER

## 2025-07-25 RX ORDER — PREDNISONE 20 MG/1
TABLET ORAL
Qty: 6 TABLET | Refills: 0 | Status: SHIPPED | OUTPATIENT
Start: 2025-07-25 | End: 2025-08-01

## 2025-07-25 RX ORDER — ATORVASTATIN CALCIUM 10 MG/1
10 TABLET, FILM COATED ORAL DAILY
Qty: 90 TABLET | Refills: 1 | Status: SHIPPED | OUTPATIENT
Start: 2025-07-25

## 2025-07-25 RX ORDER — BROMPHENIRAMINE MALEATE, PSEUDOEPHEDRINE HYDROCHLORIDE, AND DEXTROMETHORPHAN HYDROBROMIDE 2; 30; 10 MG/5ML; MG/5ML; MG/5ML
5 SYRUP ORAL 4 TIMES DAILY PRN
Qty: 118 ML | Refills: 0 | Status: SHIPPED | OUTPATIENT
Start: 2025-07-25

## 2025-07-28 DIAGNOSIS — J44.9 CHRONIC OBSTRUCTIVE PULMONARY DISEASE, UNSPECIFIED COPD TYPE: ICD-10-CM

## 2025-07-28 RX ORDER — ALBUTEROL SULFATE 90 UG/1
2 AEROSOL, METERED RESPIRATORY (INHALATION) EVERY 4 HOURS PRN
Qty: 18 G | Refills: 2 | Status: SHIPPED | OUTPATIENT
Start: 2025-07-28

## 2025-07-28 NOTE — TELEPHONE ENCOUNTER
Caller: SELAM SANCHEZ    Relationship: Emergency Contact    Best call back number: [phone]    Requested Prescriptions:   Requested Prescriptions     Pending Prescriptions Disp Refills    Ventolin  (90 Base) MCG/ACT inhaler 18 g 2     Sig: Inhale 2 puffs Every 4 (Four) Hours As Needed for Wheezing or Shortness of Air.        Pharmacy where request should be sent: [facility] - [place] - [address] [facility] - [phone] [facility] [phone]     Last office visit with prescribing clinician: 6/23/2025   Last telemedicine visit with prescribing clinician: 7/25/2025   Next office visit with prescribing clinician: 12/31/2025         Does the patient have less than a 3 day supply:  [x] Yes  [] No    Would you like a call back once the refill request has been completed: [] Yes [x] No      Kera Cramer Rep   07/28/25 12:48 CDT

## 2025-08-06 ENCOUNTER — HOSPITAL ENCOUNTER (OUTPATIENT)
Dept: INFUSION THERAPY | Age: 70
Discharge: HOME | End: 2025-08-06
Payer: MEDICARE

## 2025-08-06 VITALS
RESPIRATION RATE: 20 BRPM | HEIGHT: 67 IN | DIASTOLIC BLOOD PRESSURE: 69 MMHG | WEIGHT: 140 LBS | OXYGEN SATURATION: 97 % | TEMPERATURE: 98 F | SYSTOLIC BLOOD PRESSURE: 110 MMHG | BODY MASS INDEX: 21.97 KG/M2 | HEART RATE: 102 BPM

## 2025-08-06 DIAGNOSIS — C34.01 SMALL CELL CARCINOMA OF HILUM OF RIGHT LUNG (HCC): ICD-10-CM

## 2025-08-06 DIAGNOSIS — R53.83 FATIGUE DUE TO TREATMENT: ICD-10-CM

## 2025-08-06 DIAGNOSIS — C34.01 SMALL CELL CARCINOMA OF HILUM OF RIGHT LUNG (HCC): Primary | ICD-10-CM

## 2025-08-06 DIAGNOSIS — C7A.8 NEUROENDOCRINE CARCINOMA OF LUNG (HCC): Primary | ICD-10-CM

## 2025-08-06 DIAGNOSIS — G89.3 CANCER ASSOCIATED PAIN: ICD-10-CM

## 2025-08-06 LAB
ALBUMIN SERPL-MCNC: 3.7 G/DL (ref 3.5–5.2)
ALP SERPL-CCNC: 124 U/L (ref 35–104)
ALT SERPL-CCNC: 12 U/L (ref 5–33)
ANION GAP SERPL CALCULATED.3IONS-SCNC: 8 MMOL/L (ref 7–19)
AST SERPL-CCNC: 20 U/L (ref 5–32)
BASOPHILS # BLD: 0.04 K/UL (ref 0–0.2)
BASOPHILS NFR BLD: 0.6 % (ref 0–1)
BILIRUB SERPL-MCNC: <0.2 MG/DL (ref 0–1.2)
BUN SERPL-MCNC: 6 MG/DL (ref 8–23)
CALCIUM SERPL-MCNC: 8.4 MG/DL (ref 8.8–10.2)
CHLORIDE SERPL-SCNC: 97 MMOL/L (ref 98–107)
CO2 SERPL-SCNC: 36 MMOL/L (ref 22–29)
CORTIS AM PEAK SERPL-MCNC: 6.4 UG/DL (ref 4.8–19.5)
CREAT SERPL-MCNC: 0.9 MG/DL (ref 0.5–0.9)
EOSINOPHIL # BLD: 0.78 K/UL (ref 0–0.6)
EOSINOPHIL NFR BLD: 11.7 % (ref 0–5)
ERYTHROCYTE [DISTWIDTH] IN BLOOD BY AUTOMATED COUNT: 14.6 % (ref 11.5–14.5)
GLUCOSE SERPL-MCNC: 99 MG/DL (ref 70–99)
HCT VFR BLD AUTO: 35.8 % (ref 37–47)
HGB BLD-MCNC: 11.4 G/DL (ref 12–16)
LYMPHOCYTES # BLD: 0.62 K/UL (ref 1.1–4.5)
LYMPHOCYTES NFR BLD: 9.3 % (ref 20–40)
MCH RBC QN AUTO: 29.9 PG (ref 27–31)
MCHC RBC AUTO-ENTMCNC: 31.8 G/DL (ref 33–37)
MCV RBC AUTO: 94 FL (ref 81–99)
MONOCYTES # BLD: 0.8 K/UL (ref 0–0.9)
MONOCYTES NFR BLD: 12 % (ref 1–10)
NEUTROPHILS # BLD: 4.38 K/UL (ref 1.5–7.5)
NEUTS SEG NFR BLD: 66.1 % (ref 50–65)
PLATELET # BLD AUTO: 176 K/UL (ref 130–400)
PMV BLD AUTO: 9.5 FL (ref 9.4–12.3)
POTASSIUM SERPL-SCNC: 3.4 MMOL/L (ref 3.5–5.1)
PROT SERPL-MCNC: 5.9 G/DL (ref 6.4–8.3)
RBC # BLD AUTO: 3.81 M/UL (ref 4.2–5.4)
SODIUM SERPL-SCNC: 141 MMOL/L (ref 136–145)
T4 FREE SERPL-MCNC: 1.23 NG/DL (ref 0.93–1.7)
TSH SERPL DL<=0.005 MIU/L-ACNC: 5.65 UIU/ML (ref 0.27–4.2)
WBC # BLD AUTO: 6.64 K/UL (ref 4.8–10.8)

## 2025-08-06 PROCEDURE — 84443 ASSAY THYROID STIM HORMONE: CPT

## 2025-08-06 PROCEDURE — 6360000002 HC RX W HCPCS: Performed by: INTERNAL MEDICINE

## 2025-08-06 PROCEDURE — 84439 ASSAY OF FREE THYROXINE: CPT

## 2025-08-06 PROCEDURE — 80053 COMPREHEN METABOLIC PANEL: CPT

## 2025-08-06 PROCEDURE — 85025 COMPLETE CBC W/AUTO DIFF WBC: CPT

## 2025-08-06 PROCEDURE — 2500000003 HC RX 250 WO HCPCS: Performed by: INTERNAL MEDICINE

## 2025-08-06 PROCEDURE — 82533 TOTAL CORTISOL: CPT

## 2025-08-06 PROCEDURE — 96413 CHEMO IV INFUSION 1 HR: CPT

## 2025-08-06 PROCEDURE — 2580000003 HC RX 258: Performed by: INTERNAL MEDICINE

## 2025-08-06 RX ORDER — SODIUM CHLORIDE 9 MG/ML
5-250 INJECTION, SOLUTION INTRAVENOUS PRN
OUTPATIENT
Start: 2025-08-06

## 2025-08-06 RX ORDER — HEPARIN 100 UNIT/ML
500 SYRINGE INTRAVENOUS PRN
Status: DISCONTINUED | OUTPATIENT
Start: 2025-08-06 | End: 2025-08-07 | Stop reason: HOSPADM

## 2025-08-06 RX ORDER — SODIUM CHLORIDE 0.9 % (FLUSH) 0.9 %
5-40 SYRINGE (ML) INJECTION PRN
Status: CANCELLED | OUTPATIENT
Start: 2025-08-06

## 2025-08-06 RX ORDER — ONDANSETRON 2 MG/ML
8 INJECTION INTRAMUSCULAR; INTRAVENOUS
OUTPATIENT
Start: 2025-08-06

## 2025-08-06 RX ORDER — HYDROCORTISONE SODIUM SUCCINATE 100 MG/2ML
100 INJECTION INTRAMUSCULAR; INTRAVENOUS
OUTPATIENT
Start: 2025-08-06

## 2025-08-06 RX ORDER — ACETAMINOPHEN 325 MG/1
650 TABLET ORAL
OUTPATIENT
Start: 2025-08-06

## 2025-08-06 RX ORDER — MEPERIDINE HYDROCHLORIDE 50 MG/ML
12.5 INJECTION INTRAMUSCULAR; INTRAVENOUS; SUBCUTANEOUS PRN
OUTPATIENT
Start: 2025-08-06

## 2025-08-06 RX ORDER — ALBUTEROL SULFATE 90 UG/1
4 INHALANT RESPIRATORY (INHALATION) PRN
OUTPATIENT
Start: 2025-08-06

## 2025-08-06 RX ORDER — SODIUM CHLORIDE 0.9 % (FLUSH) 0.9 %
5-40 SYRINGE (ML) INJECTION PRN
Status: DISCONTINUED | OUTPATIENT
Start: 2025-08-06 | End: 2025-08-07 | Stop reason: HOSPADM

## 2025-08-06 RX ORDER — SODIUM CHLORIDE 9 MG/ML
INJECTION, SOLUTION INTRAVENOUS CONTINUOUS
OUTPATIENT
Start: 2025-08-06

## 2025-08-06 RX ORDER — FAMOTIDINE 10 MG/ML
20 INJECTION, SOLUTION INTRAVENOUS
OUTPATIENT
Start: 2025-08-06

## 2025-08-06 RX ORDER — HEPARIN SODIUM (PORCINE) LOCK FLUSH IV SOLN 100 UNIT/ML 100 UNIT/ML
500 SOLUTION INTRAVENOUS PRN
Status: CANCELLED | OUTPATIENT
Start: 2025-08-06

## 2025-08-06 RX ORDER — DIPHENHYDRAMINE HYDROCHLORIDE 50 MG/ML
50 INJECTION, SOLUTION INTRAMUSCULAR; INTRAVENOUS
OUTPATIENT
Start: 2025-08-06

## 2025-08-06 RX ORDER — OXYCODONE AND ACETAMINOPHEN 10; 325 MG/1; MG/1
1 TABLET ORAL EVERY 6 HOURS PRN
Qty: 120 TABLET | Refills: 0 | Status: ON HOLD | OUTPATIENT
Start: 2025-08-06 | End: 2025-09-05

## 2025-08-06 RX ORDER — EPINEPHRINE 1 MG/ML
0.3 INJECTION, SOLUTION, CONCENTRATE INTRAVENOUS PRN
OUTPATIENT
Start: 2025-08-06

## 2025-08-06 RX ADMIN — SODIUM CHLORIDE 1500 MG: 9 INJECTION, SOLUTION INTRAVENOUS at 12:16

## 2025-08-06 RX ADMIN — HEPARIN 500 UNITS: 100 SYRINGE at 13:17

## 2025-08-06 RX ADMIN — SODIUM CHLORIDE, PRESERVATIVE FREE 10 ML: 5 INJECTION INTRAVENOUS at 13:17

## 2025-08-08 ENCOUNTER — OFFICE VISIT (OUTPATIENT)
Dept: FAMILY MEDICINE CLINIC | Facility: CLINIC | Age: 70
End: 2025-08-08
Payer: MEDICARE

## 2025-08-08 VITALS
DIASTOLIC BLOOD PRESSURE: 87 MMHG | SYSTOLIC BLOOD PRESSURE: 120 MMHG | HEART RATE: 123 BPM | OXYGEN SATURATION: 99 % | RESPIRATION RATE: 18 BRPM | BODY MASS INDEX: 21.97 KG/M2 | HEIGHT: 67 IN | TEMPERATURE: 98 F | WEIGHT: 140 LBS

## 2025-08-08 DIAGNOSIS — U07.1 COVID-19 VIRUS INFECTION: ICD-10-CM

## 2025-08-08 DIAGNOSIS — R05.9 COUGH, UNSPECIFIED TYPE: Primary | ICD-10-CM

## 2025-08-08 DIAGNOSIS — J10.1 INFLUENZA A: ICD-10-CM

## 2025-08-08 LAB
EXPIRATION DATE: ABNORMAL
FLUAV AG UPPER RESP QL IA.RAPID: DETECTED
FLUBV AG UPPER RESP QL IA.RAPID: NOT DETECTED
INTERNAL CONTROL: ABNORMAL
Lab: ABNORMAL
SARS-COV-2 AG UPPER RESP QL IA.RAPID: DETECTED

## 2025-08-08 PROCEDURE — 87428 SARSCOV & INF VIR A&B AG IA: CPT | Performed by: FAMILY MEDICINE

## 2025-08-08 PROCEDURE — 99214 OFFICE O/P EST MOD 30 MIN: CPT | Performed by: FAMILY MEDICINE

## 2025-08-08 PROCEDURE — 1126F AMNT PAIN NOTED NONE PRSNT: CPT | Performed by: FAMILY MEDICINE

## 2025-08-08 RX ORDER — OSELTAMIVIR PHOSPHATE 75 MG/1
75 CAPSULE ORAL 2 TIMES DAILY
Qty: 10 CAPSULE | Refills: 0 | Status: SHIPPED | OUTPATIENT
Start: 2025-08-08 | End: 2025-08-13

## 2025-08-09 ENCOUNTER — HOSPITAL ENCOUNTER (INPATIENT)
Age: 70
LOS: 3 days | Discharge: HOME | DRG: 190 | End: 2025-08-12
Attending: EMERGENCY MEDICINE | Admitting: INTERNAL MEDICINE
Payer: MEDICARE

## 2025-08-09 ENCOUNTER — APPOINTMENT (OUTPATIENT)
Dept: GENERAL RADIOLOGY | Age: 70
DRG: 190 | End: 2025-08-09
Payer: MEDICARE

## 2025-08-09 DIAGNOSIS — J96.21 ACUTE ON CHRONIC HYPOXIC RESPIRATORY FAILURE (HCC): ICD-10-CM

## 2025-08-09 DIAGNOSIS — U07.1 COVID-19 VIRUS INFECTION: ICD-10-CM

## 2025-08-09 DIAGNOSIS — J44.1 COPD EXACERBATION (HCC): Primary | ICD-10-CM

## 2025-08-09 LAB
ALBUMIN SERPL-MCNC: 3.4 G/DL (ref 3.5–5.2)
ALP SERPL-CCNC: 117 U/L (ref 35–104)
ALT SERPL-CCNC: 13 U/L (ref 10–35)
ANION GAP SERPL CALCULATED.3IONS-SCNC: 7 MMOL/L (ref 8–16)
AST SERPL-CCNC: 33 U/L (ref 10–35)
BASOPHILS # BLD: 0 K/UL (ref 0–0.2)
BASOPHILS NFR BLD: 0.6 % (ref 0–1)
BILIRUB SERPL-MCNC: 0.2 MG/DL (ref 0.2–1.2)
BUN SERPL-MCNC: 7 MG/DL (ref 8–23)
CALCIUM SERPL-MCNC: 8.5 MG/DL (ref 8.8–10.2)
CHLORIDE SERPL-SCNC: 100 MMOL/L (ref 98–107)
CO2 SERPL-SCNC: 34 MMOL/L (ref 22–29)
CREAT SERPL-MCNC: 0.7 MG/DL (ref 0.5–0.9)
D DIMER PPP FEU-MCNC: 0.45 UG/ML FEU (ref 0–0.48)
EOSINOPHIL # BLD: 0.6 K/UL (ref 0–0.6)
EOSINOPHIL NFR BLD: 8.8 % (ref 0–5)
ERYTHROCYTE [DISTWIDTH] IN BLOOD BY AUTOMATED COUNT: 14.3 % (ref 11.5–14.5)
GLUCOSE SERPL-MCNC: 102 MG/DL (ref 70–99)
HCT VFR BLD AUTO: 40 % (ref 37–47)
HGB BLD-MCNC: 12 G/DL (ref 12–16)
IMM GRANULOCYTES # BLD: 0 K/UL
LYMPHOCYTES # BLD: 0.9 K/UL (ref 1.1–4.5)
LYMPHOCYTES NFR BLD: 13.5 % (ref 20–40)
MAGNESIUM SERPL-MCNC: 1.9 MG/DL (ref 1.6–2.4)
MCH RBC QN AUTO: 28.9 PG (ref 27–31)
MCHC RBC AUTO-ENTMCNC: 30 G/DL (ref 33–37)
MCV RBC AUTO: 96.4 FL (ref 81–99)
MONOCYTES # BLD: 0.9 K/UL (ref 0–0.9)
MONOCYTES NFR BLD: 12.4 % (ref 0–10)
NEUTROPHILS # BLD: 4.5 K/UL (ref 1.5–7.5)
NEUTS SEG NFR BLD: 64.4 % (ref 50–65)
PLATELET # BLD AUTO: 186 K/UL (ref 130–400)
PMV BLD AUTO: 10.2 FL (ref 9.4–12.3)
POTASSIUM SERPL-SCNC: 4.8 MMOL/L (ref 3.5–5.1)
PROCALCITONIN: 0.05 NG/ML (ref 0–0.09)
PROT SERPL-MCNC: 6 G/DL (ref 6.4–8.3)
RBC # BLD AUTO: 4.15 M/UL (ref 4.2–5.4)
SODIUM SERPL-SCNC: 141 MMOL/L (ref 136–145)
TSH SERPL DL<=0.005 MIU/L-ACNC: 1.28 UIU/ML (ref 0.27–4.2)
WBC # BLD AUTO: 6.9 K/UL (ref 4.8–10.8)

## 2025-08-09 PROCEDURE — 85025 COMPLETE CBC W/AUTO DIFF WBC: CPT

## 2025-08-09 PROCEDURE — 93005 ELECTROCARDIOGRAM TRACING: CPT | Performed by: EMERGENCY MEDICINE

## 2025-08-09 PROCEDURE — 71045 X-RAY EXAM CHEST 1 VIEW: CPT

## 2025-08-09 PROCEDURE — 84443 ASSAY THYROID STIM HORMONE: CPT

## 2025-08-09 PROCEDURE — 6370000000 HC RX 637 (ALT 250 FOR IP)

## 2025-08-09 PROCEDURE — 2580000003 HC RX 258

## 2025-08-09 PROCEDURE — 85379 FIBRIN DEGRADATION QUANT: CPT

## 2025-08-09 PROCEDURE — 36415 COLL VENOUS BLD VENIPUNCTURE: CPT

## 2025-08-09 PROCEDURE — 83735 ASSAY OF MAGNESIUM: CPT

## 2025-08-09 PROCEDURE — 99285 EMERGENCY DEPT VISIT HI MDM: CPT

## 2025-08-09 PROCEDURE — 80053 COMPREHEN METABOLIC PANEL: CPT

## 2025-08-09 PROCEDURE — 2500000003 HC RX 250 WO HCPCS

## 2025-08-09 PROCEDURE — 6360000002 HC RX W HCPCS

## 2025-08-09 PROCEDURE — 84145 PROCALCITONIN (PCT): CPT

## 2025-08-09 PROCEDURE — 1200000000 HC SEMI PRIVATE

## 2025-08-09 PROCEDURE — 94761 N-INVAS EAR/PLS OXIMETRY MLT: CPT

## 2025-08-09 RX ORDER — PANTOPRAZOLE SODIUM 40 MG/1
40 TABLET, DELAYED RELEASE ORAL
Status: DISCONTINUED | OUTPATIENT
Start: 2025-08-10 | End: 2025-08-12 | Stop reason: HOSPADM

## 2025-08-09 RX ORDER — ACETAMINOPHEN 650 MG/1
650 SUPPOSITORY RECTAL EVERY 6 HOURS PRN
Status: DISCONTINUED | OUTPATIENT
Start: 2025-08-09 | End: 2025-08-12 | Stop reason: HOSPADM

## 2025-08-09 RX ORDER — ALBUTEROL SULFATE 90 UG/1
1 INHALANT RESPIRATORY (INHALATION) EVERY 4 HOURS PRN
Status: DISCONTINUED | OUTPATIENT
Start: 2025-08-09 | End: 2025-08-12 | Stop reason: HOSPADM

## 2025-08-09 RX ORDER — MAGNESIUM SULFATE IN WATER 40 MG/ML
2000 INJECTION, SOLUTION INTRAVENOUS PRN
Status: DISCONTINUED | OUTPATIENT
Start: 2025-08-09 | End: 2025-08-12 | Stop reason: HOSPADM

## 2025-08-09 RX ORDER — POTASSIUM CHLORIDE 7.45 MG/ML
10 INJECTION INTRAVENOUS PRN
Status: DISCONTINUED | OUTPATIENT
Start: 2025-08-09 | End: 2025-08-12 | Stop reason: HOSPADM

## 2025-08-09 RX ORDER — ENOXAPARIN SODIUM 100 MG/ML
40 INJECTION SUBCUTANEOUS EVERY 24 HOURS
Status: DISCONTINUED | OUTPATIENT
Start: 2025-08-09 | End: 2025-08-12 | Stop reason: HOSPADM

## 2025-08-09 RX ORDER — GABAPENTIN 600 MG/1
600 TABLET ORAL 2 TIMES DAILY
Status: DISCONTINUED | OUTPATIENT
Start: 2025-08-09 | End: 2025-08-12 | Stop reason: HOSPADM

## 2025-08-09 RX ORDER — SODIUM CHLORIDE 0.9 % (FLUSH) 0.9 %
5-40 SYRINGE (ML) INJECTION EVERY 12 HOURS SCHEDULED
Status: DISCONTINUED | OUTPATIENT
Start: 2025-08-09 | End: 2025-08-12 | Stop reason: HOSPADM

## 2025-08-09 RX ORDER — SODIUM CHLORIDE 9 MG/ML
INJECTION, SOLUTION INTRAVENOUS PRN
Status: DISCONTINUED | OUTPATIENT
Start: 2025-08-09 | End: 2025-08-12 | Stop reason: HOSPADM

## 2025-08-09 RX ORDER — LEVOTHYROXINE SODIUM 112 UG/1
112 TABLET ORAL DAILY
Status: DISCONTINUED | OUTPATIENT
Start: 2025-08-10 | End: 2025-08-12 | Stop reason: HOSPADM

## 2025-08-09 RX ORDER — IPRATROPIUM BROMIDE AND ALBUTEROL SULFATE 2.5; .5 MG/3ML; MG/3ML
1 SOLUTION RESPIRATORY (INHALATION)
Status: DISCONTINUED | OUTPATIENT
Start: 2025-08-09 | End: 2025-08-12 | Stop reason: HOSPADM

## 2025-08-09 RX ORDER — OXYCODONE AND ACETAMINOPHEN 10; 325 MG/1; MG/1
1 TABLET ORAL EVERY 6 HOURS PRN
Status: DISCONTINUED | OUTPATIENT
Start: 2025-08-09 | End: 2025-08-12 | Stop reason: HOSPADM

## 2025-08-09 RX ORDER — ACETAMINOPHEN 325 MG/1
650 TABLET ORAL EVERY 6 HOURS PRN
Status: DISCONTINUED | OUTPATIENT
Start: 2025-08-09 | End: 2025-08-12 | Stop reason: HOSPADM

## 2025-08-09 RX ORDER — POLYETHYLENE GLYCOL 3350 17 G/17G
17 POWDER, FOR SOLUTION ORAL DAILY PRN
Status: DISCONTINUED | OUTPATIENT
Start: 2025-08-09 | End: 2025-08-12 | Stop reason: HOSPADM

## 2025-08-09 RX ORDER — ROPINIROLE 0.25 MG/1
0.25 TABLET, FILM COATED ORAL NIGHTLY
Status: DISCONTINUED | OUTPATIENT
Start: 2025-08-09 | End: 2025-08-12 | Stop reason: HOSPADM

## 2025-08-09 RX ORDER — SODIUM CHLORIDE 0.9 % (FLUSH) 0.9 %
5-40 SYRINGE (ML) INJECTION PRN
Status: DISCONTINUED | OUTPATIENT
Start: 2025-08-09 | End: 2025-08-12 | Stop reason: HOSPADM

## 2025-08-09 RX ORDER — POTASSIUM CHLORIDE 1500 MG/1
40 TABLET, EXTENDED RELEASE ORAL PRN
Status: DISCONTINUED | OUTPATIENT
Start: 2025-08-09 | End: 2025-08-12 | Stop reason: HOSPADM

## 2025-08-09 RX ORDER — DIAZEPAM 5 MG/1
5 TABLET ORAL DAILY PRN
Status: DISCONTINUED | OUTPATIENT
Start: 2025-08-09 | End: 2025-08-12 | Stop reason: HOSPADM

## 2025-08-09 RX ORDER — CITALOPRAM HYDROBROMIDE 20 MG/1
40 TABLET ORAL DAILY
Status: DISCONTINUED | OUTPATIENT
Start: 2025-08-09 | End: 2025-08-12 | Stop reason: HOSPADM

## 2025-08-09 RX ORDER — ONDANSETRON 2 MG/ML
4 INJECTION INTRAMUSCULAR; INTRAVENOUS EVERY 6 HOURS PRN
Status: DISCONTINUED | OUTPATIENT
Start: 2025-08-09 | End: 2025-08-12 | Stop reason: HOSPADM

## 2025-08-09 RX ORDER — MIRTAZAPINE 15 MG/1
15 TABLET, FILM COATED ORAL NIGHTLY
Status: DISCONTINUED | OUTPATIENT
Start: 2025-08-09 | End: 2025-08-12 | Stop reason: HOSPADM

## 2025-08-09 RX ORDER — GUAIFENESIN 600 MG/1
600 TABLET, EXTENDED RELEASE ORAL 2 TIMES DAILY
Status: DISCONTINUED | OUTPATIENT
Start: 2025-08-09 | End: 2025-08-12 | Stop reason: HOSPADM

## 2025-08-09 RX ORDER — ONDANSETRON 4 MG/1
4 TABLET, ORALLY DISINTEGRATING ORAL EVERY 8 HOURS PRN
Status: DISCONTINUED | OUTPATIENT
Start: 2025-08-09 | End: 2025-08-12 | Stop reason: HOSPADM

## 2025-08-09 RX ADMIN — GABAPENTIN 600 MG: 600 TABLET, FILM COATED ORAL at 21:54

## 2025-08-09 RX ADMIN — METHYLPREDNISOLONE SODIUM SUCCINATE 40 MG: 40 INJECTION, POWDER, LYOPHILIZED, FOR SOLUTION INTRAMUSCULAR; INTRAVENOUS at 21:55

## 2025-08-09 RX ADMIN — GUAIFENESIN 600 MG: 600 TABLET ORAL at 21:54

## 2025-08-09 RX ADMIN — ENOXAPARIN SODIUM 40 MG: 100 INJECTION SUBCUTANEOUS at 21:55

## 2025-08-09 RX ADMIN — SODIUM CHLORIDE, PRESERVATIVE FREE 10 ML: 5 INJECTION INTRAVENOUS at 21:56

## 2025-08-09 RX ADMIN — AZITHROMYCIN MONOHYDRATE 500 MG: 500 INJECTION, POWDER, LYOPHILIZED, FOR SOLUTION INTRAVENOUS at 21:57

## 2025-08-09 RX ADMIN — ROPINIROLE HYDROCHLORIDE 0.25 MG: 0.25 TABLET, FILM COATED ORAL at 21:54

## 2025-08-09 RX ADMIN — MIRTAZAPINE 15 MG: 15 TABLET, FILM COATED ORAL at 21:54

## 2025-08-09 SDOH — ECONOMIC STABILITY: FOOD INSECURITY: WITHIN THE PAST 12 MONTHS, YOU WORRIED THAT YOUR FOOD WOULD RUN OUT BEFORE YOU GOT MONEY TO BUY MORE.: NEVER TRUE

## 2025-08-09 SDOH — ECONOMIC STABILITY: INCOME INSECURITY: HOW HARD IS IT FOR YOU TO PAY FOR THE VERY BASICS LIKE FOOD, HOUSING, MEDICAL CARE, AND HEATING?: NOT HARD AT ALL

## 2025-08-09 SDOH — ECONOMIC STABILITY: INCOME INSECURITY: IN THE PAST 12 MONTHS, HAS THE ELECTRIC, GAS, OIL, OR WATER COMPANY THREATENED TO SHUT OFF SERVICE IN YOUR HOME?: NO

## 2025-08-09 ASSESSMENT — PATIENT HEALTH QUESTIONNAIRE - PHQ9
1. LITTLE INTEREST OR PLEASURE IN DOING THINGS: NOT AT ALL
SUM OF ALL RESPONSES TO PHQ QUESTIONS 1-9: 0
2. FEELING DOWN, DEPRESSED OR HOPELESS: NOT AT ALL

## 2025-08-10 LAB
ALBUMIN SERPL-MCNC: 3.7 G/DL (ref 3.5–5.2)
ALP SERPL-CCNC: 118 U/L (ref 35–104)
ALT SERPL-CCNC: 12 U/L (ref 10–35)
ANION GAP SERPL CALCULATED.3IONS-SCNC: 8 MMOL/L (ref 8–16)
AST SERPL-CCNC: 19 U/L (ref 10–35)
BASE EXCESS VENOUS: 16 MMOL/L
BASOPHILS # BLD: 0 K/UL (ref 0–0.2)
BASOPHILS NFR BLD: 0.2 % (ref 0–1)
BILIRUB SERPL-MCNC: 0.3 MG/DL (ref 0.2–1.2)
BUN SERPL-MCNC: 7 MG/DL (ref 8–23)
CALCIUM SERPL-MCNC: 9.3 MG/DL (ref 8.8–10.2)
CARBOXYHEMOGLOBIN: 2.2 %
CHLORIDE SERPL-SCNC: 100 MMOL/L (ref 98–107)
CO2 SERPL-SCNC: 34 MMOL/L (ref 22–29)
CREAT SERPL-MCNC: 0.6 MG/DL (ref 0.5–0.9)
EOSINOPHIL # BLD: 0 K/UL (ref 0–0.6)
EOSINOPHIL NFR BLD: 0 % (ref 0–5)
ERYTHROCYTE [DISTWIDTH] IN BLOOD BY AUTOMATED COUNT: 14.1 % (ref 11.5–14.5)
GLUCOSE SERPL-MCNC: 147 MG/DL (ref 70–99)
HCO3 VENOUS: 45 MMOL/L (ref 23–29)
HCT VFR BLD AUTO: 39.6 % (ref 37–47)
HGB BLD-MCNC: 12.1 G/DL (ref 12–16)
IMM GRANULOCYTES # BLD: 0 K/UL
LYMPHOCYTES # BLD: 0.3 K/UL (ref 1.1–4.5)
LYMPHOCYTES NFR BLD: 7.5 % (ref 20–40)
MCH RBC QN AUTO: 28.2 PG (ref 27–31)
MCHC RBC AUTO-ENTMCNC: 30.6 G/DL (ref 33–37)
MCV RBC AUTO: 92.3 FL (ref 81–99)
METHEMOGLOBIN VENOUS: 1.2 %
MONOCYTES # BLD: 0.1 K/UL (ref 0–0.9)
MONOCYTES NFR BLD: 1.1 % (ref 0–10)
NEUTROPHILS # BLD: 4 K/UL (ref 1.5–7.5)
NEUTS SEG NFR BLD: 90.7 % (ref 50–65)
O2 CONTENT, VEN: 17 ML/DL
O2 SAT, VEN: 95 %
O2 THERAPY: ABNORMAL
PCO2 VENOUS: 74 MMHG (ref 40–50)
PH VENOUS: 7.39 (ref 7.35–7.45)
PLATELET # BLD AUTO: 191 K/UL (ref 130–400)
PMV BLD AUTO: 10.4 FL (ref 9.4–12.3)
PO2 VENOUS: 98 MMHG
POTASSIUM SERPL-SCNC: 3.9 MMOL/L (ref 3.5–5)
PROT SERPL-MCNC: 6.3 G/DL (ref 6.4–8.3)
RBC # BLD AUTO: 4.29 M/UL (ref 4.2–5.4)
SODIUM SERPL-SCNC: 142 MMOL/L (ref 136–145)
WBC # BLD AUTO: 4.4 K/UL (ref 4.8–10.8)

## 2025-08-10 PROCEDURE — 6370000000 HC RX 637 (ALT 250 FOR IP)

## 2025-08-10 PROCEDURE — 2500000003 HC RX 250 WO HCPCS

## 2025-08-10 PROCEDURE — 94761 N-INVAS EAR/PLS OXIMETRY MLT: CPT

## 2025-08-10 PROCEDURE — 82800 BLOOD PH: CPT

## 2025-08-10 PROCEDURE — 82803 BLOOD GASES ANY COMBINATION: CPT

## 2025-08-10 PROCEDURE — 1200000000 HC SEMI PRIVATE

## 2025-08-10 PROCEDURE — 85025 COMPLETE CBC W/AUTO DIFF WBC: CPT

## 2025-08-10 PROCEDURE — 6360000002 HC RX W HCPCS

## 2025-08-10 PROCEDURE — 2580000003 HC RX 258

## 2025-08-10 PROCEDURE — 94640 AIRWAY INHALATION TREATMENT: CPT

## 2025-08-10 PROCEDURE — 2700000000 HC OXYGEN THERAPY PER DAY

## 2025-08-10 PROCEDURE — 6370000000 HC RX 637 (ALT 250 FOR IP): Performed by: INTERNAL MEDICINE

## 2025-08-10 PROCEDURE — 36415 COLL VENOUS BLD VENIPUNCTURE: CPT

## 2025-08-10 PROCEDURE — 80053 COMPREHEN METABOLIC PANEL: CPT

## 2025-08-10 RX ORDER — OSELTAMIVIR PHOSPHATE 75 MG/1
75 CAPSULE ORAL 2 TIMES DAILY
Status: DISCONTINUED | OUTPATIENT
Start: 2025-08-10 | End: 2025-08-12 | Stop reason: HOSPADM

## 2025-08-10 RX ADMIN — GABAPENTIN 600 MG: 600 TABLET, FILM COATED ORAL at 20:25

## 2025-08-10 RX ADMIN — METHYLPREDNISOLONE SODIUM SUCCINATE 40 MG: 40 INJECTION, POWDER, LYOPHILIZED, FOR SOLUTION INTRAMUSCULAR; INTRAVENOUS at 06:01

## 2025-08-10 RX ADMIN — GUAIFENESIN 600 MG: 600 TABLET ORAL at 20:25

## 2025-08-10 RX ADMIN — OSELTAMIVIR PHOSPHATE 75 MG: 75 CAPSULE ORAL at 20:25

## 2025-08-10 RX ADMIN — IPRATROPIUM BROMIDE AND ALBUTEROL SULFATE 1 DOSE: 2.5; .5 SOLUTION RESPIRATORY (INHALATION) at 19:27

## 2025-08-10 RX ADMIN — MIRTAZAPINE 15 MG: 15 TABLET, FILM COATED ORAL at 20:25

## 2025-08-10 RX ADMIN — OXYCODONE AND ACETAMINOPHEN 1 TABLET: 10; 325 TABLET ORAL at 23:57

## 2025-08-10 RX ADMIN — OXYCODONE AND ACETAMINOPHEN 1 TABLET: 10; 325 TABLET ORAL at 17:26

## 2025-08-10 RX ADMIN — SODIUM CHLORIDE, PRESERVATIVE FREE 10 ML: 5 INJECTION INTRAVENOUS at 08:55

## 2025-08-10 RX ADMIN — IPRATROPIUM BROMIDE AND ALBUTEROL SULFATE 1 DOSE: 2.5; .5 SOLUTION RESPIRATORY (INHALATION) at 07:58

## 2025-08-10 RX ADMIN — METHYLPREDNISOLONE SODIUM SUCCINATE 40 MG: 40 INJECTION, POWDER, LYOPHILIZED, FOR SOLUTION INTRAMUSCULAR; INTRAVENOUS at 17:18

## 2025-08-10 RX ADMIN — IPRATROPIUM BROMIDE AND ALBUTEROL SULFATE 1 DOSE: 2.5; .5 SOLUTION RESPIRATORY (INHALATION) at 11:08

## 2025-08-10 RX ADMIN — AZITHROMYCIN MONOHYDRATE 500 MG: 500 INJECTION, POWDER, LYOPHILIZED, FOR SOLUTION INTRAVENOUS at 17:26

## 2025-08-10 RX ADMIN — ROPINIROLE HYDROCHLORIDE 0.25 MG: 0.25 TABLET, FILM COATED ORAL at 20:25

## 2025-08-10 RX ADMIN — ENOXAPARIN SODIUM 40 MG: 100 INJECTION SUBCUTANEOUS at 20:25

## 2025-08-10 RX ADMIN — PANTOPRAZOLE SODIUM 40 MG: 40 TABLET, DELAYED RELEASE ORAL at 06:01

## 2025-08-10 RX ADMIN — LEVOTHYROXINE SODIUM 112 MCG: 0.11 TABLET ORAL at 06:01

## 2025-08-10 RX ADMIN — IPRATROPIUM BROMIDE AND ALBUTEROL SULFATE 1 DOSE: 2.5; .5 SOLUTION RESPIRATORY (INHALATION) at 15:02

## 2025-08-10 RX ADMIN — OXYCODONE AND ACETAMINOPHEN 1 TABLET: 10; 325 TABLET ORAL at 11:15

## 2025-08-10 RX ADMIN — GUAIFENESIN 600 MG: 600 TABLET ORAL at 08:56

## 2025-08-10 RX ADMIN — SODIUM CHLORIDE, PRESERVATIVE FREE 10 ML: 5 INJECTION INTRAVENOUS at 17:18

## 2025-08-10 RX ADMIN — OSELTAMIVIR PHOSPHATE 75 MG: 75 CAPSULE ORAL at 11:15

## 2025-08-10 ASSESSMENT — PAIN - FUNCTIONAL ASSESSMENT
PAIN_FUNCTIONAL_ASSESSMENT: 0-10

## 2025-08-10 ASSESSMENT — PAIN DESCRIPTION - LOCATION
LOCATION: BACK

## 2025-08-10 ASSESSMENT — PAIN SCALES - GENERAL
PAINLEVEL_OUTOF10: 10
PAINLEVEL_OUTOF10: 8
PAINLEVEL_OUTOF10: 8
PAINLEVEL_OUTOF10: 7
PAINLEVEL_OUTOF10: 10

## 2025-08-10 ASSESSMENT — PAIN DESCRIPTION - ORIENTATION: ORIENTATION: MID

## 2025-08-10 ASSESSMENT — PAIN DESCRIPTION - DESCRIPTORS
DESCRIPTORS: DISCOMFORT
DESCRIPTORS: DISCOMFORT
DESCRIPTORS: ACHING

## 2025-08-11 LAB
ALBUMIN SERPL-MCNC: 3.4 G/DL (ref 3.5–5.2)
ALP SERPL-CCNC: 95 U/L (ref 35–104)
ALT SERPL-CCNC: 9 U/L (ref 10–35)
ANION GAP SERPL CALCULATED.3IONS-SCNC: 8 MMOL/L (ref 8–16)
AST SERPL-CCNC: 12 U/L (ref 10–35)
BASOPHILS # BLD: 0 K/UL (ref 0–0.2)
BASOPHILS NFR BLD: 0 % (ref 0–1)
BILIRUB SERPL-MCNC: <0.2 MG/DL (ref 0.2–1.2)
BUN SERPL-MCNC: 18 MG/DL (ref 8–23)
CALCIUM SERPL-MCNC: 8.9 MG/DL (ref 8.8–10.2)
CHLORIDE SERPL-SCNC: 102 MMOL/L (ref 98–107)
CO2 SERPL-SCNC: 34 MMOL/L (ref 22–29)
CREAT SERPL-MCNC: 0.6 MG/DL (ref 0.5–0.9)
EKG P AXIS: 81 DEGREES
EKG P-R INTERVAL: 136 MS
EKG Q-T INTERVAL: 328 MS
EKG QRS DURATION: 84 MS
EKG QTC CALCULATION (BAZETT): 434 MS
EKG T AXIS: 75 DEGREES
EOSINOPHIL # BLD: 0 K/UL (ref 0–0.6)
EOSINOPHIL NFR BLD: 0 % (ref 0–5)
ERYTHROCYTE [DISTWIDTH] IN BLOOD BY AUTOMATED COUNT: 14.3 % (ref 11.5–14.5)
GLUCOSE SERPL-MCNC: 132 MG/DL (ref 70–99)
HCT VFR BLD AUTO: 34.3 % (ref 37–47)
HGB BLD-MCNC: 10.7 G/DL (ref 12–16)
IMM GRANULOCYTES # BLD: 0 K/UL
LYMPHOCYTES # BLD: 0.4 K/UL (ref 1.1–4.5)
LYMPHOCYTES NFR BLD: 3.5 % (ref 20–40)
MCH RBC QN AUTO: 28.5 PG (ref 27–31)
MCHC RBC AUTO-ENTMCNC: 31.2 G/DL (ref 33–37)
MCV RBC AUTO: 91.5 FL (ref 81–99)
MONOCYTES # BLD: 0.3 K/UL (ref 0–0.9)
MONOCYTES NFR BLD: 3 % (ref 0–10)
NEUTROPHILS # BLD: 9.5 K/UL (ref 1.5–7.5)
NEUTS SEG NFR BLD: 93.1 % (ref 50–65)
PLATELET # BLD AUTO: 191 K/UL (ref 130–400)
PMV BLD AUTO: 10.6 FL (ref 9.4–12.3)
POTASSIUM SERPL-SCNC: 3.6 MMOL/L (ref 3.5–5)
PROT SERPL-MCNC: 5.4 G/DL (ref 6.4–8.3)
RBC # BLD AUTO: 3.75 M/UL (ref 4.2–5.4)
SODIUM SERPL-SCNC: 144 MMOL/L (ref 136–145)
WBC # BLD AUTO: 10.2 K/UL (ref 4.8–10.8)

## 2025-08-11 PROCEDURE — 36415 COLL VENOUS BLD VENIPUNCTURE: CPT

## 2025-08-11 PROCEDURE — 6370000000 HC RX 637 (ALT 250 FOR IP): Performed by: INTERNAL MEDICINE

## 2025-08-11 PROCEDURE — 94761 N-INVAS EAR/PLS OXIMETRY MLT: CPT

## 2025-08-11 PROCEDURE — 2500000003 HC RX 250 WO HCPCS

## 2025-08-11 PROCEDURE — 6370000000 HC RX 637 (ALT 250 FOR IP)

## 2025-08-11 PROCEDURE — 94150 VITAL CAPACITY TEST: CPT

## 2025-08-11 PROCEDURE — 85025 COMPLETE CBC W/AUTO DIFF WBC: CPT

## 2025-08-11 PROCEDURE — 94640 AIRWAY INHALATION TREATMENT: CPT

## 2025-08-11 PROCEDURE — 80053 COMPREHEN METABOLIC PANEL: CPT

## 2025-08-11 PROCEDURE — 6360000002 HC RX W HCPCS

## 2025-08-11 PROCEDURE — 2580000003 HC RX 258

## 2025-08-11 PROCEDURE — 2700000000 HC OXYGEN THERAPY PER DAY

## 2025-08-11 PROCEDURE — 1200000000 HC SEMI PRIVATE

## 2025-08-11 RX ADMIN — OXYCODONE AND ACETAMINOPHEN 1 TABLET: 10; 325 TABLET ORAL at 19:18

## 2025-08-11 RX ADMIN — PANTOPRAZOLE SODIUM 40 MG: 40 TABLET, DELAYED RELEASE ORAL at 05:26

## 2025-08-11 RX ADMIN — GABAPENTIN 600 MG: 600 TABLET, FILM COATED ORAL at 09:08

## 2025-08-11 RX ADMIN — OXYCODONE AND ACETAMINOPHEN 1 TABLET: 10; 325 TABLET ORAL at 11:33

## 2025-08-11 RX ADMIN — SODIUM CHLORIDE, PRESERVATIVE FREE 10 ML: 5 INJECTION INTRAVENOUS at 09:09

## 2025-08-11 RX ADMIN — METHYLPREDNISOLONE SODIUM SUCCINATE 40 MG: 40 INJECTION, POWDER, LYOPHILIZED, FOR SOLUTION INTRAMUSCULAR; INTRAVENOUS at 16:36

## 2025-08-11 RX ADMIN — ENOXAPARIN SODIUM 40 MG: 100 INJECTION SUBCUTANEOUS at 19:18

## 2025-08-11 RX ADMIN — MIRTAZAPINE 15 MG: 15 TABLET, FILM COATED ORAL at 19:19

## 2025-08-11 RX ADMIN — IPRATROPIUM BROMIDE AND ALBUTEROL SULFATE 1 DOSE: 2.5; .5 SOLUTION RESPIRATORY (INHALATION) at 10:39

## 2025-08-11 RX ADMIN — AZITHROMYCIN MONOHYDRATE 500 MG: 500 INJECTION, POWDER, LYOPHILIZED, FOR SOLUTION INTRAVENOUS at 16:42

## 2025-08-11 RX ADMIN — CITALOPRAM HYDROBROMIDE 40 MG: 20 TABLET ORAL at 09:07

## 2025-08-11 RX ADMIN — GUAIFENESIN 600 MG: 600 TABLET ORAL at 19:19

## 2025-08-11 RX ADMIN — ROPINIROLE HYDROCHLORIDE 0.25 MG: 0.25 TABLET, FILM COATED ORAL at 19:20

## 2025-08-11 RX ADMIN — LEVOTHYROXINE SODIUM 112 MCG: 0.11 TABLET ORAL at 05:26

## 2025-08-11 RX ADMIN — SODIUM CHLORIDE, PRESERVATIVE FREE 10 ML: 5 INJECTION INTRAVENOUS at 19:20

## 2025-08-11 RX ADMIN — METHYLPREDNISOLONE SODIUM SUCCINATE 40 MG: 40 INJECTION, POWDER, LYOPHILIZED, FOR SOLUTION INTRAMUSCULAR; INTRAVENOUS at 05:26

## 2025-08-11 RX ADMIN — OXYCODONE AND ACETAMINOPHEN 1 TABLET: 10; 325 TABLET ORAL at 05:28

## 2025-08-11 RX ADMIN — OSELTAMIVIR PHOSPHATE 75 MG: 75 CAPSULE ORAL at 09:07

## 2025-08-11 RX ADMIN — IPRATROPIUM BROMIDE AND ALBUTEROL SULFATE 1 DOSE: 2.5; .5 SOLUTION RESPIRATORY (INHALATION) at 06:44

## 2025-08-11 RX ADMIN — GABAPENTIN 600 MG: 600 TABLET, FILM COATED ORAL at 19:20

## 2025-08-11 RX ADMIN — IPRATROPIUM BROMIDE AND ALBUTEROL SULFATE 1 DOSE: 2.5; .5 SOLUTION RESPIRATORY (INHALATION) at 19:13

## 2025-08-11 RX ADMIN — OSELTAMIVIR PHOSPHATE 75 MG: 75 CAPSULE ORAL at 19:19

## 2025-08-11 RX ADMIN — GUAIFENESIN 600 MG: 600 TABLET ORAL at 09:08

## 2025-08-11 ASSESSMENT — PAIN DESCRIPTION - DESCRIPTORS
DESCRIPTORS: ACHING

## 2025-08-11 ASSESSMENT — PAIN - FUNCTIONAL ASSESSMENT
PAIN_FUNCTIONAL_ASSESSMENT: 0-10
PAIN_FUNCTIONAL_ASSESSMENT: 0-10
PAIN_FUNCTIONAL_ASSESSMENT: PREVENTS OR INTERFERES SOME ACTIVE ACTIVITIES AND ADLS
PAIN_FUNCTIONAL_ASSESSMENT: 0-10
PAIN_FUNCTIONAL_ASSESSMENT: PREVENTS OR INTERFERES SOME ACTIVE ACTIVITIES AND ADLS
PAIN_FUNCTIONAL_ASSESSMENT: 0-10

## 2025-08-11 ASSESSMENT — PAIN DESCRIPTION - ORIENTATION
ORIENTATION: MID
ORIENTATION: MID;UPPER

## 2025-08-11 ASSESSMENT — PAIN SCALES - GENERAL
PAINLEVEL_OUTOF10: 4
PAINLEVEL_OUTOF10: 9
PAINLEVEL_OUTOF10: 8
PAINLEVEL_OUTOF10: 7

## 2025-08-11 ASSESSMENT — PAIN DESCRIPTION - LOCATION
LOCATION: BACK
LOCATION: BACK
LOCATION: GENERALIZED

## 2025-08-12 ENCOUNTER — READMISSION MANAGEMENT (OUTPATIENT)
Dept: CALL CENTER | Facility: HOSPITAL | Age: 70
End: 2025-08-12
Payer: MEDICARE

## 2025-08-12 VITALS
RESPIRATION RATE: 16 BRPM | BODY MASS INDEX: 25.77 KG/M2 | DIASTOLIC BLOOD PRESSURE: 85 MMHG | HEIGHT: 62 IN | TEMPERATURE: 98.1 F | SYSTOLIC BLOOD PRESSURE: 144 MMHG | OXYGEN SATURATION: 94 % | HEART RATE: 95 BPM | WEIGHT: 140.06 LBS

## 2025-08-12 LAB
ALBUMIN SERPL-MCNC: 3.4 G/DL (ref 3.5–5.2)
ALP SERPL-CCNC: 97 U/L (ref 35–104)
ALT SERPL-CCNC: 11 U/L (ref 10–35)
ANION GAP SERPL CALCULATED.3IONS-SCNC: 8 MMOL/L (ref 8–16)
AST SERPL-CCNC: 15 U/L (ref 10–35)
BASOPHILS # BLD: 0 K/UL (ref 0–0.2)
BASOPHILS NFR BLD: 0.1 % (ref 0–1)
BILIRUB SERPL-MCNC: <0.2 MG/DL (ref 0.2–1.2)
BUN SERPL-MCNC: 19 MG/DL (ref 8–23)
CALCIUM SERPL-MCNC: 8.5 MG/DL (ref 8.8–10.2)
CHLORIDE SERPL-SCNC: 102 MMOL/L (ref 98–107)
CO2 SERPL-SCNC: 33 MMOL/L (ref 22–29)
CREAT SERPL-MCNC: 0.7 MG/DL (ref 0.5–0.9)
EOSINOPHIL # BLD: 0 K/UL (ref 0–0.6)
EOSINOPHIL NFR BLD: 0 % (ref 0–5)
ERYTHROCYTE [DISTWIDTH] IN BLOOD BY AUTOMATED COUNT: 14.6 % (ref 11.5–14.5)
GLUCOSE SERPL-MCNC: 119 MG/DL (ref 70–99)
HCT VFR BLD AUTO: 36.3 % (ref 37–47)
HGB BLD-MCNC: 11.4 G/DL (ref 12–16)
IMM GRANULOCYTES # BLD: 0 K/UL
LYMPHOCYTES # BLD: 0.3 K/UL (ref 1.1–4.5)
LYMPHOCYTES NFR BLD: 2.8 % (ref 20–40)
MAGNESIUM SERPL-MCNC: 2.1 MG/DL (ref 1.6–2.4)
MCH RBC QN AUTO: 28.9 PG (ref 27–31)
MCHC RBC AUTO-ENTMCNC: 31.4 G/DL (ref 33–37)
MCV RBC AUTO: 91.9 FL (ref 81–99)
MONOCYTES # BLD: 0.4 K/UL (ref 0–0.9)
MONOCYTES NFR BLD: 3.3 % (ref 0–10)
NEUTROPHILS # BLD: 10.8 K/UL (ref 1.5–7.5)
NEUTS SEG NFR BLD: 93.5 % (ref 50–65)
PLATELET # BLD AUTO: 212 K/UL (ref 130–400)
PMV BLD AUTO: 10.5 FL (ref 9.4–12.3)
POTASSIUM SERPL-SCNC: 3.5 MMOL/L (ref 3.5–5)
PROT SERPL-MCNC: 5.4 G/DL (ref 6.4–8.3)
RBC # BLD AUTO: 3.95 M/UL (ref 4.2–5.4)
SODIUM SERPL-SCNC: 143 MMOL/L (ref 136–145)
WBC # BLD AUTO: 11.6 K/UL (ref 4.8–10.8)

## 2025-08-12 PROCEDURE — 6370000000 HC RX 637 (ALT 250 FOR IP)

## 2025-08-12 PROCEDURE — 6360000002 HC RX W HCPCS

## 2025-08-12 PROCEDURE — 83735 ASSAY OF MAGNESIUM: CPT

## 2025-08-12 PROCEDURE — 94761 N-INVAS EAR/PLS OXIMETRY MLT: CPT

## 2025-08-12 PROCEDURE — 85025 COMPLETE CBC W/AUTO DIFF WBC: CPT

## 2025-08-12 PROCEDURE — 94640 AIRWAY INHALATION TREATMENT: CPT

## 2025-08-12 PROCEDURE — 2500000003 HC RX 250 WO HCPCS

## 2025-08-12 PROCEDURE — 6370000000 HC RX 637 (ALT 250 FOR IP): Performed by: INTERNAL MEDICINE

## 2025-08-12 PROCEDURE — 2700000000 HC OXYGEN THERAPY PER DAY

## 2025-08-12 PROCEDURE — 6360000002 HC RX W HCPCS: Performed by: INTERNAL MEDICINE

## 2025-08-12 PROCEDURE — 80053 COMPREHEN METABOLIC PANEL: CPT

## 2025-08-12 RX ORDER — PREDNISONE 10 MG/1
TABLET ORAL
Qty: 30 TABLET | Refills: 0 | Status: SHIPPED | OUTPATIENT
Start: 2025-08-12

## 2025-08-12 RX ORDER — HEPARIN 100 UNIT/ML
3 SYRINGE INTRAVENOUS ONCE
Status: COMPLETED | OUTPATIENT
Start: 2025-08-12 | End: 2025-08-12

## 2025-08-12 RX ADMIN — METHYLPREDNISOLONE SODIUM SUCCINATE 40 MG: 40 INJECTION, POWDER, LYOPHILIZED, FOR SOLUTION INTRAMUSCULAR; INTRAVENOUS at 05:13

## 2025-08-12 RX ADMIN — CITALOPRAM HYDROBROMIDE 40 MG: 20 TABLET ORAL at 09:05

## 2025-08-12 RX ADMIN — GABAPENTIN 600 MG: 600 TABLET, FILM COATED ORAL at 09:05

## 2025-08-12 RX ADMIN — OXYCODONE AND ACETAMINOPHEN 1 TABLET: 10; 325 TABLET ORAL at 09:04

## 2025-08-12 RX ADMIN — OSELTAMIVIR PHOSPHATE 75 MG: 75 CAPSULE ORAL at 09:04

## 2025-08-12 RX ADMIN — IPRATROPIUM BROMIDE AND ALBUTEROL SULFATE 1 DOSE: 2.5; .5 SOLUTION RESPIRATORY (INHALATION) at 06:17

## 2025-08-12 RX ADMIN — OXYCODONE AND ACETAMINOPHEN 1 TABLET: 10; 325 TABLET ORAL at 02:20

## 2025-08-12 RX ADMIN — LEVOTHYROXINE SODIUM 112 MCG: 0.11 TABLET ORAL at 05:13

## 2025-08-12 RX ADMIN — GUAIFENESIN 600 MG: 600 TABLET ORAL at 09:05

## 2025-08-12 RX ADMIN — SODIUM CHLORIDE, PRESERVATIVE FREE 10 ML: 5 INJECTION INTRAVENOUS at 09:06

## 2025-08-12 RX ADMIN — HEPARIN 300 UNITS: 100 SYRINGE at 10:46

## 2025-08-12 RX ADMIN — PANTOPRAZOLE SODIUM 40 MG: 40 TABLET, DELAYED RELEASE ORAL at 05:13

## 2025-08-12 ASSESSMENT — PAIN SCALES - GENERAL
PAINLEVEL_OUTOF10: 4
PAINLEVEL_OUTOF10: 8
PAINLEVEL_OUTOF10: 5

## 2025-08-12 ASSESSMENT — PAIN DESCRIPTION - DESCRIPTORS: DESCRIPTORS: ACHING

## 2025-08-12 ASSESSMENT — PAIN - FUNCTIONAL ASSESSMENT
PAIN_FUNCTIONAL_ASSESSMENT: 0-10
PAIN_FUNCTIONAL_ASSESSMENT: PREVENTS OR INTERFERES SOME ACTIVE ACTIVITIES AND ADLS
PAIN_FUNCTIONAL_ASSESSMENT: 0-10

## 2025-08-12 ASSESSMENT — PAIN DESCRIPTION - ORIENTATION: ORIENTATION: MID

## 2025-08-12 ASSESSMENT — PAIN DESCRIPTION - LOCATION: LOCATION: BACK

## 2025-08-13 ENCOUNTER — TRANSITIONAL CARE MANAGEMENT TELEPHONE ENCOUNTER (OUTPATIENT)
Dept: CALL CENTER | Facility: HOSPITAL | Age: 70
End: 2025-08-13
Payer: MEDICARE

## 2025-08-13 DIAGNOSIS — G25.81 RESTLESS LEGS SYNDROME: ICD-10-CM

## 2025-08-14 ENCOUNTER — TRANSITIONAL CARE MANAGEMENT TELEPHONE ENCOUNTER (OUTPATIENT)
Dept: CALL CENTER | Facility: HOSPITAL | Age: 70
End: 2025-08-14
Payer: MEDICARE

## 2025-08-18 RX ORDER — MIRTAZAPINE 15 MG/1
15 TABLET, FILM COATED ORAL
Qty: 90 TABLET | Refills: 1 | Status: SHIPPED | OUTPATIENT
Start: 2025-08-18

## 2025-08-19 ENCOUNTER — HOSPITAL ENCOUNTER (OUTPATIENT)
Dept: RADIATION ONCOLOGY | Facility: HOSPITAL | Age: 70
Setting detail: RADIATION/ONCOLOGY SERIES
End: 2025-08-19
Payer: MEDICARE

## 2025-08-20 ENCOUNTER — OFFICE VISIT (OUTPATIENT)
Age: 70
End: 2025-08-20
Payer: MEDICARE

## 2025-08-20 VITALS
DIASTOLIC BLOOD PRESSURE: 64 MMHG | HEIGHT: 67 IN | HEART RATE: 98 BPM | SYSTOLIC BLOOD PRESSURE: 124 MMHG | OXYGEN SATURATION: 96 % | WEIGHT: 145 LBS | BODY MASS INDEX: 22.76 KG/M2

## 2025-08-20 DIAGNOSIS — Z29.89 IMMUNOTHERAPY: ICD-10-CM

## 2025-08-20 DIAGNOSIS — C34.81 SMALL CELL CARCINOMA OF OVERLAPPING SITES OF RIGHT LUNG: Primary | ICD-10-CM

## 2025-08-20 DIAGNOSIS — F41.0 PANIC ATTACKS: ICD-10-CM

## 2025-08-20 DIAGNOSIS — G89.4 CHRONIC PAIN DISORDER: ICD-10-CM

## 2025-08-20 DIAGNOSIS — F41.9 ANXIETY: ICD-10-CM

## 2025-08-20 DIAGNOSIS — Z92.3 HISTORY OF RADIATION THERAPY: ICD-10-CM

## 2025-08-20 DIAGNOSIS — Z87.891 FORMER SMOKER: ICD-10-CM

## 2025-08-21 RX ORDER — GABAPENTIN 600 MG/1
600 TABLET ORAL EVERY 12 HOURS SCHEDULED
Qty: 60 TABLET | Refills: 0 | Status: SHIPPED | OUTPATIENT
Start: 2025-08-21

## 2025-08-21 RX ORDER — DIAZEPAM 5 MG/1
5 TABLET ORAL EVERY 12 HOURS PRN
Qty: 60 TABLET | Refills: 0 | Status: SHIPPED | OUTPATIENT
Start: 2025-08-21

## 2025-08-21 RX ORDER — CITALOPRAM HYDROBROMIDE 40 MG/1
40 TABLET ORAL DAILY
Qty: 90 TABLET | Refills: 1 | Status: SHIPPED | OUTPATIENT
Start: 2025-08-21

## 2025-08-27 ENCOUNTER — TELEPHONE (OUTPATIENT)
Dept: PALLATIVE CARE | Age: 70
End: 2025-08-27

## 2025-09-03 ENCOUNTER — OFFICE VISIT (OUTPATIENT)
Dept: HEMATOLOGY | Age: 70
End: 2025-09-03
Payer: MEDICARE

## 2025-09-03 ENCOUNTER — OFFICE VISIT (OUTPATIENT)
Dept: PALLATIVE CARE | Age: 70
End: 2025-09-03
Payer: MEDICARE

## 2025-09-03 ENCOUNTER — HOSPITAL ENCOUNTER (OUTPATIENT)
Dept: INFUSION THERAPY | Age: 70
Discharge: HOME | End: 2025-09-03
Payer: MEDICARE

## 2025-09-03 VITALS
RESPIRATION RATE: 16 BRPM | SYSTOLIC BLOOD PRESSURE: 123 MMHG | WEIGHT: 137.9 LBS | BODY MASS INDEX: 25.38 KG/M2 | DIASTOLIC BLOOD PRESSURE: 79 MMHG | TEMPERATURE: 98.1 F | HEART RATE: 97 BPM | HEIGHT: 62 IN | OXYGEN SATURATION: 97 %

## 2025-09-03 DIAGNOSIS — G89.3 CANCER ASSOCIATED PAIN: ICD-10-CM

## 2025-09-03 DIAGNOSIS — J44.9 CHRONIC OBSTRUCTIVE PULMONARY DISEASE, UNSPECIFIED COPD TYPE (HCC): Primary | ICD-10-CM

## 2025-09-03 DIAGNOSIS — C34.01 SMALL CELL CARCINOMA OF HILUM OF RIGHT LUNG (HCC): ICD-10-CM

## 2025-09-03 DIAGNOSIS — C7A.8 NEUROENDOCRINE CARCINOMA OF LUNG (HCC): Primary | ICD-10-CM

## 2025-09-03 DIAGNOSIS — R53.83 FATIGUE DUE TO TREATMENT: ICD-10-CM

## 2025-09-03 DIAGNOSIS — C7A.8 NEUROENDOCRINE CARCINOMA OF LUNG (HCC): ICD-10-CM

## 2025-09-03 DIAGNOSIS — Z51.12 ENCOUNTER FOR ANTINEOPLASTIC IMMUNOTHERAPY: ICD-10-CM

## 2025-09-03 DIAGNOSIS — R53.83 FATIGUE DUE TO TREATMENT: Primary | ICD-10-CM

## 2025-09-03 DIAGNOSIS — Z71.89 CARE PLAN DISCUSSED WITH PATIENT: ICD-10-CM

## 2025-09-03 DIAGNOSIS — C34.01 SMALL CELL CARCINOMA OF HILUM OF RIGHT LUNG (HCC): Primary | ICD-10-CM

## 2025-09-03 DIAGNOSIS — Z51.5 ENCOUNTER FOR PALLIATIVE CARE: ICD-10-CM

## 2025-09-03 LAB
ALBUMIN SERPL-MCNC: 3.7 G/DL (ref 3.5–5.2)
ALP SERPL-CCNC: 127 U/L (ref 35–104)
ALT SERPL-CCNC: 15 U/L (ref 5–33)
ANION GAP SERPL CALCULATED.3IONS-SCNC: 10 MMOL/L (ref 7–19)
AST SERPL-CCNC: 22 U/L (ref 5–32)
BASOPHILS # BLD: 0.03 K/UL (ref 0–0.2)
BASOPHILS NFR BLD: 0.5 % (ref 0–1)
BILIRUB SERPL-MCNC: <0.2 MG/DL (ref 0–1.2)
BUN SERPL-MCNC: 9 MG/DL (ref 8–23)
CALCIUM SERPL-MCNC: 9.1 MG/DL (ref 8.8–10.2)
CHLORIDE SERPL-SCNC: 98 MMOL/L (ref 98–107)
CO2 SERPL-SCNC: 33 MMOL/L (ref 22–29)
CORTIS PM SERPL-MCNC: 7.5 UG/DL (ref 2.5–11.9)
CREAT SERPL-MCNC: 0.7 MG/DL (ref 0.5–0.9)
EOSINOPHIL # BLD: 0.36 K/UL (ref 0–0.6)
EOSINOPHIL NFR BLD: 5.5 % (ref 0–5)
ERYTHROCYTE [DISTWIDTH] IN BLOOD BY AUTOMATED COUNT: 15 % (ref 11.5–14.5)
GLUCOSE SERPL-MCNC: 106 MG/DL (ref 70–99)
HCT VFR BLD AUTO: 37.4 % (ref 37–47)
HGB BLD-MCNC: 11.8 G/DL (ref 12–16)
LYMPHOCYTES # BLD: 0.53 K/UL (ref 1.1–4.5)
LYMPHOCYTES NFR BLD: 8.1 % (ref 20–40)
MCH RBC QN AUTO: 28.4 PG (ref 27–31)
MCHC RBC AUTO-ENTMCNC: 31.6 G/DL (ref 33–37)
MCV RBC AUTO: 90.1 FL (ref 81–99)
MONOCYTES # BLD: 0.69 K/UL (ref 0–0.9)
MONOCYTES NFR BLD: 10.5 % (ref 1–10)
NEUTROPHILS # BLD: 4.95 K/UL (ref 1.5–7.5)
NEUTS SEG NFR BLD: 75.2 % (ref 50–65)
PLATELET # BLD AUTO: 188 K/UL (ref 130–400)
PMV BLD AUTO: 9.8 FL (ref 9.4–12.3)
POTASSIUM SERPL-SCNC: 3.8 MMOL/L (ref 3.5–5.1)
PROT SERPL-MCNC: 6.4 G/DL (ref 6.4–8.3)
RBC # BLD AUTO: 4.15 M/UL (ref 4.2–5.4)
SODIUM SERPL-SCNC: 141 MMOL/L (ref 136–145)
T4 FREE SERPL-MCNC: 1.18 NG/DL (ref 0.93–1.7)
TSH SERPL DL<=0.005 MIU/L-ACNC: 2.99 UIU/ML (ref 0.27–4.2)
WBC # BLD AUTO: 6.57 K/UL (ref 4.8–10.8)

## 2025-09-03 PROCEDURE — 82533 TOTAL CORTISOL: CPT

## 2025-09-03 PROCEDURE — 6360000002 HC RX W HCPCS: Performed by: INTERNAL MEDICINE

## 2025-09-03 PROCEDURE — 1036F TOBACCO NON-USER: CPT | Performed by: NURSE PRACTITIONER

## 2025-09-03 PROCEDURE — G8419 CALC BMI OUT NRM PARAM NOF/U: HCPCS | Performed by: NURSE PRACTITIONER

## 2025-09-03 PROCEDURE — 1111F DSCHRG MED/CURRENT MED MERGE: CPT | Performed by: NURSE PRACTITIONER

## 2025-09-03 PROCEDURE — 3023F SPIROM DOC REV: CPT | Performed by: NURSE PRACTITIONER

## 2025-09-03 PROCEDURE — G8399 PT W/DXA RESULTS DOCUMENT: HCPCS | Performed by: NURSE PRACTITIONER

## 2025-09-03 PROCEDURE — 2500000003 HC RX 250 WO HCPCS: Performed by: INTERNAL MEDICINE

## 2025-09-03 PROCEDURE — 84443 ASSAY THYROID STIM HORMONE: CPT

## 2025-09-03 PROCEDURE — 80053 COMPREHEN METABOLIC PANEL: CPT

## 2025-09-03 PROCEDURE — 3017F COLORECTAL CA SCREEN DOC REV: CPT | Performed by: NURSE PRACTITIONER

## 2025-09-03 PROCEDURE — 1090F PRES/ABSN URINE INCON ASSESS: CPT | Performed by: NURSE PRACTITIONER

## 2025-09-03 PROCEDURE — 85025 COMPLETE CBC W/AUTO DIFF WBC: CPT

## 2025-09-03 PROCEDURE — 84439 ASSAY OF FREE THYROXINE: CPT

## 2025-09-03 PROCEDURE — 1123F ACP DISCUSS/DSCN MKR DOCD: CPT | Performed by: NURSE PRACTITIONER

## 2025-09-03 PROCEDURE — 99214 OFFICE O/P EST MOD 30 MIN: CPT | Performed by: NURSE PRACTITIONER

## 2025-09-03 PROCEDURE — 2580000003 HC RX 258: Performed by: INTERNAL MEDICINE

## 2025-09-03 PROCEDURE — 1159F MED LIST DOCD IN RCRD: CPT | Performed by: NURSE PRACTITIONER

## 2025-09-03 PROCEDURE — G8428 CUR MEDS NOT DOCUMENT: HCPCS | Performed by: NURSE PRACTITIONER

## 2025-09-03 PROCEDURE — 96413 CHEMO IV INFUSION 1 HR: CPT

## 2025-09-03 RX ORDER — SODIUM CHLORIDE 0.9 % (FLUSH) 0.9 %
5-40 SYRINGE (ML) INJECTION PRN
Status: DISCONTINUED | OUTPATIENT
Start: 2025-09-03 | End: 2025-09-04 | Stop reason: HOSPADM

## 2025-09-03 RX ORDER — MEPERIDINE HYDROCHLORIDE 50 MG/ML
12.5 INJECTION INTRAMUSCULAR; INTRAVENOUS; SUBCUTANEOUS PRN
Status: CANCELLED | OUTPATIENT
Start: 2025-09-03

## 2025-09-03 RX ORDER — HEPARIN 100 UNIT/ML
500 SYRINGE INTRAVENOUS PRN
Status: DISCONTINUED | OUTPATIENT
Start: 2025-09-03 | End: 2025-09-04 | Stop reason: HOSPADM

## 2025-09-03 RX ORDER — ALBUTEROL SULFATE 90 UG/1
4 INHALANT RESPIRATORY (INHALATION) PRN
Status: CANCELLED | OUTPATIENT
Start: 2025-09-03

## 2025-09-03 RX ORDER — ONDANSETRON 2 MG/ML
8 INJECTION INTRAMUSCULAR; INTRAVENOUS
Status: CANCELLED | OUTPATIENT
Start: 2025-09-03

## 2025-09-03 RX ORDER — DIPHENHYDRAMINE HYDROCHLORIDE 50 MG/ML
50 INJECTION, SOLUTION INTRAMUSCULAR; INTRAVENOUS
Status: CANCELLED | OUTPATIENT
Start: 2025-09-03

## 2025-09-03 RX ORDER — SODIUM CHLORIDE 9 MG/ML
5-250 INJECTION, SOLUTION INTRAVENOUS PRN
Status: CANCELLED | OUTPATIENT
Start: 2025-09-03

## 2025-09-03 RX ORDER — EPINEPHRINE 1 MG/ML
0.3 INJECTION, SOLUTION, CONCENTRATE INTRAVENOUS PRN
Status: CANCELLED | OUTPATIENT
Start: 2025-09-03

## 2025-09-03 RX ORDER — SODIUM CHLORIDE 0.9 % (FLUSH) 0.9 %
5-40 SYRINGE (ML) INJECTION PRN
Status: CANCELLED | OUTPATIENT
Start: 2025-09-03

## 2025-09-03 RX ORDER — HYDROCORTISONE SODIUM SUCCINATE 100 MG/2ML
100 INJECTION INTRAMUSCULAR; INTRAVENOUS
Status: CANCELLED | OUTPATIENT
Start: 2025-09-03

## 2025-09-03 RX ORDER — FAMOTIDINE 10 MG/ML
20 INJECTION, SOLUTION INTRAVENOUS
Status: CANCELLED | OUTPATIENT
Start: 2025-09-03

## 2025-09-03 RX ORDER — HEPARIN SODIUM (PORCINE) LOCK FLUSH IV SOLN 100 UNIT/ML 100 UNIT/ML
500 SOLUTION INTRAVENOUS PRN
Status: CANCELLED | OUTPATIENT
Start: 2025-09-03

## 2025-09-03 RX ORDER — SODIUM CHLORIDE 9 MG/ML
INJECTION, SOLUTION INTRAVENOUS CONTINUOUS
Status: CANCELLED | OUTPATIENT
Start: 2025-09-03

## 2025-09-03 RX ORDER — ACETAMINOPHEN 325 MG/1
650 TABLET ORAL
Status: CANCELLED | OUTPATIENT
Start: 2025-09-03

## 2025-09-03 RX ORDER — OXYCODONE AND ACETAMINOPHEN 10; 325 MG/1; MG/1
1 TABLET ORAL EVERY 6 HOURS PRN
Qty: 120 TABLET | Refills: 0 | Status: SHIPPED | OUTPATIENT
Start: 2025-09-03 | End: 2025-10-03

## 2025-09-03 RX ADMIN — HEPARIN 500 UNITS: 100 SYRINGE at 13:30

## 2025-09-03 RX ADMIN — SODIUM CHLORIDE 1500 MG: 9 INJECTION, SOLUTION INTRAVENOUS at 12:30

## 2025-09-03 RX ADMIN — SODIUM CHLORIDE, PRESERVATIVE FREE 10 ML: 5 INJECTION INTRAVENOUS at 13:30

## 2025-09-04 ENCOUNTER — TELEPHONE (OUTPATIENT)
Dept: HEMATOLOGY | Age: 70
End: 2025-09-04

## (undated) DEVICE — ENDO KIT,LOURDES HOSPITAL: Brand: MEDLINE INDUSTRIES, INC.

## (undated) DEVICE — TBG SMPL FLTR LINE NASL 02/C02 A/ BX/100

## (undated) DEVICE — KT ASP VIZISHOT 5SYRG 5BIOPSY/VLV 22G

## (undated) DEVICE — THE CHANNEL CLEANING BRUSH IS A NYLON FLEXI BRUSH ATTACHED TO A FLEXIBLE PLASTIC SHEATH DESIGNED TO SAFELY REMOVE DEBRIS FROM FLEXIBLE ENDOSCOPES.

## (undated) DEVICE — TBG PRESS EXT

## (undated) DEVICE — Device: Brand: BALLOON

## (undated) DEVICE — VISION PROBE ADAPTER AND SUCTION ADAPTER

## (undated) DEVICE — TRAP,MUCUS SPECIMEN, 80CC: Brand: MEDLINE

## (undated) DEVICE — SENSR O2 OXIMAX FNGR A/ 18IN NONSTR

## (undated) DEVICE — FORCEPS BX L100CM DIA1.8MM WRK CHN 2MM PULM S STL RAD JAW 4

## (undated) DEVICE — Device: Brand: ION

## (undated) DEVICE — ADAPT SWVL FIBROPTIC BRONCH

## (undated) DEVICE — SINGLE USE SUCTION VALVE MAJ-209: Brand: SINGLE USE SUCTION VALVE (STERILE)

## (undated) DEVICE — SINGLE USE BIOPSY VALVE MAJ-210: Brand: SINGLE USE BIOPSY VALVE (STERILE)

## (undated) DEVICE — SWIVEL CONNECTOR